# Patient Record
Sex: MALE | Race: WHITE | NOT HISPANIC OR LATINO | Employment: FULL TIME | URBAN - NONMETROPOLITAN AREA
[De-identification: names, ages, dates, MRNs, and addresses within clinical notes are randomized per-mention and may not be internally consistent; named-entity substitution may affect disease eponyms.]

---

## 2017-01-05 ENCOUNTER — APPOINTMENT (OUTPATIENT)
Dept: LAB | Facility: MEDICAL CENTER | Age: 52
End: 2017-01-05
Payer: COMMERCIAL

## 2017-01-05 ENCOUNTER — TRANSCRIBE ORDERS (OUTPATIENT)
Dept: LAB | Facility: MEDICAL CENTER | Age: 52
End: 2017-01-05

## 2017-01-05 DIAGNOSIS — E26.09 ALDOSTERONISM WITH HYPERPLASIA OF THE ADRENAL CORTEX (HCC): ICD-10-CM

## 2017-01-05 DIAGNOSIS — E26.09 ALDOSTERONISM WITH HYPERPLASIA OF THE ADRENAL CORTEX (HCC): Primary | ICD-10-CM

## 2017-01-05 DIAGNOSIS — R80.0 ISOLATED NON-NEPHROTIC PROTEINURIA: ICD-10-CM

## 2017-01-05 LAB
ANION GAP SERPL CALCULATED.3IONS-SCNC: 7 MMOL/L (ref 4–13)
BUN SERPL-MCNC: 17 MG/DL (ref 5–25)
CALCIUM SERPL-MCNC: 9.3 MG/DL (ref 8.3–10.1)
CHLORIDE SERPL-SCNC: 106 MMOL/L (ref 100–108)
CO2 SERPL-SCNC: 28 MMOL/L (ref 21–32)
CREAT SERPL-MCNC: 1.31 MG/DL (ref 0.6–1.3)
CREAT UR-MCNC: 75.7 MG/DL
GFR SERPL CREATININE-BSD FRML MDRD: 57.7 ML/MIN/1.73SQ M
GLUCOSE SERPL-MCNC: 133 MG/DL (ref 65–140)
MICROALBUMIN UR-MCNC: 323 MG/L (ref 0–20)
MICROALBUMIN/CREAT 24H UR: 427 MG/G CREATININE (ref 0–30)
POTASSIUM SERPL-SCNC: 4 MMOL/L (ref 3.5–5.3)
SODIUM SERPL-SCNC: 141 MMOL/L (ref 136–145)

## 2017-01-05 PROCEDURE — 36415 COLL VENOUS BLD VENIPUNCTURE: CPT

## 2017-01-05 PROCEDURE — 82570 ASSAY OF URINE CREATININE: CPT | Performed by: INTERNAL MEDICINE

## 2017-01-05 PROCEDURE — 80048 BASIC METABOLIC PNL TOTAL CA: CPT

## 2017-01-05 PROCEDURE — 82043 UR ALBUMIN QUANTITATIVE: CPT | Performed by: INTERNAL MEDICINE

## 2017-01-06 ENCOUNTER — GENERIC CONVERSION - ENCOUNTER (OUTPATIENT)
Dept: OTHER | Facility: OTHER | Age: 52
End: 2017-01-06

## 2017-05-01 ENCOUNTER — ALLSCRIPTS OFFICE VISIT (OUTPATIENT)
Dept: OTHER | Facility: OTHER | Age: 52
End: 2017-05-01

## 2017-05-01 DIAGNOSIS — R53.82 CHRONIC FATIGUE: ICD-10-CM

## 2017-05-01 DIAGNOSIS — R73.01 IMPAIRED FASTING GLUCOSE: ICD-10-CM

## 2017-05-01 DIAGNOSIS — Z12.5 ENCOUNTER FOR SCREENING FOR MALIGNANT NEOPLASM OF PROSTATE: ICD-10-CM

## 2017-05-01 DIAGNOSIS — I10 ESSENTIAL (PRIMARY) HYPERTENSION: ICD-10-CM

## 2017-05-01 DIAGNOSIS — E07.9 DISORDER OF THYROID: ICD-10-CM

## 2017-05-01 DIAGNOSIS — Z12.11 ENCOUNTER FOR SCREENING FOR MALIGNANT NEOPLASM OF COLON: ICD-10-CM

## 2017-05-01 DIAGNOSIS — E78.5 HYPERLIPIDEMIA: ICD-10-CM

## 2017-05-02 ENCOUNTER — TRANSCRIBE ORDERS (OUTPATIENT)
Dept: LAB | Facility: MEDICAL CENTER | Age: 52
End: 2017-05-02

## 2017-05-02 ENCOUNTER — APPOINTMENT (OUTPATIENT)
Dept: LAB | Facility: MEDICAL CENTER | Age: 52
End: 2017-05-02
Payer: COMMERCIAL

## 2017-05-02 DIAGNOSIS — R53.82 CHRONIC FATIGUE: ICD-10-CM

## 2017-05-02 DIAGNOSIS — Z12.5 ENCOUNTER FOR SCREENING FOR MALIGNANT NEOPLASM OF PROSTATE: ICD-10-CM

## 2017-05-02 DIAGNOSIS — R73.01 IMPAIRED FASTING GLUCOSE: ICD-10-CM

## 2017-05-02 DIAGNOSIS — I10 ESSENTIAL (PRIMARY) HYPERTENSION: ICD-10-CM

## 2017-05-02 DIAGNOSIS — E78.5 HYPERLIPIDEMIA: ICD-10-CM

## 2017-05-02 DIAGNOSIS — Z12.11 ENCOUNTER FOR SCREENING FOR MALIGNANT NEOPLASM OF COLON: ICD-10-CM

## 2017-05-02 DIAGNOSIS — E07.9 DISORDER OF THYROID: ICD-10-CM

## 2017-05-02 LAB
CHOLEST SERPL-MCNC: 272 MG/DL (ref 50–200)
EST. AVERAGE GLUCOSE BLD GHB EST-MCNC: 140 MG/DL
HBA1C MFR BLD: 6.5 % (ref 4.2–6.3)
HDLC SERPL-MCNC: 55 MG/DL (ref 40–60)
HEMOCCULT STL QL IA: NEGATIVE
LDLC SERPL CALC-MCNC: 174 MG/DL (ref 0–100)
PROLACTIN SERPL-MCNC: 6.2 NG/ML (ref 2.5–17.4)
PSA SERPL-MCNC: 1 NG/ML (ref 0–4)
TRIGL SERPL-MCNC: 213 MG/DL
TSH SERPL DL<=0.05 MIU/L-ACNC: 1.34 UIU/ML (ref 0.36–3.74)

## 2017-05-02 PROCEDURE — 36415 COLL VENOUS BLD VENIPUNCTURE: CPT

## 2017-05-02 PROCEDURE — 80061 LIPID PANEL: CPT

## 2017-05-02 PROCEDURE — G0103 PSA SCREENING: HCPCS

## 2017-05-02 PROCEDURE — 83036 HEMOGLOBIN GLYCOSYLATED A1C: CPT

## 2017-05-02 PROCEDURE — 84402 ASSAY OF FREE TESTOSTERONE: CPT

## 2017-05-02 PROCEDURE — 84403 ASSAY OF TOTAL TESTOSTERONE: CPT

## 2017-05-02 PROCEDURE — 84146 ASSAY OF PROLACTIN: CPT

## 2017-05-02 PROCEDURE — 84443 ASSAY THYROID STIM HORMONE: CPT

## 2017-05-02 PROCEDURE — G0328 FECAL BLOOD SCRN IMMUNOASSAY: HCPCS

## 2017-05-03 ENCOUNTER — GENERIC CONVERSION - ENCOUNTER (OUTPATIENT)
Dept: OTHER | Facility: OTHER | Age: 52
End: 2017-05-03

## 2017-05-04 ENCOUNTER — GENERIC CONVERSION - ENCOUNTER (OUTPATIENT)
Dept: OTHER | Facility: OTHER | Age: 52
End: 2017-05-04

## 2017-05-04 LAB
TESTOST FREE SERPL-MCNC: 8.4 PG/ML (ref 7.2–24)
TESTOST SERPL-MCNC: 386 NG/DL (ref 348–1197)
TESTOSTERONE COMMENT: NORMAL

## 2017-05-22 ENCOUNTER — TRANSCRIBE ORDERS (OUTPATIENT)
Dept: ICU | Facility: HOSPITAL | Age: 52
End: 2017-05-22

## 2017-05-22 ENCOUNTER — APPOINTMENT (OUTPATIENT)
Dept: LAB | Facility: MEDICAL CENTER | Age: 52
End: 2017-05-22
Payer: COMMERCIAL

## 2017-05-22 DIAGNOSIS — E26.9 HYPERALDOSTERONISM, UNSPECIFIED (HCC): Primary | ICD-10-CM

## 2017-05-22 DIAGNOSIS — E26.9 HYPERALDOSTERONISM, UNSPECIFIED (HCC): ICD-10-CM

## 2017-05-22 DIAGNOSIS — R80.9 PROTEINURIA, UNSPECIFIED TYPE: ICD-10-CM

## 2017-05-22 LAB
ANION GAP SERPL CALCULATED.3IONS-SCNC: 8 MMOL/L (ref 4–13)
BUN SERPL-MCNC: 22 MG/DL (ref 5–25)
CALCIUM SERPL-MCNC: 9 MG/DL (ref 8.3–10.1)
CHLORIDE SERPL-SCNC: 105 MMOL/L (ref 100–108)
CO2 SERPL-SCNC: 25 MMOL/L (ref 21–32)
CREAT SERPL-MCNC: 1.19 MG/DL (ref 0.6–1.3)
GFR SERPL CREATININE-BSD FRML MDRD: >60 ML/MIN/1.73SQ M
GLUCOSE SERPL-MCNC: 121 MG/DL (ref 65–140)
POTASSIUM SERPL-SCNC: 4.2 MMOL/L (ref 3.5–5.3)
SODIUM SERPL-SCNC: 138 MMOL/L (ref 136–145)

## 2017-05-22 PROCEDURE — 36415 COLL VENOUS BLD VENIPUNCTURE: CPT

## 2017-05-22 PROCEDURE — 80048 BASIC METABOLIC PNL TOTAL CA: CPT

## 2017-05-22 PROCEDURE — 84165 PROTEIN E-PHORESIS SERUM: CPT

## 2017-05-23 ENCOUNTER — APPOINTMENT (OUTPATIENT)
Dept: LAB | Facility: MEDICAL CENTER | Age: 52
End: 2017-05-23
Payer: COMMERCIAL

## 2017-05-23 ENCOUNTER — TRANSCRIBE ORDERS (OUTPATIENT)
Dept: LAB | Facility: MEDICAL CENTER | Age: 52
End: 2017-05-23

## 2017-05-23 DIAGNOSIS — R80.9 PROTEINURIA, UNSPECIFIED TYPE: Primary | ICD-10-CM

## 2017-05-23 DIAGNOSIS — E26.9 HYPERALDOSTERONISM, UNSPECIFIED (HCC): ICD-10-CM

## 2017-05-23 LAB
CREAT UR-MCNC: 143 MG/DL
MICROALBUMIN UR-MCNC: 696 MG/L (ref 0–20)
MICROALBUMIN/CREAT 24H UR: 487 MG/G CREATININE (ref 0–30)

## 2017-05-23 PROCEDURE — 84166 PROTEIN E-PHORESIS/URINE/CSF: CPT | Performed by: INTERNAL MEDICINE

## 2017-05-23 PROCEDURE — 82570 ASSAY OF URINE CREATININE: CPT | Performed by: INTERNAL MEDICINE

## 2017-05-23 PROCEDURE — 82043 UR ALBUMIN QUANTITATIVE: CPT | Performed by: INTERNAL MEDICINE

## 2017-05-24 LAB
ALBUMIN SERPL ELPH-MCNC: 3.99 G/DL (ref 3.5–5)
ALBUMIN SERPL ELPH-MCNC: 58.7 % (ref 52–65)
ALPHA1 GLOB SERPL ELPH-MCNC: 0.25 G/DL (ref 0.1–0.4)
ALPHA1 GLOB SERPL ELPH-MCNC: 3.7 % (ref 2.5–5)
ALPHA2 GLOB SERPL ELPH-MCNC: 0.63 G/DL (ref 0.4–1.2)
ALPHA2 GLOB SERPL ELPH-MCNC: 9.3 % (ref 7–13)
BETA GLOB ABNORMAL SERPL ELPH-MCNC: 0.52 G/DL (ref 0.4–0.8)
BETA1 GLOB SERPL ELPH-MCNC: 7.7 % (ref 5–13)
BETA2 GLOB SERPL ELPH-MCNC: 7.7 % (ref 2–8)
BETA2+GAMMA GLOB SERPL ELPH-MCNC: 0.52 G/DL (ref 0.2–0.5)
GAMMA GLOB ABNORMAL SERPL ELPH-MCNC: 0.88 G/DL (ref 0.5–1.6)
GAMMA GLOB SERPL ELPH-MCNC: 12.9 % (ref 12–22)
IGG/ALB SER: 1.42 {RATIO} (ref 1.1–1.8)
PROT PATTERN SERPL ELPH-IMP: ABNORMAL
PROT SERPL-MCNC: 6.8 G/DL (ref 6.4–8.2)

## 2017-05-26 LAB
ALBUMIN UR ELPH-MCNC: 87.1 %
ALPHA1 GLOB MFR UR ELPH: 1.7 %
ALPHA2 GLOB MFR UR ELPH: 2.1 %
B-GLOBULIN MFR UR ELPH: 4.1 %
GAMMA GLOB MFR UR ELPH: 5 %
PROT PATTERN UR ELPH-IMP: ABNORMAL
PROT UR-MCNC: 76 MG/DL

## 2017-06-06 ENCOUNTER — ALLSCRIPTS OFFICE VISIT (OUTPATIENT)
Dept: OTHER | Facility: OTHER | Age: 52
End: 2017-06-06

## 2017-06-07 ENCOUNTER — GENERIC CONVERSION - ENCOUNTER (OUTPATIENT)
Dept: OTHER | Facility: OTHER | Age: 52
End: 2017-06-07

## 2017-08-24 ENCOUNTER — APPOINTMENT (OUTPATIENT)
Dept: LAB | Facility: MEDICAL CENTER | Age: 52
End: 2017-08-24
Payer: COMMERCIAL

## 2017-08-24 ENCOUNTER — TRANSCRIBE ORDERS (OUTPATIENT)
Dept: LAB | Facility: MEDICAL CENTER | Age: 52
End: 2017-08-24

## 2017-08-24 DIAGNOSIS — E13.9 OTHER SPECIFIED DIABETES MELLITUS: ICD-10-CM

## 2017-08-24 DIAGNOSIS — E13.9 OTHER SPECIFIED DIABETES MELLITUS: Primary | ICD-10-CM

## 2017-08-24 LAB
EST. AVERAGE GLUCOSE BLD GHB EST-MCNC: 137 MG/DL
HBA1C MFR BLD: 6.4 % (ref 4.2–6.3)

## 2017-08-24 PROCEDURE — 36415 COLL VENOUS BLD VENIPUNCTURE: CPT

## 2017-08-24 PROCEDURE — 83036 HEMOGLOBIN GLYCOSYLATED A1C: CPT

## 2017-08-25 ENCOUNTER — GENERIC CONVERSION - ENCOUNTER (OUTPATIENT)
Dept: OTHER | Facility: OTHER | Age: 52
End: 2017-08-25

## 2017-09-07 ENCOUNTER — ALLSCRIPTS OFFICE VISIT (OUTPATIENT)
Dept: OTHER | Facility: OTHER | Age: 52
End: 2017-09-07

## 2017-09-07 DIAGNOSIS — E11.42 TYPE 2 DIABETES MELLITUS WITH DIABETIC POLYNEUROPATHY (HCC): ICD-10-CM

## 2017-09-12 ENCOUNTER — TRANSCRIBE ORDERS (OUTPATIENT)
Dept: LAB | Facility: MEDICAL CENTER | Age: 52
End: 2017-09-12

## 2017-09-12 ENCOUNTER — APPOINTMENT (OUTPATIENT)
Dept: LAB | Facility: MEDICAL CENTER | Age: 52
End: 2017-09-12
Payer: COMMERCIAL

## 2017-09-12 DIAGNOSIS — E78.5 OTHER AND UNSPECIFIED HYPERLIPIDEMIA: Primary | ICD-10-CM

## 2017-09-12 DIAGNOSIS — E11.42 TYPE 2 DIABETES MELLITUS WITH DIABETIC POLYNEUROPATHY (HCC): ICD-10-CM

## 2017-09-12 DIAGNOSIS — E78.5 OTHER AND UNSPECIFIED HYPERLIPIDEMIA: ICD-10-CM

## 2017-09-12 LAB
ANION GAP SERPL CALCULATED.3IONS-SCNC: 8 MMOL/L (ref 4–13)
BUN SERPL-MCNC: 20 MG/DL (ref 5–25)
CALCIUM SERPL-MCNC: 9.5 MG/DL (ref 8.3–10.1)
CHLORIDE SERPL-SCNC: 103 MMOL/L (ref 100–108)
CHOLEST SERPL-MCNC: 268 MG/DL (ref 50–200)
CO2 SERPL-SCNC: 25 MMOL/L (ref 21–32)
CREAT SERPL-MCNC: 1.32 MG/DL (ref 0.6–1.3)
GFR SERPL CREATININE-BSD FRML MDRD: 62 ML/MIN/1.73SQ M
GLUCOSE P FAST SERPL-MCNC: 105 MG/DL (ref 65–99)
HDLC SERPL-MCNC: 50 MG/DL (ref 40–60)
LDLC SERPL CALC-MCNC: 171 MG/DL (ref 0–100)
POTASSIUM SERPL-SCNC: 4.3 MMOL/L (ref 3.5–5.3)
SODIUM SERPL-SCNC: 136 MMOL/L (ref 136–145)
TRIGL SERPL-MCNC: 237 MG/DL
URATE SERPL-MCNC: 8.2 MG/DL (ref 4.2–8)

## 2017-09-12 PROCEDURE — 84550 ASSAY OF BLOOD/URIC ACID: CPT

## 2017-09-12 PROCEDURE — 80061 LIPID PANEL: CPT

## 2017-09-12 PROCEDURE — 80048 BASIC METABOLIC PNL TOTAL CA: CPT

## 2017-09-12 PROCEDURE — 36415 COLL VENOUS BLD VENIPUNCTURE: CPT

## 2017-09-13 ENCOUNTER — GENERIC CONVERSION - ENCOUNTER (OUTPATIENT)
Dept: OTHER | Facility: OTHER | Age: 52
End: 2017-09-13

## 2017-10-03 ENCOUNTER — GENERIC CONVERSION - ENCOUNTER (OUTPATIENT)
Dept: FAMILY MEDICINE CLINIC | Facility: CLINIC | Age: 52
End: 2017-10-03

## 2017-10-10 ENCOUNTER — TRANSCRIBE ORDERS (OUTPATIENT)
Dept: LAB | Facility: MEDICAL CENTER | Age: 52
End: 2017-10-10

## 2017-10-10 ENCOUNTER — APPOINTMENT (OUTPATIENT)
Dept: LAB | Facility: MEDICAL CENTER | Age: 52
End: 2017-10-10
Payer: COMMERCIAL

## 2017-10-10 DIAGNOSIS — N18.2 CHRONIC KIDNEY DISEASE, STAGE II (MILD): ICD-10-CM

## 2017-10-10 DIAGNOSIS — N18.2 CHRONIC KIDNEY DISEASE, STAGE II (MILD): Primary | ICD-10-CM

## 2017-10-10 DIAGNOSIS — R80.9 PROTEINURIA, UNSPECIFIED TYPE: ICD-10-CM

## 2017-10-10 LAB
ANION GAP SERPL CALCULATED.3IONS-SCNC: 6 MMOL/L (ref 4–13)
BACTERIA UR QL AUTO: NORMAL /HPF
BILIRUB UR QL STRIP: NEGATIVE
BUN SERPL-MCNC: 27 MG/DL (ref 5–25)
CALCIUM SERPL-MCNC: 9.2 MG/DL (ref 8.3–10.1)
CHLORIDE SERPL-SCNC: 103 MMOL/L (ref 100–108)
CLARITY UR: CLEAR
CO2 SERPL-SCNC: 27 MMOL/L (ref 21–32)
COLOR UR: YELLOW
CREAT SERPL-MCNC: 1.33 MG/DL (ref 0.6–1.3)
CREAT UR-MCNC: 117 MG/DL
GFR SERPL CREATININE-BSD FRML MDRD: 61 ML/MIN/1.73SQ M
GLUCOSE P FAST SERPL-MCNC: 119 MG/DL (ref 65–99)
GLUCOSE UR STRIP-MCNC: NEGATIVE MG/DL
HGB UR QL STRIP.AUTO: NEGATIVE
HYALINE CASTS #/AREA URNS LPF: NORMAL /LPF
KETONES UR STRIP-MCNC: NEGATIVE MG/DL
LEUKOCYTE ESTERASE UR QL STRIP: NEGATIVE
MICROALBUMIN UR-MCNC: 654 MG/L (ref 0–20)
MICROALBUMIN/CREAT 24H UR: 559 MG/G CREATININE (ref 0–30)
NITRITE UR QL STRIP: NEGATIVE
NON-SQ EPI CELLS URNS QL MICRO: NORMAL /HPF
PH UR STRIP.AUTO: 6 [PH] (ref 4.5–8)
POTASSIUM SERPL-SCNC: 4.4 MMOL/L (ref 3.5–5.3)
PROT UR STRIP-MCNC: ABNORMAL MG/DL
RBC #/AREA URNS AUTO: NORMAL /HPF
SODIUM SERPL-SCNC: 136 MMOL/L (ref 136–145)
SP GR UR STRIP.AUTO: 1.02 (ref 1–1.03)
UROBILINOGEN UR QL STRIP.AUTO: 0.2 E.U./DL
WBC #/AREA URNS AUTO: NORMAL /HPF

## 2017-10-10 PROCEDURE — 82043 UR ALBUMIN QUANTITATIVE: CPT | Performed by: INTERNAL MEDICINE

## 2017-10-10 PROCEDURE — 81001 URINALYSIS AUTO W/SCOPE: CPT | Performed by: INTERNAL MEDICINE

## 2017-10-10 PROCEDURE — 80048 BASIC METABOLIC PNL TOTAL CA: CPT

## 2017-10-10 PROCEDURE — 82570 ASSAY OF URINE CREATININE: CPT | Performed by: INTERNAL MEDICINE

## 2017-10-10 PROCEDURE — 36415 COLL VENOUS BLD VENIPUNCTURE: CPT

## 2017-10-20 ENCOUNTER — GENERIC CONVERSION - ENCOUNTER (OUTPATIENT)
Dept: OTHER | Facility: OTHER | Age: 52
End: 2017-10-20

## 2017-11-08 ENCOUNTER — TRANSCRIBE ORDERS (OUTPATIENT)
Dept: RADIOLOGY | Facility: MEDICAL CENTER | Age: 52
End: 2017-11-08

## 2017-11-08 ENCOUNTER — APPOINTMENT (OUTPATIENT)
Dept: LAB | Facility: MEDICAL CENTER | Age: 52
End: 2017-11-08
Payer: COMMERCIAL

## 2017-11-08 DIAGNOSIS — N18.2 CHRONIC KIDNEY DISEASE, STAGE II (MILD): ICD-10-CM

## 2017-11-08 DIAGNOSIS — N18.2 CHRONIC KIDNEY DISEASE, STAGE II (MILD): Primary | ICD-10-CM

## 2017-11-08 DIAGNOSIS — I12.9 PARENCHYMAL RENAL HYPERTENSION, STAGE 1 THROUGH STAGE 4 OR UNSPECIFIED CHRONIC KIDNEY DISEASE: ICD-10-CM

## 2017-11-08 LAB
ANION GAP SERPL CALCULATED.3IONS-SCNC: 5 MMOL/L (ref 4–13)
BUN SERPL-MCNC: 29 MG/DL (ref 5–25)
CALCIUM SERPL-MCNC: 9.5 MG/DL (ref 8.3–10.1)
CHLORIDE SERPL-SCNC: 104 MMOL/L (ref 100–108)
CO2 SERPL-SCNC: 29 MMOL/L (ref 21–32)
CREAT SERPL-MCNC: 1.33 MG/DL (ref 0.6–1.3)
GFR SERPL CREATININE-BSD FRML MDRD: 61 ML/MIN/1.73SQ M
GLUCOSE SERPL-MCNC: 115 MG/DL (ref 65–140)
POTASSIUM SERPL-SCNC: 4.5 MMOL/L (ref 3.5–5.3)
SODIUM SERPL-SCNC: 138 MMOL/L (ref 136–145)

## 2017-11-08 PROCEDURE — 36415 COLL VENOUS BLD VENIPUNCTURE: CPT

## 2017-11-08 PROCEDURE — 80048 BASIC METABOLIC PNL TOTAL CA: CPT

## 2017-11-24 DIAGNOSIS — E11.29 TYPE 2 DIABETES MELLITUS WITH OTHER DIABETIC KIDNEY COMPLICATION (HCC): ICD-10-CM

## 2017-11-24 DIAGNOSIS — E11.9 TYPE 2 DIABETES MELLITUS WITHOUT COMPLICATIONS (HCC): ICD-10-CM

## 2018-01-09 NOTE — RESULT NOTES
Verified Results  (1) HOMOCYSTEINE [id number] 02:31PM Elfego Nena Order Number: [id number]   Order Number: [id number]     Test Name Result Flag Reference   HOMOCYSTEINE 13 5 umol/L  5 3-14 2

## 2018-01-10 NOTE — RESULT NOTES
Verified Results  (1) TESTOSTERONE, FREE (DIRECT) AND TOTAL [id number] 07:41AM Laly Neither   TW Order Number: [id number]     Test Name Result Flag Reference   FREE TESTOSTERONE, DIRECT 8 4 pg/mL  7 2 - 24 0   COMMENT Comment     Adult male reference interval is based on a population of lean males  up to 36years old  TESTOSTERONE (TOTAL) 386 ng/dL  348 - 1197   Performed at:  [address] [place] [address]  [id number]  : Edna Brown MD, Phone:  [phone]       Plan  Diabetes mellitus    · MetFORMIN HCl ER (MOD) 500 MG Oral Tablet Extended Release 24 Hour; Take 1  tablet at bed x 1 month, then increase to 2 tablets at bed   · (1) HEMOGLOBIN A1C; Status:Active;  Requested for:90Zvc0080;

## 2018-01-11 NOTE — RESULT NOTES
Verified Results  (1) PROTEIN S ACTIVITY [id number] 02:22PM Oswald Jc Order Number: [id number]     Test Name Result Flag Reference   PROTEIN S ACTIVITY 165 % H 60 - 145   Performed at:  [address]  [id number]  : Federico Vanessa MD, Phone:  [phone]     (1) FACTOR V LEIDEN MUTATION DNA ANALYSIS [id number] 02:22PM Oswald Jc Order Number: [id number]     Test Name Result Flag Reference   FACTOR V LEIDEN Comment     Result:  Negative (no mutation found)Factor V Leiden is a specific mutation (R506Q) in the factorV gene that is associated with an increased risk of venousthrombosis  Factor V Leiden is more resistant toinactivation by activated protein C  As a result, factor Vpersists in the circulation leading to a mild hyper-coagulable state  The Leiden mutation accounts for 90% -95% of APC resistance  Factor V Leiden has been reported inpatients with deep vein thrombosis, pulmonary embolus,central retinal vein occlusion, cerebral sinus thrombosisand hepatic vein thrombosis  Other risk factors to beconsidered in the workup for venous thrombosis include [id number] mutation in the factor II (prothrombin) gene,protein S and C deficiency, and antithrombin deficiencies  Anticardiolipin antibody and lupus anticoagulant analysismay be appropriate for certain patients, as well ashomocysteine levels  Contact your local [facility] for information on how to orderadditional testing if desired  FACTOR V LEIDEN INTERPRETATION Comment     **Genetic counselors are available for health care providers to**  discuss results at [phone]  Methodology:DNA analysis of the Factor V gene was performed by allele-specificPCR  The diagnostic sensitivity and specificity is >99% for both  Molecular-based testing is highly accurate, but as in any laboratorytest, diagnostic errors may occur   All test results must be combinedwith clinical information for the most accurate interpretation  References:Melisa BARNEY ()  [facility]in [facility] Med 17:[phone]  Camila Price, Thea Sloan, PhDWhit Rivera, PhDKellen Ugarte, Rosalba Vernon, PhD    Performed at:  [address] [place] [address]  [id number]  : Aurther Galeazzi MD, Phone:  [phone]

## 2018-01-12 VITALS
DIASTOLIC BLOOD PRESSURE: 84 MMHG | BODY MASS INDEX: 37.84 KG/M2 | HEIGHT: 72 IN | WEIGHT: 279.4 LBS | SYSTOLIC BLOOD PRESSURE: 124 MMHG

## 2018-01-12 VITALS
HEIGHT: 72 IN | SYSTOLIC BLOOD PRESSURE: 124 MMHG | BODY MASS INDEX: 36.6 KG/M2 | DIASTOLIC BLOOD PRESSURE: 90 MMHG | WEIGHT: 270.25 LBS

## 2018-01-12 NOTE — RESULT NOTES
Verified Results  (1) HEMOGLOBIN A1C [id number] 07:34AM Artisjuve Blackon     Test Name Result Flag Reference   HEMOGLOBIN A1C 6 4 % H 4 2-6 3   EST  AVG   GLUCOSE 137 mg/dl

## 2018-01-12 NOTE — RESULT NOTES
Verified Results  (1) OCCULT BLOOD, FECAL IMMUNOCHEMICAL TEST [id number] 07:41AM Erenis Order Number: [id number]     Test Name Result Flag Reference   OCCULT BLD, FECAL IMMUNOLOGICAL Negative  Negative   Performed by Fecal Immunochemical Test      (1) PSA (SCREEN) (Dx V76 44 Screen for Prostate Cancer) [id number] 07:41AM Erenis Order Number: [id number]     Test Name Result Flag Reference   PROSTATE SPECIFIC ANTIGEN 1 0 ng/mL  0 0-4 0   American Urological Association Guidelines define biochemical recurrence of prostate cancer as a detectable or rising PSA value post-radical prostatectomy that is greater than or equal to 0 2 ng/mL with a second confirmatory level of greater than or equal to 0 2 ng/mL  (1) PROLACTIN [id number] 07:41AM Erenis Order Number: [id number]     Test Name Result Flag Reference   PROLACTIN 6 2 ng/mL  2 5-17 4     (1) TSH [id number] 07:41AM Erenis Order Number: [id number]     Test Name Result Flag Reference   TSH 1 340 uIU/mL  0 358-3 740   Patients undergoing fluorescein dye angiography may retain small amounts of fluorescein in the body for 48-72 hours post procedure  Samples containing fluorescein can produce falsely depressed TSH values  If the patient had this procedure,a specimen should be resubmitted post fluorescein clearance  (1) HEMOGLOBIN A1C [id number] 07:41AM Erenis Order Number: [id number]     Test Name Result Flag Reference   HEMOGLOBIN A1C 6 5 % H 4 2-6 3   EST  AVG  GLUCOSE 140 mg/dl       (1) LIPID PANEL, FASTING [id number] 07:41AM Erenis Order Number: [id number]     Test Name Result Flag Reference   CHOLESTEROL 272 mg/dL H    HDL,DIRECT 55 mg/dL  40-60   Specimen collection should occur prior to Metamizole administration due to the potential for falsely depressed results     LDL CHOLESTEROL CALCULATED 174 mg/dL H 0-100   Triglyceride: Normal              <150 mg/dl       Borderline High    150-199 mg/dl       High               200-499 mg/dl       Very High          >499 mg/dl  Cholesterol:         Desirable        <200 mg/dl      Borderline High  200-239 mg/dl      High             >239 mg/dl  HDL Cholesterol:        High    >59 mg/dL      Low     <41 mg/dL  LDL CALCULATED:    This screening LDL is a calculated result  It does not have the accuracy of the Direct Measured LDL in the monitoring of patients with hyperlipidemia and/or statin therapy  Direct Measure LDL (WNT181) must be ordered separately in these patients  TRIGLYCERIDES 213 mg/dL H <=150   Specimen collection should occur prior to N-Acetylcysteine or Metamizole administration due to the potential for falsely depressed results

## 2018-01-12 NOTE — RESULT NOTES
Verified Results  (1) BASIC METABOLIC PROFILE [id number] 01:30PM Lorin Cheadle TW Order Number: [id number]      National Kidney Disease Education Program recommendations are as follows:  GFR calculation is accurate only with a steady state creatinine  Chronic Kidney disease less than 60 ml/min/1 73 sq  meters  Kidney failure less than 15 ml/min/1 73 sq  meters  Test Name Result Flag Reference   GLUCOSE,RANDM 116 mg/dL     If the patient is fasting, the ADA then defines impaired fasting glucose as > 100 mg/dL and diabetes as > or equal to 123 mg/dL     SODIUM 142 mmol/L  136-145   POTASSIUM 4 2 mmol/L  3 5-5 3   CHLORIDE 103 mmol/L  100-108   CARBON DIOXIDE 27 mmol/L  21-32   ANION GAP (CALC) 12 mmol/L  4-13   BLOOD UREA NITROGEN 20 mg/dL  5-25   CREATININE 1 24 mg/dL  0 60-1 30   Standardized to IDMS reference method   CALCIUM 8 9 mg/dL  8 3-10 1   eGFR Non-African American      >60 0 ml/min/1 73sq m     (1) URIC ACID [id number] 01:30PM Marquis Martinez Order Number: [id number]     Test Name Result Flag Reference   URIC ACID 8 7 mg/dL H 4 2-8 0

## 2018-01-13 VITALS
WEIGHT: 271 LBS | HEIGHT: 72 IN | SYSTOLIC BLOOD PRESSURE: 124 MMHG | DIASTOLIC BLOOD PRESSURE: 80 MMHG | BODY MASS INDEX: 36.7 KG/M2

## 2018-01-13 NOTE — MISCELLANEOUS
Assessment    1  Never a smoker   2  History of pulmonary embolism (V12 55) (Z28 714)    Plan  History of pulmonary embolism    · Xarelto 20 MG Oral Tablet; Take 1 tablet daily   Rx By: Noe Cohen; Dispense: 30 Days ; #:30 Tablet; Refill: 6; For: History of pulmonary embolism; JASMYN = N; Print Rx   · (1) ANTICARDIOLIPIN ANTIBODY; Status:Active; Requested for:20Apr2016;    Perform:[facility]; Due:20Apr2017;Ordered;  For:History of pulmonary embolism; Ordered By:Mimi Loredo;   · (1) FACTOR V LEIDEN MUTATION DNA ANALYSIS; Status:Active; Requested  for:20Apr2016;    Perform:[facility]; Due:20Apr2017;Ordered;  For:History of pulmonary embolism; Ordered By:Agustín Loredo;   · (1) MTHFR, DNA ANALYSIS; Status:Active; Requested for:20Apr2016;    Perform:[facility]; Due:20Apr2017;Ordered;  For:History of pulmonary embolism; Ordered By:Agustín Loredo;   · (1) PROTEIN C ACTIVITY; Status:Active; Requested for:20Apr2016;    Perform:[facility]; Due:20Apr2017;Ordered;  For:History of pulmonary embolism; Ordered By:Agustín Loredo;   · (1) PROTEIN S ACTIVITY; Status:Active; Requested for:20Apr2016;    Perform:[facility]; Due:20Apr2017;Ordered;  For:History of pulmonary embolism; Ordered By:Mimi Loredo; Discussion/Summary  Discussion Summary:   Patient's had 2 unprovoked clots now one a DVT in the other one a pulmonary embolism  He now has in need for a lifetime anticoagulation  He is currently on his Route toe, second problem he needs to be tested for genetic hyper coagulable states, so we will order appropriate diagnostic studies  History of Present Illness  TCM Communication [facility]: The patient is being contacted for follow-up after hospitalization and Patient called and spoke with Azeem Cuevas to schedule DARBY appt for 4/20/16 at 10:15 am  Hospital Patient had and ECHO and Cta ED Chest Pe Study performed while in hospital  He was hospitalized at [facility]   The date of admission: 04/06/2016, date of discharge: 04/08/2016  Diagnosis: Shortness of breath; chest pain D/C dx - DVT; PE; essential HTN  He was discharged to home  Medications were not reviewed today  He scheduled a follow up appointment  The patient is currently asymptomatic  Counseling was provided to the patient  Tyler Jacinto received call from patient to schedule appt  Communication performed and completed by Josee Peck      Review of Systems  Complete-Male:   Constitutional: No fever or chills, feels well, no tiredness, no recent weight gain or weight loss  Eyes: No complaints of eye pain, no red eyes, no discharge from eyes, no itchy eyes  ENT: no complaints of earache, no hearing loss, no nosebleeds, no nasal discharge, no sore throat, no hoarseness  Cardiovascular: No complaints of slow heart rate, no fast heart rate, no chest pain, no palpitations, no leg claudication, no lower extremity  Respiratory: No complaints of shortness of breath, no wheezing, no cough, no SOB on exertion, no orthopnea or PND  Gastrointestinal: No complaints of abdominal pain, no constipation, no nausea or vomiting, no diarrhea or bloody stools  Genitourinary: No complaints of dysuria, no incontinence, no hesitancy, no nocturia, no genital lesion, no testicular pain  Musculoskeletal: No complaints of arthralgia, no myalgias, no joint swelling or stiffness, no limb pain or swelling  Integumentary: No complaints of skin rash or skin lesions, no itching, no skin wound, no dry skin  Neurological: No compliants of headache, no confusion, no convulsions, no numbness or tingling, no dizziness or fainting, no limb weakness, no difficulty walking  Psychiatric: Is not suicidal, no sleep disturbances, no anxiety or depression, no change in personality, no emotional problems     Endocrine: No complaints of proptosis, no hot flashes, no muscle weakness, no erectile dysfunction, no deepening of the voice, no feelings of weakness  Hematologic/Lymphatic: No complaints of swollen glands, no swollen glands in the neck, does not bleed easily, no easy bruising  ROS Reviewed:   ROS reviewed  Active Problems    1  Acute gouty arthropathy (274 01) (M10 00)   2  Bone spur (726 91) (M77 9)   3  Gout (274 9) (M10 9)   4  Headache (784 0) (R51)   5  Hyperlipidemia (272 4) (E78 5)   6  Hypertension (401 9) (I10)   7  Need for influenza vaccination (V04 81) (Z23)   8  Restless legs syndrome (333 94) (G25 81)   9  Rheumatoid arthritis of foot, unspecified laterality, unspecified rheumatoid factor   presence (714 0) (M06 9)   10  Thyroid disorder (246 9) (E07 9)    Past Medical History    1  History of Acute deep vein thrombosis of lower limb, unspecified laterality   2  History of Acute tracheobronchitis (466 0) (J20 9)   3  History of Chronic cholecystitis (575 11) (K81 1)   4  History of Limb pain (729 5) (M79 609)   5  History of Sciatica (724 3) (M54 30)   6  History of Sinusitis, acute (461 9) (J01 90)    Surgical History    1  History of Appendectomy   2  History of Arthrodesis Lumbar L___   3  History of Cholecystectomy   4  History of Knee Arthroscopy With Lateral Meniscus Repair   5  History of Nasal Septal Deviation Repair   6  History of Oral Surgery Tooth Extraction   7  History of Tonsillectomy With Adenoidectomy   8  History of Uvulectomy  Surgical History Reviewed: The surgical history was reviewed and updated today  Family History    1  Family history of Intracranial Aneurysm Repair    2  Family history of Coronary artery disease (414 00) (I25 10)   3  Family history of hypertension (V17 49) (Z82 49)  Family History Reviewed: The family history was reviewed and updated today  Social History    · Being A Social Drinker   · Never a smoker  Social History Reviewed: The social history was reviewed and updated today  The social history was reviewed and is unchanged  Current Meds   1   Carvedilol 12 5 MG Oral Tablet; Take 1 tablet twice daily Recorded   2  Colcrys 0 6 MG Oral Tablet; TAKE 2 TABLETs for one day and then one tablet daily times   10 days; Therapy: 88DPY1958 to (Evaluate:97Usj0656)  Requested for: 21VIO9037; Last   Rx:31Ryu8015 Ordered   3  Lasix 40 MG Oral Tablet; TAKE 1 TABLET BY MOUTH ONCE DAILY; Therapy: (Onesimo Long) to Recorded   4  Levothyroxine Sodium 112 MCG Oral Tablet; TAKE TWO TABLETS BY MOUTH ONCE   DAILY; Therapy: 47YUK1276 to (Evaluate:33Zow1383)  Requested for: 63EMI2831; Last   Rx:22Mar2016 Ordered   5  Mens Multivitamin Plus TABS; Take 1 tablet daily Recorded   6  MethylPREDNISolone (Barber) 4 MG TABS; TAKE AS DIRECTED ON PATIENT   INSTRUCTION CARD; Therapy: 66HEG0851 to (Last Rx:02Mar2016)  Requested for: 62NRR4201 Ordered   7  Spironolactone 25 MG Oral Tablet; Take 1 tablet daily; Therapy: (Onseimo Long) to Recorded   8  Uloric 40 MG Oral Tablet; TAKE 1 TABLET DAILY AS DIRECTED; Therapy: 66MQQ0183 to (Evaluate:92Kqo7617)  Requested for: 32VUK2613; Last   Rx:35Ztv9895 Ordered   9  Voltaren 1 % Transdermal Gel; as directed 4 times daily Recorded   10  Xarelto 15 MG Oral Tablet; TAKE 1 TABLET Every twelve hours Recorded  Medication List Reviewed: The medication list was reviewed and updated today  Allergies    1  No Known Drug Allergies    Vitals  Signs [Data Includes: Current Encounter]   Recorded: 05RQQ6368 19:27ON   Systolic: 217  Diastolic: 86  Height: 6 ft   Weight: 256 lb   BMI Calculated: 34 72  BSA Calculated: 2 37    Physical Exam    Constitutional   General appearance: No acute distress, well appearing and well nourished  Eyes   Conjunctiva and lids: No swelling, erythema, or discharge  Pupils and irises: Equal, round and reactive to light  Ears, Nose, Mouth, and Throat   External inspection of ears and nose: Normal     Otoscopic examination: Tympanic membrance translucent with normal light reflex  Canals patent without erythema      Nasal mucosa, septum, and turbinates: Normal without edema or erythema  Oropharynx: Normal with no erythema, edema, exudate or lesions  Pulmonary   Respiratory effort: No increased work of breathing or signs of respiratory distress  Auscultation of lungs: Clear to auscultation, equal breath sounds bilaterally, no wheezes, no rales, no rhonci  Cardiovascular   Palpation of heart: Normal PMI, no thrills  Auscultation of heart: Normal rate and rhythm, normal S1 and S2, without murmurs  Examination of extremities for edema and/or varicosities: Normal     Carotid pulses: Normal     Abdomen   Abdomen: Non-tender, no masses  Liver and spleen: No hepatomegaly or splenomegaly  Lymphatic   Palpation of lymph nodes in neck: No lymphadenopathy  Musculoskeletal   Gait and station: Normal     Digits and nails: Normal without clubbing or cyanosis  Inspection/palpation of joints, bones, and muscles: Normal     Skin   Skin and subcutaneous tissue: Normal without rashes or lesions  Neurologic   Cranial nerves: Cranial nerves 2-12 intact  Reflexes: 2+ and symmetric  Sensation: No sensory loss  Psychiatric   Orientation to person, place and time: Normal     Mood and affect: Normal          Future Appointments    Date/Time Provider Specialty Site   04/20/2016 10:15 AM Chyna Hurt DO Family Medicine [address] [place] [address]   Electronically signed by : Luz Apodaca, ; Apr 12 2016 11:30AM EST                       (Author)    Electronically signed by : Izzy Kennedy DO;  Apr 20 2016 11:03AM EST                       (Author)

## 2018-01-14 NOTE — RESULT NOTES
Verified Results  (1) BASIC METABOLIC PROFILE [id number] 07:17AM Amarilis Choi   TW Order Number: [id number]     Test Name Result Flag Reference   SODIUM 136 mmol/L  136-145   POTASSIUM 4 3 mmol/L  3 5-5 3   CHLORIDE 103 mmol/L  100-108   CARBON DIOXIDE 25 mmol/L  21-32   ANION GAP (CALC) 8 mmol/L  4-13   BLOOD UREA NITROGEN 20 mg/dL  5-25   CREATININE 1 32 mg/dL H 0 60-1 30   Standardized to IDMS reference method   CALCIUM 9 5 mg/dL  8 3-10 1   eGFR 62 ml/min/1 73sq m     National Kidney Disease Education Program recommendations are as follows:  GFR calculation is accurate only with a steady state creatinine  Chronic Kidney disease less than 60 ml/min/1 73 sq  meters  Kidney failure less than 15 ml/min/1 73 sq  meters  GLUCOSE FASTING 105 mg/dL H 65-99   Specimen collection should occur prior to Sulfasalazine administration due to the potential for falsely depressed results  Specimen collection should occur prior to Sulfapyridine administration due to the potential for falsely elevated results  (1) URIC ACID 65Bjc1209 07:17AM Amarilis Choi   TW Order Number: [id number]     Test Name Result Flag Reference   URIC ACID 8 2 mg/dL H 4 2-8 0   Specimen collection should occur prior to Metamizole administration due to the potential for falsely depressed results  (1) LIPID PANEL FASTING W DIRECT LDL REFLEX [id number] 07:17AM Amarilis Choi     Test Name Result Flag Reference   CHOLESTEROL 268 mg/dL H    LDL CHOLESTEROL CALCULATED 171 mg/dL H 0-100   This is a patient instruction: This is a fasting test  Water, black tea or black coffee only after 9:00pm the night before the test  Drink 2 glasses of water the morning of the test         Triglyceride:        Normal ??? ??? ??? ??? ??? ??? ??? <150 mg/dl   ??? ??? ???Borderline High ??? ??? 150-199 mg/dl   ??? ??? ? ?? High ??? ??? ??? ??? ??? ??? ??? 200-499 mg/dl   ??? ??? ? ??Very High ??? ??? ??? ??? ??? >499 mg/dl      Cholesterol: Desirable ??? ??? ??? ??? <200 mg/dl   ??? ??? Borderline High ??? 200-239 mg/dl   ??? ??? High ??? ??? ??? ??? ??? ??? >239 mg/dl      HDL Cholesterol:       High ??? ???>59 mg/dL   ??? ??? Low ??? ??? <41 mg/dL      HDL Cholesterol:       High ??? ???>59 mg/dL   ??? ??? Low ??? ??? <41 mg/dL      This screening LDL is a calculated result  It does not have the accuracy of the Direct Measured LDL in the monitoring of patients with hyperlipidemia and/or statin therapy  Direct Measure LDL (EGH985) must be ordered separately in these patients  TRIGLYCERIDES 237 mg/dL H <=150   Specimen collection should occur prior to N-Acetylcysteine or Metamizole administration due to the potential for falsely depressed results  HDL,DIRECT 50 mg/dL  40-60   Specimen collection should occur prior to Metamizole administration due to the potential for falsley depressed results

## 2018-01-14 NOTE — RESULT NOTES
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)  The MTHFR W2299D variant was not identified  Homozygosity for [id number] mutation confers an increased risk for the development ofhyperhomocysteinemia when the individual is deficient in folate,vitamin B6, or vitamin B12  A fasting homocysteine level should bemeasured  Hyperhomocysteinemia may also occur due to mutations inenzymes other than MTHFR that are involved in homocysteine metabolism,or arise due to acquired factors  If homocysteine falls within thenormal range, there is no increased risk for venous thromboembolism orin women, recurrent pregnancy loss  Elevated homocysteine may beassociated with a mildly increased risk for venous thrombosis, coronaryartery disease and recurrent pregnancy loss  Women homozygous forMTHFR C677T also have a modestly increased risk of neural tube defectswith pregnancy, with a greater risk if the fetus is also homozygousfor MTHFR C677T  Other risk factors may be detected through systematicclinical laboratory analysis  REFERENCE: Comment     Sanjuana LD, Agustina Rascon  Am J Epidemiol 2000; 151(9):862-877  Moisedavid Kwong MM, Shantel Garcia Arch Pathol Lab Med 2007; 131(6):872-884  [address] [place] [address]; 10(1):111-113  Melissa SE et al  [facility] Med 2013; 15(2):153-156  Atamaria 86 Gynecol 2011; 118(3):730-740  Manjit Quiroz et al  Eur J Epidemiol 2013; 45(1):906-939  Marilee Keane, PhDWhit Pineda PhDKellen Cabral MS, [facility], PhD    Performed at:  [address] [place] [address]  [id number]  : Nathanael Mensah MD, Phone:  [phone]   ADDITIONAL INFO Comment     Genetic counselors are available to discuss these results with healthcare providers at [phone]  Methylenetetrahydrofolate reductase (MTHFR) is a key enzyme in thefolate pathway and is responsible for the metabolism of homocysteine  There are two common variants in the MTHFR gene, c 655C>T(p Uqa292Mbz), referred to as C677T, and c 1286A>C (p Ypv120Xkw),referred to as Z5352H  Individuals homozygous for C677T (two copiesof the variant), have decreased activity of the MTHFR enzyme and apredisposition to hyperhomocysteinemia, particularly when deficient infolate  Hyperhomocysteinemia is a risk factor for venous thrombosisand coronary artery disease and is associated with an increased riskof fetal open neural tube defects  The C677T variant does notindependently increase risk of these conditions in the absence ofhyperhomocysteinemia  The W9625F variant is not associated withelevated homocysteine levels unless a C677T variant is also present;however, the clinical significance of heterozygosity for both S510Rspu Z9825E is controversial  Population data suggest that these twovariants are not present on the same chromosome, but rare exceptionshave been reported of triple variant MTHFR genotypes (ie  homozygousfor one variant and heterozygous for the other)  Homozygosity vsbJ883G has an estimated frequency of 10% to 15% in Caucasians and 25%in Hispanics  Additional information:Dietary folic acid, B6 and T12 supplementation has been suggested tolower homocysteine levels in some people  Folic acid supplementationhas been shown to reduce the occurrence of neural tube defects  Methodology:DNA analysis of the MTHFR gene was performed by PCRamplification followed by restriction analysis  Thediagnostic sensitivity is >99% for both  Molecular-basedtesting is highly accurate, but as in any laboratory test,rare diagnostic errors may occur  All test results must becombined with clinical information for the most accurateinterpretation

## 2018-01-19 ENCOUNTER — APPOINTMENT (OUTPATIENT)
Dept: LAB | Facility: MEDICAL CENTER | Age: 53
End: 2018-01-19
Payer: COMMERCIAL

## 2018-01-19 ENCOUNTER — TRANSCRIBE ORDERS (OUTPATIENT)
Dept: LAB | Facility: MEDICAL CENTER | Age: 53
End: 2018-01-19

## 2018-01-19 DIAGNOSIS — E13.8 DIABETES MELLITUS OF OTHER TYPE WITH COMPLICATION, UNSPECIFIED LONG TERM INSULIN USE STATUS: ICD-10-CM

## 2018-01-19 DIAGNOSIS — R80.9 PROTEINURIA, UNSPECIFIED TYPE: ICD-10-CM

## 2018-01-19 DIAGNOSIS — E11.29 TYPE 2 DIABETES MELLITUS WITH OTHER DIABETIC KIDNEY COMPLICATION (HCC): ICD-10-CM

## 2018-01-19 DIAGNOSIS — E11.9 TYPE 2 DIABETES MELLITUS WITHOUT COMPLICATIONS (HCC): ICD-10-CM

## 2018-01-19 DIAGNOSIS — N18.2 CHRONIC KIDNEY DISEASE, STAGE II (MILD): ICD-10-CM

## 2018-01-19 DIAGNOSIS — N18.2 CHRONIC KIDNEY DISEASE, STAGE II (MILD): Primary | ICD-10-CM

## 2018-01-19 LAB
ANION GAP SERPL CALCULATED.3IONS-SCNC: 6 MMOL/L (ref 4–13)
BACTERIA UR QL AUTO: NORMAL /HPF
BILIRUB UR QL STRIP: NEGATIVE
BUN SERPL-MCNC: 25 MG/DL (ref 5–25)
CALCIUM SERPL-MCNC: 9.8 MG/DL (ref 8.3–10.1)
CHLORIDE SERPL-SCNC: 104 MMOL/L (ref 100–108)
CLARITY UR: CLEAR
CO2 SERPL-SCNC: 28 MMOL/L (ref 21–32)
COLOR UR: YELLOW
CREAT SERPL-MCNC: 1.43 MG/DL (ref 0.6–1.3)
CREAT UR-MCNC: 173 MG/DL
EST. AVERAGE GLUCOSE BLD GHB EST-MCNC: 154 MG/DL
GFR SERPL CREATININE-BSD FRML MDRD: 56 ML/MIN/1.73SQ M
GLUCOSE P FAST SERPL-MCNC: 148 MG/DL (ref 65–99)
GLUCOSE UR STRIP-MCNC: NEGATIVE MG/DL
HBA1C MFR BLD: 7 % (ref 4.2–6.3)
HGB UR QL STRIP.AUTO: NEGATIVE
HYALINE CASTS #/AREA URNS LPF: NORMAL /LPF
KETONES UR STRIP-MCNC: NEGATIVE MG/DL
LEUKOCYTE ESTERASE UR QL STRIP: NEGATIVE
MICROALBUMIN UR-MCNC: 764 MG/L (ref 0–20)
MICROALBUMIN/CREAT 24H UR: 442 MG/G CREATININE (ref 0–30)
NITRITE UR QL STRIP: NEGATIVE
NON-SQ EPI CELLS URNS QL MICRO: NORMAL /HPF
PH UR STRIP.AUTO: 5.5 [PH] (ref 4.5–8)
POTASSIUM SERPL-SCNC: 4.7 MMOL/L (ref 3.5–5.3)
PROT UR STRIP-MCNC: ABNORMAL MG/DL
RBC #/AREA URNS AUTO: NORMAL /HPF
SODIUM SERPL-SCNC: 138 MMOL/L (ref 136–145)
SP GR UR STRIP.AUTO: 1.02 (ref 1–1.03)
UROBILINOGEN UR QL STRIP.AUTO: 0.2 E.U./DL
WBC #/AREA URNS AUTO: NORMAL /HPF

## 2018-01-19 PROCEDURE — 80048 BASIC METABOLIC PNL TOTAL CA: CPT

## 2018-01-19 PROCEDURE — 82570 ASSAY OF URINE CREATININE: CPT

## 2018-01-19 PROCEDURE — 82043 UR ALBUMIN QUANTITATIVE: CPT

## 2018-01-19 PROCEDURE — 36415 COLL VENOUS BLD VENIPUNCTURE: CPT

## 2018-01-19 PROCEDURE — 83036 HEMOGLOBIN GLYCOSYLATED A1C: CPT

## 2018-01-19 PROCEDURE — 81001 URINALYSIS AUTO W/SCOPE: CPT

## 2018-01-22 ENCOUNTER — GENERIC CONVERSION - ENCOUNTER (OUTPATIENT)
Dept: OTHER | Facility: OTHER | Age: 53
End: 2018-01-22

## 2018-01-24 NOTE — RESULT NOTES
Verified Results  (1) BASIC METABOLIC PROFILE [id number] 07:18AM [facility] [employer]     Test Name Result Flag Reference   SODIUM 138 mmol/L  136-145   POTASSIUM 4 7 mmol/L  3 5-5 3   CHLORIDE 104 mmol/L  100-108   CARBON DIOXIDE 28 mmol/L  21-32   ANION GAP (CALC) 6 mmol/L  4-13   BLOOD UREA NITROGEN 25 mg/dL  5-25   CREATININE 1 43 mg/dL H 0 60-1 30   Standardized to IDMS reference method   CALCIUM 9 8 mg/dL  8 3-10 1   eGFR 56 ml/min/1 73sq m     National Kidney Disease Education Program recommendations are as follows:  GFR calculation is accurate only with a steady state creatinine  Chronic Kidney disease less than 60 ml/min/1 73 sq  meters  Kidney failure less than 15 ml/min/1 73 sq  meters  GLUCOSE FASTING 148 mg/dL H 65-99   Specimen collection should occur prior to Sulfasalazine administration due to the potential for falsely depressed results  Specimen collection should occur prior to Sulfapyridine administration due to the potential for falsely elevated results  (1) HEMOGLOBIN A1C [id number] 07:18AM [facility] [employer]     Test Name Result Flag Reference   HEMOGLOBIN A1C 7 0 % H 4 2-6 3   EST  AVG   GLUCOSE 154 mg/dl

## 2018-01-31 ENCOUNTER — OFFICE VISIT (OUTPATIENT)
Dept: FAMILY MEDICINE CLINIC | Facility: CLINIC | Age: 53
End: 2018-01-31
Payer: COMMERCIAL

## 2018-01-31 VITALS
HEIGHT: 72 IN | SYSTOLIC BLOOD PRESSURE: 132 MMHG | WEIGHT: 283 LBS | TEMPERATURE: 97.6 F | BODY MASS INDEX: 38.33 KG/M2 | DIASTOLIC BLOOD PRESSURE: 84 MMHG

## 2018-01-31 DIAGNOSIS — E11.29 CONTROLLED TYPE 2 DIABETES MELLITUS WITH MICROALBUMINURIA, WITHOUT LONG-TERM CURRENT USE OF INSULIN (HCC): Primary | ICD-10-CM

## 2018-01-31 DIAGNOSIS — E03.9 HYPOTHYROIDISM (ACQUIRED): ICD-10-CM

## 2018-01-31 DIAGNOSIS — I10 ESSENTIAL HYPERTENSION: ICD-10-CM

## 2018-01-31 DIAGNOSIS — G60.9 IDIOPATHIC PERIPHERAL NEUROPATHY: ICD-10-CM

## 2018-01-31 DIAGNOSIS — J01.00 ACUTE MAXILLARY SINUSITIS, RECURRENCE NOT SPECIFIED: ICD-10-CM

## 2018-01-31 DIAGNOSIS — R80.9 CONTROLLED TYPE 2 DIABETES MELLITUS WITH MICROALBUMINURIA, WITHOUT LONG-TERM CURRENT USE OF INSULIN (HCC): Primary | ICD-10-CM

## 2018-01-31 PROBLEM — E11.42 DIABETIC PERIPHERAL NEUROPATHY (HCC): Status: ACTIVE | Noted: 2017-09-07

## 2018-01-31 PROBLEM — N52.1 ERECTILE DYSFUNCTION DUE TO DISEASES CLASSIFIED ELSEWHERE: Status: ACTIVE | Noted: 2017-05-01

## 2018-01-31 PROCEDURE — 3075F SYST BP GE 130 - 139MM HG: CPT | Performed by: FAMILY MEDICINE

## 2018-01-31 PROCEDURE — 3079F DIAST BP 80-89 MM HG: CPT | Performed by: FAMILY MEDICINE

## 2018-01-31 PROCEDURE — 99214 OFFICE O/P EST MOD 30 MIN: CPT | Performed by: FAMILY MEDICINE

## 2018-01-31 PROCEDURE — 3008F BODY MASS INDEX DOCD: CPT | Performed by: FAMILY MEDICINE

## 2018-01-31 RX ORDER — GABAPENTIN 600 MG/1
1 TABLET ORAL 3 TIMES DAILY
COMMUNITY
Start: 2018-01-04 | End: 2018-01-31 | Stop reason: SDUPTHER

## 2018-01-31 RX ORDER — GABAPENTIN 600 MG/1
900 TABLET ORAL 3 TIMES DAILY
Qty: 135 TABLET | Refills: 0 | Status: SHIPPED | OUTPATIENT
Start: 2018-01-31 | End: 2018-03-02 | Stop reason: SDUPTHER

## 2018-01-31 RX ORDER — ALLOPURINOL 300 MG/1
1 TABLET ORAL DAILY
COMMUNITY
Start: 2012-12-17 | End: 2018-05-03 | Stop reason: SDUPTHER

## 2018-01-31 RX ORDER — METFORMIN HYDROCHLORIDE 500 MG/1
2 TABLET, EXTENDED RELEASE ORAL
COMMUNITY
Start: 2017-05-08 | End: 2018-01-31 | Stop reason: SDUPTHER

## 2018-01-31 RX ORDER — ATORVASTATIN CALCIUM 10 MG/1
10 TABLET, FILM COATED ORAL DAILY
COMMUNITY
Start: 2017-07-14 | End: 2018-09-18 | Stop reason: SDUPTHER

## 2018-01-31 RX ORDER — METFORMIN HYDROCHLORIDE 500 MG/1
1000 TABLET, EXTENDED RELEASE ORAL
Qty: 60 TABLET | Refills: 3 | Status: SHIPPED | OUTPATIENT
Start: 2018-01-31 | End: 2018-05-28 | Stop reason: SDUPTHER

## 2018-01-31 RX ORDER — AMOXICILLIN AND CLAVULANATE POTASSIUM 875; 125 MG/1; MG/1
1 TABLET, FILM COATED ORAL EVERY 12 HOURS SCHEDULED
Qty: 20 TABLET | Refills: 0 | Status: SHIPPED | OUTPATIENT
Start: 2018-01-31 | End: 2018-02-10

## 2018-01-31 RX ORDER — SILDENAFIL 100 MG/1
1 TABLET, FILM COATED ORAL AS NEEDED
COMMUNITY
Start: 2017-05-01 | End: 2020-09-10 | Stop reason: SDUPTHER

## 2018-01-31 RX ORDER — LISINOPRIL 2.5 MG/1
5 TABLET ORAL DAILY
Refills: 0 | COMMUNITY
Start: 2017-11-15 | End: 2019-05-16 | Stop reason: SDUPTHER

## 2018-01-31 NOTE — ASSESSMENT & PLAN NOTE
Patient's diabetes is under good control is most recent hemoglobin A1c was 7 which converts to an average blood sugar of all 174 he is not having any episodes of hypoglycemia he is taking his medications faithfully and attempting to watch his diet he has had a recent diabetic eye exam and he has no diabetic sores on his feet although he does have neuropathy which predated him becoming diabetic

## 2018-01-31 NOTE — ASSESSMENT & PLAN NOTE
Patient is currently on 224 mcg of Synthroid no problems with his thyroid feels energetic has not had a recent TSH will need 1 with his next diabetic lab work

## 2018-01-31 NOTE — ASSESSMENT & PLAN NOTE
Blood pressure is being call managed with Nephrology his doing well on is spironolactone and Coreg with good blood pressure readings no headaches or any other symptoms of uncontrolled hypertension

## 2018-01-31 NOTE — PROGRESS NOTES
Assessment/Plan:    No problem-specific Assessment & Plan notes found for this encounter  Diagnoses and all orders for this visit:    Acute maxillary sinusitis, recurrence not specified    Controlled type 2 diabetes mellitus with microalbuminuria, without long-term current use of insulin (HCC)    Hypothyroidism (acquired)    Essential hypertension    Other orders  -     atorvastatin (LIPITOR) 10 mg tablet; Take 1 tablet by mouth daily  -     allopurinol (ZYLOPRIM) 300 mg tablet; Take 1 tablet by mouth daily  -     gabapentin (NEURONTIN) 600 MG tablet; Take 1 tablet by mouth 3 (three) times a day  -     Multiple Vitamins-Minerals (MENS MULTIVITAMIN PLUS) TABS; Take 1 tablet by mouth daily  -     lisinopril (ZESTRIL) 2 5 mg tablet; Take 2 5 tablets by mouth daily  -     metFORMIN (GLUCOPHAGE-XR) 500 mg 24 hr tablet; Take 2 tablets by mouth daily at bedtime  -     sildenafil (VIAGRA) 100 mg tablet; Take 1 tablet by mouth as needed          Subjective:      Patient ID: Kwame Gu is a 46 y o  male  Patient became ill last Tuesday 1 week ago with symptoms of an upper respiratory tract infection he has sinus congestion no fever no chills he has developed a cough and is expectorating yellowish brown mucus his symptoms are getting worse and patient is here seeking treatment for same he has not taken any over-the-counter medications        The following portions of the patient's history were reviewed and updated as appropriate: He  has a past medical history of Arthritis; Chronic cholecystitis; DVT (deep venous thrombosis) ([facility] [address] ); Gout; History of pulmonary embolism; Hypertension; Hypothyroidism; Lumbar back pain; Scab; Sleep apnea; and Wears glasses  He  does not have any pertinent problems on file  He  has a past surgical history that includes Cholecystectomy (03/26/2015); Appendectomy; Spinal fusion; Tonsillectomy (10/16/2013); Nasal septum surgery (10/16/2013);  Uvulopalatopharyngoplasty; [place] tooth extraction; Bunionectomy (Right, 10/7/2016); Arthrodesis; Knee arthroscopy w/ meniscal repair; and Uvulectomy (10/16/2013)  His family history includes Aneurysm in his brother and mother; Coronary artery disease in his father; Hypertension in his father  He  reports that he has never smoked  He has never used smokeless tobacco  He reports that he drinks alcohol  He reports that he does not use drugs  Current Outpatient Prescriptions   Medication Sig Dispense Refill    allopurinol (ZYLOPRIM) 300 mg tablet Take 1 tablet by mouth daily      atorvastatin (LIPITOR) 10 mg tablet Take 1 tablet by mouth daily      carvedilol (COREG) 12 5 mg tablet Take 12 5 mg by mouth 2 (two) times a day with meals   Fish Oil-Cholecalciferol (FISH OIL + D3 PO) Take 2 capsules by mouth daily      furosemide (LASIX) 40 mg tablet Take 40 mg by mouth daily   gabapentin (NEURONTIN) 600 MG tablet Take 1 tablet by mouth 3 (three) times a day      levothyroxine 112 mcg tablet Take 224 mcg by mouth daily   lisinopril (ZESTRIL) 2 5 mg tablet Take 2 5 tablets by mouth daily  0    metFORMIN (GLUCOPHAGE-XR) 500 mg 24 hr tablet Take 2 tablets by mouth daily at bedtime      Multiple Vitamin (MULTIVITAMIN) tablet Take 1 tablet by mouth daily   Multiple Vitamins-Minerals (MENS MULTIVITAMIN PLUS) TABS Take 1 tablet by mouth daily      rivaroxaban (XARELTO) tablet Take 20 mg by mouth daily with breakfast       sildenafil (VIAGRA) 100 mg tablet Take 1 tablet by mouth as needed      spironolactone (ALDACTONE) 25 mg tablet Take 25 mg by mouth daily  No current facility-administered medications for this visit  Current Outpatient Prescriptions on File Prior to Visit   Medication Sig    carvedilol (COREG) 12 5 mg tablet Take 12 5 mg by mouth 2 (two) times a day with meals   Fish Oil-Cholecalciferol (FISH OIL + D3 PO) Take 2 capsules by mouth daily    furosemide (LASIX) 40 mg tablet Take 40 mg by mouth daily      levothyroxine 112 mcg tablet Take 224 mcg by mouth daily   Multiple Vitamin (MULTIVITAMIN) tablet Take 1 tablet by mouth daily   rivaroxaban (XARELTO) tablet Take 20 mg by mouth daily with breakfast     spironolactone (ALDACTONE) 25 mg tablet Take 25 mg by mouth daily   [DISCONTINUED] PREDNISONE, RHONDA, PO Take 1 tablet by mouth as needed     No current facility-administered medications on file prior to visit  He has No Known Allergies       Review of Systems   Constitutional: Positive for fatigue and fever  Negative for activity change, appetite change and diaphoresis  HENT: Positive for sinus pain and sinus pressure  Eyes: Negative  Respiratory: Positive for cough  Negative for apnea, chest tightness, shortness of breath and wheezing  Cardiovascular: Negative for chest pain, palpitations and leg swelling  Gastrointestinal: Negative for abdominal distention, abdominal pain, anal bleeding, constipation, diarrhea, nausea and vomiting  Endocrine: Negative for cold intolerance, heat intolerance, polydipsia, polyphagia and polyuria  Genitourinary: Negative for difficulty urinating, dysuria, flank pain, hematuria and urgency  Musculoskeletal: Negative for arthralgias, back pain, gait problem, joint swelling and myalgias  Skin: Negative for color change, rash and wound  Allergic/Immunologic: Negative for environmental allergies, food allergies and immunocompromised state  Neurological: Negative for dizziness, seizures, syncope, speech difficulty, numbness and headaches  Hematological: Negative for adenopathy  Does not bruise/bleed easily  Psychiatric/Behavioral: Negative for agitation, behavioral problems, hallucinations, sleep disturbance and suicidal ideas  Objective:     Physical Exam   Constitutional: He is oriented to person, place, and time  He appears well-developed and well-nourished  No distress  HENT:   Head: Normocephalic     Right Ear: External ear normal  Left Ear: External ear normal    Mouth/Throat: Oropharyngeal exudate present  Sinuses are injected and hyperemic bilaterally he has copious amounts postnasal drip and inflammation of the posterior pharynx   Eyes: Conjunctivae and EOM are normal  Pupils are equal, round, and reactive to light  Right eye exhibits no discharge  Left eye exhibits no discharge  No scleral icterus  Neck: Normal range of motion  No tracheal deviation present  No thyromegaly present  Cardiovascular: Normal rate, regular rhythm and normal heart sounds  Exam reveals no gallop and no friction rub  No murmur heard  Pulmonary/Chest: Effort normal and breath sounds normal  No respiratory distress  He has no wheezes  Abdominal: Soft  Bowel sounds are normal  He exhibits no mass  There is no tenderness  There is no guarding  Musculoskeletal: He exhibits no edema or deformity  Lymphadenopathy:     He has no cervical adenopathy  Neurological: He is alert and oriented to person, place, and time  No cranial nerve deficit  Skin: Skin is warm and dry  No rash noted  He is not diaphoretic  No erythema  Psychiatric: He has a normal mood and affect   Thought content normal

## 2018-03-02 DIAGNOSIS — G60.9 IDIOPATHIC PERIPHERAL NEUROPATHY: ICD-10-CM

## 2018-03-02 RX ORDER — GABAPENTIN 600 MG/1
900 TABLET ORAL 3 TIMES DAILY
Qty: 135 TABLET | Refills: 2 | Status: SHIPPED | OUTPATIENT
Start: 2018-03-02 | End: 2018-06-24 | Stop reason: SDUPTHER

## 2018-03-05 ENCOUNTER — TRANSCRIBE ORDERS (OUTPATIENT)
Dept: ADMINISTRATIVE | Facility: HOSPITAL | Age: 53
End: 2018-03-05

## 2018-03-05 DIAGNOSIS — G57.52 TARSAL TUNNEL SYNDROME OF LEFT SIDE: ICD-10-CM

## 2018-03-05 DIAGNOSIS — G57.51 TARSAL TUNNEL SYNDROME OF RIGHT SIDE: Primary | ICD-10-CM

## 2018-04-10 ENCOUNTER — OFFICE VISIT (OUTPATIENT)
Dept: URGENT CARE | Facility: CLINIC | Age: 53
End: 2018-04-10
Payer: COMMERCIAL

## 2018-04-10 VITALS
OXYGEN SATURATION: 97 % | DIASTOLIC BLOOD PRESSURE: 62 MMHG | SYSTOLIC BLOOD PRESSURE: 122 MMHG | TEMPERATURE: 97.6 F | HEART RATE: 45 BPM

## 2018-04-10 DIAGNOSIS — S39.012A STRAIN OF LUMBAR REGION, INITIAL ENCOUNTER: Primary | ICD-10-CM

## 2018-04-10 PROCEDURE — 99213 OFFICE O/P EST LOW 20 MIN: CPT | Performed by: NURSE PRACTITIONER

## 2018-04-10 RX ORDER — IBUPROFEN 800 MG/1
800 TABLET ORAL EVERY 6 HOURS PRN
Qty: 30 TABLET | Refills: 0 | Status: SHIPPED | OUTPATIENT
Start: 2018-04-10 | End: 2018-06-20

## 2018-04-10 RX ORDER — CYCLOBENZAPRINE HCL 10 MG
10 TABLET ORAL 3 TIMES DAILY PRN
Qty: 21 TABLET | Refills: 0 | Status: SHIPPED | OUTPATIENT
Start: 2018-04-10 | End: 2018-06-20

## 2018-04-10 NOTE — PROGRESS NOTES
[facility]        NAME: Floyd Mahmood is a 48 y o  male  : 1965    MRN: [id number]  DATE: April 10, 2018  TIME: 4:32 PM    Assessment and Plan   Strain of lumbar region, initial encounter [S39 012A]  1  Strain of lumbar region, initial encounter  ibuprofen (MOTRIN) 800 mg tablet    cyclobenzaprine (FLEXERIL) 10 mg tablet         Patient Instructions       Follow up with PCP in 3-5 days  Proceed to  ER if symptoms worsen  Chief Complaint     Chief Complaint   Patient presents with    Back Pain     States Friday he was twisting and felt pain in lower right back states it's shooting up his back  Erman Earing LPN          History of Present Illness       63-year-old male at urgent care with chief complaint of right-sided lower back pain radiating up denies any pain or tenderness over lumbar spine or thoracic spine pointing to right-sided  He denies any falls injury trauma stated pain started 4 days ago when he was twisting  He has not used any over-the-counter medications reports improvement in symptoms after walking around for short periods time pain gets worse when sitting still at the desk  Back Pain         Review of Systems   Review of Systems   Constitutional: Negative  HENT: Negative  Eyes: Negative  Respiratory: Negative  Cardiovascular: Negative  Gastrointestinal: Negative  Endocrine: Negative  Genitourinary: Negative  Musculoskeletal: Positive for back pain  Skin: Negative  Allergic/Immunologic: Negative  Neurological: Negative  Hematological: Negative  Psychiatric/Behavioral: Negative  Current Medications       Current Outpatient Prescriptions:     allopurinol (ZYLOPRIM) 300 mg tablet, Take 1 tablet by mouth daily, Disp: , Rfl:     atorvastatin (LIPITOR) 10 mg tablet, Take 1 tablet by mouth daily, Disp: , Rfl:     carvedilol (COREG) 12 5 mg tablet, Take 12 5 mg by mouth 2 (two) times a day with meals  , Disp: , Rfl:     Fish Oil-Cholecalciferol (FISH OIL + D3 PO), Take 2 capsules by mouth daily, Disp: , Rfl:     furosemide (LASIX) 40 mg tablet, Take 40 mg by mouth daily  , Disp: , Rfl:     levothyroxine 112 mcg tablet, Take 224 mcg by mouth daily  , Disp: , Rfl:     lisinopril (ZESTRIL) 2 5 mg tablet, Take 2 5 tablets by mouth daily, Disp: , Rfl: 0    metFORMIN (GLUCOPHAGE-XR) 500 mg 24 hr tablet, Take 2 tablets (1,000 mg total) by mouth daily at bedtime, Disp: 60 tablet, Rfl: 3    Multiple Vitamin (MULTIVITAMIN) tablet, Take 1 tablet by mouth daily  , Disp: , Rfl:     Multiple Vitamins-Minerals (MENS MULTIVITAMIN PLUS) TABS, Take 1 tablet by mouth daily, Disp: , Rfl:     rivaroxaban (XARELTO) tablet, Take 20 mg by mouth daily with breakfast , Disp: , Rfl:     sildenafil (VIAGRA) 100 mg tablet, Take 1 tablet by mouth as needed, Disp: , Rfl:     spironolactone (ALDACTONE) 25 mg tablet, Take 25 mg by mouth daily    , Disp: , Rfl:     cyclobenzaprine (FLEXERIL) 10 mg tablet, Take 1 tablet (10 mg total) by mouth 3 (three) times a day as needed for muscle spasms for up to 7 days, Disp: 21 tablet, Rfl: 0    gabapentin (NEURONTIN) 600 MG tablet, Take 1 5 tablets (900 mg total) by mouth 3 (three) times a day for 30 days, Disp: 135 tablet, Rfl: 2    ibuprofen (MOTRIN) 800 mg tablet, Take 1 tablet (800 mg total) by mouth every 6 (six) hours as needed for mild pain for up to 10 days, Disp: 30 tablet, Rfl: 0    Current Allergies     Allergies as of 04/10/2018    (No Known Allergies)            The following portions of the patient's history were reviewed and updated as appropriate: allergies, current medications, past family history, past medical history, past social history, past surgical history and problem list      Past Medical History:   Diagnosis Date    Arthritis     right foot    Chronic cholecystitis     [facility] thrombosis) ([facility] [address] )     Gout     History of pulmonary embolism     Hypertension     Hypothyroidism     Lumbar back pain     Scab     left arm / right leg- no s/s infection    Sleep apnea     no CPAP    Wears glasses     and contacts       Past Surgical History:   Procedure Laterality Date    APPENDECTOMY      ARTHRODESIS      Lumbar L__    BUNIONECTOMY Right 10/7/2016    Procedure: REMOVAL TIBIAL  SESAMOID BONE  RIGHT FOOT;  Surgeon: Nikos Conde DPM;  Location: [facility];  Service:     CHOLECYSTECTOMY  03/26/2015    KNEE ARTHROSCOPY W/ MENISCAL REPAIR      Lateral    NASAL SEPTUM SURGERY  10/16/2013    SPINAL FUSION      TONSILLECTOMY  10/16/2013    with Adenoidectomy    UVULECTOMY  10/16/2013    UVULOPALATOPHARYNGOPLASTY      WISDOM TOOTH EXTRACTION         Family History   Problem Relation Age of Onset    Aneurysm Mother      intracranial aneurysm repair    Coronary artery disease Father     Hypertension Father     Aneurysm Brother          Medications have been verified  Objective   /62   Pulse (!) 45   Temp 97 6 °F (36 4 °C)   SpO2 97%        Physical Exam     Physical Exam   Constitutional: He is oriented to person, place, and time  Vital signs are normal  He appears well-developed and well-nourished  He is active and cooperative  HENT:   Head: Normocephalic and atraumatic  Right Ear: Hearing normal    Left Ear: Hearing normal    Nose: Nose normal    Mouth/Throat: Uvula is midline, oropharynx is clear and moist and mucous membranes are normal    Cardiovascular: Normal rate, regular rhythm, normal heart sounds and normal pulses  Pulmonary/Chest: Effort normal and breath sounds normal    Musculoskeletal:        Lumbar back: He exhibits tenderness and swelling  He exhibits normal range of motion, no bony tenderness, no edema, no deformity, no laceration, no pain, no spasm and normal pulse  Arms:  Neurological: He is alert and oriented to person, place, and time  Skin: Skin is warm, dry and intact  Psychiatric: He has a normal mood and affect   His speech is normal and behavior is normal  Judgment and thought content normal  Cognition and memory are normal

## 2018-04-10 NOTE — PATIENT INSTRUCTIONS
Acute Low Back Pain, Ambulatory Care   GENERAL INFORMATION:   Acute low back pain  is discomfort in your lower back area that lasts for less than 12 weeks  The word acute is used to describe pain that starts suddenly, worsens quickly, and lasts for a short time  Common symptoms include the following:   · Back stiffness or spasms    · Pain down the back or side of one leg    · Holding yourself in an unusual position or posture to decrease your back pain    · Not being able to find a sitting position that is comfortable    · Slow increase in your pain for 24 to 48 hours after you stress your back    · Tenderness on your lower back or severe pain when you move your back  Seek immediate care for the following symptoms:   · Severe pain    · Sudden stiffness and heaviness in both buttocks down to both legs    · Numbness or weakness in one leg, or pain in both legs    · Numbness in your genital area or across your lower back    · Unable to control your urine or bowel movements  Treatment for acute low back pain  may include any of the following:  · Medicines:      ¨ NSAIDs  help decrease swelling and pain or fever  This medicine is available with or without a doctor's order  NSAIDs can cause stomach bleeding or kidney problems in certain people  If you take blood thinner medicine, always ask your healthcare provider if NSAIDs are safe for you  Always read the medicine label and follow directions  ¨ Muscle relaxers  help decrease muscle spasms pain  ¨ Prescription pain medicine  may be given  Ask how to take this medicine safely  · Surgery  may be needed if your pain is severe and other treatments do not work  Surgery may be needed for conditions of the lumbar spine, such as herniated disc or spinal stenosis  Manage your symptoms:   · Sleep on a firm mattress  If you do not have a firm mattress, have someone move your mattress to the floor for a few days   A piece of plywood under your mattress can also help make it firmer  · Apply ice  on your lower back for 15 to 20 minutes every hour or as directed  Use an ice pack, or put crushed ice in a plastic bag  Cover it with a towel  Ice helps prevent tissue damage and decreases swelling and pain  You can alternate ice and heat  · Apply heat  on your lower back for 20 to 30 minutes every 2 hours for as many days as directed  Heat helps decrease pain and muscle spasms  · Go to physical therapy  A physical therapist teaches you exercises to help improve movement and strength, and to decrease pain  Prevent acute low back pain:   · Use proper body mechanics  ¨ Bend at the hips and knees when you  objects  Do not bend from the waist  Use your leg muscles as you lift the load  Do not use your back  Keep the object close to your chest as you lift it  Try not to twist or lift anything above your waist     ¨ Change your position often when you stand for long periods of time  Rest one foot on a small box or footrest, and then switch to the other foot often  ¨ Try not to sit for long periods of time  When you do, sit in a straight-backed chair with your feet flat on the floor  Never reach, pull, or push while you are sitting  · Exercise regularly  Warm up before you exercise  Do exercises that strengthen your back muscles  Ask about the best exercise plan for you  · Maintain a healthy weight  Ask your healthcare provider how much you should weigh  Ask him to help you create a weight loss plan if you are overweight  Follow up with your healthcare provider as directed:  Return for a follow-up visit if you still have pain after 1 to 3 weeks of treatment  You may need to visit an orthopedist if your back pain lasts more than 6 to 12 weeks  Write down your questions so you remember to ask them during your visits  CARE AGREEMENT:   You have the right to help plan your care  Learn about your health condition and how it may be treated   Discuss treatment options with your caregivers to decide what care you want to receive  You always have the right to refuse treatment  The above information is an  only  It is not intended as medical advice for individual conditions or treatments  Talk to your doctor, nurse or pharmacist before following any medical regimen to see if it is safe and effective for you  © 2014 [address] is for End User's use only and may not be sold, redistributed or otherwise used for commercial purposes  All illustrations and images included in CareNotes® are the copyrighted property of [employer]  or Juan Marcano

## 2018-04-24 ENCOUNTER — HOSPITAL ENCOUNTER (OUTPATIENT)
Dept: NEUROLOGY | Facility: AMBULATORY SURGERY CENTER | Age: 53
Discharge: HOME | End: 2018-04-24
Payer: COMMERCIAL

## 2018-04-24 DIAGNOSIS — G57.51 TARSAL TUNNEL SYNDROME OF RIGHT SIDE: ICD-10-CM

## 2018-04-24 DIAGNOSIS — G57.52 TARSAL TUNNEL SYNDROME OF LEFT SIDE: ICD-10-CM

## 2018-04-24 PROCEDURE — 95911 NRV CNDJ TEST 9-10 STUDIES: CPT | Performed by: PSYCHIATRY & NEUROLOGY

## 2018-04-24 PROCEDURE — 95886 MUSC TEST DONE W/N TEST COMP: CPT | Performed by: PSYCHIATRY & NEUROLOGY

## 2018-05-03 DIAGNOSIS — E79.0 HYPERURICEMIA: Primary | ICD-10-CM

## 2018-05-03 RX ORDER — ALLOPURINOL 300 MG/1
300 TABLET ORAL DAILY
Qty: 90 TABLET | Refills: 3 | Status: SHIPPED | OUTPATIENT
Start: 2018-05-03 | End: 2019-05-09 | Stop reason: SDUPTHER

## 2018-05-28 DIAGNOSIS — R80.9 CONTROLLED TYPE 2 DIABETES MELLITUS WITH MICROALBUMINURIA, WITHOUT LONG-TERM CURRENT USE OF INSULIN (HCC): ICD-10-CM

## 2018-05-28 DIAGNOSIS — E11.29 CONTROLLED TYPE 2 DIABETES MELLITUS WITH MICROALBUMINURIA, WITHOUT LONG-TERM CURRENT USE OF INSULIN (HCC): ICD-10-CM

## 2018-05-29 RX ORDER — METFORMIN HYDROCHLORIDE 500 MG/1
TABLET, EXTENDED RELEASE ORAL
Qty: 60 TABLET | Refills: 3 | Status: SHIPPED | OUTPATIENT
Start: 2018-05-29 | End: 2019-02-03 | Stop reason: SDUPTHER

## 2018-06-11 ENCOUNTER — CONSULT (OUTPATIENT)
Dept: FAMILY MEDICINE CLINIC | Facility: CLINIC | Age: 53
End: 2018-06-11
Payer: COMMERCIAL

## 2018-06-11 VITALS
DIASTOLIC BLOOD PRESSURE: 76 MMHG | HEIGHT: 72 IN | HEART RATE: 69 BPM | SYSTOLIC BLOOD PRESSURE: 114 MMHG | TEMPERATURE: 98.2 F | BODY MASS INDEX: 38.14 KG/M2 | WEIGHT: 281.6 LBS | OXYGEN SATURATION: 98 %

## 2018-06-11 DIAGNOSIS — Z12.11 SCREENING FOR COLORECTAL CANCER: Primary | ICD-10-CM

## 2018-06-11 DIAGNOSIS — E11.9 TYPE 2 DIABETES MELLITUS WITHOUT COMPLICATION, WITHOUT LONG-TERM CURRENT USE OF INSULIN (HCC): ICD-10-CM

## 2018-06-11 DIAGNOSIS — Z11.59 ENCOUNTER FOR HEPATITIS C SCREENING TEST FOR LOW RISK PATIENT: ICD-10-CM

## 2018-06-11 DIAGNOSIS — G57.52 TARSAL TUNNEL SYNDROME, LEFT: ICD-10-CM

## 2018-06-11 DIAGNOSIS — Z12.12 SCREENING FOR COLORECTAL CANCER: Primary | ICD-10-CM

## 2018-06-11 PROCEDURE — 99242 OFF/OP CONSLTJ NEW/EST SF 20: CPT | Performed by: FAMILY MEDICINE

## 2018-06-11 RX ORDER — UBIDECARENONE 75 MG
2 CAPSULE ORAL DAILY
COMMUNITY
End: 2019-11-05

## 2018-06-11 NOTE — PROGRESS NOTES
Subjective:     Logan Hyde is a 48 y o  male who presents to the office today for a preoperative consultation at the request of surgeon Tray Williamson DPM who plans on performing  Left tarsal tunnel release on June 25  This consultation is requested for the specific conditions prompting preoperative evaluation (i e  because of potential affect on operative risk): Anticoagulation with Xarelto, hypertension, type 2 diabetes  Planned anesthesia: general  The patient has the following known anesthesia issues:  no prior problems with endotracheal intubation or anesthesia  Patients bleeding risk: no recent abnormal bleeding  The following portions of the patient's history were reviewed and updated as appropriate:   He  has a past medical history of Arthritis; Chronic cholecystitis; DVT (deep venous thrombosis) ([facility] ); Gout; History of pulmonary embolism; Hypertension; Hypothyroidism; Lumbar back pain; Scab; Sleep apnea; and Wears glasses  He   Patient Active Problem List    Diagnosis Date Noted    Tarsal tunnel syndrome of right side     Tarsal tunnel syndrome of left side     Hypothyroidism (acquired) 01/31/2018    Diabetic peripheral neuropathy ([facility] ) 09/07/2017    Controlled type 2 diabetes mellitus with microalbuminuria, without long-term current use of insulin ([facility] ) 06/06/2017    Erectile dysfunction due to diseases classified elsewhere 05/01/2017    Homozygous for MTHFR gene mutation ([place] [id number] ) 05/10/2016    Essential hypertension 04/06/2016     He  has a past surgical history that includes Cholecystectomy (03/26/2015); Appendectomy; Spinal fusion; Tonsillectomy (10/16/2013); Nasal septum surgery (10/16/2013); Uvulopalatopharyngoplasty; [place] tooth extraction; Bunionectomy (Right, 10/7/2016); Arthrodesis; Knee arthroscopy w/ meniscal repair; and Uvulectomy (10/16/2013)  His family history includes Aneurysm in his brother and mother; Coronary artery disease in his father; Hypertension in his father    He reports that he has never smoked  He has never used smokeless tobacco  He reports that he drinks about 6 0 oz of alcohol per week   He reports that he does not use drugs  Current Outpatient Prescriptions   Medication Sig Dispense Refill    allopurinol (ZYLOPRIM) 300 mg tablet Take 1 tablet (300 mg total) by mouth daily 90 tablet 3    atorvastatin (LIPITOR) 10 mg tablet Take 1 tablet by mouth daily      carvedilol (COREG) 12 5 mg tablet Take 12 5 mg by mouth 2 (two) times a day with meals   Coenzyme Q10 (CO Q-10) 200 MG CAPS Take 2 capsules by mouth daily      Fish Oil-Cholecalciferol (FISH OIL + D3 PO) Take 2 capsules by mouth daily      furosemide (LASIX) 40 mg tablet Take 40 mg by mouth daily   gabapentin (NEURONTIN) 600 MG tablet Take 1 5 tablets (900 mg total) by mouth 3 (three) times a day for 30 days 135 tablet 2    levothyroxine 112 mcg tablet Take 224 mcg by mouth daily   lisinopril (ZESTRIL) 2 5 mg tablet Take 2 5 tablets by mouth daily  0    metFORMIN (GLUCOPHAGE-XR) 500 mg 24 hr tablet TAKE TWO TABLETS BY MOUTH AT BEDTIME 60 tablet 3    Multiple Vitamins-Minerals (MENS MULTIVITAMIN PLUS) TABS Take 1 tablet by mouth daily      rivaroxaban (XARELTO) tablet Take 20 mg by mouth daily with breakfast       sildenafil (VIAGRA) 100 mg tablet Take 1 tablet by mouth as needed      spironolactone (ALDACTONE) 25 mg tablet Take 25 mg by mouth daily   cyclobenzaprine (FLEXERIL) 10 mg tablet Take 1 tablet (10 mg total) by mouth 3 (three) times a day as needed for muscle spasms for up to 7 days 21 tablet 0    ibuprofen (MOTRIN) 800 mg tablet Take 1 tablet (800 mg total) by mouth every 6 (six) hours as needed for mild pain for up to 10 days 30 tablet 0     No current facility-administered medications for this visit        Current Outpatient Prescriptions on File Prior to Visit   Medication Sig    allopurinol (ZYLOPRIM) 300 mg tablet Take 1 tablet (300 mg total) by mouth daily    atorvastatin (LIPITOR) 10 mg tablet Take 1 tablet by mouth daily    carvedilol (COREG) 12 5 mg tablet Take 12 5 mg by mouth 2 (two) times a day with meals   Fish Oil-Cholecalciferol (FISH OIL + D3 PO) Take 2 capsules by mouth daily    furosemide (LASIX) 40 mg tablet Take 40 mg by mouth daily   gabapentin (NEURONTIN) 600 MG tablet Take 1 5 tablets (900 mg total) by mouth 3 (three) times a day for 30 days    levothyroxine 112 mcg tablet Take 224 mcg by mouth daily   lisinopril (ZESTRIL) 2 5 mg tablet Take 2 5 tablets by mouth daily    metFORMIN (GLUCOPHAGE-XR) 500 mg 24 hr tablet TAKE TWO TABLETS BY MOUTH AT BEDTIME    Multiple Vitamins-Minerals (MENS MULTIVITAMIN PLUS) TABS Take 1 tablet by mouth daily    rivaroxaban (XARELTO) tablet Take 20 mg by mouth daily with breakfast     sildenafil (VIAGRA) 100 mg tablet Take 1 tablet by mouth as needed    spironolactone (ALDACTONE) 25 mg tablet Take 25 mg by mouth daily   cyclobenzaprine (FLEXERIL) 10 mg tablet Take 1 tablet (10 mg total) by mouth 3 (three) times a day as needed for muscle spasms for up to 7 days    ibuprofen (MOTRIN) 800 mg tablet Take 1 tablet (800 mg total) by mouth every 6 (six) hours as needed for mild pain for up to 10 days    [DISCONTINUED] Multiple Vitamin (MULTIVITAMIN) tablet Take 1 tablet by mouth daily  No current facility-administered medications on file prior to visit  He has No Known Allergies     Past Medical History:   Diagnosis Date    Arthritis     right foot    Chronic cholecystitis     DVT (deep venous thrombosis) (HCC)     Gout     History of pulmonary embolism     Hypertension     Hypothyroidism     Lumbar back pain     Scab     left arm / right leg- no s/s infection    Sleep apnea     no CPAP    Wears glasses     and contacts       Past Surgical History:   Procedure Laterality Date    APPENDECTOMY      ARTHRODESIS      Lumbar L__    BUNIONECTOMY Right 10/7/2016    Procedure: REMOVAL TIBIAL  SESAMOID BONE  RIGHT FOOT;  Surgeon: Jermaine Vieira DPM;  Location: [facility];  Service:     CHOLECYSTECTOMY  03/26/2015    KNEE ARTHROSCOPY W/ MENISCAL REPAIR      Lateral    NASAL SEPTUM SURGERY  10/16/2013    SPINAL FUSION      TONSILLECTOMY  10/16/2013    with Adenoidectomy    UVULECTOMY  10/16/2013    UVULOPALATOPHARYNGOPLASTY      WISDOM TOOTH EXTRACTION         Review of Systems  A comprehensive review of systems was negative except for: Musculoskeletal: positive for  Tarsal tunnel syndrome left foot     Objective:  Physical Exam    Cardiographics  ECG: no prior ECG  Echocardiogram: not done    Imaging  Chest x-ray: Patient has not yet obtained his pre-admission testing     Lab Review   No visits with results within 2 Month(s) from this visit  Latest known visit with results is:   Appointment on 01/19/2018   Component Date Value    Sodium 01/19/2018 138     Potassium 01/19/2018 4 7     Chloride 01/19/2018 104     CO2 01/19/2018 28     Anion Gap 01/19/2018 6     BUN 01/19/2018 25     Creatinine 01/19/2018 1 43*    Glucose, Fasting 01/19/2018 148*    Calcium 01/19/2018 9 8     eGFR 01/19/2018 56     Hemoglobin A1C 01/19/2018 7 0*    EAG 01/19/2018 154         Assessment:     48 y o  male with planned surgery as above  Known risk factors for perioperative complications: Alcohol overuse  Diabetes mellitus    Difficulty with intubation is not anticipated  Cardiac Risk Estimation:  minimal    Current medications which may produce withdrawal symptoms if withheld perioperatively: none     Plan:     1  Preoperative workup as follows none  2  Change in medication regimen before surgery: Patient was instructed to hold his Xarelto for 48 hr prior to the procedure and should resume the Xarelto as soon as possible postoperatively  3  Prophylaxis for cardiac events with perioperative beta-blockers: not indicated    4  Invasive hemodynamic monitoring perioperatively: not indicated  5  Deep vein thrombosis prophylaxis postoperatively:Resume Xarelto as soon as possible after surgery  6  Surveillance for postoperative MI with ECG immediately postoperatively and on postoperative days 1 and 2 AND troponin levels 24 hours postoperatively and on day 4 or hospital discharge (whichever comes first): should be considered    7  Other measures: Please resume Xarelto as soon as possible postoperatively

## 2018-06-12 ENCOUNTER — APPOINTMENT (OUTPATIENT)
Dept: LAB | Facility: HOSPITAL | Age: 53
End: 2018-06-12
Payer: COMMERCIAL

## 2018-06-12 ENCOUNTER — HOSPITAL ENCOUNTER (OUTPATIENT)
Dept: NON INVASIVE DIAGNOSTICS | Facility: HOSPITAL | Age: 53
Discharge: HOME | End: 2018-06-12
Payer: COMMERCIAL

## 2018-06-12 ENCOUNTER — LAB (OUTPATIENT)
Dept: LAB | Facility: HOSPITAL | Age: 53
End: 2018-06-12
Attending: FAMILY MEDICINE
Payer: COMMERCIAL

## 2018-06-12 ENCOUNTER — TRANSCRIBE ORDERS (OUTPATIENT)
Dept: ADMINISTRATIVE | Facility: HOSPITAL | Age: 53
End: 2018-06-12

## 2018-06-12 DIAGNOSIS — G57.53 TARSAL TUNNEL SYNDROME OF BOTH LOWER EXTREMITIES: ICD-10-CM

## 2018-06-12 DIAGNOSIS — Z11.59 ENCOUNTER FOR HEPATITIS C SCREENING TEST FOR LOW RISK PATIENT: ICD-10-CM

## 2018-06-12 DIAGNOSIS — G57.53 TARSAL TUNNEL SYNDROME OF BOTH LOWER EXTREMITIES: Primary | ICD-10-CM

## 2018-06-12 LAB
ANION GAP SERPL CALCULATED.3IONS-SCNC: 12 MMOL/L (ref 4–13)
BASOPHILS # BLD AUTO: 0.04 THOUSANDS/ΜL (ref 0–0.1)
BASOPHILS NFR BLD AUTO: 1 % (ref 0–1)
BUN SERPL-MCNC: 39 MG/DL (ref 5–25)
CALCIUM SERPL-MCNC: 9.2 MG/DL (ref 8.3–10.1)
CHLORIDE SERPL-SCNC: 102 MMOL/L (ref 100–108)
CO2 SERPL-SCNC: 24 MMOL/L (ref 21–32)
CREAT SERPL-MCNC: 1.75 MG/DL (ref 0.6–1.3)
CREAT UR-MCNC: 58 MG/DL
EOSINOPHIL # BLD AUTO: 0.62 THOUSAND/ΜL (ref 0–0.61)
EOSINOPHIL NFR BLD AUTO: 10 % (ref 0–6)
ERYTHROCYTE [DISTWIDTH] IN BLOOD BY AUTOMATED COUNT: 15.1 % (ref 11.6–15.1)
EST. AVERAGE GLUCOSE BLD GHB EST-MCNC: 163 MG/DL
GFR SERPL CREATININE-BSD FRML MDRD: 43 ML/MIN/1.73SQ M
GLUCOSE P FAST SERPL-MCNC: 160 MG/DL (ref 65–99)
HBA1C MFR BLD: 7.3 % (ref 4.2–6.3)
HCT VFR BLD AUTO: 48.2 % (ref 36.5–49.3)
HCV AB SER QL: NORMAL
HGB BLD-MCNC: 16.2 G/DL (ref 12–17)
INR PPP: 2.06 (ref 0.86–1.17)
LYMPHOCYTES # BLD AUTO: 1.76 THOUSANDS/ΜL (ref 0.6–4.47)
LYMPHOCYTES NFR BLD AUTO: 29 % (ref 14–44)
MCH RBC QN AUTO: 30.8 PG (ref 26.8–34.3)
MCHC RBC AUTO-ENTMCNC: 33.6 G/DL (ref 31.4–37.4)
MCV RBC AUTO: 92 FL (ref 82–98)
MICROALBUMIN UR-MCNC: 168 MG/L (ref 0–20)
MICROALBUMIN/CREAT 24H UR: 290 MG/G CREATININE (ref 0–30)
MONOCYTES # BLD AUTO: 0.7 THOUSAND/ΜL (ref 0.17–1.22)
MONOCYTES NFR BLD AUTO: 11 % (ref 4–12)
NEUTROPHILS # BLD AUTO: 3.01 THOUSANDS/ΜL (ref 1.85–7.62)
NEUTS SEG NFR BLD AUTO: 49 % (ref 43–75)
PLATELET # BLD AUTO: 217 THOUSANDS/UL (ref 149–390)
PMV BLD AUTO: 9.8 FL (ref 8.9–12.7)
POTASSIUM SERPL-SCNC: 4.6 MMOL/L (ref 3.5–5.3)
PROTHROMBIN TIME: 22.2 SECONDS (ref 11.8–14.2)
RBC # BLD AUTO: 5.26 MILLION/UL (ref 3.88–5.62)
SODIUM SERPL-SCNC: 138 MMOL/L (ref 136–145)
WBC # BLD AUTO: 6.13 THOUSAND/UL (ref 4.31–10.16)

## 2018-06-12 PROCEDURE — 93005 ELECTROCARDIOGRAM TRACING: CPT

## 2018-06-12 PROCEDURE — 36415 COLL VENOUS BLD VENIPUNCTURE: CPT | Performed by: FAMILY MEDICINE

## 2018-06-12 PROCEDURE — 82043 UR ALBUMIN QUANTITATIVE: CPT | Performed by: FAMILY MEDICINE

## 2018-06-12 PROCEDURE — 85025 COMPLETE CBC W/AUTO DIFF WBC: CPT

## 2018-06-12 PROCEDURE — 80048 BASIC METABOLIC PNL TOTAL CA: CPT | Performed by: FAMILY MEDICINE

## 2018-06-12 PROCEDURE — 3060F POS MICROALBUMINURIA REV: CPT | Performed by: FAMILY MEDICINE

## 2018-06-12 PROCEDURE — 85610 PROTHROMBIN TIME: CPT

## 2018-06-12 PROCEDURE — 86803 HEPATITIS C AB TEST: CPT

## 2018-06-12 PROCEDURE — 83036 HEMOGLOBIN GLYCOSYLATED A1C: CPT | Performed by: FAMILY MEDICINE

## 2018-06-12 PROCEDURE — 82570 ASSAY OF URINE CREATININE: CPT | Performed by: FAMILY MEDICINE

## 2018-06-12 PROCEDURE — 93010 ELECTROCARDIOGRAM REPORT: CPT | Performed by: INTERNAL MEDICINE

## 2018-06-12 NOTE — PROGRESS NOTES
Please call the patient regarding his abnormal result   Kidney function is not as good as it was previously he is reaching a point where he will no longer be able to take metformin see if he goes to a kidney specialist and if he does not we need to refer him to a nephrologist for diabetic kidney disease he needs to avoid NSAIDs like Motrin in the sun or Naprosyn he also needs to decrease his metformin to half the dose he is currently taking because he may have a tendency to retain metformin because he can't cleared by his kidneys he I notice he is on Lasix that to needs to be adjusted on set so that should be adjusted by his kidney specialist

## 2018-06-13 LAB
ATRIAL RATE: 64 BPM
P AXIS: 50 DEGREES
PR INTERVAL: 160 MS
QRS AXIS: 41 DEGREES
QRSD INTERVAL: 90 MS
QT INTERVAL: 440 MS
QTC INTERVAL: 453 MS
T WAVE AXIS: 61 DEGREES
VENTRICULAR RATE: 64 BPM

## 2018-06-13 NOTE — PROGRESS NOTES
Please call the patient regarding his abnormal result  Hemoglobin A1c 7 3 average blood sugar preceding  3 months was 163 our goal hemoglobin A1c is 6 5

## 2018-06-20 NOTE — PRE-PROCEDURE INSTRUCTIONS
Pre-Surgery Instructions:   Medication Instructions    allopurinol (ZYLOPRIM) 300 mg tablet Instructed patient per Anesthesia Guidelines   atorvastatin (LIPITOR) 10 mg tablet Instructed patient per Anesthesia Guidelines   carvedilol (COREG) 12 5 mg tablet Instructed patient per Anesthesia Guidelines   Coenzyme Q10 (CO Q-10) 200 MG CAPS Instructed patient per Anesthesia Guidelines   Fish Oil-Cholecalciferol (FISH OIL + D3 PO) Instructed patient per Anesthesia Guidelines   furosemide (LASIX) 40 mg tablet Instructed patient per Anesthesia Guidelines   gabapentin (NEURONTIN) 600 MG tablet Instructed patient per Anesthesia Guidelines   levothyroxine 112 mcg tablet Instructed patient per Anesthesia Guidelines   lisinopril (ZESTRIL) 2 5 mg tablet Instructed patient per Anesthesia Guidelines   metFORMIN (GLUCOPHAGE-XR) 500 mg 24 hr tablet Instructed patient per Anesthesia Guidelines   Multiple Vitamins-Minerals (MENS MULTIVITAMIN PLUS) TABS Instructed patient per Anesthesia Guidelines   rivaroxaban (XARELTO) tablet Instructed patient per Anesthesia Guidelines   sildenafil (VIAGRA) 100 mg tablet Instructed patient per Anesthesia Guidelines   spironolactone (ALDACTONE) 25 mg tablet Instructed patient per Anesthesia Guidelines  Spoke to patient via phone  PAT visit declined patient currently on vacation in [place] and returning on Sunday  Medications reviewed and patient was instructed to take levothyroxine and coreg am of surgery with a sip of water  Patient was instructed to avoid NSAID, Aspirin, Vitamin, and supplements starting today for surgery 6/25/18  Patient was instructed by MD to stop Xarelto 48 hours prior to surgery  [facility] pre-op instructions reviewed  Pre-op bathing reviewed with patient

## 2018-06-22 ENCOUNTER — ANESTHESIA EVENT (OUTPATIENT)
Dept: PERIOP | Facility: HOSPITAL | Age: 53
End: 2018-06-22
Payer: COMMERCIAL

## 2018-06-24 DIAGNOSIS — G60.9 IDIOPATHIC PERIPHERAL NEUROPATHY: Primary | ICD-10-CM

## 2018-06-24 DIAGNOSIS — S39.012A STRAIN OF LUMBAR REGION, INITIAL ENCOUNTER: ICD-10-CM

## 2018-06-24 RX ORDER — CYCLOBENZAPRINE HCL 10 MG
TABLET ORAL
Qty: 21 TABLET | Refills: 0 | OUTPATIENT
Start: 2018-06-24

## 2018-06-25 ENCOUNTER — ANESTHESIA (OUTPATIENT)
Dept: PERIOP | Facility: HOSPITAL | Age: 53
End: 2018-06-25
Payer: COMMERCIAL

## 2018-06-25 ENCOUNTER — HOSPITAL ENCOUNTER (OUTPATIENT)
Facility: HOSPITAL | Age: 53
Setting detail: OUTPATIENT SURGERY
Discharge: HOME | End: 2018-06-25
Attending: PODIATRIST | Admitting: PODIATRIST
Payer: COMMERCIAL

## 2018-06-25 VITALS
TEMPERATURE: 98 F | WEIGHT: 280 LBS | DIASTOLIC BLOOD PRESSURE: 65 MMHG | HEIGHT: 72 IN | OXYGEN SATURATION: 97 % | RESPIRATION RATE: 18 BRPM | BODY MASS INDEX: 37.93 KG/M2 | SYSTOLIC BLOOD PRESSURE: 153 MMHG | HEART RATE: 58 BPM

## 2018-06-25 DIAGNOSIS — G57.51 TARSAL TUNNEL SYNDROME OF RIGHT SIDE: Primary | ICD-10-CM

## 2018-06-25 LAB
GLUCOSE SERPL-MCNC: 122 MG/DL (ref 65–140)
GLUCOSE SERPL-MCNC: 127 MG/DL (ref 65–140)

## 2018-06-25 PROCEDURE — 82948 REAGENT STRIP/BLOOD GLUCOSE: CPT

## 2018-06-25 RX ORDER — ONDANSETRON 2 MG/ML
4 INJECTION INTRAMUSCULAR; INTRAVENOUS ONCE
Status: DISCONTINUED | OUTPATIENT
Start: 2018-06-25 | End: 2018-06-25 | Stop reason: HOSPADM

## 2018-06-25 RX ORDER — MIDAZOLAM HYDROCHLORIDE 1 MG/ML
INJECTION INTRAMUSCULAR; INTRAVENOUS AS NEEDED
Status: DISCONTINUED | OUTPATIENT
Start: 2018-06-25 | End: 2018-06-25 | Stop reason: SURG

## 2018-06-25 RX ORDER — ONDANSETRON 2 MG/ML
4 INJECTION INTRAMUSCULAR; INTRAVENOUS EVERY 6 HOURS PRN
Status: DISCONTINUED | OUTPATIENT
Start: 2018-06-25 | End: 2018-06-25 | Stop reason: HOSPADM

## 2018-06-25 RX ORDER — ACETAMINOPHEN 325 MG/1
650 TABLET ORAL EVERY 4 HOURS PRN
Status: DISCONTINUED | OUTPATIENT
Start: 2018-06-25 | End: 2018-06-25 | Stop reason: HOSPADM

## 2018-06-25 RX ORDER — ONDANSETRON 2 MG/ML
INJECTION INTRAMUSCULAR; INTRAVENOUS AS NEEDED
Status: DISCONTINUED | OUTPATIENT
Start: 2018-06-25 | End: 2018-06-25 | Stop reason: SURG

## 2018-06-25 RX ORDER — FENTANYL CITRATE 50 UG/ML
INJECTION, SOLUTION INTRAMUSCULAR; INTRAVENOUS AS NEEDED
Status: DISCONTINUED | OUTPATIENT
Start: 2018-06-25 | End: 2018-06-25 | Stop reason: SURG

## 2018-06-25 RX ORDER — MAGNESIUM HYDROXIDE 1200 MG/15ML
LIQUID ORAL AS NEEDED
Status: DISCONTINUED | OUTPATIENT
Start: 2018-06-25 | End: 2018-06-25 | Stop reason: HOSPADM

## 2018-06-25 RX ORDER — OXYCODONE HYDROCHLORIDE 5 MG/1
10 TABLET ORAL EVERY 6 HOURS PRN
Status: DISCONTINUED | OUTPATIENT
Start: 2018-06-25 | End: 2018-06-25 | Stop reason: HOSPADM

## 2018-06-25 RX ORDER — OXYCODONE HYDROCHLORIDE AND ACETAMINOPHEN 5; 325 MG/1; MG/1
1 TABLET ORAL EVERY 4 HOURS PRN
Qty: 30 TABLET | Refills: 0 | Status: SHIPPED | OUTPATIENT
Start: 2018-06-25 | End: 2018-07-05

## 2018-06-25 RX ORDER — GABAPENTIN 600 MG/1
TABLET ORAL
Qty: 135 TABLET | Refills: 2 | Status: SHIPPED | OUTPATIENT
Start: 2018-06-25 | End: 2018-11-08 | Stop reason: SDUPTHER

## 2018-06-25 RX ORDER — PROPOFOL 10 MG/ML
INJECTION, EMULSION INTRAVENOUS AS NEEDED
Status: DISCONTINUED | OUTPATIENT
Start: 2018-06-25 | End: 2018-06-25 | Stop reason: SURG

## 2018-06-25 RX ORDER — OXYCODONE HYDROCHLORIDE 5 MG/1
5 TABLET ORAL EVERY 4 HOURS PRN
Status: DISCONTINUED | OUTPATIENT
Start: 2018-06-25 | End: 2018-06-25 | Stop reason: HOSPADM

## 2018-06-25 RX ORDER — RIVAROXABAN 20 MG/1
TABLET, FILM COATED ORAL
Qty: 30 TABLET | Refills: 5 | Status: SHIPPED | OUTPATIENT
Start: 2018-06-25 | End: 2019-01-13 | Stop reason: SDUPTHER

## 2018-06-25 RX ORDER — SODIUM CHLORIDE 9 MG/ML
125 INJECTION, SOLUTION INTRAVENOUS CONTINUOUS
Status: DISCONTINUED | OUTPATIENT
Start: 2018-06-25 | End: 2018-06-25 | Stop reason: HOSPADM

## 2018-06-25 RX ORDER — FENTANYL CITRATE/PF 50 MCG/ML
25 SYRINGE (ML) INJECTION
Status: DISCONTINUED | OUTPATIENT
Start: 2018-06-25 | End: 2018-06-25 | Stop reason: HOSPADM

## 2018-06-25 RX ORDER — LABETALOL HYDROCHLORIDE 5 MG/ML
INJECTION, SOLUTION INTRAVENOUS AS NEEDED
Status: DISCONTINUED | OUTPATIENT
Start: 2018-06-25 | End: 2018-06-25 | Stop reason: SURG

## 2018-06-25 RX ADMIN — PROPOFOL 100 MG: 10 INJECTION, EMULSION INTRAVENOUS at 14:05

## 2018-06-25 RX ADMIN — Medication 3000 MG: at 14:09

## 2018-06-25 RX ADMIN — FENTANYL CITRATE 25 MCG: 50 INJECTION INTRAMUSCULAR; INTRAVENOUS at 15:45

## 2018-06-25 RX ADMIN — LABETALOL HYDROCHLORIDE 5 MG: 5 INJECTION, SOLUTION INTRAVENOUS at 14:36

## 2018-06-25 RX ADMIN — FENTANYL CITRATE 25 MCG: 50 INJECTION INTRAMUSCULAR; INTRAVENOUS at 15:40

## 2018-06-25 RX ADMIN — FENTANYL CITRATE 50 MCG: 50 INJECTION, SOLUTION INTRAMUSCULAR; INTRAVENOUS at 14:08

## 2018-06-25 RX ADMIN — OXYCODONE HYDROCHLORIDE 5 MG: 5 TABLET ORAL at 16:25

## 2018-06-25 RX ADMIN — PROPOFOL 300 MG: 10 INJECTION, EMULSION INTRAVENOUS at 14:04

## 2018-06-25 RX ADMIN — LIDOCAINE HYDROCHLORIDE 100 MG: 20 INJECTION, SOLUTION INTRAVENOUS at 14:04

## 2018-06-25 RX ADMIN — FENTANYL CITRATE 50 MCG: 50 INJECTION, SOLUTION INTRAMUSCULAR; INTRAVENOUS at 14:27

## 2018-06-25 RX ADMIN — SODIUM CHLORIDE 125 ML/HR: 0.9 INJECTION, SOLUTION INTRAVENOUS at 12:26

## 2018-06-25 RX ADMIN — ONDANSETRON HYDROCHLORIDE 4 MG: 2 INJECTION, SOLUTION INTRAVENOUS at 14:50

## 2018-06-25 RX ADMIN — MIDAZOLAM 2 MG: 1 INJECTION INTRAMUSCULAR; INTRAVENOUS at 13:55

## 2018-06-25 NOTE — OP NOTE
OPERATIVE REPORT  PATIENT NAME: Bonnie Warren    :  1965  MRN: [id number]  Pt Location: [facility] ROOM 02    SURGERY DATE: 2018    Surgeon(s) and Role:     * Mallory Perdomo DPM - Primary     * Joel Givens - Assisting    Preop Diagnosis:  Tarsal tunnel syndrome of both lower extremities [G57 53]    Post-Op Diagnosis Codes:     * Tarsal tunnel syndrome of both lower extremities [G57 53]    Procedure(s) (LRB):  RELEASE TARSAL TUNNEL (Right)    Specimen(s):  * No specimens in log *    Estimated Blood Loss:   0 mL    Hemostasis:  Pneumatic thigh tourniquet 300 mm Hg for 35 min       Anesthesia Type:   General and a total of 15 cc of a 1 1 mixture of 0 25% Marcaine plain and 1% lidocaine plain    Operative Indications:  Tarsal tunnel syndrome of both lower extremities [G57 53]      Operative Findings:  Consistent with diagnosis, hypertrophied lacinate ligament, and enlarged venae comitantes and hypertrophied nerve  Complications:   None    Procedure and Technique:  Under mild sedation, the patient was brought into the operating room and placed on the operating room table in the supine position  A pneumatic thigh tourniquet was then placed around the patient's right thigh with ample webril padding  A time out was performed to confirm the correct patient, procedure and site with all parties in agreement  Following IV sedation, local anesthetic was obtained about the patient's right ankle was performed consisting of 15 ml of 1% Lidocaine and 0 25% Bupivacaine in a 1:1 mixture  The foot was then scrubbed, prepped and draped in the usual aseptic manner  An esmarch bandage was utilized to exsangunate the patients foot and the pneumatic ankle tourniquet was then inflated  The esmarch bandage was removed and the foot was placed on the operating room table      Attention was directed to the medial aspect of the Right ankle, the tarsal tunnel area, where a 4 cm incision was made along the course, just posterior inferior to the posterior tibial tendon  The incision was then deepened down to flexor retinaculum, which was incised  The compartment of the tibial nerve and tibial artery and venae comitantes were identified  The tibial nerve was identified, noted to have moderate fibrosis and fatty tissue on the tendon, but no signs of ganglion cysts or other foreign body  The venae comitantes were noted to be slightly enlarged, and the lacinate ligament was noted to be very hypertrophied  The scar tissue and fatty tissue was debrided from the nerve  The incisions followed inferiorly down to the merrill pedis, which was opened in order to decompress the nerve  No signs of pathology or synovitis were noted  Areas of compression were noted at the inferior aspect of the crural fascia as well as the entrance to the merrill pedis of the right foot    The incision was then flushed with copious amounts of saline  The subcutaneous tissues were reapproximated with 3-o Vicryl  The skin was closed with  3-0 nylon  The incision was then bandaged with  Adaptic, Betadine-soaked 4 x 4's, fluffs, Chidi, cast padding and Robles compression dressing  The Right thigh tourniquet was deflated at 35 minutes with prompt hyperemic response noted to the left foot  The patient left the OR for the PACU with vital signs stable and vascular status intact       I was present for the entire procedure    Patient Disposition:  PACU  and extubated and stable    SIGNATURE: Marisela Garcia  DATE: June 25, 2018  TIME: 3:17 PM

## 2018-06-25 NOTE — ANESTHESIA PREPROCEDURE EVALUATION
Review of Systems/Medical History  Patient summary reviewed  Chart reviewed  No history of anesthetic complications     Cardiovascular  Hypertension on > 1 medication,   Comment:  Hx DVT's  PE's 3/16 and 4/16, PE,  Pulmonary  Sleep apnea (s/p UPPP) ,        GI/Hepatic  Negative GI/hepatic ROS               Endo/Other  Diabetes well controlled type 2 Oral agent, History of thyroid disease , hypothyroidism,   Obesity    GYN       Hematology  Negative hematology ROS      Musculoskeletal  Back pain , lumbar pain,   Arthritis     Neurology    Diabetic neuropathy,    Psychology           Physical Exam    Airway    Mallampati score: II  TM Distance: >3 FB  Neck ROM: full     Dental   No notable dental hx     Cardiovascular  Rhythm: regular, Rate: normal, Cardiovascular exam normal    Pulmonary  Pulmonary exam normal Breath sounds clear to auscultation,     Other Findings        Anesthesia Plan  ASA Score- 3     Anesthesia Type- general with ASA Monitors  Additional Monitors:   Airway Plan:         Plan Factors-Patient not instructed to abstain from smoking on day of procedure       Induction- intravenous  Postoperative Plan- Plan for postoperative opioid use  Informed Consent- Anesthetic plan and risks discussed with patient

## 2018-06-25 NOTE — DISCHARGE SUMMARY
Discharge Summary Outpatient Procedure Podiatry -   Dmitry Baer 48 y o  male MRN: [id number]  Unit/Bed#: OR POOL Encounter: [id number]    Admission Date: 6/25/2018     Admitting Diagnosis: Tarsal tunnel syndrome of both lower extremities [G57 53]    Discharge Diagnosis: same    Procedures Performed: RELEASE TARSAL TUNNEL: 90904 (CPT®)    Complications: none    Condition at Discharge: stable    Discharge instructions/Information to patient and family:   See after visit summary for information provided to patient and family  Provisions for Follow-Up Care/Important appointments:  See after visit summary for information related to follow-up care and any pertinent home health orders  Discharge Medications:  See after visit summary for reconciled discharge medications provided to patient and family

## 2018-07-09 ENCOUNTER — LAB (OUTPATIENT)
Dept: LAB | Facility: MEDICAL CENTER | Age: 53
End: 2018-07-09
Payer: COMMERCIAL

## 2018-07-09 DIAGNOSIS — Z12.11 SCREENING FOR COLORECTAL CANCER: ICD-10-CM

## 2018-07-09 DIAGNOSIS — Z12.12 SCREENING FOR COLORECTAL CANCER: ICD-10-CM

## 2018-07-09 LAB — HEMOCCULT STL QL IA: POSITIVE

## 2018-07-09 PROCEDURE — G0328 FECAL BLOOD SCRN IMMUNOASSAY: HCPCS

## 2018-07-09 NOTE — PROGRESS NOTES
Please call the patient regarding his abnormal result   Fecal occult blood test was positive patient should probably be seen by GI services to determine whether this is anything that needs to be pursued further if he is in agreement we will set him up with the GI

## 2018-07-10 DIAGNOSIS — R19.5 POSITIVE FIT (FECAL IMMUNOCHEMICAL TEST): Primary | ICD-10-CM

## 2018-07-23 ENCOUNTER — TRANSCRIBE ORDERS (OUTPATIENT)
Dept: ADMINISTRATIVE | Facility: HOSPITAL | Age: 53
End: 2018-07-23

## 2018-07-23 ENCOUNTER — APPOINTMENT (OUTPATIENT)
Dept: LAB | Facility: MEDICAL CENTER | Age: 53
End: 2018-07-23
Payer: COMMERCIAL

## 2018-07-23 DIAGNOSIS — N18.2 CHRONIC KIDNEY DISEASE, STAGE II (MILD): Primary | ICD-10-CM

## 2018-07-23 DIAGNOSIS — I12.9 PARENCHYMAL RENAL HYPERTENSION, STAGE 1 THROUGH STAGE 4 OR UNSPECIFIED CHRONIC KIDNEY DISEASE: ICD-10-CM

## 2018-07-23 DIAGNOSIS — N18.2 CHRONIC KIDNEY DISEASE, STAGE II (MILD): ICD-10-CM

## 2018-07-23 DIAGNOSIS — N18.30 CHRONIC KIDNEY DISEASE, STAGE III (MODERATE) (HCC): ICD-10-CM

## 2018-07-23 LAB
ANION GAP SERPL CALCULATED.3IONS-SCNC: 6 MMOL/L (ref 4–13)
BUN SERPL-MCNC: 20 MG/DL (ref 5–25)
CALCIUM SERPL-MCNC: 9.6 MG/DL (ref 8.3–10.1)
CHLORIDE SERPL-SCNC: 104 MMOL/L (ref 100–108)
CO2 SERPL-SCNC: 28 MMOL/L (ref 21–32)
CREAT SERPL-MCNC: 1.3 MG/DL (ref 0.6–1.3)
GFR SERPL CREATININE-BSD FRML MDRD: 62 ML/MIN/1.73SQ M
GLUCOSE SERPL-MCNC: 127 MG/DL (ref 65–140)
POTASSIUM SERPL-SCNC: 4.2 MMOL/L (ref 3.5–5.3)
SODIUM SERPL-SCNC: 138 MMOL/L (ref 136–145)

## 2018-07-23 PROCEDURE — 36415 COLL VENOUS BLD VENIPUNCTURE: CPT

## 2018-07-23 PROCEDURE — 80048 BASIC METABOLIC PNL TOTAL CA: CPT

## 2018-08-09 ENCOUNTER — OFFICE VISIT (OUTPATIENT)
Dept: URGENT CARE | Facility: CLINIC | Age: 53
End: 2018-08-09
Payer: COMMERCIAL

## 2018-08-09 VITALS
HEART RATE: 58 BPM | HEIGHT: 72 IN | RESPIRATION RATE: 18 BRPM | SYSTOLIC BLOOD PRESSURE: 124 MMHG | BODY MASS INDEX: 36.57 KG/M2 | WEIGHT: 270 LBS | DIASTOLIC BLOOD PRESSURE: 76 MMHG | TEMPERATURE: 98 F | OXYGEN SATURATION: 98 %

## 2018-08-09 DIAGNOSIS — M54.50 ACUTE RIGHT-SIDED LOW BACK PAIN WITHOUT SCIATICA: Primary | ICD-10-CM

## 2018-08-09 PROCEDURE — 99213 OFFICE O/P EST LOW 20 MIN: CPT | Performed by: PHYSICIAN ASSISTANT

## 2018-08-09 RX ORDER — CYCLOBENZAPRINE HCL 10 MG
10 TABLET ORAL 3 TIMES DAILY PRN
Qty: 15 TABLET | Refills: 0 | Status: ON HOLD | OUTPATIENT
Start: 2018-08-09 | End: 2018-11-23 | Stop reason: ALTCHOICE

## 2018-08-09 RX ORDER — PREDNISONE 10 MG/1
TABLET ORAL
Qty: 26 TABLET | Refills: 0 | Status: SHIPPED | OUTPATIENT
Start: 2018-08-09 | End: 2018-10-16

## 2018-08-09 NOTE — PATIENT INSTRUCTIONS
Start prednisone and muscle relaxer  Take as directed  Recommend ice for the next couple of days to reduce inflammation  After that can apply moist heat and do gentle stretches  If not improving over the next week, follow up with PCP

## 2018-08-09 NOTE — PROGRESS NOTES
[address]    NAME: Floyd Mahmood is a 48 y o  male  : 1965    MRN: [id number]  DATE: 2018  TIME: 4:46 PM    Assessment and Plan   Acute right-sided low back pain without sciatica [M54 5]  1  Acute right-sided low back pain without sciatica  cyclobenzaprine (FLEXERIL) 10 mg tablet    predniSONE 10 mg tablet       Patient Instructions     Patient Instructions   Start prednisone and muscle relaxer  Take as directed  Recommend ice for the next couple of days to reduce inflammation  After that can apply moist heat and do gentle stretches  If not improving over the next week, follow up with PCP  Chief Complaint     Chief Complaint   Patient presents with    Back Pain     c/o severe pain in right lower back  injured area while golfing 18  has been treating it with ice without relief       History of Present Illness   80-year-old male here with complaint of right lower back pain  Pain does not radiate down his legs  No related numbness or tingling in his lower extremities  No related weakness  No bowel or bladder symptoms  Symptoms started after golfing 5 days ago  Pain is worse when standing for long periods time being on his feet  Patient is taking Xarelto and is unable to take anti-inflammatories such as ibuprofen  Tylenol has offered no relief  Review of Systems   Review of Systems   Constitutional: Negative for activity change, appetite change, chills, diaphoresis, fatigue, fever and unexpected weight change  HENT: Negative for congestion, ear pain, hearing loss, sinus pressure, sneezing, sore throat and tinnitus  Eyes: Negative for photophobia, redness and visual disturbance  Respiratory: Negative for apnea, cough, chest tightness, shortness of breath, wheezing and stridor  Cardiovascular: Negative for chest pain, palpitations and leg swelling     Gastrointestinal: Negative for abdominal distention, abdominal pain, blood in stool, constipation, diarrhea, nausea and vomiting  Endocrine: Negative for cold intolerance, heat intolerance, polydipsia, polyphagia and polyuria  Genitourinary: Negative for difficulty urinating, dysuria, flank pain, hematuria and urgency  Musculoskeletal: Positive for back pain  Negative for arthralgias, gait problem, myalgias, neck pain and neck stiffness  Skin: Negative for rash and wound  Neurological: Negative for dizziness, tremors, seizures, syncope, weakness, light-headedness, numbness and headaches  Hematological: Negative for adenopathy  Does not bruise/bleed easily  Psychiatric/Behavioral: Negative for agitation, behavioral problems, confusion and decreased concentration  The patient is not nervous/anxious  All other systems reviewed and are negative  Current Medications     Current Outpatient Prescriptions:     allopurinol (ZYLOPRIM) 300 mg tablet, Take 1 tablet (300 mg total) by mouth daily, Disp: 90 tablet, Rfl: 3    atorvastatin (LIPITOR) 10 mg tablet, Take 10 mg by mouth daily  , Disp: , Rfl:     carvedilol (COREG) 12 5 mg tablet, Take 12 5 mg by mouth 2 (two) times a day with meals  , Disp: , Rfl:     Coenzyme Q10 (CO Q-10) 200 MG CAPS, Take 2 capsules by mouth daily, Disp: , Rfl:     furosemide (LASIX) 40 mg tablet, Take 40 mg by mouth daily  , Disp: , Rfl:     gabapentin (NEURONTIN) 600 MG tablet, TAKE 1 & 1/2 (ONE & ONE-HALF) TABLETS BY MOUTH THREE TIMES DAILY, Disp: 135 tablet, Rfl: 2    levothyroxine 112 mcg tablet, Take 224 mcg by mouth daily    , Disp: , Rfl:     lisinopril (ZESTRIL) 2 5 mg tablet, Take 2 5 tablets by mouth daily, Disp: , Rfl: 0    metFORMIN (GLUCOPHAGE-XR) 500 mg 24 hr tablet, TAKE TWO TABLETS BY MOUTH AT BEDTIME (Patient taking differently: TAKE one TABLET BY MOUTH AT BEDTIME), Disp: 60 tablet, Rfl: 3    Multiple Vitamins-Minerals (MENS MULTIVITAMIN PLUS) TABS, Take 1 tablet by mouth daily, Disp: , Rfl:     sildenafil (VIAGRA) 100 mg tablet, Take 1 tablet by mouth as needed, Disp: , Rfl:     spironolactone (ALDACTONE) 25 mg tablet, Take 25 mg by mouth daily    , Disp: , Rfl:     XARELTO 20 MG tablet, TAKE ONE TABLET BY MOUTH ONCE DAILY, Disp: 30 tablet, Rfl: 5    cyclobenzaprine (FLEXERIL) 10 mg tablet, Take 1 tablet (10 mg total) by mouth 3 (three) times a day as needed for muscle spasms for up to 5 days, Disp: 15 tablet, Rfl: 0    predniSONE 10 mg tablet, Take 3 tabs BID X 2 days, 2 tabs BID X 2 days, 1 tab BID X 2 days, 1 tab daily X 2 days, Disp: 26 tablet, Rfl: 0    Current Allergies     Allergies as of 08/09/2018    (No Known Allergies)          The following portions of the patient's history were reviewed and updated as appropriate: allergies, current medications, past family history, past medical history, past social history, past surgical history and problem list    Past Medical History:   Diagnosis Date    Arthritis     right foot    Chronic cholecystitis     [facility]     [facility] thrombosis[facility] ([facility] )     Gout     History of pulmonary embolism     Hypertension     Hypothyroidism     Lumbar back pain     [facility]     Scab     right arm- no s/s infection    Sleep apnea     no CPAP    Tarsal tunnel syndrome, right     Tinnitus     left ear    Wears glasses     and contacts    Wears glasses      Past Surgical History:   Procedure Laterality Date    APPENDECTOMY      ARTHRODESIS      Lumbar L__    BUNIONECTOMY Right 10/7/2016    Procedure: REMOVAL TIBIAL  SESAMOID BONE  RIGHT FOOT;  Surgeon: Casandra Lebron DPM;  [facility];  Service:     CHOLECYSTECTOMY  03/26/2015    KNEE ARTHROSCOPY W/ MENISCAL REPAIR      Lateral    NASAL SEPTUM SURGERY  10/16/2013    NM TARSAL TUNNEL RELEASE Right 6/25/2018    Procedure: RELEASE TARSAL TUNNEL;  Surgeon: Yasir Bell DPM;  [facility];  Service: Podiatry    SPINAL FUSION      TONSILLECTOMY  10/16/2013    with Adenoidectomy    UVULECTOMY 10/16/2013    UVULOPALATOPHARYNGOPLASTY      WISDOM TOOTH EXTRACTION       Family History   Problem Relation Age of Onset    Aneurysm Mother         intracranial aneurysm repair    Coronary artery disease Father     Hypertension Father     Aneurysm Brother      Social History     Social History    Marital status: Single     Spouse name: N/A    Number of children: N/A    Years of education: N/A     Occupational History    Not on file  Social History Main Topics    Smoking status: Never Smoker    Smokeless tobacco: Never Used    Alcohol use Yes      Comment: 2 cans of beer each night    Drug use: No    Sexual activity: Yes     Other Topics Concern    Not on file     Social History Narrative    No narrative on file     Medications have been verified  Objective   /76   Pulse 58   Temp 98 °F (36 7 °C) (Tympanic)   Resp 18   Ht 6' (1 829 m)   Wt 122 kg (270 lb)   SpO2 98%   BMI 36 62 kg/m²      Physical Exam   Physical Exam   Constitutional: He appears well-developed and well-nourished  No distress  HENT:   Head: Normocephalic  Right Ear: External ear normal    Left Ear: External ear normal    Nose: Nose normal    Mouth/Throat: Oropharynx is clear and moist  No oropharyngeal exudate  Neck: Normal range of motion  Neck supple  Cardiovascular: Normal rate, regular rhythm and normal heart sounds  No murmur heard  Pulmonary/Chest: Effort normal and breath sounds normal  No respiratory distress  He has no wheezes  He has no rales  Abdominal: Soft  Bowel sounds are normal  There is no tenderness  Musculoskeletal:        Lumbar back: He exhibits decreased range of motion, tenderness, swelling and spasm  Lymphadenopathy:     He has no cervical adenopathy  Skin: Skin is warm  No rash noted  Vitals reviewed

## 2018-08-10 ENCOUNTER — APPOINTMENT (OUTPATIENT)
Dept: LAB | Facility: MEDICAL CENTER | Age: 53
End: 2018-08-10
Payer: COMMERCIAL

## 2018-08-10 DIAGNOSIS — N18.30 CHRONIC KIDNEY DISEASE, STAGE III (MODERATE) (HCC): ICD-10-CM

## 2018-08-10 LAB
ANION GAP SERPL CALCULATED.3IONS-SCNC: 8 MMOL/L (ref 4–13)
BUN SERPL-MCNC: 25 MG/DL (ref 5–25)
CALCIUM SERPL-MCNC: 8.9 MG/DL (ref 8.3–10.1)
CHLORIDE SERPL-SCNC: 108 MMOL/L (ref 100–108)
CO2 SERPL-SCNC: 25 MMOL/L (ref 21–32)
CREAT SERPL-MCNC: 1.32 MG/DL (ref 0.6–1.3)
GFR SERPL CREATININE-BSD FRML MDRD: 61 ML/MIN/1.73SQ M
GLUCOSE P FAST SERPL-MCNC: 127 MG/DL (ref 65–99)
POTASSIUM SERPL-SCNC: 4 MMOL/L (ref 3.5–5.3)
SODIUM SERPL-SCNC: 141 MMOL/L (ref 136–145)

## 2018-08-10 PROCEDURE — 80048 BASIC METABOLIC PNL TOTAL CA: CPT

## 2018-08-10 PROCEDURE — 36415 COLL VENOUS BLD VENIPUNCTURE: CPT

## 2018-08-17 DIAGNOSIS — E03.9 ACQUIRED HYPOTHYROIDISM: Primary | ICD-10-CM

## 2018-08-17 RX ORDER — LEVOTHYROXINE SODIUM 112 UG/1
224 TABLET ORAL DAILY
Qty: 180 TABLET | Refills: 1 | Status: SHIPPED | OUTPATIENT
Start: 2018-08-17 | End: 2019-02-27 | Stop reason: SDUPTHER

## 2018-09-18 DIAGNOSIS — E78.2 MIXED HYPERLIPIDEMIA: Primary | ICD-10-CM

## 2018-09-18 RX ORDER — ATORVASTATIN CALCIUM 10 MG/1
TABLET, FILM COATED ORAL
Qty: 90 TABLET | Refills: 1 | Status: SHIPPED | OUTPATIENT
Start: 2018-09-18 | End: 2019-03-01 | Stop reason: SDUPTHER

## 2018-10-16 ENCOUNTER — OFFICE VISIT (OUTPATIENT)
Dept: GASTROENTEROLOGY | Facility: HOSPITAL | Age: 53
End: 2018-10-16
Payer: COMMERCIAL

## 2018-10-16 VITALS
WEIGHT: 281.6 LBS | HEART RATE: 57 BPM | BODY MASS INDEX: 38.14 KG/M2 | HEIGHT: 72 IN | TEMPERATURE: 98.5 F | SYSTOLIC BLOOD PRESSURE: 150 MMHG | DIASTOLIC BLOOD PRESSURE: 90 MMHG

## 2018-10-16 DIAGNOSIS — Z15.89 HOMOZYGOUS FOR MTHFR GENE MUTATION: ICD-10-CM

## 2018-10-16 DIAGNOSIS — R19.5 POSITIVE FIT (FECAL IMMUNOCHEMICAL TEST): Primary | ICD-10-CM

## 2018-10-16 PROCEDURE — 99244 OFF/OP CNSLTJ NEW/EST MOD 40: CPT | Performed by: INTERNAL MEDICINE

## 2018-10-16 RX ORDER — PREDNISONE 20 MG/1
TABLET ORAL
Refills: 2 | Status: ON HOLD | COMMUNITY
Start: 2018-08-03 | End: 2018-11-23 | Stop reason: ALTCHOICE

## 2018-10-16 RX ORDER — TRAMADOL HYDROCHLORIDE 50 MG/1
TABLET ORAL
Refills: 0 | COMMUNITY
Start: 2018-08-07 | End: 2019-02-12 | Stop reason: ALTCHOICE

## 2018-10-16 RX ORDER — POLYETHYLENE GLYCOL 3350, SODIUM SULFATE, SODIUM CHLORIDE, POTASSIUM CHLORIDE, ASCORBIC ACID, SODIUM ASCORBATE 7.5-2.691G
2000 KIT ORAL ONCE
Qty: 2000 ML | Refills: 0 | Status: SHIPPED | OUTPATIENT
Start: 2018-10-16 | End: 2019-02-12 | Stop reason: ALTCHOICE

## 2018-10-16 NOTE — LETTER
October 16, 2018     Loyd Pollard DO  [facility]    Patient: Josee Kirk   YOB: 1965   Date of Visit: 10/16/2018       Dear Dr Giovanna Rodríguez: Thank you for referring Saud Boogie to me for evaluation  Below are my notes for this consultation  If you have questions, please do not hesitate to call me  I look forward to following your patient along with you  Sincerely,        Noe Curran MD        CC: No Recipients  Noe Curran MD  10/16/2018  1:43 PM  Sign at close encounter  [facility] - Outpatient Consultation  Brisa Baer 48 y o  male MRN: [id number]  Encounter: [id number]          ASSESSMENT AND PLAN:      1  Positive FIT (fecal immunochemical test)  I will schedule for colonoscopy because of his positive fecal immunochemical test   I spoke to him about the benefits and risks of the procedure as well as instructions for the bowel preparation and answered all his questions  - MOVIPREP 100 g; Take 2,000 mL by mouth once for 1 dose  Dispense: 2000 mL; Refill: 0  - Case request operating room: COLONOSCOPY    2  Homozygous for MTHFR gene mutation [facility])  Because of his hypercoagulable state, I asked him to check with Dr Giovanna Rodríguez if it is okay for him to stop the Xarelto for two days prior to the procedure  If he is felt to be high risk, then it is okay for him to continue the Xarelto and I can perform the procedure on anticoagulation  ______________________________________________________________________    HPI:  He presents for evaluation because of a positive fecal immunochemical test   He denies any rectal bleeding, melena, abdominal pain, change in bowel habits, and weight loss  He has a history of clots due to hypercoagulable state so he is currently on Xarelto  He has never previously had a colonoscopy and he denies any family history of colon polyps or colon cancer        REVIEW OF SYSTEMS:    CONSTITUTIONAL: Denies any fever, chills, rigors, and weight loss  HEENT: No earache or tinnitus  Denies hearing loss or visual disturbances  CARDIOVASCULAR: No chest pain or palpitations  RESPIRATORY: Denies any cough, hemoptysis, shortness of breath or dyspnea on exertion  GASTROINTESTINAL: As noted in the History of Present Illness  GENITOURINARY: No problems with urination  Denies any hematuria or dysuria  NEUROLOGIC: No dizziness or vertigo, denies headaches  MUSCULOSKELETAL: Denies any muscle or joint pain, but complains of bilateral foot pain  SKIN: Denies skin rashes or itching  ENDOCRINE: Denies excessive thirst  Denies intolerance to heat or cold  PSYCHOSOCIAL: Denies depression or anxiety  Denies any recent memory loss         Historical Information   Past Medical History:   Diagnosis Date    Arthritis     right foot    Chronic cholecystitis     [facility]     [facility]     Gout     History of pulmonary embolism     Hypertension     Hypothyroidism     Lumbar back pain     [facility] ([facility] )     Scab     right arm- no s/s infection    Sleep apnea     no CPAP    Tarsal tunnel syndrome, right     Tinnitus     left ear    Wears glasses     and contacts    Wears glasses      Past Surgical History:   Procedure Laterality Date    APPENDECTOMY      ARTHRODESIS      Lumbar L__    BUNIONECTOMY Right 10/7/2016    Procedure: REMOVAL TIBIAL  SESAMOID BONE  RIGHT FOOT;  Surgeon: Albert Moss DPM;  [facility];  Service:     CHOLECYSTECTOMY  03/26/2015    KNEE ARTHROSCOPY W/ MENISCAL REPAIR      Lateral    NASAL SEPTUM SURGERY  10/16/2013    SC TARSAL TUNNEL RELEASE Right 6/25/2018    Procedure: RELEASE TARSAL TUNNEL;  Surgeon: Patty Steen DPM;  [facility];  Service: Podiatry    SPINAL FUSION      TONSILLECTOMY  10/16/2013    with Adenoidectomy    UVULECTOMY  10/16/2013    UVULOPALATOPHARYNGOPLASTY  WISDOM TOOTH EXTRACTION       Social History   History   Alcohol Use    Yes     Comment: weekends     History   Drug Use No     History   Smoking Status    Never Smoker   Smokeless Tobacco    Never Used     Family History   Problem Relation Age of Onset    Aneurysm Mother         intracranial aneurysm repair    Coronary artery disease Father     Hypertension Father     Aneurysm Brother        Meds/Allergies       Current Outpatient Prescriptions:     allopurinol (ZYLOPRIM) 300 mg tablet    atorvastatin (LIPITOR) 10 mg tablet    carvedilol (COREG) 12 5 mg tablet    Coenzyme Q10 (CO Q-10) 200 MG CAPS    furosemide (LASIX) 40 mg tablet    gabapentin (NEURONTIN) 600 MG tablet    levothyroxine 112 mcg tablet    lisinopril (ZESTRIL) 2 5 mg tablet    metFORMIN (GLUCOPHAGE-XR) 500 mg 24 hr tablet    Multiple Vitamins-Minerals (MENS MULTIVITAMIN PLUS) TABS    predniSONE 20 mg tablet    sildenafil (VIAGRA) 100 mg tablet    spironolactone (ALDACTONE) 25 mg tablet    traMADol (ULTRAM) 50 mg tablet    XARELTO 20 MG tablet    cyclobenzaprine (FLEXERIL) 10 mg tablet    MOVIPREP 100 g    No Known Allergies        Objective     Blood pressure 150/90, pulse 57, temperature 98 5 °F (36 9 °C), temperature source Tympanic, height 6' (1 829 m), weight 128 kg (281 lb 9 6 oz)  Body mass index is 38 19 kg/m²  PHYSICAL EXAM:      General Appearance:   Alert, cooperative, no distress   HEENT:   Normocephalic, atraumatic, anicteric      Neck:  Supple, symmetrical, trachea midline   Lungs:   Clear to auscultation bilaterally; no rales, rhonchi or wheezing; respirations unlabored    Heart[de-identified]   Regular rate and rhythm; no murmur, rub, or gallop     Abdomen:   Soft, obese,non-tender, non-distended; normal bowel sounds; no masses, no organomegaly    Genitalia:   Deferred    Rectal:   Deferred    Extremities:  No cyanosis, clubbing or edema    Pulses:  2+ and symmetric    Skin:  No jaundice, rashes, or lesions  Lymph nodes:  No palpable cervical lymphadenopathy        Lab Results:   No visits with results within 1 Day(s) from this visit  Latest known visit with results is:   Appointment on 08/10/2018   Component Date Value    Sodium 08/10/2018 141     Potassium 08/10/2018 4 0     Chloride 08/10/2018 108     CO2 08/10/2018 25     ANION GAP 08/10/2018 8     BUN 08/10/2018 25     Creatinine 08/10/2018 1 32*    Glucose, Fasting 08/10/2018 127*    Calcium 08/10/2018 8 9     eGFR 08/10/2018 61          Radiology Results:   No results found

## 2018-10-16 NOTE — PROGRESS NOTES
[facility] - Outpatient Consultation  Pan Baer 48 y o  male MRN: [id number]  Encounter: [id number]          ASSESSMENT AND PLAN:      1  Positive FIT (fecal immunochemical test)  I will schedule for colonoscopy because of his positive fecal immunochemical test   I spoke to him about the benefits and risks of the procedure as well as instructions for the bowel preparation and answered all his questions  - MOVIPREP 100 g; Take 2,000 mL by mouth once for 1 dose  Dispense: 2000 mL; Refill: 0  - Case request operating room: COLONOSCOPY    2  Homozygous for MTHFR gene mutation [facility])  Because of his hypercoagulable state, I asked him to check with Dr Eliza Short if it is okay for him to stop the Xarelto for two days prior to the procedure  If he is felt to be high risk, then it is okay for him to continue the Xarelto and I can perform the procedure on anticoagulation  ______________________________________________________________________    HPI:  He presents for evaluation because of a positive fecal immunochemical test   He denies any rectal bleeding, melena, abdominal pain, change in bowel habits, and weight loss  He has a history of clots due to hypercoagulable state so he is currently on Xarelto  He has never previously had a colonoscopy and he denies any family history of colon polyps or colon cancer  REVIEW OF SYSTEMS:    CONSTITUTIONAL: Denies any fever, chills, rigors, and weight loss  HEENT: No earache or tinnitus  Denies hearing loss or visual disturbances  CARDIOVASCULAR: No chest pain or palpitations  RESPIRATORY: Denies any cough, hemoptysis, shortness of breath or dyspnea on exertion  GASTROINTESTINAL: As noted in the History of Present Illness  GENITOURINARY: No problems with urination  Denies any hematuria or dysuria  NEUROLOGIC: No dizziness or vertigo, denies headaches     MUSCULOSKELETAL: Denies any muscle or joint pain, but complains of bilateral foot pain    SKIN: Denies skin rashes or itching  ENDOCRINE: Denies excessive thirst  Denies intolerance to heat or cold  PSYCHOSOCIAL: Denies depression or anxiety  Denies any recent memory loss         Historical Information   Past Medical History:   Diagnosis Date    Arthritis     right foot    Chronic cholecystitis     [facility]     DVT (deep venous thrombosis) ([facility])     Gout     History of pulmonary embolism     Hypertension     Hypothyroidism     Lumbar back pain     [facility]     Scab     right arm- no s/s infection    Sleep apnea     no CPAP    Tarsal tunnel syndrome, right     Tinnitus     left ear    Wears glasses     and contacts    Wears glasses      Past Surgical History:   Procedure Laterality Date    APPENDECTOMY      ARTHRODESIS      Lumbar L__    BUNIONECTOMY Right 10/7/2016    Procedure: REMOVAL TIBIAL  SESAMOID BONE  RIGHT FOOT;  Surgeon: Guerda Johnson DPM;  [facility];  Service:     CHOLECYSTECTOMY  03/26/2015    KNEE ARTHROSCOPY W/ MENISCAL REPAIR      Lateral    NASAL SEPTUM SURGERY  10/16/2013    [place] TARSAL TUNNEL RELEASE Right 6/25/2018    Procedure: RELEASE TARSAL TUNNEL;  Surgeon: Varsha James DPM;  [facility];  Service: Podiatry    SPINAL FUSION      TONSILLECTOMY  10/16/2013    with Adenoidectomy    UVULECTOMY  10/16/2013    UVULOPALATOPHARYNGOPLASTY      WISDOM TOOTH EXTRACTION       Social History   History   Alcohol Use    Yes     Comment: weekends     History   Drug Use No     History   Smoking Status    Never Smoker   Smokeless Tobacco    Never Used     Family History   Problem Relation Age of Onset    Aneurysm Mother         intracranial aneurysm repair    Coronary artery disease Father     Hypertension Father     Aneurysm Brother        Meds/Allergies       Current Outpatient Prescriptions:     allopurinol (ZYLOPRIM) 300 mg tablet    atorvastatin (LIPITOR) 10 mg tablet    carvedilol (COREG) 12 5 mg tablet    Coenzyme Q10 (CO Q-10) 200 MG CAPS    furosemide (LASIX) 40 mg tablet    gabapentin (NEURONTIN) 600 MG tablet    levothyroxine 112 mcg tablet    lisinopril (ZESTRIL) 2 5 mg tablet    metFORMIN (GLUCOPHAGE-XR) 500 mg 24 hr tablet    Multiple Vitamins-Minerals (MENS MULTIVITAMIN PLUS) TABS    predniSONE 20 mg tablet    sildenafil (VIAGRA) 100 mg tablet    spironolactone (ALDACTONE) 25 mg tablet    traMADol (ULTRAM) 50 mg tablet    XARELTO 20 MG tablet    cyclobenzaprine (FLEXERIL) 10 mg tablet    MOVIPREP 100 g    No Known Allergies        Objective     Blood pressure 150/90, pulse 57, temperature 98 5 °F (36 9 °C), temperature source Tympanic, height 6' (1 829 m), weight 128 kg (281 lb 9 6 oz)  Body mass index is 38 19 kg/m²  PHYSICAL EXAM:      General Appearance:   Alert, cooperative, no distress   HEENT:   Normocephalic, atraumatic, anicteric      Neck:  Supple, symmetrical, trachea midline   Lungs:   Clear to auscultation bilaterally; no rales, rhonchi or wheezing; respirations unlabored    Heart[de-identified]   Regular rate and rhythm; no murmur, rub, or gallop  Abdomen:   Soft, obese,non-tender, non-distended; normal bowel sounds; no masses, no organomegaly    Genitalia:   Deferred    Rectal:   Deferred    Extremities:  No cyanosis, clubbing or edema    Pulses:  2+ and symmetric    Skin:  No jaundice, rashes, or lesions    Lymph nodes:  No palpable cervical lymphadenopathy        Lab Results:   No visits with results within 1 Day(s) from this visit  Latest known visit with results is:   Appointment on 08/10/2018   Component Date Value    Sodium 08/10/2018 141     Potassium 08/10/2018 4 0     Chloride 08/10/2018 108     CO2 08/10/2018 25     ANION GAP 08/10/2018 8     BUN 08/10/2018 25     Creatinine 08/10/2018 1 32*    Glucose, Fasting 08/10/2018 127*    Calcium 08/10/2018 8 9     eGFR 08/10/2018 61          Radiology Results:   No results found

## 2018-10-24 ENCOUNTER — TELEPHONE (OUTPATIENT)
Dept: FAMILY MEDICINE CLINIC | Facility: CLINIC | Age: 53
End: 2018-10-24

## 2018-10-24 ENCOUNTER — TELEPHONE (OUTPATIENT)
Dept: GASTROENTEROLOGY | Facility: CLINIC | Age: 53
End: 2018-10-24

## 2018-10-24 NOTE — TELEPHONE ENCOUNTER
Patient is having a colonoscopy on 11/23/2018 and needs orders on how many days he can hold his xeralto

## 2018-10-25 ENCOUNTER — TELEPHONE (OUTPATIENT)
Dept: GASTROENTEROLOGY | Facility: CLINIC | Age: 53
End: 2018-10-25

## 2018-10-25 NOTE — TELEPHONE ENCOUNTER
Spoke to patient, 2 day Xarelto hold for procedure scheduled 11/23/18  Suprep in not covered under his insurance so I emailed him Dulcolax/Miralax instructiosn

## 2018-11-08 DIAGNOSIS — G60.9 IDIOPATHIC PERIPHERAL NEUROPATHY: ICD-10-CM

## 2018-11-08 RX ORDER — GABAPENTIN 600 MG/1
1200 TABLET ORAL 2 TIMES DAILY
Qty: 135 TABLET | Refills: 5 | Status: SHIPPED | OUTPATIENT
Start: 2018-11-08 | End: 2019-02-12

## 2018-11-09 LAB
LEFT EYE DIABETIC RETINOPATHY: NORMAL
RIGHT EYE DIABETIC RETINOPATHY: NORMAL
SEVERITY (EYE EXAM): NORMAL

## 2018-11-22 ENCOUNTER — ANESTHESIA EVENT (OUTPATIENT)
Dept: PERIOP | Facility: HOSPITAL | Age: 53
End: 2018-11-22
Payer: COMMERCIAL

## 2018-11-22 RX ORDER — SODIUM CHLORIDE, SODIUM LACTATE, POTASSIUM CHLORIDE, CALCIUM CHLORIDE 600; 310; 30; 20 MG/100ML; MG/100ML; MG/100ML; MG/100ML
125 INJECTION, SOLUTION INTRAVENOUS CONTINUOUS
Status: CANCELLED | OUTPATIENT
Start: 2018-11-22

## 2018-11-23 ENCOUNTER — ANESTHESIA (OUTPATIENT)
Dept: PERIOP | Facility: HOSPITAL | Age: 53
End: 2018-11-23
Payer: COMMERCIAL

## 2018-11-23 ENCOUNTER — HOSPITAL ENCOUNTER (OUTPATIENT)
Facility: HOSPITAL | Age: 53
Setting detail: OUTPATIENT SURGERY
Discharge: HOME | End: 2018-11-23
Attending: INTERNAL MEDICINE | Admitting: INTERNAL MEDICINE
Payer: COMMERCIAL

## 2018-11-23 VITALS
HEIGHT: 72 IN | TEMPERATURE: 99.3 F | HEART RATE: 42 BPM | BODY MASS INDEX: 38.06 KG/M2 | SYSTOLIC BLOOD PRESSURE: 146 MMHG | WEIGHT: 281 LBS | RESPIRATION RATE: 20 BRPM | DIASTOLIC BLOOD PRESSURE: 70 MMHG | OXYGEN SATURATION: 95 %

## 2018-11-23 LAB — GLUCOSE SERPL-MCNC: 108 MG/DL (ref 65–140)

## 2018-11-23 PROCEDURE — 45378 DIAGNOSTIC COLONOSCOPY: CPT | Performed by: INTERNAL MEDICINE

## 2018-11-23 PROCEDURE — 82948 REAGENT STRIP/BLOOD GLUCOSE: CPT

## 2018-11-23 RX ORDER — PROPOFOL 10 MG/ML
INJECTION, EMULSION INTRAVENOUS AS NEEDED
Status: DISCONTINUED | OUTPATIENT
Start: 2018-11-23 | End: 2018-11-23 | Stop reason: SURG

## 2018-11-23 RX ORDER — SODIUM CHLORIDE, SODIUM LACTATE, POTASSIUM CHLORIDE, CALCIUM CHLORIDE 600; 310; 30; 20 MG/100ML; MG/100ML; MG/100ML; MG/100ML
125 INJECTION, SOLUTION INTRAVENOUS CONTINUOUS
Status: DISCONTINUED | OUTPATIENT
Start: 2018-11-23 | End: 2018-11-23 | Stop reason: HOSPADM

## 2018-11-23 RX ORDER — PROPOFOL 10 MG/ML
INJECTION, EMULSION INTRAVENOUS CONTINUOUS PRN
Status: DISCONTINUED | OUTPATIENT
Start: 2018-11-23 | End: 2018-11-23 | Stop reason: SURG

## 2018-11-23 RX ADMIN — PROPOFOL 50 MG: 10 INJECTION, EMULSION INTRAVENOUS at 08:33

## 2018-11-23 RX ADMIN — SODIUM CHLORIDE, SODIUM LACTATE, POTASSIUM CHLORIDE, AND CALCIUM CHLORIDE 125 ML/HR: .6; .31; .03; .02 INJECTION, SOLUTION INTRAVENOUS at 07:54

## 2018-11-23 RX ADMIN — PROPOFOL 150 MCG/KG/MIN: 10 INJECTION, EMULSION INTRAVENOUS at 08:32

## 2018-11-23 RX ADMIN — PROPOFOL 50 MG: 10 INJECTION, EMULSION INTRAVENOUS at 08:32

## 2018-11-23 NOTE — OP NOTE
Colonoscopy Procedure Note    Procedure: Colonoscopy    Sedation: Monitored anesthesia care, check anesthesia records      ASA Class: 2    INDICATIONS:  POSITIVE FECAL IMMUNOCHEMICAL TEST    POST-OP DIAGNOSIS: See the impression below    Procedure Details     Prior colonoscopy: No prior colonoscopy  Informed consent was obtained for the procedure, including sedation  Risks of perforation, hemorrhage, adverse drug reaction and aspiration were discussed  The patient was placed in the left lateral decubitus position  Based on the pre-procedure assessment, including review of the patient's medical history, medications, allergies, and review of systems, he had been deemed to be an appropriate candidate for conscious sedation; he was therefore sedated with the medications listed below  The patient was monitored continuously with telemetry, pulse oximetry, blood pressure monitoring, and direct observations  A rectal examination was performed  The colonoscope was inserted into the rectum and advanced under direct vision to the cecum, which was identified by the ileocecal valve and cecal strap  The quality of the colonic preparation was poor  A careful inspection was made as the colonoscope was withdrawn, including a retroflexed view of the rectum; findings and interventions are described below  Findings: There was a large amount of thick liquid and solid stool throughout the colon  This was lavaged and suctioned as much as possible but most of it could not be removed as the solid debris was clogging the suction channel  Retroflexed view in the rectum was unremarkable  Complications: None; patient tolerated the procedure well  Impression:    Poor bowel preparation    Recommendations:  Repeat colonoscopy in 1 years or sooner if clinically indicated  Repeat colonoscopy is being recommended at an interval of less than 10 years, this is because of an inadequate bowel preparation

## 2018-11-23 NOTE — ANESTHESIA POSTPROCEDURE EVALUATION
Post-Op Assessment Note      CV Status:  Stable    Mental Status:  Alert and awake    Hydration Status:  Euvolemic    PONV Controlled:  Controlled    Airway Patency:  Patent    Post Op Vitals Reviewed: Yes          Staff: CRNA           /70 (11/23/18 0847)    Temp 99 3 °F (37 4 °C) (11/23/18 0847)    Pulse (!) 48 (11/23/18 0847)   Resp 20 (11/23/18 0847)    SpO2 98 % (11/23/18 0847)

## 2018-11-23 NOTE — ANESTHESIA PREPROCEDURE EVALUATION
Review of Systems/Medical History          Cardiovascular  Hypertension ,    Pulmonary  Sleep apnea ,        GI/Hepatic            Endo/Other  Diabetes , History of thyroid disease ,   Obesity  morbid obesity   GYN       Hematology   Musculoskeletal    Arthritis     Neurology   Psychology           Physical Exam    Airway    Mallampati score: II         Dental   No notable dental hx     Cardiovascular      Pulmonary      Other Findings        Anesthesia Plan  ASA Score- 3     Anesthesia Type- IV sedation with anesthesia with ASA Monitors  Additional Monitors:   Airway Plan:     Comment: I, Dr Candelaria Martin, the attending physician, have personally seen and evaluated the patient prior to anesthetic care  I have reviewed the pre-anesthetic record, and other medical records if appropriate to the anesthetic care  If a CRNA is involved in the case, I have reviewed the CRNA assessment, if present, and agree  The patient is in a suitable condition to proceed with my formulated anesthetic plan        Plan Factors-    Induction- intravenous  Postoperative Plan-     Informed Consent- Anesthetic plan and risks discussed with patient  I personally reviewed this patient with the CRNA  Discussed and agreed on the Anesthesia Plan with the RAUL Troy

## 2018-11-23 NOTE — H&P
History and Physical - [facility]  Marlin France Keyur 48 y o  male MRN: [id number]                  HPI: Herbert Brink is a 48y o  year old male who presents for positive fecal immunochemical test       REVIEW OF SYSTEMS: Per the HPI, and otherwise unremarkable      Historical Information   Past Medical History:   Diagnosis Date    Arthritis     right foot    Chronic cholecystitis     Diabetes mellitus ([facility] )     [facility]     Gout     History of pulmonary embolism     Hypertension     Hypothyroidism     Lumbar back pain     [facility]FR mutation ([facility] )     Scab     right arm- no s/s infection    Sleep apnea     no CPAP    Tarsal tunnel syndrome, right     Tinnitus     left ear    Wears glasses     and contacts    Wears glasses      Past Surgical History:   Procedure Laterality Date    APPENDECTOMY      ARTHRODESIS      Lumbar L__    BUNIONECTOMY Right 10/7/2016    Procedure: REMOVAL TIBIAL  SESAMOID BONE  RIGHT FOOT;  Surgeon: Jayashree Hawkins DPM;  Location: [facility];  Service:     CHOLECYSTECTOMY  03/26/2015    KNEE ARTHROSCOPY W/ MENISCAL REPAIR      Lateral    NASAL SEPTUM SURGERY  10/16/2013    OR TARSAL TUNNEL RELEASE Right 6/25/2018    Procedure: RELEASE TARSAL TUNNEL;  Surgeon: Jack Silva DPM;  Location: [facility];  Service: Podiatry    SPINAL FUSION      TONSILLECTOMY  10/16/2013    with Adenoidectomy    UVULECTOMY  10/16/2013    UVULOPALATOPHARYNGOPLASTY      WISDOM TOOTH EXTRACTION       Social History   History   Alcohol Use    Yes     Comment: weekends     History   Drug Use No     History   Smoking Status    Never Smoker   Smokeless Tobacco    Never Used     Family History   Problem Relation Age of Onset    Aneurysm Mother         intracranial aneurysm repair    Coronary artery disease Father     Hypertension Father     Aneurysm Brother        Meds/Allergies     Prescriptions Prior to Admission   Medication    allopurinol (ZYLOPRIM) 300 mg tablet    atorvastatin (LIPITOR) 10 mg tablet    carvedilol (COREG) 12 5 mg tablet    Coenzyme Q10 (CO Q-10) 200 MG CAPS    furosemide (LASIX) 40 mg tablet    gabapentin (NEURONTIN) 600 MG tablet    levothyroxine 112 mcg tablet    lisinopril (ZESTRIL) 2 5 mg tablet    metFORMIN (GLUCOPHAGE-XR) 500 mg 24 hr tablet    Multiple Vitamins-Minerals (MENS MULTIVITAMIN PLUS) TABS    sildenafil (VIAGRA) 100 mg tablet    spironolactone (ALDACTONE) 25 mg tablet    traMADol (ULTRAM) 50 mg tablet    MOVIPREP 100 g    XARELTO 20 MG tablet       No Known Allergies    Objective     Blood pressure 145/70, pulse (!) 48, temperature 98 2 °F (36 8 °C), temperature source Tympanic, resp  rate 18, height 6' (1 829 m), weight 127 kg (281 lb), SpO2 99 %  PHYSICAL EXAM    Gen: NAD  CV: RRR  CHEST: Clear  ABD: soft, NT/ND  EXT: no edema      ASSESSMENT/PLAN:  This is a 48y o  year old male here for colonoscopy, and he is stable and optimized for his procedure

## 2019-01-13 DIAGNOSIS — G60.9 IDIOPATHIC PERIPHERAL NEUROPATHY: ICD-10-CM

## 2019-01-14 RX ORDER — RIVAROXABAN 20 MG/1
TABLET, FILM COATED ORAL
Qty: 30 TABLET | Refills: 5 | Status: SHIPPED | OUTPATIENT
Start: 2019-01-14 | End: 2019-05-16 | Stop reason: SDUPTHER

## 2019-02-03 DIAGNOSIS — E11.29 CONTROLLED TYPE 2 DIABETES MELLITUS WITH MICROALBUMINURIA, WITHOUT LONG-TERM CURRENT USE OF INSULIN (HCC): ICD-10-CM

## 2019-02-03 DIAGNOSIS — R80.9 CONTROLLED TYPE 2 DIABETES MELLITUS WITH MICROALBUMINURIA, WITHOUT LONG-TERM CURRENT USE OF INSULIN (HCC): ICD-10-CM

## 2019-02-04 RX ORDER — METFORMIN HYDROCHLORIDE 500 MG/1
TABLET, EXTENDED RELEASE ORAL
Qty: 60 TABLET | Refills: 3 | Status: SHIPPED | OUTPATIENT
Start: 2019-02-04 | End: 2019-03-01 | Stop reason: ALTCHOICE

## 2019-02-12 ENCOUNTER — OFFICE VISIT (OUTPATIENT)
Dept: FAMILY MEDICINE CLINIC | Facility: CLINIC | Age: 54
End: 2019-02-12
Payer: COMMERCIAL

## 2019-02-12 VITALS
BODY MASS INDEX: 34.13 KG/M2 | DIASTOLIC BLOOD PRESSURE: 80 MMHG | WEIGHT: 252 LBS | HEIGHT: 72 IN | SYSTOLIC BLOOD PRESSURE: 126 MMHG

## 2019-02-12 DIAGNOSIS — I10 ESSENTIAL HYPERTENSION: ICD-10-CM

## 2019-02-12 DIAGNOSIS — R80.9 CONTROLLED TYPE 2 DIABETES MELLITUS WITH MICROALBUMINURIA, WITHOUT LONG-TERM CURRENT USE OF INSULIN (HCC): Primary | ICD-10-CM

## 2019-02-12 DIAGNOSIS — M79.651 RIGHT THIGH PAIN: Primary | ICD-10-CM

## 2019-02-12 DIAGNOSIS — G60.9 IDIOPATHIC PERIPHERAL NEUROPATHY: ICD-10-CM

## 2019-02-12 DIAGNOSIS — E03.9 HYPOTHYROIDISM (ACQUIRED): ICD-10-CM

## 2019-02-12 DIAGNOSIS — E11.29 CONTROLLED TYPE 2 DIABETES MELLITUS WITH MICROALBUMINURIA, WITHOUT LONG-TERM CURRENT USE OF INSULIN (HCC): Primary | ICD-10-CM

## 2019-02-12 DIAGNOSIS — N52.1 ERECTILE DYSFUNCTION DUE TO DISEASES CLASSIFIED ELSEWHERE: ICD-10-CM

## 2019-02-12 DIAGNOSIS — M79.651 RIGHT THIGH PAIN: ICD-10-CM

## 2019-02-12 PROCEDURE — 99214 OFFICE O/P EST MOD 30 MIN: CPT | Performed by: FAMILY MEDICINE

## 2019-02-12 PROCEDURE — 1036F TOBACCO NON-USER: CPT | Performed by: FAMILY MEDICINE

## 2019-02-12 PROCEDURE — 3008F BODY MASS INDEX DOCD: CPT | Performed by: FAMILY MEDICINE

## 2019-02-12 PROCEDURE — 3079F DIAST BP 80-89 MM HG: CPT | Performed by: FAMILY MEDICINE

## 2019-02-12 PROCEDURE — 3074F SYST BP LT 130 MM HG: CPT | Performed by: FAMILY MEDICINE

## 2019-02-12 RX ORDER — GABAPENTIN 600 MG/1
1200 TABLET ORAL 3 TIMES DAILY
Qty: 180 TABLET | Refills: 5 | Status: SHIPPED | OUTPATIENT
Start: 2019-02-12 | End: 2019-02-13 | Stop reason: SDUPTHER

## 2019-02-12 NOTE — ASSESSMENT & PLAN NOTE
Lab Results   Component Value Date    HGBA1C 7 3 (H) 06/12/2018       No results for input(s): POCGLU in the last 72 hours      Blood Sugar Average: Last 72 hrs:not cheked

## 2019-02-12 NOTE — PROGRESS NOTES
Assessment/Plan:    Controlled type 2 diabetes mellitus with microalbuminuria, without long-term current use of insulin (HCC)  Lab Results   Component Value Date    HGBA1C 7 3 (H) 06/12/2018       No results for input(s): POCGLU in the last 72 hours  Blood Sugar Average: Last 72 hrs:not cheked      Hypothyroidism (acquired)  Check tsh       Diagnoses and all orders for this visit:    Controlled type 2 diabetes mellitus with microalbuminuria, without long-term current use of insulin (HCC)    Idiopathic peripheral neuropathy    Hypothyroidism (acquired)    Essential hypertension    Erectile dysfunction due to diseases classified elsewhere          Subjective:      Patient ID: Humberto Hernadez is a 48 y o  male  Patient here for follow-up regarding his diabetes he is due for his diabetic labs of also needs thyroid function he is experiencing peripheral neuropathy in both feet his podiatrist has treated by increasing his gabapentin to 1200 mg 3 times daily in addition he is developed problems with pain in the right buttocks and the right lateral thigh a possible case of meralgia paresthetica or a new type of pain syndrome from his low back although he states that his low back has been really good lately with no symptoms on he did lose almost 40 lb watching his diet very closely anxious to see what his hemoglobin A1c is patient just had his eye exam no signs of any diabetic retinopathy      The following portions of the patient's history were reviewed and updated as appropriate:   He  has a past medical history of Arthritis, Chronic cholecystitis, Diabetes mellitus ([facility] ), DVT (deep venous thrombosis) ([facility] ), Gout, History of pulmonary embolism, Hypertension, Hypothyroidism, Lumbar back pain, [facility] ([facility] ), Scab, Sleep apnea, Tarsal tunnel syndrome, right, Tinnitus, Wears glasses, and Wears glasses    He   Patient Active Problem List    Diagnosis Date Noted    Positive FIT (fecal immunochemical test) 10/16/2018    Tarsal tunnel syndrome of right side     Tarsal tunnel syndrome of left side     Hypothyroidism (acquired) 01/31/2018    Diabetic peripheral neuropathy ([facility] ) 09/07/2017    Controlled type 2 diabetes mellitus with microalbuminuria, without long-term current use of insulin ([facility] ) 06/06/2017    Erectile dysfunction due to diseases classified elsewhere 05/01/2017    Homozygous for MTHFR gene mutation ([facility] ) 05/10/2016    Essential hypertension 04/06/2016     He  has a past surgical history that includes Cholecystectomy (03/26/2015); Appendectomy; Spinal fusion; Tonsillectomy (10/16/2013); Nasal septum surgery (10/16/2013); Uvulopalatopharyngoplasty; Aspers tooth extraction; Bunionectomy (Right, 10/7/2016); Arthrodesis; Knee arthroscopy w/ meniscal repair; Uvulectomy (10/16/2013); pr tarsal tunnel release (Right, 6/25/2018); and pr colonoscopy flx dx w/collj spec when pfrmd (N/A, 11/23/2018)  His family history includes Aneurysm in his brother and mother; Coronary artery disease in his father; Hypertension in his father  He  reports that he has never smoked  He has never used smokeless tobacco  He reports that he drinks alcohol  He reports that he does not use drugs  Current Outpatient Medications   Medication Sig Dispense Refill    allopurinol (ZYLOPRIM) 300 mg tablet Take 1 tablet (300 mg total) by mouth daily 90 tablet 3    atorvastatin (LIPITOR) 10 mg tablet TAKE ONE TABLET BY MOUTH ONCE DAILY 90 tablet 1    carvedilol (COREG) 12 5 mg tablet Take 12 5 mg by mouth 2 (two) times a day with meals   furosemide (LASIX) 40 mg tablet Take 40 mg by mouth daily        gabapentin (NEURONTIN) 600 MG tablet Take 2 tablets (1,200 mg total) by mouth 2 (two) times a day 135 tablet 5    levothyroxine 112 mcg tablet Take 2 tablets (224 mcg total) by mouth daily 180 tablet 1    lisinopril (ZESTRIL) 2 5 mg tablet Take 2 5 tablets by mouth daily  0    metFORMIN (GLUCOPHAGE-XR) 500 mg 24 hr tablet TAKE 2 TABLETS BY MOUTH AT BEDTIME (Patient taking differently: TAKE 1 TABLETS BY MOUTH AT BEDTIME) 60 tablet 3    Multiple Vitamins-Minerals (MENS MULTIVITAMIN PLUS) TABS Take 1 tablet by mouth daily      sildenafil (VIAGRA) 100 mg tablet Take 1 tablet by mouth as needed      spironolactone (ALDACTONE) 25 mg tablet Take 25 mg by mouth daily   XARELTO 20 MG tablet TAKE 1 TABLET BY MOUTH ONCE DAILY 30 tablet 5    Coenzyme Q10 (CO Q-10) 200 MG CAPS Take 2 capsules by mouth daily       No current facility-administered medications for this visit  Current Outpatient Medications on File Prior to Visit   Medication Sig    allopurinol (ZYLOPRIM) 300 mg tablet Take 1 tablet (300 mg total) by mouth daily    atorvastatin (LIPITOR) 10 mg tablet TAKE ONE TABLET BY MOUTH ONCE DAILY    carvedilol (COREG) 12 5 mg tablet Take 12 5 mg by mouth 2 (two) times a day with meals   furosemide (LASIX) 40 mg tablet Take 40 mg by mouth daily   gabapentin (NEURONTIN) 600 MG tablet Take 2 tablets (1,200 mg total) by mouth 2 (two) times a day    levothyroxine 112 mcg tablet Take 2 tablets (224 mcg total) by mouth daily    lisinopril (ZESTRIL) 2 5 mg tablet Take 2 5 tablets by mouth daily    metFORMIN (GLUCOPHAGE-XR) 500 mg 24 hr tablet TAKE 2 TABLETS BY MOUTH AT BEDTIME (Patient taking differently: TAKE 1 TABLETS BY MOUTH AT BEDTIME)    Multiple Vitamins-Minerals (MENS MULTIVITAMIN PLUS) TABS Take 1 tablet by mouth daily    sildenafil (VIAGRA) 100 mg tablet Take 1 tablet by mouth as needed    spironolactone (ALDACTONE) 25 mg tablet Take 25 mg by mouth daily   XARELTO 20 MG tablet TAKE 1 TABLET BY MOUTH ONCE DAILY    Coenzyme Q10 (CO Q-10) 200 MG CAPS Take 2 capsules by mouth daily    [DISCONTINUED] MOVIPREP 100 g Take 2,000 mL by mouth once for 1 dose    [DISCONTINUED] traMADol (ULTRAM) 50 mg tablet      No current facility-administered medications on file prior to visit  He has No Known Allergies       Review of Systems   Constitutional: Negative for activity change, appetite change, diaphoresis, fatigue and fever  HENT: Negative  Eyes: Negative  Respiratory: Negative for apnea, cough, chest tightness, shortness of breath and wheezing  Cardiovascular: Negative for chest pain, palpitations and leg swelling  Gastrointestinal: Negative for abdominal distention, abdominal pain, anal bleeding, constipation, diarrhea, nausea and vomiting  Endocrine: Negative for cold intolerance, heat intolerance, polydipsia, polyphagia and polyuria  Genitourinary: Negative for difficulty urinating, dysuria, flank pain, hematuria and urgency  Musculoskeletal: Negative for arthralgias, back pain, gait problem, joint swelling and myalgias  Skin: Negative for color change, rash and wound  Allergic/Immunologic: Negative for environmental allergies, food allergies and immunocompromised state  Neurological: Negative for dizziness, seizures, syncope, speech difficulty, numbness and headaches  Right-sided neuropathy in the buttocks and the lateral right thigh   Hematological: Negative for adenopathy  Does not bruise/bleed easily  Psychiatric/Behavioral: Negative for agitation, behavioral problems, hallucinations, sleep disturbance and suicidal ideas  Objective:      /80 (BP Location: Left arm, Patient Position: Sitting, Cuff Size: Standard)   Ht 6' (1 829 m)   Wt 114 kg (252 lb)   BMI 34 18 kg/m²          Physical Exam   Constitutional: He is oriented to person, place, and time  He appears well-developed and well-nourished  No distress  HENT:   Head: Normocephalic  Right Ear: External ear normal    Left Ear: External ear normal    Nose: Nose normal    Mouth/Throat: Oropharynx is clear and moist    Eyes: Pupils are equal, round, and reactive to light  Conjunctivae and EOM are normal  Right eye exhibits no discharge  Left eye exhibits no discharge  No scleral icterus  Neck: Normal range of motion  No tracheal deviation present  No thyromegaly present  Cardiovascular: Normal rate, regular rhythm and normal heart sounds  Exam reveals no gallop and no friction rub  No murmur heard  Pulses:       Dorsalis pedis pulses are 2+ on the right side, and 2+ on the left side  Posterior tibial pulses are 2+ on the right side, and 2+ on the left side  Pulmonary/Chest: Effort normal and breath sounds normal  No respiratory distress  He has no wheezes  Abdominal: Soft  Bowel sounds are normal  He exhibits no mass  There is no tenderness  There is no guarding  Musculoskeletal: He exhibits no edema or deformity  Feet:    Feet:   Right Foot:   Skin Integrity: Negative for ulcer, skin breakdown, erythema, warmth, callus or dry skin  Left Foot:   Skin Integrity: Negative for ulcer, skin breakdown, erythema, warmth, callus or dry skin  Lymphadenopathy:     He has no cervical adenopathy  Neurological: He is alert and oriented to person, place, and time  No cranial nerve deficit  Skin: Skin is warm and dry  No rash noted  He is not diaphoretic  No erythema  Psychiatric: He has a normal mood and affect  Thought content normal      Patient's shoes and socks removed  Right Foot/Ankle   Right Foot Inspection  Skin Exam: skin normal and skin intact no dry skin, no warmth, no callus, no erythema, no maceration, no abnormal color, no pre-ulcer, no ulcer and no callus                          Toe Exam: ROM and strength within normal limits  Sensory   Vibration: diminished  Proprioception: intact   Monofilament testing: diminished  Vascular  Capillary refills: < 3 seconds  The right DP pulse is 2+  The right PT pulse is 2+       Left Foot/Ankle  Left Foot Inspection  Skin Exam: skin normal and skin intactno dry skin, no warmth, no erythema, no maceration, normal color, no pre-ulcer, no ulcer and no callus                         Toe Exam: ROM and strength within normal limits                   Sensory Vibration: diminished  Proprioception: intact  Monofilament: diminished  Vascular  Capillary refills: < 3 seconds  The left DP pulse is 2+  The left PT pulse is 2+  Assign Risk Category:  No deformity present;  No loss of protective sensation;        Risk: 0

## 2019-02-13 DIAGNOSIS — E11.29 CONTROLLED TYPE 2 DIABETES MELLITUS WITH MICROALBUMINURIA, WITHOUT LONG-TERM CURRENT USE OF INSULIN (HCC): ICD-10-CM

## 2019-02-13 DIAGNOSIS — I10 ESSENTIAL HYPERTENSION: ICD-10-CM

## 2019-02-13 DIAGNOSIS — G60.9 IDIOPATHIC PERIPHERAL NEUROPATHY: ICD-10-CM

## 2019-02-13 DIAGNOSIS — R80.9 CONTROLLED TYPE 2 DIABETES MELLITUS WITH MICROALBUMINURIA, WITHOUT LONG-TERM CURRENT USE OF INSULIN (HCC): ICD-10-CM

## 2019-02-13 RX ORDER — GABAPENTIN 800 MG/1
800 TABLET ORAL 3 TIMES DAILY
Qty: 90 TABLET | Refills: 5 | Status: SHIPPED | OUTPATIENT
Start: 2019-02-13 | End: 2019-05-16 | Stop reason: SDUPTHER

## 2019-02-23 ENCOUNTER — HOSPITAL ENCOUNTER (OUTPATIENT)
Dept: RADIOLOGY | Facility: HOSPITAL | Age: 54
Discharge: HOME | End: 2019-02-23
Attending: FAMILY MEDICINE
Payer: COMMERCIAL

## 2019-02-23 ENCOUNTER — HOSPITAL ENCOUNTER (OUTPATIENT)
Dept: ULTRASOUND IMAGING | Facility: HOSPITAL | Age: 54
Discharge: HOME | End: 2019-02-23
Attending: FAMILY MEDICINE
Payer: COMMERCIAL

## 2019-02-23 DIAGNOSIS — I10 ESSENTIAL HYPERTENSION: ICD-10-CM

## 2019-02-23 DIAGNOSIS — M79.651 RIGHT THIGH PAIN: ICD-10-CM

## 2019-02-23 DIAGNOSIS — R80.9 CONTROLLED TYPE 2 DIABETES MELLITUS WITH MICROALBUMINURIA, WITHOUT LONG-TERM CURRENT USE OF INSULIN (HCC): ICD-10-CM

## 2019-02-23 DIAGNOSIS — E11.29 CONTROLLED TYPE 2 DIABETES MELLITUS WITH MICROALBUMINURIA, WITHOUT LONG-TERM CURRENT USE OF INSULIN (HCC): ICD-10-CM

## 2019-02-23 PROCEDURE — 73552 X-RAY EXAM OF FEMUR 2/>: CPT

## 2019-02-23 PROCEDURE — 76882 US LMTD JT/FCL EVL NVASC XTR: CPT

## 2019-02-23 PROCEDURE — 73502 X-RAY EXAM HIP UNI 2-3 VIEWS: CPT

## 2019-02-27 DIAGNOSIS — M79.651 RIGHT THIGH PAIN: Primary | ICD-10-CM

## 2019-02-27 DIAGNOSIS — E03.9 ACQUIRED HYPOTHYROIDISM: ICD-10-CM

## 2019-02-27 RX ORDER — LEVOTHYROXINE SODIUM 112 UG/1
TABLET ORAL
Qty: 180 TABLET | Refills: 1 | Status: SHIPPED | OUTPATIENT
Start: 2019-02-27 | End: 2019-05-16 | Stop reason: SDUPTHER

## 2019-02-27 NOTE — RESULT ENCOUNTER NOTE
Please call the patient regarding his abnormal result    Patient needs MRI of a bony pelvis and upper femurs order has been put in the chart possible previous hairline fractures

## 2019-03-01 ENCOUNTER — TRANSCRIBE ORDERS (OUTPATIENT)
Dept: ADMINISTRATIVE | Facility: HOSPITAL | Age: 54
End: 2019-03-01

## 2019-03-01 ENCOUNTER — LAB (OUTPATIENT)
Dept: LAB | Facility: MEDICAL CENTER | Age: 54
End: 2019-03-01
Payer: COMMERCIAL

## 2019-03-01 DIAGNOSIS — R80.9 CONTROLLED TYPE 2 DIABETES MELLITUS WITH MICROALBUMINURIA, WITHOUT LONG-TERM CURRENT USE OF INSULIN (HCC): Primary | ICD-10-CM

## 2019-03-01 DIAGNOSIS — E78.2 MIXED HYPERLIPIDEMIA: ICD-10-CM

## 2019-03-01 DIAGNOSIS — N18.30 CHRONIC KIDNEY DISEASE, STAGE III (MODERATE) (HCC): ICD-10-CM

## 2019-03-01 DIAGNOSIS — I12.9 PARENCHYMAL RENAL HYPERTENSION, STAGE 1 THROUGH STAGE 4 OR UNSPECIFIED CHRONIC KIDNEY DISEASE: ICD-10-CM

## 2019-03-01 DIAGNOSIS — N18.30 CHRONIC KIDNEY DISEASE, STAGE III (MODERATE) (HCC): Primary | ICD-10-CM

## 2019-03-01 DIAGNOSIS — M79.651 RIGHT THIGH PAIN: ICD-10-CM

## 2019-03-01 DIAGNOSIS — R80.9 PROTEINURIA, UNSPECIFIED TYPE: ICD-10-CM

## 2019-03-01 DIAGNOSIS — E11.29 CONTROLLED TYPE 2 DIABETES MELLITUS WITH MICROALBUMINURIA, WITHOUT LONG-TERM CURRENT USE OF INSULIN (HCC): Primary | ICD-10-CM

## 2019-03-01 LAB
BACTERIA UR QL AUTO: ABNORMAL /HPF
BILIRUB UR QL STRIP: NEGATIVE
CLARITY UR: CLEAR
COLOR UR: YELLOW
GLUCOSE UR STRIP-MCNC: NEGATIVE MG/DL
HGB UR QL STRIP.AUTO: NEGATIVE
HYALINE CASTS #/AREA URNS LPF: ABNORMAL /LPF
KETONES UR STRIP-MCNC: NEGATIVE MG/DL
LDLC SERPL DIRECT ASSAY-MCNC: 121 MG/DL (ref 0–100)
LEUKOCYTE ESTERASE UR QL STRIP: NEGATIVE
MAGNESIUM SERPL-MCNC: 2.6 MG/DL (ref 1.6–2.6)
NITRITE UR QL STRIP: NEGATIVE
NON-SQ EPI CELLS URNS QL MICRO: ABNORMAL /HPF
PH UR STRIP.AUTO: 5.5 [PH] (ref 4.5–8)
PROT UR STRIP-MCNC: ABNORMAL MG/DL
RBC #/AREA URNS AUTO: ABNORMAL /HPF
SP GR UR STRIP.AUTO: 1.02 (ref 1–1.03)
TSH SERPL DL<=0.05 MIU/L-ACNC: 0.14 UIU/ML (ref 0.36–3.74)
UROBILINOGEN UR QL STRIP.AUTO: 0.2 E.U./DL
WBC #/AREA URNS AUTO: ABNORMAL /HPF

## 2019-03-01 PROCEDURE — 81001 URINALYSIS AUTO W/SCOPE: CPT | Performed by: INTERNAL MEDICINE

## 2019-03-01 PROCEDURE — 83721 ASSAY OF BLOOD LIPOPROTEIN: CPT | Performed by: FAMILY MEDICINE

## 2019-03-01 PROCEDURE — 84443 ASSAY THYROID STIM HORMONE: CPT | Performed by: FAMILY MEDICINE

## 2019-03-01 PROCEDURE — 83735 ASSAY OF MAGNESIUM: CPT

## 2019-03-01 RX ORDER — ATORVASTATIN CALCIUM 20 MG/1
10 TABLET, FILM COATED ORAL DAILY
Start: 2019-03-01 | End: 2019-05-16 | Stop reason: SDUPTHER

## 2019-03-01 NOTE — RESULT ENCOUNTER NOTE
Please call the patient regarding his abnormal result    Thyroid is just a hair over replaced but I probably would not adjust his dosage for that little bit of difference cholesterol is elevated does he follow a diet also if he is willing he can increase his atorvastatin from 10-20 mg

## 2019-03-06 ENCOUNTER — HOSPITAL ENCOUNTER (OUTPATIENT)
Dept: MRI IMAGING | Facility: HOSPITAL | Age: 54
Discharge: HOME | End: 2019-03-06
Attending: FAMILY MEDICINE
Payer: COMMERCIAL

## 2019-03-06 PROCEDURE — 72195 MRI PELVIS W/O DYE: CPT

## 2019-03-07 DIAGNOSIS — M79.651 RIGHT THIGH PAIN: Primary | ICD-10-CM

## 2019-03-07 DIAGNOSIS — R93.7 ABNORMAL MRI SCAN, BONE: ICD-10-CM

## 2019-03-07 NOTE — RESULT ENCOUNTER NOTE
Please call the patient regarding his abnormal result    Patient has a lump on the of right femur in the back of the head of the femur which is rubbing on some tendons this might possibly account for some of his pain needs to also see Orthopedics because of the pain may be would benefit from some injections into the area if he is not already established with an orthopedic surgeon I would recommend Dr Christie Stack

## 2019-03-21 DIAGNOSIS — E78.2 MIXED HYPERLIPIDEMIA: ICD-10-CM

## 2019-03-21 RX ORDER — ATORVASTATIN CALCIUM 10 MG/1
TABLET, FILM COATED ORAL
Qty: 90 TABLET | Refills: 1 | Status: SHIPPED | OUTPATIENT
Start: 2019-03-21 | End: 2019-05-16 | Stop reason: SDUPTHER

## 2019-04-03 ENCOUNTER — OFFICE VISIT (OUTPATIENT)
Dept: OBGYN CLINIC | Facility: HOSPITAL | Age: 54
End: 2019-04-03
Payer: COMMERCIAL

## 2019-04-03 VITALS
HEIGHT: 72 IN | SYSTOLIC BLOOD PRESSURE: 121 MMHG | BODY MASS INDEX: 34.13 KG/M2 | HEART RATE: 54 BPM | DIASTOLIC BLOOD PRESSURE: 73 MMHG | WEIGHT: 252 LBS

## 2019-04-03 DIAGNOSIS — M53.3 SACRODYNIA: Primary | ICD-10-CM

## 2019-04-03 DIAGNOSIS — M79.651 RIGHT THIGH PAIN: ICD-10-CM

## 2019-04-03 DIAGNOSIS — R93.7 ABNORMAL MRI SCAN, BONE: ICD-10-CM

## 2019-04-03 DIAGNOSIS — Z98.890 STATUS POST LUMBAR SPINE SURGERY FOR DECOMPRESSION OF SPINAL CORD: ICD-10-CM

## 2019-04-03 PROCEDURE — 99203 OFFICE O/P NEW LOW 30 MIN: CPT | Performed by: ORTHOPAEDIC SURGERY

## 2019-04-03 RX ORDER — GABAPENTIN 600 MG/1
TABLET ORAL
Refills: 5 | COMMUNITY
Start: 2019-03-30 | End: 2019-05-16 | Stop reason: ALTCHOICE

## 2019-04-03 RX ORDER — LISINOPRIL 5 MG/1
TABLET ORAL
Refills: 1 | COMMUNITY
Start: 2019-03-22 | End: 2020-02-27 | Stop reason: SDUPTHER

## 2019-04-09 ENCOUNTER — HOSPITAL ENCOUNTER (OUTPATIENT)
Dept: RADIOLOGY | Facility: HOSPITAL | Age: 54
Discharge: HOME | End: 2019-04-09
Admitting: STUDENT IN AN ORGANIZED HEALTH CARE EDUCATION/TRAINING PROGRAM
Payer: COMMERCIAL

## 2019-04-09 ENCOUNTER — TRANSCRIBE ORDERS (OUTPATIENT)
Dept: RADIOLOGY | Facility: HOSPITAL | Age: 54
End: 2019-04-09

## 2019-04-09 DIAGNOSIS — M53.3 SACRODYNIA: ICD-10-CM

## 2019-04-09 PROCEDURE — G0260 INJ FOR SACROILIAC JT ANESTH: HCPCS

## 2019-04-09 RX ORDER — METHYLPREDNISOLONE ACETATE 80 MG/ML
80 INJECTION, SUSPENSION INTRA-ARTICULAR; INTRALESIONAL; INTRAMUSCULAR; SOFT TISSUE ONCE
Status: COMPLETED | OUTPATIENT
Start: 2019-04-09 | End: 2019-04-09

## 2019-04-09 RX ORDER — BUPIVACAINE HYDROCHLORIDE 2.5 MG/ML
2 INJECTION, SOLUTION EPIDURAL; INFILTRATION; INTRACAUDAL ONCE
Status: COMPLETED | OUTPATIENT
Start: 2019-04-09 | End: 2019-04-09

## 2019-04-09 RX ADMIN — BUPIVACAINE HYDROCHLORIDE 2 ML: 2.5 INJECTION, SOLUTION EPIDURAL; INFILTRATION; INTRACAUDAL; PERINEURAL at 08:49

## 2019-04-09 RX ADMIN — METHYLPREDNISOLONE ACETATE 80 MG: 80 INJECTION, SUSPENSION INTRA-ARTICULAR; INTRALESIONAL; INTRAMUSCULAR; SOFT TISSUE at 08:50

## 2019-05-07 ENCOUNTER — TELEPHONE (OUTPATIENT)
Dept: PODIATRY | Facility: CLINIC | Age: 54
End: 2019-05-07

## 2019-05-09 DIAGNOSIS — E79.0 HYPERURICEMIA: ICD-10-CM

## 2019-05-09 RX ORDER — ALLOPURINOL 300 MG/1
300 TABLET ORAL DAILY
Qty: 90 TABLET | Refills: 0 | Status: SHIPPED | OUTPATIENT
Start: 2019-05-09 | End: 2019-09-17 | Stop reason: SDUPTHER

## 2019-05-13 ENCOUNTER — TELEPHONE (OUTPATIENT)
Dept: PODIATRY | Facility: CLINIC | Age: 54
End: 2019-05-13

## 2019-05-16 ENCOUNTER — OFFICE VISIT (OUTPATIENT)
Dept: PODIATRY | Facility: CLINIC | Age: 54
End: 2019-05-16
Payer: COMMERCIAL

## 2019-05-16 VITALS
HEIGHT: 60 IN | WEIGHT: 237 LBS | SYSTOLIC BLOOD PRESSURE: 144 MMHG | BODY MASS INDEX: 46.53 KG/M2 | DIASTOLIC BLOOD PRESSURE: 86 MMHG

## 2019-05-16 DIAGNOSIS — R20.2 PARESTHESIA OF BOTH LOWER EXTREMITIES: Primary | ICD-10-CM

## 2019-05-16 DIAGNOSIS — E11.42 DIABETIC PERIPHERAL NEUROPATHY (HCC): ICD-10-CM

## 2019-05-16 PROCEDURE — 99213 OFFICE O/P EST LOW 20 MIN: CPT | Performed by: PODIATRIST

## 2019-05-16 RX ORDER — FUROSEMIDE 80 MG
40 TABLET ORAL
Refills: 1 | COMMUNITY
Start: 2019-04-10 | End: 2019-11-12 | Stop reason: SDUPTHER

## 2019-05-16 RX ORDER — CARVEDILOL 12.5 MG/1
TABLET ORAL
COMMUNITY
End: 2019-05-16 | Stop reason: SDUPTHER

## 2019-05-16 RX ORDER — SPIRONOLACTONE 25 MG/1
TABLET ORAL
COMMUNITY
End: 2020-03-23

## 2019-05-16 RX ORDER — DULOXETIN HYDROCHLORIDE 60 MG/1
1 CAPSULE, DELAYED RELEASE ORAL 2 TIMES DAILY
Refills: 0 | COMMUNITY
Start: 2019-05-10 | End: 2019-06-20

## 2019-05-16 RX ORDER — LEVOTHYROXINE SODIUM 112 UG/1
TABLET ORAL
COMMUNITY
End: 2019-07-25 | Stop reason: SDUPTHER

## 2019-05-16 RX ORDER — ATORVASTATIN CALCIUM 20 MG/1
TABLET, FILM COATED ORAL
COMMUNITY
End: 2019-07-25 | Stop reason: SDUPTHER

## 2019-05-16 RX ORDER — METFORMIN HYDROCHLORIDE 500 MG/1
TABLET, EXTENDED RELEASE ORAL
COMMUNITY
End: 2019-10-24 | Stop reason: SDUPTHER

## 2019-05-16 RX ORDER — ALLOPURINOL 300 MG/1
TABLET ORAL
COMMUNITY
End: 2019-05-16 | Stop reason: SDUPTHER

## 2019-06-20 ENCOUNTER — TELEPHONE (OUTPATIENT)
Dept: PODIATRY | Facility: CLINIC | Age: 54
End: 2019-06-20

## 2019-06-20 DIAGNOSIS — R20.2 PARESTHESIA: Primary | ICD-10-CM

## 2019-06-20 RX ORDER — DULOXETIN HYDROCHLORIDE 60 MG/1
60 CAPSULE, DELAYED RELEASE ORAL DAILY
Qty: 300 CAPSULE | Refills: 9 | Status: SHIPPED | OUTPATIENT
Start: 2019-06-20 | End: 2019-08-13 | Stop reason: ALTCHOICE

## 2019-07-09 ENCOUNTER — PATIENT MESSAGE (OUTPATIENT)
Dept: PODIATRY | Facility: CLINIC | Age: 54
End: 2019-07-09

## 2019-07-09 NOTE — TELEPHONE ENCOUNTER
From: Kate Holguin  To: Arianna Beach DPM  Sent: 7/9/2019 7:42 AM EDT  Subject: Prescription Question    Good Morning Doc,    Ever since I went onto Cymbalta I feel extremely fatigued , have diarrhea and I really cannot sleep  I did not give it any thought until we switched the dosage to 2X a day and it seems it has gotten worse  Can I get off of Cymbalta and go back on Gabapentin? I was on (2)600mg tablets 3x charbel Jacobo

## 2019-07-22 ENCOUNTER — PATIENT MESSAGE (OUTPATIENT)
Dept: PODIATRY | Facility: CLINIC | Age: 54
End: 2019-07-22

## 2019-07-22 NOTE — TELEPHONE ENCOUNTER
From: Stefania Gagnon  To: Marylynn Cushing, DPM  Sent: 7/22/2019 8:03 AM EDT  Subject: Con respecto a:RE: Prescription Question    Good Morning Doc    Sorry I haven't replied I was away on vacation for several weeks and I noticed I did not reply to your request, I will need a 30 day supply with 5 refills  That will get me to my next visit  LORELEI Recio I am using up the Gabapentin that I had before we switched to Cymbalta    I only have 4 days left    the new script should be (2) 600mg 3x aday  Josh Contreras  ----- Message -----  Angeles Garcia: Marylynn Cushing, DPM  Enviado: 7/10/2019 7:41 AM EDT  Para: Stefania Gagnon  Asunto: RE: Prescription Question  Marciano,    Yes we can switch back to the gabapentin  I will send in a prescription  How many weeks worth do you want? Dr Jayce Monterroso    ----- Message -----   From: Stefania Gagnon   Sent: 7/9/2019 7:42 AM EDT   To: Marylynn Cushing, DPM  Subject: Prescription Question    Good Morning Doc,    Ever since I went onto Cymbalta I feel extremely fatigued , have diarrhea and I really cannot sleep  I did not give it any thought until we switched the dosage to 2X a day and it seems it has gotten worse  Can I get off of Cymbalta and go back on Gabapentin? I was on (2)600mg tablets 3x aday        Rahul Jauregui

## 2019-07-25 ENCOUNTER — LAB (OUTPATIENT)
Dept: LAB | Facility: MEDICAL CENTER | Age: 54
End: 2019-07-25
Payer: COMMERCIAL

## 2019-07-25 ENCOUNTER — TRANSCRIBE ORDERS (OUTPATIENT)
Dept: ADMINISTRATIVE | Facility: HOSPITAL | Age: 54
End: 2019-07-25

## 2019-07-25 DIAGNOSIS — E03.9 ACQUIRED HYPOTHYROIDISM: Primary | ICD-10-CM

## 2019-07-25 DIAGNOSIS — E03.9 HYPOTHYROIDISM (ACQUIRED): ICD-10-CM

## 2019-07-25 DIAGNOSIS — E11.42 DIABETIC PERIPHERAL NEUROPATHY (HCC): Primary | ICD-10-CM

## 2019-07-25 DIAGNOSIS — O22.30 DVT (DEEP VEIN THROMBOSIS) IN PREGNANCY: ICD-10-CM

## 2019-07-25 DIAGNOSIS — I10 ESSENTIAL HYPERTENSION: ICD-10-CM

## 2019-07-25 DIAGNOSIS — E11.21 DIABETIC GLOMERULOPATHY (HCC): Primary | ICD-10-CM

## 2019-07-25 DIAGNOSIS — E11.21 DIABETIC GLOMERULOPATHY (HCC): ICD-10-CM

## 2019-07-25 DIAGNOSIS — R80.9 CONTROLLED TYPE 2 DIABETES MELLITUS WITH MICROALBUMINURIA, WITHOUT LONG-TERM CURRENT USE OF INSULIN (HCC): ICD-10-CM

## 2019-07-25 DIAGNOSIS — E78.2 MIXED HYPERLIPIDEMIA: ICD-10-CM

## 2019-07-25 DIAGNOSIS — E11.29 CONTROLLED TYPE 2 DIABETES MELLITUS WITH MICROALBUMINURIA, WITHOUT LONG-TERM CURRENT USE OF INSULIN (HCC): ICD-10-CM

## 2019-07-25 LAB
ALBUMIN SERPL BCP-MCNC: 3.5 G/DL (ref 3.5–5)
ALP SERPL-CCNC: 79 U/L (ref 46–116)
ALT SERPL W P-5'-P-CCNC: 22 U/L (ref 12–78)
ANION GAP SERPL CALCULATED.3IONS-SCNC: 5 MMOL/L (ref 4–13)
AST SERPL W P-5'-P-CCNC: 13 U/L (ref 5–45)
BILIRUB SERPL-MCNC: 0.31 MG/DL (ref 0.2–1)
BUN SERPL-MCNC: 28 MG/DL (ref 5–25)
CALCIUM ALBUM COR SERPL-MCNC: 10.6 MG/DL (ref 8.3–10.1)
CALCIUM SERPL-MCNC: 10.2 MG/DL (ref 8.3–10.1)
CHLORIDE SERPL-SCNC: 110 MMOL/L (ref 100–108)
CO2 SERPL-SCNC: 27 MMOL/L (ref 21–32)
CREAT SERPL-MCNC: 1.25 MG/DL (ref 0.6–1.3)
GFR SERPL CREATININE-BSD FRML MDRD: 65 ML/MIN/1.73SQ M
GLUCOSE P FAST SERPL-MCNC: 117 MG/DL (ref 65–99)
POTASSIUM SERPL-SCNC: 5 MMOL/L (ref 3.5–5.3)
PROT SERPL-MCNC: 7.3 G/DL (ref 6.4–8.2)
SODIUM SERPL-SCNC: 142 MMOL/L (ref 136–145)
TSH SERPL DL<=0.05 MIU/L-ACNC: 0.01 UIU/ML (ref 0.36–3.74)

## 2019-07-25 PROCEDURE — 80053 COMPREHEN METABOLIC PANEL: CPT

## 2019-07-25 PROCEDURE — 84443 ASSAY THYROID STIM HORMONE: CPT

## 2019-07-25 RX ORDER — LEVOTHYROXINE SODIUM 88 UG/1
88 TABLET ORAL DAILY
Qty: 90 TABLET | Refills: 1 | Status: SHIPPED | OUTPATIENT
Start: 2019-07-25 | End: 2019-11-12 | Stop reason: ALTCHOICE

## 2019-07-25 RX ORDER — ATORVASTATIN CALCIUM 20 MG/1
20 TABLET, FILM COATED ORAL DAILY
Qty: 90 TABLET | Refills: 1 | Status: SHIPPED | OUTPATIENT
Start: 2019-07-25 | End: 2019-11-05 | Stop reason: SDUPTHER

## 2019-07-25 RX ORDER — GABAPENTIN 600 MG/1
1200 TABLET ORAL 3 TIMES DAILY
Qty: 180 TABLET | Refills: 5 | Status: SHIPPED | OUTPATIENT
Start: 2019-07-25 | End: 2020-02-13 | Stop reason: SDUPTHER

## 2019-07-25 NOTE — RESULT ENCOUNTER NOTE
Please call the patient regarding his abnormal result    Thyroid is over replaced on please confirm that he is on levothyroxine 112 mcg daily also ask him how long he is been on that does make sure that we did not just recently changed his dosage if we did let me know when and what we did and if this is not a new a recent change I would decrease the levothyroxine from 112 mcg down to 88 mcg and then recheck a TSH in about 6 weeks

## 2019-07-30 DIAGNOSIS — R80.9 CONTROLLED TYPE 2 DIABETES MELLITUS WITH MICROALBUMINURIA, WITHOUT LONG-TERM CURRENT USE OF INSULIN (HCC): Primary | ICD-10-CM

## 2019-07-30 DIAGNOSIS — E78.2 MIXED HYPERLIPIDEMIA: ICD-10-CM

## 2019-07-30 DIAGNOSIS — I10 ESSENTIAL HYPERTENSION: ICD-10-CM

## 2019-07-30 DIAGNOSIS — E11.29 CONTROLLED TYPE 2 DIABETES MELLITUS WITH MICROALBUMINURIA, WITHOUT LONG-TERM CURRENT USE OF INSULIN (HCC): Primary | ICD-10-CM

## 2019-08-13 ENCOUNTER — OFFICE VISIT (OUTPATIENT)
Dept: FAMILY MEDICINE CLINIC | Facility: CLINIC | Age: 54
End: 2019-08-13
Payer: COMMERCIAL

## 2019-08-13 VITALS
SYSTOLIC BLOOD PRESSURE: 150 MMHG | HEIGHT: 72 IN | DIASTOLIC BLOOD PRESSURE: 82 MMHG | BODY MASS INDEX: 33.72 KG/M2 | WEIGHT: 249 LBS

## 2019-08-13 DIAGNOSIS — I10 ESSENTIAL HYPERTENSION: ICD-10-CM

## 2019-08-13 DIAGNOSIS — E11.29 CONTROLLED TYPE 2 DIABETES MELLITUS WITH MICROALBUMINURIA, WITHOUT LONG-TERM CURRENT USE OF INSULIN (HCC): Primary | ICD-10-CM

## 2019-08-13 DIAGNOSIS — E11.42 DIABETIC PERIPHERAL NEUROPATHY (HCC): ICD-10-CM

## 2019-08-13 DIAGNOSIS — E03.9 HYPOTHYROIDISM (ACQUIRED): ICD-10-CM

## 2019-08-13 DIAGNOSIS — R80.9 CONTROLLED TYPE 2 DIABETES MELLITUS WITH MICROALBUMINURIA, WITHOUT LONG-TERM CURRENT USE OF INSULIN (HCC): Primary | ICD-10-CM

## 2019-08-13 DIAGNOSIS — Z15.89 HOMOZYGOUS FOR MTHFR GENE MUTATION: ICD-10-CM

## 2019-08-13 PROCEDURE — 99214 OFFICE O/P EST MOD 30 MIN: CPT | Performed by: FAMILY MEDICINE

## 2019-08-13 PROCEDURE — 3008F BODY MASS INDEX DOCD: CPT | Performed by: FAMILY MEDICINE

## 2019-08-13 RX ORDER — AMITRIPTYLINE HYDROCHLORIDE 25 MG/1
TABLET, FILM COATED ORAL
Qty: 30 TABLET | Refills: 2 | Status: SHIPPED | OUTPATIENT
Start: 2019-08-13 | End: 2019-11-20 | Stop reason: SDUPTHER

## 2019-08-13 NOTE — PROGRESS NOTES
BMI Counseling: Body mass index is 33 77 kg/m²  Discussed the patient's BMI with him  The BMI is above average  BMI counseling and education was provided to the patient  Nutrition recommendations include reducing portion sizes, decreasing overall calorie intake, 3-5 servings of fruits/vegetables daily, reducing fast food intake, consuming healthier snacks, decreasing soda and/or juice intake, moderation in carbohydrate intake, increasing intake of lean protein, reducing intake of saturated fat and trans fat and reducing intake of cholesterol  Exercise recommendations include moderate aerobic physical activity for 150 minutes/week

## 2019-08-13 NOTE — PATIENT INSTRUCTIONS
Obesity   AMBULATORY CARE:   Obesity  is when your body mass index (BMI) is greater than 30  Your healthcare provider will use your height and weight to measure your BMI  The risks of obesity include  many health problems, such as injuries or physical disability  You may need tests to check for the following:  · Diabetes     · High blood pressure or high cholesterol     · Heart disease     · Gallbladder or liver disease     · Cancer of the colon, breast, prostate, liver, or kidney     · Sleep apnea     · Arthritis or gout  Seek care immediately if:   · You have a severe headache, confusion, or difficulty speaking  · You have weakness on one side of your body  · You have chest pain, sweating, or shortness of breath  Contact your healthcare provider if:   · You have symptoms of gallbladder or liver disease, such as pain in your upper abdomen  · You have knee or hip pain and discomfort while walking  · You have symptoms of diabetes, such as intense hunger and thirst, and frequent urination  · You have symptoms of sleep apnea, such as snoring or daytime sleepiness  · You have questions or concerns about your condition or care  Treatment for obesity  focuses on helping you lose weight to improve your health  Even a small decrease in BMI can reduce the risk for many health problems  Your healthcare provider will help you set a weight-loss goal   · Lifestyle changes  are the first step in treating obesity  These include making healthy food choices and getting regular physical activity  Your healthcare provider may suggest a weight-loss program that involves coaching, education, and therapy  · Medicine  may help you lose weight when it is used with a healthy diet and physical activity  · Surgery  can help you lose weight if you are very obese and have other health problems  There are several types of weight-loss surgery  Ask your healthcare provider for more information    Be successful losing weight:   · Set small, realistic goals  An example of a small goal is to walk for 20 minutes 5 days a week  Anther goal is to lose 5% of your body weight  · Tell friends, family members, and coworkers about your goals  and ask for their support  Ask a friend to lose weight with you, or join a weight-loss support group  · Identify foods or triggers that may cause you to overeat , and find ways to avoid them  Remove tempting high-calorie foods from your home and workplace  Place a bowl of fresh fruit on your kitchen counter  If stress causes you to eat, then find other ways to cope with stress  · Keep a diary to track what you eat and drink  Also write down how many minutes of physical activity you do each day  Weigh yourself once a week and record it in your diary  Eating changes: You will need to eat 500 to 1,000 fewer calories each day than you currently eat to lose 1 to 2 pounds a week  The following changes will help you cut calories:  · Eat smaller portions  Use small plates, no larger than 9 inches in diameter  Fill your plate half full of fruits and vegetables  Measure your food using measuring cups until you know what a serving size looks like  · Eat 3 meals and 1 or 2 snacks each day  Plan your meals in advance  Elyce FerrPAK and eat at home most of the time  Eat slowly  · Eat fruits and vegetables at every meal   They are low in calories and high in fiber, which makes you feel full  Do not add butter, margarine, or cream sauce to vegetables  Use herbs to season steamed vegetables  · Eat less fat and fewer fried foods  Eat more baked or grilled chicken and fish  These protein sources are lower in calories and fat than red meat  Limit fast food  Dress your salads with olive oil and vinegar instead of bottled dressing  · Limit the amount of sugar you eat  Do not drink sugary beverages  Limit alcohol  Activity changes:  Physical activity is good for your body in many ways   It helps you burn calories and build strong muscles  It decreases stress and depression, and improves your mood  It can also help you sleep better  Talk to your healthcare provider before you begin an exercise program   · Exercise for at least 30 minutes 5 days a week  Start slowly  Set aside time each day for physical activity that you enjoy and that is convenient for you  It is best to do both weight training and an activity that increases your heart rate, such as walking, bicycling, or swimming  · Find ways to be more active  Do yard work and housecleaning  Walk up the stairs instead of using elevators  Spend your leisure time going to events that require walking, such as outdoor festivals or fairs  This extra physical activity can help you lose weight and keep it off  Follow up with your healthcare provider as directed: You may need to meet with a dietitian  Write down your questions so you remember to ask them during your visits  © 2017 [address] [employer] [employer] Information is for End User's use only and may not be sold, redistributed or otherwise used for commercial purposes  All illustrations and images included in CareNotes® are the copyrighted property of [employer]  or Juan Marcano  The above information is an  only  It is not intended as medical advice for individual conditions or treatments  Talk to your doctor, nurse or pharmacist before following any medical regimen to see if it is safe and effective for you  Weight Management   AMBULATORY CARE:   Why it is important to manage your weight:  Being overweight increases your risk of health conditions such as heart disease, high blood pressure, type 2 diabetes, and certain types of cancer  It can also increase your risk for osteoarthritis, sleep apnea, and other respiratory problems  Aim for a slow, steady weight loss  Even a small amount of weight loss can lower your risk of health problems    How to lose weight safely:  A safe and healthy way to lose weight is to eat fewer calories and get regular exercise  You can lose up about 1 pound a week by decreasing the number of calories you eat by 500 calories each day  You can decrease calories by eating smaller portion sizes or by cutting out high-calorie foods  Read labels to find out how many calories are in the foods you eat  You can also burn calories with exercise such as walking, swimming, or biking  You will be more likely to keep weight off if you make these changes part of your lifestyle  Healthy meal plan for weight management:  A healthy meal plan includes a variety of foods, contains fewer calories, and helps you stay healthy  A healthy meal plan includes the following:  · Eat whole-grain foods more often  A healthy meal plan should contain fiber  Fiber is the part of grains, fruits, and vegetables that is not broken down by your body  Whole-grain foods are healthy and provide extra fiber in your diet  Some examples of whole-grain foods are whole-wheat breads and pastas, oatmeal, brown rice, and bulgur  · Eat a variety of vegetables every day  Include dark, leafy greens such as spinach, kale, koki greens, and mustard greens  Eat yellow and orange vegetables such as carrots, sweet potatoes, and winter squash  · Eat a variety of fruits every day  Choose fresh or canned fruit (canned in its own juice or light syrup) instead of juice  Fruit juice has very little or no fiber  · Eat low-fat dairy foods  Drink fat-free (skim) milk or 1% milk  Eat fat-free yogurt and low-fat cottage cheese  Try low-fat cheeses such as mozzarella and other reduced-fat cheeses  · Choose meat and other protein foods that are low in fat  Choose beans or other legumes such as split peas or lentils  Choose fish, skinless poultry (chicken or turkey), or lean cuts of red meat (beef or pork)  Before you cook meat or poultry, cut off any visible fat  · Use less fat and oil  Try baking foods instead of frying them  Add less fat, such as margarine, sour cream, regular salad dressing and mayonnaise to foods  Eat fewer high-fat foods  Some examples of high-fat foods include french fries, doughnuts, ice cream, and cakes  · Eat fewer sweets  Limit foods and drinks that are high in sugar  This includes candy, cookies, regular soda, and sweetened drinks  Ways to decrease calories:   · Eat smaller portions  ¨ Use a small plate with smaller servings  ¨ Do not eat second helpings  ¨ When you eat at a restaurant, ask for a box and place half of your meal in the box before you eat  ¨ Share an entrée with someone else  · Replace high-calorie snacks with healthy, low-calorie snacks  ¨ Choose fresh fruit, vegetables, fat-free rice cakes, or air-popped popcorn instead of potato chips, nuts, or chocolate  ¨ Choose water or calorie-free drinks instead of soda or sweetened drinks  · Eat regular meals  Skipping meals can lead to overeating later in the day  Eat a healthy snack in place of a meal if you do not have time to eat a regular meal      · Do not shop for groceries when you are hungry  You may be more likely to make unhealthy food choices  Take a grocery list of healthy foods and shop after you have eaten  Exercise:  Exercise at least 30 minutes per day on most days of the week  Some examples of exercise include walking, biking, dancing, and swimming  You can also fit in more physical activity by taking the stairs instead of the elevator or parking farther away from stores  Ask your healthcare provider about the best exercise plan for you  Other things to consider as you try to lose weight:   · Be aware of situations that may give you the urge to overeat, such as eating while watching television  Find ways to avoid these situations  For example, read a book, go for a walk, or do crafts      · Meet with a weight loss support group or friends who are also trying to lose weight  This may help you stay motivated to continue working on your weight loss goals  © 2017 [employer] Information is for End User's use only and may not be sold, redistributed or otherwise used for commercial purposes  All illustrations and images included in CareNotes® are the copyrighted property of [employer]  or Juan Marcano  The above information is an  only  It is not intended as medical advice for individual conditions or treatments  Talk to your doctor, nurse or pharmacist before following any medical regimen to see if it is safe and effective for you

## 2019-08-13 NOTE — PROGRESS NOTES
Assessment/Plan:    Problem List Items Addressed This Visit        Endocrine    Controlled type 2 diabetes mellitus with microalbuminuria, without long-term current use of insulin ([facility] [place] [address] ) - Primary    Homozygous for MTHFR gene mutation ([facility] [place] [address] )    Hypothyroidism (acquired)       Cardiovascular and Mediastinum    Essential hypertension           Diagnoses and all orders for this visit:    Controlled type 2 diabetes mellitus with microalbuminuria, without long-term current use of insulin ([facility] [place] [address] )    Homozygous for MTHFR gene mutation ([facility] [place] [address] )    Hypothyroidism (acquired)    Essential hypertension        No problem-specific Assessment & Plan notes found for this encounter  Subjective:      Patient ID: Marge Singer is a 47 y o  male  Patient presents today for follow-up regarding his diabetes his hypertension his gout and his hypothyroidism he has made some medication changes Dr Dominique Mishra has decreased his medications slightly he is down to 1 metformin a day he is also down to 5 mg of lisinopril and he still takes his spironolactone his biggest problem right now is diabetic neuropathy of both feet with painful neuropathy he is on 30/6 100 mg a day of gabapentin with no relief in his symptoms for very minimal relief      The following portions of the patient's history were reviewed and updated as appropriate:   He has a past medical history of Arthritis, Chronic cholecystitis, Diabetes mellitus ([facility] [place] [address] ), [facility] Gout, History of pulmonary embolism, Hypertension, Hypothyroidism, Lumbar back pain, M[facility]FR mutation ([facility] ), Scab, Sleep apnea, Tarsal tunnel syndrome, right, Tinnitus, Wears glasses, and Wears glasses  ,  does not have any pertinent problems on file  ,   has a past surgical history that includes Cholecystectomy (03/26/2015); Appendectomy; Spinal fusion; Tonsillectomy (10/16/2013); Nasal septum surgery (10/16/2013);  Uvulopalatopharyngoplasty; [place] tooth extraction; Bunionectomy (Right, 10/7/2016); Arthrodesis; Knee arthroscopy w/ meniscal repair; Uvulectomy (10/16/2013); pr tarsal tunnel release (Right, 6/25/2018); and pr colonoscopy flx dx w/collj spec when pfrmd (N/A, 11/23/2018)  ,  family history includes Aneurysm in his brother and mother; Coronary artery disease in his father; Hypertension in his father  ,   reports that he has never smoked  He has never used smokeless tobacco  He reports that he drinks alcohol  He reports that he does not use drugs  ,  has No Known Allergies     Current Outpatient Medications   Medication Sig Dispense Refill    allopurinol (ZYLOPRIM) 300 mg tablet Take 1 tablet (300 mg total) by mouth daily 90 tablet 0    atorvastatin (LIPITOR) 20 mg tablet Take 1 tablet (20 mg total) by mouth daily 90 tablet 1    carvedilol (COREG) 12 5 mg tablet Take 12 5 mg by mouth 2 (two) times a day with meals   furosemide (LASIX) 80 mg tablet 40 mg   1    gabapentin (NEURONTIN) 600 MG tablet Take 2 tablets (1,200 mg total) by mouth 3 (three) times a day 180 tablet 5    levothyroxine 88 mcg tablet Take 1 tablet (88 mcg total) by mouth daily 90 tablet 1    lisinopril (ZESTRIL) 5 mg tablet   1    metFORMIN (GLUCOPHAGE-XR) 500 mg 24 hr tablet metformin  mg tablet,extended release 24 hr      Multiple Vitamins-Minerals (MENS MULTIVITAMIN PLUS) TABS Take 1 tablet by mouth daily      rivaroxaban (XARELTO) 20 mg tablet Take 1 tablet (20 mg total) by mouth daily with breakfast 90 tablet 1    sildenafil (VIAGRA) 100 mg tablet Take 1 tablet by mouth as needed      spironolactone (ALDACTONE) 25 mg tablet spironolactone 25 mg tablet      Coenzyme Q10 (CO Q-10) 200 MG CAPS Take 2 capsules by mouth daily       No current facility-administered medications for this visit  Review of Systems   Constitutional: Positive for activity change  Negative for appetite change, diaphoresis, fatigue and fever  HENT: Negative  Eyes: Negative      Respiratory: Negative for apnea, cough, chest tightness, shortness of breath and wheezing  Cardiovascular: Negative for chest pain, palpitations and leg swelling  Gastrointestinal: Negative for abdominal distention, abdominal pain, anal bleeding, constipation, diarrhea, nausea and vomiting  Endocrine: Negative for cold intolerance, heat intolerance, polydipsia, polyphagia and polyuria  Genitourinary: Negative for difficulty urinating, dysuria, flank pain, hematuria and urgency  Musculoskeletal: Negative for arthralgias, back pain, gait problem, joint swelling and myalgias  Skin: Negative for color change, rash and wound  Allergic/Immunologic: Negative for environmental allergies, food allergies and immunocompromised state  Neurological: Positive for numbness  Negative for dizziness, seizures, syncope, speech difficulty and headaches  Painful diabetic neuropathy both feet   Hematological: Negative for adenopathy  Does not bruise/bleed easily  Psychiatric/Behavioral: Negative for agitation, behavioral problems, hallucinations, sleep disturbance and suicidal ideas  Objective:  Vitals:    08/13/19 0752   BP: 150/82   BP Location: Left arm   Patient Position: Sitting   Cuff Size: Standard   Weight: 113 kg (249 lb)   Height: 6' (1 829 m)     Body mass index is 33 77 kg/m²  Physical Exam   Constitutional: He is oriented to person, place, and time  He appears well-developed and well-nourished  No distress  HENT:   Head: Normocephalic  Right Ear: External ear normal    Left Ear: External ear normal    Nose: Nose normal    Mouth/Throat: Oropharynx is clear and moist    Eyes: Pupils are equal, round, and reactive to light  Conjunctivae and EOM are normal  Right eye exhibits no discharge  Left eye exhibits no discharge  No scleral icterus  Neck: Normal range of motion  No tracheal deviation present  No thyromegaly present  Cardiovascular: Normal rate, regular rhythm and normal heart sounds   Exam reveals no gallop and no friction rub  Pulses are no weak pulses  No murmur heard  Pulses:       Dorsalis pedis pulses are 2+ on the right side, and 2+ on the left side  Posterior tibial pulses are 2+ on the right side, and 2+ on the left side  Pulmonary/Chest: Effort normal and breath sounds normal  No respiratory distress  He has no wheezes  Abdominal: Soft  Bowel sounds are normal  He exhibits no mass  There is no tenderness  There is no guarding  Musculoskeletal: He exhibits no edema or deformity  Feet:   Right Foot:   Skin Integrity: Negative for ulcer, skin breakdown, erythema, warmth, callus or dry skin  Left Foot:   Skin Integrity: Negative for ulcer, skin breakdown, erythema, warmth, callus or dry skin  Lymphadenopathy:     He has no cervical adenopathy  Neurological: He is alert and oriented to person, place, and time  No cranial nerve deficit  Skin: Skin is warm and dry  No rash noted  He is not diaphoretic  No erythema  Psychiatric: He has a normal mood and affect  Thought content normal      Patient's shoes and socks removed  Right Foot/Ankle   Right Foot Inspection  Skin Exam: skin normal and skin intact no dry skin, no warmth, no callus, no erythema, no maceration, no abnormal color, no pre-ulcer, no ulcer and no callus                          Toe Exam: ROM and strength within normal limits  Sensory   Vibration: intact  Proprioception: intact   Monofilament testing: diminished  Vascular  Capillary refills: < 3 seconds  The right DP pulse is 2+  The right PT pulse is 2+  Left Foot/Ankle  Left Foot Inspection  Skin Exam: skin normal and skin intactno dry skin, no warmth, no erythema, no maceration, normal color, no pre-ulcer, no ulcer and no callus                         Toe Exam: ROM and strength within normal limits                   Sensory   Vibration: intact  Proprioception: intact  Monofilament: diminished  Vascular  Capillary refills: < 3 seconds  The left DP pulse is 2+  The left PT pulse is 2+  Assign Risk Category:  No deformity present; No loss of protective sensation;  No weak pulses       Risk: 0

## 2019-08-26 ENCOUNTER — OFFICE VISIT (OUTPATIENT)
Dept: URGENT CARE | Facility: MEDICAL CENTER | Age: 54
End: 2019-08-26
Payer: COMMERCIAL

## 2019-08-26 VITALS
SYSTOLIC BLOOD PRESSURE: 126 MMHG | HEART RATE: 54 BPM | OXYGEN SATURATION: 98 % | DIASTOLIC BLOOD PRESSURE: 82 MMHG | TEMPERATURE: 97.6 F | WEIGHT: 252 LBS | BODY MASS INDEX: 34.13 KG/M2 | RESPIRATION RATE: 16 BRPM | HEIGHT: 72 IN

## 2019-08-26 DIAGNOSIS — J30.9 ALLERGIC RHINITIS, UNSPECIFIED SEASONALITY, UNSPECIFIED TRIGGER: ICD-10-CM

## 2019-08-26 DIAGNOSIS — S05.01XA BILATERAL CORNEAL ABRASIONS, INITIAL ENCOUNTER: Primary | ICD-10-CM

## 2019-08-26 DIAGNOSIS — S05.02XA BILATERAL CORNEAL ABRASIONS, INITIAL ENCOUNTER: Primary | ICD-10-CM

## 2019-08-26 PROCEDURE — 99214 OFFICE O/P EST MOD 30 MIN: CPT | Performed by: NURSE PRACTITIONER

## 2019-08-26 RX ORDER — TOBRAMYCIN 3 MG/ML
2 SOLUTION/ DROPS OPHTHALMIC
Qty: 1 BOTTLE | Refills: 0 | Status: SHIPPED | COMMUNITY
Start: 2019-08-26 | End: 2019-08-31

## 2019-08-26 RX ORDER — TETRACAINE HYDROCHLORIDE 5 MG/ML
2 SOLUTION OPHTHALMIC ONCE
Status: COMPLETED | OUTPATIENT
Start: 2019-08-26 | End: 2019-08-26

## 2019-08-26 RX ADMIN — TETRACAINE HYDROCHLORIDE 2 DROP: 5 SOLUTION OPHTHALMIC at 14:40

## 2019-08-26 NOTE — PATIENT INSTRUCTIONS
Maintain good hygiene of site, wash, and dry well  Maintain good hand hygiene  Apply warm compresses to affected eye  Complete course of eye drops as prescribed  Recommend adding anti-histamines daily, ie  Claritin  Follow up with PCP if symptoms persist or go to Opthalmology or go to nearest ED if any additional neuro distress, ie  Blurred/loss of vision, and/or change in mental status  Corneal Abrasion   WHAT YOU NEED TO KNOW:   A corneal abrasion is a scratch on the cornea of your eye  The cornea is the clear layer that covers the front of your eye  A small scratch may heal in 1 to 2 days  Deeper or larger scratches may take longer to heal         DISCHARGE INSTRUCTIONS:   Contact your healthcare provider if:   · Your eye pain or vision gets worse  · You have yellow or green drainage from your eye  · You have questions or concerns about your condition or care  Medicines:   · Medicines  may be given in the form of eyedrops or ointment to help prevent an eye infection  You may also be given eye drops to decrease pain  Ask how to take this medicine safely  · Take your medicine as directed  Contact your healthcare provider if you think your medicine is not helping or if you have side effects  Tell him or her if you are allergic to any medicine  Keep a list of the medicines, vitamins, and herbs you take  Include the amounts, and when and why you take them  Bring the list or the pill bottles to follow-up visits  Carry your medicine list with you in case of an emergency  Follow up with your healthcare provider as directed:  Write down your questions so you remember to ask them during your visits  Self-care:   · Do not touch or rub your eye  · Ask your healthcare provider when you can start your normal activities  · Ask your healthcare provider when you can wear your contact lenses  · Wear sunglasses in bright light until your eyes feel better    Help prevent corneal abrasions: · Remove your contact lenses if your eyes feel dry or irritated  · Wash your hands if you need to touch your eyes or your face  · Trim your child's fingernails so he cannot scratch his eye  · Wear protective eyewear when you work with chemicals, wood, dust, or metal      · Wear protective eyewear when you play sports  · Do not wear your contacts for longer than you should  · Do not wear colored lenses or lenses with shapes on them  These lenses may cause eye damage and vision loss  · Do not wear glitter makeup  Glitter can easily get into your eyes and under contact lenses  · Do not sleep with your contacts in your eyes  © 2017 [address] [facility] Information is for End User's use only and may not be sold, redistributed or otherwise used for commercial purposes  All illustrations and images included in CareNotes® are the copyrighted property of [employer]  or Juan Marcano  The above information is an  only  It is not intended as medical advice for individual conditions or treatments  Talk to your doctor, nurse or pharmacist before following any medical regimen to see if it is safe and effective for you

## 2019-08-26 NOTE — PROGRESS NOTES
[address] [employer] [facility]        NAME: Adelso Holden is a 47 y o  male  : 1965    MRN: [id number]  DATE: 2019  TIME: 2:08 PM    Assessment and Plan   Bilateral corneal abrasions, initial encounter [S05  [address], [place]  1  Bilateral corneal abrasions, initial encounter  tobramycin (TOBREX) 0 3 % SOLN   2  Allergic rhinitis, unspecified seasonality, unspecified trigger           Patient Instructions   + fluorescein uptake noted on both eyes  Maintain good hygiene of site, wash, and dry well  Maintain good hand hygiene  Apply warm compresses to affected eye  Complete course of eye drops as prescribed  Recommend adding anti-histamines daily, ie  Claritin  Follow up with PCP/Opthalmologist in 3-5 days  Proceed to  ER if symptoms worsen  Chief Complaint     Chief Complaint   Patient presents with    Eye Pain     3/10 right eye pain and irritation that began last night after doing yard work  Some left eye redness as well  Pt c/o some blurred vision  History of Present Illness       Patient presents in office with R eye itching, irritation x last night  + rhinorrhea noted  L eye now starting with similar symptoms  Slightly blurred vision; denies pain  More of annoying sensation  + light sensitivity  Denies crusting, discharge noted, fever, chills, N/V/D  No contact use  Up to date with eye dr  + seasonal allergies, utilized visine dry and itchy eyes with no improvement  Feels like he has been out on a dirt road and dust has gotten into eyes  Review of Systems   Review of Systems   Constitutional: Negative for chills, fatigue and fever  HENT: Positive for rhinorrhea  Negative for congestion, ear pain and sore throat  Eyes: Positive for redness, itching and visual disturbance  Negative for photophobia, pain and discharge  Respiratory: Negative  Cardiovascular: Negative  Gastrointestinal: Negative  Musculoskeletal: Negative for myalgias     Skin: Negative for rash          Current Medications       Current Outpatient Medications:     allopurinol (ZYLOPRIM) 300 mg tablet, Take 1 tablet (300 mg total) by mouth daily, Disp: 90 tablet, Rfl: 0    amitriptyline (ELAVIL) 25 mg tablet, 1/2 tablet 1 hour before bedtime for 2 weeks then increase to 1 full tablet at bedtime, Disp: 30 tablet, Rfl: 2    atorvastatin (LIPITOR) 20 mg tablet, Take 1 tablet (20 mg total) by mouth daily, Disp: 90 tablet, Rfl: 1    carvedilol (COREG) 12 5 mg tablet, Take 12 5 mg by mouth 2 (two) times a day with meals  , Disp: , Rfl:     Coenzyme Q10 (CO Q-10) 200 MG CAPS, Take 2 capsules by mouth daily, Disp: , Rfl:     furosemide (LASIX) 80 mg tablet, 40 mg , Disp: , Rfl: 1    gabapentin (NEURONTIN) 600 MG tablet, Take 2 tablets (1,200 mg total) by mouth 3 (three) times a day, Disp: 180 tablet, Rfl: 5    levothyroxine 88 mcg tablet, Take 1 tablet (88 mcg total) by mouth daily, Disp: 90 tablet, Rfl: 1    lisinopril (ZESTRIL) 5 mg tablet, , Disp: , Rfl: 1    metFORMIN (GLUCOPHAGE-XR) 500 mg 24 hr tablet, metformin  mg tablet,extended release 24 hr, Disp: , Rfl:     Multiple Vitamins-Minerals (MENS MULTIVITAMIN PLUS) TABS, Take 1 tablet by mouth daily, Disp: , Rfl:     rivaroxaban (XARELTO) 20 mg tablet, Take 1 tablet (20 mg total) by mouth daily with breakfast, Disp: 90 tablet, Rfl: 1    sildenafil (VIAGRA) 100 mg tablet, Take 1 tablet by mouth as needed, Disp: , Rfl:     spironolactone (ALDACTONE) 25 mg tablet, spironolactone 25 mg tablet, Disp: , Rfl:     tobramycin (TOBREX) 0 3 % SOLN, Administer 2 drops to both eyes every 4 (four) hours while awake for 5 days, Disp: 1 Bottle, Rfl: 0    Current Allergies     Allergies as of 08/26/2019    (No Known Allergies)            The following portions of the patient's history were reviewed and updated as appropriate: allergies, current medications, past family history, past medical history, past social history, past surgical history and problem list      Past Medical History:   Diagnosis Date    Arthritis     right foot    Chronic cholecystitis     Diabetes mellitus [facility] [address] )     DVT (deep venous thrombosis) (HCC)     Gout     History of pulmonary embolism     Hypertension     Hypothyroidism     Lumbar back pain     MTHFR mutation ([facility] [address] )     Scab     right arm- no s/s infection    Sleep apnea     no CPAP    Tarsal tunnel syndrome, right     Tinnitus     left ear    Wears glasses     and contacts    Wears glasses        Past Surgical History:   Procedure Laterality Date    APPENDECTOMY      ARTHRODESIS      Lumbar L__    BUNIONECTOMY Right 10/7/2016    Procedure: REMOVAL TIBIAL  SESAMOID BONE  RIGHT FOOT;  Surgeon: Mike Briseno DPM;  [facility];  Service:     CHOLECYSTECTOMY  03/26/2015    KNEE ARTHROSCOPY W/ MENISCAL REPAIR      Lateral    NASAL SEPTUM SURGERY  10/16/2013    [place] COLONOSCOPY FLX DX W/COLLJ SPEC WHEN PFRMD N/A 11/23/2018    Procedure: COLONOSCOPY;  Surgeon: Elisha Ramirez MD;  [facility]: [facility];  Service: Gastroenterology    [place] TARSAL TUNNEL RELEASE Right 6/25/2018    Procedure: RELEASE TARSAL TUNNEL;  Surgeon: Humaira Earl DPM;  [facility];  Service: [place]  10/16/2013    with Adenoidectomy    UVULECTOMY  10/16/2013    UVULOPALATOPHARYNGOPLASTY      WISDOM TOOTH EXTRACTION         Family History   Problem Relation Age of Onset    Aneurysm Mother         intracranial aneurysm repair    Coronary artery disease Father     Hypertension Father     Aneurysm Brother          Medications have been verified  Objective   /82   Pulse (!) 54   Temp 97 6 °F (36 4 °C)   Resp 16   Ht 6' (1 829 m)   Wt 114 kg (252 lb)   SpO2 98%   BMI 34 18 kg/m²        Physical Exam     Physical Exam   Constitutional: He is oriented to person, place, and time  Vital signs are normal  He appears well-developed and well-nourished  He is cooperative  Non-toxic appearance  He does not have a sickly appearance  He does not appear ill  No distress  HENT:   Head: Normocephalic and atraumatic  Right Ear: Hearing, tympanic membrane, external ear and ear canal normal    Left Ear: Hearing, tympanic membrane, external ear and ear canal normal    Nose: Nose normal  No mucosal edema, rhinorrhea or sinus tenderness  Right sinus exhibits no maxillary sinus tenderness and no frontal sinus tenderness  Left sinus exhibits no maxillary sinus tenderness and no frontal sinus tenderness  Mouth/Throat: Uvula is midline, oropharynx is clear and moist and mucous membranes are normal  Normal dentition  No oropharyngeal exudate  Eyes: Pupils are equal, round, and reactive to light  EOM and lids are normal  Right eye exhibits no discharge and no exudate  No foreign body present in the right eye  Left eye exhibits no discharge and no exudate  No foreign body present in the left eye  Right conjunctiva is injected  Right conjunctiva has no hemorrhage  Left conjunctiva is injected  Left conjunctiva has no hemorrhage  + fluorescein uptake noted in both eyes, present on both sides of pupils    Neck: Trachea normal and normal range of motion  No thyroid mass and no thyromegaly present  Cardiovascular: Normal rate, regular rhythm, S1 normal, S2 normal, normal heart sounds, intact distal pulses and normal pulses  Exam reveals no gallop and no friction rub  No murmur heard  Pulmonary/Chest: Effort normal and breath sounds normal  No respiratory distress  He has no wheezes  He has no rhonchi  He has no rales  He exhibits no tenderness  Musculoskeletal: Normal range of motion  He exhibits no edema  Lymphadenopathy:     He has no cervical adenopathy  Neurological: He is alert and oriented to person, place, and time  Skin: Skin is warm and dry  No rash noted  He is not diaphoretic  Psychiatric: He has a normal mood and affect   His behavior is normal  Thought content normal  Nursing note and vitals reviewed

## 2019-09-17 DIAGNOSIS — E79.0 HYPERURICEMIA: ICD-10-CM

## 2019-09-17 RX ORDER — ALLOPURINOL 300 MG/1
TABLET ORAL
Qty: 90 TABLET | Refills: 0 | Status: SHIPPED | OUTPATIENT
Start: 2019-09-17 | End: 2019-12-15 | Stop reason: SDUPTHER

## 2019-10-08 DIAGNOSIS — I10 ESSENTIAL HYPERTENSION: ICD-10-CM

## 2019-10-08 DIAGNOSIS — E11.29 CONTROLLED TYPE 2 DIABETES MELLITUS WITH MICROALBUMINURIA, WITHOUT LONG-TERM CURRENT USE OF INSULIN (HCC): ICD-10-CM

## 2019-10-08 DIAGNOSIS — E03.9 HYPOTHYROIDISM (ACQUIRED): Primary | ICD-10-CM

## 2019-10-08 DIAGNOSIS — R80.9 CONTROLLED TYPE 2 DIABETES MELLITUS WITH MICROALBUMINURIA, WITHOUT LONG-TERM CURRENT USE OF INSULIN (HCC): ICD-10-CM

## 2019-10-16 ENCOUNTER — TELEPHONE (OUTPATIENT)
Dept: FAMILY MEDICINE CLINIC | Facility: CLINIC | Age: 54
End: 2019-10-16

## 2019-10-21 DIAGNOSIS — E11.29 CONTROLLED TYPE 2 DIABETES MELLITUS WITH MICROALBUMINURIA, WITHOUT LONG-TERM CURRENT USE OF INSULIN (HCC): ICD-10-CM

## 2019-10-21 DIAGNOSIS — R80.9 CONTROLLED TYPE 2 DIABETES MELLITUS WITH MICROALBUMINURIA, WITHOUT LONG-TERM CURRENT USE OF INSULIN (HCC): ICD-10-CM

## 2019-10-22 RX ORDER — METFORMIN HYDROCHLORIDE 500 MG/1
TABLET, EXTENDED RELEASE ORAL
Qty: 60 TABLET | Refills: 3 | OUTPATIENT
Start: 2019-10-22

## 2019-10-22 NOTE — TELEPHONE ENCOUNTER
We stop the metformin on 03/01/2019 I am not sure why check with the patient on why we stopped his metformin

## 2019-10-24 DIAGNOSIS — E11.29 CONTROLLED TYPE 2 DIABETES MELLITUS WITH MICROALBUMINURIA, WITHOUT LONG-TERM CURRENT USE OF INSULIN (HCC): Primary | ICD-10-CM

## 2019-10-24 DIAGNOSIS — R80.9 CONTROLLED TYPE 2 DIABETES MELLITUS WITH MICROALBUMINURIA, WITHOUT LONG-TERM CURRENT USE OF INSULIN (HCC): Primary | ICD-10-CM

## 2019-10-24 RX ORDER — METFORMIN HYDROCHLORIDE 500 MG/1
500 TABLET, EXTENDED RELEASE ORAL
Qty: 30 TABLET | Refills: 5 | Status: SHIPPED | OUTPATIENT
Start: 2019-10-24 | End: 2020-04-29

## 2019-11-05 ENCOUNTER — OFFICE VISIT (OUTPATIENT)
Dept: OBGYN CLINIC | Facility: HOSPITAL | Age: 54
End: 2019-11-05
Payer: COMMERCIAL

## 2019-11-05 ENCOUNTER — LAB (OUTPATIENT)
Dept: LAB | Facility: MEDICAL CENTER | Age: 54
End: 2019-11-05
Payer: COMMERCIAL

## 2019-11-05 ENCOUNTER — TRANSCRIBE ORDERS (OUTPATIENT)
Dept: ADMINISTRATIVE | Facility: HOSPITAL | Age: 54
End: 2019-11-05

## 2019-11-05 VITALS
BODY MASS INDEX: 35.49 KG/M2 | SYSTOLIC BLOOD PRESSURE: 146 MMHG | WEIGHT: 262 LBS | HEIGHT: 72 IN | DIASTOLIC BLOOD PRESSURE: 98 MMHG | HEART RATE: 59 BPM

## 2019-11-05 DIAGNOSIS — E11.21 DIABETIC GLOMERULOPATHY (HCC): ICD-10-CM

## 2019-11-05 DIAGNOSIS — E78.2 MIXED HYPERLIPIDEMIA: ICD-10-CM

## 2019-11-05 DIAGNOSIS — M10.9 GOUT, UNSPECIFIED CAUSE, UNSPECIFIED CHRONICITY, UNSPECIFIED SITE: ICD-10-CM

## 2019-11-05 DIAGNOSIS — E03.9 ACQUIRED HYPOTHYROIDISM: ICD-10-CM

## 2019-11-05 DIAGNOSIS — R80.9 CONTROLLED TYPE 2 DIABETES MELLITUS WITH MICROALBUMINURIA, WITHOUT LONG-TERM CURRENT USE OF INSULIN (HCC): ICD-10-CM

## 2019-11-05 DIAGNOSIS — Z79.899 MEDICATION DOSE INCREASED: ICD-10-CM

## 2019-11-05 DIAGNOSIS — M46.1 SACROILIITIS (HCC): Primary | ICD-10-CM

## 2019-11-05 DIAGNOSIS — N18.30 CHRONIC KIDNEY DISEASE, STAGE III (MODERATE) (HCC): Primary | ICD-10-CM

## 2019-11-05 DIAGNOSIS — E78.2 MIXED HYPERLIPIDEMIA: Primary | ICD-10-CM

## 2019-11-05 DIAGNOSIS — E11.29 CONTROLLED TYPE 2 DIABETES MELLITUS WITH MICROALBUMINURIA, WITHOUT LONG-TERM CURRENT USE OF INSULIN (HCC): ICD-10-CM

## 2019-11-05 DIAGNOSIS — N18.30 CHRONIC KIDNEY DISEASE, STAGE III (MODERATE) (HCC): ICD-10-CM

## 2019-11-05 LAB
ALBUMIN SERPL BCP-MCNC: 3.7 G/DL (ref 3.5–5)
ALP SERPL-CCNC: 76 U/L (ref 46–116)
ALT SERPL W P-5'-P-CCNC: 30 U/L (ref 12–78)
ANION GAP SERPL CALCULATED.3IONS-SCNC: 5 MMOL/L (ref 4–13)
AST SERPL W P-5'-P-CCNC: 22 U/L (ref 5–45)
BACTERIA UR QL AUTO: NORMAL /HPF
BASOPHILS # BLD AUTO: 0.07 THOUSANDS/ΜL (ref 0–0.1)
BASOPHILS NFR BLD AUTO: 1 % (ref 0–1)
BILIRUB SERPL-MCNC: 0.45 MG/DL (ref 0.2–1)
BILIRUB UR QL STRIP: NEGATIVE
BUN SERPL-MCNC: 23 MG/DL (ref 5–25)
CALCIUM SERPL-MCNC: 9.6 MG/DL (ref 8.3–10.1)
CHLORIDE SERPL-SCNC: 103 MMOL/L (ref 100–108)
CHOLEST SERPL-MCNC: 252 MG/DL (ref 50–200)
CLARITY UR: CLEAR
CO2 SERPL-SCNC: 30 MMOL/L (ref 21–32)
COLOR UR: YELLOW
CREAT SERPL-MCNC: 1.56 MG/DL (ref 0.6–1.3)
CREAT UR-MCNC: 30.7 MG/DL
EOSINOPHIL # BLD AUTO: 0.53 THOUSAND/ΜL (ref 0–0.61)
EOSINOPHIL NFR BLD AUTO: 9 % (ref 0–6)
ERYTHROCYTE [DISTWIDTH] IN BLOOD BY AUTOMATED COUNT: 14.2 % (ref 11.6–15.1)
EST. AVERAGE GLUCOSE BLD GHB EST-MCNC: 128 MG/DL
GFR SERPL CREATININE-BSD FRML MDRD: 50 ML/MIN/1.73SQ M
GLUCOSE P FAST SERPL-MCNC: 150 MG/DL (ref 65–99)
GLUCOSE UR STRIP-MCNC: NEGATIVE MG/DL
HBA1C MFR BLD: 6.1 % (ref 4.2–6.3)
HCT VFR BLD AUTO: 56.6 % (ref 36.5–49.3)
HDLC SERPL-MCNC: 80 MG/DL
HGB BLD-MCNC: 17.7 G/DL (ref 12–17)
HGB UR QL STRIP.AUTO: NEGATIVE
HYALINE CASTS #/AREA URNS LPF: NORMAL /LPF
IMM GRANULOCYTES # BLD AUTO: 0.01 THOUSAND/UL (ref 0–0.2)
IMM GRANULOCYTES NFR BLD AUTO: 0 % (ref 0–2)
KETONES UR STRIP-MCNC: NEGATIVE MG/DL
LDLC SERPL CALC-MCNC: 133 MG/DL (ref 0–100)
LEUKOCYTE ESTERASE UR QL STRIP: NEGATIVE
LYMPHOCYTES # BLD AUTO: 1.8 THOUSANDS/ΜL (ref 0.6–4.47)
LYMPHOCYTES NFR BLD AUTO: 32 % (ref 14–44)
MCH RBC QN AUTO: 30.2 PG (ref 26.8–34.3)
MCHC RBC AUTO-ENTMCNC: 31.3 G/DL (ref 31.4–37.4)
MCV RBC AUTO: 97 FL (ref 82–98)
MICROALBUMIN UR-MCNC: 877 MG/L (ref 0–20)
MICROALBUMIN/CREAT 24H UR: 2857 MG/G CREATININE (ref 0–30)
MONOCYTES # BLD AUTO: 0.42 THOUSAND/ΜL (ref 0.17–1.22)
MONOCYTES NFR BLD AUTO: 7 % (ref 4–12)
NEUTROPHILS # BLD AUTO: 2.85 THOUSANDS/ΜL (ref 1.85–7.62)
NEUTS SEG NFR BLD AUTO: 51 % (ref 43–75)
NITRITE UR QL STRIP: NEGATIVE
NON-SQ EPI CELLS URNS QL MICRO: NORMAL /HPF
NONHDLC SERPL-MCNC: 172 MG/DL
NRBC BLD AUTO-RTO: 0 /100 WBCS
PH UR STRIP.AUTO: 6 [PH]
PLATELET # BLD AUTO: 190 THOUSANDS/UL (ref 149–390)
PMV BLD AUTO: 10.2 FL (ref 8.9–12.7)
POTASSIUM SERPL-SCNC: 4.1 MMOL/L (ref 3.5–5.3)
PROT SERPL-MCNC: 7.5 G/DL (ref 6.4–8.2)
PROT UR STRIP-MCNC: ABNORMAL MG/DL
RBC # BLD AUTO: 5.86 MILLION/UL (ref 3.88–5.62)
RBC #/AREA URNS AUTO: NORMAL /HPF
SODIUM SERPL-SCNC: 138 MMOL/L (ref 136–145)
SP GR UR STRIP.AUTO: 1.01 (ref 1–1.03)
T4 FREE SERPL-MCNC: 0.61 NG/DL (ref 0.76–1.46)
TRIGL SERPL-MCNC: 195 MG/DL
TSH SERPL DL<=0.05 MIU/L-ACNC: 96.3 UIU/ML (ref 0.36–3.74)
UROBILINOGEN UR QL STRIP.AUTO: 0.2 E.U./DL
WBC # BLD AUTO: 5.68 THOUSAND/UL (ref 4.31–10.16)
WBC #/AREA URNS AUTO: NORMAL /HPF

## 2019-11-05 PROCEDURE — 83036 HEMOGLOBIN GLYCOSYLATED A1C: CPT

## 2019-11-05 PROCEDURE — 99213 OFFICE O/P EST LOW 20 MIN: CPT | Performed by: ORTHOPAEDIC SURGERY

## 2019-11-05 PROCEDURE — 82570 ASSAY OF URINE CREATININE: CPT | Performed by: INTERNAL MEDICINE

## 2019-11-05 PROCEDURE — 80061 LIPID PANEL: CPT

## 2019-11-05 PROCEDURE — 80053 COMPREHEN METABOLIC PANEL: CPT

## 2019-11-05 PROCEDURE — 81001 URINALYSIS AUTO W/SCOPE: CPT | Performed by: INTERNAL MEDICINE

## 2019-11-05 PROCEDURE — 36415 COLL VENOUS BLD VENIPUNCTURE: CPT

## 2019-11-05 PROCEDURE — 84439 ASSAY OF FREE THYROXINE: CPT

## 2019-11-05 PROCEDURE — 82043 UR ALBUMIN QUANTITATIVE: CPT | Performed by: INTERNAL MEDICINE

## 2019-11-05 PROCEDURE — 84443 ASSAY THYROID STIM HORMONE: CPT

## 2019-11-05 PROCEDURE — 85025 COMPLETE CBC W/AUTO DIFF WBC: CPT

## 2019-11-05 RX ORDER — ATORVASTATIN CALCIUM 40 MG/1
40 TABLET, FILM COATED ORAL DAILY
Qty: 90 TABLET | Refills: 1
Start: 2019-11-05 | End: 2019-12-24 | Stop reason: SDUPTHER

## 2019-11-05 RX ORDER — FUROSEMIDE 40 MG/1
TABLET ORAL
Refills: 3 | COMMUNITY
Start: 2019-10-28 | End: 2020-02-03 | Stop reason: SDUPTHER

## 2019-11-05 NOTE — RESULT ENCOUNTER NOTE
Please call the patient regarding his abnormal result    Thyroid level seems to be low DD recently decrease his thyroid medicine or did he skip doses or miss doses

## 2019-11-05 NOTE — RESULT ENCOUNTER NOTE
Please call the patient regarding his abnormal result    Cholesterol is 250 to that is too high in the LDL cholesterol is 133 that is too high increase atorvastatin from 20 mg to 40 mg in recheck lipids in 3 months

## 2019-11-05 NOTE — PROGRESS NOTES
47 y o male presents for evaluation of return of pain in the posterior 3rd right hemipelvis extends down the leg  He denies midline low back pain, he denies lateral right hip pain  He denies pain in the right groin    He has had a image guided injection of corticosteroid to the sacroiliac joint on the right side months ago which has alleviated identical pain    Review of Systems  Review of systems negative unless otherwise specified in HPI    Past Medical History  Past Medical History:   Diagnosis Date    Arthritis     right foot    Chronic cholecystitis     Diabetes mellitus ([facility] )     DVT (deep venous thrombosis) ([place] [address] )     Gout     History of pulmonary embolism     Hypertension     Hypothyroidism     Lumbar back pain     MTHFR mutation ([place] [address] )     Scab     right arm- no s/s infection    Sleep apnea     no CPAP    Tarsal tunnel syndrome, right     Tinnitus     left ear    Wears glasses     and contacts    Wears glasses        Past Surgical History  Past Surgical History:   Procedure Laterality Date    APPENDECTOMY      ARTHRODESIS      Lumbar L__    BUNIONECTOMY Right 10/7/2016    Procedure: REMOVAL TIBIAL  SESAMOID BONE  RIGHT FOOT;  Surgeon: Lelia Tapia DPM;  [facility];  Service:     CHOLECYSTECTOMY  03/26/2015    KNEE ARTHROSCOPY W/ MENISCAL REPAIR      Lateral    NASAL SEPTUM SURGERY  10/16/2013    [place] COLONOSCOPY FLX DX W/COLLJ SPEC WHEN PFRMD N/A 11/23/2018    Procedure: COLONOSCOPY;  Surgeon: Teresa Campos MD;  [facility]: [facility];  Service: Gastroenterology    [place] TARSAL TUNNEL RELEASE Right 6/25/2018    Procedure: RELEASE TARSAL TUNNEL;  Surgeon: Hernandez Hemphill DPM;  [facility];  Service: Podiatry   [address]  10/16/2013    with Adenoidectomy    UVULECTOMY  10/16/2013    UVULOPALATOPHARYNGOPLASTY      WISDOM TOOTH EXTRACTION         Current Medications  Current Outpatient Medications on File Prior to Visit   Medication Sig Dispense Refill    allopurinol (ZYLOPRIM) 300 mg tablet TAKE 1 TABLET BY MOUTH ONCE DAILY 90 tablet 0    amitriptyline (ELAVIL) 25 mg tablet 1/2 tablet 1 hour before bedtime for 2 weeks then increase to 1 full tablet at bedtime 30 tablet 2    atorvastatin (LIPITOR) 20 mg tablet Take 1 tablet (20 mg total) by mouth daily 90 tablet 1    carvedilol (COREG) 12 5 mg tablet Take 12 5 mg by mouth 2 (two) times a day with meals   furosemide (LASIX) 40 mg tablet TAKE 1 TABLET BY MOUTH ONCE DAILY AS NEEDED FOR SWELLING  3    furosemide (LASIX) 80 mg tablet 40 mg   1    gabapentin (NEURONTIN) 600 MG tablet Take 2 tablets (1,200 mg total) by mouth 3 (three) times a day 180 tablet 5    levothyroxine 88 mcg tablet Take 1 tablet (88 mcg total) by mouth daily 90 tablet 1    lisinopril (ZESTRIL) 5 mg tablet   1    metFORMIN (GLUCOPHAGE-XR) 500 mg 24 hr tablet Take 1 tablet (500 mg total) by mouth daily at bedtime 30 tablet 5    Multiple Vitamins-Minerals (MENS MULTIVITAMIN PLUS) TABS Take 1 tablet by mouth daily      rivaroxaban (XARELTO) 20 mg tablet Take 1 tablet (20 mg total) by mouth daily with breakfast 90 tablet 1    sildenafil (VIAGRA) 100 mg tablet Take 1 tablet by mouth as needed      spironolactone (ALDACTONE) 25 mg tablet spironolactone 25 mg tablet      [DISCONTINUED] Coenzyme Q10 (CO Q-10) 200 MG CAPS Take 2 capsules by mouth daily       No current facility-administered medications on file prior to visit          Recent Labs [facility])  0   Lab Value Date/Time    HCT 56 6 (H) 11/05/2019 0730    HCT 49 6 (H) 06/11/2015 0713    HGB 17 7 (H) 11/05/2019 0730    HGB 16 8 06/11/2015 0713    WBC 5 68 11/05/2019 0730    WBC 5 61 06/11/2015 0713    INR 2 06 (H) 06/12/2018 0751    ESR 2 06/11/2015 0713    GLUCOSE 150 (H) 12/21/2015 1313    HGBA1C 6 1 11/05/2019 0730    HGBA1C 6 2 (H) 04/28/2015 1339         Physical exam  · General: Awake, Alert, Oriented  · Eyes: Pupils equal, round and reactive to light  · Heart: regular rate and rhythm  · Lungs: No audible wheezing  · Abdomen: soft  Examination confirms lumbar spine without tenderness the midline  There is no paraspinal spasm  The right SI joint is tender to palpation  Right hip is good mobility  There is no tenderness the patient the trochanteric flare on the right  Figure 4 maneuvers exacerbate posterior right pelvic pain  Right knee is not effused  The foot and toes strongly dorsiflex on the right side    Imaging  No new x-rays accompany him    Procedure  None indicated or performed today    Assessment/Plan:   47 y o male who has recurrence of sacroiliac joint dysfunction on the right  Treatment option gluteal risk, benefits, alternatives were discussed in detail  An image guided injection would benefit the patient considerably  This schedule    I would welcome the opportunity see back in the office on an as-needed basis

## 2019-11-12 ENCOUNTER — OFFICE VISIT (OUTPATIENT)
Dept: FAMILY MEDICINE CLINIC | Facility: CLINIC | Age: 54
End: 2019-11-12
Payer: COMMERCIAL

## 2019-11-12 VITALS
WEIGHT: 263.6 LBS | HEIGHT: 72 IN | DIASTOLIC BLOOD PRESSURE: 118 MMHG | SYSTOLIC BLOOD PRESSURE: 172 MMHG | BODY MASS INDEX: 35.7 KG/M2

## 2019-11-12 DIAGNOSIS — I10 ESSENTIAL HYPERTENSION: ICD-10-CM

## 2019-11-12 DIAGNOSIS — E11.42 DIABETIC PERIPHERAL NEUROPATHY (HCC): ICD-10-CM

## 2019-11-12 DIAGNOSIS — R80.9 CONTROLLED TYPE 2 DIABETES MELLITUS WITH MICROALBUMINURIA, WITHOUT LONG-TERM CURRENT USE OF INSULIN (HCC): Primary | ICD-10-CM

## 2019-11-12 DIAGNOSIS — E11.29 CONTROLLED TYPE 2 DIABETES MELLITUS WITH MICROALBUMINURIA, WITHOUT LONG-TERM CURRENT USE OF INSULIN (HCC): Primary | ICD-10-CM

## 2019-11-12 DIAGNOSIS — E03.9 HYPOTHYROIDISM (ACQUIRED): ICD-10-CM

## 2019-11-12 DIAGNOSIS — G57.53 TARSAL TUNNEL SYNDROME OF BOTH LOWER EXTREMITIES: ICD-10-CM

## 2019-11-12 PROBLEM — R20.2 PARESTHESIA OF BOTH LOWER EXTREMITIES: Status: RESOLVED | Noted: 2019-05-16 | Resolved: 2019-11-12

## 2019-11-12 LAB
LEFT EYE DIABETIC RETINOPATHY: NORMAL
LEFT EYE IMAGE QUALITY: NORMAL
LEFT EYE MACULAR EDEMA: NORMAL
LEFT EYE OTHER RETINOPATHY: NORMAL
RIGHT EYE DIABETIC RETINOPATHY: NORMAL
RIGHT EYE IMAGE QUALITY: NORMAL
RIGHT EYE MACULAR EDEMA: NORMAL
RIGHT EYE OTHER RETINOPATHY: NORMAL
SEVERITY (EYE EXAM): NORMAL

## 2019-11-12 PROCEDURE — 2023F DILAT RTA XM W/O RTNOPTHY: CPT | Performed by: FAMILY MEDICINE

## 2019-11-12 PROCEDURE — 99214 OFFICE O/P EST MOD 30 MIN: CPT | Performed by: FAMILY MEDICINE

## 2019-11-12 PROCEDURE — 92250 FUNDUS PHOTOGRAPHY W/I&R: CPT | Performed by: FAMILY MEDICINE

## 2019-11-12 RX ORDER — LEVOTHYROXINE SODIUM 0.12 MG/1
125 TABLET ORAL DAILY
Qty: 30 TABLET | Refills: 3 | Status: SHIPPED | OUTPATIENT
Start: 2019-11-12 | End: 2020-02-07 | Stop reason: DRUGHIGH

## 2019-11-12 RX ORDER — CARVEDILOL 12.5 MG/1
12.5 TABLET ORAL 2 TIMES DAILY WITH MEALS
Qty: 60 TABLET | Refills: 5 | Status: SHIPPED | OUTPATIENT
Start: 2019-11-12 | End: 2020-06-01

## 2019-11-12 NOTE — PROGRESS NOTES
Assessment/Plan:    Problem List Items Addressed This Visit        Endocrine    Controlled type 2 diabetes mellitus with microalbuminuria, without long-term current use of insulin ([facility] [address] ) - Primary       Lab Results   Component Value Date    HGBA1C 6 1 11/05/2019            Relevant Orders    IRIS Diabetic eye exam    Diabetic peripheral neuropathy (HCC)    Hypothyroidism (acquired)       Cardiovascular and Mediastinum    Essential hypertension       Nervous and Auditory    Tarsal tunnel syndrome of both lower extremities           Diagnoses and all orders for this visit:    Controlled type 2 diabetes mellitus with microalbuminuria, without long-term current use of insulin (HCC)  -     IRIS Diabetic eye exam    Diabetic peripheral neuropathy (HCC)    Hypothyroidism (acquired)    Essential hypertension    Tarsal tunnel syndrome of both lower extremities        Controlled type 2 diabetes mellitus with microalbuminuria, without long-term current use of insulin (HCC)    Lab Results   Component Value Date    HGBA1C 6 1 11/05/2019         Subjective:      Patient ID: Asia Tarango is a 47 y o  male      Mr  Nigel De Guzman old here today follow-up visit couple of issues lipid profile still does not meet guidelines his total cholesterol is in the 250s and his LDL cholesterol is in the 130s diet is pretty heavy on meet now the other issue is that I reduced his thyroid the last time he had labs done because he was over replaced but I erroneously thought he was taking 112 mcg and come down to 88 when in actuality he was taking 224 mcg he was taking 2 of the 112 mcg doses so I am going to get him back up again to 125 mcg dose and recheck a TSH in about 3-4 weeks blood pressure is up but he has been off of his carvedilol he ran out of it and had issues with [facility] refilling his prescription so he kind of cold turkey himself off of a [place] dial his tarsal tunnel is her redness really having problems when he has an appointment with his podiatrist in the near future      The following portions of the patient's history were reviewed and updated as appropriate:   He has a past medical history of Arthritis, Chronic cholecystitis, Diabetes mellitus ([facility] [address] ), DVT (deep venous thrombosis) ([facility] ), Gout, History of pulmonary embolism, Hypertension, Hypothyroidism, Lumbar back pain, MTHFR mutation ([facility] ), Scab, Sleep apnea, Tarsal tunnel syndrome, right, Tinnitus, Wears glasses, and Wears glasses  ,  does not have any pertinent problems on file  ,   has a past surgical history that includes Cholecystectomy (03/26/2015); Appendectomy; Spinal fusion; Tonsillectomy (10/16/2013); Nasal septum surgery (10/16/2013); Uvulopalatopharyngoplasty; [place] tooth extraction; Bunionectomy (Right, 10/7/2016); Arthrodesis; Knee arthroscopy w/ meniscal repair; Uvulectomy (10/16/2013); pr tarsal tunnel release (Right, 6/25/2018); and pr colonoscopy flx dx w/collj spec when pfrmd (N/A, 11/23/2018)  ,  family history includes Aneurysm in his brother and mother; Coronary artery disease in his father; Hypertension in his father  ,   reports that he has never smoked  He has never used smokeless tobacco  He reports that he drinks alcohol  He reports that he does not use drugs  ,  has No Known Allergies     Current Outpatient Medications   Medication Sig Dispense Refill    allopurinol (ZYLOPRIM) 300 mg tablet TAKE 1 TABLET BY MOUTH ONCE DAILY 90 tablet 0    amitriptyline (ELAVIL) 25 mg tablet 1/2 tablet 1 hour before bedtime for 2 weeks then increase to 1 full tablet at bedtime 30 tablet 2    atorvastatin (LIPITOR) 40 mg tablet Take 1 tablet (40 mg total) by mouth daily 90 tablet 1    carvedilol (COREG) 12 5 mg tablet Take 12 5 mg by mouth 2 (two) times a day with meals        furosemide (LASIX) 40 mg tablet TAKE 1 TABLET BY MOUTH ONCE DAILY AS NEEDED FOR SWELLING  3    gabapentin (NEURONTIN) 600 MG tablet Take 2 tablets (1,200 mg total) by mouth 3 (three) times a day 180 tablet 5    lisinopril (ZESTRIL) 5 mg tablet   1    metFORMIN (GLUCOPHAGE-XR) 500 mg 24 hr tablet Take 1 tablet (500 mg total) by mouth daily at bedtime 30 tablet 5    Multiple Vitamins-Minerals (MENS MULTIVITAMIN PLUS) TABS Take 1 tablet by mouth daily      rivaroxaban (XARELTO) 20 mg tablet Take 1 tablet (20 mg total) by mouth daily with breakfast 90 tablet 1    sildenafil (VIAGRA) 100 mg tablet Take 1 tablet by mouth as needed      spironolactone (ALDACTONE) 25 mg tablet spironolactone 25 mg tablet       No current facility-administered medications for this visit  Review of Systems   Constitutional: Negative for activity change, appetite change, diaphoresis, fatigue and fever  HENT: Negative  Eyes: Negative  Respiratory: Negative for apnea, cough, chest tightness, shortness of breath and wheezing  Cardiovascular: Negative for chest pain, palpitations and leg swelling  Gastrointestinal: Negative for abdominal distention, abdominal pain, anal bleeding, constipation, diarrhea, nausea and vomiting  Endocrine: Negative for cold intolerance, heat intolerance, polydipsia, polyphagia and polyuria  Type 2 diabetes hemoglobin A1cs are well controlled at 6 1 cholesterol however needs a lot of work   Genitourinary: Negative for difficulty urinating, dysuria, flank pain, hematuria and urgency  Musculoskeletal: Negative for arthralgias, back pain, gait problem, joint swelling and myalgias  Skin: Negative for color change, rash and wound  Allergic/Immunologic: Negative for environmental allergies, food allergies and immunocompromised state  Neurological: Positive for numbness  Negative for dizziness, seizures, syncope, speech difficulty and headaches  Hematological: Negative for adenopathy  Does not bruise/bleed easily  Psychiatric/Behavioral: Negative for agitation, behavioral problems, hallucinations, sleep disturbance and suicidal ideas           Objective:  Vitals:    11/12/19 0700   BP: (!) 172/118   BP Location: Left arm   Patient Position: Sitting   Cuff Size: Large   Weight: 120 kg (263 lb 9 6 oz)   Height: 6' (1 829 m)     Body mass index is 35 75 kg/m²  Physical Exam   Constitutional: He is oriented to person, place, and time  He appears well-developed and well-nourished  No distress  HENT:   Head: Normocephalic  Right Ear: External ear normal    Left Ear: External ear normal    Nose: Nose normal    Mouth/Throat: Oropharynx is clear and moist    Eyes: Pupils are equal, round, and reactive to light  Conjunctivae and EOM are normal  Right eye exhibits no discharge  Left eye exhibits no discharge  No scleral icterus  Neck: Normal range of motion  No tracheal deviation present  No thyromegaly present  Cardiovascular: Normal rate, regular rhythm and normal heart sounds  Exam reveals no gallop and no friction rub  Pulses are no weak pulses  No murmur heard  Pulses:       Dorsalis pedis pulses are 2+ on the right side, and 2+ on the left side  Posterior tibial pulses are 2+ on the right side, and 2+ on the left side  Pulmonary/Chest: Effort normal and breath sounds normal  No respiratory distress  He has no wheezes  Abdominal: Soft  Bowel sounds are normal  He exhibits no mass  There is no tenderness  There is no guarding  Musculoskeletal: He exhibits no edema or deformity  Feet:    Feet:   Right Foot:   Skin Integrity: Negative for ulcer, skin breakdown, erythema, warmth, callus or dry skin  Left Foot:   Skin Integrity: Negative for ulcer, skin breakdown, erythema, warmth, callus or dry skin  Lymphadenopathy:     He has no cervical adenopathy  Neurological: He is alert and oriented to person, place, and time  No cranial nerve deficit  Skin: Skin is warm and dry  No rash noted  He is not diaphoretic  No erythema  Psychiatric: He has a normal mood and affect  Thought content normal      Patient's shoes and socks removed  Right Foot/Ankle   Right Foot Inspection  Skin Exam: skin normal and skin intact no dry skin, no warmth, no callus, no erythema, no maceration, no abnormal color, no pre-ulcer, no ulcer and no callus                          Toe Exam: ROM and strength within normal limits  Sensory   Vibration: intact  Proprioception: intact   Monofilament testing: diminished  Vascular  Capillary refills: < 3 seconds  The right DP pulse is 2+  The right PT pulse is 2+  Left Foot/Ankle  Left Foot Inspection  Skin Exam: skin normal and skin intactno dry skin, no warmth, no erythema, no maceration, normal color, no pre-ulcer, no ulcer and no callus                         Toe Exam: ROM and strength within normal limits                   Sensory   Vibration: intact  Proprioception: intact  Monofilament: diminished  Vascular  Capillary refills: < 3 seconds  The left DP pulse is 2+  The left PT pulse is 2+  Assign Risk Category:  No deformity present; Loss of protective sensation;  No weak pulses       Risk: 1

## 2019-11-18 ENCOUNTER — HOSPITAL ENCOUNTER (OUTPATIENT)
Dept: RADIOLOGY | Facility: HOSPITAL | Age: 54
Discharge: HOME | End: 2019-11-18
Attending: ORTHOPAEDIC SURGERY
Payer: COMMERCIAL

## 2019-11-18 DIAGNOSIS — M46.1 SACROILIITIS (HCC): ICD-10-CM

## 2019-11-18 PROCEDURE — G0260 INJ FOR SACROILIAC JT ANESTH: HCPCS

## 2019-11-18 RX ORDER — BUPIVACAINE HYDROCHLORIDE 2.5 MG/ML
5 INJECTION, SOLUTION EPIDURAL; INFILTRATION; INTRACAUDAL ONCE
Status: COMPLETED | OUTPATIENT
Start: 2019-11-18 | End: 2019-11-18

## 2019-11-18 RX ORDER — METHYLPREDNISOLONE ACETATE 80 MG/ML
80 INJECTION, SUSPENSION INTRA-ARTICULAR; INTRALESIONAL; INTRAMUSCULAR; SOFT TISSUE ONCE
Status: COMPLETED | OUTPATIENT
Start: 2019-11-18 | End: 2019-11-18

## 2019-11-18 RX ADMIN — BUPIVACAINE HYDROCHLORIDE 5 ML: 2.5 INJECTION, SOLUTION EPIDURAL; INFILTRATION; INTRACAUDAL at 08:33

## 2019-11-18 RX ADMIN — METHYLPREDNISOLONE ACETATE 80 MG: 80 INJECTION, SUSPENSION INTRA-ARTICULAR; INTRALESIONAL; INTRAMUSCULAR; SOFT TISSUE at 08:33

## 2019-11-20 DIAGNOSIS — E11.42 DIABETIC PERIPHERAL NEUROPATHY (HCC): ICD-10-CM

## 2019-11-20 RX ORDER — AMITRIPTYLINE HYDROCHLORIDE 25 MG/1
TABLET, FILM COATED ORAL
Qty: 30 TABLET | Refills: 2 | Status: SHIPPED | OUTPATIENT
Start: 2019-11-20 | End: 2020-02-24

## 2019-11-21 ENCOUNTER — OFFICE VISIT (OUTPATIENT)
Dept: PODIATRY | Facility: CLINIC | Age: 54
End: 2019-11-21
Payer: COMMERCIAL

## 2019-11-21 VITALS
DIASTOLIC BLOOD PRESSURE: 104 MMHG | WEIGHT: 260.2 LBS | SYSTOLIC BLOOD PRESSURE: 170 MMHG | HEIGHT: 72 IN | HEART RATE: 53 BPM | BODY MASS INDEX: 35.24 KG/M2

## 2019-11-21 DIAGNOSIS — E11.42 DIABETIC PERIPHERAL NEUROPATHY (HCC): ICD-10-CM

## 2019-11-21 DIAGNOSIS — I73.9 CLAUDICATION IN PERIPHERAL VASCULAR DISEASE (HCC): Primary | ICD-10-CM

## 2019-11-21 DIAGNOSIS — G57.53 TARSAL TUNNEL SYNDROME OF BOTH LOWER EXTREMITIES: ICD-10-CM

## 2019-11-21 PROCEDURE — 99213 OFFICE O/P EST LOW 20 MIN: CPT | Performed by: PODIATRIST

## 2019-11-25 NOTE — PROGRESS NOTES
This patient was seen on 11/21/19  Assessment:    Problem List Items Addressed This Visit        Endocrine    Diabetic peripheral neuropathy (HCC)       Nervous and Auditory    Tarsal tunnel syndrome of both lower extremities      Other Visit Diagnoses     [facility]aud[facility] ([facility]    -  Primary    Relevant Orders    VAS lower limb arterial duplex, complete bilateral          Plan:  1  Order arterial doppler to review peripheral vascular perfusion   2  Start adjunctive capscain + gabapentin PO  3  Follow-up after doppler to review and possibly schedule tarsal tunnel release Left  I feel that a tarsal tunnel release may give some benefit but I was very clear that he should not expect surgical entrapment release to give 100% relief and continued use of gabapentin in likely  Diagnoses and all orders for this visit:    Claudication in peripheral vascular disease ([facility] )  -     VAS lower limb arterial duplex, complete bilateral; Future    Diabetic peripheral neuropathy (HCC)    Tarsal tunnel syndrome of both lower extremities          Subjective:      Patient ID: Marce Streeter is a 47 y o  male  Elaine Rolling is here for cc of painful paresthesias of both lower extremities  He is currenly on max dose gabapentin and getting some breakthrough paresthesias  The following portions of the patient's history were reviewed and updated as appropriate: allergies, current medications, past family history, past medical history, past social history, past surgical history and problem list     Review of Systems   Constitutional: Negative  Respiratory: Negative for chest tightness and shortness of breath  Cardiovascular: Negative for chest pain, palpitations and leg swelling  Gastrointestinal: Negative for abdominal distention, abdominal pain, constipation, diarrhea, nausea and vomiting  Genitourinary: Negative for difficulty urinating  Musculoskeletal: Negative for arthralgias     Skin: Negative  Neurological: Positive for numbness  Patient notes tingling and numbness in feet, with electrical sensations and shooting parethesias   Hematological: Negative  Psychiatric/Behavioral: Negative  Objective:      BP (!) 170/104   Pulse (!) 53   Ht 6' (1 829 m) Comment: verbal  Wt 118 kg (260 lb 3 2 oz)   BMI 35 29 kg/m²          Physical Exam   Constitutional: He is oriented to person, place, and time  He appears well-developed and well-nourished  No distress  HENT:   Head: Normocephalic  Neck: No JVD present  Cardiovascular:   Pulses:       Dorsalis pedis pulses are 0 on the right side, and 0 on the left side  Posterior tibial pulses are 0 on the right side, and 0 on the left side  Pulmonary/Chest: Effort normal and breath sounds normal    Abdominal: Soft  Bowel sounds are normal    Musculoskeletal: Normal range of motion  Neurological: He is alert and oriented to person, place, and time  A sensory deficit is present  I note positive distal "Tinel's sign" tingling and negative proximal "Valleix's sign" upon percussion of the tibial nerve on the Left lower extremity  Skin: Skin is warm and dry  Capillary refill takes less than 2 seconds  Rash noted  He is not diaphoretic  No pallor  Psychiatric: He has a normal mood and affect  His behavior is normal  Judgment and thought content normal    Nursing note and vitals reviewed

## 2019-11-29 ENCOUNTER — HOSPITAL ENCOUNTER (OUTPATIENT)
Dept: NON INVASIVE DIAGNOSTICS | Facility: HOSPITAL | Age: 54
Discharge: HOME | End: 2019-11-29
Payer: COMMERCIAL

## 2019-11-29 ENCOUNTER — HOSPITAL ENCOUNTER (OUTPATIENT)
Dept: NON INVASIVE DIAGNOSTICS | Facility: HOSPITAL | Age: 54
Discharge: HOME | End: 2019-11-29
Attending: PODIATRIST
Payer: COMMERCIAL

## 2019-11-29 DIAGNOSIS — I70.223 REST PAIN OF BOTH LOWER EXTREMITIES DUE TO ATHEROSCLEROSIS (HCC): ICD-10-CM

## 2019-11-29 DIAGNOSIS — R60.9 EDEMA: ICD-10-CM

## 2019-11-29 PROCEDURE — 93923 UPR/LXTR ART STDY 3+ LVLS: CPT | Performed by: SURGERY

## 2019-11-29 PROCEDURE — 93971 EXTREMITY STUDY: CPT | Performed by: SURGERY

## 2019-11-29 PROCEDURE — 93923 UPR/LXTR ART STDY 3+ LVLS: CPT

## 2019-11-29 PROCEDURE — 93971 EXTREMITY STUDY: CPT

## 2019-12-12 ENCOUNTER — OFFICE VISIT (OUTPATIENT)
Dept: PODIATRY | Facility: CLINIC | Age: 54
End: 2019-12-12
Payer: COMMERCIAL

## 2019-12-12 VITALS
HEART RATE: 50 BPM | DIASTOLIC BLOOD PRESSURE: 95 MMHG | HEIGHT: 72 IN | WEIGHT: 261.3 LBS | BODY MASS INDEX: 35.39 KG/M2 | SYSTOLIC BLOOD PRESSURE: 143 MMHG

## 2019-12-12 DIAGNOSIS — G57.52 TARSAL TUNNEL SYNDROME, LEFT: Primary | ICD-10-CM

## 2019-12-12 PROCEDURE — 99213 OFFICE O/P EST LOW 20 MIN: CPT | Performed by: PODIATRIST

## 2019-12-15 DIAGNOSIS — E79.0 HYPERURICEMIA: ICD-10-CM

## 2019-12-16 RX ORDER — ALLOPURINOL 300 MG/1
TABLET ORAL
Qty: 90 TABLET | Refills: 0 | Status: SHIPPED | OUTPATIENT
Start: 2019-12-16 | End: 2020-03-23

## 2019-12-17 NOTE — PROGRESS NOTES
This patient was seen on 12/12/19  Assessment:      Problem List Items Addressed This Visit        Nervous and Auditory    Tarsal tunnel syndrome, left - Primary    Relevant Orders    Case request operating room: RELEASE TARSAL TUNNEL (Completed)    Ambulatory referral to Family Practice    CBC and differential    Basic metabolic panel    Protime-INR          Plan:  I was very clear at the beginning of the discussion about alternatives to this surgery including benign neglect, orthotics, injections, and second surgical opinions  I spent time to discuss with the patient the surgical procedure (release of the tight tarsal tunnel), pre-op testing, and post-op course required to properly heal the surgery  I discussed risks as infection, scar, swelling, pain, poor healing of incision or bone that could require more surgery, change in shape of foot, toe, or walking and function, numbness, neuritis/RSD, blood clots in the leg or lung, and even death from anesthesia complications  I was very specific with him that in light of his diabetes, a tarsal tunnel release may help relief some of the tingling and burning in his foot, but he should not expect all paresthesias to resolve nor the numbness to resolve as his hyperglycemia is still playing a role  No guarantees were given and the possibility of recurrent pain/tingling or incomplete correction were discussed before patient signed the consent form  The surgery is contemplated for 3/92/08  Patient will be scheduled for a history and physical and PATS  Diagnoses and all orders for this visit:    Tarsal tunnel syndrome, left  -     Case request operating room: RELEASE TARSAL TUNNEL; Standing  -     Ambulatory referral to [facility]; Future  -     CBC and differential; Future  -     Basic metabolic panel; Future  -     Protime-INR;  Future  -     Case request operating room: RELEASE TARSAL TUNNEL    Other orders  -     Incentive spirometry; Standing  -     Insert and maintain IV line; Standing  -     Void On-Call to O R ; Standing  -     Place sequential compression device; Standing  -     Electrocardiogram, 12-lead; Standing  -     ceFAZolin (ANCEF) 3,000 mg in dextrose 5 % 100 mL IVPB          Subjective:      Patient ID: Douglas Simon is a 47 y o  male  Sulaiman Fernandez is here for cc of painful paresthesias of his Left foot and ankle  He wants to proceed with a Left tarsal tunnel release as he got some relief when he had it done on the Right  The following portions of the patient's history were reviewed and updated as appropriate: allergies, current medications, past family history, past medical history, past social history, past surgical history and problem list     Review of Systems   Constitutional: Negative  Respiratory: Negative for chest tightness and shortness of breath  Cardiovascular: Negative for chest pain, palpitations and leg swelling  Gastrointestinal: Negative for abdominal distention, abdominal pain, constipation, diarrhea, nausea and vomiting  Genitourinary: Negative for difficulty urinating  Musculoskeletal: Negative for arthralgias  Skin: Negative  Neurological: Positive for numbness  Patient notes tingling and numbness in feet, with electrical sensations and shooting parethesias   Hematological: Negative  Psychiatric/Behavioral: Negative  Objective:      /95   Pulse (!) 50   Ht 6' (1 829 m) Comment: verbal  Wt 119 kg (261 lb 4 8 oz)   BMI 35 44 kg/m²          Physical Exam   Constitutional: He is oriented to person, place, and time  He appears well-developed and well-nourished  No distress  HENT:   Head: Normocephalic  Neck: No JVD present  Cardiovascular:   Pulses:       Dorsalis pedis pulses are 2+ on the right side, and 2+ on the left side  Posterior tibial pulses are 2+ on the right side, and 2+ on the left side  Pulmonary/Chest: Effort normal and breath sounds normal    Abdominal: Soft  Bowel sounds are normal    Musculoskeletal: Normal range of motion  Feet:   Right Foot:   Protective Sensation: 10 sites tested  4 sites sensed  Left Foot:   Protective Sensation: 10 sites tested  3 sites sensed  Neurological: He is alert and oriented to person, place, and time  A sensory deficit is present  I note positive distal "Tinel's sign" tingling and negative proximal "Valleix's sign" upon percussion of the tibial nerve on the Left lower extremity  Skin: Skin is warm and dry  Capillary refill takes less than 2 seconds  No rash noted  He is not diaphoretic  No pallor  Psychiatric: He has a normal mood and affect  His behavior is normal  Judgment and thought content normal    Nursing note and vitals reviewed

## 2019-12-24 DIAGNOSIS — E78.2 MIXED HYPERLIPIDEMIA: ICD-10-CM

## 2019-12-24 RX ORDER — ATORVASTATIN CALCIUM 40 MG/1
40 TABLET, FILM COATED ORAL DAILY
Qty: 90 TABLET | Refills: 0
Start: 2019-12-24 | End: 2020-01-02 | Stop reason: SDUPTHER

## 2019-12-24 NOTE — TELEPHONE ENCOUNTER
Make sure patient has had colorectal screening I do not see it but does not show as a  care gap either and make sure he has a visit scheduled for spring time

## 2020-01-02 DIAGNOSIS — E78.2 MIXED HYPERLIPIDEMIA: ICD-10-CM

## 2020-01-02 RX ORDER — ATORVASTATIN CALCIUM 40 MG/1
40 TABLET, FILM COATED ORAL DAILY
Qty: 90 TABLET | Refills: 0 | Status: SHIPPED | OUTPATIENT
Start: 2020-01-02 | End: 2020-04-29

## 2020-01-10 PROCEDURE — 3066F NEPHROPATHY DOC TX: CPT | Performed by: PHYSICIAN ASSISTANT

## 2020-01-16 ENCOUNTER — OFFICE VISIT (OUTPATIENT)
Dept: OBGYN CLINIC | Facility: HOSPITAL | Age: 55
End: 2020-01-16
Payer: COMMERCIAL

## 2020-01-16 ENCOUNTER — PREP FOR PROCEDURE (OUTPATIENT)
Dept: PODIATRY | Facility: CLINIC | Age: 55
End: 2020-01-16

## 2020-01-16 VITALS
WEIGHT: 270 LBS | BODY MASS INDEX: 36.57 KG/M2 | SYSTOLIC BLOOD PRESSURE: 129 MMHG | HEART RATE: 60 BPM | HEIGHT: 72 IN | DIASTOLIC BLOOD PRESSURE: 86 MMHG

## 2020-01-16 DIAGNOSIS — M46.1 SACROILIITIS (HCC): Primary | ICD-10-CM

## 2020-01-16 DIAGNOSIS — G57.52 TARSAL TUNNEL SYNDROME, LEFT: Primary | ICD-10-CM

## 2020-01-16 PROCEDURE — 99213 OFFICE O/P EST LOW 20 MIN: CPT | Performed by: ORTHOPAEDIC SURGERY

## 2020-01-16 NOTE — PROGRESS NOTES
Assessment:  1  Sacroiliitis ([facility] )         Plan:  The patient is doing well  He can call office if he would like repeat SI joint injection  The patient can follow up as needed and is welcome to return at any point with any new or old issue  To do next visit:  Return if symptoms worsen or fail to improve  The above stated was discussed in layman's terms and the patient expressed understanding  All questions were answered to the patient's satisfaction  Scribe Attestation    I,:   Stephane Fuentes am acting as a scribe while in the presence of the attending physician :        I,:   Luisa Rapp MD personally performed the services described in this documentation    as scribed in my presence :              Subjective:   Alena Singh is a 47 y o  male who presents for follow up of right Sacroiliac joint pain  He is s/p right SI joint injection, 11/18/19  Today he complains of right low back/ buttock pain following walking on hard surface  He denies medications for this issue  He does use Xarelto        Review of systems negative unless otherwise specified in HPI    Past Medical History:   Diagnosis Date    Arthritis     right foot    Chronic cholecystitis     [facility]     [facility] thrombosis[facility]     Gout     History of pulmonary embolism     Hypertension     Hypothyroidism     Lumbar back pain     [facility] mutation [facility] )     Scab     right arm- no s/s infection    Sleep apnea     no CPAP    Tarsal tunnel syndrome, right     Tinnitus     left ear    Wears glasses     and contacts    Wears glasses        Past Surgical History:   Procedure Laterality Date    APPENDECTOMY      ARTHRODESIS      Lumbar L__    BUNIONECTOMY Right 10/7/2016    Procedure: REMOVAL TIBIAL  SESAMOID BONE  RIGHT FOOT;  Surgeon: Socrates Campa DPM;  Location: [facility];  Service:     CHOLECYSTECTOMY  03/26/2015    KNEE ARTHROSCOPY W/ MENISCAL REPAIR      Lateral    NASAL SEPTUM SURGERY  10/16/2013    OH COLONOSCOPY FLX DX W/COLLJ SPEC WHEN PFRMD N/A 11/23/2018    Procedure: COLONOSCOPY;  Surgeon: Meron Munguia MD;  Location: [facility];  Service: Gastroenterology    OH TARSAL TUNNEL RELEASE Right 6/25/2018    Procedure: RELEASE TARSAL TUNNEL;  Surgeon: Shaila Redmond DPM;  Location: [facility];  Service: [place]  10/16/2013    with Adenoidectomy    UVULECTOMY  10/16/2013    UVULOPALATOPHARYNGOPLASTY      WISDOM TOOTH EXTRACTION         Family History   Problem Relation Age of Onset    Aneurysm Mother         intracranial aneurysm repair    Coronary artery disease Father     Hypertension Father     Aneurysm Brother        Social History     Occupational History    Not on file   Tobacco Use    Smoking status: Never Smoker    Smokeless tobacco: Never Used   Substance and Sexual Activity    Alcohol use: Yes     Frequency: 2-4 times a month     Drinks per session: 5 or 6     Binge frequency: Less than monthly     Comment: weekends    Drug use: No    Sexual activity: Yes         Current Outpatient Medications:     allopurinol (ZYLOPRIM) 300 mg tablet, TAKE 1 TABLET BY MOUTH ONCE DAILY, Disp: 90 tablet, Rfl: 0    amitriptyline (ELAVIL) 25 mg tablet, TAKE 1/2 (ONE-HALF) TABLET BY MOUTH 1 HOUR BEFORE BEDTIME FOR 2 WEEKS THEN INCREASE TO 1 FULL TABLET AT BEDTIME, Disp: 30 tablet, Rfl: 2    atorvastatin (LIPITOR) 40 mg tablet, Take 1 tablet (40 mg total) by mouth daily, Disp: 90 tablet, Rfl: 0    carvedilol (COREG) 12 5 mg tablet, Take 1 tablet (12 5 mg total) by mouth 2 (two) times a day with meals, Disp: 60 tablet, Rfl: 5    furosemide (LASIX) 40 mg tablet, TAKE 1 TABLET BY MOUTH ONCE DAILY AS NEEDED FOR SWELLING, Disp: , Rfl: 3    gabapentin (NEURONTIN) 600 MG tablet, Take 2 tablets (1,200 mg total) by mouth 3 (three) times a day, Disp: 180 tablet, Rfl: 5    levothyroxine 125 mcg tablet, Take 1 tablet (125 mcg total) by mouth daily, Disp: 30 tablet, Rfl: 3    lisinopril (ZESTRIL) 5 mg tablet, , Disp: , Rfl: 1    metFORMIN (GLUCOPHAGE-XR) 500 mg 24 hr tablet, Take 1 tablet (500 mg total) by mouth daily at bedtime, Disp: 30 tablet, Rfl: 5    Multiple Vitamins-Minerals (MENS MULTIVITAMIN PLUS) TABS, Take 1 tablet by mouth daily, Disp: , Rfl:     rivaroxaban (XARELTO) 20 mg tablet, Take 1 tablet (20 mg total) by mouth daily with breakfast, Disp: 90 tablet, Rfl: 1    sildenafil (VIAGRA) 100 mg tablet, Take 1 tablet by mouth as needed, Disp: , Rfl:     spironolactone (ALDACTONE) 25 mg tablet, spironolactone 25 mg tablet, Disp: , Rfl:     No Known Allergies         Vitals:    01/16/20 1526   BP: 129/86   Pulse: 60       Objective:  Physical exam  · General: Awake, Alert, Oriented  · Eyes: Pupils equal, round and reactive to light  · Heart: regular rate and rhythm  · Lungs: No audible wheezing  · Abdomen: soft                    Ortho Exam   Lumbar:  Limited flexion and extension  Negative german's test bilaterally  No pain with IR of hip bilaterally  Non-tender right SI joint    Diagnostics, reviewed and taken today if performed as documented:    None performed     Procedures, if performed today:    Procedures    None performed      Portions of the record may have been created with voice recognition software  Occasional wrong word or "sound a like" substitutions may have occurred due to the inherent limitations of voice recognition software  Read the chart carefully and recognize, using context, where substitutions have occurred

## 2020-01-30 DIAGNOSIS — O22.30 DVT (DEEP VEIN THROMBOSIS) IN PREGNANCY: ICD-10-CM

## 2020-01-30 RX ORDER — RIVAROXABAN 20 MG/1
TABLET, FILM COATED ORAL
Qty: 90 TABLET | Refills: 0 | Status: SHIPPED | OUTPATIENT
Start: 2020-01-30 | End: 2020-04-29

## 2020-02-03 ENCOUNTER — TELEPHONE (OUTPATIENT)
Dept: FAMILY MEDICINE CLINIC | Facility: CLINIC | Age: 55
End: 2020-02-03

## 2020-02-03 DIAGNOSIS — R60.9 EDEMA, UNSPECIFIED TYPE: Primary | ICD-10-CM

## 2020-02-03 DIAGNOSIS — E03.9 ACQUIRED HYPOTHYROIDISM: Primary | ICD-10-CM

## 2020-02-03 DIAGNOSIS — E11.9 TYPE 2 DIABETES MELLITUS WITHOUT COMPLICATION, WITHOUT LONG-TERM CURRENT USE OF INSULIN (HCC): ICD-10-CM

## 2020-02-03 RX ORDER — FUROSEMIDE 40 MG/1
40 TABLET ORAL DAILY
Qty: 90 TABLET | Refills: 3 | Status: SHIPPED | OUTPATIENT
Start: 2020-02-03 | End: 2021-02-01

## 2020-02-03 NOTE — TELEPHONE ENCOUNTER
----- Message from Myla Fan DO sent at 2/3/2020  2:03 PM EST -----  Regarding: FW: Non-Urgent Medical Question  Contact: [phone]  Please notify patient lab work is in the chart  ----- Message -----  From: Torrie Milan  Sent: 2/3/2020  10:36 AM EST  To: Myla Fan DO  Subject: FW: Non-Urgent Medical Question                      ----- Message -----  From: Asia Tarango  Sent: 2/3/2020  10:31 AM EST  To: Brandi Duque [facility]  Subject: Non-Urgent Medical Question                      Good Morning Doc    Alfonzo Pineda I know I am due  for my yearly physical and my regular diabetes check, ever since you lowered my thyroid med I have been feeling sluggish, tired and I am putting cristine back on  I am going to schedule an appointment this month but do you want to do any blood work? Let me know when you post the scripts and I can run over the [employer] at lunch time        Andrzej Kebede

## 2020-02-06 ENCOUNTER — LAB (OUTPATIENT)
Dept: LAB | Facility: MEDICAL CENTER | Age: 55
End: 2020-02-06
Payer: COMMERCIAL

## 2020-02-06 DIAGNOSIS — E03.9 ACQUIRED HYPOTHYROIDISM: ICD-10-CM

## 2020-02-06 DIAGNOSIS — E11.9 TYPE 2 DIABETES MELLITUS WITHOUT COMPLICATION, WITHOUT LONG-TERM CURRENT USE OF INSULIN (HCC): ICD-10-CM

## 2020-02-06 LAB
CREAT UR-MCNC: 36.6 MG/DL
LDLC SERPL DIRECT ASSAY-MCNC: 119 MG/DL (ref 0–100)
MICROALBUMIN UR-MCNC: 811 MG/L (ref 0–20)
MICROALBUMIN/CREAT 24H UR: 2216 MG/G CREATININE (ref 0–30)
TSH SERPL DL<=0.05 MIU/L-ACNC: 62 UIU/ML (ref 0.36–3.74)

## 2020-02-06 PROCEDURE — 83721 ASSAY OF BLOOD LIPOPROTEIN: CPT

## 2020-02-06 PROCEDURE — 82043 UR ALBUMIN QUANTITATIVE: CPT

## 2020-02-06 PROCEDURE — 84443 ASSAY THYROID STIM HORMONE: CPT

## 2020-02-06 PROCEDURE — 82570 ASSAY OF URINE CREATININE: CPT

## 2020-02-06 NOTE — RESULT ENCOUNTER NOTE
Please call the patient regarding his abnormal result    Thyroid level is still low make sure that he is taking his thyroid and properly on empty stomach and if that is not the case then he needs to take it regularly however if he is taking his prescription regularly on an empty stomach then we need to increase the dose because his level is low

## 2020-02-06 NOTE — RESULT ENCOUNTER NOTE
Please call the patient regarding his abnormal result    Make sure he follows with his nephrologist his of protein level is still high

## 2020-02-07 DIAGNOSIS — E78.2 MIXED HYPERLIPIDEMIA: Primary | ICD-10-CM

## 2020-02-07 DIAGNOSIS — E03.9 ACQUIRED HYPOTHYROIDISM: ICD-10-CM

## 2020-02-07 RX ORDER — LEVOTHYROXINE SODIUM 0.15 MG/1
150 TABLET ORAL
Qty: 30 TABLET | Refills: 5 | Status: SHIPPED | OUTPATIENT
Start: 2020-02-07 | End: 2020-08-07 | Stop reason: SDUPTHER

## 2020-02-07 RX ORDER — EZETIMIBE 10 MG/1
10 TABLET ORAL DAILY
Qty: 30 TABLET | Refills: 5 | Status: SHIPPED | OUTPATIENT
Start: 2020-02-07 | End: 2020-08-17

## 2020-02-13 ENCOUNTER — TELEPHONE (OUTPATIENT)
Dept: PODIATRY | Facility: CLINIC | Age: 55
End: 2020-02-13

## 2020-02-13 DIAGNOSIS — E11.42 DIABETIC PERIPHERAL NEUROPATHY (HCC): ICD-10-CM

## 2020-02-13 RX ORDER — GABAPENTIN 600 MG/1
1200 TABLET ORAL 3 TIMES DAILY
Qty: 180 TABLET | Refills: 5 | Status: SHIPPED | OUTPATIENT
Start: 2020-02-13 | End: 2020-07-28 | Stop reason: SDUPTHER

## 2020-02-23 DIAGNOSIS — E11.42 DIABETIC PERIPHERAL NEUROPATHY (HCC): ICD-10-CM

## 2020-02-24 ENCOUNTER — TELEPHONE (OUTPATIENT)
Dept: OBGYN CLINIC | Facility: CLINIC | Age: 55
End: 2020-02-24

## 2020-02-24 RX ORDER — AMITRIPTYLINE HYDROCHLORIDE 25 MG/1
TABLET, FILM COATED ORAL
Qty: 30 TABLET | Refills: 0 | Status: SHIPPED | OUTPATIENT
Start: 2020-02-24 | End: 2020-03-23

## 2020-02-26 ENCOUNTER — TELEPHONE (OUTPATIENT)
Dept: PODIATRY | Facility: CLINIC | Age: 55
End: 2020-02-26

## 2020-02-27 DIAGNOSIS — I10 ESSENTIAL HYPERTENSION: ICD-10-CM

## 2020-02-27 DIAGNOSIS — E11.9 TYPE 2 DIABETES MELLITUS WITHOUT COMPLICATION, WITHOUT LONG-TERM CURRENT USE OF INSULIN (HCC): Primary | ICD-10-CM

## 2020-02-28 PROCEDURE — 4010F ACE/ARB THERAPY RXD/TAKEN: CPT | Performed by: PHYSICIAN ASSISTANT

## 2020-02-28 RX ORDER — LISINOPRIL 5 MG/1
5 TABLET ORAL DAILY
Qty: 90 TABLET | Refills: 1 | Status: SHIPPED | OUTPATIENT
Start: 2020-02-28 | End: 2020-09-08

## 2020-03-03 ENCOUNTER — APPOINTMENT (OUTPATIENT)
Dept: LAB | Facility: MEDICAL CENTER | Age: 55
End: 2020-03-03
Payer: COMMERCIAL

## 2020-03-03 DIAGNOSIS — G57.52 TARSAL TUNNEL SYNDROME, LEFT: ICD-10-CM

## 2020-03-03 DIAGNOSIS — R80.9 CONTROLLED TYPE 2 DIABETES MELLITUS WITH MICROALBUMINURIA, WITHOUT LONG-TERM CURRENT USE OF INSULIN (HCC): ICD-10-CM

## 2020-03-03 DIAGNOSIS — Z79.899 MEDICATION DOSE INCREASED: ICD-10-CM

## 2020-03-03 DIAGNOSIS — E78.2 MIXED HYPERLIPIDEMIA: ICD-10-CM

## 2020-03-03 DIAGNOSIS — E03.9 ACQUIRED HYPOTHYROIDISM: Primary | ICD-10-CM

## 2020-03-03 DIAGNOSIS — E11.29 CONTROLLED TYPE 2 DIABETES MELLITUS WITH MICROALBUMINURIA, WITHOUT LONG-TERM CURRENT USE OF INSULIN (HCC): ICD-10-CM

## 2020-03-03 LAB
ANION GAP SERPL CALCULATED.3IONS-SCNC: 6 MMOL/L (ref 4–13)
BASOPHILS # BLD AUTO: 0.04 THOUSANDS/ΜL (ref 0–0.1)
BASOPHILS NFR BLD AUTO: 1 % (ref 0–1)
BUN SERPL-MCNC: 38 MG/DL (ref 5–25)
CALCIUM SERPL-MCNC: 9 MG/DL (ref 8.3–10.1)
CHLORIDE SERPL-SCNC: 108 MMOL/L (ref 100–108)
CHOLEST SERPL-MCNC: 139 MG/DL (ref 50–200)
CO2 SERPL-SCNC: 25 MMOL/L (ref 21–32)
CREAT SERPL-MCNC: 1.51 MG/DL (ref 0.6–1.3)
CREAT UR-MCNC: 39.9 MG/DL
EOSINOPHIL # BLD AUTO: 0.45 THOUSAND/ΜL (ref 0–0.61)
EOSINOPHIL NFR BLD AUTO: 7 % (ref 0–6)
ERYTHROCYTE [DISTWIDTH] IN BLOOD BY AUTOMATED COUNT: 14.3 % (ref 11.6–15.1)
EST. AVERAGE GLUCOSE BLD GHB EST-MCNC: 128 MG/DL
GFR SERPL CREATININE-BSD FRML MDRD: 52 ML/MIN/1.73SQ M
GLUCOSE P FAST SERPL-MCNC: 143 MG/DL (ref 65–99)
HBA1C MFR BLD: 6.1 %
HCT VFR BLD AUTO: 52.4 % (ref 36.5–49.3)
HDLC SERPL-MCNC: 55 MG/DL
HGB BLD-MCNC: 16.5 G/DL (ref 12–17)
IMM GRANULOCYTES # BLD AUTO: 0.02 THOUSAND/UL (ref 0–0.2)
IMM GRANULOCYTES NFR BLD AUTO: 0 % (ref 0–2)
INR PPP: 1.78 (ref 0.84–1.19)
LDLC SERPL CALC-MCNC: 24 MG/DL (ref 0–100)
LYMPHOCYTES # BLD AUTO: 1.51 THOUSANDS/ΜL (ref 0.6–4.47)
LYMPHOCYTES NFR BLD AUTO: 24 % (ref 14–44)
MCH RBC QN AUTO: 31 PG (ref 26.8–34.3)
MCHC RBC AUTO-ENTMCNC: 31.5 G/DL (ref 31.4–37.4)
MCV RBC AUTO: 99 FL (ref 82–98)
MICROALBUMIN UR-MCNC: 727 MG/L (ref 0–20)
MICROALBUMIN/CREAT 24H UR: 1822 MG/G CREATININE (ref 0–30)
MONOCYTES # BLD AUTO: 0.55 THOUSAND/ΜL (ref 0.17–1.22)
MONOCYTES NFR BLD AUTO: 9 % (ref 4–12)
NEUTROPHILS # BLD AUTO: 3.73 THOUSANDS/ΜL (ref 1.85–7.62)
NEUTS SEG NFR BLD AUTO: 59 % (ref 43–75)
NONHDLC SERPL-MCNC: 84 MG/DL
NRBC BLD AUTO-RTO: 0 /100 WBCS
PLATELET # BLD AUTO: 193 THOUSANDS/UL (ref 149–390)
PMV BLD AUTO: 10.1 FL (ref 8.9–12.7)
POTASSIUM SERPL-SCNC: 4.7 MMOL/L (ref 3.5–5.3)
PROTHROMBIN TIME: 20.2 SECONDS (ref 11.6–14.5)
RBC # BLD AUTO: 5.32 MILLION/UL (ref 3.88–5.62)
SODIUM SERPL-SCNC: 139 MMOL/L (ref 136–145)
TRIGL SERPL-MCNC: 301 MG/DL
WBC # BLD AUTO: 6.3 THOUSAND/UL (ref 4.31–10.16)

## 2020-03-03 PROCEDURE — 85025 COMPLETE CBC W/AUTO DIFF WBC: CPT

## 2020-03-03 PROCEDURE — 82043 UR ALBUMIN QUANTITATIVE: CPT

## 2020-03-03 PROCEDURE — 80061 LIPID PANEL: CPT

## 2020-03-03 PROCEDURE — 80048 BASIC METABOLIC PNL TOTAL CA: CPT

## 2020-03-03 PROCEDURE — 36415 COLL VENOUS BLD VENIPUNCTURE: CPT

## 2020-03-03 PROCEDURE — 83036 HEMOGLOBIN GLYCOSYLATED A1C: CPT

## 2020-03-03 PROCEDURE — 85610 PROTHROMBIN TIME: CPT

## 2020-03-03 PROCEDURE — 82570 ASSAY OF URINE CREATININE: CPT

## 2020-03-03 RX ORDER — LEVOTHYROXINE SODIUM 0.03 MG/1
25 TABLET ORAL DAILY
Qty: 30 TABLET | Refills: 2 | Status: SHIPPED | OUTPATIENT
Start: 2020-03-03 | End: 2020-06-23

## 2020-03-06 ENCOUNTER — CONSULT (OUTPATIENT)
Dept: FAMILY MEDICINE CLINIC | Facility: CLINIC | Age: 55
End: 2020-03-06
Payer: COMMERCIAL

## 2020-03-06 VITALS
OXYGEN SATURATION: 98 % | WEIGHT: 265 LBS | HEIGHT: 72 IN | DIASTOLIC BLOOD PRESSURE: 84 MMHG | TEMPERATURE: 98.2 F | RESPIRATION RATE: 15 BRPM | HEART RATE: 63 BPM | BODY MASS INDEX: 35.89 KG/M2 | SYSTOLIC BLOOD PRESSURE: 122 MMHG

## 2020-03-06 DIAGNOSIS — E11.29 CONTROLLED TYPE 2 DIABETES MELLITUS WITH MICROALBUMINURIA, WITHOUT LONG-TERM CURRENT USE OF INSULIN (HCC): ICD-10-CM

## 2020-03-06 DIAGNOSIS — Z01.818 PREOP EXAMINATION: Primary | ICD-10-CM

## 2020-03-06 DIAGNOSIS — I10 ESSENTIAL HYPERTENSION: ICD-10-CM

## 2020-03-06 DIAGNOSIS — G57.52 TARSAL TUNNEL SYNDROME, LEFT: ICD-10-CM

## 2020-03-06 DIAGNOSIS — E03.9 HYPOTHYROIDISM (ACQUIRED): ICD-10-CM

## 2020-03-06 DIAGNOSIS — R80.9 CONTROLLED TYPE 2 DIABETES MELLITUS WITH MICROALBUMINURIA, WITHOUT LONG-TERM CURRENT USE OF INSULIN (HCC): ICD-10-CM

## 2020-03-06 PROCEDURE — 99242 OFF/OP CONSLTJ NEW/EST SF 20: CPT | Performed by: FAMILY MEDICINE

## 2020-03-06 PROCEDURE — 3044F HG A1C LEVEL LT 7.0%: CPT | Performed by: FAMILY MEDICINE

## 2020-03-06 NOTE — H&P (VIEW-ONLY)
BMI Counseling: Body mass index is 35 94 kg/m²  The BMI is above normal  Nutrition recommendations include decreasing portion sizes, encouraging healthy choices of fruits and vegetables, decreasing fast food intake, consuming healthier snacks, moderation in carbohydrate intake and increasing intake of lean protein  Exercise recommendations include moderate physical activity 150 minutes/week and exercising 3-5 times per week  No pharmacotherapy was ordered  Patient referred to PCP due to patient being overweight  Subjective:     Laura Canas is a 47 y o  male who presents to the office today for a preoperative consultation at the request of surgeon Dr Cliff Delvalle who plans on performing left tarsal tunnel release on March 13  This consultation is requested for the specific conditions prompting preoperative evaluation (i e  because of potential affect on operative risk): Hypertension, type 2 diabetes  Planned anesthesia: general  The patient has the following known anesthesia issues:  no prior problems with endotracheal intubation or anesthesia  Patients bleeding risk: no recent abnormal bleeding  The following portions of the patient's history were reviewed and updated as appropriate:   He  has a past medical history of Arthritis, Chronic cholecystitis, Diabetes mellitus ([address] ), DVT (deep venous thrombosis) ([facility] ), Gout, History of pulmonary embolism, Hypertension, Hypothyroidism, Lumbar back pain, MTHFR mutation ([address] ), Scab, Sleep apnea, Tarsal tunnel syndrome, right, Tinnitus, Wears glasses, and Wears glasses    He   Patient Active Problem List    Diagnosis Date Noted    Tarsal tunnel syndrome, left 12/12/2019    Tarsal tunnel syndrome of both lower extremities 11/12/2019    Sacroiliitis ([address] ) 11/05/2019    Positive FIT (fecal immunochemical test) 10/16/2018    Hypothyroidism (acquired) 01/31/2018    Diabetic peripheral neuropathy ([address] ) 09/07/2017    Controlled type 2 diabetes mellitus with microalbuminuria, without long-term current use of insulin ([facility] ) 06/06/2017    Erectile dysfunction due to diseases classified elsewhere 05/01/2017    Homozygous for MTHFR gene mutation ([facility] ) 05/10/2016    Essential hypertension 04/06/2016     He  has a past surgical history that includes Cholecystectomy (03/26/2015); Appendectomy; Spinal fusion; Tonsillectomy (10/16/2013); Nasal septum surgery (10/16/2013); Uvulopalatopharyngoplasty; [place] tooth extraction; Bunionectomy (Right, 10/7/2016); Arthrodesis; Knee arthroscopy w/ meniscal repair; Uvulectomy (10/16/2013); pr tarsal tunnel release (Right, 6/25/2018); and pr colonoscopy flx dx w/collj spec when pfrmd (N/A, 11/23/2018)  His family history includes Aneurysm in his brother and mother; Coronary artery disease in his father; Hypertension in his father  He  reports that he has never smoked  He has never used smokeless tobacco  He reports that he drinks alcohol  He reports that he does not use drugs    Current Outpatient Medications   Medication Sig Dispense Refill    allopurinol (ZYLOPRIM) 300 mg tablet TAKE 1 TABLET BY MOUTH ONCE DAILY 90 tablet 0    atorvastatin (LIPITOR) 40 mg tablet Take 1 tablet (40 mg total) by mouth daily 90 tablet 0    carvedilol (COREG) 12 5 mg tablet Take 1 tablet (12 5 mg total) by mouth 2 (two) times a day with meals 60 tablet 5    ezetimibe (ZETIA) 10 mg tablet Take 1 tablet (10 mg total) by mouth daily 30 tablet 5    furosemide (LASIX) 40 mg tablet Take 1 tablet (40 mg total) by mouth daily 90 tablet 3    gabapentin (NEURONTIN) 600 MG tablet Take 2 tablets (1,200 mg total) by mouth 3 (three) times a day 180 tablet 5    levothyroxine 150 mcg tablet Take 1 tablet (150 mcg total) by mouth daily in the early morning 30 tablet 5    levothyroxine 25 mcg tablet Take 1 tablet (25 mcg total) by mouth daily With Levothyroxine 150mcg to equal 175mcg daily 30 tablet 2    lisinopril (ZESTRIL) 5 mg tablet Take 1 tablet (5 mg total) by mouth daily 90 tablet 1    metFORMIN (GLUCOPHAGE-XR) 500 mg 24 hr tablet Take 1 tablet (500 mg total) by mouth daily at bedtime 30 tablet 5    Multiple Vitamins-Minerals (MENS MULTIVITAMIN PLUS) TABS Take 1 tablet by mouth daily      sildenafil (VIAGRA) 100 mg tablet Take 1 tablet by mouth as needed      spironolactone (ALDACTONE) 25 mg tablet spironolactone 25 mg tablet      XARELTO 20 MG tablet TAKE 1 TABLET BY MOUTH ONCE DAILY WITH  BREAKFAST 90 tablet 0    amitriptyline (ELAVIL) 25 mg tablet TAKE 1/2 TABLET BY MOUTH 1 HOUR BEFORE BEDTIME FOR 2 WEEKS THEN INCREASE TO 1 FULL TABLET AT BEDTIME 30 tablet 0     No current facility-administered medications for this visit        Current Outpatient Medications on File Prior to Visit   Medication Sig    allopurinol (ZYLOPRIM) 300 mg tablet TAKE 1 TABLET BY MOUTH ONCE DAILY    atorvastatin (LIPITOR) 40 mg tablet Take 1 tablet (40 mg total) by mouth daily    carvedilol (COREG) 12 5 mg tablet Take 1 tablet (12 5 mg total) by mouth 2 (two) times a day with meals    ezetimibe (ZETIA) 10 mg tablet Take 1 tablet (10 mg total) by mouth daily    furosemide (LASIX) 40 mg tablet Take 1 tablet (40 mg total) by mouth daily    gabapentin (NEURONTIN) 600 MG tablet Take 2 tablets (1,200 mg total) by mouth 3 (three) times a day    levothyroxine 150 mcg tablet Take 1 tablet (150 mcg total) by mouth daily in the early morning    levothyroxine 25 mcg tablet Take 1 tablet (25 mcg total) by mouth daily With Levothyroxine 150mcg to equal 175mcg daily    lisinopril (ZESTRIL) 5 mg tablet Take 1 tablet (5 mg total) by mouth daily    metFORMIN (GLUCOPHAGE-XR) 500 mg 24 hr tablet Take 1 tablet (500 mg total) by mouth daily at bedtime    Multiple Vitamins-Minerals (MENS MULTIVITAMIN PLUS) TABS Take 1 tablet by mouth daily    sildenafil (VIAGRA) 100 mg tablet Take 1 tablet by mouth as needed    spironolactone (ALDACTONE) 25 mg tablet spironolactone 25 mg tablet    XARELTO 20 MG tablet TAKE 1 TABLET BY MOUTH ONCE DAILY WITH  BREAKFAST    amitriptyline (ELAVIL) 25 mg tablet TAKE 1/2 TABLET BY MOUTH 1 HOUR BEFORE BEDTIME FOR 2 WEEKS THEN INCREASE TO 1 FULL TABLET AT BEDTIME     No current facility-administered medications on file prior to visit  He has No Known Allergies     Past Medical History:   Diagnosis Date    Arthritis     right foot    Chronic cholecystitis     Diabetes mellitus ([facility] )     DVT (deep venous thrombosis) (HCC)     Gout     History of pulmonary embolism     Hypertension     Hypothyroidism     Lumbar back pain     [facility]FR mutation ([facility] )     Scab     right arm- no s/s infection    Sleep apnea     no CPAP    Tarsal tunnel syndrome, right     Tinnitus     left ear    Wears glasses     and contacts    Wears glasses        Past Surgical History:   Procedure Laterality Date    APPENDECTOMY      ARTHRODESIS      Lumbar L__    BUNIONECTOMY Right 10/7/2016    Procedure: REMOVAL TIBIAL  SESAMOID BONE  RIGHT FOOT;  Surgeon: Clara Downey DPM;  [facility];  Service:     CHOLECYSTECTOMY  03/26/2015    KNEE ARTHROSCOPY W/ MENISCAL REPAIR      Lateral    NASAL SEPTUM SURGERY  10/16/2013    SC COLONOSCOPY FLX DX W/COLLJ SPEC WHEN PFRMD N/A 11/23/2018    Procedure: COLONOSCOPY;  Surgeon: Meron Munguia MD;  [facility]: [facility];  Service: Gastroenterology    SC TARSAL TUNNEL RELEASE Right 6/25/2018    Procedure: RELEASE TARSAL TUNNEL;  Surgeon: Shaila Redmond DPM;  [facility];  Service: Podiatry   [email or web address]  10/16/2013    with Adenoidectomy    UVULECTOMY  10/16/2013    UVULOPALATOPHARYNGOPLASTY      WISDOM TOOTH EXTRACTION         Review of Systems  A comprehensive review of systems was negative except for: Cardiovascular: positive for Essential hypertension  Musculoskeletal: positive for  trouble walking and Left tarsal tunnel entrapment  Endocrine: positive for diabetic symptoms including increased fatigue, polydipsia, polyphagia and polyuria     Objective:  Physical Exam    Cardiographics  ECG: no prior ECG  Echocardiogram: not done    Imaging  Chest x-ray:  no recent chest x-ray     Lab Review   Appointment on 03/03/2020   Component Date Value    Hemoglobin A1C 03/03/2020 6 1*    EAG 03/03/2020 128     Sodium 03/03/2020 139     Potassium 03/03/2020 4 7     Chloride 03/03/2020 108     CO2 03/03/2020 25     ANION GAP 03/03/2020 6     BUN 03/03/2020 38*    Creatinine 03/03/2020 1 51*    Glucose, Fasting 03/03/2020 143*    Calcium 03/03/2020 9 0     eGFR 03/03/2020 52     Cholesterol 03/03/2020 139     Triglycerides 03/03/2020 301*    HDL, Direct 03/03/2020 55     LDL Calculated 03/03/2020 24     Non-HDL-Chol (CHOL-HDL) 03/03/2020 84     WBC 03/03/2020 6 30     RBC 03/03/2020 5 32     Hemoglobin 03/03/2020 16 5     Hematocrit 03/03/2020 52 4*    MCV 03/03/2020 99*    MCH 03/03/2020 31 0     MCHC 03/03/2020 31 5     RDW 03/03/2020 14 3     MPV 03/03/2020 10 1     Platelets 55/03/8431 193     nRBC 03/03/2020 0     Neutrophils Relative 03/03/2020 59     Immat GRANS % 03/03/2020 0     Lymphocytes Relative 03/03/2020 24     Monocytes Relative 03/03/2020 9     Eosinophils Relative 03/03/2020 7*    Basophils Relative 03/03/2020 1     Neutrophils Absolute 03/03/2020 3 73     Immature Grans Absolute 03/03/2020 0 02     Lymphocytes Absolute 03/03/2020 1 51     Monocytes Absolute 03/03/2020 0 55     Eosinophils Absolute 03/03/2020 0 45     Basophils Absolute 03/03/2020 0 04     Protime 03/03/2020 20 2*    INR 03/03/2020 1 78*    Creatinine, Ur 03/03/2020 39 9     Microalbum  ,U,Random 03/03/2020 727 0*    Microalb Creat Ratio 03/03/2020 1,822*   Ancillary Orders on 02/07/2020   Component Date Value    TSH 3RD GENERATON 03/03/2020 55 900*   Lab on 02/06/2020   Component Date Value    LDL Direct 02/06/2020 119*    Creatinine, Ur 02/06/2020 36 6     Microalbum  ,U,Random 02/06/2020 811 0*  Microalb Creat Ratio 02/06/2020 2,216*    TSH 3RD GENERATON 02/06/2020 62 000*   Ancillary Orders on 01/10/2020   Component Date Value    Hemoglobin A1C 02/06/2020 6 4*    EAG 02/06/2020 137     Sodium 02/06/2020 140     Potassium 02/06/2020 4 7     Chloride 02/06/2020 107     CO2 02/06/2020 29     ANION GAP 02/06/2020 4     BUN 02/06/2020 29*    Creatinine 02/06/2020 1 64*    Glucose, Fasting 02/06/2020 126*    Calcium 02/06/2020 9 8     eGFR 02/06/2020 47         Assessment:     47 y o  male with planned surgery as above  Known risk factors for perioperative complications: None    Difficulty with intubation is not anticipated  Cardiac Risk Estimation:  Minimal    Current medications which may produce withdrawal symptoms if withheld perioperatively:  None      Plan:  Patient is CLEARED for surgery without any additional cardiac testing  1  Preoperative workup as follows none  2  Change in medication regimen before surgery: none, continue medication regimen including morning of surgery, with sip of water  3  Prophylaxis for cardiac events with perioperative beta-blockers: not indicated  4  Invasive hemodynamic monitoring perioperatively: not indicated  5  Deep vein thrombosis prophylaxis postoperatively:Not indicated  6  Other measures: Consultation with hospitalist for in-hospital postoperative management of complications

## 2020-03-06 NOTE — PROGRESS NOTES
BMI Counseling: Body mass index is 35 94 kg/m²  The BMI is above normal  Nutrition recommendations include decreasing portion sizes, encouraging healthy choices of fruits and vegetables, decreasing fast food intake, consuming healthier snacks, moderation in carbohydrate intake and increasing intake of lean protein  Exercise recommendations include moderate physical activity 150 minutes/week and exercising 3-5 times per week  No pharmacotherapy was ordered  Patient referred to PCP due to patient being overweight  Subjective:     Esmer Wilkinson is a 47 y o  male who presents to the office today for a preoperative consultation at the request of surgeon Dr Rachelle Winn who plans on performing left tarsal tunnel release on March 13  This consultation is requested for the specific conditions prompting preoperative evaluation (i e  because of potential affect on operative risk): Hypertension, type 2 diabetes  Planned anesthesia: general  The patient has the following known anesthesia issues:  no prior problems with endotracheal intubation or anesthesia  Patients bleeding risk: no recent abnormal bleeding  The following portions of the patient's history were reviewed and updated as appropriate:   He  has a past medical history of Arthritis, Chronic cholecystitis, Diabetes mellitus ([facility] ), DVT (deep venous thrombosis) ([facility] [address] ), Gout, History of pulmonary embolism, Hypertension, Hypothyroidism, Lumbar back pain, MTHFR mutation ([facility] ), Scab, Sleep apnea, Tarsal tunnel syndrome, right, Tinnitus, Wears glasses, and Wears glasses    He   Patient Active Problem List    Diagnosis Date Noted    Tarsal tunnel syndrome, left 12/12/2019    Tarsal tunnel syndrome of both lower extremities 11/12/2019    Sacroiliitis ([address] ) 11/05/2019    Positive FIT (fecal immunochemical test) 10/16/2018    Hypothyroidism (acquired) 01/31/2018    Diabetic peripheral neuropathy ([facility] [facility] ) 09/07/2017    Controlled type 2 diabetes mellitus with microalbuminuria, without long-term current use of insulin ([facility] ) 06/06/2017    Erectile dysfunction due to diseases classified elsewhere 05/01/2017    Homozygous for MTHFR gene mutation ([facility] ) 05/10/2016    Essential hypertension 04/06/2016     He  has a past surgical history that includes Cholecystectomy (03/26/2015); Appendectomy; Spinal fusion; Tonsillectomy (10/16/2013); Nasal septum surgery (10/16/2013); Uvulopalatopharyngoplasty; [place] tooth extraction; Bunionectomy (Right, 10/7/2016); Arthrodesis; Knee arthroscopy w/ meniscal repair; Uvulectomy (10/16/2013); pr tarsal tunnel release (Right, 6/25/2018); and pr colonoscopy flx dx w/collj spec when pfrmd (N/A, 11/23/2018)  His family history includes Aneurysm in his brother and mother; Coronary artery disease in his father; Hypertension in his father  He  reports that he has never smoked  He has never used smokeless tobacco  He reports that he drinks alcohol  He reports that he does not use drugs    Current Outpatient Medications   Medication Sig Dispense Refill    allopurinol (ZYLOPRIM) 300 mg tablet TAKE 1 TABLET BY MOUTH ONCE DAILY 90 tablet 0    atorvastatin (LIPITOR) 40 mg tablet Take 1 tablet (40 mg total) by mouth daily 90 tablet 0    carvedilol (COREG) 12 5 mg tablet Take 1 tablet (12 5 mg total) by mouth 2 (two) times a day with meals 60 tablet 5    ezetimibe (ZETIA) 10 mg tablet Take 1 tablet (10 mg total) by mouth daily 30 tablet 5    furosemide (LASIX) 40 mg tablet Take 1 tablet (40 mg total) by mouth daily 90 tablet 3    gabapentin (NEURONTIN) 600 MG tablet Take 2 tablets (1,200 mg total) by mouth 3 (three) times a day 180 tablet 5    levothyroxine 150 mcg tablet Take 1 tablet (150 mcg total) by mouth daily in the early morning 30 tablet 5    levothyroxine 25 mcg tablet Take 1 tablet (25 mcg total) by mouth daily With Levothyroxine 150mcg to equal 175mcg daily 30 tablet 2    lisinopril (ZESTRIL) 5 mg tablet Take 1 tablet (5 mg total) by mouth daily 90 tablet 1    metFORMIN (GLUCOPHAGE-XR) 500 mg 24 hr tablet Take 1 tablet (500 mg total) by mouth daily at bedtime 30 tablet 5    Multiple Vitamins-Minerals (MENS MULTIVITAMIN PLUS) TABS Take 1 tablet by mouth daily      sildenafil (VIAGRA) 100 mg tablet Take 1 tablet by mouth as needed      spironolactone (ALDACTONE) 25 mg tablet spironolactone 25 mg tablet      XARELTO 20 MG tablet TAKE 1 TABLET BY MOUTH ONCE DAILY WITH  BREAKFAST 90 tablet 0    amitriptyline (ELAVIL) 25 mg tablet TAKE 1/2 TABLET BY MOUTH 1 HOUR BEFORE BEDTIME FOR 2 WEEKS THEN INCREASE TO 1 FULL TABLET AT BEDTIME 30 tablet 0     No current facility-administered medications for this visit        Current Outpatient Medications on File Prior to Visit   Medication Sig    allopurinol (ZYLOPRIM) 300 mg tablet TAKE 1 TABLET BY MOUTH ONCE DAILY    atorvastatin (LIPITOR) 40 mg tablet Take 1 tablet (40 mg total) by mouth daily    carvedilol (COREG) 12 5 mg tablet Take 1 tablet (12 5 mg total) by mouth 2 (two) times a day with meals    ezetimibe (ZETIA) 10 mg tablet Take 1 tablet (10 mg total) by mouth daily    furosemide (LASIX) 40 mg tablet Take 1 tablet (40 mg total) by mouth daily    gabapentin (NEURONTIN) 600 MG tablet Take 2 tablets (1,200 mg total) by mouth 3 (three) times a day    levothyroxine 150 mcg tablet Take 1 tablet (150 mcg total) by mouth daily in the early morning    levothyroxine 25 mcg tablet Take 1 tablet (25 mcg total) by mouth daily With Levothyroxine 150mcg to equal 175mcg daily    lisinopril (ZESTRIL) 5 mg tablet Take 1 tablet (5 mg total) by mouth daily    metFORMIN (GLUCOPHAGE-XR) 500 mg 24 hr tablet Take 1 tablet (500 mg total) by mouth daily at bedtime    Multiple Vitamins-Minerals (MENS MULTIVITAMIN PLUS) TABS Take 1 tablet by mouth daily    sildenafil (VIAGRA) 100 mg tablet Take 1 tablet by mouth as needed    spironolactone (ALDACTONE) 25 mg tablet spironolactone 25 mg tablet    XARELTO 20 MG tablet TAKE 1 TABLET BY MOUTH ONCE DAILY WITH  BREAKFAST    amitriptyline (ELAVIL) 25 mg tablet TAKE 1/2 TABLET BY MOUTH 1 HOUR BEFORE BEDTIME FOR 2 WEEKS THEN INCREASE TO 1 FULL TABLET AT BEDTIME     No current facility-administered medications on file prior to visit  He has No Known Allergies     Past Medical History:   Diagnosis Date    Arthritis     right foot    Chronic cholecystitis     Diabetes mellitus ([facility] )     DVT (deep venous thrombosis) (HCC)     Gout     History of pulmonary embolism     Hypertension     Hypothyroidism     Lumbar back pain     [facility]FR mutation ([facility] )     Scab     right arm- no s/s infection    Sleep apnea     no CPAP    Tarsal tunnel syndrome, right     Tinnitus     left ear    Wears glasses     and contacts    Wears glasses        Past Surgical History:   Procedure Laterality Date    APPENDECTOMY      ARTHRODESIS      Lumbar L__    BUNIONECTOMY Right 10/7/2016    Procedure: REMOVAL TIBIAL  SESAMOID BONE  RIGHT FOOT;  Surgeon: Shay Duran DPM;  [facility];  Service:     CHOLECYSTECTOMY  03/26/2015    KNEE ARTHROSCOPY W/ MENISCAL REPAIR      Lateral    NASAL SEPTUM SURGERY  10/16/2013    NV COLONOSCOPY FLX DX W/COLLJ SPEC WHEN PFRMD N/A 11/23/2018    Procedure: COLONOSCOPY;  Surgeon: Vinayak Gonzalez MD;  [facility]: [facility];  Service: Gastroenterology    NV TARSAL TUNNEL RELEASE Right 6/25/2018    Procedure: RELEASE TARSAL TUNNEL;  Surgeon: Alexandru Jones DPM;  [facility];  Service: Podiatry   [address] [employer] [employer] [place]  10/16/2013    with Adenoidectomy    UVULECTOMY  10/16/2013    UVULOPALATOPHARYNGOPLASTY      WISDOM TOOTH EXTRACTION         Review of Systems  A comprehensive review of systems was negative except for: Cardiovascular: positive for Essential hypertension  Musculoskeletal: positive for  trouble walking and Left tarsal tunnel entrapment  Endocrine: positive for diabetic symptoms including increased fatigue, polydipsia, polyphagia and polyuria     Objective:  Physical Exam    Cardiographics  ECG: no prior ECG  Echocardiogram: not done    Imaging  Chest x-ray:  no recent chest x-ray     Lab Review   Appointment on 03/03/2020   Component Date Value    Hemoglobin A1C 03/03/2020 6 1*    EAG 03/03/2020 128     Sodium 03/03/2020 139     Potassium 03/03/2020 4 7     Chloride 03/03/2020 108     CO2 03/03/2020 25     ANION GAP 03/03/2020 6     BUN 03/03/2020 38*    Creatinine 03/03/2020 1 51*    Glucose, Fasting 03/03/2020 143*    Calcium 03/03/2020 9 0     eGFR 03/03/2020 52     Cholesterol 03/03/2020 139     Triglycerides 03/03/2020 301*    HDL, Direct 03/03/2020 55     LDL Calculated 03/03/2020 24     Non-HDL-Chol (CHOL-HDL) 03/03/2020 84     WBC 03/03/2020 6 30     RBC 03/03/2020 5 32     Hemoglobin 03/03/2020 16 5     Hematocrit 03/03/2020 52 4*    MCV 03/03/2020 99*    MCH 03/03/2020 31 0     MCHC 03/03/2020 31 5     RDW 03/03/2020 14 3     MPV 03/03/2020 10 1     Platelets 90/98/1422 193     nRBC 03/03/2020 0     Neutrophils Relative 03/03/2020 59     Immat GRANS % 03/03/2020 0     Lymphocytes Relative 03/03/2020 24     Monocytes Relative 03/03/2020 9     Eosinophils Relative 03/03/2020 7*    Basophils Relative 03/03/2020 1     Neutrophils Absolute 03/03/2020 3 73     Immature Grans Absolute 03/03/2020 0 02     Lymphocytes Absolute 03/03/2020 1 51     Monocytes Absolute 03/03/2020 0 55     Eosinophils Absolute 03/03/2020 0 45     Basophils Absolute 03/03/2020 0 04     Protime 03/03/2020 20 2*    INR 03/03/2020 1 78*    Creatinine, Ur 03/03/2020 39 9     Microalbum  ,U,Random 03/03/2020 727 0*    Microalb Creat Ratio 03/03/2020 1,822*   Ancillary Orders on 02/07/2020   Component Date Value    TSH 3RD GENERATON 03/03/2020 55 900*   Lab on 02/06/2020   Component Date Value    LDL Direct 02/06/2020 119*    Creatinine, Ur 02/06/2020 36 6     Microalbum  ,U,Random 02/06/2020 811 0*  Microalb Creat Ratio 02/06/2020 2,216*    TSH 3RD GENERATON 02/06/2020 62 000*   Ancillary Orders on 01/10/2020   Component Date Value    Hemoglobin A1C 02/06/2020 6 4*    EAG 02/06/2020 137     Sodium 02/06/2020 140     Potassium 02/06/2020 4 7     Chloride 02/06/2020 107     CO2 02/06/2020 29     ANION GAP 02/06/2020 4     BUN 02/06/2020 29*    Creatinine 02/06/2020 1 64*    Glucose, Fasting 02/06/2020 126*    Calcium 02/06/2020 9 8     eGFR 02/06/2020 47         Assessment:     47 y o  male with planned surgery as above  Known risk factors for perioperative complications: None    Difficulty with intubation is not anticipated  Cardiac Risk Estimation:  Minimal    Current medications which may produce withdrawal symptoms if withheld perioperatively:  None      Plan:  Patient is CLEARED for surgery without any additional cardiac testing  1  Preoperative workup as follows none  2  Change in medication regimen before surgery: none, continue medication regimen including morning of surgery, with sip of water  3  Prophylaxis for cardiac events with perioperative beta-blockers: not indicated  4  Invasive hemodynamic monitoring perioperatively: not indicated  5  Deep vein thrombosis prophylaxis postoperatively:Not indicated  6  Other measures: Consultation with hospitalist for in-hospital postoperative management of complications

## 2020-03-11 NOTE — PRE-PROCEDURE INSTRUCTIONS
Pre-Surgery Instructions:   Medication Instructions    carvedilol (COREG) 12 5 mg tablet Instructed patient per Anesthesia Guidelines   gabapentin (NEURONTIN) 600 MG tablet Instructed patient per Anesthesia Guidelines   lisinopril (ZESTRIL) 5 mg tablet Instructed patient per Anesthesia Guidelines  Pre-op Showering Instructions for Surgery Patients    Before your operation, you play an important role in decreasing your risk for infection by washing with special antiseptic soap  This is an effective way to reduce bacteria on the skin which may help to prevent infections at the surgical site  Please read the following directions in advance  1  In the week before your operation, purchase a 4 ounce bottle of antiseptic soap containing chlorhexidine gluconate (CHG)  4%  Some brand names include: Aplicare®, Endure, and Hibiclens®  The cost is usually less than $5 00   For your convenience, the [employer] [facility] [employer] carries the soap   It may also be available at your doctors office or pre-admission testing center, and at most retail pharmacies   If you are allergic or sensitive to soaps containing CHG, please let your doctor know so another antiseptic can be suggested   CHG antiseptic soap is for external use only  2   The day before your operation, follow these instructions carefully to get ready   Please clean linens (sheets) on your bed; you should sleep on clean sheets after your evening shower   Get clean towels and washcloth ready - you need enough for 2 showers   Set aside clean underwear, pajamas, and clothing to wear after the showers     Reminders:   DO NOT use any other soap or body rinse on your skin during or after the antiseptic showers   DO NOT use lotion, powder, deodorant, or perfume/aftershave of any kind on your skin after your antiseptic shower   DO NOT shave any body parts in the 24 hours/day before your operation   DO NOT get the antiseptic soap in your eyes, ears, nose, mouth, or vaginal area    3  You will need to shower the night before AND the morning of your surgery  Shower 1:   The first evening before the operation, take the first shower   First, shampoo your hair with regular shampoo and rinse it completely before you use the antiseptic soap  After washing and rinsing your hair, rinse your body   Next, use a clean washcloth to apply the antiseptic soap and wash your body from the neck down to your toes using ½ bottle of the antiseptic soap   You will use the other ½ bottle for the second shower   Clean the area where your incision will be; lather this area well for about 2 minutes   If you are having head or neck surgery, wash areas with the antiseptic soap   Rinse yourself completely with running water   Use a clean towel to dry off   Wear clean underwear and clothing/pajamas  Shower 2   The morning of your operation, take the second shower following the same steps as Shower 1 using the second ½ of the bottle of antiseptic soap   Use clean cloths and towels to wash and dry yourself   Wear clean underwear and clothing

## 2020-03-11 NOTE — PRE-PROCEDURE INSTRUCTIONS
Pre-Surgery Instructions:   Medication Instructions    carvedilol (COREG) 12 5 mg tablet Instructed patient per Anesthesia Guidelines   gabapentin (NEURONTIN) 600 MG tablet Instructed patient per Anesthesia Guidelines   lisinopril (ZESTRIL) 5 mg tablet Instructed patient per Anesthesia Guidelines

## 2020-03-12 ENCOUNTER — ANESTHESIA EVENT (OUTPATIENT)
Dept: PERIOP | Facility: HOSPITAL | Age: 55
End: 2020-03-12
Payer: COMMERCIAL

## 2020-03-13 ENCOUNTER — HOSPITAL ENCOUNTER (OUTPATIENT)
Facility: HOSPITAL | Age: 55
Setting detail: OUTPATIENT SURGERY
Discharge: HOME | End: 2020-03-13
Attending: PODIATRIST | Admitting: PODIATRIST
Payer: COMMERCIAL

## 2020-03-13 ENCOUNTER — ANESTHESIA (OUTPATIENT)
Dept: PERIOP | Facility: HOSPITAL | Age: 55
End: 2020-03-13
Payer: COMMERCIAL

## 2020-03-13 VITALS
HEIGHT: 72 IN | BODY MASS INDEX: 35.21 KG/M2 | OXYGEN SATURATION: 96 % | SYSTOLIC BLOOD PRESSURE: 127 MMHG | WEIGHT: 260 LBS | RESPIRATION RATE: 16 BRPM | DIASTOLIC BLOOD PRESSURE: 76 MMHG | HEART RATE: 52 BPM | TEMPERATURE: 96.6 F

## 2020-03-13 DIAGNOSIS — G89.18 POSTOPERATIVE PAIN: Primary | ICD-10-CM

## 2020-03-13 LAB
GLUCOSE SERPL-MCNC: 102 MG/DL (ref 65–140)
GLUCOSE SERPL-MCNC: 117 MG/DL (ref 65–140)

## 2020-03-13 PROCEDURE — 82948 REAGENT STRIP/BLOOD GLUCOSE: CPT

## 2020-03-13 PROCEDURE — 28035 DECOMPRESSION OF TIBIA NERVE: CPT | Performed by: PODIATRIST

## 2020-03-13 PROCEDURE — 99024 POSTOP FOLLOW-UP VISIT: CPT | Performed by: PODIATRIST

## 2020-03-13 RX ORDER — LIDOCAINE HYDROCHLORIDE 10 MG/ML
INJECTION, SOLUTION EPIDURAL; INFILTRATION; INTRACAUDAL; PERINEURAL AS NEEDED
Status: DISCONTINUED | OUTPATIENT
Start: 2020-03-13 | End: 2020-03-13 | Stop reason: SURG

## 2020-03-13 RX ORDER — DIPHENHYDRAMINE HYDROCHLORIDE 50 MG/ML
12.5 INJECTION INTRAMUSCULAR; INTRAVENOUS ONCE AS NEEDED
Status: DISCONTINUED | OUTPATIENT
Start: 2020-03-13 | End: 2020-03-13 | Stop reason: HOSPADM

## 2020-03-13 RX ORDER — MAGNESIUM HYDROXIDE 1200 MG/15ML
LIQUID ORAL AS NEEDED
Status: DISCONTINUED | OUTPATIENT
Start: 2020-03-13 | End: 2020-03-13 | Stop reason: HOSPADM

## 2020-03-13 RX ORDER — CEFAZOLIN SODIUM 2 G/50ML
2000 SOLUTION INTRAVENOUS ONCE
Status: COMPLETED | OUTPATIENT
Start: 2020-03-13 | End: 2020-03-13

## 2020-03-13 RX ORDER — KETOROLAC TROMETHAMINE 30 MG/ML
INJECTION, SOLUTION INTRAMUSCULAR; INTRAVENOUS AS NEEDED
Status: DISCONTINUED | OUTPATIENT
Start: 2020-03-13 | End: 2020-03-13 | Stop reason: SURG

## 2020-03-13 RX ORDER — FENTANYL CITRATE 50 UG/ML
INJECTION, SOLUTION INTRAMUSCULAR; INTRAVENOUS AS NEEDED
Status: DISCONTINUED | OUTPATIENT
Start: 2020-03-13 | End: 2020-03-13 | Stop reason: SURG

## 2020-03-13 RX ORDER — SODIUM CHLORIDE, SODIUM LACTATE, POTASSIUM CHLORIDE, CALCIUM CHLORIDE 600; 310; 30; 20 MG/100ML; MG/100ML; MG/100ML; MG/100ML
50 INJECTION, SOLUTION INTRAVENOUS CONTINUOUS
Status: DISCONTINUED | OUTPATIENT
Start: 2020-03-13 | End: 2020-03-13 | Stop reason: HOSPADM

## 2020-03-13 RX ORDER — PROPOFOL 10 MG/ML
INJECTION, EMULSION INTRAVENOUS AS NEEDED
Status: DISCONTINUED | OUTPATIENT
Start: 2020-03-13 | End: 2020-03-13 | Stop reason: SURG

## 2020-03-13 RX ORDER — DEXAMETHASONE SODIUM PHOSPHATE 4 MG/ML
4 INJECTION, SOLUTION INTRA-ARTICULAR; INTRALESIONAL; INTRAMUSCULAR; INTRAVENOUS; SOFT TISSUE ONCE AS NEEDED
Status: DISCONTINUED | OUTPATIENT
Start: 2020-03-13 | End: 2020-03-13 | Stop reason: HOSPADM

## 2020-03-13 RX ORDER — MIDAZOLAM HYDROCHLORIDE 2 MG/2ML
INJECTION, SOLUTION INTRAMUSCULAR; INTRAVENOUS AS NEEDED
Status: DISCONTINUED | OUTPATIENT
Start: 2020-03-13 | End: 2020-03-13 | Stop reason: SURG

## 2020-03-13 RX ORDER — PROPOFOL 10 MG/ML
INJECTION, EMULSION INTRAVENOUS CONTINUOUS PRN
Status: DISCONTINUED | OUTPATIENT
Start: 2020-03-13 | End: 2020-03-13 | Stop reason: SURG

## 2020-03-13 RX ORDER — MEPERIDINE HYDROCHLORIDE 25 MG/ML
12.5 INJECTION INTRAMUSCULAR; INTRAVENOUS; SUBCUTANEOUS
Status: DISCONTINUED | OUTPATIENT
Start: 2020-03-13 | End: 2020-03-13 | Stop reason: HOSPADM

## 2020-03-13 RX ORDER — PROMETHAZINE HYDROCHLORIDE 25 MG/ML
12.5 INJECTION, SOLUTION INTRAMUSCULAR; INTRAVENOUS ONCE AS NEEDED
Status: DISCONTINUED | OUTPATIENT
Start: 2020-03-13 | End: 2020-03-13 | Stop reason: HOSPADM

## 2020-03-13 RX ORDER — FENTANYL CITRATE/PF 50 MCG/ML
12.5 SYRINGE (ML) INJECTION
Status: DISCONTINUED | OUTPATIENT
Start: 2020-03-13 | End: 2020-03-13 | Stop reason: HOSPADM

## 2020-03-13 RX ORDER — FENTANYL CITRATE/PF 50 MCG/ML
25 SYRINGE (ML) INJECTION
Status: COMPLETED | OUTPATIENT
Start: 2020-03-13 | End: 2020-03-13

## 2020-03-13 RX ORDER — OXYCODONE HYDROCHLORIDE AND ACETAMINOPHEN 5; 325 MG/1; MG/1
1 TABLET ORAL EVERY 4 HOURS PRN
Qty: 15 TABLET | Refills: 0 | Status: SHIPPED | OUTPATIENT
Start: 2020-03-13 | End: 2020-03-23

## 2020-03-13 RX ADMIN — LIDOCAINE HYDROCHLORIDE 50 MG: 10 INJECTION, SOLUTION EPIDURAL; INFILTRATION; INTRACAUDAL; PERINEURAL at 12:33

## 2020-03-13 RX ADMIN — FENTANYL CITRATE 25 MCG: 50 INJECTION INTRAMUSCULAR; INTRAVENOUS at 13:57

## 2020-03-13 RX ADMIN — PROPOFOL 120 MCG/KG/MIN: 10 INJECTION, EMULSION INTRAVENOUS at 12:33

## 2020-03-13 RX ADMIN — KETOROLAC TROMETHAMINE 30 MG: 30 INJECTION, SOLUTION INTRAMUSCULAR; INTRAVENOUS at 13:01

## 2020-03-13 RX ADMIN — FENTANYL CITRATE 25 MCG: 50 INJECTION INTRAMUSCULAR; INTRAVENOUS at 13:47

## 2020-03-13 RX ADMIN — FENTANYL CITRATE 50 MCG: 50 INJECTION INTRAMUSCULAR; INTRAVENOUS at 12:33

## 2020-03-13 RX ADMIN — CEFAZOLIN SODIUM 2000 MG: 2 SOLUTION INTRAVENOUS at 12:38

## 2020-03-13 RX ADMIN — MIDAZOLAM HYDROCHLORIDE 2 MG: 1 INJECTION, SOLUTION INTRAMUSCULAR; INTRAVENOUS at 12:24

## 2020-03-13 RX ADMIN — FENTANYL CITRATE 25 MCG: 50 INJECTION INTRAMUSCULAR; INTRAVENOUS at 14:04

## 2020-03-13 RX ADMIN — SODIUM CHLORIDE, SODIUM LACTATE, POTASSIUM CHLORIDE, AND CALCIUM CHLORIDE: .6; .31; .03; .02 INJECTION, SOLUTION INTRAVENOUS at 13:11

## 2020-03-13 RX ADMIN — FENTANYL CITRATE 25 MCG: 50 INJECTION INTRAMUSCULAR; INTRAVENOUS at 13:52

## 2020-03-13 RX ADMIN — PROPOFOL 150 MG: 10 INJECTION, EMULSION INTRAVENOUS at 12:33

## 2020-03-13 RX ADMIN — SODIUM CHLORIDE, SODIUM LACTATE, POTASSIUM CHLORIDE, AND CALCIUM CHLORIDE: .6; .31; .03; .02 INJECTION, SOLUTION INTRAVENOUS at 12:20

## 2020-03-13 NOTE — OP NOTE
OPERATIVE REPORT  PATIENT NAME: Isabelle Snyder    :  1965  MRN: [id number]  Pt Location:  OR ROOM 11    SURGERY DATE: 3/13/2020    Surgeon(s) and Role:     * Ynes Arango DPM - Primary     * Israel Anand DPM - Assisting    Preop Diagnosis:  Tarsal tunnel syndrome, left [G57 52]    Post-Op Diagnosis Codes:     * Tarsal tunnel syndrome, left [G57 52]    Procedure(s) (LRB):  RELEASE TARSAL TUNNEL (Left)     CPT 42791: release tarsal tunnel (posterior tibial nerve decompression)    Specimen(s):  * No specimens in log *    Estimated Blood Loss:   Minimal    Drains:  * No LDAs found *    Anesthesia Type:   IV Sedation with Anesthesia    Operative Indications:  Tarsal tunnel syndrome, left [G57 52]    Operative Findings:  Assistant with diagnosis  Hypertrophied, enlarged venous structures storm posterior tibial nerve within tarsal tunnel  Hypertrophied and thickening of flexor retinaculum     Complications:   None    Procedure and Technique:    Under mild sedation the patient was brought into the operating room and transferred operating table in a supine position  After IV sedation was achieved by anesthesia team and the universal time-out was performed were all parties in agreement correct patient, correct procedure and correct site a tibial block was performed consisting of 20 mL one-to-one mixture of 1% lidocaine plain and 0 5% bupivacaine plain     A pneumatic calf tourniquet was placed over the left calf ample padding  The left lower leg was then prepped and draped in the usual aseptic manner  Attention was directed to the medial aspect of the ankle  A curvilinear incision was made through skin posterior to the medial malleolus and tibial using 15 blade  Dissection was carried down using tenotomy scissors cauterizing venous vessels as needed  There were noted to be a large amounts of varicosities in the subcutaneous layer  Subcutaneous fat was divided bluntly    The flexor retinaculum was then identified and incised with a scissor and #15 blade  There was noted to be hypertrophy and thickening of flexor retinaculum as well as enlarged venae concomitantes storm-tibial nerve  Adequate decompression was noted to the all sites and wound was irrigated with copious amounts of normal saline  Subcutaneous tissue was repaired using 3-0 Vicryl and the skin was repaired using 3 0 nylon  This incision was then dressed with Xeroform and dry sterile dressing  1 rolls of cast padding, kerlex, and ACE wrap were applied   The pneumatic calf tourniquet was deflated and proper hyperemic response was noted to all digits  The patient tolerated procedure well without immediate complications and transferred to PACU with stable vital signs  Dr Magi Tobias was present for the entire procedure and participated in all key surgical elements      Patient Disposition:  PACU     SIGNATURE: Polly Jason DPM  DATE: March 13, 2020  TIME: 1:22 PM

## 2020-03-13 NOTE — DISCHARGE INSTRUCTIONS
Dr Rosibel Soto Instructions    1  Take your prescribed medication as directed  2  Upon arrival at home, lie down and elevate your surgical foot on 2 pillows  3  Remain quiet, off your feet as much as possible, for the first 24-48 hours  This is when your feet first swell and may become painful  After 48 hours you may begin limited walking following these restrictions:   Weightbear as tolerated to surgical foot in surgical shoe and with crutches  4  Drink large quantities of water  Consume no alcohol  Continue a well-balanced diet  5  Report any unusual discomfort or fever to this office  6  A limited amount of discomfort and swelling is to be expected  In some cases the skin may take on a bruised appearance  The surgical solution that was applied to your foot prior to the operation is dark in color and the operation site may appear to be oozing when it actually is not  7  A slight amount of blood is to be expected, and is no cause for alarm  Do not remove the dressings  If there is active bleeding and if the bleeding persists, add additional gauze to the bandage, apply direct pressure, elevate your feet and call this office  8  Do not get the dressings wet  As regular bathing may be inconvenient, sponge baths are recommended  9  When anesthesia wears off and if any discomfort should be present, apply an ice pack directly over the operated area for 15 minute intervals for several hours or until the pain leaves  (USE IN EXCESS OF 15 MINUTES COULD CAUSE FROSTBITE)  Do not use hot water bags or electric pads  A convenient icepack can be made by placing ice cubes in a plastic bag and covering this with a towel  10  If necessary, take a mild laxative before retiring  11  Wear your special open shoes anytime you put weight on your foot, even if it is just to walk to the bathroom and back  It will probably be 2 or 3 weeks before you will be permitted to try regular shoes    12  Having performed the operation, we are interested in a prompt recovery  Please cooperate by following the above instructions  13  Please call to confirm your post-op appointment or call with any other questions

## 2020-03-13 NOTE — NURSING NOTE
Dressing L-foot dry/intact  Brisk capillary refill +2 toes  Denies pain at this time  Fitted for Post-op Shoe  No complaints

## 2020-03-13 NOTE — ANESTHESIA POSTPROCEDURE EVALUATION
Post-Op Assessment Note    CV Status:  Stable  Pain Score: 1    Pain management: adequate     Mental Status:  Alert and awake   Hydration Status:  Euvolemic   PONV Controlled:  Controlled   Airway Patency:  Patent   Post Op Vitals Reviewed: Yes      Staff: Anesthesiologist, CRNA           /68 (03/13/20 1319)    Temp (!) 97 °F (36 1 °C) (03/13/20 1319)    Pulse 64 (03/13/20 1319)   Resp 16 (03/13/20 1319)    SpO2 98 % (03/13/20 1319)

## 2020-03-13 NOTE — DISCHARGE SUMMARY
Discharge Summary Outpatient Procedure Podiatry -   Anna Nurse Keyur 47 y o  male MRN: [id number]  Unit/Bed#: [facility] Encounter: [id number]    Admission Date: 3/13/2020     Admitting Diagnosis: Tarsal tunnel syndrome, left [G57 52]    Discharge Diagnosis: same    Procedures Performed: RELEASE TARSAL TUNNEL: 10646 (CPT®)    Complications: none    Condition at Discharge: stable    Discharge instructions/Information to patient and family:   See after visit summary for information provided to patient and family  Provisions for Follow-Up Care/Important appointments:  See after visit summary for information related to follow-up care and any pertinent home health orders  Discharge Medications:  See after visit summary for reconciled discharge medications provided to patient and family

## 2020-03-13 NOTE — ANESTHESIA PREPROCEDURE EVALUATION
Review of Systems/Medical History          Cardiovascular  Negative cardio ROS Exercise tolerance (METS): >4,  Hyperlipidemia, Hypertension controlled,   PE (stopped xarelto two days ago ),  Pulmonary  Negative pulmonary ROS Sleep apnea (cpap non compliant ) ,        GI/Hepatic  Negative GI/hepatic ROS          Chronic kidney disease stage 3,        Endo/Other  Diabetes well controlled type 2 Oral agent, History of thyroid disease , hypothyroidism,   Obesity    GYN       Hematology  Negative hematology ROS      Musculoskeletal  Negative musculoskeletal ROS   Arthritis     Neurology  Negative neurology ROS      Psychology   Negative psychology ROS              Physical Exam    Airway    Mallampati score: II  TM Distance: >3 FB  Neck ROM: full     Dental       Cardiovascular  Comment: Negative ROS, Cardiovascular exam normal    Pulmonary  Pulmonary exam normal     Other Findings        Anesthesia Plan  ASA Score- 2     Anesthesia Type- IV sedation with anesthesia with ASA Monitors  Additional Monitors:   Airway Plan:         Plan Factors-Patient not instructed to abstain from smoking on day of procedure  Patient did not smoke on day of surgery  Induction- intravenous  Postoperative Plan- Plan for postoperative opioid use  Informed Consent- Anesthetic plan and risks discussed with patient  I personally reviewed this patient with the CRNA  Discussed and agreed on the Anesthesia Plan with the CRNA             Lab Results   Component Value Date    HGBA1C 6 1 (H) 03/03/2020       Lab Results   Component Value Date     12/21/2015    K 4 7 03/03/2020     03/03/2020    CO2 25 03/03/2020    ANIONGAP 10 12/21/2015    BUN 38 (H) 03/03/2020    CREATININE 1 51 (H) 03/03/2020    GLUCOSE 150 (H) 12/21/2015    GLUF 143 (H) 03/03/2020    CALCIUM 9 0 03/03/2020    CORRECTEDCA 10 6 (H) 07/25/2019    AST 22 11/05/2019    ALT 30 11/05/2019    ALKPHOS 76 11/05/2019    PROT 7 1 06/11/2015    PROT 7 1 06/11/2015 BILITOT 0 6 06/11/2015    EGFR 52 03/03/2020       Lab Results   Component Value Date    WBC 6 30 03/03/2020    HGB 16 5 03/03/2020    HCT 52 4 (H) 03/03/2020    MCV 99 (H) 03/03/2020     03/03/2020   Normal sinus rhythm  Normal ECG  When compared with ECG of 23-SEP-2016 08:23,  No significant change was found  Confirmed by Joy 24, [address] ([phone]) on 6/13/2018 10:35:46 PM

## 2020-03-13 NOTE — INTERVAL H&P NOTE
H&P reviewed  After examining the patient I find no changes in the patients condition since the H&P had been written      Vitals:    03/13/20 1047   BP: 122/63   Pulse: (!) 53   Resp: 20   Temp: 97 7 °F (36 5 °C)   SpO2: 94%

## 2020-03-16 NOTE — ANESTHESIA POST-OP FOLLOW-UP NOTE
Patient: Sherley Reyes  Procedure(s):  RELEASE TARSAL TUNNEL  Vitals:    03/13/20 1416   BP: 127/76   Pulse: (!) 52   Resp: 16   Temp: (!) 96 6 °F (35 9 °C)   SpO2: 96%     Called pt after preop nurse on routine call said the pt c/o jaw pain and swelling  Talked to pt  Pt said the pain started as he was leaving the hospital after surgery  He said the pain and swelling is better today  No redness or signs of infection  No swallowing/ chewing/ biting/ speaking problem  Pt is able to eat hard and solid foods     Told the pt that he can come to ER and we will be happy to take care of him or if he feels he is getting better ( which he said he does) he can wait and reevaluate in 24 hrs

## 2020-03-19 ENCOUNTER — OFFICE VISIT (OUTPATIENT)
Dept: PODIATRY | Facility: CLINIC | Age: 55
End: 2020-03-19

## 2020-03-19 VITALS
SYSTOLIC BLOOD PRESSURE: 108 MMHG | WEIGHT: 265.8 LBS | DIASTOLIC BLOOD PRESSURE: 68 MMHG | HEART RATE: 49 BPM | BODY MASS INDEX: 37.21 KG/M2 | HEIGHT: 71 IN

## 2020-03-19 DIAGNOSIS — Z98.890 POSTOPERATIVE STATE: Primary | ICD-10-CM

## 2020-03-19 PROCEDURE — 3060F POS MICROALBUMINURIA REV: CPT | Performed by: PODIATRIST

## 2020-03-19 PROCEDURE — 3074F SYST BP LT 130 MM HG: CPT | Performed by: PODIATRIST

## 2020-03-19 PROCEDURE — 3078F DIAST BP <80 MM HG: CPT | Performed by: PODIATRIST

## 2020-03-19 PROCEDURE — 2022F DILAT RTA XM EVC RTNOPTHY: CPT | Performed by: PODIATRIST

## 2020-03-19 PROCEDURE — 3066F NEPHROPATHY DOC TX: CPT | Performed by: PODIATRIST

## 2020-03-19 PROCEDURE — 99024 POSTOP FOLLOW-UP VISIT: CPT | Performed by: PODIATRIST

## 2020-03-19 NOTE — PROGRESS NOTES
This patient was seen on 3/19/20  Assessment:    Problem List Items Addressed This Visit        Other    Postoperative state - Primary          Plan:  Sutures left intact  May ambulate as tolerated  Instructions given to keep surgical dressing clean, dry and intact  Fresh DSD applied  Diagnoses and all orders for this visit:    Postoperative state          Subjective:      Patient ID: Imtiaz Reina is a 47 y o  male  Alondra [place] is here for his first post-op appointment  He denies pain, fevers, nor strikethrough drainage  The following portions of the patient's history were reviewed and updated as appropriate: allergies, current medications, past family history, past medical history, past social history, past surgical history and problem list     Review of Systems   Constitutional: Negative  Respiratory: Negative  Objective:      /68   Pulse (!) 49   Ht 5' 11" (1 803 m)   Wt 121 kg (265 lb 12 8 oz)   BMI 37 07 kg/m²          Physical Exam   Constitutional: He appears well-developed and well-nourished  No distress  Pulmonary/Chest: Effort normal and breath sounds normal    Skin: He is not diaphoretic  Nursing note and vitals reviewed  The incision is well-coapted without dehiscence, signs of infection, active drainage, necrosis  Her calf is soft and non-tender  Neurovascular status is intact to the foot  Normal post-op edema and bruising is noted

## 2020-03-21 DIAGNOSIS — E11.42 DIABETIC PERIPHERAL NEUROPATHY (HCC): ICD-10-CM

## 2020-03-21 DIAGNOSIS — E26.9 HYPERALDOSTERONISM (HCC): Primary | ICD-10-CM

## 2020-03-21 DIAGNOSIS — E79.0 HYPERURICEMIA: ICD-10-CM

## 2020-03-23 RX ORDER — ALLOPURINOL 300 MG/1
TABLET ORAL
Qty: 90 TABLET | Refills: 0 | Status: SHIPPED | OUTPATIENT
Start: 2020-03-23 | End: 2020-06-23

## 2020-03-23 RX ORDER — AMITRIPTYLINE HYDROCHLORIDE 25 MG/1
TABLET, FILM COATED ORAL
Qty: 30 TABLET | Refills: 0 | Status: SHIPPED | OUTPATIENT
Start: 2020-03-23 | End: 2020-10-15

## 2020-03-23 RX ORDER — SPIRONOLACTONE 25 MG/1
TABLET ORAL
Qty: 90 TABLET | Refills: 1 | Status: SHIPPED | OUTPATIENT
Start: 2020-03-23 | End: 2020-09-28

## 2020-03-27 ENCOUNTER — OFFICE VISIT (OUTPATIENT)
Dept: PODIATRY | Facility: CLINIC | Age: 55
End: 2020-03-27

## 2020-03-27 VITALS — HEIGHT: 71 IN | WEIGHT: 263 LBS | BODY MASS INDEX: 36.82 KG/M2

## 2020-03-27 DIAGNOSIS — Z98.890 POSTOPERATIVE STATE: Primary | ICD-10-CM

## 2020-03-27 PROCEDURE — 2022F DILAT RTA XM EVC RTNOPTHY: CPT | Performed by: PODIATRIST

## 2020-03-27 PROCEDURE — 3074F SYST BP LT 130 MM HG: CPT | Performed by: PODIATRIST

## 2020-03-27 PROCEDURE — 3060F POS MICROALBUMINURIA REV: CPT | Performed by: PODIATRIST

## 2020-03-27 PROCEDURE — 99024 POSTOP FOLLOW-UP VISIT: CPT | Performed by: PODIATRIST

## 2020-03-27 PROCEDURE — 3066F NEPHROPATHY DOC TX: CPT | Performed by: PODIATRIST

## 2020-03-27 PROCEDURE — 3078F DIAST BP <80 MM HG: CPT | Performed by: PODIATRIST

## 2020-03-27 NOTE — PROGRESS NOTES
This patient was seen on 3/27/20  Assessment:    Problem List Items Addressed This Visit        Other    Postoperative state - Primary          Plan:  Sutures left intact  Patient to keep small adherent dressing clean, dry and intact over incision line at all times  Follow-up x 1 weeks for suture removal      Diagnoses and all orders for this visit:    Postoperative state          Subjective:      Patient ID: Yung Asif is a 47 y o  male  Jenny Jaime is here for his second post-op appointment  He denies fevers, drainage, pain  He notes a reduction in pain in his foot (such as arch cramping) since the surgery  The following portions of the patient's history were reviewed and updated as appropriate: allergies, current medications, past family history, past medical history, past social history, past surgical history and problem list     Review of Systems   Constitutional: Negative  Respiratory: Negative  Objective:      Ht 5' 11" (1 803 m)   Wt 119 kg (263 lb)   BMI 36 68 kg/m²          Physical Exam   Constitutional: He appears well-developed and well-nourished  No distress  Pulmonary/Chest: Effort normal and breath sounds normal    Skin: He is not diaphoretic  Nursing note and vitals reviewed  The incision is well-coapted without dehiscence, signs of infection, active drainage, necrosis  Her calf is soft and non-tender  Neurovascular status is intact to the foot  Normal post-op edema and bruising is noted

## 2020-04-03 ENCOUNTER — OFFICE VISIT (OUTPATIENT)
Dept: PODIATRY | Facility: CLINIC | Age: 55
End: 2020-04-03

## 2020-04-03 VITALS — BODY MASS INDEX: 36.82 KG/M2 | WEIGHT: 263 LBS | HEIGHT: 71 IN

## 2020-04-03 DIAGNOSIS — Z98.890 POSTOPERATIVE STATE: Primary | ICD-10-CM

## 2020-04-03 PROCEDURE — 3078F DIAST BP <80 MM HG: CPT | Performed by: PODIATRIST

## 2020-04-03 PROCEDURE — 3060F POS MICROALBUMINURIA REV: CPT | Performed by: PODIATRIST

## 2020-04-03 PROCEDURE — 2022F DILAT RTA XM EVC RTNOPTHY: CPT | Performed by: PODIATRIST

## 2020-04-03 PROCEDURE — 99024 POSTOP FOLLOW-UP VISIT: CPT | Performed by: PODIATRIST

## 2020-04-03 PROCEDURE — 3066F NEPHROPATHY DOC TX: CPT | Performed by: PODIATRIST

## 2020-04-03 PROCEDURE — 3074F SYST BP LT 130 MM HG: CPT | Performed by: PODIATRIST

## 2020-04-10 ENCOUNTER — OFFICE VISIT (OUTPATIENT)
Dept: PODIATRY | Facility: CLINIC | Age: 55
End: 2020-04-10
Payer: COMMERCIAL

## 2020-04-10 VITALS — HEIGHT: 71 IN | WEIGHT: 250.4 LBS | BODY MASS INDEX: 35.06 KG/M2

## 2020-04-10 DIAGNOSIS — L97.321 VENOUS STASIS ULCER OF LEFT ANKLE LIMITED TO BREAKDOWN OF SKIN WITH VARICOSE VEINS (HCC): Primary | ICD-10-CM

## 2020-04-10 DIAGNOSIS — I83.023 VENOUS STASIS ULCER OF LEFT ANKLE LIMITED TO BREAKDOWN OF SKIN WITH VARICOSE VEINS (HCC): Primary | ICD-10-CM

## 2020-04-10 DIAGNOSIS — Z98.890 POSTOPERATIVE STATE: ICD-10-CM

## 2020-04-10 PROCEDURE — 3078F DIAST BP <80 MM HG: CPT | Performed by: PODIATRIST

## 2020-04-10 PROCEDURE — 3060F POS MICROALBUMINURIA REV: CPT | Performed by: PODIATRIST

## 2020-04-10 PROCEDURE — 99213 OFFICE O/P EST LOW 20 MIN: CPT | Performed by: PODIATRIST

## 2020-04-10 PROCEDURE — 2022F DILAT RTA XM EVC RTNOPTHY: CPT | Performed by: PODIATRIST

## 2020-04-10 PROCEDURE — 1036F TOBACCO NON-USER: CPT | Performed by: PODIATRIST

## 2020-04-10 PROCEDURE — 3074F SYST BP LT 130 MM HG: CPT | Performed by: PODIATRIST

## 2020-04-10 PROCEDURE — 3044F HG A1C LEVEL LT 7.0%: CPT | Performed by: PODIATRIST

## 2020-04-10 PROCEDURE — 3066F NEPHROPATHY DOC TX: CPT | Performed by: PODIATRIST

## 2020-04-12 PROBLEM — L97.321 VENOUS STASIS ULCER OF LEFT ANKLE LIMITED TO BREAKDOWN OF SKIN WITH VARICOSE VEINS (HCC): Status: ACTIVE | Noted: 2020-04-12

## 2020-04-12 PROBLEM — I83.023 VENOUS STASIS ULCER OF LEFT ANKLE LIMITED TO BREAKDOWN OF SKIN WITH VARICOSE VEINS (HCC): Status: ACTIVE | Noted: 2020-04-12

## 2020-04-12 PROCEDURE — 29580 STRAPPING UNNA BOOT: CPT | Performed by: PODIATRIST

## 2020-04-17 ENCOUNTER — OFFICE VISIT (OUTPATIENT)
Dept: PODIATRY | Facility: CLINIC | Age: 55
End: 2020-04-17
Payer: COMMERCIAL

## 2020-04-17 VITALS — HEIGHT: 71 IN | BODY MASS INDEX: 35.01 KG/M2 | WEIGHT: 250.1 LBS

## 2020-04-17 DIAGNOSIS — L97.321 VENOUS STASIS ULCER OF LEFT ANKLE LIMITED TO BREAKDOWN OF SKIN WITH VARICOSE VEINS (HCC): Primary | ICD-10-CM

## 2020-04-17 DIAGNOSIS — I83.023 VENOUS STASIS ULCER OF LEFT ANKLE LIMITED TO BREAKDOWN OF SKIN WITH VARICOSE VEINS (HCC): Primary | ICD-10-CM

## 2020-04-17 PROCEDURE — 1036F TOBACCO NON-USER: CPT | Performed by: PODIATRIST

## 2020-04-17 PROCEDURE — 99213 OFFICE O/P EST LOW 20 MIN: CPT | Performed by: PODIATRIST

## 2020-04-17 PROCEDURE — 3066F NEPHROPATHY DOC TX: CPT | Performed by: PODIATRIST

## 2020-04-17 PROCEDURE — 3078F DIAST BP <80 MM HG: CPT | Performed by: PODIATRIST

## 2020-04-17 PROCEDURE — 3074F SYST BP LT 130 MM HG: CPT | Performed by: PODIATRIST

## 2020-04-17 PROCEDURE — 2022F DILAT RTA XM EVC RTNOPTHY: CPT | Performed by: PODIATRIST

## 2020-04-17 PROCEDURE — 3060F POS MICROALBUMINURIA REV: CPT | Performed by: PODIATRIST

## 2020-04-17 PROCEDURE — 3044F HG A1C LEVEL LT 7.0%: CPT | Performed by: PODIATRIST

## 2020-04-29 DIAGNOSIS — R80.9 CONTROLLED TYPE 2 DIABETES MELLITUS WITH MICROALBUMINURIA, WITHOUT LONG-TERM CURRENT USE OF INSULIN (HCC): ICD-10-CM

## 2020-04-29 DIAGNOSIS — O22.30 DVT (DEEP VEIN THROMBOSIS) IN PREGNANCY: ICD-10-CM

## 2020-04-29 DIAGNOSIS — E78.2 MIXED HYPERLIPIDEMIA: ICD-10-CM

## 2020-04-29 DIAGNOSIS — E11.29 CONTROLLED TYPE 2 DIABETES MELLITUS WITH MICROALBUMINURIA, WITHOUT LONG-TERM CURRENT USE OF INSULIN (HCC): ICD-10-CM

## 2020-04-29 RX ORDER — METFORMIN HYDROCHLORIDE 500 MG/1
TABLET, EXTENDED RELEASE ORAL
Qty: 30 TABLET | Refills: 0 | Status: SHIPPED | OUTPATIENT
Start: 2020-04-29 | End: 2020-06-11

## 2020-04-29 RX ORDER — RIVAROXABAN 20 MG/1
TABLET, FILM COATED ORAL
Qty: 90 TABLET | Refills: 0 | Status: SHIPPED | OUTPATIENT
Start: 2020-04-29 | End: 2020-08-07 | Stop reason: SDUPTHER

## 2020-04-29 RX ORDER — ATORVASTATIN CALCIUM 40 MG/1
TABLET, FILM COATED ORAL
Qty: 90 TABLET | Refills: 0 | Status: SHIPPED | OUTPATIENT
Start: 2020-04-29 | End: 2020-08-07 | Stop reason: SDUPTHER

## 2020-05-30 DIAGNOSIS — I10 ESSENTIAL HYPERTENSION: ICD-10-CM

## 2020-06-01 RX ORDER — CARVEDILOL 12.5 MG/1
TABLET ORAL
Qty: 60 TABLET | Refills: 0 | Status: SHIPPED | OUTPATIENT
Start: 2020-06-01 | End: 2020-07-01

## 2020-06-05 ENCOUNTER — OFFICE VISIT (OUTPATIENT)
Dept: FAMILY MEDICINE CLINIC | Facility: CLINIC | Age: 55
End: 2020-06-05
Payer: COMMERCIAL

## 2020-06-05 VITALS
SYSTOLIC BLOOD PRESSURE: 140 MMHG | OXYGEN SATURATION: 96 % | HEIGHT: 71 IN | WEIGHT: 265.6 LBS | BODY MASS INDEX: 37.18 KG/M2 | DIASTOLIC BLOOD PRESSURE: 88 MMHG | TEMPERATURE: 96.7 F | HEART RATE: 54 BPM

## 2020-06-05 DIAGNOSIS — G89.29 CHRONIC MIDLINE LOW BACK PAIN WITH RIGHT-SIDED SCIATICA: ICD-10-CM

## 2020-06-05 DIAGNOSIS — M54.41 CHRONIC MIDLINE LOW BACK PAIN WITH RIGHT-SIDED SCIATICA: ICD-10-CM

## 2020-06-05 DIAGNOSIS — Z98.1 HISTORY OF SPINAL FUSION: ICD-10-CM

## 2020-06-05 DIAGNOSIS — Z13.31 DEPRESSION SCREENING NEGATIVE: ICD-10-CM

## 2020-06-05 DIAGNOSIS — E11.29 CONTROLLED TYPE 2 DIABETES MELLITUS WITH MICROALBUMINURIA, WITHOUT LONG-TERM CURRENT USE OF INSULIN (HCC): Primary | ICD-10-CM

## 2020-06-05 DIAGNOSIS — R80.9 CONTROLLED TYPE 2 DIABETES MELLITUS WITH MICROALBUMINURIA, WITHOUT LONG-TERM CURRENT USE OF INSULIN (HCC): Primary | ICD-10-CM

## 2020-06-05 DIAGNOSIS — I10 ESSENTIAL HYPERTENSION: ICD-10-CM

## 2020-06-05 DIAGNOSIS — E03.9 HYPOTHYROIDISM (ACQUIRED): ICD-10-CM

## 2020-06-05 PROBLEM — M87.00 AVASCULAR NECROSIS OF BONE (HCC): Status: ACTIVE | Noted: 2020-06-05

## 2020-06-05 PROBLEM — M10.9 GOUT: Status: ACTIVE | Noted: 2020-06-05

## 2020-06-05 PROBLEM — R19.5 POSITIVE FIT (FECAL IMMUNOCHEMICAL TEST): Status: RESOLVED | Noted: 2018-10-16 | Resolved: 2020-06-05

## 2020-06-05 PROBLEM — Z98.890 POSTOPERATIVE STATE: Status: RESOLVED | Noted: 2020-03-19 | Resolved: 2020-06-05

## 2020-06-05 PROCEDURE — 99213 OFFICE O/P EST LOW 20 MIN: CPT | Performed by: PHYSICIAN ASSISTANT

## 2020-06-05 PROCEDURE — 3008F BODY MASS INDEX DOCD: CPT | Performed by: PHYSICIAN ASSISTANT

## 2020-06-05 PROCEDURE — 1036F TOBACCO NON-USER: CPT | Performed by: PHYSICIAN ASSISTANT

## 2020-06-05 PROCEDURE — 3080F DIAST BP >= 90 MM HG: CPT | Performed by: PHYSICIAN ASSISTANT

## 2020-06-05 PROCEDURE — 3060F POS MICROALBUMINURIA REV: CPT | Performed by: PHYSICIAN ASSISTANT

## 2020-06-05 PROCEDURE — 3044F HG A1C LEVEL LT 7.0%: CPT | Performed by: PHYSICIAN ASSISTANT

## 2020-06-05 PROCEDURE — 2022F DILAT RTA XM EVC RTNOPTHY: CPT | Performed by: PHYSICIAN ASSISTANT

## 2020-06-05 PROCEDURE — 3066F NEPHROPATHY DOC TX: CPT | Performed by: PHYSICIAN ASSISTANT

## 2020-06-05 PROCEDURE — 3077F SYST BP >= 140 MM HG: CPT | Performed by: PHYSICIAN ASSISTANT

## 2020-06-11 ENCOUNTER — HOSPITAL ENCOUNTER (OUTPATIENT)
Dept: MRI IMAGING | Facility: HOSPITAL | Age: 55
Discharge: HOME | End: 2020-06-11
Payer: COMMERCIAL

## 2020-06-11 DIAGNOSIS — G89.29 CHRONIC MIDLINE LOW BACK PAIN WITH RIGHT-SIDED SCIATICA: ICD-10-CM

## 2020-06-11 DIAGNOSIS — R80.9 CONTROLLED TYPE 2 DIABETES MELLITUS WITH MICROALBUMINURIA, WITHOUT LONG-TERM CURRENT USE OF INSULIN (HCC): ICD-10-CM

## 2020-06-11 DIAGNOSIS — E11.29 CONTROLLED TYPE 2 DIABETES MELLITUS WITH MICROALBUMINURIA, WITHOUT LONG-TERM CURRENT USE OF INSULIN (HCC): ICD-10-CM

## 2020-06-11 DIAGNOSIS — Z98.1 HISTORY OF SPINAL FUSION: ICD-10-CM

## 2020-06-11 DIAGNOSIS — M54.41 CHRONIC MIDLINE LOW BACK PAIN WITH RIGHT-SIDED SCIATICA: ICD-10-CM

## 2020-06-11 PROCEDURE — 72148 MRI LUMBAR SPINE W/O DYE: CPT

## 2020-06-11 RX ORDER — METFORMIN HYDROCHLORIDE 500 MG/1
TABLET, EXTENDED RELEASE ORAL
Qty: 30 TABLET | Refills: 0 | Status: SHIPPED | OUTPATIENT
Start: 2020-06-11 | End: 2020-06-16 | Stop reason: SDUPTHER

## 2020-06-16 DIAGNOSIS — R80.9 CONTROLLED TYPE 2 DIABETES MELLITUS WITH MICROALBUMINURIA, WITHOUT LONG-TERM CURRENT USE OF INSULIN (HCC): ICD-10-CM

## 2020-06-16 DIAGNOSIS — E11.29 CONTROLLED TYPE 2 DIABETES MELLITUS WITH MICROALBUMINURIA, WITHOUT LONG-TERM CURRENT USE OF INSULIN (HCC): ICD-10-CM

## 2020-06-16 RX ORDER — METFORMIN HYDROCHLORIDE 500 MG/1
500 TABLET, EXTENDED RELEASE ORAL
Qty: 90 TABLET | Refills: 1 | Status: SHIPPED | OUTPATIENT
Start: 2020-06-16 | End: 2021-01-14

## 2020-06-23 DIAGNOSIS — E03.9 ACQUIRED HYPOTHYROIDISM: ICD-10-CM

## 2020-06-23 DIAGNOSIS — E79.0 HYPERURICEMIA: ICD-10-CM

## 2020-06-23 RX ORDER — LEVOTHYROXINE SODIUM 25 UG/1
TABLET ORAL
Qty: 30 TABLET | Refills: 0 | Status: SHIPPED | OUTPATIENT
Start: 2020-06-23 | End: 2020-08-07 | Stop reason: SDUPTHER

## 2020-06-23 RX ORDER — ALLOPURINOL 300 MG/1
TABLET ORAL
Qty: 90 TABLET | Refills: 0 | Status: SHIPPED | OUTPATIENT
Start: 2020-06-23 | End: 2020-09-28

## 2020-07-01 ENCOUNTER — APPOINTMENT (OUTPATIENT)
Dept: LAB | Facility: MEDICAL CENTER | Age: 55
End: 2020-07-01
Payer: COMMERCIAL

## 2020-07-01 DIAGNOSIS — I10 ESSENTIAL HYPERTENSION: ICD-10-CM

## 2020-07-01 DIAGNOSIS — E03.9 HYPOTHYROIDISM (ACQUIRED): ICD-10-CM

## 2020-07-01 DIAGNOSIS — E11.29 CONTROLLED TYPE 2 DIABETES MELLITUS WITH MICROALBUMINURIA, WITHOUT LONG-TERM CURRENT USE OF INSULIN (HCC): ICD-10-CM

## 2020-07-01 DIAGNOSIS — R80.9 CONTROLLED TYPE 2 DIABETES MELLITUS WITH MICROALBUMINURIA, WITHOUT LONG-TERM CURRENT USE OF INSULIN (HCC): ICD-10-CM

## 2020-07-01 LAB
ALBUMIN SERPL BCP-MCNC: 3.5 G/DL (ref 3.5–5)
ALP SERPL-CCNC: 67 U/L (ref 46–116)
ALT SERPL W P-5'-P-CCNC: 27 U/L (ref 12–78)
ANION GAP SERPL CALCULATED.3IONS-SCNC: 8 MMOL/L (ref 4–13)
AST SERPL W P-5'-P-CCNC: 17 U/L (ref 5–45)
BILIRUB SERPL-MCNC: 0.37 MG/DL (ref 0.2–1)
BUN SERPL-MCNC: 33 MG/DL (ref 5–25)
CALCIUM SERPL-MCNC: 9.7 MG/DL (ref 8.3–10.1)
CHLORIDE SERPL-SCNC: 108 MMOL/L (ref 100–108)
CHOLEST SERPL-MCNC: 120 MG/DL (ref 50–200)
CO2 SERPL-SCNC: 24 MMOL/L (ref 21–32)
CREAT SERPL-MCNC: 1.53 MG/DL (ref 0.6–1.3)
CREAT UR-MCNC: 43.1 MG/DL
ERYTHROCYTE [DISTWIDTH] IN BLOOD BY AUTOMATED COUNT: 14.3 % (ref 11.6–15.1)
EST. AVERAGE GLUCOSE BLD GHB EST-MCNC: 128 MG/DL
GFR SERPL CREATININE-BSD FRML MDRD: 50 ML/MIN/1.73SQ M
GLUCOSE P FAST SERPL-MCNC: 127 MG/DL (ref 65–99)
HBA1C MFR BLD: 6.1 %
HCT VFR BLD AUTO: 49.2 % (ref 36.5–49.3)
HDLC SERPL-MCNC: 52 MG/DL
HGB BLD-MCNC: 15.7 G/DL (ref 12–17)
LDLC SERPL CALC-MCNC: 47 MG/DL (ref 0–100)
MCH RBC QN AUTO: 30.4 PG (ref 26.8–34.3)
MCHC RBC AUTO-ENTMCNC: 31.9 G/DL (ref 31.4–37.4)
MCV RBC AUTO: 95 FL (ref 82–98)
MICROALBUMIN UR-MCNC: 323 MG/L (ref 0–20)
MICROALBUMIN/CREAT 24H UR: 749 MG/G CREATININE (ref 0–30)
PLATELET # BLD AUTO: 180 THOUSANDS/UL (ref 149–390)
PMV BLD AUTO: 10.3 FL (ref 8.9–12.7)
POTASSIUM SERPL-SCNC: 4.7 MMOL/L (ref 3.5–5.3)
PROT SERPL-MCNC: 7 G/DL (ref 6.4–8.2)
RBC # BLD AUTO: 5.16 MILLION/UL (ref 3.88–5.62)
SODIUM SERPL-SCNC: 140 MMOL/L (ref 136–145)
TRIGL SERPL-MCNC: 107 MG/DL
TSH SERPL DL<=0.05 MIU/L-ACNC: 3.08 UIU/ML (ref 0.36–3.74)
WBC # BLD AUTO: 6.36 THOUSAND/UL (ref 4.31–10.16)

## 2020-07-01 PROCEDURE — 3044F HG A1C LEVEL LT 7.0%: CPT | Performed by: PHYSICIAN ASSISTANT

## 2020-07-01 PROCEDURE — 85027 COMPLETE CBC AUTOMATED: CPT

## 2020-07-01 PROCEDURE — 84443 ASSAY THYROID STIM HORMONE: CPT

## 2020-07-01 PROCEDURE — 80053 COMPREHEN METABOLIC PANEL: CPT

## 2020-07-01 PROCEDURE — 82570 ASSAY OF URINE CREATININE: CPT | Performed by: PHYSICIAN ASSISTANT

## 2020-07-01 PROCEDURE — 80061 LIPID PANEL: CPT

## 2020-07-01 PROCEDURE — 3062F POS MACROALBUMINURIA REV: CPT | Performed by: PHYSICIAN ASSISTANT

## 2020-07-01 PROCEDURE — 82043 UR ALBUMIN QUANTITATIVE: CPT | Performed by: PHYSICIAN ASSISTANT

## 2020-07-01 PROCEDURE — 36415 COLL VENOUS BLD VENIPUNCTURE: CPT

## 2020-07-01 PROCEDURE — 83036 HEMOGLOBIN GLYCOSYLATED A1C: CPT

## 2020-07-01 RX ORDER — CARVEDILOL 12.5 MG/1
TABLET ORAL
Qty: 60 TABLET | Refills: 0 | Status: SHIPPED | OUTPATIENT
Start: 2020-07-01 | End: 2020-08-12

## 2020-07-28 DIAGNOSIS — E11.42 DIABETIC PERIPHERAL NEUROPATHY (HCC): ICD-10-CM

## 2020-07-28 RX ORDER — GABAPENTIN 600 MG/1
1200 TABLET ORAL 3 TIMES DAILY
Qty: 180 TABLET | Refills: 5 | Status: SHIPPED | OUTPATIENT
Start: 2020-07-28 | End: 2020-11-30

## 2020-08-07 DIAGNOSIS — O22.30 DVT (DEEP VEIN THROMBOSIS) IN PREGNANCY: ICD-10-CM

## 2020-08-07 DIAGNOSIS — E78.2 MIXED HYPERLIPIDEMIA: ICD-10-CM

## 2020-08-07 DIAGNOSIS — E03.9 ACQUIRED HYPOTHYROIDISM: ICD-10-CM

## 2020-08-07 RX ORDER — LEVOTHYROXINE SODIUM 0.15 MG/1
150 TABLET ORAL
Qty: 90 TABLET | Refills: 1 | Status: SHIPPED | OUTPATIENT
Start: 2020-08-07 | End: 2021-02-03 | Stop reason: SDUPTHER

## 2020-08-07 RX ORDER — ATORVASTATIN CALCIUM 40 MG/1
40 TABLET, FILM COATED ORAL DAILY
Qty: 90 TABLET | Refills: 1 | Status: SHIPPED | OUTPATIENT
Start: 2020-08-07 | End: 2021-02-22

## 2020-08-07 RX ORDER — LEVOTHYROXINE SODIUM 0.03 MG/1
25 TABLET ORAL DAILY
Qty: 90 TABLET | Refills: 1 | Status: SHIPPED | OUTPATIENT
Start: 2020-08-07 | End: 2021-02-03

## 2020-08-12 DIAGNOSIS — I10 ESSENTIAL HYPERTENSION: ICD-10-CM

## 2020-08-12 RX ORDER — CARVEDILOL 12.5 MG/1
TABLET ORAL
Qty: 60 TABLET | Refills: 0 | Status: SHIPPED | OUTPATIENT
Start: 2020-08-12 | End: 2020-09-08

## 2020-08-16 DIAGNOSIS — E78.2 MIXED HYPERLIPIDEMIA: ICD-10-CM

## 2020-08-17 RX ORDER — EZETIMIBE 10 MG/1
TABLET ORAL
Qty: 30 TABLET | Refills: 0 | Status: SHIPPED | OUTPATIENT
Start: 2020-08-17 | End: 2020-09-28

## 2020-09-07 DIAGNOSIS — I10 ESSENTIAL HYPERTENSION: ICD-10-CM

## 2020-09-07 DIAGNOSIS — E11.9 TYPE 2 DIABETES MELLITUS WITHOUT COMPLICATION, WITHOUT LONG-TERM CURRENT USE OF INSULIN (HCC): ICD-10-CM

## 2020-09-08 ENCOUNTER — OFFICE VISIT (OUTPATIENT)
Dept: FAMILY MEDICINE CLINIC | Facility: CLINIC | Age: 55
End: 2020-09-08
Payer: COMMERCIAL

## 2020-09-08 VITALS
OXYGEN SATURATION: 96 % | DIASTOLIC BLOOD PRESSURE: 84 MMHG | SYSTOLIC BLOOD PRESSURE: 128 MMHG | HEIGHT: 71 IN | TEMPERATURE: 96.4 F | HEART RATE: 96 BPM | BODY MASS INDEX: 36.37 KG/M2 | WEIGHT: 259.8 LBS

## 2020-09-08 DIAGNOSIS — I10 ESSENTIAL HYPERTENSION: Primary | ICD-10-CM

## 2020-09-08 DIAGNOSIS — E03.9 HYPOTHYROIDISM (ACQUIRED): ICD-10-CM

## 2020-09-08 DIAGNOSIS — E11.42 DIABETIC PERIPHERAL NEUROPATHY (HCC): ICD-10-CM

## 2020-09-08 DIAGNOSIS — R80.9 CONTROLLED TYPE 2 DIABETES MELLITUS WITH MICROALBUMINURIA, WITHOUT LONG-TERM CURRENT USE OF INSULIN (HCC): ICD-10-CM

## 2020-09-08 DIAGNOSIS — E11.29 CONTROLLED TYPE 2 DIABETES MELLITUS WITH MICROALBUMINURIA, WITHOUT LONG-TERM CURRENT USE OF INSULIN (HCC): ICD-10-CM

## 2020-09-08 PROCEDURE — 99213 OFFICE O/P EST LOW 20 MIN: CPT | Performed by: PHYSICIAN ASSISTANT

## 2020-09-08 PROCEDURE — 4010F ACE/ARB THERAPY RXD/TAKEN: CPT | Performed by: INTERNAL MEDICINE

## 2020-09-08 PROCEDURE — 3725F SCREEN DEPRESSION PERFORMED: CPT | Performed by: PHYSICIAN ASSISTANT

## 2020-09-08 PROCEDURE — 3066F NEPHROPATHY DOC TX: CPT | Performed by: INTERNAL MEDICINE

## 2020-09-08 RX ORDER — LISINOPRIL 5 MG/1
TABLET ORAL
Qty: 90 TABLET | Refills: 0 | Status: SHIPPED | OUTPATIENT
Start: 2020-09-08 | End: 2020-12-14

## 2020-09-08 RX ORDER — CARVEDILOL 12.5 MG/1
TABLET ORAL
Qty: 60 TABLET | Refills: 0 | Status: SHIPPED | OUTPATIENT
Start: 2020-09-08 | End: 2020-10-19

## 2020-09-08 NOTE — PROGRESS NOTES
Assessment/Plan:    Problem List Items Addressed This Visit        Endocrine    Controlled type 2 diabetes mellitus with microalbuminuria, without long-term current use of insulin (HCC)    Relevant Orders    CBC    Hemoglobin A1C    Microalbumin / creatinine urine ratio    Diabetic peripheral neuropathy ([facility] [address] )    Relevant Orders    Microalbumin / creatinine urine ratio    Lipid Panel with Direct LDL reflex    Hypothyroidism (acquired)    Relevant Orders    TSH, 3rd generation with Free T4 reflex       Cardiovascular and Mediastinum    Essential hypertension - Primary    Relevant Orders    CBC    Comprehensive metabolic panel           Diagnoses and all orders for this visit:    Essential hypertension  -     CBC; Future  -     Comprehensive metabolic panel; Future    Diabetic peripheral neuropathy (HCC)  -     Microalbumin / creatinine urine ratio  -     Lipid Panel with Direct LDL reflex; Future    Hypothyroidism (acquired)  -     TSH, 3rd generation with Free T4 reflex; Future    Controlled type 2 diabetes mellitus with microalbuminuria, without long-term current use of insulin (HCC)  -     CBC; Future  -     Hemoglobin A1C; Future  -     Microalbumin / creatinine urine ratio        Follow up in 3 months or sooner PRN  Subjective:      Patient ID: Gage Finney is a 54 y o  male  Carlos Estrada is here today for DM follow up  He complains of worsening neuropathy in bilateral feet, mostly in the balls of feet and than to toes 3-5  He is taking 3600 mg of gabapentin daily  He is considering tapering off of gabapentin to try Lyrica again, but the Lyrica did not work in the past  He does see Dr Maria Isabel Quan and is also considering asking him to Rx Tramadol     He is due for eye exam at next OV along with foot exam        The following portions of the patient's history were reviewed and updated as appropriate:   He has a past medical history of Arthritis, Chronic cholecystitis, Diabetes mellitus ([facility] [address] ), DVT (deep venous thrombosis) ([facility] [address] ), Gout, History of pulmonary embolism, Hyperlipidemia, Hypertension, Hypothyroidism, Lumbar back pain, MTHFR mutation ([facility] ), Neuropathy, Scab, Sleep apnea, Tarsal tunnel syndrome, right, Tinnitus, Wears glasses, and Wears glasses  ,  does not have any pertinent problems on file  ,   has a past surgical history that includes Cholecystectomy (03/26/2015); Appendectomy; Spinal fusion; Tonsillectomy (10/16/2013); Nasal septum surgery (10/16/2013); Uvulopalatopharyngoplasty; [place] tooth extraction; Bunionectomy (Right, 10/7/2016); Arthrodesis; Knee arthroscopy w/ meniscal repair; Uvulectomy (10/16/2013); pr tarsal tunnel release (Right, 6/25/2018); pr colonoscopy flx dx w/collj spec when pfrmd (N/A, 11/23/2018); Colonoscopy; Back surgery; Knee arthroscopy; and pr tarsal tunnel release (Left, 3/13/2020)  ,  family history includes Aneurysm in his brother and mother; Coronary artery disease in his father; Hypertension in his father  ,   reports that he has never smoked  He has never used smokeless tobacco  He reports current alcohol use  He reports that he does not use drugs  ,  has No Known Allergies     Current Outpatient Medications   Medication Sig Dispense Refill    allopurinol (ZYLOPRIM) 300 mg tablet Take 1 tablet by mouth once daily 90 tablet 0    atorvastatin (LIPITOR) 40 mg tablet Take 1 tablet (40 mg total) by mouth daily 90 tablet 1    carvedilol (COREG) 12 5 mg tablet TAKE 1 TABLET BY MOUTH TWICE DAILY WITH MEALS 60 tablet 0    ezetimibe (ZETIA) 10 mg tablet Take 1 tablet by mouth once daily 30 tablet 0    furosemide (LASIX) 40 mg tablet Take 1 tablet (40 mg total) by mouth daily 90 tablet 3    gabapentin (NEURONTIN) 600 MG tablet Take 2 tablets (1,200 mg total) by mouth 3 (three) times a day 180 tablet 5    levothyroxine (Euthyrox) 25 mcg tablet Take 1 tablet (25 mcg total) by mouth daily Taken with Levothyroxine 150 mcg to equal 175mcg daily 90 tablet 1    levothyroxine 150 mcg tablet Take 1 tablet (150 mcg total) by mouth daily in the early morning Taken with Levothyroxine 25mcg to equal 175mcg 90 tablet 1    lisinopril (ZESTRIL) 5 mg tablet Take 1 tablet by mouth once daily 90 tablet 0    metFORMIN (GLUCOPHAGE-XR) 500 mg 24 hr tablet Take 1 tablet (500 mg total) by mouth daily at bedtime 90 tablet 1    Multiple Vitamins-Minerals (MENS MULTIVITAMIN PLUS) TABS Take 1 tablet by mouth daily      rivaroxaban (Xarelto) 20 mg tablet Take 1 tablet (20 mg total) by mouth daily with breakfast 90 tablet 1    spironolactone (ALDACTONE) 25 mg tablet Take 1 tablet by mouth once daily 90 tablet 1    amitriptyline (ELAVIL) 25 mg tablet TAKE 1/2 TABLET BY MOUTH 1 HOUR BEFORE BEDTIME FOR 2 WEEKS AND THEN INCREASE TO 1 FULL TABLET AT BEDTIME (Patient not taking: Reported on 6/5/2020) 30 tablet 0    sildenafil (VIAGRA) 100 mg tablet Take 1 tablet by mouth as needed       No current facility-administered medications for this visit  Review of Systems   Constitutional: Negative for activity change, appetite change, chills, diaphoresis, fatigue, fever and unexpected weight change  HENT: Negative for congestion, ear pain, postnasal drip, rhinorrhea, sinus pressure, sinus pain, sneezing, sore throat, tinnitus and voice change  Eyes: Negative for pain, redness and visual disturbance  Respiratory: Negative for cough, chest tightness, shortness of breath and wheezing  Cardiovascular: Negative for chest pain, palpitations and leg swelling  Gastrointestinal: Negative for abdominal pain, blood in stool, constipation, diarrhea, nausea and vomiting  Genitourinary: Negative for difficulty urinating, dysuria, frequency, hematuria and urgency  Musculoskeletal: Negative for arthralgias, back pain, gait problem, joint swelling, myalgias, neck pain and neck stiffness  Skin: Negative for color change, pallor, rash and wound     Neurological: Positive for numbness (has "dulled sensation" in his hands, drops things at times)  Negative for dizziness, tremors, weakness, light-headedness and headaches  Pain in feet   Psychiatric/Behavioral: Negative for dysphoric mood, self-injury, sleep disturbance and suicidal ideas  The patient is not nervous/anxious  Objective:  Vitals:    09/08/20 0711   BP: 128/84   Pulse: 96   Temp: (!) 96 4 °F (35 8 °C)   SpO2: 96%   Weight: 118 kg (259 lb 12 8 oz)   Height: 5' 11" (1 803 m)     Body mass index is 36 23 kg/m²  Physical Exam  Vitals signs reviewed  Constitutional:       General: He is not in acute distress  Appearance: He is well-developed  He is not diaphoretic  HENT:      Head: Normocephalic and atraumatic  Right Ear: Hearing, tympanic membrane, ear canal and external ear normal       Left Ear: Hearing, tympanic membrane, ear canal and external ear normal       Mouth/Throat:      Pharynx: Uvula midline  No oropharyngeal exudate  Eyes:      General: No scleral icterus  Right eye: No discharge  Left eye: No discharge  Conjunctiva/sclera: Conjunctivae normal    Neck:      Musculoskeletal: Neck supple  Thyroid: No thyromegaly  Vascular: No carotid bruit  Cardiovascular:      Rate and Rhythm: Normal rate and regular rhythm  Heart sounds: Normal heart sounds  No murmur  Pulmonary:      Effort: Pulmonary effort is normal  No respiratory distress  Breath sounds: Normal breath sounds  No wheezing  Abdominal:      General: Bowel sounds are normal  There is no distension  Palpations: Abdomen is soft  There is no mass  Tenderness: There is no abdominal tenderness  There is no guarding or rebound  Musculoskeletal: Normal range of motion  General: No tenderness  Lymphadenopathy:      Cervical: No cervical adenopathy  Skin:     General: Skin is warm and dry  Findings: No erythema or rash  Neurological:      Mental Status: He is alert and oriented to person, place, and time  Psychiatric:         Behavior: Behavior normal          Thought Content:  Thought content normal          Judgment: Judgment normal

## 2020-09-10 DIAGNOSIS — N52.9 ERECTILE DYSFUNCTION, UNSPECIFIED ERECTILE DYSFUNCTION TYPE: Primary | ICD-10-CM

## 2020-09-10 RX ORDER — SILDENAFIL 100 MG/1
100 TABLET, FILM COATED ORAL AS NEEDED
Qty: 30 TABLET | Refills: 0 | Status: SHIPPED | OUTPATIENT
Start: 2020-09-10

## 2020-09-28 DIAGNOSIS — E26.9 HYPERALDOSTERONISM (HCC): ICD-10-CM

## 2020-09-28 DIAGNOSIS — E79.0 HYPERURICEMIA: ICD-10-CM

## 2020-09-28 DIAGNOSIS — E78.2 MIXED HYPERLIPIDEMIA: ICD-10-CM

## 2020-09-28 RX ORDER — SPIRONOLACTONE 25 MG/1
TABLET ORAL
Qty: 90 TABLET | Refills: 1 | Status: SHIPPED | OUTPATIENT
Start: 2020-09-28 | End: 2021-07-19

## 2020-09-28 RX ORDER — ALLOPURINOL 300 MG/1
TABLET ORAL
Qty: 90 TABLET | Refills: 0 | Status: SHIPPED | OUTPATIENT
Start: 2020-09-28 | End: 2021-01-14

## 2020-09-28 RX ORDER — EZETIMIBE 10 MG/1
TABLET ORAL
Qty: 30 TABLET | Refills: 0 | Status: SHIPPED | OUTPATIENT
Start: 2020-09-28 | End: 2020-10-21

## 2020-10-12 ENCOUNTER — LAB (OUTPATIENT)
Dept: LAB | Facility: MEDICAL CENTER | Age: 55
End: 2020-10-12
Payer: COMMERCIAL

## 2020-10-12 DIAGNOSIS — E11.29 CONTROLLED TYPE 2 DIABETES MELLITUS WITH MICROALBUMINURIA, WITHOUT LONG-TERM CURRENT USE OF INSULIN (HCC): ICD-10-CM

## 2020-10-12 DIAGNOSIS — E03.9 HYPOTHYROIDISM (ACQUIRED): ICD-10-CM

## 2020-10-12 DIAGNOSIS — E11.42 DIABETIC PERIPHERAL NEUROPATHY (HCC): ICD-10-CM

## 2020-10-12 DIAGNOSIS — R80.9 CONTROLLED TYPE 2 DIABETES MELLITUS WITH MICROALBUMINURIA, WITHOUT LONG-TERM CURRENT USE OF INSULIN (HCC): ICD-10-CM

## 2020-10-12 DIAGNOSIS — I10 ESSENTIAL HYPERTENSION: ICD-10-CM

## 2020-10-12 LAB
ALBUMIN SERPL BCP-MCNC: 3.5 G/DL (ref 3.5–5)
ALP SERPL-CCNC: 78 U/L (ref 46–116)
ALT SERPL W P-5'-P-CCNC: 27 U/L (ref 12–78)
ANION GAP SERPL CALCULATED.3IONS-SCNC: 3 MMOL/L (ref 4–13)
AST SERPL W P-5'-P-CCNC: 10 U/L (ref 5–45)
BILIRUB SERPL-MCNC: 0.29 MG/DL (ref 0.2–1)
BUN SERPL-MCNC: 30 MG/DL (ref 5–25)
CALCIUM SERPL-MCNC: 9.1 MG/DL (ref 8.3–10.1)
CHLORIDE SERPL-SCNC: 110 MMOL/L (ref 100–108)
CHOLEST SERPL-MCNC: 120 MG/DL (ref 50–200)
CO2 SERPL-SCNC: 26 MMOL/L (ref 21–32)
CREAT SERPL-MCNC: 1.26 MG/DL (ref 0.6–1.3)
CREAT UR-MCNC: 83.8 MG/DL
ERYTHROCYTE [DISTWIDTH] IN BLOOD BY AUTOMATED COUNT: 14.4 % (ref 11.6–15.1)
EST. AVERAGE GLUCOSE BLD GHB EST-MCNC: 134 MG/DL
GFR SERPL CREATININE-BSD FRML MDRD: 64 ML/MIN/1.73SQ M
GLUCOSE P FAST SERPL-MCNC: 119 MG/DL (ref 65–99)
HBA1C MFR BLD: 6.3 %
HCT VFR BLD AUTO: 51.4 % (ref 36.5–49.3)
HDLC SERPL-MCNC: 58 MG/DL
HGB BLD-MCNC: 15.9 G/DL (ref 12–17)
LDLC SERPL CALC-MCNC: 41 MG/DL (ref 0–100)
MCH RBC QN AUTO: 30.2 PG (ref 26.8–34.3)
MCHC RBC AUTO-ENTMCNC: 30.9 G/DL (ref 31.4–37.4)
MCV RBC AUTO: 98 FL (ref 82–98)
MICROALBUMIN UR-MCNC: 1150 MG/L (ref 0–20)
MICROALBUMIN/CREAT 24H UR: 1372 MG/G CREATININE (ref 0–30)
PLATELET # BLD AUTO: 188 THOUSANDS/UL (ref 149–390)
PMV BLD AUTO: 10.4 FL (ref 8.9–12.7)
POTASSIUM SERPL-SCNC: 4.7 MMOL/L (ref 3.5–5.3)
PROT SERPL-MCNC: 7.2 G/DL (ref 6.4–8.2)
RBC # BLD AUTO: 5.26 MILLION/UL (ref 3.88–5.62)
SODIUM SERPL-SCNC: 139 MMOL/L (ref 136–145)
TRIGL SERPL-MCNC: 107 MG/DL
TSH SERPL DL<=0.05 MIU/L-ACNC: 2.15 UIU/ML (ref 0.36–3.74)
WBC # BLD AUTO: 6.38 THOUSAND/UL (ref 4.31–10.16)

## 2020-10-12 PROCEDURE — 83036 HEMOGLOBIN GLYCOSYLATED A1C: CPT

## 2020-10-12 PROCEDURE — 82043 UR ALBUMIN QUANTITATIVE: CPT | Performed by: PHYSICIAN ASSISTANT

## 2020-10-12 PROCEDURE — 36415 COLL VENOUS BLD VENIPUNCTURE: CPT

## 2020-10-12 PROCEDURE — 3062F POS MACROALBUMINURIA REV: CPT | Performed by: INTERNAL MEDICINE

## 2020-10-12 PROCEDURE — 85027 COMPLETE CBC AUTOMATED: CPT

## 2020-10-12 PROCEDURE — 3044F HG A1C LEVEL LT 7.0%: CPT | Performed by: INTERNAL MEDICINE

## 2020-10-12 PROCEDURE — 80061 LIPID PANEL: CPT

## 2020-10-12 PROCEDURE — 82570 ASSAY OF URINE CREATININE: CPT | Performed by: PHYSICIAN ASSISTANT

## 2020-10-12 PROCEDURE — 84443 ASSAY THYROID STIM HORMONE: CPT

## 2020-10-12 PROCEDURE — 80053 COMPREHEN METABOLIC PANEL: CPT

## 2020-10-13 DIAGNOSIS — E11.9 TYPE 2 DIABETES MELLITUS WITHOUT COMPLICATION, WITHOUT LONG-TERM CURRENT USE OF INSULIN (HCC): ICD-10-CM

## 2020-10-13 DIAGNOSIS — I10 ESSENTIAL HYPERTENSION: ICD-10-CM

## 2020-10-13 DIAGNOSIS — R80.9 PROTEINURIA, UNSPECIFIED TYPE: Primary | ICD-10-CM

## 2020-10-13 DIAGNOSIS — E78.2 MIXED HYPERLIPIDEMIA: ICD-10-CM

## 2020-10-15 ENCOUNTER — OFFICE VISIT (OUTPATIENT)
Dept: NEPHROLOGY | Facility: CLINIC | Age: 55
End: 2020-10-15
Payer: COMMERCIAL

## 2020-10-15 VITALS
HEIGHT: 72 IN | SYSTOLIC BLOOD PRESSURE: 112 MMHG | HEART RATE: 52 BPM | BODY MASS INDEX: 34.95 KG/M2 | WEIGHT: 258 LBS | TEMPERATURE: 97.8 F | OXYGEN SATURATION: 96 % | DIASTOLIC BLOOD PRESSURE: 82 MMHG

## 2020-10-15 DIAGNOSIS — N18.31 STAGE 3A CHRONIC KIDNEY DISEASE (HCC): Primary | ICD-10-CM

## 2020-10-15 DIAGNOSIS — E26.9 HYPERALDOSTERONISM (HCC): ICD-10-CM

## 2020-10-15 DIAGNOSIS — E11.21 DIABETIC NEPHROPATHY ASSOCIATED WITH TYPE 2 DIABETES MELLITUS (HCC): ICD-10-CM

## 2020-10-15 DIAGNOSIS — I10 ESSENTIAL HYPERTENSION: ICD-10-CM

## 2020-10-15 DIAGNOSIS — R80.9 PROTEINURIA, UNSPECIFIED TYPE: ICD-10-CM

## 2020-10-15 PROCEDURE — 1036F TOBACCO NON-USER: CPT | Performed by: INTERNAL MEDICINE

## 2020-10-15 PROCEDURE — 3079F DIAST BP 80-89 MM HG: CPT | Performed by: INTERNAL MEDICINE

## 2020-10-15 PROCEDURE — 99244 OFF/OP CNSLTJ NEW/EST MOD 40: CPT | Performed by: INTERNAL MEDICINE

## 2020-10-17 DIAGNOSIS — I10 ESSENTIAL HYPERTENSION: ICD-10-CM

## 2020-10-19 RX ORDER — CARVEDILOL 12.5 MG/1
TABLET ORAL
Qty: 60 TABLET | Refills: 0 | Status: SHIPPED | OUTPATIENT
Start: 2020-10-19 | End: 2020-10-21

## 2020-10-21 DIAGNOSIS — I10 ESSENTIAL HYPERTENSION: ICD-10-CM

## 2020-10-21 DIAGNOSIS — E78.2 MIXED HYPERLIPIDEMIA: ICD-10-CM

## 2020-10-21 RX ORDER — CARVEDILOL 12.5 MG/1
TABLET ORAL
Qty: 60 TABLET | Refills: 0 | Status: SHIPPED | OUTPATIENT
Start: 2020-10-21 | End: 2020-11-17

## 2020-10-21 RX ORDER — EZETIMIBE 10 MG/1
TABLET ORAL
Qty: 30 TABLET | Refills: 0 | Status: SHIPPED | OUTPATIENT
Start: 2020-10-21 | End: 2020-12-14

## 2020-11-17 DIAGNOSIS — I10 ESSENTIAL HYPERTENSION: ICD-10-CM

## 2020-11-17 RX ORDER — CARVEDILOL 12.5 MG/1
TABLET ORAL
Qty: 60 TABLET | Refills: 0 | Status: SHIPPED | OUTPATIENT
Start: 2020-11-17 | End: 2020-11-25

## 2020-11-25 DIAGNOSIS — I10 ESSENTIAL HYPERTENSION: ICD-10-CM

## 2020-11-25 RX ORDER — CARVEDILOL 12.5 MG/1
TABLET ORAL
Qty: 60 TABLET | Refills: 0 | Status: SHIPPED | OUTPATIENT
Start: 2020-11-25 | End: 2021-01-25

## 2020-11-28 DIAGNOSIS — E11.42 DIABETIC PERIPHERAL NEUROPATHY (HCC): ICD-10-CM

## 2020-11-30 RX ORDER — GABAPENTIN 600 MG/1
TABLET ORAL
Qty: 180 TABLET | Refills: 0 | Status: SHIPPED | OUTPATIENT
Start: 2020-11-30 | End: 2020-12-21

## 2020-12-09 ENCOUNTER — OFFICE VISIT (OUTPATIENT)
Dept: FAMILY MEDICINE CLINIC | Facility: CLINIC | Age: 55
End: 2020-12-09
Payer: COMMERCIAL

## 2020-12-09 VITALS
HEIGHT: 72 IN | BODY MASS INDEX: 35.49 KG/M2 | WEIGHT: 262 LBS | TEMPERATURE: 97.3 F | SYSTOLIC BLOOD PRESSURE: 110 MMHG | DIASTOLIC BLOOD PRESSURE: 78 MMHG

## 2020-12-09 DIAGNOSIS — E11.42 DIABETIC PERIPHERAL NEUROPATHY (HCC): ICD-10-CM

## 2020-12-09 DIAGNOSIS — E03.9 HYPOTHYROIDISM (ACQUIRED): ICD-10-CM

## 2020-12-09 DIAGNOSIS — E11.29 CONTROLLED TYPE 2 DIABETES MELLITUS WITH MICROALBUMINURIA, WITHOUT LONG-TERM CURRENT USE OF INSULIN (HCC): ICD-10-CM

## 2020-12-09 DIAGNOSIS — E11.9 TYPE 2 DIABETES MELLITUS WITHOUT COMPLICATION, WITHOUT LONG-TERM CURRENT USE OF INSULIN (HCC): Primary | ICD-10-CM

## 2020-12-09 DIAGNOSIS — R80.9 CONTROLLED TYPE 2 DIABETES MELLITUS WITH MICROALBUMINURIA, WITHOUT LONG-TERM CURRENT USE OF INSULIN (HCC): ICD-10-CM

## 2020-12-09 DIAGNOSIS — E11.21 DIABETIC NEPHROPATHY ASSOCIATED WITH TYPE 2 DIABETES MELLITUS (HCC): ICD-10-CM

## 2020-12-09 PROCEDURE — 99214 OFFICE O/P EST MOD 30 MIN: CPT | Performed by: FAMILY MEDICINE

## 2020-12-09 PROCEDURE — 3066F NEPHROPATHY DOC TX: CPT | Performed by: FAMILY MEDICINE

## 2020-12-09 PROCEDURE — 3078F DIAST BP <80 MM HG: CPT | Performed by: FAMILY MEDICINE

## 2020-12-09 PROCEDURE — 92250 FUNDUS PHOTOGRAPHY W/I&R: CPT | Performed by: FAMILY MEDICINE

## 2020-12-09 PROCEDURE — 1036F TOBACCO NON-USER: CPT | Performed by: FAMILY MEDICINE

## 2020-12-09 PROCEDURE — 3008F BODY MASS INDEX DOCD: CPT | Performed by: FAMILY MEDICINE

## 2020-12-09 PROCEDURE — 2025F 7 FLD RTA PHOTO W/O RTNOPTHY: CPT | Performed by: FAMILY MEDICINE

## 2020-12-09 PROCEDURE — 3074F SYST BP LT 130 MM HG: CPT | Performed by: FAMILY MEDICINE

## 2020-12-13 DIAGNOSIS — E11.9 TYPE 2 DIABETES MELLITUS WITHOUT COMPLICATION, WITHOUT LONG-TERM CURRENT USE OF INSULIN (HCC): ICD-10-CM

## 2020-12-13 DIAGNOSIS — I10 ESSENTIAL HYPERTENSION: ICD-10-CM

## 2020-12-13 DIAGNOSIS — E78.2 MIXED HYPERLIPIDEMIA: ICD-10-CM

## 2020-12-14 PROCEDURE — 4010F ACE/ARB THERAPY RXD/TAKEN: CPT | Performed by: FAMILY MEDICINE

## 2020-12-14 RX ORDER — EZETIMIBE 10 MG/1
TABLET ORAL
Qty: 30 TABLET | Refills: 0 | Status: SHIPPED | OUTPATIENT
Start: 2020-12-14 | End: 2021-01-14

## 2020-12-14 RX ORDER — LISINOPRIL 5 MG/1
TABLET ORAL
Qty: 90 TABLET | Refills: 0 | Status: SHIPPED | OUTPATIENT
Start: 2020-12-14 | End: 2021-03-29

## 2020-12-15 ENCOUNTER — TELEPHONE (OUTPATIENT)
Dept: FAMILY MEDICINE CLINIC | Facility: CLINIC | Age: 55
End: 2020-12-15

## 2020-12-15 DIAGNOSIS — B34.9 VIRAL INFECTION, UNSPECIFIED: ICD-10-CM

## 2020-12-15 DIAGNOSIS — J22 LOWER RESPIRATORY INFECTION: Primary | ICD-10-CM

## 2020-12-15 DIAGNOSIS — Z03.818 ENCOUNTER FOR OBSERVATION FOR SUSPECTED EXPOSURE TO OTHER BIOLOGICAL AGENTS RULED OUT: ICD-10-CM

## 2020-12-15 RX ORDER — AZITHROMYCIN 250 MG/1
TABLET, FILM COATED ORAL
Qty: 6 TABLET | Refills: 0 | Status: SHIPPED | OUTPATIENT
Start: 2020-12-15 | End: 2020-12-19

## 2020-12-18 DIAGNOSIS — Z03.818 ENCOUNTER FOR OBSERVATION FOR SUSPECTED EXPOSURE TO OTHER BIOLOGICAL AGENTS RULED OUT: ICD-10-CM

## 2020-12-18 DIAGNOSIS — B34.9 VIRAL INFECTION, UNSPECIFIED: ICD-10-CM

## 2020-12-18 PROCEDURE — U0003 INFECTIOUS AGENT DETECTION BY NUCLEIC ACID (DNA OR RNA); SEVERE ACUTE RESPIRATORY SYNDROME CORONAVIRUS 2 (SARS-COV-2) (CORONAVIRUS DISEASE [COVID-19]), AMPLIFIED PROBE TECHNIQUE, MAKING USE OF HIGH THROUGHPUT TECHNOLOGIES AS DESCRIBED BY CMS-2020-01-R: HCPCS | Performed by: FAMILY MEDICINE

## 2020-12-19 DIAGNOSIS — E11.42 DIABETIC PERIPHERAL NEUROPATHY (HCC): ICD-10-CM

## 2020-12-19 LAB — SARS-COV-2 RNA SPEC QL NAA+PROBE: DETECTED

## 2020-12-21 RX ORDER — GABAPENTIN 600 MG/1
TABLET ORAL
Qty: 180 TABLET | Refills: 0 | Status: SHIPPED | OUTPATIENT
Start: 2020-12-21 | End: 2021-01-06

## 2020-12-21 NOTE — RESULT ENCOUNTER NOTE
Please call the patient regarding his abnormal result   Call patient he is positive for COVID he needs to quarantine for 10 days from the date of his  Nasal swab in also patient needs to not return to work on day 10 unless he is totally free of infection for 48 hours without using Tylenol or ibuprofen in the interim he should take vitamin D3 2000 units daily he should also take vitamin-C 1000 mg daily and zinc 100 mg daily he is already on blood thinners may does not need the aspirin

## 2021-01-06 ENCOUNTER — LAB (OUTPATIENT)
Dept: LAB | Facility: MEDICAL CENTER | Age: 56
End: 2021-01-06
Payer: COMMERCIAL

## 2021-01-06 DIAGNOSIS — E11.42 DIABETIC PERIPHERAL NEUROPATHY (HCC): ICD-10-CM

## 2021-01-06 DIAGNOSIS — E78.2 MIXED HYPERLIPIDEMIA: ICD-10-CM

## 2021-01-06 DIAGNOSIS — E83.52 HYPERCALCEMIA: Primary | ICD-10-CM

## 2021-01-06 DIAGNOSIS — I10 ESSENTIAL HYPERTENSION: ICD-10-CM

## 2021-01-06 DIAGNOSIS — E11.9 TYPE 2 DIABETES MELLITUS WITHOUT COMPLICATION, WITHOUT LONG-TERM CURRENT USE OF INSULIN (HCC): ICD-10-CM

## 2021-01-06 LAB
ALBUMIN SERPL BCP-MCNC: 3.1 G/DL (ref 3.5–5)
ALP SERPL-CCNC: 75 U/L (ref 46–116)
ALT SERPL W P-5'-P-CCNC: 62 U/L (ref 12–78)
ANION GAP SERPL CALCULATED.3IONS-SCNC: 4 MMOL/L (ref 4–13)
AST SERPL W P-5'-P-CCNC: 31 U/L (ref 5–45)
BILIRUB SERPL-MCNC: 0.35 MG/DL (ref 0.2–1)
BUN SERPL-MCNC: 20 MG/DL (ref 5–25)
CALCIUM ALBUM COR SERPL-MCNC: 10.5 MG/DL (ref 8.3–10.1)
CALCIUM SERPL-MCNC: 9.8 MG/DL (ref 8.3–10.1)
CHLORIDE SERPL-SCNC: 112 MMOL/L (ref 100–108)
CHOLEST SERPL-MCNC: 134 MG/DL (ref 50–200)
CO2 SERPL-SCNC: 26 MMOL/L (ref 21–32)
CREAT SERPL-MCNC: 1.11 MG/DL (ref 0.6–1.3)
ERYTHROCYTE [DISTWIDTH] IN BLOOD BY AUTOMATED COUNT: 15.6 % (ref 11.6–15.1)
EST. AVERAGE GLUCOSE BLD GHB EST-MCNC: 143 MG/DL
GFR SERPL CREATININE-BSD FRML MDRD: 74 ML/MIN/1.73SQ M
GLUCOSE P FAST SERPL-MCNC: 96 MG/DL (ref 65–99)
HBA1C MFR BLD: 6.6 %
HCT VFR BLD AUTO: 43.3 % (ref 36.5–49.3)
HDLC SERPL-MCNC: 55 MG/DL
HGB BLD-MCNC: 12.8 G/DL (ref 12–17)
LDLC SERPL CALC-MCNC: 60 MG/DL (ref 0–100)
MCH RBC QN AUTO: 29 PG (ref 26.8–34.3)
MCHC RBC AUTO-ENTMCNC: 29.6 G/DL (ref 31.4–37.4)
MCV RBC AUTO: 98 FL (ref 82–98)
PLATELET # BLD AUTO: 364 THOUSANDS/UL (ref 149–390)
PMV BLD AUTO: 10.2 FL (ref 8.9–12.7)
POTASSIUM SERPL-SCNC: 4.4 MMOL/L (ref 3.5–5.3)
PROT SERPL-MCNC: 7.2 G/DL (ref 6.4–8.2)
RBC # BLD AUTO: 4.41 MILLION/UL (ref 3.88–5.62)
SODIUM SERPL-SCNC: 142 MMOL/L (ref 136–145)
TRIGL SERPL-MCNC: 96 MG/DL
WBC # BLD AUTO: 6.54 THOUSAND/UL (ref 4.31–10.16)

## 2021-01-06 PROCEDURE — 85027 COMPLETE CBC AUTOMATED: CPT

## 2021-01-06 PROCEDURE — 83036 HEMOGLOBIN GLYCOSYLATED A1C: CPT

## 2021-01-06 PROCEDURE — 80053 COMPREHEN METABOLIC PANEL: CPT

## 2021-01-06 PROCEDURE — 36415 COLL VENOUS BLD VENIPUNCTURE: CPT

## 2021-01-06 PROCEDURE — 3044F HG A1C LEVEL LT 7.0%: CPT | Performed by: FAMILY MEDICINE

## 2021-01-06 PROCEDURE — 80061 LIPID PANEL: CPT

## 2021-01-06 RX ORDER — GABAPENTIN 600 MG/1
TABLET ORAL
Qty: 180 TABLET | Refills: 0 | Status: SHIPPED | OUTPATIENT
Start: 2021-01-06 | End: 2021-02-01

## 2021-01-14 DIAGNOSIS — E79.0 HYPERURICEMIA: ICD-10-CM

## 2021-01-14 DIAGNOSIS — R80.9 CONTROLLED TYPE 2 DIABETES MELLITUS WITH MICROALBUMINURIA, WITHOUT LONG-TERM CURRENT USE OF INSULIN (HCC): ICD-10-CM

## 2021-01-14 DIAGNOSIS — E78.2 MIXED HYPERLIPIDEMIA: ICD-10-CM

## 2021-01-14 DIAGNOSIS — E11.29 CONTROLLED TYPE 2 DIABETES MELLITUS WITH MICROALBUMINURIA, WITHOUT LONG-TERM CURRENT USE OF INSULIN (HCC): ICD-10-CM

## 2021-01-14 PROCEDURE — 3066F NEPHROPATHY DOC TX: CPT | Performed by: FAMILY MEDICINE

## 2021-01-14 RX ORDER — METFORMIN HYDROCHLORIDE 500 MG/1
TABLET, EXTENDED RELEASE ORAL
Qty: 30 TABLET | Refills: 0 | Status: SHIPPED | OUTPATIENT
Start: 2021-01-14 | End: 2021-02-22

## 2021-01-14 RX ORDER — ALLOPURINOL 300 MG/1
TABLET ORAL
Qty: 90 TABLET | Refills: 0 | Status: SHIPPED | OUTPATIENT
Start: 2021-01-14 | End: 2021-04-19

## 2021-01-14 RX ORDER — EZETIMIBE 10 MG/1
TABLET ORAL
Qty: 30 TABLET | Refills: 0 | Status: SHIPPED | OUTPATIENT
Start: 2021-01-14 | End: 2021-02-22

## 2021-01-23 DIAGNOSIS — I10 ESSENTIAL HYPERTENSION: ICD-10-CM

## 2021-01-25 RX ORDER — CARVEDILOL 12.5 MG/1
TABLET ORAL
Qty: 60 TABLET | Refills: 0 | Status: SHIPPED | OUTPATIENT
Start: 2021-01-25 | End: 2021-03-10 | Stop reason: SDUPTHER

## 2021-02-01 DIAGNOSIS — E11.42 DIABETIC PERIPHERAL NEUROPATHY (HCC): ICD-10-CM

## 2021-02-01 DIAGNOSIS — R60.9 EDEMA, UNSPECIFIED TYPE: ICD-10-CM

## 2021-02-01 PROCEDURE — 3066F NEPHROPATHY DOC TX: CPT | Performed by: FAMILY MEDICINE

## 2021-02-01 RX ORDER — GABAPENTIN 600 MG/1
TABLET ORAL
Qty: 180 TABLET | Refills: 0 | Status: SHIPPED | OUTPATIENT
Start: 2021-02-01 | End: 2021-02-22

## 2021-02-01 RX ORDER — FUROSEMIDE 40 MG/1
TABLET ORAL
Qty: 90 TABLET | Refills: 0 | Status: SHIPPED | OUTPATIENT
Start: 2021-02-01 | End: 2021-04-30

## 2021-02-03 DIAGNOSIS — E03.9 ACQUIRED HYPOTHYROIDISM: ICD-10-CM

## 2021-02-03 RX ORDER — LEVOTHYROXINE SODIUM 0.03 MG/1
25 TABLET ORAL DAILY
Qty: 90 TABLET | Refills: 1
Start: 2021-02-03

## 2021-02-03 RX ORDER — LEVOTHYROXINE SODIUM 0.15 MG/1
150 TABLET ORAL
Qty: 90 TABLET | Refills: 1 | Status: SHIPPED | OUTPATIENT
Start: 2021-02-03 | End: 2021-08-02

## 2021-02-22 DIAGNOSIS — R80.9 CONTROLLED TYPE 2 DIABETES MELLITUS WITH MICROALBUMINURIA, WITHOUT LONG-TERM CURRENT USE OF INSULIN (HCC): ICD-10-CM

## 2021-02-22 DIAGNOSIS — E11.42 DIABETIC PERIPHERAL NEUROPATHY (HCC): ICD-10-CM

## 2021-02-22 DIAGNOSIS — E03.9 ACQUIRED HYPOTHYROIDISM: ICD-10-CM

## 2021-02-22 DIAGNOSIS — E78.2 MIXED HYPERLIPIDEMIA: ICD-10-CM

## 2021-02-22 DIAGNOSIS — E11.29 CONTROLLED TYPE 2 DIABETES MELLITUS WITH MICROALBUMINURIA, WITHOUT LONG-TERM CURRENT USE OF INSULIN (HCC): ICD-10-CM

## 2021-02-22 RX ORDER — ATORVASTATIN CALCIUM 40 MG/1
TABLET, FILM COATED ORAL
Qty: 90 TABLET | Refills: 0 | Status: SHIPPED | OUTPATIENT
Start: 2021-02-22 | End: 2021-05-21

## 2021-02-22 RX ORDER — LEVOTHYROXINE SODIUM 25 UG/1
TABLET ORAL
Qty: 30 TABLET | Refills: 0 | Status: SHIPPED | OUTPATIENT
Start: 2021-02-22 | End: 2021-03-22

## 2021-02-22 RX ORDER — EZETIMIBE 10 MG/1
TABLET ORAL
Qty: 30 TABLET | Refills: 0 | Status: SHIPPED | OUTPATIENT
Start: 2021-02-22 | End: 2021-03-22

## 2021-02-22 RX ORDER — METFORMIN HYDROCHLORIDE 500 MG/1
TABLET, EXTENDED RELEASE ORAL
Qty: 30 TABLET | Refills: 0 | Status: SHIPPED | OUTPATIENT
Start: 2021-02-22 | End: 2021-03-22

## 2021-02-22 RX ORDER — GABAPENTIN 600 MG/1
TABLET ORAL
Qty: 180 TABLET | Refills: 0 | Status: SHIPPED | OUTPATIENT
Start: 2021-02-22 | End: 2021-03-22

## 2021-03-02 DIAGNOSIS — O22.30 DVT (DEEP VEIN THROMBOSIS) IN PREGNANCY: ICD-10-CM

## 2021-03-02 RX ORDER — RIVAROXABAN 20 MG/1
TABLET, FILM COATED ORAL
Qty: 90 TABLET | Refills: 0 | Status: SHIPPED | OUTPATIENT
Start: 2021-03-02 | End: 2021-07-12

## 2021-03-08 ENCOUNTER — LAB (OUTPATIENT)
Dept: LAB | Facility: MEDICAL CENTER | Age: 56
End: 2021-03-08
Payer: COMMERCIAL

## 2021-03-08 DIAGNOSIS — E55.9 VITAMIN D DEFICIENCY: ICD-10-CM

## 2021-03-08 DIAGNOSIS — R73.09 ELEVATED GLUCOSE: ICD-10-CM

## 2021-03-08 DIAGNOSIS — E34.9 ELEVATED PARATHYROID HORMONE: Primary | ICD-10-CM

## 2021-03-08 DIAGNOSIS — E83.52 HYPERCALCEMIA: ICD-10-CM

## 2021-03-08 LAB
25(OH)D3 SERPL-MCNC: 23.1 NG/ML (ref 30–100)
ALBUMIN SERPL BCP-MCNC: 3.7 G/DL (ref 3.5–5)
ALP SERPL-CCNC: 74 U/L (ref 46–116)
ALT SERPL W P-5'-P-CCNC: 36 U/L (ref 12–78)
ANION GAP SERPL CALCULATED.3IONS-SCNC: 5 MMOL/L (ref 4–13)
AST SERPL W P-5'-P-CCNC: 28 U/L (ref 5–45)
BILIRUB SERPL-MCNC: 0.47 MG/DL (ref 0.2–1)
BUN SERPL-MCNC: 24 MG/DL (ref 5–25)
CALCIUM SERPL-MCNC: 9.9 MG/DL (ref 8.3–10.1)
CHLORIDE SERPL-SCNC: 110 MMOL/L (ref 100–108)
CO2 SERPL-SCNC: 28 MMOL/L (ref 21–32)
CREAT SERPL-MCNC: 1.26 MG/DL (ref 0.6–1.3)
GFR SERPL CREATININE-BSD FRML MDRD: 64 ML/MIN/1.73SQ M
GLUCOSE P FAST SERPL-MCNC: 108 MG/DL (ref 65–99)
POTASSIUM SERPL-SCNC: 4.2 MMOL/L (ref 3.5–5.3)
PROT SERPL-MCNC: 7.3 G/DL (ref 6.4–8.2)
PTH-INTACT SERPL-MCNC: 90.9 PG/ML (ref 18.4–80.1)
SODIUM SERPL-SCNC: 143 MMOL/L (ref 136–145)

## 2021-03-08 PROCEDURE — 36415 COLL VENOUS BLD VENIPUNCTURE: CPT

## 2021-03-08 PROCEDURE — 83970 ASSAY OF PARATHORMONE: CPT

## 2021-03-08 PROCEDURE — 80053 COMPREHEN METABOLIC PANEL: CPT

## 2021-03-08 PROCEDURE — 82306 VITAMIN D 25 HYDROXY: CPT

## 2021-03-10 ENCOUNTER — OFFICE VISIT (OUTPATIENT)
Dept: FAMILY MEDICINE CLINIC | Facility: CLINIC | Age: 56
End: 2021-03-10
Payer: COMMERCIAL

## 2021-03-10 VITALS
DIASTOLIC BLOOD PRESSURE: 98 MMHG | SYSTOLIC BLOOD PRESSURE: 142 MMHG | TEMPERATURE: 97.6 F | WEIGHT: 264 LBS | HEIGHT: 72 IN | BODY MASS INDEX: 35.76 KG/M2

## 2021-03-10 DIAGNOSIS — Z00.00 ANNUAL PHYSICAL EXAM: Primary | ICD-10-CM

## 2021-03-10 DIAGNOSIS — I10 ESSENTIAL HYPERTENSION: ICD-10-CM

## 2021-03-10 DIAGNOSIS — Z23 ENCOUNTER FOR IMMUNIZATION: ICD-10-CM

## 2021-03-10 PROCEDURE — 3008F BODY MASS INDEX DOCD: CPT | Performed by: FAMILY MEDICINE

## 2021-03-10 PROCEDURE — 3080F DIAST BP >= 90 MM HG: CPT | Performed by: FAMILY MEDICINE

## 2021-03-10 PROCEDURE — 99396 PREV VISIT EST AGE 40-64: CPT | Performed by: FAMILY MEDICINE

## 2021-03-10 PROCEDURE — 3077F SYST BP >= 140 MM HG: CPT | Performed by: FAMILY MEDICINE

## 2021-03-10 PROCEDURE — 1036F TOBACCO NON-USER: CPT | Performed by: FAMILY MEDICINE

## 2021-03-10 RX ORDER — CARVEDILOL 12.5 MG/1
12.5 TABLET ORAL 2 TIMES DAILY WITH MEALS
Qty: 180 TABLET | Refills: 3 | Status: SHIPPED | OUTPATIENT
Start: 2021-03-10 | End: 2021-06-15

## 2021-03-10 NOTE — PATIENT INSTRUCTIONS
Wellness Visit for Adults   AMBULATORY CARE:   A wellness visit  is when you see your healthcare provider to get screened for health problems  Your healthcare provider will also give you advice on how to stay healthy  Write down your questions so you remember to ask them  Ask your healthcare provider how often you should have a wellness visit  What happens at a wellness visit:  Your healthcare provider will ask about your health, and your family history of health problems  This includes high blood pressure, heart disease, and cancer  He or she will ask if you have symptoms that concern you, if you smoke, and about your mood  You may also be asked about your intake of medicines, supplements, food, and alcohol  Any of the following may be done:  · Your weight  will be checked  Your height may also be checked so your body mass index (BMI) can be calculated  Your BMI shows if you are at a healthy weight  · Your blood pressure  and heart rate will be checked  Your temperature may also be checked  · Blood and urine tests  may be done  Blood tests may be done to check your cholesterol levels  Abnormal cholesterol levels increase your risk for heart disease and stroke  You may also need a blood or urine test to check for diabetes if you are at increased risk  Urine tests may be done to look for signs of an infection or kidney disease  · A physical exam  includes checking your heartbeat and lungs with a stethoscope  Your healthcare provider may also check your skin to look for sun damage  · Screening tests  may be recommended  A screening test is done to check for diseases that may not cause symptoms  The screening tests you may need depend on your age, gender, family history, and lifestyle habits  For example, colorectal screening may be recommended if you are 48years old or older  Screening tests you need if you are a woman:   · A Pap smear  is used to screen for cervical cancer   Pap smears are usually done every 3 to 5 years depending on your age  You may need them more often if you have had abnormal Pap smear test results in the past  Ask your healthcare provider how often you should have a Pap smear  · A mammogram  is an x-ray of your breasts to screen for breast cancer  Experts recommend mammograms every 2 years starting at age 48 years  You may need a mammogram at age 52 years or younger if you have an increased risk for breast cancer  Talk to your healthcare provider about when you should start having mammograms and how often you need them  Vaccines you may need:   · Get an influenza vaccine  every year  The influenza vaccine protects you from the flu  Several types of viruses cause the flu  The viruses change over time, so new vaccines are made each year  · Get a tetanus-diphtheria (Td) booster vaccine  every 10 years  This vaccine protects you against tetanus and diphtheria  Tetanus is a severe infection that may cause painful muscle spasms and lockjaw  Diphtheria is a severe bacterial infection that causes a thick covering in the back of your mouth and throat  · Get a human papillomavirus (HPV) vaccine  if you are female and aged 23 to 32 or male 23 to 24 and never received it  This vaccine protects you from HPV infection  HPV is the most common infection spread by sexual contact  HPV may also cause vaginal, penile, and anal cancers  · Get a pneumococcal vaccine  if you are aged 72 years or older  The pneumococcal vaccine is an injection given to protect you from pneumococcal disease  Pneumococcal disease is an infection caused by pneumococcal bacteria  The infection may cause pneumonia, meningitis, or an ear infection  · Get a shingles vaccine  if you are 60 or older, even if you have had shingles before  The shingles vaccine is an injection to protect you from the varicella-zoster virus  This is the same virus that causes chickenpox   Shingles is a painful rash that develops in people who had chickenpox or have been exposed to the virus  How to eat healthy:  My Plate is a model for planning healthy meals  It shows the types and amounts of foods that should go on your plate  Fruits and vegetables make up about half of your plate, and grains and protein make up the other half  A serving of dairy is included on the side of your plate  The amount of calories and serving sizes you need depends on your age, gender, weight, and height  Examples of healthy foods are listed below:  · Eat a variety of vegetables  such as dark green, red, and orange vegetables  You can also include canned vegetables low in sodium (salt) and frozen vegetables without added butter or sauces  · Eat a variety of fresh fruits , canned fruit in 100% juice, frozen fruit, and dried fruit  · Include whole grains  At least half of the grains you eat should be whole grains  Examples include whole-wheat bread, wheat pasta, brown rice, and whole-grain cereals such as oatmeal     · Eat a variety of protein foods such as seafood (fish and shellfish), lean meat, and poultry without skin (turkey and chicken)  Examples of lean meats include pork leg, shoulder, or tenderloin, and beef round, sirloin, tenderloin, and extra lean ground beef  Other protein foods include eggs and egg substitutes, beans, peas, soy products, nuts, and seeds  · Choose low-fat dairy products such as skim or 1% milk or low-fat yogurt, cheese, and cottage cheese  · Limit unhealthy fats  such as butter, hard margarine, and shortening  Exercise:  Exercise at least 30 minutes per day on most days of the week  Some examples of exercise include walking, biking, dancing, and swimming  You can also fit in more physical activity by taking the stairs instead of the elevator or parking farther away from stores  Include muscle strengthening activities 2 days each week  Regular exercise provides many health benefits   It helps you manage your weight, and decreases your risk for type 2 diabetes, heart disease, stroke, and high blood pressure  Exercise can also help improve your mood  Ask your healthcare provider about the best exercise plan for you  General health and safety guidelines:   · Do not smoke  Nicotine and other chemicals in cigarettes and cigars can cause lung damage  Ask your healthcare provider for information if you currently smoke and need help to quit  E-cigarettes or smokeless tobacco still contain nicotine  Talk to your healthcare provider before you use these products  · Limit alcohol  A drink of alcohol is 12 ounces of beer, 5 ounces of wine, or 1½ ounces of liquor  · Lose weight, if needed  Being overweight increases your risk of certain health conditions  These include heart disease, high blood pressure, type 2 diabetes, and certain types of cancer  · Protect your skin  Do not sunbathe or use tanning beds  Use sunscreen with a SPF 15 or higher  Apply sunscreen at least 15 minutes before you go outside  Reapply sunscreen every 2 hours  Wear protective clothing, hats, and sunglasses when you are outside  · Drive safely  Always wear your seatbelt  Make sure everyone in your car wears a seatbelt  A seatbelt can save your life if you are in an accident  Do not use your cell phone when you are driving  This could distract you and cause an accident  Pull over if you need to make a call or send a text message  · Practice safe sex  Use latex condoms if are sexually active and have more than one partner  Your healthcare provider may recommend screening tests for sexually transmitted infections (STIs)  · Wear helmets, lifejackets, and protective gear  Always wear a helmet when you ride a bike or motorcycle, go skiing, or play sports that could cause a head injury  Wear protective equipment when you play sports  Wear a lifejacket when you are on a boat or doing water sports      © Copyright [employer] 2020 Information is for End User's use only and may not be sold, redistributed or otherwise used for commercial purposes  All illustrations and images included in CareNotes® are the copyrighted property of [employer]  or Keely Medina  The above information is an  only  It is not intended as medical advice for individual conditions or treatments  Talk to your doctor, nurse or pharmacist before following any medical regimen to see if it is safe and effective for you  Weight Management   AMBULATORY CARE:   Why it is important to manage your weight:  Being overweight increases your risk of health conditions such as heart disease, high blood pressure, type 2 diabetes, and certain types of cancer  It can also increase your risk for osteoarthritis, sleep apnea, and other respiratory problems  Aim for a slow, steady weight loss  Even a small amount of weight loss can lower your risk of health problems  How to lose weight safely:  A safe and healthy way to lose weight is to eat fewer calories and get regular exercise  · You can lose up about 1 pound a week by decreasing the number of calories you eat by 500 calories each day  You can decrease calories by eating smaller portion sizes or by cutting out high-calorie foods  Read labels to find out how many calories are in the foods you eat  · You can also burn calories with exercise such as walking, swimming, or biking  You will be more likely to keep weight off if you make these changes part of your lifestyle  Exercise at least 30 minutes per day on most days of the week  You can also fit in more physical activity by taking the stairs instead of the elevator or parking farther away from stores  Ask your healthcare provider about the best exercise plan for you  Healthy meal plan for weight management:  A healthy meal plan includes a variety of foods, contains fewer calories, and helps you stay healthy   A healthy meal plan includes the following:     · Eat whole-grain foods more often  A healthy meal plan should contain fiber  Fiber is the part of grains, fruits, and vegetables that is not broken down by your body  Whole-grain foods are healthy and provide extra fiber in your diet  Some examples of whole-grain foods are whole-wheat breads and pastas, oatmeal, brown rice, and bulgur  · Eat a variety of vegetables every day  Include dark, leafy greens such as spinach, kale, koki greens, and mustard greens  Eat yellow and orange vegetables such as carrots, sweet potatoes, and winter squash  · Eat a variety of fruits every day  Choose fresh or canned fruit (canned in its own juice or light syrup) instead of juice  Fruit juice has very little or no fiber  · Eat low-fat dairy foods  Drink fat-free (skim) milk or 1% milk  Eat fat-free yogurt and low-fat cottage cheese  Try low-fat cheeses such as mozzarella and other reduced-fat cheeses  · Choose meat and other protein foods that are low in fat  Choose beans or other legumes such as split peas or lentils  Choose fish, skinless poultry (chicken or turkey), or lean cuts of red meat (beef or pork)  Before you cook meat or poultry, cut off any visible fat  · Use less fat and oil  Try baking foods instead of frying them  Add less fat, such as margarine, sour cream, regular salad dressing and mayonnaise to foods  Eat fewer high-fat foods  Some examples of high-fat foods include french fries, doughnuts, ice cream, and cakes  · Eat fewer sweets  Limit foods and drinks that are high in sugar  This includes candy, cookies, regular soda, and sweetened drinks  Ways to decrease calories:   · Eat smaller portions  ? Use a small plate with smaller servings  ? Do not eat second helpings  ? When you eat at a restaurant, ask for a box and place half of your meal in the box before you eat  ? Share an entrée with someone else  · Replace high-calorie snacks with healthy, low-calorie snacks  ? Choose fresh fruit, vegetables, fat-free rice cakes, or air-popped popcorn instead of potato chips, nuts, or chocolate  ? Choose water or calorie-free drinks instead of soda or sweetened drinks  · Do not shop for groceries when you are hungry  You may be more likely to make unhealthy food choices  Take a grocery list of healthy foods and shop after you have eaten  · Eat regular meals  Do not skip meals  Skipping meals can lead to overeating later in the day  This can make it harder for you to lose weight  Eat a healthy snack in place of a meal if you do not have time to eat a regular meal  Talk with a dietitian to help you create a meal plan and schedule that is right for you  Other things to consider as you try to lose weight:   · Be aware of situations that may give you the urge to overeat, such as eating while watching television  Find ways to avoid these situations  For example, read a book, go for a walk, or do crafts  · Meet with a weight loss support group or friends who are also trying to lose weight  This may help you stay motivated to continue working on your weight loss goals  © Copyright [address] Information is for End User's use only and may not be sold, redistributed or otherwise used for commercial purposes  All illustrations and images included in CareNotes® are the copyrighted property of [employer]  or [facility] Jackelyn Escobedo   The above information is an  only  It is not intended as medical advice for individual conditions or treatments  Talk to your doctor, nurse or pharmacist before following any medical regimen to see if it is safe and effective for you  Obesity   AMBULATORY CARE:   Obesity  is when your body mass index (BMI) is greater than 30  Your healthcare provider will use your height and weight to measure your BMI  The risks of obesity include  many health problems, such as injuries or physical disability   You may need tests to check for the following:  · Diabetes    · High blood pressure or high cholesterol    · Heart disease    · Gallbladder or liver disease    · Cancer of the colon, breast, prostate, liver, or kidney    · Sleep apnea    · Arthritis or gout    Seek care immediately if:   · You have a severe headache, confusion, or difficulty speaking  · You have weakness on one side of your body  · You have chest pain, sweating, or shortness of breath  Contact your healthcare provider if:   · You have symptoms of gallbladder or liver disease, such as pain in your upper abdomen  · You have knee or hip pain and discomfort while walking  · You have symptoms of diabetes, such as intense hunger and thirst, and frequent urination  · You have symptoms of sleep apnea, such as snoring or daytime sleepiness  · You have questions or concerns about your condition or care  Treatment for obesity  focuses on helping you lose weight to improve your health  Even a small decrease in BMI can reduce the risk for many health problems  Your healthcare provider will help you set a weight-loss goal   · Lifestyle changes  are the first step in treating obesity  These include making healthy food choices and getting regular physical activity  Your healthcare provider may suggest a weight-loss program that involves coaching, education, and therapy  · Medicine  may help you lose weight when it is used with a healthy diet and physical activity  · Surgery  can help you lose weight if you are very obese and have other health problems  There are several types of weight-loss surgery  Ask your healthcare provider for more information  Be successful losing weight:   · Set small, realistic goals  An example of a small goal is to walk for 20 minutes 5 days a week  Reyna goal is to lose 5% of your body weight  · Tell friends, family members, and coworkers about your goals  and ask for their support   Ask a friend to lose weight with you, or join a weight-loss support group  · Identify foods or triggers that may cause you to overeat , and find ways to avoid them  Remove tempting high-calorie foods from your home and workplace  Place a bowl of fresh fruit on your kitchen counter  If stress causes you to eat, then find other ways to cope with stress  · Keep a diary to track what you eat and drink  Also write down how many minutes of physical activity you do each day  Weigh yourself once a week and record it in your diary  Eating changes: You will need to eat 500 to 1,000 fewer calories each day than you currently eat to lose 1 to 2 pounds a week  The following changes will help you cut calories:  · Eat smaller portions  Use small plates, no larger than 9 inches in diameter  Fill your plate half full of fruits and vegetables  Measure your food using measuring cups until you know what a serving size looks like  · Eat 3 meals and 1 or 2 snacks each day  Plan your meals in advance  Colon and eat at home most of the time  Eat slowly  Do not skip meals  Skipping meals can lead to overeating later in the day  This can make it harder for you to lose weight  Talk with a dietitian to help you make a meal plan and schedule that is right for you  · Eat fruits and vegetables at every meal   They are low in calories and high in fiber, which makes you feel full  Do not add butter, margarine, or cream sauce to vegetables  Use herbs to season steamed vegetables  · Eat less fat and fewer fried foods  Eat more baked or grilled chicken and fish  These protein sources are lower in calories and fat than red meat  Limit fast food  Dress your salads with olive oil and vinegar instead of bottled dressing  · Limit the amount of sugar you eat  Do not drink sugary beverages  Limit alcohol  Activity changes:  Physical activity is good for your body in many ways  It helps you burn calories and build strong muscles   It decreases stress and depression, and improves your mood  It can also help you sleep better  Talk to your healthcare provider before you begin an exercise program   · Exercise for at least 30 minutes 5 days a week  Start slowly  Set aside time each day for physical activity that you enjoy and that is convenient for you  It is best to do both weight training and an activity that increases your heart rate, such as walking, bicycling, or swimming  · Find ways to be more active  Do yard work and housecleaning  Walk up the stairs instead of using elevators  Spend your leisure time going to events that require walking, such as outdoor festivals or fairs  This extra physical activity can help you lose weight and keep it off  Follow up with your healthcare provider as directed: You may need to meet with a dietitian  Write down your questions so you remember to ask them during your visits  © Copyright [address] Information is for End User's use only and may not be sold, redistributed or otherwise used for commercial purposes  All illustrations and images included in CareNotes® are the copyrighted property of [employer]  or [facility] Jackelyn Escobedo   The above information is an  only  It is not intended as medical advice for individual conditions or treatments  Talk to your doctor, nurse or pharmacist before following any medical regimen to see if it is safe and effective for you

## 2021-03-10 NOTE — PROGRESS NOTES
ADULT ANNUAL [address] PRACTICE    NAME: Kellie Baer  AGE: 54 y o  SEX: male  : 1965     DATE: 3/10/2021     Assessment and Plan:     Problem List Items Addressed This Visit     None          Immunizations and preventive care screenings were discussed with patient today  Appropriate education was printed on patient's after visit summary  Counseling:  Alcohol/drug use: discussed moderation in alcohol intake, the recommendations for healthy alcohol use, and avoidance of illicit drug use  Dental Health: discussed importance of regular tooth brushing, flossing, and dental visits  Injury prevention: discussed safety/seat belts, safety helmets, smoke detectors, carbon dioxide detectors, and smoking near bedding or upholstery  Sexual health: discussed sexually transmitted diseases, partner selection, use of condoms, avoidance of unintended pregnancy, and contraceptive alternatives  · Exercise: the importance of regular exercise/physical activity was discussed  Recommend exercise 3-5 times per week for at least 30 minutes  No follow-ups on file  Chief Complaint:     Chief Complaint   Patient presents with    Annual Exam      History of Present Illness:     Adult Annual Physical   Patient here for a comprehensive physical exam  The patient reports no problems  Diet and Physical Activity  · Diet/Nutrition: poor diet  · Exercise: walking  Depression Screening  PHQ-9 Depression Screening    PHQ-9:   Frequency of the following problems over the past two weeks:           General Health  · Sleep: sleeps poorly  · Hearing: normal - bilateral   · Vision: wears glasses  · Dental: regular dental visits          Health  · Symptoms include: none     Review of Systems:     Review of Systems   Past Medical History:     Past Medical History:   Diagnosis Date    Arthritis     right foot    Chronic cholecystitis     Diabetes mellitus ([address] [facility] )     DVT (deep venous thrombosis) ([address] )     Gout     History of pulmonary embolism     Hyperlipidemia     Hypertension     Hypothyroidism     Lumbar back pain     MTHFR mutation (HCC)     Neuropathy     Scab     right arm- no s/s infection    Sleep apnea     no CPAP    Tarsal tunnel syndrome, right     Tinnitus     left ear    Wears glasses     and contacts    Wears glasses       Past Surgical History:     Past Surgical History:   Procedure Laterality Date    APPENDECTOMY      ARTHRODESIS      Lumbar L__    BACK SURGERY      BUNIONECTOMY Right 10/7/2016    Procedure: REMOVAL TIBIAL  SESAMOID BONE  RIGHT FOOT;  Surgeon: Shay Duran DPM;  [facility];  Service:     CHOLECYSTECTOMY  03/26/2015    COLONOSCOPY      KNEE ARTHROSCOPY      KNEE ARTHROSCOPY W/ MENISCAL REPAIR      Lateral    NASAL SEPTUM SURGERY  10/16/2013    OH COLONOSCOPY FLX DX W/COLLJ SPEC WHEN PFRMD N/A 11/23/2018    Procedure: COLONOSCOPY;  Surgeon: Vinayak Gonzalez MD;  [facility]: [facility];  Service: Gastroenterology    OH TARSAL TUNNEL RELEASE Right 6/25/2018    Procedure: RELEASE TARSAL TUNNEL;  Surgeon: Alexandru Jones DPM;  [facility];  Service: Podiatry    OH TARSAL TUNNEL RELEASE Left 3/13/2020    Procedure: RELEASE TARSAL TUNNEL;  Surgeon: Alexandru Jones DPM;  Location: [facility];  Service: [place]  10/16/2013    with Adenoidectomy    UVULECTOMY  10/16/2013    UVULOPALATOPHARYNGOPLASTY      WISDOM TOOTH EXTRACTION        Family History:     Family History   Problem Relation Age of Onset    Aneurysm Mother         intracranial aneurysm repair    Coronary artery disease Father     Hypertension Father     Aneurysm Brother       Social History:     E-Cigarette/Vaping    E-Cigarette Use Never User      E-Cigarette/Vaping Substances    Nicotine No     THC No     CBD No     Flavoring No      Social History     Socioeconomic History    Marital status: Single     Spouse name: None    Number of children: None    Years of education: None    Highest education level: None   Occupational History    None   Social Needs    Financial resource strain: None    Food insecurity     Worry: None     Inability: None    Transportation needs     Medical: None     Non-medical: None   Tobacco Use    Smoking status: Never Smoker    Smokeless tobacco: Never Used   Substance and Sexual Activity    Alcohol use: Yes     Frequency: 2-4 times a month     Drinks per session: 5 or 6     Binge frequency: Less than monthly     Comment: weekends    Drug use: No    Sexual activity: Yes   Lifestyle    Physical activity     Days per week: None     Minutes per session: None    Stress: None   Relationships    Social connections     Talks on phone: None     Gets together: None     Attends Latter day service: None     Active member of club or organization: None     Attends meetings of clubs or organizations: None     Relationship status: None    Intimate partner violence     Fear of current or ex partner: None     Emotionally abused: None     Physically abused: None     Forced sexual activity: None   Other Topics Concern    None   Social History Narrative    None      Current Medications:     Current Outpatient Medications   Medication Sig Dispense Refill    allopurinol (ZYLOPRIM) 300 mg tablet Take 1 tablet by mouth once daily 90 tablet 0    atorvastatin (LIPITOR) 40 mg tablet Take 1 tablet by mouth once daily 90 tablet 0    carvedilol (COREG) 12 5 mg tablet TAKE 1 TABLET BY MOUTH TWICE DAILY WITH MEALS 60 tablet 0    Euthyrox 25 MCG tablet TAKE 1 TABLET BY MOUTH ONCE DAILY WITH LEVOTHYROXINE 150MCG TO EQUAL 175MCG 30 tablet 0    ezetimibe (ZETIA) 10 mg tablet Take 1 tablet by mouth once daily 30 tablet 0    furosemide (LASIX) 40 mg tablet Take 1 tablet by mouth once daily 90 tablet 0    gabapentin (NEURONTIN) 600 MG tablet TAKE 2 TABLETS BY MOUTH THREE TIMES DAILY 180 tablet 0    levothyroxine 150 mcg tablet Take 1 tablet (150 mcg total) by mouth daily in the early morning Taken with Levothyroxine 25mcg to equal 175mcg 90 tablet 1    lisinopril (ZESTRIL) 5 mg tablet Take 1 tablet by mouth once daily 90 tablet 0    metFORMIN (GLUCOPHAGE-XR) 500 mg 24 hr tablet TAKE 1 TABLET BY MOUTH ONCE DAILY AT BEDTIME 30 tablet 0    Multiple Vitamins-Minerals (MENS MULTIVITAMIN PLUS) TABS Take 1 tablet by mouth daily      sildenafil (Viagra) 100 mg tablet Take 1 tablet (100 mg total) by mouth as needed for erectile dysfunction 30 tablet 0    spironolactone (ALDACTONE) 25 mg tablet Take 1 tablet by mouth once daily 90 tablet 1    Xarelto 20 MG tablet Take 1 tablet by mouth once daily with breakfast 90 tablet 0     No current facility-administered medications for this visit         Allergies:     No Known Allergies   Physical Exam:     /98 (BP Location: Left arm, Patient Position: Sitting, Cuff Size: Large)   Temp 97 6 °F (36 4 °C) (Temporal)   Ht 6' (1 829 m)   Wt 120 kg (264 lb)   BMI 35 80 kg/m²     Physical Exam     Margoth Floyd, DO  [address] [facility]

## 2021-03-21 DIAGNOSIS — E11.42 DIABETIC PERIPHERAL NEUROPATHY (HCC): ICD-10-CM

## 2021-03-22 DIAGNOSIS — E11.29 CONTROLLED TYPE 2 DIABETES MELLITUS WITH MICROALBUMINURIA, WITHOUT LONG-TERM CURRENT USE OF INSULIN (HCC): ICD-10-CM

## 2021-03-22 DIAGNOSIS — E03.9 ACQUIRED HYPOTHYROIDISM: ICD-10-CM

## 2021-03-22 DIAGNOSIS — E78.2 MIXED HYPERLIPIDEMIA: ICD-10-CM

## 2021-03-22 DIAGNOSIS — R80.9 CONTROLLED TYPE 2 DIABETES MELLITUS WITH MICROALBUMINURIA, WITHOUT LONG-TERM CURRENT USE OF INSULIN (HCC): ICD-10-CM

## 2021-03-22 RX ORDER — EZETIMIBE 10 MG/1
TABLET ORAL
Qty: 30 TABLET | Refills: 0 | Status: SHIPPED | OUTPATIENT
Start: 2021-03-22 | End: 2021-04-19

## 2021-03-22 RX ORDER — GABAPENTIN 600 MG/1
TABLET ORAL
Qty: 180 TABLET | Refills: 0 | Status: SHIPPED | OUTPATIENT
Start: 2021-03-22 | End: 2021-04-22

## 2021-03-22 RX ORDER — METFORMIN HYDROCHLORIDE 500 MG/1
TABLET, EXTENDED RELEASE ORAL
Qty: 30 TABLET | Refills: 0 | Status: SHIPPED | OUTPATIENT
Start: 2021-03-22 | End: 2021-04-30

## 2021-03-22 RX ORDER — LEVOTHYROXINE SODIUM 25 UG/1
TABLET ORAL
Qty: 30 TABLET | Refills: 0 | Status: SHIPPED | OUTPATIENT
Start: 2021-03-22 | End: 2021-04-19

## 2021-03-26 ENCOUNTER — IMMUNIZATIONS (OUTPATIENT)
Dept: FAMILY MEDICINE CLINIC | Facility: HOSPITAL | Age: 56
End: 2021-03-26

## 2021-03-26 DIAGNOSIS — Z23 ENCOUNTER FOR IMMUNIZATION: Primary | ICD-10-CM

## 2021-03-26 PROCEDURE — 91301 SARS-COV-2 / COVID-19 MRNA VACCINE (MODERNA) 100 MCG: CPT

## 2021-03-26 PROCEDURE — 0011A SARS-COV-2 / COVID-19 MRNA VACCINE (MODERNA) 100 MCG: CPT

## 2021-03-29 DIAGNOSIS — E11.9 TYPE 2 DIABETES MELLITUS WITHOUT COMPLICATION, WITHOUT LONG-TERM CURRENT USE OF INSULIN (HCC): ICD-10-CM

## 2021-03-29 DIAGNOSIS — I10 ESSENTIAL HYPERTENSION: ICD-10-CM

## 2021-03-29 PROCEDURE — 4010F ACE/ARB THERAPY RXD/TAKEN: CPT | Performed by: FAMILY MEDICINE

## 2021-03-29 RX ORDER — LISINOPRIL 5 MG/1
TABLET ORAL
Qty: 90 TABLET | Refills: 0 | Status: SHIPPED | OUTPATIENT
Start: 2021-03-29 | End: 2021-07-06

## 2021-04-12 ENCOUNTER — OFFICE VISIT (OUTPATIENT)
Dept: NEPHROLOGY | Facility: CLINIC | Age: 56
End: 2021-04-12
Payer: COMMERCIAL

## 2021-04-12 VITALS
HEART RATE: 46 BPM | DIASTOLIC BLOOD PRESSURE: 84 MMHG | HEIGHT: 72 IN | BODY MASS INDEX: 34.67 KG/M2 | SYSTOLIC BLOOD PRESSURE: 124 MMHG | WEIGHT: 256 LBS | OXYGEN SATURATION: 97 %

## 2021-04-12 DIAGNOSIS — M1A.00X0 IDIOPATHIC CHRONIC GOUT WITHOUT TOPHUS, UNSPECIFIED SITE: ICD-10-CM

## 2021-04-12 DIAGNOSIS — E26.9 HYPERALDOSTERONISM (HCC): ICD-10-CM

## 2021-04-12 DIAGNOSIS — E55.9 VITAMIN D DEFICIENCY: ICD-10-CM

## 2021-04-12 DIAGNOSIS — I10 ESSENTIAL HYPERTENSION: ICD-10-CM

## 2021-04-12 DIAGNOSIS — N18.31 STAGE 3A CHRONIC KIDNEY DISEASE (HCC): Primary | ICD-10-CM

## 2021-04-12 PROCEDURE — 99214 OFFICE O/P EST MOD 30 MIN: CPT | Performed by: INTERNAL MEDICINE

## 2021-04-12 PROCEDURE — 3074F SYST BP LT 130 MM HG: CPT | Performed by: INTERNAL MEDICINE

## 2021-04-12 PROCEDURE — 3066F NEPHROPATHY DOC TX: CPT | Performed by: INTERNAL MEDICINE

## 2021-04-12 PROCEDURE — 3079F DIAST BP 80-89 MM HG: CPT | Performed by: INTERNAL MEDICINE

## 2021-04-12 PROCEDURE — 3008F BODY MASS INDEX DOCD: CPT | Performed by: INTERNAL MEDICINE

## 2021-04-12 PROCEDURE — 1036F TOBACCO NON-USER: CPT | Performed by: INTERNAL MEDICINE

## 2021-04-12 NOTE — PROGRESS NOTES
[facility] [place], [place]    Name: Darlene Barillas  YOB: 1965      Assessment/Plan:    Stage 3a chronic kidney disease  Lab Results   Component Value Date    EGFR 64 03/08/2021    EGFR 74 01/06/2021    EGFR 64 10/12/2020    CREATININE 1 26 03/08/2021    CREATININE 1 11 01/06/2021    CREATININE 1 26 10/12/2020       Kidney function stable, continue with blood pressure and blood sugar control  Hyperaldosteronism ([address] )    Pressures been well controlled, continue current dosing of spironolactone  Essential hypertension    Continue with current medications, blood pressures as mentioned above are doing well at this time  Vitamin D deficiency    Patient will increase vitamin-D intake up to 5000 units once a day  Gout    Continue with allopurinol 300 mg once daily  Patient has been without flares since our last visit  Problem List Items Addressed This Visit        Endocrine    Hyperaldosteronism ([address] )       Pressures been well controlled, continue current dosing of spironolactone  Cardiovascular and Mediastinum    Essential hypertension       Continue with current medications, blood pressures as mentioned above are doing well at this time  Genitourinary    Stage 3a chronic kidney disease - Primary     Lab Results   Component Value Date    EGFR 64 03/08/2021    EGFR 74 01/06/2021    EGFR 64 10/12/2020    CREATININE 1 26 03/08/2021    CREATININE 1 11 01/06/2021    CREATININE 1 26 10/12/2020       Kidney function stable, continue with blood pressure and blood sugar control  Relevant Orders    Microalbumin / creatinine urine ratio    Urinalysis with microscopic    Comprehensive metabolic panel    PTH, intact    Magnesium       Other    Gout       Continue with allopurinol 300 mg once daily  Patient has been without flares since our last visit           Vitamin D deficiency       Patient will increase vitamin-D intake up to 5000 units once a day  Relevant Medications    Cholecalciferol 125 MCG (5000 UT) TABS            Patient is stable and doing well at this time  We will see him back in approximately 6 months for regular appointment  Blood work to be done prior to that appointment  Subjective:      Patient ID: Gayle Lee is a 64 y o  male  Patient presents for follow up regarding CKD/hyperaldosteronism  Patient is having therapy for legs regarding her for neuropathy, there has been significant improvement in neuropathy that he is very happy about  The continues to have several more treatments left  He is taking magnesium, as well as other supplements along with fish oil during the course of his treatments  Hypertension  This is a chronic problem  The current episode started more than 1 year ago  The problem is unchanged  The problem is controlled  Pertinent negatives include no chest pain or orthopnea  There are no associated agents to hypertension  Risk factors for coronary artery disease include male gender and diabetes mellitus  Past treatments include ACE inhibitors, diuretics and lifestyle changes  Hypertensive end-organ damage includes kidney disease  Identifiable causes of hypertension include chronic renal disease and hyperaldosteronism  The following portions of the patient's history were reviewed and updated as appropriate: allergies, current medications, past family history, past medical history, past social history, past surgical history and problem list     Review of Systems   Cardiovascular: Negative for chest pain and orthopnea  All other systems reviewed and are negative          Social History     Socioeconomic History    Marital status: Single     Spouse name: Not on file    Number of children: Not on file    Years of education: Not on file    Highest education level: Not on file   Occupational History    Not on file   Social Needs    Financial resource strain: Not on file    Food insecurity     Worry: Not on file     Inability: Not on file    Transportation needs     Medical: Not on file     Non-medical: Not on file   Tobacco Use    Smoking status: Never Smoker    Smokeless tobacco: Never Used   Substance and Sexual Activity    Alcohol use: Yes     Frequency: 2-4 times a month     Drinks per session: 5 or 6     Binge frequency: Less than monthly     Comment: weekends    Drug use: No    Sexual activity: Yes   Lifestyle    Physical activity     Days per week: Not on file     Minutes per session: Not on file    Stress: Not on file   Relationships    Social connections     Talks on phone: Not on file     Gets together: Not on file     Attends Anabaptist service: Not on file     Active member of club or organization: Not on file     Attends meetings of clubs or organizations: Not on file     Relationship status: Not on file    Intimate partner violence     Fear of current or ex partner: Not on file     Emotionally abused: Not on file     Physically abused: Not on file     Forced sexual activity: Not on file   Other Topics Concern    Not on file   Social History Narrative    Not on file     Past Medical History:   Diagnosis Date    Arthritis     right foot    Chronic cholecystitis     Diabetes mellitus ([place] [address] )     DVT (deep venous thrombosis) ([place] [address] )     Gout     History of pulmonary embolism     Hyperlipidemia     Hypertension     Hypothyroidism     Lumbar back pain     MTHFR mutation ([place] [address] )     Neuropathy     Scab     right arm- no s/s infection    Sleep apnea     no CPAP    Tarsal tunnel syndrome, right     Tinnitus     left ear    Wears glasses     and contacts    Wears glasses      Past Surgical History:   Procedure Laterality Date    APPENDECTOMY      ARTHRODESIS      Lumbar L__    BACK SURGERY      BUNIONECTOMY Right 10/7/2016    Procedure: REMOVAL TIBIAL  SESAMOID BONE  RIGHT FOOT;  Surgeon: Ashley Torres DPM;  [facility]; Service:     CHOLECYSTECTOMY  03/26/2015    COLONOSCOPY      KNEE ARTHROSCOPY      KNEE ARTHROSCOPY W/ MENISCAL REPAIR      Lateral    NASAL SEPTUM SURGERY  10/16/2013    DC COLONOSCOPY FLX DX W/COLLJ SPEC WHEN PFRMD N/A 11/23/2018    Procedure: COLONOSCOPY;  Surgeon: Mitzi Tamayo MD;  [facility];  Service: Gastroenterology    DC TARSAL TUNNEL RELEASE Right 6/25/2018    Procedure: RELEASE TARSAL TUNNEL;  Surgeon: Radha Arias DPM;  [facility];  Service: Podiatry    DC TARSAL TUNNEL RELEASE Left 3/13/2020    Procedure: RELEASE TARSAL TUNNEL;  Surgeon: Radha Arias DPM;  Location: [facility] MA[facility] OR;  Service: [place]  10/16/2013    with Adenoidectomy    UVULECTOMY  10/16/2013    UVULOPALATOPHARYNGOPLASTY      WISDOM TOOTH EXTRACTION         Current Outpatient Medications:     allopurinol (ZYLOPRIM) 300 mg tablet, Take 1 tablet by mouth once daily, Disp: 90 tablet, Rfl: 0    atorvastatin (LIPITOR) 40 mg tablet, Take 1 tablet by mouth once daily, Disp: 90 tablet, Rfl: 0    carvedilol (COREG) 12 5 mg tablet, Take 1 tablet (12 5 mg total) by mouth 2 (two) times a day with meals, Disp: 180 tablet, Rfl: 3    Euthyrox 25 MCG tablet, TAKE 1 TABLET BY MOUTH ONCE DAILY WITH LEVOTHYROXINE 150 MCG TO EQUAL 175 MCG , Disp: 30 tablet, Rfl: 0    ezetimibe (ZETIA) 10 mg tablet, Take 1 tablet by mouth once daily, Disp: 30 tablet, Rfl: 0    furosemide (LASIX) 40 mg tablet, Take 1 tablet by mouth once daily, Disp: 90 tablet, Rfl: 0    gabapentin (NEURONTIN) 600 MG tablet, TAKE 2 TABLETS BY MOUTH THREE TIMES DAILY, Disp: 180 tablet, Rfl: 0    levothyroxine 150 mcg tablet, Take 1 tablet (150 mcg total) by mouth daily in the early morning Taken with Levothyroxine 25mcg to equal 175mcg, Disp: 90 tablet, Rfl: 1    lisinopril (ZESTRIL) 5 mg tablet, Take 1 tablet by mouth once daily, Disp: 90 tablet, Rfl: 0    metFORMIN (GLUCOPHAGE-XR) 500 mg 24 hr tablet, TAKE 1 TABLET BY MOUTH ONCE DAILY AT BEDTIME, Disp: 30 tablet, Rfl: 0    Multiple Vitamins-Minerals (MENS MULTIVITAMIN PLUS) TABS, Take 1 tablet by mouth daily, Disp: , Rfl:     sildenafil (Viagra) 100 mg tablet, Take 1 tablet (100 mg total) by mouth as needed for erectile dysfunction, Disp: 30 tablet, Rfl: 0    spironolactone (ALDACTONE) 25 mg tablet, Take 1 tablet by mouth once daily, Disp: 90 tablet, Rfl: 1    Xarelto 20 MG tablet, Take 1 tablet by mouth once daily with breakfast, Disp: 90 tablet, Rfl: 0    Cholecalciferol 125 MCG (5000 UT) TABS, Take 1 tablet (5,000 Units total) by mouth daily, Disp: , Rfl:     Lab Results   Component Value Date     12/21/2015    SODIUM 143 03/08/2021    K 4 2 03/08/2021     (H) 03/08/2021    CO2 28 03/08/2021    ANIONGAP 10 12/21/2015    AGAP 5 03/08/2021    BUN 24 03/08/2021    CREATININE 1 26 03/08/2021    GLUC 127 07/23/2018    GLUF 108 (H) 03/08/2021    CALCIUM 9 9 03/08/2021    AST 28 03/08/2021    ALT 36 03/08/2021    ALKPHOS 74 03/08/2021    PROT 7 1 06/11/2015    PROT 7 1 06/11/2015    TP 7 3 03/08/2021    BILITOT 0 6 06/11/2015    TBILI 0 47 03/08/2021    EGFR 64 03/08/2021     Lab Results   Component Value Date    WBC 6 54 01/06/2021    HGB 12 8 01/06/2021    HCT 43 3 01/06/2021    MCV 98 01/06/2021     01/06/2021     Lab Results   Component Value Date    CHOLESTEROL 134 01/06/2021    CHOLESTEROL 120 10/12/2020    CHOLESTEROL 120 07/01/2020     Lab Results   Component Value Date    HDL 55 01/06/2021    HDL 58 10/12/2020    HDL 52 07/01/2020     Lab Results   Component Value Date    LDLCALC 60 01/06/2021    LDLCALC 41 10/12/2020    LDLCALC 47 07/01/2020     Lab Results   Component Value Date    TRIG 96 01/06/2021    TRIG 107 10/12/2020    TRIG 107 07/01/2020     No results found for: [place], [place]  Lab Results   Component Value Date    DLP7BXBRCTSP 2 150 10/12/2020     Lab Results   Component Value Date    PTH 90 9 (H) 03/08/2021    CALCIUM 9 9 03/08/2021 Lab Results   Component Value Date    SPEP  05/22/2017     The serum total protein and albumin are within normal limits  The serum protein electrophoresis shows an increased beta-2 region  No monoclonal bands noted  Reviewed by: Marla More MD ([id number]) **Electronic Signature**    UPEP  05/23/2017     The urine total protein is increased  No monoclonal bands noted  Reviewed by: Lidia Eastman MD ([id number]) **Electronic Signature**     No results found for: ZAIRA [id number]        Objective:      /84   Pulse (!) 46   Ht 6' (1 829 m)   Wt 116 kg (256 lb)   SpO2 97%   BMI 34 72 kg/m²          Physical Exam  Vitals signs reviewed  Constitutional:       General: He is not in acute distress  Appearance: He is well-developed  HENT:      Head: Normocephalic and atraumatic  Eyes:      Conjunctiva/sclera: Conjunctivae normal    Neck:      Musculoskeletal: Neck supple  Cardiovascular:      Rate and Rhythm: Normal rate and regular rhythm  Pulmonary:      Effort: Pulmonary effort is normal       Breath sounds: Normal breath sounds  Abdominal:      Palpations: Abdomen is soft  Musculoskeletal:      Right lower leg: Edema present  Left lower leg: Edema present  Skin:     General: Skin is warm  Findings: No rash  Neurological:      Mental Status: He is alert and oriented to person, place, and time  Cranial Nerves: No cranial nerve deficit     Psychiatric:         Behavior: Behavior normal

## 2021-04-12 NOTE — ASSESSMENT & PLAN NOTE
Lab Results   Component Value Date    EGFR 64 03/08/2021    EGFR 74 01/06/2021    EGFR 64 10/12/2020    CREATININE 1 26 03/08/2021    CREATININE 1 11 01/06/2021    CREATININE 1 26 10/12/2020       Kidney function stable, continue with blood pressure and blood sugar control

## 2021-04-19 DIAGNOSIS — E78.2 MIXED HYPERLIPIDEMIA: ICD-10-CM

## 2021-04-19 DIAGNOSIS — E03.9 ACQUIRED HYPOTHYROIDISM: ICD-10-CM

## 2021-04-19 DIAGNOSIS — E79.0 HYPERURICEMIA: ICD-10-CM

## 2021-04-19 RX ORDER — EZETIMIBE 10 MG/1
TABLET ORAL
Qty: 30 TABLET | Refills: 0 | Status: SHIPPED | OUTPATIENT
Start: 2021-04-19 | End: 2021-06-01

## 2021-04-19 RX ORDER — LEVOTHYROXINE SODIUM 25 UG/1
TABLET ORAL
Qty: 30 TABLET | Refills: 0 | Status: SHIPPED | OUTPATIENT
Start: 2021-04-19 | End: 2021-06-01

## 2021-04-19 RX ORDER — ALLOPURINOL 300 MG/1
TABLET ORAL
Qty: 90 TABLET | Refills: 0 | Status: SHIPPED | OUTPATIENT
Start: 2021-04-19 | End: 2021-07-19

## 2021-04-22 DIAGNOSIS — E11.42 DIABETIC PERIPHERAL NEUROPATHY (HCC): ICD-10-CM

## 2021-04-22 RX ORDER — GABAPENTIN 600 MG/1
TABLET ORAL
Qty: 180 TABLET | Refills: 0 | Status: SHIPPED | OUTPATIENT
Start: 2021-04-22 | End: 2021-05-21

## 2021-04-28 ENCOUNTER — IMMUNIZATIONS (OUTPATIENT)
Dept: FAMILY MEDICINE CLINIC | Facility: HOSPITAL | Age: 56
End: 2021-04-28

## 2021-04-28 DIAGNOSIS — Z23 ENCOUNTER FOR IMMUNIZATION: Primary | ICD-10-CM

## 2021-04-28 PROCEDURE — 91301 SARS-COV-2 / COVID-19 MRNA VACCINE (MODERNA) 100 MCG: CPT

## 2021-04-28 PROCEDURE — 0012A SARS-COV-2 / COVID-19 MRNA VACCINE (MODERNA) 100 MCG: CPT

## 2021-04-30 DIAGNOSIS — E11.29 CONTROLLED TYPE 2 DIABETES MELLITUS WITH MICROALBUMINURIA, WITHOUT LONG-TERM CURRENT USE OF INSULIN (HCC): ICD-10-CM

## 2021-04-30 DIAGNOSIS — R80.9 CONTROLLED TYPE 2 DIABETES MELLITUS WITH MICROALBUMINURIA, WITHOUT LONG-TERM CURRENT USE OF INSULIN (HCC): ICD-10-CM

## 2021-04-30 DIAGNOSIS — R60.9 EDEMA, UNSPECIFIED TYPE: ICD-10-CM

## 2021-04-30 RX ORDER — FUROSEMIDE 40 MG/1
TABLET ORAL
Qty: 90 TABLET | Refills: 1 | Status: SHIPPED | OUTPATIENT
Start: 2021-04-30 | End: 2021-11-08 | Stop reason: SDUPTHER

## 2021-04-30 RX ORDER — METFORMIN HYDROCHLORIDE 500 MG/1
TABLET, EXTENDED RELEASE ORAL
Qty: 30 TABLET | Refills: 0 | Status: SHIPPED | OUTPATIENT
Start: 2021-04-30 | End: 2021-06-01

## 2021-05-21 DIAGNOSIS — E11.42 DIABETIC PERIPHERAL NEUROPATHY (HCC): ICD-10-CM

## 2021-05-21 DIAGNOSIS — E78.2 MIXED HYPERLIPIDEMIA: ICD-10-CM

## 2021-05-21 RX ORDER — GABAPENTIN 600 MG/1
TABLET ORAL
Qty: 180 TABLET | Refills: 0 | Status: SHIPPED | OUTPATIENT
Start: 2021-05-21 | End: 2021-06-15

## 2021-05-21 RX ORDER — ATORVASTATIN CALCIUM 40 MG/1
TABLET, FILM COATED ORAL
Qty: 90 TABLET | Refills: 0 | Status: SHIPPED | OUTPATIENT
Start: 2021-05-21 | End: 2021-08-26

## 2021-05-31 DIAGNOSIS — E78.2 MIXED HYPERLIPIDEMIA: ICD-10-CM

## 2021-05-31 DIAGNOSIS — E03.9 ACQUIRED HYPOTHYROIDISM: ICD-10-CM

## 2021-05-31 DIAGNOSIS — E11.29 CONTROLLED TYPE 2 DIABETES MELLITUS WITH MICROALBUMINURIA, WITHOUT LONG-TERM CURRENT USE OF INSULIN (HCC): ICD-10-CM

## 2021-05-31 DIAGNOSIS — R80.9 CONTROLLED TYPE 2 DIABETES MELLITUS WITH MICROALBUMINURIA, WITHOUT LONG-TERM CURRENT USE OF INSULIN (HCC): ICD-10-CM

## 2021-06-01 RX ORDER — EZETIMIBE 10 MG/1
TABLET ORAL
Qty: 30 TABLET | Refills: 0 | Status: SHIPPED | OUTPATIENT
Start: 2021-06-01 | End: 2021-07-06

## 2021-06-01 RX ORDER — METFORMIN HYDROCHLORIDE 500 MG/1
TABLET, EXTENDED RELEASE ORAL
Qty: 30 TABLET | Refills: 0 | Status: SHIPPED | OUTPATIENT
Start: 2021-06-01 | End: 2021-07-06

## 2021-06-01 RX ORDER — LEVOTHYROXINE SODIUM 25 UG/1
TABLET ORAL
Qty: 30 TABLET | Refills: 0 | Status: SHIPPED | OUTPATIENT
Start: 2021-06-01 | End: 2021-07-06

## 2021-06-15 DIAGNOSIS — I10 ESSENTIAL HYPERTENSION: ICD-10-CM

## 2021-06-15 DIAGNOSIS — E11.42 DIABETIC PERIPHERAL NEUROPATHY (HCC): ICD-10-CM

## 2021-06-15 RX ORDER — GABAPENTIN 600 MG/1
TABLET ORAL
Qty: 180 TABLET | Refills: 0 | Status: SHIPPED | OUTPATIENT
Start: 2021-06-15 | End: 2021-07-19

## 2021-06-15 RX ORDER — CARVEDILOL 12.5 MG/1
TABLET ORAL
Qty: 60 TABLET | Refills: 0 | Status: SHIPPED | OUTPATIENT
Start: 2021-06-15 | End: 2021-09-02 | Stop reason: HOSPADM

## 2021-07-02 DIAGNOSIS — E03.9 ACQUIRED HYPOTHYROIDISM: ICD-10-CM

## 2021-07-02 DIAGNOSIS — E11.29 CONTROLLED TYPE 2 DIABETES MELLITUS WITH MICROALBUMINURIA, WITHOUT LONG-TERM CURRENT USE OF INSULIN (HCC): ICD-10-CM

## 2021-07-02 DIAGNOSIS — R80.9 CONTROLLED TYPE 2 DIABETES MELLITUS WITH MICROALBUMINURIA, WITHOUT LONG-TERM CURRENT USE OF INSULIN (HCC): ICD-10-CM

## 2021-07-02 DIAGNOSIS — I10 ESSENTIAL HYPERTENSION: ICD-10-CM

## 2021-07-02 DIAGNOSIS — E11.9 TYPE 2 DIABETES MELLITUS WITHOUT COMPLICATION, WITHOUT LONG-TERM CURRENT USE OF INSULIN (HCC): ICD-10-CM

## 2021-07-02 DIAGNOSIS — E78.2 MIXED HYPERLIPIDEMIA: ICD-10-CM

## 2021-07-06 RX ORDER — METFORMIN HYDROCHLORIDE 500 MG/1
TABLET, EXTENDED RELEASE ORAL
Qty: 30 TABLET | Refills: 0 | Status: SHIPPED | OUTPATIENT
Start: 2021-07-06 | End: 2021-08-02

## 2021-07-06 RX ORDER — LISINOPRIL 5 MG/1
TABLET ORAL
Qty: 90 TABLET | Refills: 0 | Status: SHIPPED | OUTPATIENT
Start: 2021-07-06 | End: 2021-10-04

## 2021-07-06 RX ORDER — LEVOTHYROXINE SODIUM 25 UG/1
TABLET ORAL
Qty: 30 TABLET | Refills: 0 | Status: SHIPPED | OUTPATIENT
Start: 2021-07-06 | End: 2021-08-02

## 2021-07-06 RX ORDER — EZETIMIBE 10 MG/1
TABLET ORAL
Qty: 30 TABLET | Refills: 0 | Status: SHIPPED | OUTPATIENT
Start: 2021-07-06 | End: 2021-08-02

## 2021-07-11 DIAGNOSIS — O22.30 DVT (DEEP VEIN THROMBOSIS) IN PREGNANCY: ICD-10-CM

## 2021-07-12 ENCOUNTER — HOSPITAL ENCOUNTER (OUTPATIENT)
Dept: NON INVASIVE DIAGNOSTICS | Facility: HOSPITAL | Age: 56
Discharge: HOME | End: 2021-07-12
Attending: FAMILY MEDICINE
Payer: COMMERCIAL

## 2021-07-12 ENCOUNTER — TELEPHONE (OUTPATIENT)
Dept: FAMILY MEDICINE CLINIC | Facility: CLINIC | Age: 56
End: 2021-07-12

## 2021-07-12 DIAGNOSIS — I82.509 CHRONIC DEEP VEIN THROMBOSIS (DVT) OF LOWER EXTREMITY, UNSPECIFIED LATERALITY, UNSPECIFIED VEIN (HCC): Primary | ICD-10-CM

## 2021-07-12 DIAGNOSIS — I82.509 CHRONIC DEEP VEIN THROMBOSIS (DVT) OF LOWER EXTREMITY, UNSPECIFIED LATERALITY, UNSPECIFIED VEIN (HCC): ICD-10-CM

## 2021-07-12 PROCEDURE — 93971 EXTREMITY STUDY: CPT | Performed by: SURGERY

## 2021-07-12 PROCEDURE — 93971 EXTREMITY STUDY: CPT

## 2021-07-12 RX ORDER — RIVAROXABAN 20 MG/1
TABLET, FILM COATED ORAL
Qty: 90 TABLET | Refills: 0 | Status: SHIPPED | OUTPATIENT
Start: 2021-07-12 | End: 2021-10-04

## 2021-07-12 NOTE — TELEPHONE ENCOUNTER
Given to  to schedule       ----- Message from Patrick Duron DO sent at 7/12/2021 12:13 PM EDT -----  Regarding: FW: Non-Urgent Medical Question  Contact: [phone]  Resume anticoagulation immediately and I have ordered a stat venous Doppler to be done today  ----- Message -----  From: Bladimir Herrera  Sent: 7/12/2021  11:44 AM EDT  To: Patrick Duron DO  Subject: FW: Non-Urgent Medical Question                    ----- Message -----  From: Safia Mahmood  Sent: 7/12/2021  11:41 AM EDT  To: [facility]  Subject: Non-Urgent Medical Question                      Good Morning Doc,    I just realized that I have not been taking my Xarelto for the past month and my right lower leg is sore in the exact spot where my first clot was, what should I do?       Justus Padilla

## 2021-07-12 NOTE — TELEPHONE ENCOUNTER
STAT results -  Positive (+) for DVT - Right Posterior Tibial vein    Pt was sent home because he received Rx'd Xarelto today

## 2021-07-13 NOTE — TELEPHONE ENCOUNTER
Call patient make sure he picked up his Xarelto now generally if somebody starting this for for an acute DVT without ever being on it we to give them 15 mg twice daily for the days but since he has already been on a before I think we can skip the loading dose and just treat him by resuming his previous dose find out if he was on the 20 mg once a day or 10 mg once a day

## 2021-07-19 DIAGNOSIS — E26.9 HYPERALDOSTERONISM (HCC): ICD-10-CM

## 2021-07-19 DIAGNOSIS — E79.0 HYPERURICEMIA: ICD-10-CM

## 2021-07-19 DIAGNOSIS — E11.42 DIABETIC PERIPHERAL NEUROPATHY (HCC): ICD-10-CM

## 2021-07-19 RX ORDER — ALLOPURINOL 300 MG/1
TABLET ORAL
Qty: 90 TABLET | Refills: 0 | Status: SHIPPED | OUTPATIENT
Start: 2021-07-19 | End: 2021-10-18

## 2021-07-19 RX ORDER — GABAPENTIN 600 MG/1
TABLET ORAL
Qty: 180 TABLET | Refills: 0 | Status: SHIPPED | OUTPATIENT
Start: 2021-07-19 | End: 2021-08-26

## 2021-07-19 RX ORDER — SPIRONOLACTONE 25 MG/1
TABLET ORAL
Qty: 90 TABLET | Refills: 0 | Status: SHIPPED | OUTPATIENT
Start: 2021-07-19 | End: 2021-10-19

## 2021-08-02 DIAGNOSIS — E11.29 CONTROLLED TYPE 2 DIABETES MELLITUS WITH MICROALBUMINURIA, WITHOUT LONG-TERM CURRENT USE OF INSULIN (HCC): ICD-10-CM

## 2021-08-02 DIAGNOSIS — E03.9 ACQUIRED HYPOTHYROIDISM: ICD-10-CM

## 2021-08-02 DIAGNOSIS — R80.9 CONTROLLED TYPE 2 DIABETES MELLITUS WITH MICROALBUMINURIA, WITHOUT LONG-TERM CURRENT USE OF INSULIN (HCC): ICD-10-CM

## 2021-08-02 DIAGNOSIS — E78.2 MIXED HYPERLIPIDEMIA: ICD-10-CM

## 2021-08-02 RX ORDER — EZETIMIBE 10 MG/1
TABLET ORAL
Qty: 30 TABLET | Refills: 0 | Status: SHIPPED | OUTPATIENT
Start: 2021-08-02 | End: 2021-09-13 | Stop reason: SDUPTHER

## 2021-08-02 RX ORDER — LEVOTHYROXINE SODIUM 25 UG/1
TABLET ORAL
Qty: 30 TABLET | Refills: 0 | Status: SHIPPED | OUTPATIENT
Start: 2021-08-02 | End: 2021-09-15

## 2021-08-02 RX ORDER — METFORMIN HYDROCHLORIDE 500 MG/1
TABLET, EXTENDED RELEASE ORAL
Qty: 30 TABLET | Refills: 0 | Status: SHIPPED | OUTPATIENT
Start: 2021-08-02 | End: 2021-09-15

## 2021-08-02 RX ORDER — LEVOTHYROXINE SODIUM 0.15 MG/1
TABLET ORAL
Qty: 90 TABLET | Refills: 0 | Status: SHIPPED | OUTPATIENT
Start: 2021-08-02 | End: 2021-10-18

## 2021-08-25 DIAGNOSIS — E11.42 DIABETIC PERIPHERAL NEUROPATHY (HCC): ICD-10-CM

## 2021-08-25 DIAGNOSIS — E78.2 MIXED HYPERLIPIDEMIA: ICD-10-CM

## 2021-08-26 RX ORDER — GABAPENTIN 600 MG/1
TABLET ORAL
Qty: 180 TABLET | Refills: 0 | Status: SHIPPED | OUTPATIENT
Start: 2021-08-26 | End: 2021-09-24

## 2021-08-26 RX ORDER — ATORVASTATIN CALCIUM 40 MG/1
TABLET, FILM COATED ORAL
Qty: 90 TABLET | Refills: 0 | Status: SHIPPED | OUTPATIENT
Start: 2021-08-26 | End: 2021-12-10 | Stop reason: SDUPTHER

## 2021-08-30 ENCOUNTER — HOSPITAL ENCOUNTER (INPATIENT)
Facility: HOSPITAL | Age: 56
LOS: 3 days | Discharge: HOME | DRG: 243 | End: 2021-09-02
Attending: EMERGENCY MEDICINE | Admitting: INTERNAL MEDICINE
Payer: COMMERCIAL

## 2021-08-30 ENCOUNTER — APPOINTMENT (EMERGENCY)
Dept: RADIOLOGY | Facility: HOSPITAL | Age: 56
DRG: 243 | End: 2021-08-30
Payer: COMMERCIAL

## 2021-08-30 ENCOUNTER — APPOINTMENT (INPATIENT)
Dept: NON INVASIVE DIAGNOSTICS | Facility: HOSPITAL | Age: 56
DRG: 243 | End: 2021-08-30
Payer: COMMERCIAL

## 2021-08-30 DIAGNOSIS — E87.5 HYPERKALEMIA: ICD-10-CM

## 2021-08-30 DIAGNOSIS — R00.1 BRADYCARDIA: Primary | ICD-10-CM

## 2021-08-30 DIAGNOSIS — N28.9 RENAL INSUFFICIENCY: ICD-10-CM

## 2021-08-30 DIAGNOSIS — I48.0 PAROXYSMAL ATRIAL FIBRILLATION (HCC): ICD-10-CM

## 2021-08-30 PROBLEM — Z86.718 HISTORY OF DVT (DEEP VEIN THROMBOSIS): Status: ACTIVE | Noted: 2021-08-30

## 2021-08-30 PROBLEM — E78.5 DYSLIPIDEMIA: Status: ACTIVE | Noted: 2021-08-30

## 2021-08-30 PROBLEM — R55 SYNCOPAL EPISODES: Status: ACTIVE | Noted: 2021-08-30

## 2021-08-30 LAB
ALBUMIN SERPL BCP-MCNC: 3.7 G/DL (ref 3.5–5)
ALP SERPL-CCNC: 53 U/L (ref 46–116)
ALT SERPL W P-5'-P-CCNC: 30 U/L (ref 12–78)
ANION GAP SERPL CALCULATED.3IONS-SCNC: 8 MMOL/L (ref 4–13)
ANION GAP SERPL CALCULATED.3IONS-SCNC: 9 MMOL/L (ref 4–13)
ANION GAP SERPL CALCULATED.3IONS-SCNC: 9 MMOL/L (ref 4–13)
APTT PPP: 41 SECONDS (ref 23–37)
AST SERPL W P-5'-P-CCNC: 42 U/L (ref 5–45)
ATRIAL RATE: 267 BPM
ATRIAL RATE: 36 BPM
ATRIAL RATE: 50 BPM
BASOPHILS # BLD AUTO: 0.03 THOUSANDS/ΜL (ref 0–0.1)
BASOPHILS NFR BLD AUTO: 1 % (ref 0–1)
BILIRUB SERPL-MCNC: 0.49 MG/DL (ref 0.2–1)
BUN SERPL-MCNC: 40 MG/DL (ref 5–25)
BUN SERPL-MCNC: 41 MG/DL (ref 5–25)
BUN SERPL-MCNC: 42 MG/DL (ref 5–25)
CALCIUM SERPL-MCNC: 9.5 MG/DL (ref 8.3–10.1)
CALCIUM SERPL-MCNC: 9.7 MG/DL (ref 8.3–10.1)
CALCIUM SERPL-MCNC: 9.8 MG/DL (ref 8.3–10.1)
CHLORIDE SERPL-SCNC: 102 MMOL/L (ref 100–108)
CHLORIDE SERPL-SCNC: 105 MMOL/L (ref 100–108)
CHLORIDE SERPL-SCNC: 105 MMOL/L (ref 100–108)
CO2 SERPL-SCNC: 22 MMOL/L (ref 21–32)
CO2 SERPL-SCNC: 24 MMOL/L (ref 21–32)
CO2 SERPL-SCNC: 24 MMOL/L (ref 21–32)
CREAT SERPL-MCNC: 1.66 MG/DL (ref 0.6–1.3)
CREAT SERPL-MCNC: 1.72 MG/DL (ref 0.6–1.3)
CREAT SERPL-MCNC: 1.93 MG/DL (ref 0.6–1.3)
EOSINOPHIL # BLD AUTO: 0.41 THOUSAND/ΜL (ref 0–0.61)
EOSINOPHIL NFR BLD AUTO: 6 % (ref 0–6)
ERYTHROCYTE [DISTWIDTH] IN BLOOD BY AUTOMATED COUNT: 14.7 % (ref 11.6–15.1)
EST. AVERAGE GLUCOSE BLD GHB EST-MCNC: 126 MG/DL
GFR SERPL CREATININE-BSD FRML MDRD: 38 ML/MIN/1.73SQ M
GFR SERPL CREATININE-BSD FRML MDRD: 43 ML/MIN/1.73SQ M
GFR SERPL CREATININE-BSD FRML MDRD: 45 ML/MIN/1.73SQ M
GLUCOSE SERPL-MCNC: 112 MG/DL (ref 65–140)
GLUCOSE SERPL-MCNC: 122 MG/DL (ref 65–140)
GLUCOSE SERPL-MCNC: 164 MG/DL (ref 65–140)
GLUCOSE SERPL-MCNC: 167 MG/DL (ref 65–140)
GLUCOSE SERPL-MCNC: 181 MG/DL (ref 65–140)
GLUCOSE SERPL-MCNC: 220 MG/DL (ref 65–140)
HBA1C MFR BLD: 6 %
HCT VFR BLD AUTO: 49.9 % (ref 36.5–49.3)
HGB BLD-MCNC: 15.9 G/DL (ref 12–17)
IMM GRANULOCYTES # BLD AUTO: 0.02 THOUSAND/UL (ref 0–0.2)
IMM GRANULOCYTES NFR BLD AUTO: 0 % (ref 0–2)
INR PPP: 2.43 (ref 0.84–1.19)
LYMPHOCYTES # BLD AUTO: 1.83 THOUSANDS/ΜL (ref 0.6–4.47)
LYMPHOCYTES NFR BLD AUTO: 29 % (ref 14–44)
MAGNESIUM SERPL-MCNC: 2.1 MG/DL (ref 1.6–2.6)
MCH RBC QN AUTO: 30.9 PG (ref 26.8–34.3)
MCHC RBC AUTO-ENTMCNC: 31.9 G/DL (ref 31.4–37.4)
MCV RBC AUTO: 97 FL (ref 82–98)
MONOCYTES # BLD AUTO: 0.64 THOUSAND/ΜL (ref 0.17–1.22)
MONOCYTES NFR BLD AUTO: 10 % (ref 4–12)
NEUTROPHILS # BLD AUTO: 3.49 THOUSANDS/ΜL (ref 1.85–7.62)
NEUTS SEG NFR BLD AUTO: 54 % (ref 43–75)
NRBC BLD AUTO-RTO: 0 /100 WBCS
NT-PROBNP SERPL-MCNC: 292 PG/ML
P AXIS: 64 DEGREES
P AXIS: 74 DEGREES
PLATELET # BLD AUTO: 175 THOUSANDS/UL (ref 149–390)
PMV BLD AUTO: 10 FL (ref 8.9–12.7)
POTASSIUM SERPL-SCNC: 5 MMOL/L (ref 3.5–5.3)
POTASSIUM SERPL-SCNC: 5.3 MMOL/L (ref 3.5–5.3)
POTASSIUM SERPL-SCNC: 6.3 MMOL/L (ref 3.5–5.3)
PR INTERVAL: 152 MS
PR INTERVAL: 162 MS
PROT SERPL-MCNC: 7.4 G/DL (ref 6.4–8.2)
PROTHROMBIN TIME: 25.9 SECONDS (ref 11.6–14.5)
QRS AXIS: 55 DEGREES
QRS AXIS: 59 DEGREES
QRS AXIS: 67 DEGREES
QRSD INTERVAL: 84 MS
QRSD INTERVAL: 86 MS
QRSD INTERVAL: 90 MS
QT INTERVAL: 392 MS
QT INTERVAL: 464 MS
QT INTERVAL: 574 MS
QTC INTERVAL: 404 MS
QTC INTERVAL: 423 MS
QTC INTERVAL: 443 MS
RBC # BLD AUTO: 5.14 MILLION/UL (ref 3.88–5.62)
SODIUM SERPL-SCNC: 134 MMOL/L (ref 136–145)
SODIUM SERPL-SCNC: 136 MMOL/L (ref 136–145)
SODIUM SERPL-SCNC: 138 MMOL/L (ref 136–145)
T WAVE AXIS: 60 DEGREES
T WAVE AXIS: 62 DEGREES
T WAVE AXIS: 78 DEGREES
T4 FREE SERPL-MCNC: 1.43 NG/DL (ref 0.76–1.46)
TROPONIN I SERPL-MCNC: <0.02 NG/ML
TSH SERPL DL<=0.05 MIU/L-ACNC: 3.83 UIU/ML (ref 0.36–3.74)
VENTRICULAR RATE: 36 BPM
VENTRICULAR RATE: 50 BPM
VENTRICULAR RATE: 64 BPM
WBC # BLD AUTO: 6.42 THOUSAND/UL (ref 4.31–10.16)

## 2021-08-30 PROCEDURE — 96375 TX/PRO/DX INJ NEW DRUG ADDON: CPT

## 2021-08-30 PROCEDURE — 36415 COLL VENOUS BLD VENIPUNCTURE: CPT | Performed by: EMERGENCY MEDICINE

## 2021-08-30 PROCEDURE — 80048 BASIC METABOLIC PNL TOTAL CA: CPT | Performed by: NURSE PRACTITIONER

## 2021-08-30 PROCEDURE — 71045 X-RAY EXAM CHEST 1 VIEW: CPT

## 2021-08-30 PROCEDURE — 93306 TTE W/DOPPLER COMPLETE: CPT

## 2021-08-30 PROCEDURE — 85730 THROMBOPLASTIN TIME PARTIAL: CPT | Performed by: EMERGENCY MEDICINE

## 2021-08-30 PROCEDURE — 83735 ASSAY OF MAGNESIUM: CPT | Performed by: EMERGENCY MEDICINE

## 2021-08-30 PROCEDURE — 93010 ELECTROCARDIOGRAM REPORT: CPT | Performed by: INTERNAL MEDICINE

## 2021-08-30 PROCEDURE — 83880 ASSAY OF NATRIURETIC PEPTIDE: CPT | Performed by: EMERGENCY MEDICINE

## 2021-08-30 PROCEDURE — 82948 REAGENT STRIP/BLOOD GLUCOSE: CPT

## 2021-08-30 PROCEDURE — 85025 COMPLETE CBC W/AUTO DIFF WBC: CPT | Performed by: EMERGENCY MEDICINE

## 2021-08-30 PROCEDURE — 99285 EMERGENCY DEPT VISIT HI MDM: CPT

## 2021-08-30 PROCEDURE — 84443 ASSAY THYROID STIM HORMONE: CPT | Performed by: EMERGENCY MEDICINE

## 2021-08-30 PROCEDURE — 80053 COMPREHEN METABOLIC PANEL: CPT | Performed by: EMERGENCY MEDICINE

## 2021-08-30 PROCEDURE — 93005 ELECTROCARDIOGRAM TRACING: CPT

## 2021-08-30 PROCEDURE — 96365 THER/PROPH/DIAG IV INF INIT: CPT

## 2021-08-30 PROCEDURE — 84484 ASSAY OF TROPONIN QUANT: CPT | Performed by: EMERGENCY MEDICINE

## 2021-08-30 PROCEDURE — 99254 IP/OBS CNSLTJ NEW/EST MOD 60: CPT | Performed by: INTERNAL MEDICINE

## 2021-08-30 PROCEDURE — 83036 HEMOGLOBIN GLYCOSYLATED A1C: CPT | Performed by: NURSE PRACTITIONER

## 2021-08-30 PROCEDURE — 86618 LYME DISEASE ANTIBODY: CPT | Performed by: PHYSICIAN ASSISTANT

## 2021-08-30 PROCEDURE — 85610 PROTHROMBIN TIME: CPT | Performed by: EMERGENCY MEDICINE

## 2021-08-30 PROCEDURE — 80048 BASIC METABOLIC PNL TOTAL CA: CPT

## 2021-08-30 PROCEDURE — 93306 TTE W/DOPPLER COMPLETE: CPT | Performed by: INTERNAL MEDICINE

## 2021-08-30 PROCEDURE — 99291 CRITICAL CARE FIRST HOUR: CPT | Performed by: INTERNAL MEDICINE

## 2021-08-30 PROCEDURE — 96368 THER/DIAG CONCURRENT INF: CPT

## 2021-08-30 PROCEDURE — 99291 CRITICAL CARE FIRST HOUR: CPT | Performed by: EMERGENCY MEDICINE

## 2021-08-30 PROCEDURE — 84439 ASSAY OF FREE THYROXINE: CPT | Performed by: INTERNAL MEDICINE

## 2021-08-30 RX ORDER — ATROPINE SULFATE 0.1 MG/ML
0.5 INJECTION INTRAVENOUS ONCE
Status: COMPLETED | OUTPATIENT
Start: 2021-08-30 | End: 2021-08-30

## 2021-08-30 RX ORDER — LISINOPRIL 5 MG/1
5 TABLET ORAL DAILY
Status: DISCONTINUED | OUTPATIENT
Start: 2021-08-30 | End: 2021-08-30

## 2021-08-30 RX ORDER — HEPARIN SODIUM 1000 [USP'U]/ML
4000 INJECTION, SOLUTION INTRAVENOUS; SUBCUTANEOUS ONCE
Status: DISCONTINUED | OUTPATIENT
Start: 2021-08-30 | End: 2021-08-30

## 2021-08-30 RX ORDER — GABAPENTIN 300 MG/1
1200 CAPSULE ORAL 3 TIMES DAILY
Status: DISCONTINUED | OUTPATIENT
Start: 2021-08-30 | End: 2021-09-02 | Stop reason: HOSPADM

## 2021-08-30 RX ORDER — HEPARIN SODIUM 10000 [USP'U]/100ML
3-20 INJECTION, SOLUTION INTRAVENOUS
Status: DISCONTINUED | OUTPATIENT
Start: 2021-08-30 | End: 2021-08-30

## 2021-08-30 RX ORDER — ALLOPURINOL 100 MG/1
300 TABLET ORAL DAILY
Status: DISCONTINUED | OUTPATIENT
Start: 2021-08-30 | End: 2021-09-02 | Stop reason: HOSPADM

## 2021-08-30 RX ORDER — SODIUM CHLORIDE, SODIUM LACTATE, POTASSIUM CHLORIDE, CALCIUM CHLORIDE 600; 310; 30; 20 MG/100ML; MG/100ML; MG/100ML; MG/100ML
75 INJECTION, SOLUTION INTRAVENOUS CONTINUOUS
Status: DISCONTINUED | OUTPATIENT
Start: 2021-08-30 | End: 2021-09-01

## 2021-08-30 RX ORDER — ATROPINE SULFATE 1 MG/ML
0.5 INJECTION, SOLUTION INTRAMUSCULAR; INTRAVENOUS; SUBCUTANEOUS ONCE
Status: DISCONTINUED | OUTPATIENT
Start: 2021-08-30 | End: 2021-08-30

## 2021-08-30 RX ORDER — ALBUTEROL SULFATE 2.5 MG/3ML
10 SOLUTION RESPIRATORY (INHALATION) ONCE
Status: DISCONTINUED | OUTPATIENT
Start: 2021-08-30 | End: 2021-08-30

## 2021-08-30 RX ORDER — ACETAMINOPHEN 325 MG/1
650 TABLET ORAL EVERY 6 HOURS PRN
Status: DISCONTINUED | OUTPATIENT
Start: 2021-08-30 | End: 2021-09-02 | Stop reason: HOSPADM

## 2021-08-30 RX ORDER — DOPAMINE HYDROCHLORIDE 160 MG/100ML
1-20 INJECTION, SOLUTION INTRAVENOUS CONTINUOUS
Status: DISCONTINUED | OUTPATIENT
Start: 2021-08-30 | End: 2021-08-31

## 2021-08-30 RX ORDER — ATORVASTATIN CALCIUM 40 MG/1
40 TABLET, FILM COATED ORAL
Status: DISCONTINUED | OUTPATIENT
Start: 2021-08-30 | End: 2021-09-02 | Stop reason: HOSPADM

## 2021-08-30 RX ORDER — FUROSEMIDE 40 MG/1
40 TABLET ORAL DAILY
Status: DISCONTINUED | OUTPATIENT
Start: 2021-08-30 | End: 2021-08-30

## 2021-08-30 RX ORDER — EZETIMIBE 10 MG/1
10 TABLET ORAL DAILY
Status: DISCONTINUED | OUTPATIENT
Start: 2021-08-30 | End: 2021-08-30

## 2021-08-30 RX ORDER — EZETIMIBE 10 MG/1
10 TABLET ORAL EVERY MORNING
Status: DISCONTINUED | OUTPATIENT
Start: 2021-08-30 | End: 2021-09-02 | Stop reason: HOSPADM

## 2021-08-30 RX ORDER — ATROPINE SULFATE 0.1 MG/ML
INJECTION INTRAVENOUS
Status: COMPLETED
Start: 2021-08-30 | End: 2021-08-30

## 2021-08-30 RX ORDER — SPIRONOLACTONE 25 MG/1
25 TABLET ORAL DAILY
Status: DISCONTINUED | OUTPATIENT
Start: 2021-08-30 | End: 2021-08-30

## 2021-08-30 RX ADMIN — DOPAMINE HYDROCHLORIDE IN DEXTROSE 5 MCG/KG/MIN: 1.6 INJECTION, SOLUTION INTRAVENOUS at 08:25

## 2021-08-30 RX ADMIN — DOPAMINE HYDROCHLORIDE IN DEXTROSE 15 MCG/KG/MIN: 1.6 INJECTION, SOLUTION INTRAVENOUS at 13:19

## 2021-08-30 RX ADMIN — SODIUM CHLORIDE, SODIUM LACTATE, POTASSIUM CHLORIDE, AND CALCIUM CHLORIDE 500 ML: .6; .31; .03; .02 INJECTION, SOLUTION INTRAVENOUS at 07:53

## 2021-08-30 RX ADMIN — SODIUM CHLORIDE, SODIUM LACTATE, POTASSIUM CHLORIDE, AND CALCIUM CHLORIDE 75 ML/HR: .6; .31; .03; .02 INJECTION, SOLUTION INTRAVENOUS at 17:39

## 2021-08-30 RX ADMIN — ATROPINE SULFATE 0.5 MG: 0.1 INJECTION INTRAVENOUS at 07:47

## 2021-08-30 RX ADMIN — INSULIN LISPRO 1 UNITS: 100 INJECTION, SOLUTION INTRAVENOUS; SUBCUTANEOUS at 18:30

## 2021-08-30 RX ADMIN — ACETAMINOPHEN 650 MG: 325 TABLET, FILM COATED ORAL at 14:21

## 2021-08-30 RX ADMIN — DOPAMINE HYDROCHLORIDE IN DEXTROSE 17.5 MCG/KG/MIN: 1.6 INJECTION, SOLUTION INTRAVENOUS at 17:01

## 2021-08-30 RX ADMIN — DOPAMINE HYDROCHLORIDE IN DEXTROSE 20 MCG/KG/MIN: 1.6 INJECTION, SOLUTION INTRAVENOUS at 21:10

## 2021-08-30 RX ADMIN — INSULIN LISPRO 1 UNITS: 100 INJECTION, SOLUTION INTRAVENOUS; SUBCUTANEOUS at 11:53

## 2021-08-30 RX ADMIN — GABAPENTIN 1200 MG: 300 CAPSULE ORAL at 21:10

## 2021-08-30 RX ADMIN — SODIUM CHLORIDE, SODIUM LACTATE, POTASSIUM CHLORIDE, AND CALCIUM CHLORIDE 125 ML/HR: .6; .31; .03; .02 INJECTION, SOLUTION INTRAVENOUS at 08:06

## 2021-08-30 RX ADMIN — GABAPENTIN 1200 MG: 300 CAPSULE ORAL at 15:45

## 2021-08-30 RX ADMIN — SODIUM CHLORIDE, SODIUM LACTATE, POTASSIUM CHLORIDE, AND CALCIUM CHLORIDE 500 ML: .6; .31; .03; .02 INJECTION, SOLUTION INTRAVENOUS at 09:44

## 2021-08-30 NOTE — ASSESSMENT & PLAN NOTE
Lab Results   Component Value Date/Time    INR 2 43 (H) 08/30/2021 07:33 AM    PTT 41 (H) 08/30/2021 07:33 AM    PROTIME 25 9 (H) 08/30/2021 07:33 AM     H/o DVT of B/L LE and PE   · Pt takes Xeralto at home - took today   · Heparin tomorrow

## 2021-08-30 NOTE — ED NOTES
Patients girlfriend and emergency contact updated on patient status and plan of care        Mayank Avalos RN  08/30/21 0745

## 2021-08-30 NOTE — PROGRESS NOTES
08/30/21 1200   Clinical Encounter Type   Visited With Patient   Routine Visit Introduction   Continue Visiting Yes

## 2021-08-30 NOTE — ED NOTES
While provider still speaking with pt at bedside  Pt noted to become bradycardic  New EKG obtained, pt placed on life pack, and atropine given as ordered        Jong Harmon RN  08/30/21 [id number]

## 2021-08-30 NOTE — ASSESSMENT & PLAN NOTE
· Cardiology recommendations appreciated   · Monitor BP - if BP increases considering Lisinopril or Amlodipine  · Carvedilol currently held

## 2021-08-30 NOTE — PROGRESS NOTES
Pastoral Care Progress Note    2021  Patient: Aparna Brownlee : 1965  Admission Date & Time: 2021 4271  MRN: [id number] [facility]: [id number]                     Chaplaincy Interventions Utilized:   Empowerment: Encouraged self-care    Exploration: Explored emotional needs & resources and Explored spiritual needs & resources    Collaboration: Encouraged adherence to treatment plan    Relationship Building: Cultivated a relationship of care and support and Listened empathically      Chaplaincy Outcomes Achieved:  Expressed gratitude    Spiritual Coping Strategies Utilized:   Spiritual gratitude

## 2021-08-30 NOTE — CONSULTS
Consult - Cardiology   Portia Baer 64 y o  male MRN: [id number]  Unit/Bed#: ED 10 Encounter: [id number]        Reason For Consult:  Cardia                 Assessment:  Near-syncope  - likely due to conversion pause or Symptomatic sinus bradycardia (POA)   Sinus rhythm with ventricular rate trend approximately 40 beats per minute  Paroxysmal Atrial flutter (POA)   New dx  HTN    O/p Rx: coreg 12 5mg bid, lasix 40mg/d  Dyslipidemia:    zetia 10mg  Hypothyroidism   TSH 3 82  Hx DVT, bilateral PE-2016   xarelto 20mg qd  Other       Diabetes mellitus       CORIN:  No CPAP    Discussion / Plan:  #  patient presents to the hospital with complaints of near-syncope with evidence of sinus bradycardia, and intermittent atrial flutter with concern for symptomatic sinus bradycardia or extended conversion pause as cause for the patient's near-syncope and lightheadedness    · patient initiated on dopamine with improvement in sinus rate now trending in 50s-low 60s and asymptomatic ~~> continue same hopeful to avoid need for temporary pacing wire  · With signs of new atrial flutter with ventricular rates around 100 beats per minute on Coreg and bradyarrhythmias and thus tachy-luzma syndrome he will in all likelihood need a permanent pacemaker ~~> check echocardiogram with forthcoming correspondence with electrophysiology regarding guarding PPM   · Coreg being held ~~> should hypertension become problematic we can advance lisinopril or add amlodipine  · Hold Xarelto ~~> for now will place patient on a heparin drip for stroke risk reduction            History Of Present Illness:  Gia Barr is a 70-year-old who reports remote evaluation by [facility] cardiologist Dr Ramesh Mitchell for evaluation of chest discomfort with the patient reporting having had a negative stress test with no subsequent cardiology care    His history is notable for hypertension, dyslipidemia, untreated sleep apnea, prior DVT and PE for which he is anticoagulated, and hypothyroidism  This patient indicates he 1-2 month history of intermittent recurring episodes of lightheadedness occurring on essentially a daily basis  The symptoms have occurred randomly under varying circumstances without clear relation to body position or activity with patient stating that he would have transient lightheadedness which would last seconds and then resolve without prior syncope  There is no accompanying sense of cardiac ectopy or chest pain  Today patient had a recurrent episode of lightheadedness resulting in his coming to the emergency department  He states that he had an acute feeling of some lightheadedness which was more profound in usual with a sense of impending loss of consciousness  With that the patient leaned against his workstation for support  After few moments he felt improved enough to ambulate to where he can sit down  Again his episode included no feelings of cardiac ectopy, chest pain, diaphoresis, or dyspnea  Because of the profound nature of his symptoms he this time came to emergency department to be evaluated  Since arrival the patient has had evidence of sinus bradycardia with ventricular rate trend in the 40s as well as intermittent brief episodes of atrial flutter with will looks to be ventricular rates of about 100 beats per minute  His current telemetry shows no conversion pause of greater than 3 seconds          Past Medical History:        Past Medical History:   Diagnosis Date    Arthritis     right foot    Chronic cholecystitis     Diabetes mellitus ([address] )     [facility])     Gout     History of pulmonary embolism     Hyperlipidemia     Hypertension     Hypothyroidism     Lumbar back pain     MTHFR mutation     Neuropathy     Scab     right arm- no s/s infection    Sleep apnea     no CPAP    Tarsal tunnel syndrome, right     Tinnitus     left ear    Wears glasses     and contacts    Wears glasses Past Surgical History:   Procedure Laterality Date    APPENDECTOMY      ARTHRODESIS      Lumbar L__    BACK SURGERY      BUNIONECTOMY Right 10/7/2016    Procedure: REMOVAL TIBIAL  SESAMOID BONE  RIGHT FOOT;  Surgeon: Sary Cazares DPM;  Location: [facility];  Service:     CHOLECYSTECTOMY  03/26/2015    COLONOSCOPY      KNEE ARTHROSCOPY      KNEE ARTHROSCOPY W/ MENISCAL REPAIR      Lateral    NASAL SEPTUM SURGERY  10/16/2013    MI COLONOSCOPY FLX DX W/COLLJ SPEC WHEN PFRMD N/A 11/23/2018    Procedure: COLONOSCOPY;  Surgeon: Omer Arzate MD;  Location: [facility];  Service: Gastroenterology    MI TARSAL TUNNEL RELEASE Right 6/25/2018    Procedure: RELEASE TARSAL TUNNEL;  Surgeon: De Dunlap DPM;  Location: [facility];  Service: Podiatry   [address] Left 3/13/2020    Procedure: RELEASE TARSAL TUNNEL;  Surgeon: De Dunlap DPM;  Location: [address] [place] [address] MAIN OR;  Service: [place]  10/16/2013    with Adenoidectomy    UVULECTOMY  10/16/2013    UVULOPALATOPHARYNGOPLASTY      WISDOM TOOTH EXTRACTION          Allergy:        No Known Allergies    Medications:       Prior to Admission medications    Medication Sig Start Date End Date Taking?  Authorizing Provider   allopurinol (ZYLOPRIM) 300 mg tablet Take 1 tablet by mouth once daily 7/19/21  Yes Reagan Later, DO   atorvastatin (LIPITOR) 40 mg tablet Take 1 tablet by mouth once daily 8/26/21  Yes Reagan Later, DO   carvedilol (COREG) 12 5 mg tablet TAKE 1 TABLET BY MOUTH TWICE DAILY WITH MEALS 6/15/21  Yes Reagan Later, DO   Cholecalciferol 125 MCG (5000 UT) TABS Take 1 tablet (5,000 Units total) by mouth daily 4/12/21  Yes Valente Navarro, DO   Euthyrox 25 MCG tablet TAKE 1 TABLET BY MOUTH ONCE DAILY WITH LEVOTHYROXINE 150 MCG TO EQUAL 175 MCG 8/2/21  Yes Reagan Later, DO   ezetimibe (ZETIA) 10 mg tablet Take 1 tablet by mouth once daily 8/2/21  Yes Reagan Later, DO   furosemide (LASIX) 40 mg tablet Take 1 tablet by mouth once daily 4/30/21  Yes Valente Navarro, DO   gabapentin (NEURONTIN) 600 MG tablet TAKE 2 TABLETS BY MOUTH THREE TIMES DAILY 8/26/21  Yes Ratna Leak, DO   levothyroxine 150 mcg tablet TAKE 1 TABLET BY MOUTH ONCE DAILY IN THE EARLY MORNING    TAKE WITH LEVOTHYROXINE 25 MCG TO EQUAL 175 MCG 8/2/21  Yes Ratna Leak, DO   lisinopril (ZESTRIL) 5 mg tablet Take 1 tablet by mouth once daily 7/6/21  Yes Ratna Leak, DO   metFORMIN (GLUCOPHAGE-XR) 500 mg 24 hr tablet TAKE 1 TABLET BY MOUTH ONCE DAILY AT BEDTIME 8/2/21  Yes Ratna Leak, DO   Multiple Vitamins-Minerals (MENS MULTIVITAMIN PLUS) TABS Take 1 tablet by mouth daily   Yes Historical Provider, MD   spironolactone (ALDACTONE) 25 mg tablet Take 1 tablet by mouth once daily 7/19/21  Yes SARA Dye   Xarelto 20 MG tablet Take 1 tablet by mouth once daily with breakfast 7/12/21  Yes Ratna Leak, DO   sildenafil (Viagra) 100 mg tablet Take 1 tablet (100 mg total) by mouth as needed for erectile dysfunction 9/10/20   Margo Delcid PA-C       Family History:     Family History   Problem Relation Age of Onset    Aneurysm Mother         intracranial aneurysm repair    Coronary artery disease Father     Hypertension Father     Aneurysm Brother         Social History:       Social History     Socioeconomic History    Marital status: Single     Spouse name: None    Number of children: None    Years of education: None    Highest education level: None   Occupational History    None   Tobacco Use    Smoking status: Never Smoker    Smokeless tobacco: Never Used   Vaping Use    Vaping Use: Never used   Substance and Sexual Activity    Alcohol use: Yes     Comment: weekends    Drug use: No    Sexual activity: Yes   Other Topics Concern    None   Social History Narrative    None     Social Determinants of Health     Financial Resource Strain:     Difficulty of Paying Living Expenses:    Food Insecurity:     Worried About Running Out of Food in the Last Year:    [address] in the Last Year:    Transportation Needs:     Lack of Transportation (Medical):  Lack of Transportation (Non-Medical):    Physical Activity:     Days of Exercise per Week:     Minutes of Exercise per Session:    Stress:     Feeling of Stress :    Social Connections:     Frequency of Communication with Friends and Family:     Frequency of Social Gatherings with Friends and Family:     Attends Sabianism Services:     Active Member of Clubs or Organizations:     Attends Club or Organization Meetings:     Marital Status:    Intimate Partner Violence:     Fear of Current or Ex-Partner:     Emotionally Abused:     Physically Abused:     Sexually Abused:        ROS:  Symptoms per HPI  The remainder of the review of systems is negative    Exam:  General:  Alert, normally conversant, comfortable appearing  Head: Normocephalic, atraumatic  Eyes:  EOMI  Pupils - equal, round, reactive to accomodation  No icterus  Normal Conjunctiva  Oropharynx: Moist without lesion  Neck:  No gross bruit, JVD, thyromegaly, or lymphadenopathy  Heart:  Regular with slow rate  No rub nor pathologic murmur  Lungs:  Clear without rales/rhonchi/wheeze  Abdomen:  Soft and nontender with normal bowel sounds  No organomegaly or mass  Lower Limbs:  No edema  Pulses[de-identified]  RLE - DP:  2+                 LLE - DP:  2+  Musculoskeletal: Independent movement of limbs observed, Formal ROM and strength eval not performed  Neurologic:    Oriented to: person, place, situation  Cranial Nerves: grossly intact - vision, smell, taste, and hearing were not tested       Motor function: grossly normal, symmetric   Sensation: Was not tested       Vitals:    08/30/21 0730 08/30/21 0816 08/30/21 0830 08/30/21 0845   BP: 116/78 112/64 (!) 91/42 (!) 101/43   Pulse: 61 81 (!) 44 80   Resp: 18 16 16 18   Temp: 98 2 °F (36 8 °C)      TempSrc: Oral      SpO2: 96% 96% 95% 95%   Weight: 121 kg (266 lb 12 1 oz)              DATA:      Telemetry:   Intermittent atrial flutter with ventricular rates approximately 100 beats per minute  Sinus rates on arrival approximately 40 beats per minute-now trending in the upper 50s on dopamine      -----------------------------------------------------------------------------------------------------------------------------------------------  Weights: Wt Readings from Last 20 Encounters:   08/30/21 121 kg (266 lb 12 1 oz)   04/12/21 116 kg (256 lb)   03/10/21 120 kg (264 lb)   12/09/20 119 kg (262 lb)   10/15/20 117 kg (258 lb)   09/08/20 118 kg (259 lb 12 8 oz)   06/05/20 120 kg (265 lb 9 6 oz)   04/17/20 113 kg (250 lb 1 6 oz)   04/10/20 114 kg (250 lb 6 4 oz)   04/03/20 119 kg (263 lb)   03/27/20 119 kg (263 lb)   03/19/20 121 kg (265 lb 12 8 oz)   03/13/20 118 kg (260 lb)   03/06/20 120 kg (265 lb)   01/16/20 122 kg (270 lb)   12/12/19 119 kg (261 lb 4 8 oz)   11/21/19 118 kg (260 lb 3 2 oz)   11/12/19 120 kg (263 lb 9 6 oz)   11/05/19 119 kg (262 lb)   08/26/19 114 kg (252 lb)   , Body mass index is 36 18 kg/m²           Lab Studies:    Results from last 7 days   Lab Units 08/30/21  0733   TROPONIN I ng/mL <0 02            Results from last 7 days   Lab Units 08/30/21  0733   WBC Thousand/uL 6 42   HEMOGLOBIN g/dL 15 9   HEMATOCRIT % 49 9*   PLATELETS Thousands/uL 175   ,       Invalid input(s): LABALBU

## 2021-08-30 NOTE — H&P
[address] [place] [address]  H&P- [facility] [address] [employer] [address] 1965, 64 y o  male MRN: [id number]  Unit/Bed#: ICU 13 Encounter: [id number]  Primary Care Provider: Rita Abdalla DO   Date and time admitted to hospital: 8/30/2021  7:27 AM    * Syncopal episodes   Assessment & Plan  Syncopal episode while standing, was able to catch himself on seat  EKG: Changing between Sinus bradycardia and A fib   Atropine did not resolve bradycardia  CXR: no acute cardiopulmonary disease, scarring of left lung base  ECHO: EF 75%, no wall motion abnormality  · Cardiology on board  - Sinus bradycardia vs Extended conversion pause   · On Dopamine infusions  · If bradycardia and sxs recur considering TVP   · Telemetry monitoring   · Monitor w AM CMP, Mg, Calcium    Dyslipidemia  Assessment & Plan  Pt had no anginal symptoms on arrival    Lipid Panel from January 2021 was unremarkable  · Managed at home w Lipitor and Zetia - continue inpatient     History of DVT (deep vein thrombosis)  Assessment & Plan  Lab Results   Component Value Date/Time    INR 2 43 (H) 08/30/2021 07:33 AM    PTT 41 (H) 08/30/2021 07:33 AM    PROTIME 25 9 (H) 08/30/2021 07:33 AM   H/o DVT of B/L LE and PE   · Pt takes Xeralto at home - took today   · Heparin tomorrow   · Repeat PTT, PT, INR in AM     Paroxysmal atrial fibrillation (HCC)  Assessment & Plan  EKG: changing between Sinus bradycardia and Paroxysmal Atrial Fibrillation    Echo: EF 75%, no wall motion abnormality  · On telemetry monitoring   · Cardiology recommendations appreciated  · Pt takes Inga Dukes at home for DVT - took today in AM     Essential hypertension  Assessment & Plan  · Cardiology recommendations appreciated   · Monitor BP - if BP increases considering Lisinopril or Amlodipine  · Carvedilol currently held  · Monitor w AM CMP    -------------------------------------------------------------------------------------------------------------  Chief Complaint: Syncopal episode     History of Present Illness   HX and PE limited by: None  Adelso Holden is a 64 y o  male who presents to ED brought by coworker with a syncopal episode at work  Pt was standing then felt his knees buckle, and was able to catch himself by sitting on chair, no fall, no head trauma  Pt addresses feeling dizzy and blacking out for about 5 min, blurred vision, feeling "sluggish"  Pt denied chest pain, palpitations, dyspnea, cough, diaphoresis, chills  Pt has been experiencing dizzy spells for about 2 months, however, never had episodes of passing out  At home, has also experienced dizziness after raising himself from sitting or laying in bed  Fam Hx includes CAD, HTN, Aneurysm  In the ED, pt was bradycardic with stable blood pressure  History obtained from the patient   -------------------------------------------------------------------------------------------------------------  Dispo: Admit to Stepdown Level 1    Code Status: Level 1 - Full Code  --------------------------------------------------------------------------------------------------------------  Review of Systems   Constitutional: Negative for chills, diaphoresis, fatigue and fever  HENT: Negative for hearing loss, postnasal drip, sneezing, sore throat, tinnitus and trouble swallowing  Eyes: Positive for visual disturbance (blurry vision, sudden vision loss for 5 min)  Negative for photophobia, pain and redness  Respiratory: Negative for cough, chest tightness and shortness of breath  Cardiovascular: Negative for chest pain, palpitations and leg swelling  Gastrointestinal: Negative for abdominal pain, blood in stool, constipation, diarrhea, nausea and vomiting  Endocrine: Negative for cold intolerance and heat intolerance  Genitourinary: Negative for decreased urine volume, difficulty urinating, enuresis and hematuria  Musculoskeletal: Negative for arthralgias, back pain, gait problem, neck pain and neck stiffness     Skin: Negative for color change, rash and wound  Neurological: Positive for dizziness, syncope and weakness  Negative for numbness and headaches  Psychiatric/Behavioral: Negative for agitation, confusion and decreased concentration  The patient is not nervous/anxious  A 12-point, complete review of systems was reviewed and negative except as stated above     Physical Exam  Vitals and nursing note reviewed  Constitutional:       General: He is not in acute distress  Appearance: Normal appearance  He is obese  He is not ill-appearing  HENT:      Head: Normocephalic and atraumatic  Nose: Nose normal       Mouth/Throat:      Mouth: Mucous membranes are moist       Pharynx: Oropharynx is clear  Eyes:      General: No scleral icterus  Conjunctiva/sclera: Conjunctivae normal    Cardiovascular:      Rate and Rhythm: Regular rhythm  Bradycardia present  Pulses: Normal pulses  Heart sounds: Normal heart sounds  No murmur heard  No gallop  Pulmonary:      Effort: Pulmonary effort is normal  No respiratory distress  Breath sounds: Normal breath sounds  No wheezing or rales  Abdominal:      General: Bowel sounds are normal  There is no distension  Palpations: Abdomen is soft  There is no mass  Tenderness: There is no abdominal tenderness  Musculoskeletal:         General: No tenderness  Normal range of motion  Cervical back: Normal range of motion and neck supple  No tenderness  Right lower leg: Edema present  Left lower leg: Edema present  Skin:     General: Skin is warm and dry  Capillary Refill: Capillary refill takes less than 2 seconds  Coloration: Skin is not jaundiced  Neurological:      General: No focal deficit present  Mental Status: He is alert and oriented to person, place, and time  Mental status is at baseline  Sensory: No sensory deficit     Psychiatric:         Mood and Affect: Mood normal          Behavior: Behavior normal  Behavior is cooperative  Thought Content: Thought content normal          Judgment: Judgment normal        --------------------------------------------------------------------------------------------------------------  Vitals:   Vitals:    08/30/21 1446 08/30/21 1448 08/30/21 1500 08/30/21 1530   BP:  153/66     Pulse:  (!) 52 (!) 46 (!) 54   Resp:  18 19 (!) 24   Temp: (!) 96 3 °F (35 7 °C)      TempSrc: Temporal      SpO2:  96% 97% 93%   Weight:       Height:         Temp  Min: 96 2 °F (35 7 °C)  Max: 98 2 °F (36 8 °C)  IBW (Ideal Body Weight): 77 6 kg  Height: 6' (182 9 cm)  Body mass index is 34 03 kg/m²  Laboratory and Diagnostics:  Results from last 7 days   Lab Units 08/30/21  0733   WBC Thousand/uL 6 42   HEMOGLOBIN g/dL 15 9   HEMATOCRIT % 49 9*   PLATELETS Thousands/uL 175   NEUTROS PCT % 54   MONOS PCT % 10     Results from last 7 days   Lab Units 08/30/21  0940 08/30/21  0733   SODIUM mmol/L 136 138   POTASSIUM mmol/L 5 0 6 3*   CHLORIDE mmol/L 105 105   CO2 mmol/L 22 24   ANION GAP mmol/L 9 9   BUN mg/dL 41* 42*   CREATININE mg/dL 1 72* 1 66*   CALCIUM mg/dL 9 7 9 5   GLUCOSE RANDOM mg/dL 181* 122   ALT U/L  --  30   AST U/L  --  42   ALK PHOS U/L  --  53   ALBUMIN g/dL  --  3 7   TOTAL BILIRUBIN mg/dL  --  0 49     Results from last 7 days   Lab Units 08/30/21  0733   MAGNESIUM mg/dL 2 1      Results from last 7 days   Lab Units 08/30/21  0733   INR  2 43*   PTT seconds 41*      Results from last 7 days   Lab Units 08/30/21  0733   TROPONIN I ng/mL <0 02         ABG:    VBG:          Micro:        EKG: Initial EKG showed P  Afib, repeat EKG Sinus bradycardia  Compared to EKG from 12/Jun/21 previously sinus rhythm  Imaging: I have personally reviewed pertinent reports     and I have personally reviewed pertinent films in PACS      Historical Information   Past Medical History:   Diagnosis Date    Arthritis     right foot    Chronic cholecystitis     Diabetes mellitus ([address] )     DVT [facility]deep venous thrombosis) ([facility] [address] [facility]     Gout     History of pulmonary embolism     Hyperlipidemia     Hypertension     Hypothyroidism     Lumbar back pain     MTHFR mutation     Neuropathy     Scab     right arm- no s/s infection    Sleep apnea     no CPAP    Tarsal tunnel syndrome, right     Tinnitus     left ear    Wears glasses     and contacts    Wears glasses      Past Surgical History:   Procedure Laterality Date    APPENDECTOMY      ARTHRODESIS      Lumbar L__    BACK SURGERY      BUNIONECTOMY Right 10/7/2016    Procedure: REMOVAL TIBIAL  SESAMOID BONE  RIGHT FOOT;  Surgeon: Idris Epps DPM;  Location: [facility];  Service:     CHOLECYSTECTOMY  03/26/2015    COLONOSCOPY      KNEE ARTHROSCOPY      KNEE ARTHROSCOPY W/ MENISCAL REPAIR      Lateral    NASAL SEPTUM SURGERY  10/16/2013    [place] COLONOSCOPY FLX DX W/COLLJ SPEC WHEN PFRMD N/A 11/23/2018    Procedure: COLONOSCOPY;  Surgeon: Fermin Forrest MD;  Location: [facility];  Service: Gastroenterology    AZ TARSAL TUNNEL RELEASE Right 6/25/2018    Procedure: RELEASE TARSAL TUNNEL;  Surgeon: Nettie Leroy DPM;  Location: [facility];  Service: Podiatry    [place] TARSAL TUNNEL RELEASE Left 3/13/2020    Procedure: RELEASE TARSAL TUNNEL;  Surgeon: Nettie Leroy DPM;  Location: [address] [place] [address] MAIN OR;  Service: [place]  10/16/2013    with Adenoidectomy    UVULECTOMY  10/16/2013    UVULOPALATOPHARYNGOPLASTY      WISDOM TOOTH EXTRACTION       Social History   Social History     Substance and Sexual Activity   Alcohol Use Yes    Comment: weekends     Social History     Substance and Sexual Activity   Drug Use No     Social History     Tobacco Use   Smoking Status Never Smoker   Smokeless Tobacco Never Used     Exercise History: None   Family History:   Family History   Problem Relation Age of Onset    Aneurysm Mother         intracranial aneurysm repair    Coronary artery disease Father     Hypertension Father     Aneurysm Brother      I have reviewed this patient's family history and commented on sigificant items within the HPI      Medications:  Current Facility-Administered Medications   Medication Dose Route Frequency    acetaminophen (TYLENOL) tablet 650 mg  650 mg Oral Q6H PRN    allopurinol (ZYLOPRIM) tablet 300 mg  300 mg Oral Daily    atorvastatin (LIPITOR) tablet 40 mg  40 mg Oral Before Dinner    DOPamine (INTROPIN) 400 mg in 250 mL infusion (premix)  1-20 mcg/kg/min Intravenous Continuous    ezetimibe (ZETIA) tablet 10 mg  10 mg Oral QAM    gabapentin (NEURONTIN) capsule 1,200 mg  1,200 mg Oral TID    insulin lispro (HumaLOG) 100 units/mL subcutaneous injection 1-5 Units  1-5 Units Subcutaneous Q6H [address]    lactated ringers infusion  125 mL/hr Intravenous Continuous    levothyroxine tablet 175 mcg  175 mcg Oral Early Morning    [START ON 8/31/2021] multivitamin-minerals (CENTRUM) tablet 1 tablet  1 tablet Oral Daily     Home medications:  Prior to Admission Medications   Prescriptions Last Dose Informant Patient Reported? Taking?    Cholecalciferol 125 MCG (5000 UT) TABS 8/30/2021 at Unknown time  No Yes   Sig: Take 1 tablet (5,000 Units total) by mouth daily   Euthyrox 25 MCG tablet 8/30/2021 at Unknown time  No Yes   Sig: TAKE 1 TABLET BY MOUTH ONCE DAILY WITH LEVOTHYROXINE 150 MCG TO EQUAL 175 MCG   Multiple Vitamins-Minerals (MENS MULTIVITAMIN PLUS) TABS 8/30/2021 at Unknown time Self Yes Yes   Sig: Take 1 tablet by mouth daily   Xarelto 20 MG tablet 8/30/2021 at Unknown time  No Yes   Sig: Take 1 tablet by mouth once daily with breakfast   allopurinol (ZYLOPRIM) 300 mg tablet 8/30/2021 at Unknown time  No Yes   Sig: Take 1 tablet by mouth once daily   atorvastatin (LIPITOR) 40 mg tablet 8/30/2021 at Unknown time  No Yes   Sig: Take 1 tablet by mouth once daily   carvedilol (COREG) 12 5 mg tablet 8/30/2021 at Unknown time  No Yes   Sig: TAKE 1 TABLET BY MOUTH TWICE DAILY WITH MEALS   ezetimibe (ZETIA) 10 mg tablet 8/30/2021 at Unknown time  No Yes   Sig: Take 1 tablet by mouth once daily   furosemide (LASIX) 40 mg tablet 8/30/2021 at Unknown time  No Yes   Sig: Take 1 tablet by mouth once daily   gabapentin (NEURONTIN) 600 MG tablet 8/30/2021 at Unknown time  No Yes   Sig: TAKE 2 TABLETS BY MOUTH THREE TIMES DAILY   levothyroxine 150 mcg tablet 8/30/2021 at Unknown time  No Yes   Sig: TAKE 1 TABLET BY MOUTH ONCE DAILY IN THE EARLY MORNING  TAKE WITH LEVOTHYROXINE 25 MCG TO EQUAL 175 MCG   lisinopril (ZESTRIL) 5 mg tablet 8/30/2021 at Unknown time  No Yes   Sig: Take 1 tablet by mouth once daily   metFORMIN (GLUCOPHAGE-XR) 500 mg 24 hr tablet 8/30/2021 at Unknown time  No Yes   Sig: TAKE 1 TABLET BY MOUTH ONCE DAILY AT BEDTIME   sildenafil (Viagra) 100 mg tablet Unknown at Unknown time Self No No   Sig: Take 1 tablet (100 mg total) by mouth as needed for erectile dysfunction   spironolactone (ALDACTONE) 25 mg tablet 8/30/2021 at Unknown time  No Yes   Sig: Take 1 tablet by mouth once daily      Facility-Administered Medications: None     Allergies:  No Known Allergies    ------------------------------------------------------------------------------------------------------------  Advance Directive and Living Will:      Power of :    POLST:    ------------------------------------------------------------------------------------------------------------  Anticipated Length of Stay is > 2 midnights    Care Time Delivered:   No Critical Care time spent       Martha Osman MD        Portions of the record may have been created with voice recognition software  Occasional wrong word or "sound a like" substitutions may have occurred due to the inherent limitations of voice recognition software    Read the chart carefully and recognize, using context, where substitutions have occurred

## 2021-08-30 NOTE — ASSESSMENT & PLAN NOTE
EKG: changing between Sinus bradycardia and Paroxysmal Atrial Fibrillation    Echo: EF 75%, no wall motion abnormality  · On telemetry monitoring   · Cardiology recommendations appreciated  · Pt takes Xeralto at home for DVT - currently held   · Heparin drip in AM started

## 2021-08-30 NOTE — ED PROVIDER NOTES
History  Chief Complaint   Patient presents with    Dizziness     states sudden onset within the last hour of dizziness, minor chest pain, and blurry vision  intermittent over the last 2 months but states that he has not been evaluated for it  History provided by:  Patient   used: No    Dizziness  Quality:  Lightheadedness (Near syncopal episode)  Severity:  Severe  Onset quality:  Sudden  Duration: Just prior to arrival while at work  Timing:  Constant  Progression:  Partially resolved  Chronicity:  Recurrent  Context: standing up    Context comment:  Happened multiple times over the last week but never lasted and this episode was the worst and lasted  Didn't pass out  No CP  No palpitations  No h/o cva  Anticoagulated for PE/DVT in past  H/O htn on carvedilol  Relieved by:  Nothing  Worsened by:  Nothing  Ineffective treatments:  None tried  Associated symptoms: hearing loss    Associated symptoms: no chest pain, no headaches, no nausea, no palpitations, no shortness of breath, no vomiting and no weakness    Risk factors: no heart disease and no hx of stroke    Risk factors comment:  Prior PE/DVT on xarelto  Prior to Admission Medications   Prescriptions Last Dose Informant Patient Reported? Taking?    Cholecalciferol 125 MCG (5000 UT) TABS 8/30/2021 at Unknown time  No Yes   Sig: Take 1 tablet (5,000 Units total) by mouth daily   Euthyrox 25 MCG tablet 8/30/2021 at Unknown time  No Yes   Sig: TAKE 1 TABLET BY MOUTH ONCE DAILY WITH LEVOTHYROXINE 150 MCG TO EQUAL 175 MCG   Multiple Vitamins-Minerals (MENS MULTIVITAMIN PLUS) TABS 8/30/2021 at Unknown time Self Yes Yes   Sig: Take 1 tablet by mouth daily   Xarelto 20 MG tablet 8/30/2021 at Unknown time  No Yes   Sig: Take 1 tablet by mouth once daily with breakfast   allopurinol (ZYLOPRIM) 300 mg tablet 8/30/2021 at Unknown time  No Yes   Sig: Take 1 tablet by mouth once daily   atorvastatin (LIPITOR) 40 mg tablet 8/30/2021 at Unknown time  No Yes   Sig: Take 1 tablet by mouth once daily   carvedilol (COREG) 12 5 mg tablet 8/30/2021 at Unknown time  No Yes   Sig: TAKE 1 TABLET BY MOUTH TWICE DAILY WITH MEALS   ezetimibe (ZETIA) 10 mg tablet 8/30/2021 at Unknown time  No Yes   Sig: Take 1 tablet by mouth once daily   furosemide (LASIX) 40 mg tablet 8/30/2021 at Unknown time  No Yes   Sig: Take 1 tablet by mouth once daily   gabapentin (NEURONTIN) 600 MG tablet 8/30/2021 at Unknown time  No Yes   Sig: TAKE 2 TABLETS BY MOUTH THREE TIMES DAILY   levothyroxine 150 mcg tablet 8/30/2021 at Unknown time  No Yes   Sig: TAKE 1 TABLET BY MOUTH ONCE DAILY IN THE EARLY MORNING    TAKE WITH LEVOTHYROXINE 25 MCG TO EQUAL 175 MCG   lisinopril (ZESTRIL) 5 mg tablet 8/30/2021 at Unknown time  No Yes   Sig: Take 1 tablet by mouth once daily   metFORMIN (GLUCOPHAGE-XR) 500 mg 24 hr tablet 8/30/2021 at Unknown time  No Yes   Sig: TAKE 1 TABLET BY MOUTH ONCE DAILY AT BEDTIME   sildenafil (Viagra) 100 mg tablet Unknown at Unknown time Self No No   Sig: Take 1 tablet (100 mg total) by mouth as needed for erectile dysfunction   spironolactone (ALDACTONE) 25 mg tablet 8/30/2021 at Unknown time  No Yes   Sig: Take 1 tablet by mouth once daily      Facility-Administered Medications: None       Past Medical History:   Diagnosis Date    Arthritis     right foot    Chronic cholecystitis     Diabetes mellitus (HCC)     DVT (deep venous thrombosis) (HCC)     Gout     History of pulmonary embolism     Hyperlipidemia     Hypertension     Hypothyroidism     Lumbar back pain     MTHFR mutation     Neuropathy     Scab     right arm- no s/s infection    Sleep apnea     no CPAP    Tarsal tunnel syndrome, right     Tinnitus     left ear    Wears glasses     and contacts    Wears glasses        Past Surgical History:   Procedure Laterality Date    APPENDECTOMY      ARTHRODESIS      Lumbar L__    BACK SURGERY      BUNIONECTOMY Right 10/7/2016    Procedure: REMOVAL TIBIAL  SESAMOID BONE  RIGHT FOOT;  Surgeon: Karthik Campos DPM;  Location: [facility];  Service:     CHOLECYSTECTOMY  03/26/2015    COLONOSCOPY      KNEE ARTHROSCOPY      KNEE ARTHROSCOPY W/ MENISCAL REPAIR      Lateral    NASAL SEPTUM SURGERY  10/16/2013    HI COLONOSCOPY FLX DX W/COLLJ SPEC WHEN PFRMD N/A 11/23/2018    Procedure: COLONOSCOPY;  Surgeon: Darnell Rossi MD;  Location: [facility];  Service: Gastroenterology    HI TARSAL TUNNEL RELEASE Right 6/25/2018    Procedure: RELEASE TARSAL TUNNEL;  Surgeon: Kacey Estrada DPM;  Location: [facility];  Service: Podiatry    HI TARSAL TUNNEL RELEASE Left 3/13/2020    Procedure: RELEASE TARSAL TUNNEL;  Surgeon: Kacey Estrada DPM;  Location: [address] [place] [address] MAIN OR;  Service: [place]  10/16/2013    with Adenoidectomy    UVULECTOMY  10/16/2013    UVULOPALATOPHARYNGOPLASTY      WISDOM TOOTH EXTRACTION         Family History   Problem Relation Age of Onset    Aneurysm Mother         intracranial aneurysm repair    Coronary artery disease Father     Hypertension Father     Aneurysm Brother      I have reviewed and agree with the history as documented  E-Cigarette/Vaping    E-Cigarette Use Never User      E-Cigarette/Vaping Substances    Nicotine No     THC No     CBD No     Flavoring No      Social History     Tobacco Use    Smoking status: Never Smoker    Smokeless tobacco: Never Used   Vaping Use    Vaping Use: Never used   Substance Use Topics    Alcohol use: Yes     Comment: weekends    Drug use: No       Review of Systems   Constitutional: Negative for appetite change, chills and fever  HENT: Positive for hearing loss  Negative for congestion and sore throat  Hearing was "funny" when he was feeling like this  Respiratory: Negative for cough, chest tightness and shortness of breath  Cardiovascular: Negative for chest pain, palpitations and leg swelling     Gastrointestinal: Negative for abdominal pain, nausea and vomiting  Neurological: Positive for speech difficulty and light-headedness  Negative for dizziness, facial asymmetry, weakness, numbness and headaches  Speech felt funny when dizzy   All other systems reviewed and are negative  Physical Exam  Physical Exam  Vitals and nursing note reviewed  Constitutional:       General: He is not in acute distress  Appearance: Normal appearance  He is well-developed  He is not ill-appearing, toxic-appearing or diaphoretic  HENT:      Head: Normocephalic and atraumatic  Right Ear: Hearing normal  No drainage or swelling  Left Ear: Hearing normal  No drainage or swelling  Eyes:      General: Lids are normal          Right eye: No discharge  Left eye: No discharge  Extraocular Movements: Extraocular movements intact  Conjunctiva/sclera: Conjunctivae normal       Pupils: Pupils are equal, round, and reactive to light  Neck:      Vascular: No JVD  Trachea: Trachea normal    Cardiovascular:      Rate and Rhythm: Bradycardia present  Rhythm irregular  Pulses: Normal pulses  Heart sounds: Normal heart sounds  No murmur heard  No friction rub  No gallop  Pulmonary:      Effort: Pulmonary effort is normal  No respiratory distress  Breath sounds: Normal breath sounds  No stridor  No wheezing or rales  Abdominal:      Palpations: Abdomen is soft  Tenderness: There is no abdominal tenderness  There is no guarding or rebound  Musculoskeletal:         General: No tenderness  Normal range of motion  Cervical back: Normal range of motion  Right lower leg: No edema  Left lower leg: No edema  Skin:     General: Skin is warm and dry  Coloration: Skin is not pale  Findings: No rash  Neurological:      General: No focal deficit present  Mental Status: He is alert  GCS: GCS eye subscore is 4  GCS verbal subscore is 5  GCS motor subscore is 6  Cranial Nerves: Cranial nerves are intact  No cranial nerve deficit  Sensory: Sensation is intact  No sensory deficit  Motor: Motor function is intact  No abnormal muscle tone  Coordination: Finger-Nose-Finger Test normal    Psychiatric:         Mood and Affect: Mood normal          Speech: Speech normal          Behavior: Behavior is cooperative           Vital Signs  ED Triage Vitals [08/30/21 0730]   Temperature Pulse Respirations Blood Pressure SpO2   98 2 °F (36 8 °C) 61 18 116/78 96 %      Temp Source Heart Rate Source Patient Position - Orthostatic VS BP Location FiO2 (%)   Oral -- -- -- --      Pain Score       2           Vitals:    08/30/21 0816 08/30/21 0830 08/30/21 0845 08/30/21 0915   BP: 112/64 (!) 91/42 (!) 101/43 (!) 109/47   Pulse: 81 (!) 44 80 58         Visual Acuity  Visual Acuity      Most Recent Value   L Pupil Size (mm)  3   R Pupil Size (mm)  3          ED Medications  Medications   lactated ringers infusion (125 mL/hr Intravenous New Bag 8/30/21 0806)   DOPamine (INTROPIN) 400 mg in 250 mL infusion (premix) (7 5 mcg/kg/min × 121 kg Intravenous Rate/Dose Change 8/30/21 0835)   lactated ringers bolus 500 mL (0 mL Intravenous Stopped 8/30/21 0828)   atropine 1 mg/10 mL injection 0 5 mg (0 5 mg Intravenous Given 8/30/21 0747)       Diagnostic Studies  Results Reviewed     Procedure Component Value Units Date/Time    Comprehensive metabolic panel [[id number]]  (Abnormal) Collected: 08/30/21 0733    Lab Status: Final result Specimen: Blood from Arm, Left Updated: 08/30/21 0914     Sodium 138 mmol/L      Potassium 6 3 mmol/L      Chloride 105 mmol/L      CO2 24 mmol/L      ANION GAP 9 mmol/L      BUN 42 mg/dL      Creatinine 1 66 mg/dL      Glucose 122 mg/dL      Calcium 9 5 mg/dL      AST 42 U/L      ALT 30 U/L      Alkaline Phosphatase 53 U/L      Total Protein 7 4 g/dL      Albumin 3 7 g/dL      Total Bilirubin 0 49 mg/dL      eGFR 45 ml/min/1 73sq m     Narrative:      Consolidated [employer] guidelines for Chronic Kidney Disease (CKD):     Stage 1 with normal or high GFR (GFR > 90 mL/min/1 73 square meters)    Stage 2 Mild CKD (GFR = 60-89 mL/min/1 73 square meters)    Stage 3A Moderate CKD (GFR = 45-59 mL/min/1 73 square meters)    Stage 3B Moderate CKD (GFR = 30-44 mL/min/1 73 square meters)    Stage 4 Severe CKD (GFR = 15-29 mL/min/1 73 square meters)    Stage 5 End Stage CKD (GFR <15 mL/min/1 73 square meters)  Note: GFR calculation is accurate only with a steady state creatinine    TSH [[id number]]  (Abnormal) Collected: 08/30/21 0733    Lab Status: Final result Specimen: Blood from Arm, Left Updated: 08/30/21 0913     TSH 3RD GENERATON 3 825 uIU/mL     Narrative:      Patients undergoing fluorescein dye angiography may retain small amounts of fluorescein in the body for 48-72 hours post procedure  Samples containing fluorescein can produce falsely depressed TSH values  If the patient had this procedure,a specimen should be resubmitted post fluorescein clearance        Magnesium [[id number]]  (Normal) Collected: 08/30/21 0733    Lab Status: Final result Specimen: Blood from Arm, Left Updated: 08/30/21 0913     Magnesium 2 1 mg/dL     NT-BNP PRO [[id number]]  (Abnormal) Collected: 08/30/21 0733    Lab Status: Final result Specimen: Blood from Arm, Left Updated: 08/30/21 0913     NT-proBNP 292 pg/mL     APTT [[id number]]  (Abnormal) Collected: 08/30/21 0733    Lab Status: Final result Specimen: Blood from Arm, Left Updated: 08/30/21 0834     PTT 41 seconds     Protime-INR [[id number]]  (Abnormal) Collected: 08/30/21 0733    Lab Status: Final result Specimen: Blood from Arm, Left Updated: 08/30/21 0834     Protime 25 9 seconds      INR 2 43    Troponin I [[id number]]  (Normal) Collected: 08/30/21 0733    Lab Status: Final result Specimen: Blood from Arm, Left Updated: 08/30/21 0831     Troponin I <0 02 ng/mL     CBC and differential [[id number]]  (Abnormal) Collected: 08/30/21 9515    Lab Status: Final result Specimen: Blood from Arm, Left Updated: 08/30/21 0802     WBC 6 42 Thousand/uL      RBC 5 14 Million/uL      Hemoglobin 15 9 g/dL      Hematocrit 49 9 %      MCV 97 fL      MCH 30 9 pg      MCHC 31 9 g/dL      RDW 14 7 %      MPV 10 0 fL      Platelets 859 Thousands/uL      nRBC 0 /100 WBCs      Neutrophils Relative 54 %      Immat GRANS % 0 %      Lymphocytes Relative 29 %      Monocytes Relative 10 %      Eosinophils Relative 6 %      Basophils Relative 1 %      Neutrophils Absolute 3 49 Thousands/µL      Immature Grans Absolute 0 02 Thousand/uL      Lymphocytes Absolute 1 83 Thousands/µL      Monocytes Absolute 0 64 Thousand/µL      Eosinophils Absolute 0 41 Thousand/µL      Basophils Absolute 0 03 Thousands/µL     Fingerstick Glucose (POCT) [[id number]]  (Normal) Collected: 08/30/21 0736    Lab Status: Final result Updated: 08/30/21 0737     POC Glucose 112 mg/dl                  XR chest 1 view portable   ED Interpretation by Cristina Kramer MD (35/13 [id number])   I have personally reviewed the x-ray and my findings are: no acute disease                     Procedures  ECG 12 Lead Documentation Only    Date/Time: 8/30/2021 7:40 AM  Performed by: Cristina Kramer MD  Authorized by: Cristina Kramer MD     Indications / Diagnosis:  Dizziness  ECG reviewed by me, the ED Provider: yes    Patient location:  ED  Interpretation:     Interpretation: abnormal    Rate:     ECG rate:  64    ECG rate assessment: normal    Rhythm:     Rhythm: atrial fibrillation    QRS:     QRS axis:  Normal    QRS intervals:  Normal  Conduction:     Conduction: normal    ST segments:     ST segments:  Normal  T waves:     T waves: normal    ECG 12 Lead Documentation Only    Date/Time: 8/30/2021 7:50 AM  Performed by: Cristina Kramer MD  Authorized by: Cristina Kramer MD     Indications / Diagnosis:  Dizziness  ECG reviewed by me, the ED Provider: yes    Patient location: ED  Interpretation:     Interpretation: abnormal    Rate:     ECG rate:  36    ECG rate assessment: bradycardic    Rhythm:     Rhythm: sinus bradycardia    QRS:     QRS axis:  Normal    QRS intervals:  Normal  Conduction:     Conduction: normal    ST segments:     ST segments:  Normal  T waves:     T waves: normal    CriticalCare Time  Performed by: Zane Aguilar MD  Authorized by: Zane Aguilar MD     Critical care provider statement:     Critical care time (minutes):  40    Critical care time was exclusive of:  Separately billable procedures and treating other patients and teaching time    Critical care was necessary to treat or prevent imminent or life-threatening deterioration of the following conditions:  Cardiac failure (Significant bradycardia requiring IV atropine, dopamine drip, Cardiology consult and admission to step-down unit )    Critical care was time spent personally by me on the following activities:  Obtaining history from patient or surrogate, development of treatment plan with patient or surrogate, discussions with consultants, evaluation of patient's response to treatment, examination of patient, re-evaluation of patient's condition, ordering and review of radiographic studies, ordering and review of laboratory studies and review of old charts    I assumed direction of critical care for this patient from another provider in my specialty: no               ED Course  ED Course as of Aug 30 0928   [facility] Aug 30, 2021   6000 Patient's heart rate in the 70s and his blood pressure is stable  He is currently on a dopamine drip and will be admitted to the step-down unit  8876 Patient's potassium was 6 3 with slight hemolysis will need repeat  ICU was informed of this and will order redraw  Does have some renal insufficiency                                                MDM  Number of Diagnoses or Management Options  Bradycardia  Diagnosis management comments: Patient with new onset atrial fibrillation and then suddenly had some significant sinus bradycardia  This may be medication related  He states he did not take an extra dose of his medication  Initial atropine does did nothing to his heart rate  Pacer pads were placed  I immediately consulted Cardiology and ended up speaking with Dr Loree Soler from electrophysiology  Start dopamine drip  Patient would intermittently go back into a more rapid heart rate in the 80s to 100s which appeared to be atrial fibrillation and then drop back down to his sinus bradycardia so the dopamine was started  ICU is down to see the patient to admit to level 1 step-down  This could all be medication related but will need to be monitored as he may need a pacemaker         Amount and/or Complexity of Data Reviewed  Clinical lab tests: ordered and reviewed  Tests in the radiology section of CPT®: ordered and reviewed  Tests in the medicine section of CPT®: ordered and reviewed  Independent visualization of images, tracings, or specimens: yes        Disposition  Final diagnoses:   Bradycardia   Renal insufficiency   Hyperkalemia - hemolysis     Time reflects when diagnosis was documented in both MDM as applicable and the Disposition within this note     Time User Action Codes Description Comment    8/30/2021  8:15 AM Octavia Santos Add [R00 1] Bradycardia     8/30/2021  9:27 AM Octavia Santos Add [N28 9] Renal insufficiency     8/30/2021  9:27 AM Octavia Santos Add [E87 5] Hyperkalemia     8/30/2021  9:27 AM Octavia Santos Modify [E87 5] Hyperkalemia hemolysis      ED Disposition     ED Disposition Condition Date/Time Comment    Admit Stable Mon Aug 30, 2021  8:28 AM Case was discussed with Critical care and the patient's admission status was agreed to be Admission Status: inpatient status to the service of Dr Denise John          Follow-up Information    None         Patient's Medications   Discharge Prescriptions    No medications on file     No discharge procedures on file      PDMP Review     None          ED Provider  Electronically Signed by           Ayaka Small MD  08/30/21 6613

## 2021-08-30 NOTE — ASSESSMENT & PLAN NOTE
Pt had no anginal symptoms on arrival    Lipid Panel from January 2021 was unremarkable     · Managed at home w Lipitor and Zetia - continue inpatient

## 2021-08-30 NOTE — ASSESSMENT & PLAN NOTE
· Cardiology recommendations appreciated   · Monitor BP - if BP increases considering Lisinopril or Amlodipine  · Hold Carvedilol   · Hold home spironolactone, Lisinopril, Lasix  · Monitor w AM CMP

## 2021-08-30 NOTE — ASSESSMENT & PLAN NOTE
Syncopal episode while standing, was able to catch himself on sit  EKG: Changing between Sinus bradycardia and A fib   Atropine did not resolve bradycardia     CXR; interpreted as no acute cardiopulmonary disease, will follow results report    · Cardiology on board  - Sinus bradycardia vs Extended conversion pause   · On Dopamine infusions  · If bradycardia and sxs recur considering TVP   · Telemetry monitoring   · F/u on Echo results

## 2021-08-30 NOTE — ASSESSMENT & PLAN NOTE
EKG: changing between Sinus bradycardia and Paroxysmal Atrial Fibrillation  · On telemetry monitoring   · Cardiology recommendations appreciated  · Echo results pending   · Pt takes Xeralto at home for DVT - took today

## 2021-08-30 NOTE — ASSESSMENT & PLAN NOTE
Lab Results   Component Value Date/Time    INR 1 24 (H) 08/31/2021 05:48 AM    INR 2 43 (H) 08/30/2021 07:33 AM    PTT 33 08/31/2021 07:40 AM    PTT 41 (H) 08/30/2021 07:33 AM    PROTIME 15 3 (H) 08/31/2021 05:48 AM    PROTIME 25 9 (H) 08/30/2021 07:33 AM   H/o DVT of B/L LE and PE   · Pt takes Xeralto at home - currently Held   · Heparin drip AM started  · Repeat PTT, PT, INR in AM

## 2021-08-30 NOTE — ASSESSMENT & PLAN NOTE
Syncopal episode while standing, was able to catch himself on seat  EKG: Changing between Sinus bradycardia and A fib   Atropine did not resolve bradycardia     CXR: no acute cardiopulmonary disease, scarring of left lung base  ECHO: EF 75%, no wall motion abnormality  · Cardiology on board  - Sinus bradycardia vs Extended conversion pause   · On Dopamine infusions  · If bradycardia and sxs recur placement of Permanent TVP tomorrow  · Pt made NPO at midnight   · Telemetry monitoring   · Monitor w AM CMP, CBC, Mg, Calcium

## 2021-08-31 ENCOUNTER — ANESTHESIA EVENT (INPATIENT)
Dept: NON INVASIVE DIAGNOSTICS | Facility: HOSPITAL | Age: 56
DRG: 243 | End: 2021-08-31
Payer: COMMERCIAL

## 2021-08-31 PROBLEM — G47.33 OBSTRUCTIVE SLEEP APNEA: Status: ACTIVE | Noted: 2021-08-31

## 2021-08-31 PROBLEM — R00.1 SINUS BRADYCARDIA: Status: ACTIVE | Noted: 2021-08-31

## 2021-08-31 PROBLEM — N17.9 ACUTE-ON-CHRONIC KIDNEY INJURY (HCC): Status: ACTIVE | Noted: 2021-08-31

## 2021-08-31 PROBLEM — N18.9 ACUTE-ON-CHRONIC KIDNEY INJURY (HCC): Status: ACTIVE | Noted: 2021-08-31

## 2021-08-31 LAB
ALBUMIN SERPL BCP-MCNC: 3.6 G/DL (ref 3.5–5)
ALP SERPL-CCNC: 54 U/L (ref 46–116)
ALT SERPL W P-5'-P-CCNC: 27 U/L (ref 12–78)
ANION GAP SERPL CALCULATED.3IONS-SCNC: 13 MMOL/L (ref 4–13)
APTT PPP: 33 SECONDS (ref 23–37)
APTT PPP: 91 SECONDS (ref 23–37)
AST SERPL W P-5'-P-CCNC: 20 U/L (ref 5–45)
B BURGDOR IGG+IGM SER-ACNC: 111
BASOPHILS # BLD AUTO: 0.02 THOUSANDS/ΜL (ref 0–0.1)
BASOPHILS NFR BLD AUTO: 0 % (ref 0–1)
BILIRUB SERPL-MCNC: 0.61 MG/DL (ref 0.2–1)
BUN SERPL-MCNC: 36 MG/DL (ref 5–25)
CA-I BLD-SCNC: 1.23 MMOL/L (ref 1.12–1.32)
CALCIUM SERPL-MCNC: 9.9 MG/DL (ref 8.3–10.1)
CHLORIDE SERPL-SCNC: 104 MMOL/L (ref 100–108)
CO2 SERPL-SCNC: 20 MMOL/L (ref 21–32)
CREAT SERPL-MCNC: 1.74 MG/DL (ref 0.6–1.3)
EOSINOPHIL # BLD AUTO: 0.2 THOUSAND/ΜL (ref 0–0.61)
EOSINOPHIL NFR BLD AUTO: 2 % (ref 0–6)
ERYTHROCYTE [DISTWIDTH] IN BLOOD BY AUTOMATED COUNT: 14.4 % (ref 11.6–15.1)
GFR SERPL CREATININE-BSD FRML MDRD: 43 ML/MIN/1.73SQ M
GLUCOSE SERPL-MCNC: 142 MG/DL (ref 65–140)
GLUCOSE SERPL-MCNC: 152 MG/DL (ref 65–140)
GLUCOSE SERPL-MCNC: 163 MG/DL (ref 65–140)
GLUCOSE SERPL-MCNC: 170 MG/DL (ref 65–140)
GLUCOSE SERPL-MCNC: 173 MG/DL (ref 65–140)
GLUCOSE SERPL-MCNC: 207 MG/DL (ref 65–140)
HCT VFR BLD AUTO: 50 % (ref 36.5–49.3)
HGB BLD-MCNC: 16.4 G/DL (ref 12–17)
IMM GRANULOCYTES # BLD AUTO: 0.02 THOUSAND/UL (ref 0–0.2)
IMM GRANULOCYTES NFR BLD AUTO: 0 % (ref 0–2)
INR PPP: 1.24 (ref 0.84–1.19)
LYMPHOCYTES # BLD AUTO: 1.2 THOUSANDS/ΜL (ref 0.6–4.47)
LYMPHOCYTES NFR BLD AUTO: 13 % (ref 14–44)
MAGNESIUM SERPL-MCNC: 1.8 MG/DL (ref 1.6–2.6)
MCH RBC QN AUTO: 31.1 PG (ref 26.8–34.3)
MCHC RBC AUTO-ENTMCNC: 32.8 G/DL (ref 31.4–37.4)
MCV RBC AUTO: 95 FL (ref 82–98)
MONOCYTES # BLD AUTO: 0.93 THOUSAND/ΜL (ref 0.17–1.22)
MONOCYTES NFR BLD AUTO: 10 % (ref 4–12)
NEUTROPHILS # BLD AUTO: 6.6 THOUSANDS/ΜL (ref 1.85–7.62)
NEUTS SEG NFR BLD AUTO: 75 % (ref 43–75)
NRBC BLD AUTO-RTO: 0 /100 WBCS
PLATELET # BLD AUTO: 178 THOUSANDS/UL (ref 149–390)
PMV BLD AUTO: 9.6 FL (ref 8.9–12.7)
POTASSIUM SERPL-SCNC: 5 MMOL/L (ref 3.5–5.3)
PROT SERPL-MCNC: 7.5 G/DL (ref 6.4–8.2)
PROTHROMBIN TIME: 15.3 SECONDS (ref 11.6–14.5)
RBC # BLD AUTO: 5.27 MILLION/UL (ref 3.88–5.62)
SODIUM SERPL-SCNC: 137 MMOL/L (ref 136–145)
WBC # BLD AUTO: 8.97 THOUSAND/UL (ref 4.31–10.16)

## 2021-08-31 PROCEDURE — 85610 PROTHROMBIN TIME: CPT | Performed by: NURSE PRACTITIONER

## 2021-08-31 PROCEDURE — 99233 SBSQ HOSP IP/OBS HIGH 50: CPT | Performed by: INTERNAL MEDICINE

## 2021-08-31 PROCEDURE — 85730 THROMBOPLASTIN TIME PARTIAL: CPT | Performed by: INTERNAL MEDICINE

## 2021-08-31 PROCEDURE — 85025 COMPLETE CBC W/AUTO DIFF WBC: CPT | Performed by: NURSE PRACTITIONER

## 2021-08-31 PROCEDURE — 83735 ASSAY OF MAGNESIUM: CPT | Performed by: NURSE PRACTITIONER

## 2021-08-31 PROCEDURE — 82330 ASSAY OF CALCIUM: CPT | Performed by: NURSE PRACTITIONER

## 2021-08-31 PROCEDURE — 85730 THROMBOPLASTIN TIME PARTIAL: CPT

## 2021-08-31 PROCEDURE — 80053 COMPREHEN METABOLIC PANEL: CPT | Performed by: NURSE PRACTITIONER

## 2021-08-31 PROCEDURE — 82948 REAGENT STRIP/BLOOD GLUCOSE: CPT

## 2021-08-31 RX ORDER — HEPARIN SODIUM 10000 [USP'U]/100ML
3-30 INJECTION, SOLUTION INTRAVENOUS
Status: DISCONTINUED | OUTPATIENT
Start: 2021-08-31 | End: 2021-08-31 | Stop reason: ALTCHOICE

## 2021-08-31 RX ORDER — DOPAMINE HYDROCHLORIDE 160 MG/100ML
20 INJECTION, SOLUTION INTRAVENOUS CONTINUOUS
Status: DISCONTINUED | OUTPATIENT
Start: 2021-08-31 | End: 2021-09-01

## 2021-08-31 RX ORDER — HEPARIN SODIUM 10000 [USP'U]/100ML
3-30 INJECTION, SOLUTION INTRAVENOUS
Status: DISCONTINUED | OUTPATIENT
Start: 2021-08-31 | End: 2021-09-01

## 2021-08-31 RX ORDER — SODIUM CHLORIDE 9 MG/ML
100 INJECTION, SOLUTION INTRAVENOUS CONTINUOUS
Status: DISCONTINUED | OUTPATIENT
Start: 2021-08-31 | End: 2021-08-31

## 2021-08-31 RX ORDER — DOCUSATE SODIUM 100 MG/1
100 CAPSULE, LIQUID FILLED ORAL 2 TIMES DAILY
Status: DISCONTINUED | OUTPATIENT
Start: 2021-08-31 | End: 2021-09-02 | Stop reason: HOSPADM

## 2021-08-31 RX ADMIN — INSULIN LISPRO 1 UNITS: 100 INJECTION, SOLUTION INTRAVENOUS; SUBCUTANEOUS at 18:26

## 2021-08-31 RX ADMIN — MULTIPLE VITAMINS W/ MINERALS TAB 1 TABLET: TAB ORAL at 09:12

## 2021-08-31 RX ADMIN — INSULIN LISPRO 1 UNITS: 100 INJECTION, SOLUTION INTRAVENOUS; SUBCUTANEOUS at 05:54

## 2021-08-31 RX ADMIN — DOPAMINE HYDROCHLORIDE IN DEXTROSE 17.5 MCG/KG/MIN: 1.6 INJECTION, SOLUTION INTRAVENOUS at 13:28

## 2021-08-31 RX ADMIN — ALLOPURINOL 300 MG: 100 TABLET ORAL at 09:12

## 2021-08-31 RX ADMIN — SODIUM CHLORIDE, SODIUM LACTATE, POTASSIUM CHLORIDE, AND CALCIUM CHLORIDE 50 ML/HR: .6; .31; .03; .02 INJECTION, SOLUTION INTRAVENOUS at 08:58

## 2021-08-31 RX ADMIN — SODIUM CHLORIDE 100 ML/HR: 0.9 INJECTION, SOLUTION INTRAVENOUS at 11:50

## 2021-08-31 RX ADMIN — INSULIN LISPRO 1 UNITS: 100 INJECTION, SOLUTION INTRAVENOUS; SUBCUTANEOUS at 11:57

## 2021-08-31 RX ADMIN — ONDANSETRON 8 MG: 2 INJECTION INTRAMUSCULAR; INTRAVENOUS at 00:09

## 2021-08-31 RX ADMIN — HEPARIN SODIUM 18 UNITS/KG/HR: 10000 INJECTION, SOLUTION INTRAVENOUS at 09:11

## 2021-08-31 RX ADMIN — GABAPENTIN 1200 MG: 300 CAPSULE ORAL at 21:20

## 2021-08-31 RX ADMIN — SODIUM CHLORIDE, SODIUM LACTATE, POTASSIUM CHLORIDE, AND CALCIUM CHLORIDE 75 ML/HR: .6; .31; .03; .02 INJECTION, SOLUTION INTRAVENOUS at 16:26

## 2021-08-31 RX ADMIN — DOPAMINE HYDROCHLORIDE IN DEXTROSE 20 MCG/KG/MIN: 1.6 INJECTION, SOLUTION INTRAVENOUS at 11:49

## 2021-08-31 RX ADMIN — DOPAMINE HYDROCHLORIDE IN DEXTROSE 17.5 MCG/KG/MIN: 1.6 INJECTION, SOLUTION INTRAVENOUS at 17:34

## 2021-08-31 RX ADMIN — ACETAMINOPHEN 650 MG: 325 TABLET, FILM COATED ORAL at 17:41

## 2021-08-31 RX ADMIN — EZETIMIBE 10 MG: 10 TABLET ORAL at 09:27

## 2021-08-31 RX ADMIN — GABAPENTIN 1200 MG: 300 CAPSULE ORAL at 09:12

## 2021-08-31 RX ADMIN — LEVOTHYROXINE SODIUM 175 MCG: 125 TABLET ORAL at 05:49

## 2021-08-31 RX ADMIN — DOPAMINE HYDROCHLORIDE IN DEXTROSE 15 MCG/KG/MIN: 1.6 INJECTION, SOLUTION INTRAVENOUS at 21:20

## 2021-08-31 RX ADMIN — DOPAMINE HYDROCHLORIDE IN DEXTROSE 20 MCG/KG/MIN: 1.6 INJECTION, SOLUTION INTRAVENOUS at 08:58

## 2021-08-31 RX ADMIN — INSULIN LISPRO 1 UNITS: 100 INJECTION, SOLUTION INTRAVENOUS; SUBCUTANEOUS at 22:13

## 2021-08-31 RX ADMIN — ATORVASTATIN CALCIUM 40 MG: 40 TABLET, FILM COATED ORAL at 16:31

## 2021-08-31 RX ADMIN — GABAPENTIN 1200 MG: 300 CAPSULE ORAL at 16:31

## 2021-08-31 RX ADMIN — DOPAMINE HYDROCHLORIDE IN DEXTROSE 20 MCG/KG/MIN: 1.6 INJECTION, SOLUTION INTRAVENOUS at 02:52

## 2021-08-31 RX ADMIN — DOCUSATE SODIUM 100 MG: 100 CAPSULE ORAL at 18:29

## 2021-08-31 RX ADMIN — SODIUM CHLORIDE 500 ML: 0.9 INJECTION, SOLUTION INTRAVENOUS at 10:20

## 2021-08-31 RX ADMIN — DOPAMINE HYDROCHLORIDE IN DEXTROSE 20 MCG/KG/MIN: 1.6 INJECTION, SOLUTION INTRAVENOUS at 05:49

## 2021-08-31 RX ADMIN — DOPAMINE HYDROCHLORIDE IN DEXTROSE 20 MCG/KG/MIN: 1.6 INJECTION, SOLUTION INTRAVENOUS at 00:07

## 2021-08-31 NOTE — ANESTHESIA PREPROCEDURE EVALUATION
Procedure:  CARDIAC PACER IMPLANT    Relevant Problems   CARDIO   (+) Essential hypertension   (+) Paroxysmal atrial fibrillation (HCC)   (+) Sinus bradycardia      ENDO   (+) Controlled type 2 diabetes mellitus with microalbuminuria, without long-term current use of insulin (HCC)   (+) Hypothyroidism (acquired)      /RENAL   (+) Acute-on-chronic kidney injury ([address] )   (+) Diabetic nephropathy associated with type 2 diabetes mellitus (HCC)   (+) Stage 3a chronic kidney disease (HCC)      MUSCULOSKELETAL   (+) Gout   (+) Sacroiliitis (HCC)      NEURO/PSYCH   (+) Diabetic peripheral neuropathy (HCC)   (+) History of DVT (deep vein thrombosis)      PULMONARY   (+) Obstructive sleep apnea        Physical Exam    Airway    Mallampati score: III  TM Distance: >3 FB  Neck ROM: full     Dental   No notable dental hx     Cardiovascular  Rhythm: irregular, Rate: abnormal,     Pulmonary  Breath sounds clear to auscultation,     Other Findings        Anesthesia Plan  ASA Score- 3     Anesthesia Type- general and IV sedation with anesthesia with ASA Monitors  Additional Monitors:   Airway Plan: LMA  Comment: Transthoracic Echocardiogram  2D, M-mode, Doppler, and Color Doppler     Study date:  30-Aug-2021     Patient: Zada Dakin    Age: 64 years  Gender: Male  Diagnoses: R00 1 - Bradycardia, unspecified     Interpreting Physician:  Rowena Sterling DO     SUMMARY     LEFT VENTRICLE:  Systolic function was normal  Ejection fraction was estimated to be 75 %  There were no regional wall motion abnormalities  Wall thickness was mildly increased      LEFT ATRIUM:  The atrium was mildly dilated      RIGHT ATRIUM:  The atrium was mildly dilated          MITRAL VALVE:  There was mild annular calcification      EKG  A-Fib    LMA for airway control patient supine with CORIN    Plan Factors-Exercise tolerance (METS): >4 METS  Chart reviewed  EKG reviewed  Existing labs reviewed  Patient summary reviewed      Patient is not a current smoker  Patient not instructed to abstain from smoking on day of procedure  Patient did not smoke on day of surgery  Obstructive sleep apnea risk education given perioperatively  Induction- intravenous  Postoperative Plan- Plan for postoperative opioid use  Informed Consent- Anesthetic plan and risks discussed with patient

## 2021-08-31 NOTE — ASSESSMENT & PLAN NOTE
Lab Results   Component Value Date    EGFR 43 08/31/2021    EGFR 38 08/30/2021    EGFR 43 08/30/2021    CREATININE 1 74 (H) 08/31/2021    CREATININE 1 93 (H) 08/30/2021    CREATININE 1 72 (H) 08/30/2021    BUN 36 (H) 08/31/2021    BUN 40 (H) 08/30/2021    BUN 41 (H) 08/30/2021     · IV fluids  · Avoid nephrotoxic agents and hypotension  · Hold Lisinopril   · Monitor AM CMP, CBC, Ca+2

## 2021-08-31 NOTE — PROGRESS NOTES
[facility]  Progress Note - Alice Valenzuela [address] 1965, 64 y o  male MRN: [id number]  Unit/Bed#: ICU 13 Encounter: [id number]  Primary Care Provider: Emerald Tristan DO   Date and time admitted to hospital: 8/30/2021  7:27 AM    Sinus bradycardia  Assessment & Plan  · Dopamine drip at max dose - 20 mcg/kg/min  · IV hydration   · Cardiology recommendations appreciated  · Will try to wean dopamine and monitor   · Decision for pacemaker by end of day     * Syncopal episodes   Assessment & Plan  Syncopal episode while standing, was able to catch himself on seat  EKG: Changing between Sinus bradycardia and A fib   Atropine did not resolve bradycardia  CXR: no acute cardiopulmonary disease, scarring of left lung base  ECHO: EF 75%, no wall motion abnormality  · Cardiology on board  - Sinus bradycardia vs Extended conversion pause   · On Dopamine infusions  · If bradycardia and sxs recur placement of Permanent TVP tomorrow  · Pt made NPO at midnight   · Telemetry monitoring   · Monitor w AM CMP, CBC, Mg, Calcium    Paroxysmal atrial fibrillation (HCC)  Assessment & Plan  EKG: changing between Sinus bradycardia and Paroxysmal Atrial Fibrillation    Echo: EF 75%, no wall motion abnormality  · On telemetry monitoring   · Cardiology recommendations appreciated  · Pt takes Gabby Hazy at home for DVT - currently held   · Heparin drip in AM started    History of DVT (deep vein thrombosis)  Assessment & Plan  Lab Results   Component Value Date/Time    INR 1 24 (H) 08/31/2021 05:48 AM    INR 2 43 (H) 08/30/2021 07:33 AM    PTT 33 08/31/2021 07:40 AM    PTT 41 (H) 08/30/2021 07:33 AM    PROTIME 15 3 (H) 08/31/2021 05:48 AM    PROTIME 25 9 (H) 08/30/2021 07:33 AM   H/o DVT of B/L LE and PE   · Pt takes Xeralto at home - currently Held   · Heparin drip AM started  · Repeat PTT, PT, INR in AM     Acute-on-chronic kidney injury [facility])  Assessment & Plan  Lab Results   Component Value Date    EGFR 43 08/31/2021 EGFR 38 08/30/2021    EGFR 43 08/30/2021    CREATININE 1 74 (H) 08/31/2021    CREATININE 1 93 (H) 08/30/2021    CREATININE 1 72 (H) 08/30/2021    BUN 36 (H) 08/31/2021    BUN 40 (H) 08/30/2021    BUN 41 (H) 08/30/2021     · IV fluids  · Avoid nephrotoxic agents and hypotension  · Hold Lisinopril   · Monitor AM CMP, CBC, Ca+2    Dyslipidemia  Assessment & Plan  Pt had no anginal symptoms on arrival    Lipid Panel from January 2021 was unremarkable  · Managed at home w Lipitor and Zetia - continue inpatient     Hyperaldosteronism [facility])  Assessment & Plan  · On spironolactone at home - currently held  · BP monitoring   · Will follow AM CMP     Essential hypertension  Assessment & Plan  · Cardiology recommendations appreciated   · Monitor BP - if BP increases considering Lisinopril or Amlodipine  · Hold Carvedilol   · Hold home spironolactone, Lisinopril, Lasix  · Monitor w AM CMP    ----------------------------------------------------------------------------------------  HPI/24hr events: Pt had nausea, vomited once non-bloody and nursing team provided Zofran  Dopamine dose maxed  Patient appropriate for transfer out of the ICU today?: No  Disposition: Continue Stepdown Level 1 level of care   Code Status: Level 1 - Full Code  ---------------------------------------------------------------------------------------  SUBJECTIVE  Pt addressed HA, n/v, posterior LE pain B/L, and able to have bowel movement and voiding urine  Review of Systems   Constitutional: Negative for chills, diaphoresis, fatigue and fever  HENT: Negative for postnasal drip, sore throat and trouble swallowing  Eyes: Negative for photophobia, redness and visual disturbance  Respiratory: Negative for cough, chest tightness and shortness of breath  Gastrointestinal: Positive for nausea and vomiting  Negative for abdominal pain, blood in stool, constipation and diarrhea     Genitourinary: Negative for decreased urine volume, difficulty urinating and hematuria  Musculoskeletal: Positive for myalgias (B/L LE)  Neurological: Positive for headaches  Negative for dizziness, syncope and weakness  Psychiatric/Behavioral: Negative for confusion and decreased concentration  The patient is not nervous/anxious  Review of systems was reviewed and negative unless stated above in HPI/24-hour events   ---------------------------------------------------------------------------------------  OBJECTIVE    Vitals   Vitals:    21 1143 21 1145 21 1200 21 1230   BP:   (!) 176/78 170/79   BP Location:       Pulse: 73 66 58 56   Resp: 19 19 20 15   Temp:       TempSrc:       SpO2: (!) 80% (!) 87% 97% 96%   Weight:       Height:         Temp (24hrs), Av 5 °F (36 4 °C), Min:96 3 °F (35 7 °C), Max:98 8 °F (37 1 °C)  Current: Temperature: 97 8 °F (36 6 °C)          Respiratory:  SpO2: SpO2: 96 %  Nasal Cannula O2 Flow Rate (L/min): 3 L/min    Invasive/non-invasive ventilation settings   Respiratory    Lab Data (Last 4 hours)    None         O2/Vent Data (Last 4 hours)    None                Physical Exam  Vitals and nursing note reviewed  Constitutional:       General: He is not in acute distress  Appearance: Normal appearance  He is obese  He is not ill-appearing  HENT:      Head: Normocephalic and atraumatic  Mouth/Throat:      Mouth: Mucous membranes are moist       Pharynx: Oropharynx is clear  Eyes:      General: No scleral icterus  Conjunctiva/sclera: Conjunctivae normal    Cardiovascular:      Rate and Rhythm: Regular rhythm  Bradycardia present  Pulses: Normal pulses  Heart sounds: Normal heart sounds  No murmur heard  No gallop  Pulmonary:      Effort: Pulmonary effort is normal  No respiratory distress  Breath sounds: Normal breath sounds  No wheezing or rales  Abdominal:      General: Bowel sounds are normal  There is no distension  Palpations: Abdomen is soft  There is no mass  Tenderness: There is no abdominal tenderness  Musculoskeletal:         General: No tenderness  Normal range of motion  Cervical back: Normal range of motion and neck supple  No tenderness  Right lower leg: No edema  Left lower leg: No edema  Skin:     General: Skin is warm and dry  Capillary Refill: Capillary refill takes less than 2 seconds  Coloration: Skin is not jaundiced  Neurological:      General: No focal deficit present  Mental Status: He is alert and oriented to person, place, and time  Mental status is at baseline  Sensory: No sensory deficit  Psychiatric:         Mood and Affect: Mood normal          Behavior: Behavior normal          Thought Content:  Thought content normal          Judgment: Judgment normal          Laboratory and Diagnostics:  Results from last 7 days   Lab Units 08/31/21  0548 08/30/21  0733   WBC Thousand/uL 8 97 6 42   HEMOGLOBIN g/dL 16 4 15 9   HEMATOCRIT % 50 0* 49 9*   PLATELETS Thousands/uL 178 175   NEUTROS PCT % 75 54   MONOS PCT % 10 10     Results from last 7 days   Lab Units 08/31/21  0548 08/30/21  1939 08/30/21  0940 08/30/21  0733   SODIUM mmol/L 137 134* 136 138   POTASSIUM mmol/L 5 0 5 3 5 0 6 3*   CHLORIDE mmol/L 104 102 105 105   CO2 mmol/L 20* 24 22 24   ANION GAP mmol/L 13 8 9 9   BUN mg/dL 36* 40* 41* 42*   CREATININE mg/dL 1 74* 1 93* 1 72* 1 66*   CALCIUM mg/dL 9 9 9 8 9 7 9 5   GLUCOSE RANDOM mg/dL 163* 220* 181* 122   ALT U/L 27  --   --  30   AST U/L 20  --   --  42   ALK PHOS U/L 54  --   --  53   ALBUMIN g/dL 3 6  --   --  3 7   TOTAL BILIRUBIN mg/dL 0 61  --   --  0 49     Results from last 7 days   Lab Units 08/31/21  0548 08/30/21  0733   MAGNESIUM mg/dL 1 8 2 1      Results from last 7 days   Lab Units 08/31/21  0740 08/31/21  0548 08/30/21  0733   INR   --  1 24* 2 43*   PTT seconds 33  --  41*      Results from last 7 days   Lab Units 08/30/21  0733   TROPONIN I ng/mL <0 02         ABG:    VBG: Micro        EK/30 showed Afib, Sinus bradycardia,   Imaging: I have personally reviewed pertinent reports  Intake and Output  I/O       701 -  0700 701 -  07 -  0700    P  O   720     I V  (mL/kg)  3323 9 (32 3)     IV Piggyback  1000     Total Intake(mL/kg)  5043 9 (49)     Urine (mL/kg/hr)  3050     Emesis/NG output  0     Stool  0     Total Output  3050     Net  + 9            Unmeasured Stool Occurrence  1 x     Unmeasured Emesis Occurrence  1 x           Height and Weights   Height: 6' (182 9 cm)  IBW (Ideal Body Weight): 77 6 kg  Body mass index is 30 83 kg/m²  Weight (last 2 days)     Date/Time   Weight    21 0555   103 (227 29)    21 1130   114 (250 88)    21 1014   114 (250 88)    21 0730   121 (266 76)                Nutrition       Diet Orders   (From admission, onward)             Start     Ordered    21 0001  Diet NPO  Diet effective midnight     Question Answer Comment   Diet Type NPO    RD to adjust diet per protocol? Yes        21 1059    21 0903  Diet Reggie/CHO Controlled; Consistent Carbohydrate Diet Level 2 (5 carb servings/75 grams CHO/meal); Lo Fat  Diet effective now     Question Answer Comment   Diet Type Reggie/CHO Controlled    Reggie/CHO Controlled Consistent Carbohydrate Diet Level 2 (5 carb servings/75 grams CHO/meal)    Other Restriction(s): Lo Fat    RD to adjust diet per protocol?  Yes        21 0902                  Active Medications  Scheduled Meds:  Current Facility-Administered Medications   Medication Dose Route Frequency Provider Last Rate    acetaminophen  650 mg Oral Q6H PRN SARA Lakhani      allopurinol  300 mg Oral Daily SARA Cedillo      atorvastatin  40 mg Oral Before JaSARA Mejia      DOPamine in dextrose 5%  20 mcg/kg/min Intravenous Continuous Rujul Choi, DO 17 5 mcg/kg/min (21 1239)    ezetimibe  10 mg Oral ADITYA Jimenez CRNP      gabapentin  1,200 mg Oral TID Tommas Smiling, CRNP      heparin (porcine)  3-30 Units/kg/hr (Order-Specific) Intravenous Titrated Thomas Buckley MD 18 Units/kg/hr (08/31/21 0911)    insulin lispro  1-5 Units Subcutaneous Q6H [address] Hemanth Singleton, CRNP      lactated ringers  75 mL/hr Intravenous Continuous Dorcas Running, DO Stopped (08/31/21 1015)    levothyroxine  175 mcg Oral Early Morning Tommas Smiling, CRNP      multivitamin-minerals  1 tablet Oral Daily Tommas Smiling, [address]      ondansetron  8 mg Intravenous Q8H PRN Griselda Pool, AARONNP 8 mg (08/31/21 0009)    sodium chloride  100 mL/hr Intravenous Continuous Rujul Choi,  mL/hr (08/31/21 1150)     Continuous Infusions:  DOPamine in dextrose 5%, 20 mcg/kg/min, Last Rate: 17 5 mcg/kg/min (08/31/21 1239)  heparin (porcine), 3-30 Units/kg/hr (Order-Specific), Last Rate: 18 Units/kg/hr (08/31/21 0911)  lactated ringers, 75 mL/hr, Last Rate: Stopped (08/31/21 1015)  sodium chloride, 100 mL/hr, Last Rate: 100 mL/hr (08/31/21 1150)      PRN Meds:   acetaminophen, 650 mg, Q6H PRN  ondansetron, 8 mg, Q8H PRN        Invasive Devices Review  Invasive Devices     Peripheral Intravenous Line            Peripheral IV 08/30/21 Left Antecubital 1 day    Peripheral IV 08/30/21 Left Hand 1 day          Airway            Non-Surgical Airway Oral pharyngeal airway 100 mm 536 days                Rationale for remaining devices: Necessary for medical management    ---------------------------------------------------------------------------------------  Advance Directive and Living Will:      Power of :    POLST:    ---------------------------------------------------------------------------------------  Care Time Delivered:   No Critical Care time spent       Thomas Buckley MD      Portions of the record may have been created with voice recognition software    Occasional wrong word or "sound a like" substitutions may have occurred due to the inherent limitations of voice recognition software    Read the chart carefully and recognize, using context, where substitutions have occurred

## 2021-08-31 NOTE — PLAN OF CARE
Problem: Potential for Falls  Goal: Patient will remain free of falls  Description: INTERVENTIONS:  - Educate patient/family on patient safety including physical limitations  - Instruct patient to call for assistance with activity   - Consult OT/PT to assist with strengthening/mobility   - Keep Call bell within reach  - Keep bed low and locked with side rails adjusted as appropriate  - Keep care items and personal belongings within reach  - Initiate and maintain comfort rounds  - Make Fall Risk Sign visible to staff  - Apply yellow socks and bracelet for high fall risk patients  - Consider moving patient to room near nurses station  Outcome: Progressing     Problem: PAIN - ADULT  Goal: Verbalizes/displays adequate comfort level or baseline comfort level  Description: Interventions:  - Encourage patient to monitor pain and request assistance  - Assess pain using appropriate pain scale  - Administer analgesics based on type and severity of pain and evaluate response  - Implement non-pharmacological measures as appropriate and evaluate response  - Consider cultural and social influences on pain and pain management  - Notify physician/advanced practitioner if interventions unsuccessful or patient reports new pain  Outcome: Progressing     Problem: INFECTION - ADULT  Goal: Absence or prevention of progression during hospitalization  Description: INTERVENTIONS:  - Assess and monitor for signs and symptoms of infection  - Monitor lab/diagnostic results  - Monitor all insertion sites, i e  indwelling lines, tubes, and drains  - Monitor endotracheal if appropriate and nasal secretions for changes in amount and color  - [place] appropriate cooling/warming therapies per order  - Administer medications as ordered  - Instruct and encourage patient and family to use good hand hygiene technique  - Identify and instruct in appropriate isolation precautions for identified infection/condition  Outcome: Progressing  Goal: Absence of fever/infection during neutropenic period  Description: INTERVENTIONS:  - Monitor WBC    Outcome: Progressing     Problem: SAFETY ADULT  Goal: Patient will remain free of falls  Description: INTERVENTIONS:  - Educate patient/family on patient safety including physical limitations  - Instruct patient to call for assistance with activity   - Consult OT/PT to assist with strengthening/mobility   - Keep Call bell within reach  - Keep bed low and locked with side rails adjusted as appropriate  - Keep care items and personal belongings within reach  - Initiate and maintain comfort rounds  - Make Fall Risk Sign visible to staff  - Apply yellow socks and bracelet for high fall risk patients  - Consider moving patient to room near nurses station  Outcome: Progressing  Goal: Maintain or return to baseline ADL function  Description: INTERVENTIONS:  -  Assess patient's ability to carry out ADLs; assess patient's baseline for ADL function and identify physical deficits which impact ability to perform ADLs (bathing, care of mouth/teeth, toileting, grooming, dressing, etc )  - Assess/evaluate cause of self-care deficits   - Assess range of motion  - Assess patient's mobility; develop plan if impaired  - Assess patient's need for assistive devices and provide as appropriate  - Encourage maximum independence but intervene and supervise when necessary  - Involve family in performance of ADLs  - Assess for home care needs following discharge   - Consider OT consult to assist with ADL evaluation and planning for discharge  - Provide patient education as appropriate  Outcome: Progressing  Goal: Maintains/Returns to pre admission functional level  Description: INTERVENTIONS:  - Perform BMAT or MOVE assessment daily    - Set and communicate daily mobility goal to care team and patient/family/caregiver     - Collaborate with rehabilitation services on mobility goals if consulted  - Out of bed for toileting  - Record patient progress and toleration of activity level   Outcome: Progressing     Problem: DISCHARGE PLANNING  Goal: Discharge to home or other facility with appropriate resources  Description: INTERVENTIONS:  - Identify barriers to discharge w/patient and caregiver  - Arrange for needed discharge resources and transportation as appropriate  - Identify discharge learning needs (meds, wound care, etc )  - Arrange for interpretive services to assist at discharge as needed  - Refer to Case Management Department for coordinating discharge planning if the patient needs post-hospital services based on physician/advanced practitioner order or complex needs related to functional status, cognitive ability, or social support system  Outcome: Progressing     Problem: Knowledge Deficit  Goal: Patient/family/caregiver demonstrates understanding of disease process, treatment plan, medications, and discharge instructions  Description: Complete learning assessment and assess knowledge base    Interventions:  - Provide teaching at level of understanding  - Provide teaching via preferred learning methods  Outcome: Progressing

## 2021-08-31 NOTE — UTILIZATION REVIEW
Initial Clinical Review    Admission: Date/Time/Statement:   Admission Orders (From admission, onward)     Ordered        08/30/21 0829  Inpatient Admission  Once                   Orders Placed This Encounter   Procedures    Inpatient Admission     Standing Status:   Standing     Number of Occurrences:   1     Order Specific Question:   Level of Care     Answer:   Level 1 Stepdown [13]     Order Specific Question:   Estimated length of stay     Answer:   More than 2 Midnights     Order Specific Question:   Certification     Answer:   I certify that inpatient services are medically necessary for this patient for a duration of greater than two midnights  See H&P and MD Progress Notes for additional information about the patient's course of treatment  ED Arrival Information     Expected Arrival Acuity    - 8/30/2021 07:24 Emergent         Means of arrival Escorted by Service Admission type    Walk-In Self Pulmonology Emergency         Arrival complaint    Dizziness, numbness        Chief Complaint   Patient presents with    Dizziness     states sudden onset within the last hour of dizziness, minor chest pain, and blurry vision  intermittent over the last 2 months but states that he has not been evaluated for it  Initial Presentation: 64  Y O male presents to ED from work after a syncopal episode  Denied head trauma  States he felt dizzy, blacked out for about 5 minutes, vision blurred, felt  "sluggish "  Admits to dizzy spells for past 2 months, however, never passed out  PMH  Is   DVT, essential hypertension  CXR  NAD  EKG  Shows   SB and Afib  Bradycardia not resolved  With atropine  Admit  Ip with  Syncopal episodes, PAF  And  Hypertension and plan is  Tele, monitor labs, cardiology consult, dopamine  Infusion, IV heparin and  IVF  Cardiology consult  ( 8/30)    Pre syncope secondary to sinus bradycardia  Tele  Back and for the  From SB to Afib  Continue  Dopamine  Drip    Start  IV  Heparin 8/31, patient took  Jaziel Lopez  8/30  Wait  2  DE  Date:  8/31      Day 2:    Started on IV  Heparin  Still with some transient lightheadedness  Continue  IVF and  Dopamine  Infusion  Considering  TVP  Continue tele monitoring  Had 1 episode of vomiting  Will make  Decision for pacemaker by   End of day  Will try to wean dopamine      ED Triage Vitals   Temperature Pulse Respirations Blood Pressure SpO2   08/30/21 0730 08/30/21 0730 08/30/21 0730 08/30/21 0730 08/30/21 0730   98 2 °F (36 8 °C) 61 18 116/78 96 %      Temp Source Heart Rate Source Patient Position - Orthostatic VS BP Location FiO2 (%)   08/30/21 0730 08/30/21 1242 08/30/21 1630 08/30/21 1630 --   Oral Monitor Sitting Left arm       Pain Score       08/30/21 0730       2          Wt Readings from Last 1 Encounters:   08/31/21 103 kg (227 lb 4 7 oz)     Additional Vital Signs:   /30/21 1530  --  54Abnormal   24Abnormal   --  --  93 %  --  --  None (Room air)  --   08/30/21 1500  --  46Abnormal   19  --  --  97 %  --  --  --  --   08/30/21 1448  --  52Abnormal   18  153/66  99  96 %  --  --  --  --   08/30/21 1446  96 3 °F (35 7 °C)Abnormal   --  --  --  --  --  --  --  --  --   08/30/21 1430  --  48Abnormal   23Abnormal   137/58  89  96 %  --  --  --  --   08/30/21 1409  --  48Abnormal   20  143/58  91  96 %  32  3 L/min  Nasal cannula  --   08/30/21 1400  --  48Abnormal   21  --  --  95 %  --  --  --  --   08/30/21 1330  --  50Abnormal   22  136/62  83  95 %  --  --  --  --   08/30/21 1300  --  52Abnormal   23Abnormal   136/63  84  96 %  --  --  --  --   08/30/21 1243  --  --  --  --  --  --  32  3 L/min  Nasal cannula  --   08/30/21 1242  --  52Abnormal   17  --  --  88 %Abnormal   --  --  --  --   08/30/21 1230  --  48Abnormal   23Abnormal   142/54  74  94 %  --  --  --  --   08/30/21 1200  --  54Abnormal   24Abnormal   --  --  94 %  --  --  --  --   08/30/21 1130  --  46Abnormal   16  125/54  75  96 %  --  --  --  --   08/30/21 1129  96 2 °F (35 7 °C)Abnormal   --  --  --  --  --  --  --  --  --   08/30/21 1100  --  72  19  120/58  83  92 %  --  --  --  --   08/30/21 0931  --  53Abnormal   20  111/51  --  95 %  --  --  None (Room air)  --   08/30/21 0915  --  58  18  109/47Abnormal   66  96 %  --  --  None (Room air)  --   08/30/21 0845  --  80  18  101/43Abnormal   64  95 %  --  --  None (Room air)  --   08/30/21 0830  --  44Abnormal   16  91/42Abnormal   59  95 %  --  --  None (Room air)  --   08/30/21 0816  --  81  16  112/64  --  96 %  --  --  None (Room air)  --   08/30/21 0739  --  --  --  --  --  --  --  --  None (Room air)  --   08/30/21 0730  98 2 °F (36 8 °C)  61  18  116/78  --  96 %  --  --  None (Room          Pertinent Labs/Diagnostic Test Results:   CXR   ( 8/30)   NAD  EKG   Atrial fibrillation   HR  64         Results from last 7 days   Lab Units 08/31/21  0548 08/30/21  0733   WBC Thousand/uL 8 97 6 42   HEMOGLOBIN g/dL 16 4 15 9   HEMATOCRIT % 50 0* 49 9*   PLATELETS Thousands/uL 178 175   NEUTROS ABS Thousands/µL 6 60 3 49         Results from last 7 days   Lab Units 08/31/21  0548 08/30/21  1939 08/30/21  0940 08/30/21  0733   SODIUM mmol/L 137 134* 136 138   POTASSIUM mmol/L 5 0 5 3 5 0 6 3*   CHLORIDE mmol/L 104 102 105 105   CO2 mmol/L 20* 24 22 24   ANION GAP mmol/L 13 8 9 9   BUN mg/dL 36* 40* 41* 42*   CREATININE mg/dL 1 74* 1 93* 1 72* 1 66*   EGFR ml/min/1 73sq m 43 38 43 45   CALCIUM mg/dL 9 9 9 8 9 7 9 5   CALCIUM, IONIZED mmol/L 1 23  --   --   --    MAGNESIUM mg/dL 1 8  --   --  2 1     Results from last 7 days   Lab Units 08/31/21  0548 08/30/21  0733   AST U/L 20 42   ALT U/L 27 30   ALK PHOS U/L 54 53   TOTAL PROTEIN g/dL 7 5 7 4   ALBUMIN g/dL 3 6 3 7   TOTAL BILIRUBIN mg/dL 0 61 0 49     Results from last 7 days   Lab Units 08/31/21  0552 08/31/21  0040 08/30/21  1829 08/30/21  1131 08/30/21  0736   POC GLUCOSE mg/dl 152* 142* 167* 164* 112     Results from last 7 days   Lab Units 08/31/21  0548 08/30/21  1939 08/30/21  0940 08/30/21  0733   GLUCOSE RANDOM mg/dL 163* 220* 181* 122         Results from last 7 days   Lab Units 08/30/21  0733   HEMOGLOBIN A1C % 6 0*   EAG mg/dl 126             Results from last 7 days   Lab Units 08/30/21  0733   TROPONIN I ng/mL <0 02         Results from last 7 days   Lab Units 08/31/21  0740 08/31/21  0548 08/30/21  0733   PROTIME seconds  --  15 3* 25 9*   INR   --  1 24* 2 43*   PTT seconds 33  --  41*     Results from last 7 days   Lab Units 08/30/21  0733   TSH 3RD GENERATON uIU/mL 3 825*                     Results from last 7 days   Lab Units 08/30/21  0733   NT-PRO BNP pg/mL 292*             ED Treatment:   Medication Administration from 08/30/2021 0724 to 08/30/2021 1012       Date/Time Order Dose Route Action Comments     08/30/2021 0823 atropine injection 0 5 mg 0 mg Intravenous Hold given code cart atropine 0 5mg per 5mL, unable to link this order     08/30/2021 6260 lactated ringers bolus 500 mL 0 mL Intravenous Stopped      08/30/2021 0753 lactated ringers bolus 500 mL 500 mL Intravenous New Bag      08/30/2021 0806 lactated ringers infusion 125 mL/hr Intravenous New Bag      08/30/2021 0835 DOPamine (INTROPIN) 400 mg in 250 mL infusion (premix) 7 5 mcg/kg/min Intravenous Rate/Dose Change      08/30/2021 0825 DOPamine (INTROPIN) 400 mg in 250 mL infusion (premix) 5 mcg/kg/min Intravenous New Bag      08/30/2021 0747 atropine 1 mg/10 mL injection 0 5 mg 0 5 mg Intravenous Given      08/30/2021 0944 lactated ringers bolus 500 mL 500 mL Intravenous New Bag         Present on Admission:   Syncopal episodes    Essential hypertension   Paroxysmal atrial fibrillation (HCC)   Dyslipidemia   Acute-on-chronic kidney injury ([facility] [address] )   Hyperaldosteronism ([facility] [address] )   Sinus bradycardia      Admitting Diagnosis: Hyperkalemia [E87 5]  Dizziness [R42]  Bradycardia [R00 1]  Renal insufficiency [N28 9]  Age/Sex: 64 y o  male  Admission Orders:  Scheduled Medications:  allopurinol, 300 mg, Oral, Daily  atorvastatin, 40 mg, Oral, Before Dinner  ezetimibe, 10 mg, Oral, QAM  gabapentin, 1,200 mg, Oral, TID  insulin lispro, 1-5 Units, Subcutaneous, Q6H DAVID  levothyroxine, 175 mcg, Oral, Early Morning  multivitamin-minerals, 1 tablet, Oral, Daily      Continuous IV Infusions:  DOPamine in dextrose 5%, 1-20 mcg/kg/min, Intravenous, Continuous  heparin (porcine), 3-30 Units/kg/hr (Order-Specific), Intravenous, Titrated  lactated ringers, 75 mL/hr, Intravenous, Continuous      PRN Meds:  acetaminophen, 650 mg, Oral, Q6H PRN  ondansetron, 8 mg, Intravenous, Q8H PRN        IP CONSULT TO CARDIOLOGY  IP CONSULT TO CASE MANAGEMENT    Network Utilization Review Department  ATTENTION: Please call with any questions or concerns to [phone] and carefully listen to the prompts so that you are directed to the right person  All voicemails are confidential   Karina Gao all requests for admission clinical reviews, approved or denied determinations and any other requests to dedicated fax number below belonging to the campus where the patient is receiving treatment   List of dedicated fax numbers for the Facilities:  [address] DENIALS (Administrative/Medical Necessity) [phone]   [address] (Maternity/NICU/Pediatrics) [phone]   [address] Jarocho Mcneil [phone] [place] [address] [address]aya Jane Fitzgerald [address] [facility] [phone]

## 2021-08-31 NOTE — ASSESSMENT & PLAN NOTE
· Dopamine drip at max dose - 20 mcg/kg/min  · IV hydration   · Cardiology recommendations appreciated  · Will try to wean dopamine and monitor   · Decision for pacemaker by end of day

## 2021-08-31 NOTE — PROGRESS NOTES
08/31/21 1200   Clinical Encounter Type   Visited With Patient   Routine Visit Follow-up   Continue Visiting Yes

## 2021-08-31 NOTE — UTILIZATION REVIEW
Inpatient Admission Authorization Request   NOTIFICATION OF INPATIENT ADMISSION/INPATIENT AUTHORIZATION REQUEST   SERVICING FACILITY:   [address] [place] [address], [facility], [facility]  Tax ID: [id number]  NPI: [id number]  Place of Service: Inpatient [address] [facility]  [address]  Place of Service Code: 24     ATTENDING PROVIDER:  Attending Name and NPI#: Freda Holliday [[id number]]  Address: [address] [place] [address], [place] [address] [address] [facility]  Phone: [phone]     UTILIZATION REVIEW CONTACT:  Yakelin Escudero Utilization   Network Utilization Review Department  Phone: [phone]  Fax: [phone]  Email: [email or web address]     PHYSICIAN ADVISORY SERVICES:  FOR [id number] REVIEW - MEDICAL NECESSITY DENIAL  Phone: [phone]  Fax: [phone]  Email: [email or web address]     TYPE OF REQUEST:  Inpatient Status     ADMISSION INFORMATION:  ADMISSION DATE/TIME: 8/30/21  8:29 AM  PATIENT DIAGNOSIS CODE/DESCRIPTION:  Hyperkalemia [E87 5]  Dizziness [R42]  Bradycardia [R00 1]  Renal insufficiency [N28 9]  DISCHARGE DATE/TIME: No discharge date for patient encounter  DISCHARGE DISPOSITION (IF DISCHARGED): Home/Self Care     IMPORTANT INFORMATION:  Please contact the Glenis Barrios directly with any questions or concerns regarding this request  Department voicemails are confidential     Send requests for admission clinical reviews, concurrent reviews, approvals, and administrative denials due to lack of clinical to fax [phone]

## 2021-08-31 NOTE — PROGRESS NOTES
Progress Note - Cardiology   Juan Daniel Baer 64 y o  male MRN: [id number]  Unit/Bed#: ICU 13 Encounter: [id number]          Assessment / Plan  Near-syncope, Symptomatic sinus bradycardia (POA), possible long conversion pause   coreg 12 5mg bid prior to admission - last dose ~5:00 a m  8/30/2021   Sinus rhythm with ventricular rate trend approximately 40 beats per minute  Paroxysmal Atrial flutter (POA)             New dx  HTN               O/p Rx: coreg 12 5mg bid, lasix 40mg/d  Dyslipidemia:               zetia 10mg  Hypothyroidism              TSH 3 82  Hx DVT, bilateral PE-2016              xarelto 20mg qd  Other       Diabetes mellitus       CORIN:  No CPAP    · With half life of carvedilol, and last dose at approximately 5:00 a m  Yesterday would expect affected of this drug to be almost completely eliminated ~~> will wean from dopamine following ventricular rates with both sinus rhythm and atrial dysrhythmia guiding determination of near-term pacemaker need  Discussed with nursing who will now cut dopamine in half  · Will place patient on a heparin drip for stroke risk protection until plan regarding device therapy is finalized ~~> if not needed will resume DOAC (direct oral anticoagulant)  · Presently with suboptimal BP trend that we will for now accept this ~~> if patient does not return to beta-blocker this admission will plan to add amlodipine        Subjective:  Some vague transient lightheadedness - not correlated to rate/rhythm or orthostasis   No other complaints    Vitals:  Vitals:    08/30/21 1130 08/31/21 0555   Weight: 114 kg (250 lb 14 1 oz) 103 kg (227 lb 4 7 oz)   ,  Vitals:    08/31/21 0830 08/31/21 0841 08/31/21 0919 08/31/21 0930   BP: 156/81   147/63   BP Location: Right arm      Pulse: (!) 50 (!) 50 (!) 50    Resp: (!) 23 19 (!) 11    Temp:       TempSrc:       SpO2: 94% 95% 94%    Weight:       Height:           Exam:  General:  Alert normally conversant and comfortable-appearing  Heart: Regular with controlled rate and no pathologic murmur or rub  Lungs:  Clear throughout  Lower Limbs:  No edema    Telemetry:       Predominantly sinus rhythm with ventricular rate trend around 50 beats per minute with occasional episodes of atrial fibrillation or flutter with ventricular rate around 90 beats per minute      Medications:    Current Facility-Administered Medications:     acetaminophen (TYLENOL) tablet 650 mg, 650 mg, Oral, Q6H PRN, SARA Nicole, 650 mg at 08/30/21 1421    allopurinol (ZYLOPRIM) tablet 300 mg, 300 mg, Oral, Daily, SARA Cedillo, 300 mg at 08/31/21 0912    atorvastatin (LIPITOR) tablet 40 mg, 40 mg, Oral, Before Darrold SARA Segura    DOPamine (INTROPIN) 400 mg in 250 mL infusion (premix), 1-20 mcg/kg/min, Intravenous, Continuous, SARA Nicole, Last Rate: 90 8 mL/hr at 08/31/21 0858, 20 mcg/kg/min at 08/31/21 0858    ezetimibe (ZETIA) tablet 10 mg, 10 mg, Oral, QAM, SARA Cedillo, 10 mg at 08/31/21 3258    gabapentin (NEURONTIN) capsule 1,200 mg, 1,200 mg, Oral, TID, SARA Nicole, 1,200 mg at 08/31/21 0912    heparin (porcine) 25,000 units in 0 45% NaCl 250 mL infusion (premix), 3-30 Units/kg/hr (Order-Specific), Intravenous, Titrated, Stewart Russell MD, Last Rate: 18 mL/hr at 08/31/21 0911, 18 Units/kg/hr at 08/31/21 0911    insulin lispro (HumaLOG) 100 units/mL subcutaneous injection 1-5 Units, 1-5 Units, Subcutaneous, Q6H [facility], 1 Units at 08/31/21 0554 **AND** Fingerstick Glucose (POCT), , , Q6H, SARA Cedillo    lactated ringers infusion, 75 mL/hr, Intravenous, Continuous, Alanna Carrasco DO, Last Rate: 50 mL/hr at 08/31/21 0858, 50 mL/hr at 08/31/21 0858    levothyroxine tablet 175 mcg, 175 mcg, Oral, Early Morning, MeccaSARA Vasquez, 175 mcg at 08/31/21 0549    multivitamin-minerals (CENTRUM) tablet 1 tablet, 1 tablet, Oral, Daily, SARA Nicole, 1 tablet at 08/31/21 0912    ondansetron (ZOFRAN) 8 mg in sodium chloride 0 9 % 50 mL IVPB, 8 mg, Intravenous, Q8H PRN, SARA Valdivia, Last Rate: 200 mL/hr at 08/31/21 0009, 8 mg at 08/31/21 0009      Labs/Data:        Results from last 7 days   Lab Units 08/31/21  0548 08/30/21  0733   WBC Thousand/uL 8 97 6 42   HEMOGLOBIN g/dL 16 4 15 9   HEMATOCRIT % 50 0* 49 9*   PLATELETS Thousands/uL 178 175     Results from last 7 days   Lab Units 08/31/21  0548 08/30/21  1939 08/30/21  0940 08/30/21  0733   POTASSIUM mmol/L 5 0 5 3 5 0 6 3*   CHLORIDE mmol/L 104 102 105 105   CO2 mmol/L 20* 24 22 24   BUN mg/dL 36* 40* 41* 42*   TROPONIN I ng/mL  --   --   --  <0 02

## 2021-09-01 ENCOUNTER — ANESTHESIA (INPATIENT)
Dept: NON INVASIVE DIAGNOSTICS | Facility: HOSPITAL | Age: 56
DRG: 243 | End: 2021-09-01
Payer: COMMERCIAL

## 2021-09-01 ENCOUNTER — APPOINTMENT (OUTPATIENT)
Dept: NON INVASIVE DIAGNOSTICS | Facility: HOSPITAL | Age: 56
DRG: 243 | End: 2021-09-01
Attending: INTERNAL MEDICINE
Payer: COMMERCIAL

## 2021-09-01 ENCOUNTER — APPOINTMENT (INPATIENT)
Dept: RADIOLOGY | Facility: HOSPITAL | Age: 56
DRG: 243 | End: 2021-09-01
Payer: COMMERCIAL

## 2021-09-01 LAB
ANION GAP SERPL CALCULATED.3IONS-SCNC: 6 MMOL/L (ref 4–13)
APTT PPP: 31 SECONDS (ref 23–37)
BUN SERPL-MCNC: 33 MG/DL (ref 5–25)
CALCIUM SERPL-MCNC: 9.6 MG/DL (ref 8.3–10.1)
CHLORIDE SERPL-SCNC: 105 MMOL/L (ref 100–108)
CO2 SERPL-SCNC: 28 MMOL/L (ref 21–32)
CREAT SERPL-MCNC: 1.62 MG/DL (ref 0.6–1.3)
ERYTHROCYTE [DISTWIDTH] IN BLOOD BY AUTOMATED COUNT: 14.6 % (ref 11.6–15.1)
GFR SERPL CREATININE-BSD FRML MDRD: 47 ML/MIN/1.73SQ M
GLUCOSE SERPL-MCNC: 103 MG/DL (ref 65–140)
GLUCOSE SERPL-MCNC: 122 MG/DL (ref 65–140)
GLUCOSE SERPL-MCNC: 125 MG/DL (ref 65–140)
GLUCOSE SERPL-MCNC: 134 MG/DL (ref 65–140)
GLUCOSE SERPL-MCNC: 138 MG/DL (ref 65–140)
GLUCOSE SERPL-MCNC: 160 MG/DL (ref 65–140)
GLUCOSE SERPL-MCNC: 86 MG/DL (ref 65–140)
HCT VFR BLD AUTO: 48.3 % (ref 36.5–49.3)
HGB BLD-MCNC: 15.5 G/DL (ref 12–17)
INR PPP: 1.05 (ref 0.84–1.19)
MCH RBC QN AUTO: 30.8 PG (ref 26.8–34.3)
MCHC RBC AUTO-ENTMCNC: 32.1 G/DL (ref 31.4–37.4)
MCV RBC AUTO: 96 FL (ref 82–98)
PLATELET # BLD AUTO: 151 THOUSANDS/UL (ref 149–390)
PMV BLD AUTO: 9.5 FL (ref 8.9–12.7)
POTASSIUM SERPL-SCNC: 5 MMOL/L (ref 3.5–5.3)
PROTHROMBIN TIME: 13.6 SECONDS (ref 11.6–14.5)
RBC # BLD AUTO: 5.04 MILLION/UL (ref 3.88–5.62)
SODIUM SERPL-SCNC: 139 MMOL/L (ref 136–145)
WBC # BLD AUTO: 8.15 THOUSAND/UL (ref 4.31–10.16)

## 2021-09-01 PROCEDURE — 80048 BASIC METABOLIC PNL TOTAL CA: CPT | Performed by: INTERNAL MEDICINE

## 2021-09-01 PROCEDURE — 02H60JZ INSERTION OF PACEMAKER LEAD INTO RIGHT ATRIUM, OPEN APPROACH: ICD-10-PCS | Performed by: INTERNAL MEDICINE

## 2021-09-01 PROCEDURE — 71045 X-RAY EXAM CHEST 1 VIEW: CPT

## 2021-09-01 PROCEDURE — 33208 INSRT HEART PM ATRIAL & VENT: CPT | Performed by: INTERNAL MEDICINE

## 2021-09-01 PROCEDURE — 0JH606Z INSERTION OF PACEMAKER, DUAL CHAMBER INTO CHEST SUBCUTANEOUS TISSUE AND FASCIA, OPEN APPROACH: ICD-10-PCS | Performed by: INTERNAL MEDICINE

## 2021-09-01 PROCEDURE — C1898 LEAD, PMKR, OTHER THAN TRANS: HCPCS

## 2021-09-01 PROCEDURE — 82948 REAGENT STRIP/BLOOD GLUCOSE: CPT

## 2021-09-01 PROCEDURE — 85610 PROTHROMBIN TIME: CPT | Performed by: INTERNAL MEDICINE

## 2021-09-01 PROCEDURE — C1785 PMKR, DUAL, RATE-RESP: HCPCS

## 2021-09-01 PROCEDURE — 02HK0JZ INSERTION OF PACEMAKER LEAD INTO RIGHT VENTRICLE, OPEN APPROACH: ICD-10-PCS | Performed by: INTERNAL MEDICINE

## 2021-09-01 PROCEDURE — C1892 INTRO/SHEATH,FIXED,PEEL-AWAY: HCPCS | Performed by: INTERNAL MEDICINE

## 2021-09-01 PROCEDURE — 85730 THROMBOPLASTIN TIME PARTIAL: CPT | Performed by: INTERNAL MEDICINE

## 2021-09-01 PROCEDURE — 85027 COMPLETE CBC AUTOMATED: CPT | Performed by: INTERNAL MEDICINE

## 2021-09-01 PROCEDURE — C1769 GUIDE WIRE: HCPCS | Performed by: INTERNAL MEDICINE

## 2021-09-01 PROCEDURE — 99233 SBSQ HOSP IP/OBS HIGH 50: CPT | Performed by: INTERNAL MEDICINE

## 2021-09-01 PROCEDURE — 99232 SBSQ HOSP IP/OBS MODERATE 35: CPT | Performed by: INTERNAL MEDICINE

## 2021-09-01 RX ORDER — FENTANYL CITRATE/PF 50 MCG/ML
25 SYRINGE (ML) INJECTION
Status: DISCONTINUED | OUTPATIENT
Start: 2021-09-01 | End: 2021-09-01 | Stop reason: HOSPADM

## 2021-09-01 RX ORDER — GENTAMICIN SULFATE 40 MG/ML
INJECTION, SOLUTION INTRAMUSCULAR; INTRAVENOUS CODE/TRAUMA/SEDATION MEDICATION
Status: COMPLETED | OUTPATIENT
Start: 2021-09-01 | End: 2021-09-01

## 2021-09-01 RX ORDER — ONDANSETRON 2 MG/ML
4 INJECTION INTRAMUSCULAR; INTRAVENOUS ONCE AS NEEDED
Status: DISCONTINUED | OUTPATIENT
Start: 2021-09-01 | End: 2021-09-01 | Stop reason: HOSPADM

## 2021-09-01 RX ORDER — ACETAMINOPHEN 325 MG/1
650 TABLET ORAL EVERY 4 HOURS PRN
Status: DISCONTINUED | OUTPATIENT
Start: 2021-09-01 | End: 2021-09-01 | Stop reason: SDUPTHER

## 2021-09-01 RX ORDER — CEFAZOLIN SODIUM 2 G/50ML
SOLUTION INTRAVENOUS AS NEEDED
Status: DISCONTINUED | OUTPATIENT
Start: 2021-09-01 | End: 2021-09-01

## 2021-09-01 RX ORDER — EPHEDRINE SULFATE 50 MG/ML
INJECTION INTRAVENOUS AS NEEDED
Status: DISCONTINUED | OUTPATIENT
Start: 2021-09-01 | End: 2021-09-01

## 2021-09-01 RX ORDER — LIDOCAINE HYDROCHLORIDE 20 MG/ML
INJECTION, SOLUTION EPIDURAL; INFILTRATION; INTRACAUDAL; PERINEURAL AS NEEDED
Status: DISCONTINUED | OUTPATIENT
Start: 2021-09-01 | End: 2021-09-01

## 2021-09-01 RX ORDER — PROPOFOL 10 MG/ML
INJECTION, EMULSION INTRAVENOUS CONTINUOUS PRN
Status: DISCONTINUED | OUTPATIENT
Start: 2021-09-01 | End: 2021-09-01

## 2021-09-01 RX ORDER — KETAMINE HYDROCHLORIDE 50 MG/ML
INJECTION, SOLUTION, CONCENTRATE INTRAMUSCULAR; INTRAVENOUS AS NEEDED
Status: DISCONTINUED | OUTPATIENT
Start: 2021-09-01 | End: 2021-09-01

## 2021-09-01 RX ORDER — CEFAZOLIN SODIUM 2 G/50ML
2000 SOLUTION INTRAVENOUS ONCE
Status: COMPLETED | OUTPATIENT
Start: 2021-09-01 | End: 2021-09-01

## 2021-09-01 RX ORDER — HYDROMORPHONE HCL/PF 1 MG/ML
0.5 SYRINGE (ML) INJECTION
Status: DISCONTINUED | OUTPATIENT
Start: 2021-09-01 | End: 2021-09-01 | Stop reason: HOSPADM

## 2021-09-01 RX ORDER — OXYCODONE HYDROCHLORIDE 5 MG/1
5 TABLET ORAL EVERY 4 HOURS PRN
Status: DISCONTINUED | OUTPATIENT
Start: 2021-09-01 | End: 2021-09-02 | Stop reason: HOSPADM

## 2021-09-01 RX ORDER — CHLORHEXIDINE GLUCONATE 0.12 MG/ML
15 RINSE ORAL ONCE
Status: DISCONTINUED | OUTPATIENT
Start: 2021-09-01 | End: 2021-09-02 | Stop reason: HOSPADM

## 2021-09-01 RX ORDER — MEPERIDINE HYDROCHLORIDE 25 MG/ML
12.5 INJECTION INTRAMUSCULAR; INTRAVENOUS; SUBCUTANEOUS
Status: DISCONTINUED | OUTPATIENT
Start: 2021-09-01 | End: 2021-09-01 | Stop reason: HOSPADM

## 2021-09-01 RX ORDER — FENTANYL CITRATE 50 UG/ML
INJECTION, SOLUTION INTRAMUSCULAR; INTRAVENOUS AS NEEDED
Status: DISCONTINUED | OUTPATIENT
Start: 2021-09-01 | End: 2021-09-01

## 2021-09-01 RX ORDER — LIDOCAINE HYDROCHLORIDE 10 MG/ML
INJECTION, SOLUTION EPIDURAL; INFILTRATION; INTRACAUDAL; PERINEURAL CODE/TRAUMA/SEDATION MEDICATION
Status: COMPLETED | OUTPATIENT
Start: 2021-09-01 | End: 2021-09-01

## 2021-09-01 RX ADMIN — GENTAMICIN SULFATE 80 MG: 40 INJECTION, SOLUTION INTRAMUSCULAR; INTRAVENOUS at 14:51

## 2021-09-01 RX ADMIN — DOCUSATE SODIUM 100 MG: 100 CAPSULE ORAL at 18:40

## 2021-09-01 RX ADMIN — DOPAMINE HYDROCHLORIDE IN DEXTROSE 15 MCG/KG/MIN: 1.6 INJECTION, SOLUTION INTRAVENOUS at 05:16

## 2021-09-01 RX ADMIN — OXYCODONE HYDROCHLORIDE 5 MG: 5 TABLET ORAL at 21:20

## 2021-09-01 RX ADMIN — IOHEXOL 10 ML: 350 INJECTION, SOLUTION INTRAVENOUS at 14:22

## 2021-09-01 RX ADMIN — SODIUM CHLORIDE, SODIUM LACTATE, POTASSIUM CHLORIDE, AND CALCIUM CHLORIDE 75 ML/HR: .6; .31; .03; .02 INJECTION, SOLUTION INTRAVENOUS at 18:36

## 2021-09-01 RX ADMIN — GABAPENTIN 1200 MG: 300 CAPSULE ORAL at 21:19

## 2021-09-01 RX ADMIN — KETAMINE HYDROCHLORIDE 30 MG: 50 INJECTION INTRAMUSCULAR; INTRAVENOUS at 13:58

## 2021-09-01 RX ADMIN — LIDOCAINE HYDROCHLORIDE 100 MG: 20 INJECTION, SOLUTION EPIDURAL; INFILTRATION; INTRACAUDAL; PERINEURAL at 13:46

## 2021-09-01 RX ADMIN — LIDOCAINE HYDROCHLORIDE 30 ML: 10 INJECTION, SOLUTION EPIDURAL; INFILTRATION; INTRACAUDAL; PERINEURAL at 14:19

## 2021-09-01 RX ADMIN — ACETAMINOPHEN 650 MG: 325 TABLET, FILM COATED ORAL at 02:44

## 2021-09-01 RX ADMIN — EPHEDRINE SULFATE 10 MG: 50 INJECTION, SOLUTION INTRAVENOUS at 15:00

## 2021-09-01 RX ADMIN — PROPOFOL 80 MCG/KG/MIN: 10 INJECTION, EMULSION INTRAVENOUS at 13:46

## 2021-09-01 RX ADMIN — CEFAZOLIN SODIUM 2000 MG: 2 SOLUTION INTRAVENOUS at 13:50

## 2021-09-01 RX ADMIN — ISOPROTERENOL HYDROCHLORIDE 1 MCG/MIN: 0.2 INJECTION, SOLUTION INTRAMUSCULAR; INTRAVENOUS at 04:45

## 2021-09-01 RX ADMIN — DOPAMINE HYDROCHLORIDE IN DEXTROSE 10 MCG/KG/MIN: 1.6 INJECTION, SOLUTION INTRAVENOUS at 10:31

## 2021-09-01 RX ADMIN — DOPAMINE HYDROCHLORIDE IN DEXTROSE 17.5 MCG/KG/MIN: 1.6 INJECTION, SOLUTION INTRAVENOUS at 01:07

## 2021-09-01 RX ADMIN — FENTANYL CITRATE 50 MCG: 50 INJECTION INTRAMUSCULAR; INTRAVENOUS at 13:58

## 2021-09-01 RX ADMIN — GABAPENTIN 1200 MG: 300 CAPSULE ORAL at 18:40

## 2021-09-01 RX ADMIN — ATORVASTATIN CALCIUM 40 MG: 40 TABLET, FILM COATED ORAL at 18:40

## 2021-09-01 RX ADMIN — CEFAZOLIN SODIUM 2000 MG: 2 SOLUTION INTRAVENOUS at 18:36

## 2021-09-01 RX ADMIN — LEVOTHYROXINE SODIUM 175 MCG: 125 TABLET ORAL at 05:20

## 2021-09-01 RX ADMIN — FENTANYL CITRATE 50 MCG: 50 INJECTION INTRAMUSCULAR; INTRAVENOUS at 13:46

## 2021-09-01 RX ADMIN — KETAMINE HYDROCHLORIDE 20 MG: 50 INJECTION INTRAMUSCULAR; INTRAVENOUS at 14:04

## 2021-09-01 NOTE — ASSESSMENT & PLAN NOTE
Lab Results   Component Value Date/Time    INR 1 05 09/01/2021 05:10 AM    INR 1 24 (H) 08/31/2021 05:48 AM    INR 2 43 (H) 08/30/2021 07:33 AM    PTT 31 09/01/2021 05:10 AM    PTT 91 (H) 08/31/2021 03:08 PM    PTT 33 08/31/2021 07:40 AM    PROTIME 13 6 09/01/2021 05:10 AM    PROTIME 15 3 (H) 08/31/2021 05:48 AM    PROTIME 25 9 (H) 08/30/2021 07:33 AM   H/o DVT of B/L LE and PE   · Pt takes Xeralto at home - currently Held   · Heparin drip held for TVP placement   · Repeat PTT, PT, INR in AM   · Restart POAC after TVP placement

## 2021-09-01 NOTE — ASSESSMENT & PLAN NOTE
· Dopamine drip at 15 mcg/kg/min  · IV hydration   · Cardiology recommendations appreciated  · Will try to wean dopamine and monitor   · Isuprel drip 1 mcg/kg/min - as patient becomes bradycardic with dopamine wean trials  · Placement of pacemaker today

## 2021-09-01 NOTE — ASSESSMENT & PLAN NOTE
EKG: changing between Sinus bradycardia and Paroxysmal Atrial Fibrillation    Echo: EF 75%, no wall motion abnormality  · On telemetry monitoring   · Cardiology recommendations appreciated  · Pt takes Dorismanjit Rosas at home for DVT - currently held, cont TY [facility] after TVP placement   · Heparin drip held at midnight for TVP placement today

## 2021-09-01 NOTE — ASSESSMENT & PLAN NOTE
H/o of CORIN, not on CPAP at home  Sat at upper 90s on 4L NC while sleeping   · Requires O2 supplementation while sleeping

## 2021-09-01 NOTE — ASSESSMENT & PLAN NOTE
Lab Results   Component Value Date/Time    INR 1 05 09/01/2021 05:10 AM    INR 1 24 (H) 08/31/2021 05:48 AM    INR 2 43 (H) 08/30/2021 07:33 AM    PTT 31 09/01/2021 05:10 AM    PTT 91 (H) 08/31/2021 03:08 PM    PTT 33 08/31/2021 07:40 AM    PROTIME 13 6 09/01/2021 05:10 AM    PROTIME 15 3 (H) 08/31/2021 05:48 AM    PROTIME 25 9 (H) 08/30/2021 07:33 AM   H/o DVT of B/L LE and PE   · Pt takes Xeralto at home - currently Held   · Heparin drip held for TVP placement   · Repeat PTT, PT, INR in AM

## 2021-09-01 NOTE — ASSESSMENT & PLAN NOTE
EKG: changing between Sinus bradycardia and Paroxysmal Atrial Fibrillation    Echo: EF 75%, no wall motion abnormality  · On telemetry monitoring   · Cardiology recommendations appreciated  · Pt takes Xeralto at home for DVT - currently held   · Heparin drip held at midnight for TVP placement today

## 2021-09-01 NOTE — ASSESSMENT & PLAN NOTE
Lab Results   Component Value Date    EGFR 47 09/01/2021    EGFR 43 08/31/2021    EGFR 38 08/30/2021    CREATININE 1 62 (H) 09/01/2021    CREATININE 1 74 (H) 08/31/2021    CREATININE 1 93 (H) 08/30/2021    BUN 33 (H) 09/01/2021    BUN 36 (H) 08/31/2021    BUN 40 (H) 08/30/2021     · IV fluids  · Avoid nephrotoxic agents and hypotension  · Hold Lisinopril   · Monitor AM CMP, CBC

## 2021-09-01 NOTE — PROGRESS NOTES
CARDIOLOGY INPATIENT FOLLOW-UP PROGRESS NOTE  *-*-*-*-*-*-*-*-*-*-*-*-*-*-*-*-*-*-*-*-*-*-*-*-*-*-*-*-*-*-*-*-*-*-*-*-*-*-*-*-*-*-*-*-*-*-*-*-*-*-*-*-*-*-  Vonda Glass DATE: 09/01/21 9:09 AM   AUTHOR: Severiano Loose, MD  PATIENT: Michell Aldrich   1965    [id number]   57 y o    male  INPATIENT HOSPITALIST PHYSICIAN: Larissa Sousa, *  DATE OF ADMISSION: 8/30/2021  7:27 AM; LENGTH OF STAY: 2 days  *-*-*-*-*-*-*-*-*-*-*-*-*-*-*-*-*-*-*-*-*-*-*-*-*-*-*-*-*-*-*-*-*-*-*-*-*-*-*-*-*-*-*-*-*-*-*-*-*-*-*-*-*-*-    CARDIOLOGY ASSESSMENT  1  Symptomatic sinus bradycardia  2  Paroxysmal atrial fibrillation and atrial flutter with conversion pauses  3  Essential hypertension  4  Dyslipidemia  5  History of DVT  6  Diabetes mellitus  7  Obesity  8  Stage III A/B CKD    Patient hemodynamically stable though still bradycardic despite being off beta-blocker medication for over 72 hours  Discussed with patient about the plan for pacemaker implantation later today  He has been NPO  Is currently on heparin drip  Blood pressure is noted to be elevated due to dopamine       Patient Active Problem List   Diagnosis    Essential hypertension    Controlled type 2 diabetes mellitus with microalbuminuria, without long-term current use of insulin ([facility] [address] [facility]    Diabetic peripheral neuropathy ([facility] [address] )    Erectile dysfunction due to diseases classified elsewhere    Homozygous for MTHFR gene mutation    Hypothyroidism (acquired)    Sacroiliitis ([address]    Tarsal tunnel syndrome of both lower extremities    Tarsal tunnel syndrome, left    Venous stasis ulcer of left ankle limited to breakdown of skin with varicose veins (HCC)    Avascular necrosis of bone (HCC)    Gout    Hyperaldosteronism ([address]    Diabetic nephropathy associated with type 2 diabetes mellitus (HCC)    Stage 3a chronic kidney disease (HCC)    Proteinuria    Vitamin D deficiency    Syncopal episodes     Paroxysmal atrial fibrillation (HCC)    History of DVT (deep vein thrombosis)    Dyslipidemia    Acute-on-chronic kidney injury ([address] )    Sinus bradycardia    Obstructive sleep apnea          amckd  PLAN:  -- lowering dose of dopamine while increasing Isuprel dose to 2 micrograms/minute  -- continue telemetry monitoring   -- patient is scheduled to have left-sided dual-chamber pacemaker implantation later today  -- plan is for restarting oral anticoagulation after pacemaker implantation  *-*-*-*-*-*-*-*-*-*-*-*-*-*-*-*-*-*-*-*-*-*-*-*-*-*-*-*-*-*-*-*-*-*-*-*-*-*-*-*-*-*-*-*-*-*-*-*-*-*-*-*-*-*-  INTERVAL CHANGES / HISTORY OF PRESENT ILLNESS:  No acute events overnight  Patient remains bradycardic with heart rate in 40s  Has had no significant pauses  Is currently on dopamine at 18 micrograms/kilogram per minute and on Isuprel at 1 milligram/minute  Is scheduled for pacemaker today  Denies any chest pain or shortness of breath or dizziness  *-*-*-*-*-*-*-*-*-*-*-*-*-*-*-*-*-*-*-*-*-*-*-*-*-*-*-*-*-*-*-*-*-*-*-*-*-*-*-*-*-*-*-*-*-*-*-*-*-*-*-*-*-*  REVIEW OF SYMPTOMS:    Positive for:  As noted above  Negative for: All remaining as reviewed below and in HPI   SYSTEM SYMPTOMS REVIEWED:  General--weight change, fever, night sweats  Respiratoryl-- Wheezing, shortness of breath, cough, URI symptoms, sputum, blood  Cardiovascular--chest pain, syncope, dyspnea on exertion, edema, decline in exercise tolerance, claudication   Gastrointestinal--persistent vomiting, diarrhea, abdominal distention, blood in stool   Muscular or skeletal--joint pain or swelling   Neurologic--headaches, syncope, abnormal movement  Hematologic--history of easy bruising and bleeding   Endocrine--thyroid enlargement, heat or cold intolerance, polyuria   Psychiatric--anxiety, depression      *-*-*-*-*-*-*-*-*-*-*-*-*-*-*-*-*-*-*-*-*-*-*-*-*-*-*-*-*-*-*-*-*-*-*-*-*-*-*-*-*-*-*-*-*-*-*-*-*-*-*-*-*-*-  VITAL SIGNS:  Vitals:    09/01/21 0800 09/01/21 0830 09/01/21 0900 09/01/21 0905   BP: 156/65 170/70 142/67    Pulse: (!) 50 (!) 52 (!) 52 (!) 52   Resp: 20 18 20 15   Temp: (!) 97 °F (36 1 °C)      TempSrc: Oral      SpO2: 96% 98% 95% 96%   Weight:       Height:          Temp (24hrs), Av 9 °F (36 6 °C), Min:97 °F (36 1 °C), Max:98 6 °F (37 °C)  Current: Temperature: (!) 97 °F (36 1 °C)    Weight (last 2 days)     Date/Time   Weight    21 0555   103 (227 29)    21 1130   114 (250 88)    21 1014   114 (250 88)    21 0730   121 (266 76)            Body mass index is 30 83 kg/m²  Wt Readings from Last 3 Encounters:   21 103 kg (227 lb 4 7 oz)   21 116 kg (256 lb)   03/10/21 120 kg (264 lb)      Intake/Output Summary (Last 24 hours) at 2021 2381  Last data filed at 2021 8907  Gross per 24 hour   Intake 4303 29 ml   Output 3150 ml   Net 1153 29 ml        *-*-*-*-*-*-*-*-*-*-*-*-*-*-*-*-*-*-*-*-*-*-*-*-*-*-*-*-*-*-*-*-*-*-*-*-*-*-*-*-*-*-*-*-*-*-*-*-*-*-*-*-*-*-  PHYSICAL EXAM:  General Appearance:    Alert, cooperative, no distress, appears stated age, large built, slightly obese   Head, Eyes, ENT:    No obvious abnormality, moist mucous mebranes  Neck:   Supple, no carotid bruit or JVD   Back:     Symmetric, no curvature  Lungs:     Respirations unlabored  Clear to auscultation bilaterally,    Chest wall:    No tenderness or deformity   Heart:    Regular rate and rhythm, bradycardic, S1 and S2 normal, no murmur, rub  or gallop  Abdomen:     Soft, non-tender, No obvious masses, or organomegaly   Extremities:   Extremities warm, no cyanosis or edema    Skin:   Skin color, texture, turgor normal, no rashes or lesions     *-*-*-*-*-*-*-*-*-*-*-*-*-*-*-*-*-*-*-*-*-*-*-*-*-*-*-*-*-*-*-*-*-*-*-*-*-*-*-*-*-*-*-*-*-*-*-*-*-*-*-*-*-*-  TELEMETRY, LAST ECG:  Telemetry reviewed    Sinus bradycardia with heart rate in mid 40s mostly with occasional dropped to high 30s  At times has ectopic atrial rhythm  No pauses noted   Results for orders placed or performed during the hospital encounter of 08/30/21   ECG 12 lead   Result Value    Ventricular Rate 50    Atrial Rate 50    CO Interval 162    QRSD Interval 86    QT Interval 464    QTC Interval 423    P Axis 64    QRS Axis 59    T Wave Axis 60    Narrative    Sinus bradycardia  Otherwise normal ECG  When compared with ECG of 30-AUG-2021 07:48, (unconfirmed)  No significant change was found  Confirmed by Xin Rivas ([id number]) on 8/30/2021 10:43:51 AM      *-*-*-*-*-*-*-*-*-*-*-*-*-*-*-*-*-*-*-*-*-*-*-*-*-*-*-*-*-*-*-*-*-*-*-*-*-*-*-*-*-*-*-*-*-*-*-*-*-*-*-*-*-*-    CURRENT SCHEDULED MEDICATIONS:    Current Facility-Administered Medications:     acetaminophen (TYLENOL) tablet 650 mg, 650 mg, Oral, Q6H PRN, SARA Nicole, 650 mg at 09/01/21 0244    allopurinol (ZYLOPRIM) tablet 300 mg, 300 mg, Oral, Daily, SARA Cedillo, 300 mg at 08/31/21 0912    atorvastatin (LIPITOR) tablet 40 mg, 40 mg, Oral, Before Darrold SARA Segura, 40 mg at 08/31/21 1631    docusate sodium (COLACE) capsule 100 mg, 100 mg, Oral, BID, SARA Mendoza, 100 mg at 08/31/21 1829    DOPamine (INTROPIN) 400 mg in 250 mL infusion (premix), 20 mcg/kg/min, Intravenous, Continuous, Xin Choi DO, Last Rate: 38 6 mL/hr at 09/01/21 0850, 10 mcg/kg/min at 09/01/21 0850    ezetimibe (ZETIA) tablet 10 mg, 10 mg, Oral, QAM, SARA Cedillo, 10 mg at 08/31/21 7218    gabapentin (NEURONTIN) capsule 1,200 mg, 1,200 mg, Oral, TID, SARA Nicole, 1,200 mg at 08/31/21 2120    insulin lispro (HumaLOG) 100 units/mL subcutaneous injection 1-5 Units, 1-5 Units, Subcutaneous, HS, SARA Mendoza, 1 Units at 08/31/21 2213    insulin lispro (HumaLOG) 100 units/mL subcutaneous injection 1-6 Units, 1-6 Units, Subcutaneous, TID AC, 1 Units at 08/31/21 1826 **AND** Fingerstick Glucose (POCT), , , TID AC, Marce Birch, SARA    isoproterenol (ISUPREL) 1000 mcg (STANDARD CONCENTRATION) IV in dextrose 5% 250 mL, 1 mcg/min, Intravenous, Continuous, Thora Cam, CRNP, Last Rate: 30 mL/hr at 09/01/21 0905, 2 mcg/min at 09/01/21 0905    lactated ringers infusion, 75 mL/hr, Intravenous, Continuous, Alanna Carrasco DO, Last Rate: 75 mL/hr at 08/31/21 1626, 75 mL/hr at 08/31/21 1626    levothyroxine tablet 175 mcg, 175 mcg, Oral, Early Morning, Wayna Halsted, CRNP, 175 mcg at 09/01/21 1751    multivitamin-minerals (CENTRUM) tablet 1 tablet, 1 tablet, Oral, Daily, Wayna Halsted, CRNP, 1 tablet at 08/31/21 0912    ondansetron (ZOFRAN) 8 mg in sodium chloride 0 9 % 50 mL IVPB, 8 mg, Intravenous, Q8H PRN, Thora Cam, CRNP, Last Rate: 200 mL/hr at 08/31/21 0009, 8 mg at 08/31/21 0009 ALLERGIES:  No Known Allergies     *-*-*-*-*-*-*-*-*-*-*-*-*-*-*-*-*-*-*-*-*-*-*-*-*-*-*-*-*-*-*-*-*-*-*-*-*-*-*-*-*-*-*-*-*-*-*-*-*-*-*-*-*-*  LABORATORY DATA:  I have personally reviewed pertinent labs      CMP:  Results from last 7 days   Lab Units 09/01/21  0510 08/31/21  0548 08/30/21  1939 08/30/21  0733   SODIUM mmol/L 139 137 134* 138   POTASSIUM mmol/L 5 0 5 0 5 3 6 3*   CHLORIDE mmol/L 105 104 102 105   CO2 mmol/L 28 20* 24 24   BUN mg/dL 33* 36* 40* 42*   CREATININE mg/dL 1 62* 1 74* 1 93* 1 66*   CALCIUM mg/dL 9 6 9 9 9 8 9 5   ALK PHOS U/L  --  54  --  53   ALT U/L  --  27  --  30   AST U/L  --  20  --  42        Results from last 7 days   Lab Units 08/31/21  0548 08/30/21  0733   MAGNESIUM mg/dL 1 8 2 1        Cardiac Profile:   Results from last 7 days   Lab Units 08/30/21  0733   TROPONIN I ng/mL <0 02   NT-PRO BNP pg/mL 292*     Results from last 7 days   Lab Units 08/30/21  0733   HEMOGLOBIN A1C % 6 0*            CBC:   Results from last 7 days   Lab Units 09/01/21  0510 08/31/21  0548 08/30/21  0733   WBC Thousand/uL 8 15 8 97 6 42   HEMOGLOBIN g/dL 15 5 16 4 15 9   HEMATOCRIT % 48 3 50 0* 49 9*   PLATELETS Thousands/uL 151 178 175     PT/INR:   Lab Results   Component Value Date    INR 1 05 09/01/2021   , Microbiology: *-*-*-*-*-*-*-*-*-*-*-*-*-*-*-*-*-*-*-*-*-*-*-*-*-*-*-*-*-*-*-*-*-*-*-*-*-*-*-*-*-*-*-*-*-*-*-*-*-*-*-*-*-*-  IMAGING STUDIES REPORTS: Imaging studies results reviewed    No valid procedures specified  No Chest XR results available for this patient  *-*-*-*-*-*-*-*-*-*-*-*-*-*-*-*-*-*-*-*-*-*-*-*-*-*-*-*-*-*-*-*-*-*-*-*-*-*-*-*-*-*-*-*-*-*-*-*-*-*-*-*-*-*-  ECHOCARDIOGRAM AND OTHER CARDIAC TESTS RESULTS:  Results for orders placed during the hospital encounter of 21    Echo complete with contrast if indicated    Narrative  [address] [place] [address]  [facility], [address]  [phone]    Transthoracic Echocardiogram  2D, M-mode, Doppler, and Color Doppler    Study date:  30-Aug-2021    Patient: Eden Garza  MR number: [id number]  Account number: [de-identified]  : 1965  Age: 64 years  Gender: Male  Status: Inpatient  Location: Bedside  Height: 72 in  Weight: 249 5 lb  BP: 125/ 54 mmHg    Indications: Bradycardia  Near syncope  Diagnoses: R00 1 - Bradycardia, unspecified    Sonographer:  ROMELIA Rincon  Primary Physician:  Yun Cramer DO  Referring Physician:  Nelsy Costa PA-C  Group:  [facility]  Interpreting Physician:  Janes Rose DO    SUMMARY    LEFT VENTRICLE:  Systolic function was normal  Ejection fraction was estimated to be 75 %  There were no regional wall motion abnormalities  Wall thickness was mildly increased  LEFT ATRIUM:  The atrium was mildly dilated  RIGHT ATRIUM:  The atrium was mildly dilated  MITRAL VALVE:  There was mild annular calcification  SUMMARY MEASUREMENTS  2D measurements:  Unspecified Anatomy: Ao Diam was 3 7 cm  LA Diam was 3 9 cm  LAAs A2C was 22 3 cm2  LAAs A4C was 25 cm2  LAESV A-L A2C was 71 2 ml  LAESV A-L A4C was 86 8 ml  LAESV Index (A-L) was 34 2 ml/m2  LAESV MOD A2C was 68 1 ml  LAESV MOD  A4C was 79 ml  LAESV(A-L) was 80 1 ml  LAESV(MOD BP) was 74 4 ml  LAESVInd MOD BP was 31 8 ml/m2  LALs A2C was 5 9 cm  LALs A4C was 6 1 cm  LVEDV MOD A4C was 105 ml  LVEF MOD A4C was 72 7 %  LVESV MOD A4C was 28 7 ml  LVLd A4C was  8 5 cm  LVLs A4C was 6 5 cm  RVIDd was 3 8 cm  SV MOD A4C was 76 4 ml   CW measurements:  Unspecified Anatomy:   AV Env  Ti was 315 9 ms   AV VTI was 40 5 cm  AV Vmax was 1 9 m/s  AV Vmean was 1 3 m/s  AV maxPG was 13 9 mmHg  AV meanPG was 7 6 mmHg  MM measurements:  Unspecified Anatomy:   TAPSE was 2 5 cm  PW measurements:  Unspecified Anatomy:   DVI was 0 7   E' Avg was 0 1 m/s  E' Lat was 0 1 m/s  E' Sept was 0 1 m/s  E/E' Avg was 8 2   E/E' Lat was 7   E/E' Sept was 9 8   LVOT Env  Ti was 288 ms  LVOT VTI was 30 2 cm  LVOT Vmax was 1 5 m/s  LVOT  Vmean was 1 m/s  LVOT maxPG was 8 5 mmHg  LVOT meanPG was 5 mmHg  MV A Reji was 0 9 m/s  MV Dec Davidson was 4 5 m/s2  MV DecT was 230 1 ms   MV E Reji was 1 m/s  MV E/A Ratio was 1 2   RV S' was 0 2 m/s  HISTORY: PRIOR HISTORY: hypothyroid  history of DVT  Risk factors: hypertension, diabetes, and medication-treated hypercholesterolemia  PROCEDURE: The procedure was performed at the bedside  This was a routine study  The transthoracic approach was used  The study included complete 2D imaging, M-mode, complete spectral Doppler, and color Doppler  The heart rate was 65 bpm,  at the start of the study  Image quality was adequate  LEFT VENTRICLE: Size was normal  Systolic function was normal  Ejection fraction was estimated to be 75 %  There were no regional wall motion abnormalities  Wall thickness was mildly increased  DOPPLER: Left ventricular diastolic function  parameters were normal     RIGHT VENTRICLE: The size was normal  Systolic function was normal  Wall thickness was normal     LEFT ATRIUM: The atrium was mildly dilated  RIGHT ATRIUM: The atrium was mildly dilated  MITRAL VALVE: There was mild annular calcification  There was normal leaflet separation  DOPPLER: The transmitral velocity was within the normal range  There was no evidence for stenosis  There was no regurgitation  AORTIC VALVE: The valve was trileaflet  Leaflets exhibited normal thickness and normal cuspal separation  DOPPLER: Transaortic velocity was within the normal range  There was no evidence for stenosis  There was no regurgitation  TRICUSPID VALVE: The valve structure was normal  There was normal leaflet separation  DOPPLER: The transtricuspid velocity was within the normal range  There was no evidence for stenosis  There was no regurgitation  PULMONIC VALVE: Not well visualized  DOPPLER: The transpulmonic velocity was within the normal range  There was no evidence for stenosis  There was no regurgitation  PERICARDIUM: There was no pericardial effusion  The pericardium was normal in appearance  AORTA: The root exhibited normal size  SYSTEMIC VEINS: IVC: The inferior vena cava was normal in size and course  Respirophasic changes were normal     SYSTEM MEASUREMENT TABLES    2D  Ao Diam: 3 7 cm  LA Diam: 3 9 cm  LAAs A2C: 22 3 cm2  LAAs A4C: 25 cm2  LAESV A-L A2C: 71 2 ml  LAESV A-L A4C: 86 8 ml  LAESV Index (A-L): 34 2 ml/m2  LAESV MOD A2C: 68 1 ml  LAESV MOD A4C: 79 ml  LAESV(A-L): 80 1 ml  LAESV(MOD BP): 74 4 ml  LAESVInd MOD BP: 31 8 ml/m2  LALs A2C: 5 9 cm  LALs A4C: 6 1 cm  LVEDV MOD A4C: 105 ml  LVEF MOD A4C: 72 7 %  LVESV MOD A4C: 28 7 ml  LVLd A4C: 8 5 cm  LVLs A4C: 6 5 cm  RVIDd: 3 8 cm  SV MOD A4C: 76 4 ml    CW  AV Env  Ti: 315 9 ms  AV VTI: 40 5 cm  AV Vmax: 1 9 m/s  AV Vmean: 1 3 m/s  AV maxP 9 mmHg  AV meanP 6 mmHg    MM  TAPSE: 2 5 cm    PW  DVI: 0 7  E' Av 1 m/s  E' Lat: 0 1 m/s  E' Sept: 0 1 m/s  E/E' Av 2  E/E' Lat: 7  E/E' Sept: 9 8  LVOT Env  Ti: 288 ms  LVOT VTI: 30 2 cm  LVOT Vmax: 1 5 m/s  LVOT Vmean: 1 m/s  LVOT maxP 5 mmHg  LVOT meanP mmHg  MV A Reji: 0 9 m/s  MV Dec Stewart: 4 5 m/s2  MV DecT: 230 1 ms  MV E Reji: 1 m/s  MV E/A Ratio: 1 2  RV S': 0 2 m/s    [employer] Accredited Echocardi[facility]    Prepared and electronically signed by    Daniel Wong DO  Signed 30-Aug-2021 14:34:41    No results found for this or any previous visit  No results found for this or any previous visit  No results found for this or any previous visit         *-*-*-*-*-*-*-*-*-*-*-*-*-*-*-*-*-*-*-*-*-*-*-*-*-*-*-*-*-*-*-*-*-*-*-*-*-*-*-*-*-*-*-*-*-*-*-*-*-*-*-*-*-*-  SIGNATURES:   [unfilled]   José Miguel Bird MD

## 2021-09-01 NOTE — ASSESSMENT & PLAN NOTE
Syncopal episode while standing, was able to catch himself on seat  EKG: Changing between Sinus bradycardia and A fib   Atropine did not resolve bradycardia     CXR: no acute cardiopulmonary disease, scarring of left lung base  ECHO: EF 75%, no wall motion abnormality  · Cardiology on board  - Sinus bradycardia vs Extended conversion pause   · On Dopamine infusions  · If bradycardia and sxs recur placement of Permanent TVP today  · Pt made NPO at midnight   · Telemetry monitoring   · Monitor w AM CMP, CBC

## 2021-09-01 NOTE — ANESTHESIA POSTPROCEDURE EVALUATION
Post-Op Assessment Note    CV Status:  Stable  Pain Score: 2    Pain management: adequate     Mental Status:  Alert and awake   Hydration Status:  Euvolemic   PONV Controlled:  Controlled   Airway Patency:  Patent      Post Op Vitals Reviewed: Yes      Staff: Anesthesiologist         No complications documented      /75 (09/01/21 1533)    Temp     Pulse 58 (09/01/21 1533)   Resp 14 (09/01/21 1533)    SpO2 94 % (09/01/21 1533)

## 2021-09-01 NOTE — PROGRESS NOTES
[facility]  Progress Note - Jayshree Cocking [address] 1965, 64 y o  male MRN: [id number]  Unit/Bed#: ICU 13 Encounter: [id number]  Primary Care Provider: Lolly Gill DO   Date and time admitted to hospital: 8/30/2021  7:27 AM    Sinus bradycardia  Assessment & Plan  · Dopamine drip at 15 mcg/kg/min  · IV hydration   · Cardiology recommendations appreciated  · Will try to wean dopamine and monitor   · Isuprel drip 1 mcg/kg/min - as patient becomes bradycardic with dopamine wean trials  · Placement of pacemaker today     * Syncopal episodes   Assessment & Plan  Syncopal episode while standing, was able to catch himself on seat  EKG: Changing between Sinus bradycardia and A fib   Atropine did not resolve bradycardia  CXR: no acute cardiopulmonary disease, scarring of left lung base  ECHO: EF 75%, no wall motion abnormality  · Cardiology on board  - Sinus bradycardia vs Extended conversion pause   · On Dopamine infusions  · If bradycardia and sxs recur placement of Permanent TVP today  · Pt made NPO at midnight   · Telemetry monitoring   · Monitor w AM CMP, CBC    Paroxysmal atrial fibrillation (HCC)  Assessment & Plan  EKG: changing between Sinus bradycardia and Paroxysmal Atrial Fibrillation    Echo: EF 75%, no wall motion abnormality  · On telemetry monitoring   · Cardiology recommendations appreciated  · Pt takes Cholo Gonzalez at home for DVT - currently held, cont [facility] after TVP placement   · Heparin drip held at midnight for TVP placement today    History of DVT (deep vein thrombosis)  Assessment & Plan  Lab Results   Component Value Date/Time    INR 1 05 09/01/2021 05:10 AM    INR 1 24 (H) 08/31/2021 05:48 AM    INR 2 43 (H) 08/30/2021 07:33 AM    PTT 31 09/01/2021 05:10 AM    PTT 91 (H) 08/31/2021 03:08 PM    PTT 33 08/31/2021 07:40 AM    PROTIME 13 6 09/01/2021 05:10 AM    PROTIME 15 3 (H) 08/31/2021 05:48 AM    PROTIME 25 9 (H) 08/30/2021 07:33 AM   H/o DVT of B/L LE and PE   · Pt takes Xeralto at home - currently Held   · Heparin drip held for TVP placement   · Repeat PTT, PT, INR in AM   · Restart POAC after TVP placement     Obstructive sleep apnea  Assessment & Plan  H/o of CORIN, not on CPAP at home  Sat at upper 90s on 4L NC while sleeping   · Requires O2 supplementation while sleeping     Acute-on-chronic kidney injury [facility])  Assessment & Plan  Lab Results   Component Value Date    EGFR 47 09/01/2021    EGFR 43 08/31/2021    EGFR 38 08/30/2021    CREATININE 1 62 (H) 09/01/2021    CREATININE 1 74 (H) 08/31/2021    CREATININE 1 93 (H) 08/30/2021    BUN 33 (H) 09/01/2021    BUN 36 (H) 08/31/2021    BUN 40 (H) 08/30/2021     · IV fluids  · Avoid nephrotoxic agents and hypotension  · Hold Lisinopril   · Monitor AM CMP, CBC    Dyslipidemia  Assessment & Plan  Pt had no anginal symptoms on arrival    Lipid Panel from January 2021 was unremarkable  · Managed at home w Lipitor and Zetia - continue inpatient     Hyperaldosteronism [facility])  Assessment & Plan  · On spironolactone at home - currently held  · BP monitoring   · Will follow AM CMP     Essential hypertension  Assessment & Plan  · Cardiology recommendations appreciated   · Monitor BP - if BP increases considering Lisinopril or Amlodipine  · Hold Carvedilol   · Hold home spironolactone, Lisinopril, Lasix  · Monitor w AM CMP      ----------------------------------------------------------------------------------------  HPI/24hr events: Pt had bradycardia with trial of dopamine wean from 17 5 to 15 mcg/kg/min after elevated /73  Cardiology contacted and recommended Isuprel drip 1mcg/kg/min  Pt had HA overnight which resolved with tylenol, and episode of dizziness and blurry vision       Patient appropriate for transfer out of the ICU today?: No  Disposition: Transfer to Med Surg with Telemetry   Code Status: Level 1 - Full Code  ---------------------------------------------------------------------------------------  SUBJECTIVE  Pt addressed no symptoms today, denied dizziness, blurry vision, N/V, posterior LE pain  Review of Systems   Constitutional: Negative for chills, diaphoresis, fatigue and fever  HENT: Negative for ear pain, postnasal drip, sore throat and trouble swallowing  Eyes: Negative for photophobia, pain and visual disturbance  Respiratory: Negative for cough, choking, chest tightness and shortness of breath  Cardiovascular: Negative for chest pain, palpitations and leg swelling  Gastrointestinal: Negative for abdominal pain, blood in stool, constipation, diarrhea, nausea and vomiting  Genitourinary: Negative for decreased urine volume, difficulty urinating, dysuria and hematuria  Musculoskeletal: Negative for arthralgias, back pain and neck pain  Skin: Negative for color change and rash  Neurological: Positive for dizziness  Negative for seizures, syncope and headaches  Psychiatric/Behavioral: Negative for confusion and decreased concentration  The patient is not nervous/anxious  All other systems reviewed and are negative  Review of systems was reviewed and negative unless stated above in HPI/24-hour events   ---------------------------------------------------------------------------------------  OBJECTIVE    Vitals   Vitals:    21 0905 21 0930 21 1000 21 1020   BP:  157/72 142/67    Pulse: (!) 52  (!) 54 (!) 54   Resp: 15  19 16   Temp:       TempSrc:       SpO2: 96%  93% 93%   Weight:       Height:         Temp (24hrs), Av 9 °F (36 6 °C), Min:97 °F (36 1 °C), Max:98 6 °F (37 °C)  Current: Temperature: (!) 97 °F (36 1 °C)          Respiratory:  SpO2: SpO2: 93 %  Nasal Cannula O2 Flow Rate (L/min): 3 L/min    Invasive/non-invasive ventilation settings   Respiratory    Lab Data (Last 4 hours)    None         O2/Vent Data (Last 4 hours)    None                Physical Exam  Vitals and nursing note reviewed  Constitutional:       General: He is not in acute distress  Appearance: Normal appearance  He is not ill-appearing  HENT:      Head: Normocephalic and atraumatic  Mouth/Throat:      Mouth: Mucous membranes are moist       Pharynx: Oropharynx is clear  Eyes:      General: No scleral icterus  Conjunctiva/sclera: Conjunctivae normal    Cardiovascular:      Rate and Rhythm: Regular rhythm  Bradycardia present  Pulses: Normal pulses  Heart sounds: Normal heart sounds  No murmur heard  No gallop  Pulmonary:      Effort: Pulmonary effort is normal  No respiratory distress  Breath sounds: Normal breath sounds  No wheezing or rales  Abdominal:      General: Bowel sounds are normal  There is no distension  Palpations: Abdomen is soft  There is no mass  Tenderness: There is no abdominal tenderness  Musculoskeletal:         General: No tenderness  Normal range of motion  Cervical back: Normal range of motion and neck supple  No tenderness  Right lower leg: No edema  Left lower leg: No edema  Skin:     General: Skin is warm and dry  Capillary Refill: Capillary refill takes less than 2 seconds  Coloration: Skin is not jaundiced or pale  Neurological:      General: No focal deficit present  Mental Status: He is alert and oriented to person, place, and time  Mental status is at baseline  Sensory: No sensory deficit  Psychiatric:         Mood and Affect: Mood normal          Behavior: Behavior normal          Thought Content:  Thought content normal          Judgment: Judgment normal          Laboratory and Diagnostics:  Results from last 7 days   Lab Units 09/01/21  0510 08/31/21  0548 08/30/21  0733   WBC Thousand/uL 8 15 8 97 6 42   HEMOGLOBIN g/dL 15 5 16 4 15 9   HEMATOCRIT % 48 3 50 0* 49 9*   PLATELETS Thousands/uL 151 178 175   NEUTROS PCT %  --  75 54   MONOS PCT %  --  10 10     Results from last 7 days   Lab Units 09/01/21  0510 08/31/21  0548 08/30/21  1939 08/30/21  0940 08/30/21  4186 SODIUM mmol/L 139 137 134* 136 138   POTASSIUM mmol/L 5 0 5 0 5 3 5 0 6 3*   CHLORIDE mmol/L 105 104 102 105 105   CO2 mmol/L 28 20* 24 22 24   ANION GAP mmol/L 6 13 8 9 9   BUN mg/dL 33* 36* 40* 41* 42*   CREATININE mg/dL 1 62* 1 74* 1 93* 1 72* 1 66*   CALCIUM mg/dL 9 6 9 9 9 8 9 7 9 5   GLUCOSE RANDOM mg/dL 160* 163* 220* 181* 122   ALT U/L  --  27  --   --  30   AST U/L  --  20  --   --  42   ALK PHOS U/L  --  54  --   --  53   ALBUMIN g/dL  --  3 6  --   --  3 7   TOTAL BILIRUBIN mg/dL  --  0 61  --   --  0 49     Results from last 7 days   Lab Units 21  0548 21  0733   MAGNESIUM mg/dL 1 8 2 1      Results from last 7 days   Lab Units 21  0510 21  1508 21  0740 21  0548 21  0733   INR  1 05  --   --  1 24* 2 43*   PTT seconds 31 91* 33  --  41*      Results from last 7 days   Lab Units 21  0733   TROPONIN I ng/mL <0 02         ABG:    VBG:          Micro        EK/30 - Sinus bradycardia, Afib   Imaging: I have personally reviewed pertinent reports  Intake and Output  I/O       701 -  07 -  07 07 -  0700    P  O  720 780     I V  (mL/kg) 3323 9 (32 3) 3381 6 (32 8) 425 6 (4 1)    IV Piggyback 1000      Total Intake(mL/kg) 5043 9 (49) 4161 6 (40 4) 425 6 (4 1)    Urine (mL/kg/hr) 3050 3775 (1 5) 850 (2 3)    Emesis/NG output 0      Stool 0      Total Output 3050 3775 850    Net + 9 +386 6 -424 4           Unmeasured Stool Occurrence 1 x      Unmeasured Emesis Occurrence 1 x            Height and Weights   Height: 6' (182 9 cm)  IBW (Ideal Body Weight): 77 6 kg  Body mass index is 30 83 kg/m²    Weight (last 2 days)     Date/Time   Weight    21 0555   103 (227 29)    21 1130   114 (250 88)    21 1014   114 (250 88)    21 0730   121 (266 76)                Nutrition       Diet Orders   (From admission, onward)             Start     Ordered    21 0001  Diet NPO  Diet effective midnight     Question Answer Comment   Diet Type NPO    RD to adjust diet per protocol? Yes        08/31/21 1059                  Active Medications  Scheduled Meds:  Current Facility-Administered Medications   Medication Dose Route Frequency Provider Last Rate    acetaminophen  650 mg Oral Q6H PRN SARA Ramirez      allopurinol  300 mg Oral Daily Hemanth SingletonSARA      atorvastatin  40 mg Oral Before [employer]iSARA      docusate sodium  100 mg Oral BID SARA Taveras      DOPamine in dextrose 5%  20 mcg/kg/min Intravenous Continuous Rujul Choi, DO 10 mcg/kg/min (09/01/21 1031)    ezetimibe  10 mg Oral QAM Hemanth SingletonSARA      gabapentin  1,200 mg Oral TID SARA Ramirez      insulin lispro  1-5 Units Subcutaneous HS MadeSARA Jay      insulin lispro  1-6 Units Subcutaneous TID AC SARA Taveras      isoproterenol  2 mcg/min Intravenous Continuous Oracio Vega MD 2 mcg/min (09/01/21 0906)    lactated ringers  75 mL/hr Intravenous Continuous Alanna NEWTON Dostal, DO 75 mL/hr (08/31/21 1626)    levothyroxine  175 mcg Oral Early Morning SARA Ramirez      multivitamin-minerals  1 tablet Oral Daily Rylan Stark SARA Singleton      ondansetron  8 mg Intravenous Q8H PRN SARA Anaya 8 mg (08/31/21 0009)     Continuous Infusions:  DOPamine in dextrose 5%, 20 mcg/kg/min, Last Rate: 10 mcg/kg/min (09/01/21 1031)  isoproterenol, 2 mcg/min, Last Rate: 2 mcg/min (09/01/21 0906)  lactated ringers, 75 mL/hr, Last Rate: 75 mL/hr (08/31/21 1626)      PRN Meds:   acetaminophen, 650 mg, Q6H PRN  ondansetron, 8 mg, Q8H PRN        Invasive Devices Review  Invasive Devices     Peripheral Intravenous Line            Peripheral IV 08/30/21 Left Antecubital 2 days    Peripheral IV 08/30/21 Left Hand 2 days          Airway            Non-Surgical Airway Oral pharyngeal airway 100 mm 536 days                Rationale for remaining devices: Medically necessary  ---------------------------------------------------------------------------------------  Advance Directive and Living Will:      Power of :    POLST:    ---------------------------------------------------------------------------------------  Care Time Delivered:   No Critical Care time spent       Lexus Feliciano MD      Portions of the record may have been created with voice recognition software  Occasional wrong word or "sound a like" substitutions may have occurred due to the inherent limitations of voice recognition software    Read the chart carefully and recognize, using context, where substitutions have occurred

## 2021-09-01 NOTE — QUICK NOTE
Pt addressed that he will be updating his significant other, Dwightclaudiajuve Lissett, about his care  He addressed no need to contact

## 2021-09-02 ENCOUNTER — APPOINTMENT (INPATIENT)
Dept: RADIOLOGY | Facility: HOSPITAL | Age: 56
DRG: 243 | End: 2021-09-02
Payer: COMMERCIAL

## 2021-09-02 ENCOUNTER — TRANSITIONAL CARE MANAGEMENT (OUTPATIENT)
Dept: FAMILY MEDICINE CLINIC | Facility: CLINIC | Age: 56
End: 2021-09-02

## 2021-09-02 VITALS
RESPIRATION RATE: 16 BRPM | HEIGHT: 72 IN | DIASTOLIC BLOOD PRESSURE: 79 MMHG | HEART RATE: 61 BPM | SYSTOLIC BLOOD PRESSURE: 133 MMHG | BODY MASS INDEX: 33.68 KG/M2 | WEIGHT: 248.68 LBS | TEMPERATURE: 97.6 F | OXYGEN SATURATION: 95 %

## 2021-09-02 PROBLEM — E78.5 DYSLIPIDEMIA: Status: RESOLVED | Noted: 2021-08-30 | Resolved: 2021-09-02

## 2021-09-02 LAB
ANION GAP SERPL CALCULATED.3IONS-SCNC: 10 MMOL/L (ref 4–13)
BUN SERPL-MCNC: 27 MG/DL (ref 5–25)
CALCIUM SERPL-MCNC: 9.3 MG/DL (ref 8.3–10.1)
CHLORIDE SERPL-SCNC: 108 MMOL/L (ref 100–108)
CO2 SERPL-SCNC: 26 MMOL/L (ref 21–32)
CREAT SERPL-MCNC: 1.33 MG/DL (ref 0.6–1.3)
ERYTHROCYTE [DISTWIDTH] IN BLOOD BY AUTOMATED COUNT: 14.8 % (ref 11.6–15.1)
GFR SERPL CREATININE-BSD FRML MDRD: 59 ML/MIN/1.73SQ M
GLUCOSE SERPL-MCNC: 110 MG/DL (ref 65–140)
GLUCOSE SERPL-MCNC: 94 MG/DL (ref 65–140)
GLUCOSE SERPL-MCNC: 95 MG/DL (ref 65–140)
HCT VFR BLD AUTO: 46.1 % (ref 36.5–49.3)
HGB BLD-MCNC: 14.6 G/DL (ref 12–17)
MCH RBC QN AUTO: 30.5 PG (ref 26.8–34.3)
MCHC RBC AUTO-ENTMCNC: 31.7 G/DL (ref 31.4–37.4)
MCV RBC AUTO: 96 FL (ref 82–98)
PLATELET # BLD AUTO: 138 THOUSANDS/UL (ref 149–390)
PMV BLD AUTO: 9.6 FL (ref 8.9–12.7)
POTASSIUM SERPL-SCNC: 4.8 MMOL/L (ref 3.5–5.3)
RBC # BLD AUTO: 4.79 MILLION/UL (ref 3.88–5.62)
SODIUM SERPL-SCNC: 144 MMOL/L (ref 136–145)
WBC # BLD AUTO: 6.75 THOUSAND/UL (ref 4.31–10.16)

## 2021-09-02 PROCEDURE — 82948 REAGENT STRIP/BLOOD GLUCOSE: CPT

## 2021-09-02 PROCEDURE — 71046 X-RAY EXAM CHEST 2 VIEWS: CPT

## 2021-09-02 PROCEDURE — 99024 POSTOP FOLLOW-UP VISIT: CPT | Performed by: INTERNAL MEDICINE

## 2021-09-02 PROCEDURE — 85027 COMPLETE CBC AUTOMATED: CPT | Performed by: NURSE PRACTITIONER

## 2021-09-02 PROCEDURE — 86618 LYME DISEASE ANTIBODY: CPT | Performed by: FAMILY MEDICINE

## 2021-09-02 PROCEDURE — 99239 HOSP IP/OBS DSCHRG MGMT >30: CPT | Performed by: INTERNAL MEDICINE

## 2021-09-02 PROCEDURE — 80048 BASIC METABOLIC PNL TOTAL CA: CPT | Performed by: NURSE PRACTITIONER

## 2021-09-02 RX ORDER — CARVEDILOL 6.25 MG/1
6.25 TABLET ORAL 2 TIMES DAILY WITH MEALS
Qty: 60 TABLET | Refills: 0 | Status: SHIPPED | OUTPATIENT
Start: 2021-09-02 | End: 2021-12-28 | Stop reason: SDUPTHER

## 2021-09-02 RX ADMIN — DOCUSATE SODIUM 100 MG: 100 CAPSULE ORAL at 08:21

## 2021-09-02 RX ADMIN — LEVOTHYROXINE SODIUM 175 MCG: 125 TABLET ORAL at 05:36

## 2021-09-02 RX ADMIN — GABAPENTIN 1200 MG: 300 CAPSULE ORAL at 08:21

## 2021-09-02 RX ADMIN — ALLOPURINOL 300 MG: 100 TABLET ORAL at 08:22

## 2021-09-02 RX ADMIN — EZETIMIBE 10 MG: 10 TABLET ORAL at 08:22

## 2021-09-02 RX ADMIN — MULTIPLE VITAMINS W/ MINERALS TAB 1 TABLET: TAB ORAL at 08:22

## 2021-09-02 RX ADMIN — RIVAROXABAN 20 MG: 20 TABLET, FILM COATED ORAL at 08:22

## 2021-09-02 NOTE — PLAN OF CARE
Problem: Potential for Falls  Goal: Patient will remain free of falls  Description: INTERVENTIONS:  - Educate patient/family on patient safety including physical limitations  - Instruct patient to call for assistance with activity   - Consult OT/PT to assist with strengthening/mobility   - Keep Call bell within reach  - Keep bed low and locked with side rails adjusted as appropriate  - Keep care items and personal belongings within reach  - Initiate and maintain comfort rounds  - Make Fall Risk Sign visible to staff  - Offer Toileting every 2 Hours, in advance of need  - Apply yellow socks and bracelet for high fall risk patients  - Consider moving patient to room near nurses station  9/2/2021 1249 by Bette Meraz RN  Outcome: Adequate for Discharge  9/2/2021 0748 by Bette Meraz RN  Outcome: Progressing     Problem: PAIN - ADULT  Goal: Verbalizes/displays adequate comfort level or baseline comfort level  Description: Interventions:  - Encourage patient to monitor pain and request assistance  - Assess pain using appropriate pain scale  - Administer analgesics based on type and severity of pain and evaluate response  - Implement non-pharmacological measures as appropriate and evaluate response  - Consider cultural and social influences on pain and pain management  - Notify physician/advanced practitioner if interventions unsuccessful or patient reports new pain  9/2/2021 1249 by Bette Meraz RN  Outcome: Adequate for Discharge  9/2/2021 0748 by Bette Meraz RN  Outcome: Progressing     Problem: INFECTION - ADULT  Goal: Absence or prevention of progression during hospitalization  Description: INTERVENTIONS:  - Assess and monitor for signs and symptoms of infection  - Monitor lab/diagnostic results  - Monitor all insertion sites, i e  indwelling lines, tubes, and drains  - [place] appropriate cooling/warming therapies per order  - Administer medications as ordered  - Instruct and encourage patient and family to use good hand hygiene technique  - Identify and instruct in appropriate isolation precautions for identified infection/condition  9/2/2021 1249 by Ashley Padilla RN  Outcome: Adequate for Discharge  9/2/2021 0748 by Ashley Padilla RN  Outcome: Progressing     Problem: SAFETY ADULT  Goal: Patient will remain free of falls  Description: INTERVENTIONS:  - Educate patient/family on patient safety including physical limitations  - Instruct patient to call for assistance with activity   - Consult OT/PT to assist with strengthening/mobility   - Keep Call bell within reach  - Keep bed low and locked with side rails adjusted as appropriate  - Keep care items and personal belongings within reach  - Initiate and maintain comfort rounds  - Make Fall Risk Sign visible to staff  - Offer Toileting every 2 Hours, in advance of need  - Apply yellow socks and bracelet for high fall risk patients  - Consider moving patient to room near nurses station  9/2/2021 1249 by Ashley Padilla RN  Outcome: Adequate for Discharge  9/2/2021 0748 by Ashley Padilla RN  Outcome: Progressing  Goal: Maintain or return to baseline ADL function  Description: INTERVENTIONS:  - Educate patient/family on patient safety including physical limitations  - Instruct patient to call for assistance with activity   - Consult OT/PT to assist with strengthening/mobility   - Keep Call bell within reach  - Keep bed low and locked with side rails adjusted as appropriate  - Keep care items and personal belongings within reach  - Initiate and maintain comfort rounds  - Make Fall Risk Sign visible to staff  - Offer Toileting every 2 Hours, in advance of need  - Apply yellow socks and bracelet for high fall risk patients  - Consider moving patient to room near nurses station  9/2/2021 1249 by Ashley Padilla RN  Outcome: Adequate for Discharge  9/2/2021 0748 by Ashley Padilla RN  Outcome: Progressing  Goal: Maintains/Returns to pre admission functional level  Description: INTERVENTIONS:  - Perform BMAT or MOVE assessment daily    - Set and communicate daily mobility goal to care team and patient/family/caregiver  - Collaborate with rehabilitation services on mobility goals if consulted  - Perform Range of Motion times a day  - Reposition patient every hours  - Dangle patient times a day  - Stand patient times a day  - Ambulate patient times a day  - Out of bed to chair times a day   - Out of bed for meals times a day  - Out of bed for toileting  - Record patient progress and toleration of activity level   9/2/2021 1249 by Gilberto Cruz RN  Outcome: Adequate for Discharge  9/2/2021 0748 by Gilberto Cruz RN  Outcome: Progressing     Problem: DISCHARGE PLANNING  Goal: Discharge to home or other facility with appropriate resources  Description: INTERVENTIONS:  - Identify barriers to discharge w/patient and caregiver  - Arrange for needed discharge resources and transportation as appropriate  - Identify discharge learning needs (meds, wound care, etc )  - Refer to Case Management Department for coordinating discharge planning if the patient needs post-hospital services based on physician/advanced practitioner order or complex needs related to functional status, cognitive ability, or social support system  9/2/2021 1249 by Gilberto Cruz RN  Outcome: Adequate for Discharge  9/2/2021 0748 by Gilberto Cruz RN  Outcome: Progressing     Problem: Knowledge Deficit  Goal: Patient/family/caregiver demonstrates understanding of disease process, treatment plan, medications, and discharge instructions  Description: Complete learning assessment and assess knowledge base    Interventions:  - Provide teaching at level of understanding  - Provide teaching via preferred learning methods  9/2/2021 1249 by Gilberto Cruz RN  Outcome: Adequate for Discharge  9/2/2021 0748 by Gilberto Cruz RN  Outcome: Progressing     Problem: CARDIOVASCULAR - ADULT  Goal: Maintains optimal cardiac output and hemodynamic stability  Description: INTERVENTIONS:  - Monitor I/O, vital signs and rhythm  - Monitor for S/S and trends of decreased cardiac output  - Administer and titrate ordered vasoactive medications to optimize hemodynamic stability  - Assess quality of pulses, skin color and temperature  - Assess for signs of decreased coronary artery perfusion  - Instruct patient to report change in severity of symptoms  9/2/2021 1249 by Lala Nicole RN  Outcome: Adequate for Discharge  9/2/2021 0748 by Lala Nicole RN  Outcome: Progressing  Goal: Absence of cardiac dysrhythmias or at baseline rhythm  Description: INTERVENTIONS:  - Continuous cardiac monitoring, vital signs, obtain 12 lead EKG if ordered  - Administer antiarrhythmic and heart rate control medications as ordered  - Monitor electrolytes and administer replacement therapy as ordered  9/2/2021 1249 by Lala Nicole RN  Outcome: Adequate for Discharge  9/2/2021 0748 by Lala Nicole RN  Outcome: Progressing     Problem: METABOLIC, FLUID AND ELECTROLYTES - ADULT  Goal: Glucose maintained within target range  Description: INTERVENTIONS:  - Monitor Blood Glucose as ordered  - Assess for signs and symptoms of hyperglycemia and hypoglycemia  - Administer ordered medications to maintain glucose within target range  - Assess nutritional intake and initiate nutrition service referral as needed  9/2/2021 1249 by Lala Nicole RN  Outcome: Adequate for Discharge  9/2/2021 0748 by Lala Nicole RN  Outcome: Progressing

## 2021-09-02 NOTE — PLAN OF CARE
Problem: Potential for Falls  Goal: Patient will remain free of falls  Description: INTERVENTIONS:  - Educate patient/family on patient safety including physical limitations  - Instruct patient to call for assistance with activity   - Consult OT/PT to assist with strengthening/mobility   - Keep Call bell within reach  - Keep bed low and locked with side rails adjusted as appropriate  - Keep care items and personal belongings within reach  - Initiate and maintain comfort rounds  - Make Fall Risk Sign visible to staff  - Offer Toileting every 2 Hours, in advance of need  - Apply yellow socks and bracelet for high fall risk patients  - Consider moving patient to room near nurses station  Outcome: Progressing     Problem: PAIN - ADULT  Goal: Verbalizes/displays adequate comfort level or baseline comfort level  Description: Interventions:  - Encourage patient to monitor pain and request assistance  - Assess pain using appropriate pain scale  - Administer analgesics based on type and severity of pain and evaluate response  - Implement non-pharmacological measures as appropriate and evaluate response  - Consider cultural and social influences on pain and pain management  - Notify physician/advanced practitioner if interventions unsuccessful or patient reports new pain  Outcome: Progressing     Problem: INFECTION - ADULT  Goal: Absence or prevention of progression during hospitalization  Description: INTERVENTIONS:  - Assess and monitor for signs and symptoms of infection  - Monitor lab/diagnostic results  - Monitor all insertion sites, i e  indwelling lines, tubes, and drains  - [place] appropriate cooling/warming therapies per order  - Administer medications as ordered  - Instruct and encourage patient and family to use good hand hygiene technique  - Identify and instruct in appropriate isolation precautions for identified infection/condition  Outcome: Progressing     Problem: SAFETY ADULT  Goal: Patient will remain free of falls  Description: INTERVENTIONS:  - Educate patient/family on patient safety including physical limitations  - Instruct patient to call for assistance with activity   - Consult OT/PT to assist with strengthening/mobility   - Keep Call bell within reach  - Keep bed low and locked with side rails adjusted as appropriate  - Keep care items and personal belongings within reach  - Initiate and maintain comfort rounds  - Make Fall Risk Sign visible to staff  - Offer Toileting every 2 Hours, in advance of need  - Apply yellow socks and bracelet for high fall risk patients  - Consider moving patient to room near nurses station  Outcome: Progressing  Goal: Maintain or return to baseline ADL function  Description: INTERVENTIONS:  - Educate patient/family on patient safety including physical limitations  - Instruct patient to call for assistance with activity   - Consult OT/PT to assist with strengthening/mobility   - Keep Call bell within reach  - Keep bed low and locked with side rails adjusted as appropriate  - Keep care items and personal belongings within reach  - Initiate and maintain comfort rounds  - Make Fall Risk Sign visible to staff  - Offer Toileting every 2 Hours, in advance of need  - Apply yellow socks and bracelet for high fall risk patients  - Consider moving patient to room near nurses station  Outcome: Progressing  Goal: Maintains/Returns to pre admission functional level  Description: INTERVENTIONS:  - Perform BMAT or MOVE assessment daily    - Set and communicate daily mobility goal to care team and patient/family/caregiver     - Collaborate with rehabilitation services on mobility goals if consulted  - Ambulate patient 5 times a day  - Out of bed to chair 3 times a day   - Out of bed for meals   - Out of bed for toileting  - Record patient progress and toleration of activity level   Outcome: Progressing     Problem: DISCHARGE PLANNING  Goal: Discharge to home or other facility with appropriate resources  Description: INTERVENTIONS:  - Identify barriers to discharge w/patient and caregiver  - Arrange for needed discharge resources and transportation as appropriate  - Identify discharge learning needs (meds, wound care, etc )  - Refer to Case Management Department for coordinating discharge planning if the patient needs post-hospital services based on physician/advanced practitioner order or complex needs related to functional status, cognitive ability, or social support system  Outcome: Progressing     Problem: Knowledge Deficit  Goal: Patient/family/caregiver demonstrates understanding of disease process, treatment plan, medications, and discharge instructions  Description: Complete learning assessment and assess knowledge base    Interventions:  - Provide teaching at level of understanding  - Provide teaching via preferred learning methods  Outcome: Progressing     Problem: CARDIOVASCULAR - ADULT  Goal: Maintains optimal cardiac output and hemodynamic stability  Description: INTERVENTIONS:  - Monitor I/O, vital signs and rhythm  - Monitor for S/S and trends of decreased cardiac output  - Administer and titrate ordered vasoactive medications to optimize hemodynamic stability  - Assess quality of pulses, skin color and temperature  - Assess for signs of decreased coronary artery perfusion  - Instruct patient to report change in severity of symptoms  Outcome: Progressing  Goal: Absence of cardiac dysrhythmias or at baseline rhythm  Description: INTERVENTIONS:  - Continuous cardiac monitoring, vital signs, obtain 12 lead EKG if ordered  - Administer antiarrhythmic and heart rate control medications as ordered  - Monitor electrolytes and administer replacement therapy as ordered  Outcome: Progressing     Problem: METABOLIC, FLUID AND ELECTROLYTES - ADULT  Goal: Glucose maintained within target range  Description: INTERVENTIONS:  - Monitor Blood Glucose as ordered  - Assess for signs and symptoms of hyperglycemia and hypoglycemia  - Administer ordered medications to maintain glucose within target range  - Assess nutritional intake and initiate nutrition service referral as needed  Outcome: Progressing

## 2021-09-02 NOTE — DISCHARGE INSTR - AVS FIRST PAGE
Dear Billie Corado,     It was our pleasure to care for you here at [facility], [facility]  It is our hope that we were always able to exceed the expected standards for your care during your stay  You were hospitalized due to symptomatic bradycardia  You were cared for on the 4th floor by Jesika Green DO with the [facility] who covers for your primary care physician (PCP), John Mayorga DO, while you were hospitalized  If you have any questions or concerns related to this hospitalization, you may contact us at [phone]  For follow up as well as any medication refills, we recommend that you follow up with your primary care physician  A registered nurse will reach out to you by phone within a few days after your discharge to answer any additional questions that you may have after going home  However, at this time we provide for you here, the most important instructions / recommendations at discharge:     · Notable Medication Adjustments -   · Please see discharge medication list  · Resume your blood thinner  · Testing Required after Discharge -   · Follow-up with electrophysiology, Cardiology at discharge  · Important follow up information -   · None  · Other Instructions -   · Please follow-up pacemaker instructions, no lifting anything heavy per cardiology discussion  · Please review this entire after visit summary as additional general instructions including medication list, appointments, activity, diet, any pertinent wound care, and other additional recommendations from your care team that may be provided for you        Sincerely,     Jesika Green DO and Nurse Peace Nicholson

## 2021-09-02 NOTE — DISCHARGE SUMMARY
[address]  Discharge- Enmanuel Dukesenold 1965, 64 y o  male MRN: [id number]  Unit/Bed#: E4 -01 Encounter: [id number]  Primary Care Provider: Racheal Thornton DO   Date and time admitted to hospital: 8/30/2021  7:27 AM    Admitting Provider:  Akash Liang DO  Discharge Provider:  Ellyn Georges DO  Admission Date: 8/30/2021       Discharge Date: 09/02/21   LOS: 3  Primary Care Physician at Discharge: Racheal Thornton, [address] [place] [address] COURSE:  Olga Beltran is a 64 y o  male who presented with multiple syncopal episodes  The patient was found to have sick sinus syndrome with symptomatic sinus bradycardia  The patient initially required intensive care admission, he was started on dopamine, however due to accelerated hypertension bradycardia the patient was placed on Isoproterenol  Due to symptomatic bradycardia the patient was evaluated in consultation by the cardiology service  He underwent pacemaker placement  He subsequently did improve, he did have an acute kidney injury on chronic kidney disease stage IIIA which was near baseline at the time of discharge  The patient was cleared by Cardiology on postoperative day number 1  His device was interrogated and showed good function  The patient was resumed back on his outpatient Xarelto, and subsequently monitored on gyn medical floor on telemetry  At the time of discharge the patient was tolerating oral diet, he was without acute complaint and was medically cleared for discharge  All questions were answered to the patient's satisfaction and he was in agreement with the discharge plan    The patient will have established outpatient follow-up with Cardiology service and will have device check in 1-2 weeks        DISCHARGE DIAGNOSES  Obstructive sleep apnea  Assessment & Plan  Outpatient sleep study if agreeable    Sinus bradycardia  Assessment & Plan  Patient with sick sinus syndrome status post pacemaker placed - see plan for syncopal episode    Acute-on-chronic kidney injury [facility])  Assessment & Plan  Lab Results   Component Value Date    EGFR 59 2021    EGFR 47 2021    EGFR 43 2021    CREATININE 1 33 (H) 2021    CREATININE 1 62 (H) 2021    CREATININE 1 74 (H) 2021   Renal function improved, near baseline  Outpatient follow-up    History of DVT (deep vein thrombosis)  Assessment & Plan  Continue Xarelto    Paroxysmal atrial fibrillation [facility])  Assessment & Plan  Ventricular rate control  Continue Xarelto at discharge  Patient with dual-chamber pacemaker implantation  Will be discharged on carvedilol 6 25 mg twice a day    Hyperaldosteronism [facility])  Assessment & Plan  Continue Aldactone    * Syncopal episodes   Assessment & Plan  Secondary to sick sinus syndrome  Underwent pacemaker placement 2021  Postoperative day 1  From pacemaker placed  Cleared by Cardiology for discharge today  Device interrogation today shows normal function  Patient will have device check in the device clinic within 1-2 weeks           [facility]  * No surgery found *    RADIOLOGY RESULTS  Echo complete with contrast if indicated    Result Date: 2021  Narrative: [facility], [facility] [address], [address] [facility] [phone] Transthoracic Echocardiogram 2D, M-mode, Doppler, and Color Doppler Study date:  30-Aug-2021 Patient: Dieter Jimenez MR number: [id number] Account number: [de-identified] : 1965 Age: 64 years Gender: Male Status: Inpatient Location: Bedside Height: 72 in Weight: 249 5 lb BP: 125/ 54 mmHg Indications: Bradycardia  Near syncope   Diagnoses: R00 1 - Bradycardia, unspecified Sonographer:  ROMELIA Naik Primary Physician:  Ashely Stroud DO Referring Physician:  Sharyn Mayorga PA-C Group:  [facility] Interpreting Physician:  Marbella Alejo DO SUMMARY LEFT VENTRICLE: Systolic function was normal  Ejection fraction was estimated to be 75 %  There were no regional wall motion abnormalities  Wall thickness was mildly increased  LEFT ATRIUM: The atrium was mildly dilated  RIGHT ATRIUM: The atrium was mildly dilated  MITRAL VALVE: There was mild annular calcification  SUMMARY MEASUREMENTS 2D measurements: Unspecified Anatomy: Ao Diam was 3 7 cm  LA Diam was 3 9 cm  LAAs A2C was 22 3 cm2  LAAs A4C was 25 cm2  LAESV A-L A2C was 71 2 ml  LAESV A-L A4C was 86 8 ml  LAESV Index (A-L) was 34 2 ml/m2  LAESV MOD A2C was 68 1 ml  LAESV MOD A4C was 79 ml  LAESV(A-L) was 80 1 ml  LAESV(MOD BP) was 74 4 ml  LAESVInd MOD BP was 31 8 ml/m2  LALs A2C was 5 9 cm  LALs A4C was 6 1 cm  LVEDV MOD A4C was 105 ml  LVEF MOD A4C was 72 7 %  LVESV MOD A4C was 28 7 ml  LVLd A4C was 8 5 cm  LVLs A4C was 6 5 cm  RVIDd was 3 8 cm  SV MOD A4C was 76 4 ml  CW measurements: Unspecified Anatomy:   AV Env  Ti was 315 9 ms   AV VTI was 40 5 cm  AV Vmax was 1 9 m/s  AV Vmean was 1 3 m/s  AV maxPG was 13 9 mmHg  AV meanPG was 7 6 mmHg  MM measurements: Unspecified Anatomy:   TAPSE was 2 5 cm  PW measurements: Unspecified Anatomy:   DVI was 0 7   E' Avg was 0 1 m/s  E' Lat was 0 1 m/s  E' Sept was 0 1 m/s  E/E' Avg was 8 2   E/E' Lat was 7   E/E' Sept was 9 8   LVOT Env  Ti was 288 ms  LVOT VTI was 30 2 cm  LVOT Vmax was 1 5 m/s  LVOT Vmean was 1 m/s  LVOT maxPG was 8 5 mmHg  LVOT meanPG was 5 mmHg  MV A Reji was 0 9 m/s  MV Dec [place] was 4 5 m/s2  MV DecT was 230 1 ms   MV E Reji was 1 m/s  MV E/A Ratio was 1 2   RV S' was 0 2 m/s  HISTORY: PRIOR HISTORY: hypothyroid  history of DVT  Risk factors: hypertension, diabetes, and medication-treated hypercholesterolemia  PROCEDURE: The procedure was performed at the bedside  This was a routine study  The transthoracic approach was used   The study included complete 2D imaging, M-mode, complete spectral Doppler, and color Doppler  The heart rate was 65 bpm, at the start of the study  Image quality was adequate  LEFT VENTRICLE: Size was normal  Systolic function was normal  Ejection fraction was estimated to be 75 %  There were no regional wall motion abnormalities  Wall thickness was mildly increased  DOPPLER: Left ventricular diastolic function parameters were normal  RIGHT VENTRICLE: The size was normal  Systolic function was normal  Wall thickness was normal  LEFT ATRIUM: The atrium was mildly dilated  RIGHT ATRIUM: The atrium was mildly dilated  MITRAL VALVE: There was mild annular calcification  There was normal leaflet separation  DOPPLER: The transmitral velocity was within the normal range  There was no evidence for stenosis  There was no regurgitation  AORTIC VALVE: The valve was trileaflet  Leaflets exhibited normal thickness and normal cuspal separation  DOPPLER: Transaortic velocity was within the normal range  There was no evidence for stenosis  There was no regurgitation  TRICUSPID VALVE: The valve structure was normal  There was normal leaflet separation  DOPPLER: The transtricuspid velocity was within the normal range  There was no evidence for stenosis  There was no regurgitation  PULMONIC VALVE: Not well visualized  DOPPLER: The transpulmonic velocity was within the normal range  There was no evidence for stenosis  There was no regurgitation  PERICARDIUM: There was no pericardial effusion  The pericardium was normal in appearance  AORTA: The root exhibited normal size  SYSTEMIC VEINS: IVC: The inferior vena cava was normal in size and course   Respirophasic changes were normal  SYSTEM MEASUREMENT TABLES 2D Ao Diam: 3 7 cm LA Diam: 3 9 cm LAAs A2C: 22 3 cm2 LAAs A4C: 25 cm2 LAESV A-L A2C: 71 2 ml LAESV A-L A4C: 86 8 ml LAESV Index (A-L): 34 2 ml/m2 LAESV MOD A2C: 68 1 ml LAESV MOD A4C: 79 ml LAESV(A-L): 80 1 ml LAESV(MOD BP): 74 4 ml LAESVInd MOD BP: 31 8 ml/m2 LALs A2C: 5 9 cm LALs A4C: 6 1 cm LVEDV MOD A4C: 105 ml LVEF MOD A4C: 72 7 % LVESV MOD A4C: 28 7 ml LVLd A4C: 8 5 cm LVLs A4C: 6 5 cm RVIDd: 3 8 cm SV MOD A4C: 76 4 ml CW AV Env  Ti: 315 9 ms AV VTI: 40 5 cm AV Vmax: 1 9 m/s AV Vmean: 1 3 m/s AV maxP 9 mmHg AV meanP 6 mmHg MM TAPSE: 2 5 cm PW DVI: 0 7 E' Av 1 m/s E' Lat: 0 1 m/s E' Sept: 0 1 m/s E/E' Av 2 E/E' Lat: 7 E/E' Sept: 9 8 LVOT Env  Ti: 288 ms LVOT VTI: 30 2 cm LVOT Vmax: 1 5 m/s LVOT Vmean: 1 m/s LVOT maxP 5 mmHg LVOT meanP mmHg MV A Reji: 0 9 m/s MV Dec [place]: 4 5 m/s2 MV DecT: 230 1 ms MV E Reji: 1 m/s MV E/A Ratio: 1 2 RV S': 0 2 m/[facility]redited [facility] Prepared and electronically signed by Karoline Quinteros DO Signed 30-Aug-2021 14:34:41     XR chest portable    Result Date: 2021  Narrative: CHEST INDICATION:   Patient s/p Pacemaker/ICD Insertion  COMPARISON:  2021 EXAM PERFORMED/VIEWS:  XR CHEST PORTABLE FINDINGS:  Left-sided chest wall intracardiac device is identified  Leads are intact  Cardiomediastinal silhouette appears unremarkable  The lungs are clear  No pneumothorax or pleural effusion  Osseous structures appear within normal limits for patient age  Impression: No pneumothorax  Workstation performed: [id number]     XR chest 1 view portable    Result Date: 2021  Narrative: CHEST INDICATION:   dizziness  COMPARISON:  2016 EXAM PERFORMED/VIEWS:  XR CHEST PORTABLE Single view FINDINGS: Strand-like scarring lateral left lung base, unchanged Cardiomediastinal silhouette appears unremarkable  The lungs are otherwise clear  No pneumothorax or pleural effusion  Osseous structures appear within normal limits for patient age  Impression: Scarring lateral left lung base No acute cardiopulmonary disease   Stable exam Workstation performed: [id number]     Cardiac pacer implant    Result Date: 2021  Narrative: ELECTROPHYSIOLOGY OPERATIVE REPORT PATIENT NAME: Marco Cherry :  1965 MRN: [id number] Date of surgery: 09/01/21 Surgeon: Ayesha Quesada DO Pt Location:  Cardiac Electrophysiology Laboratory PROCEDURE PERFORMED: 1)Dual Chamber Permanent Pacemaker Implantation Preoperative Medications: vancomycin ANESTHESIA: general PREOPERATIVE DIAGNOSIS: Symptomatic Sick Sinus syndrome Tachycardia/bradycardia syndrome POSTOPERATIVE DIAGNOSIS: Successful Dual Chamber Permanent Pacemaker implant  Same as Preop  Informed Consent: Risks, benefits, and alternatives discussed with patient and any family present  The patient understands risks, which include but are not limited to life threatening  bleeding, infection, air around lungs, blood around the heart and reoperation dislodged or malfunctioning device  Procedure Description: After informed consent was obtained, the patient was brought to the electrophysiology laboratory NPO  A time out was called and the patient was properly identified  The patient was pre-medicated as above  The left infraclavicular area was prepped and draped in the usual sterile fashion  After local anesthetic infiltration with 1% lidocaine, an incision was made below clavicle  The incision was extended down to the level of the pectoral fascia  A pocket was formed above the pectoral fascia using cautery and blunt dissection  axillary approach was done  A safe sheath introducer was advanced over a wire into the axillary vein, the wire was confirmed to be in the right atrium on flouroscopy, the wire was removed  Through the introducer the pacing lead was passed into the right heart  Under fluoroscopic guidance the right ventricular lead was positioned on the right ventricular septum  After satisfactory ventricular sensing and pacing thresholds were confirmed, the lead was sewn to the pectoral fascia/muscle using 0 silk sutures   The right atrial lead was placed in the right atrial appendage with similar fashion using a preformed J stylet, the helix was deployed, tissue contact was confirmed and good lead parameters were seen, the Safe-sheath was removed and the lead was tied down with 0 silk sutures to the muscle  The lead and pulse generator were placed in the pre-pectoral pocket  The pocket was then closed with 3 layers of 2-0, 3-0, 4-0 -Vicryl suture was used to close the skin  Sterile dressing applied  EBL: Minimal Complications: None IGOPULCH:85 cc Findings:  Vein was extremely deep and this patient's chest wall making axillary vein access difficult and placing him at higher risk for pneumothorax The patient tolerated the procedure well  Plan: Routine postoperative care, CXR, and overnight observation with telemetry  Follow-up in 2 weeks with incision check and interrogation         LABS  Results from last 7 days   Lab Units 09/02/21  0524 09/01/21  0510 08/31/21  0548 08/30/21  0733   WBC Thousand/uL 6 75 8 15 8 97 6 42   HEMOGLOBIN g/dL 14 6 15 5 16 4 15 9   HEMATOCRIT % 46 1 48 3 50 0* 49 9*   MCV fL 96 96 95 97   PLATELETS Thousands/uL 138* 151 178 175   INR   --  1 05 1 24* 2 43*     Results from last 7 days   Lab Units 09/02/21  0524 09/01/21  0510 08/31/21  0548 08/30/21  1939 08/30/21  0940 08/30/21  0733   SODIUM mmol/L 144 139 137 134* 136 138   POTASSIUM mmol/L 4 8 5 0 5 0 5 3 5 0 6 3*   CHLORIDE mmol/L 108 105 104 102 105 105   CO2 mmol/L 26 28 20* 24 22 24   BUN mg/dL 27* 33* 36* 40* 41* 42*   CREATININE mg/dL 1 33* 1 62* 1 74* 1 93* 1 72* 1 66*   CALCIUM mg/dL 9 3 9 6 9 9 9 8 9 7 9 5   ALBUMIN g/dL  --   --  3 6  --   --  3 7   TOTAL BILIRUBIN mg/dL  --   --  0 61  --   --  0 49   ALK PHOS U/L  --   --  54  --   --  53   ALT U/L  --   --  27  --   --  30   AST U/L  --   --  20  --   --  42   EGFR ml/min/1 73sq m 59 47 43 38 43 45   GLUCOSE RANDOM mg/dL 95 160* 163* 220* 181* 122     Results from last 7 days   Lab Units 08/30/21  0733   TROPONIN I ng/mL <0 02     Results from last 7 days   Lab Units 08/30/21  0733   NT-PRO BNP pg/mL 292*          Results from last 7 days   Lab Units 09/02/21  1054 09/02/21  0717 09/01/21  2109 09/01/21  1839 09/01/21  1527 09/01/21  1219 09/01/21  1105 09/01/21  0830 08/31/21  2100 08/31/21  1826   POC GLUCOSE mg/dl 110 94 86 122 103 134 125 138 170* 173*     Results from last 7 days   Lab Units 08/30/21  0733   HEMOGLOBIN A1C % 6 0*     Results from last 7 days   Lab Units 08/30/21  0940 08/30/21  0733   TSH 3RD GENERATON uIU/mL  --  3 825*   FREE T4 ng/dL 1 43  --                Cultures:         Invalid input(s): URIBILINOGEN              PHYSICAL EXAM:  Vitals:   Blood Pressure: 133/79 (09/02/21 0717)  Pulse: 61 (09/02/21 0717)  Temperature: 97 6 °F (36 4 °C) (09/02/21 0717)  Temp Source: Temporal (09/02/21 0717)  Respirations: 16 (09/02/21 0717)  Height: 6' (182 9 cm) (08/30/21 1130)  Weight - Scale: 113 kg (248 lb 10 9 oz) (09/02/21 0600)  SpO2: 95 % (09/02/21 0717)      General: well appearing, no acute distress  HEENT: atraumatic, PERRLA, moist mucosa, normal pharynx, normal tonsils and adenoids, normal tongue, no fluid in sinuses  Neck: Trachea midline, no carotid bruit, no masses  Respiratory: normal chest wall expansion, CTA B, no r/r/w, no rubs  Cardiovascular: RRR, no m/r/g, Normal S1 and S2  Abdomen: Soft, non-tender, non-distended, normal bowel sounds in all quadrants, no hepatosplenomegaly, no tympany  Rectal: deferred  Musculoskeletal: normal ROM in upper and lower extremities  Integumentary: warm, dry, and pink, with no rash, purpura, or petechia  Heme/Lymph: no lymphadenopathy, no bruises  Neurological: Cranial Nerves II-XII grossly intact, no tics, normal sensation to pressure and light touch  Psychiatric: cooperative with normal mood, affect, and cognition      Discharge Disposition: Home/Self Care      Test Results Pending at Discharge:   Pending Labs     Order Current Status    Lyme Antibody Profile with reflex to WB In process              Medications   · Summary of Medication Adjustments made as a result of this hospitalization:  Carvedilol 6 25 mg twice a day  · Medication Dosing Tapers - Please refer to Discharge Medication List for details on any medication dosing tapers (if applicable to patient)  · Discharge Medication List: See after visit summary for reconciled discharge medications  Diet restrictions: Activity restrictions: No strenuous activity  Discharge Condition: good    Outpatient Follow-Up and Discharge Instructions  See after visit summary section titled Discharge Instructions for information provided to patient and family  Code Status:  Full   Discharge Statement   I spent 35 minutes discharging the patient  This time was spent on the day of discharge  Greater than 50% of total time was spent with the patient and / or family counseling and / or coordination of care  ** Please Note: This note was completed in part utilizing M-Modal Fluency Direct Software  Grammatical errors, random word insertions, spelling mistakes, and incomplete sentences may be an occasional consequence of this system secondary to software limitations, ambient noise, and hardware issues  If you have any questions or concerns about the content, text, or information contained within the body of this dictation, please contact the provider for clarification  **

## 2021-09-02 NOTE — ASSESSMENT & PLAN NOTE
Lab Results   Component Value Date    EGFR 59 09/02/2021    EGFR 47 09/01/2021    EGFR 43 08/31/2021    CREATININE 1 33 (H) 09/02/2021    CREATININE 1 62 (H) 09/01/2021    CREATININE 1 74 (H) 08/31/2021   Renal function improved, near baseline  Outpatient follow-up

## 2021-09-02 NOTE — PROGRESS NOTES
CARDIOLOGY INPATIENT FOLLOW-UP PROGRESS NOTE  *-*-*-*-*-*-*-*-*-*-*-*-*-*-*-*-*-*-*-*-*-*-*-*-*-*-*-*-*-*-*-*-*-*-*-*-*-*-*-*-*-*-*-*-*-*-*-*-*-*-*-*-*-*-  [id number] DATE: 09/02/21 10:29 AM   AUTHOR: Rajendra Morejon MD  PATIENT: Ambrosio Pan   1965    [id number]   69 y o    male  INPATIENT HOSPITALIST PHYSICIAN: Jaida Winn, *  DATE OF ADMISSION: 8/30/2021  7:27 AM; LENGTH OF STAY: 3 days  *-*-*-*-*-*-*-*-*-*-*-*-*-*-*-*-*-*-*-*-*-*-*-*-*-*-*-*-*-*-*-*-*-*-*-*-*-*-*-*-*-*-*-*-*-*-*-*-*-*-*-*-*-*-    CARDIOLOGY ASSESSMENT  1  Sick sinus syndrome with symptomatic sinus bradycardia, POD 1 s/p dual-chamber pacemaker implantation  2  Paroxysmal atrial fibrillation and atrial flutter with conversion pauses  3  Essential hypertension  4  Dyslipidemia  5  History of DVT and pulmonary embolism  6  Diabetes mellitus  7  Obesity  8  Stage III A/B CKD  9  Hypothyroidism    Patient is doing well status post pacemaker implantation with no recurrence of symptoms  His device interrogation this morning shows normal functioning device with good parameters  Chest x-ray this morning shows well placed leads without obvious pneumothorax  Formal report is pending  His blood pressure is well controlled  On exam he does not have any signs of heart failure       Patient Active Problem List   Diagnosis    Essential hypertension    Controlled type 2 diabetes mellitus with microalbuminuria, without long-term current use of insulin ([facility] [address] )    Diabetic peripheral neuropathy ([facility] )    Erectile dysfunction due to diseases classified elsewhere    Homozygous for MTHFR gene mutation    Hypothyroidism (acquired)    Sacroiliitis ([facility] [address] )    Tarsal tunnel syndrome of both lower extremities    Tarsal tunnel syndrome, left    Venous stasis ulcer of left ankle limited to breakdown of skin with varicose veins (HCC)    Avascular necrosis of bone (HCC)    Gout    Hyperaldosteronism ([address]    Diabetic nephropathy associated with type 2 diabetes mellitus (HCC)    Stage 3a chronic kidney disease (HCC)    Proteinuria    Vitamin D deficiency    Syncopal episodes     Paroxysmal atrial fibrillation (HCC)    History of DVT (deep vein thrombosis)    Dyslipidemia    Acute-on-chronic kidney injury ([facility] [address] )    Sinus bradycardia    Obstructive sleep apnea          PLAN:  -- patient may be discharged home from cardiac perspective  -- he should continue Xarelto therapy  Given his blood pressure would sending on carvedilol 6 25 mg twice daily  -- please provide patient with post pacemaker implantation instructions and precautions  -- we will make arrangements for patient did to be seen in device clinic for incision check in 7-10 days and establishment of cardiology office appointment  -- will likely need evaluation for sleep apnea in the outpatient setting   -- future consultation with electrophysiologist if patient is having recurrent atrial fibrillation episodes  *-*-*-*-*-*-*-*-*-*-*-*-*-*-*-*-*-*-*-*-*-*-*-*-*-*-*-*-*-*-*-*-*-*-*-*-*-*-*-*-*-*-*-*-*-*-*-*-*-*-*-*-*-*-  INTERVAL CHANGES / HISTORY OF PRESENT ILLNESS:  No acute events overnight  Patient reports feeling well with no chest pain shortness of breath dizziness or lightheadedness  Has been ambulating without symptoms  Denies pain at the site of pacemaker  *-*-*-*-*-*-*-*-*-*-*-*-*-*-*-*-*-*-*-*-*-*-*-*-*-*-*-*-*-*-*-*-*-*-*-*-*-*-*-*-*-*-*-*-*-*-*-*-*-*-*-*-*-*  REVIEW OF SYMPTOMS:    Positive for:  As noted above  Negative for: All remaining as reviewed below and in HPI   SYSTEM SYMPTOMS REVIEWED:  General--weight change, fever, night sweats  Respiratoryl-- Wheezing, shortness of breath, cough, URI symptoms, sputum, blood  Cardiovascular--chest pain, syncope, dyspnea on exertion, edema, decline in exercise tolerance, claudication   Gastrointestinal--persistent vomiting, diarrhea, abdominal distention, blood in stool   Muscular or skeletal--joint pain or swelling Neurologic--headaches, syncope, abnormal movement  Hematologic--history of easy bruising and bleeding   Endocrine--thyroid enlargement, heat or cold intolerance, polyuria   Psychiatric--anxiety, depression      *-*-*-*-*-*-*-*-*-*-*-*-*-*-*-*-*-*-*-*-*-*-*-*-*-*-*-*-*-*-*-*-*-*-*-*-*-*-*-*-*-*-*-*-*-*-*-*-*-*-*-*-*-*-  VITAL SIGNS:  Vitals:    21 2300 21 0257 21 0600 21 0717   BP: 134/78 118/72  133/79   BP Location: Right arm Right arm  Right arm   Pulse: 60 62  61   Resp: 18 18  16   Temp: (!) 97 2 °F (36 2 °C) 98 °F (36 7 °C)  97 6 °F (36 4 °C)   TempSrc: Temporal Temporal  Temporal   SpO2: 98% 95%  95%   Weight:   113 kg (248 lb 10 9 oz)    Height:          Temp (24hrs), Av 6 °F (36 4 °C), Min:97 2 °F (36 2 °C), Max:98 °F (36 7 °C)  Current: Temperature: 97 6 °F (36 4 °C)    Weight (last 2 days)     Date/Time   Weight    21 0600   113 (248 68)    21 0555   103 (227 29)            Body mass index is 33 73 kg/m²  Wt Readings from Last 3 Encounters:   21 113 kg (248 lb 10 9 oz)   21 116 kg (256 lb)   03/10/21 120 kg (264 lb)      Intake/Output Summary (Last 24 hours) at 2021 1029  Last data filed at 2021 0815  Gross per 24 hour   Intake 2996 29 ml   Output 480 ml   Net 2516 29 ml        *-*-*-*-*-*-*-*-*-*-*-*-*-*-*-*-*-*-*-*-*-*-*-*-*-*-*-*-*-*-*-*-*-*-*-*-*-*-*-*-*-*-*-*-*-*-*-*-*-*-*-*-*-*-  PHYSICAL EXAM:  General Appearance:    Alert, cooperative, no distress, appears stated age, large built, slightly obese   Head, Eyes, ENT:    No obvious abnormality, moist mucous mebranes  Neck:   Supple, no carotid bruit or JVD   Back:     Symmetric, no curvature  Lungs:     Respirations unlabored  Clear to auscultation bilaterally,    Chest wall:    No tenderness or deformity  Left upper chest pacemaker implant site covered with left lower dressing  There is no hematoma or signs of inflammation in the surrounding region     Heart:    Regular rate and rhythm, bradycardic, S1 and S2 normal, no murmur, rub  or gallop  Abdomen:     Soft, non-tender, No obvious masses, or organomegaly   Extremities:   Extremities warm, no cyanosis or edema    Skin:   Skin color, texture, turgor normal, no rashes or lesions     *-*-*-*-*-*-*-*-*-*-*-*-*-*-*-*-*-*-*-*-*-*-*-*-*-*-*-*-*-*-*-*-*-*-*-*-*-*-*-*-*-*-*-*-*-*-*-*-*-*-*-*-*-*-  TELEMETRY, LAST ECG:  Telemetry reviewed    Atrial paced ventricular sensed rhythm at 60 beats per minute although pacer spikes are not visible    No significant ectopy Results for orders placed or performed during the hospital encounter of 08/30/21   ECG 12 lead   Result Value    Ventricular Rate 50    Atrial Rate 50    CT Interval 162    QRSD Interval 86    QT Interval 464    QTC Interval 423    P Axis 64    QRS Axis 59    T Wave Axis 60    Narrative    Sinus bradycardia  Otherwise normal ECG  When compared with ECG of 30-AUG-2021 07:48, (unconfirmed)  No significant change was found  Confirmed by Xin Nicole ([id number]) on 8/30/2021 10:43:51 AM      *-*-*-*-*-*-*-*-*-*-*-*-*-*-*-*-*-*-*-*-*-*-*-*-*-*-*-*-*-*-*-*-*-*-*-*-*-*-*-*-*-*-*-*-*-*-*-*-*-*-*-*-*-*-    CURRENT SCHEDULED MEDICATIONS:    Current Facility-Administered Medications:     acetaminophen (TYLENOL) tablet 650 mg, 650 mg, Oral, Q6H PRN, SARA Soto, 650 mg at 09/01/21 0244    allopurinol (ZYLOPRIM) tablet 300 mg, 300 mg, Oral, Daily, Aissatou SARA Peng, 300 mg at 09/02/21 9304    atorvastatin (LIPITOR) tablet 40 mg, 40 mg, Oral, Before Dinner, [place] Cooldisha, CRNP, 40 mg at 09/01/21 1840    chlorhexidine (PERIDEX) 0 12 % oral rinse 15 mL, 15 mL, Swish & Spit, Once, Alida Wahl PA-C    docusate sodium (COLACE) capsule 100 mg, 100 mg, Oral, BID, Aissatou SARA Peng, 100 mg at 09/02/21 1783    ezetimibe (ZETIA) tablet 10 mg, 10 mg, Oral, QAM, Aissatou AARON PengNP, 10 mg at 09/02/21 8903    gabapentin (NEURONTIN) capsule 1,200 mg, 1,200 mg, Oral, TID, [place]worth Cooler, CRNP, 1,200 mg at 09/02/21 5594    insulin lispro (HumaLOG) 100 units/mL subcutaneous injection 1-5 Units, 1-5 Units, Subcutaneous, HS, SARA Mullins, 1 Units at 08/31/21 2213    insulin lispro (HumaLOG) 100 units/mL subcutaneous injection 1-6 Units, 1-6 Units, Subcutaneous, TID AC, 1 Units at 08/31/21 1826 **AND** Fingerstick Glucose (POCT), , , TID AC, SARA Mullins    levothyroxine tablet 175 mcg, 175 mcg, Oral, Early Morning, SARA Mullins, 175 mcg at 09/02/21 0536    multivitamin-minerals (CENTRUM) tablet 1 tablet, 1 tablet, Oral, Daily, Jardenisa WatersumAARONNP, 1 tablet at 09/02/21 1996    ondansetron (ZOFRAN) 8 mg in sodium chloride 0 9 % 50 mL IVPB, 8 mg, Intravenous, Q8H PRN, Melissa Montiel CRNP, Last Rate: 200 mL/hr at 08/31/21 0009, 8 mg at 08/31/21 0009    oxyCODONE (ROXICODONE) IR tablet 5 mg, 5 mg, Oral, Q4H PRN, Cari Allen PA-C, 5 mg at 09/01/21 2120    rivaroxaban (XARELTO) tablet 20 mg, 20 mg, Oral, Daily With Breakfast, Melissa Watersum, CRNP, 20 mg at 09/02/21 1433 ALLERGIES:  No Known Allergies     *-*-*-*-*-*-*-*-*-*-*-*-*-*-*-*-*-*-*-*-*-*-*-*-*-*-*-*-*-*-*-*-*-*-*-*-*-*-*-*-*-*-*-*-*-*-*-*-*-*-*-*-*-*  LABORATORY DATA:  I have personally reviewed pertinent labs      CMP:  Results from last 7 days   Lab Units 09/02/21  0524 09/01/21  0510 08/31/21  0548 08/30/21  0733   SODIUM mmol/L 144 139 137 138   POTASSIUM mmol/L 4 8 5 0 5 0 6 3*   CHLORIDE mmol/L 108 105 104 105   CO2 mmol/L 26 28 20* 24   BUN mg/dL 27* 33* 36* 42*   CREATININE mg/dL 1 33* 1 62* 1 74* 1 66*   CALCIUM mg/dL 9 3 9 6 9 9 9 5   ALK PHOS U/L  --   --  54 53   ALT U/L  --   --  27 30   AST U/L  --   --  20 42        Results from last 7 days   Lab Units 08/31/21  0548 08/30/21  0733   MAGNESIUM mg/dL 1 8 2 1        Cardiac Profile:   Results from last 7 days   Lab Units 08/30/21  0733   TROPONIN I ng/mL <0 02   NT-PRO BNP pg/mL 292*     Results from last 7 days   Lab Units 08/30/21  0733   HEMOGLOBIN A1C % 6 0* CBC:   Results from last 7 days   Lab Units 21  0524 21  0510 21  0548   WBC Thousand/uL 6 75 8 15 8 97   HEMOGLOBIN g/dL 14 6 15 5 16 4   HEMATOCRIT % 46 1 48 3 50 0*   PLATELETS Thousands/uL 138* 151 178     PT/INR:   No results found for: PT, INR, Microbiology:          *-*-*-*-*-*-*-*-*-*-*-*-*-*-*-*-*-*-*-*-*-*-*-*-*-*-*-*-*-*-*-*-*-*-*-*-*-*-*-*-*-*-*-*-*-*-*-*-*-*-*-*-*-*-  IMAGING STUDIES REPORTS: Imaging studies results reviewed    No valid procedures specified  No Chest XR results available for this patient  *-*-*-*-*-*-*-*-*-*-*-*-*-*-*-*-*-*-*-*-*-*-*-*-*-*-*-*-*-*-*-*-*-*-*-*-*-*-*-*-*-*-*-*-*-*-*-*-*-*-*-*-*-*-  ECHOCARDIOGRAM AND OTHER CARDIAC TESTS RESULTS:  Results for orders placed during the hospital encounter of 21    Echo complete with contrast if indicated    Narrative  [facility]  [facility]  [place], [address]  [phone]    Transthoracic Echocardiogram  2D, M-mode, Doppler, and Color Doppler    Study date:  30-Aug-2021    Patient: Janell Fall  MR number: [id number]  Account number: [de-identified]  : 1965  Age: 64 years  Gender: Male  Status: Inpatient  Location: Bedside  Height: 72 in  Weight: 249 5 lb  BP: 125/ 54 mmHg    Indications: Bradycardia  Near syncope  Diagnoses: R00 1 - Bradycardia, unspecified    Sonographer:  ROMELIA Stallworth  Primary Physician:  Tamiko Wallace DO  Referring Physician:  Mulugeta Dickson PA-C  Group:  [facility]  Interpreting Physician:  Ayla Swanson DO    SUMMARY    LEFT VENTRICLE:  Systolic function was normal  Ejection fraction was estimated to be 75 %  There were no regional wall motion abnormalities  Wall thickness was mildly increased  LEFT ATRIUM:  The atrium was mildly dilated  RIGHT ATRIUM:  The atrium was mildly dilated  MITRAL VALVE:  There was mild annular calcification      SUMMARY MEASUREMENTS  2D measurements:  Unspecified Anatomy: Ao Diam was 3 7 cm  LA Diam was 3 9 cm  LAAs A2C was 22 3 cm2  LAAs A4C was 25 cm2  LAESV A-L A2C was 71 2 ml  LAESV A-L A4C was 86 8 ml  LAESV Index (A-L) was 34 2 ml/m2  LAESV MOD A2C was 68 1 ml  LAESV MOD  A4C was 79 ml  LAESV(A-L) was 80 1 ml  LAESV(MOD BP) was 74 4 ml  LAESVInd MOD BP was 31 8 ml/m2  LALs A2C was 5 9 cm  LALs A4C was 6 1 cm  LVEDV MOD A4C was 105 ml  LVEF MOD A4C was 72 7 %  LVESV MOD A4C was 28 7 ml  LVLd A4C was  8 5 cm  LVLs A4C was 6 5 cm  RVIDd was 3 8 cm  SV MOD A4C was 76 4 ml   CW measurements:  Unspecified Anatomy:   AV Env  Ti was 315 9 ms   AV VTI was 40 5 cm  AV Vmax was 1 9 m/s  AV Vmean was 1 3 m/s  AV maxPG was 13 9 mmHg  AV meanPG was 7 6 mmHg  MM measurements:  Unspecified Anatomy:   TAPSE was 2 5 cm  PW measurements:  Unspecified Anatomy:   DVI was 0 7   E' Avg was 0 1 m/s  E' Lat was 0 1 m/s  E' Sept was 0 1 m/s  E/E' Avg was 8 2   E/E' Lat was 7   E/E' Sept was 9 8   LVOT Env  Ti was 288 ms  LVOT VTI was 30 2 cm  LVOT Vmax was 1 5 m/s  LVOT  Vmean was 1 m/s  LVOT maxPG was 8 5 mmHg  LVOT meanPG was 5 mmHg  MV A Reji was 0 9 m/s  MV Dec [place] was 4 5 m/s2  MV DecT was 230 1 ms   MV E Reji was 1 m/s  MV E/A Ratio was 1 2   RV S' was 0 2 m/s  HISTORY: PRIOR HISTORY: hypothyroid  history of DVT  Risk factors: hypertension, diabetes, and medication-treated hypercholesterolemia  PROCEDURE: The procedure was performed at the bedside  This was a routine study  The transthoracic approach was used  The study included complete 2D imaging, M-mode, complete spectral Doppler, and color Doppler  The heart rate was 65 bpm,  at the start of the study  Image quality was adequate  LEFT VENTRICLE: Size was normal  Systolic function was normal  Ejection fraction was estimated to be 75 %  There were no regional wall motion abnormalities  Wall thickness was mildly increased   DOPPLER: Left ventricular diastolic function  parameters were normal     RIGHT VENTRICLE: The size was normal  Systolic function was normal  Wall thickness was normal     LEFT ATRIUM: The atrium was mildly dilated  RIGHT ATRIUM: The atrium was mildly dilated  MITRAL VALVE: There was mild annular calcification  There was normal leaflet separation  DOPPLER: The transmitral velocity was within the normal range  There was no evidence for stenosis  There was no regurgitation  AORTIC VALVE: The valve was trileaflet  Leaflets exhibited normal thickness and normal cuspal separation  DOPPLER: Transaortic velocity was within the normal range  There was no evidence for stenosis  There was no regurgitation  TRICUSPID VALVE: The valve structure was normal  There was normal leaflet separation  DOPPLER: The transtricuspid velocity was within the normal range  There was no evidence for stenosis  There was no regurgitation  PULMONIC VALVE: Not well visualized  DOPPLER: The transpulmonic velocity was within the normal range  There was no evidence for stenosis  There was no regurgitation  PERICARDIUM: There was no pericardial effusion  The pericardium was normal in appearance  AORTA: The root exhibited normal size  SYSTEMIC VEINS: IVC: The inferior vena cava was normal in size and course  Respirophasic changes were normal     SYSTEM MEASUREMENT TABLES    2D  Ao Diam: 3 7 cm  LA Diam: 3 9 cm  LAAs A2C: 22 3 cm2  LAAs A4C: 25 cm2  LAESV A-L A2C: 71 2 ml  LAESV A-L A4C: 86 8 ml  LAESV Index (A-L): 34 2 ml/m2  LAESV MOD A2C: 68 1 ml  LAESV MOD A4C: 79 ml  LAESV(A-L): 80 1 ml  LAESV(MOD BP): 74 4 ml  LAESVInd MOD BP: 31 8 ml/m2  LALs A2C: 5 9 cm  LALs A4C: 6 1 cm  LVEDV MOD A4C: 105 ml  LVEF MOD A4C: 72 7 %  LVESV MOD A4C: 28 7 ml  LVLd A4C: 8 5 cm  LVLs A4C: 6 5 cm  RVIDd: 3 8 cm  SV MOD A4C: 76 4 ml    CW  AV Env  Ti: 315 9 ms  AV VTI: 40 5 cm  AV Vmax: 1 9 m/s  AV Vmean: 1 3 m/s  AV maxP 9 mmHg  AV meanP 6 mmHg    MM  TAPSE: 2 5 cm    PW  DVI: 0 7  E' Av 1 m/s  E' Lat: 0 1 m/s  E' Sept: 0 1 m/s  E/E' Av 2  E/E' Lat: 7  E/E' Sept: 9 8  LVOT Env  Ti: 288 ms  LVOT VTI: 30 2 cm  LVOT Vmax: 1 5 m/s  LVOT Vmean: 1 m/s  LVOT maxP 5 mmHg  LVOT meanP mmHg  MV A Reji: 0 9 m/s  MV Dec [place]: 4 5 m/s2  MV DecT: 230 1 ms  MV E Reji: 1 m/s  MV E/A Ratio: 1 2  RV S': 0 2 m/s    [facility]    Prepared and electronically signed by    Elijah De Oliveira DO  Signed 30-Aug-2021 14:34:41    No results found for this or any previous visit  No results found for this or any previous visit  No results found for this or any previous visit         *-*-*-*-*-*-*-*-*-*-*-*-*-*-*-*-*-*-*-*-*-*-*-*-*-*-*-*-*-*-*-*-*-*-*-*-*-*-*-*-*-*-*-*-*-*-*-*-*-*-*-*-*-*-  SIGNATURES:   [unfilled]   Анна Osborne MD

## 2021-09-02 NOTE — DISCHARGE INSTRUCTIONS
Pacemaker   WHAT YOU NEED TO KNOW:   A pacemaker is a small device placed in your chest to help control your heartbeat  You may need a pacemaker if your heartbeat is too slow, too fast, or irregular  A pacemaker is about the size of a large wristwatch  It contains flexible wires (leads) with sensors, a battery, pulse generator, and a small computer  The sensors measure your heartbeat and send the information to the computer  The computer causes the generator to send electrical impulses to your heart  This makes your heart beat correctly  Some pacemakers can also record your heart rate and rhythm  DISCHARGE INSTRUCTIONS:   Call your local emergency number (911 in the [address] [facility]), or have someone call if:   · You have any of the following signs of a heart attack:      ? Squeezing, pressure, or pain in your chest    ? You may  also have any of the following:     § Discomfort or pain in your back, neck, jaw, stomach, or arm    § Shortness of breath    § Nausea or vomiting    § Lightheadedness or a sudden cold sweat    · You feel lightheaded, short of breath, or have chest pain  · You cough up blood  Seek care immediately if:   · Your arm or leg feels warm, tender, and painful  It may look swollen and red  · You feel weak, dizzy, or faint  · Your stitches come apart  · Your pulse is lower or higher than your healthcare provider said it should be  Call your doctor or cardiologist if:   · You have a fever or chills  · Your procedure area is red, swollen, or draining pus  · You have questions or concerns about your condition or care  Care for the incision area as directed:  Ask your healthcare provider when you can remove your bandage  Wash around the incision area with soap and water  It is okay to let soap and water run over the area  Do not scrub the area  Gently pat the area dry, and apply new, clean bandages as directed  Check every day for redness, swelling, or pus    Activity:  Ask your provider how long to follow these and other safety precautions given to you:  · Do not  lift anything heavier than 10 pounds, or as directed  · Do not  lift the arm closest to your pacemaker over your head  · Do not  put pressure on the pacemaker area  · Do not  play contact sports or do vigorous exercises  These activities can damage your pacemaker or cause the wires to move  Ask your healthcare provider which activities are safe for you to do  Check your pulse, if directed:  Check while you are resting  · Use a watch with a second hand  Count your pulse for 60 seconds  · To check the pulse on your wrist,  place your index and middle fingers on the inside of your wrist  You should feel your pulse beating just below your thumb  · To check the pulse on your neck,  place your index and middle fingers on one side of your neck  You should feel your pulse beating where your neck meets your jaw  · Record your information  Include your pulse rate, the date, time, and which side was used to take the pulse  Include anything you notice about your pulse, such as that it is weak, strong, or missing beats  Bring a copy of the information to your follow-up visits  Pacemaker safety:  Talk to your healthcare provider about driving and playing sports after you have a pacemaker inserted  The following are instructions to keep you safe with a pacemaker:  · Tell all healthcare providers that you have a pacemaker  MRI machines and certain equipment used during surgery can affect how your pacemaker works  · Wear medical alert identification  Wear medical alert jewelry or carry a card that says you have a pacemaker  Ask your healthcare provider where to get these items  · Stay away from magnets or machines with electric fields  These can interfere with how your pacemaker works  You will get specific safety information based on the type of pacemaker you have   The following are general safety guidelines:    ? Do not  lean into a car engine or do welding  ? Do not  use an electronic body fat scale or electronic abdominal stimulating exercise machine  ? Check  before you have a CT scan  Some CT scan machines may be safe to use with your pacemaker  ? Check  before you have electrolysis for hair removal  Your provider may give you instructions on how to get this procedure safely  He or she may need to give you written permission before you can get electrolysis  ? Check  before you use a medical alert system, or let your company know you have a pacemaker  The company will be able to tell you if a pacemaker is safe to use with the system  ? Avoid or limit  time around electric fence systems  This also includes electric systems to keep pets in a small area  If you cannot avoid it, make your time near it as short as possible  · Keep your cell phone away from your pacemaker  Do not place your cell phone in a breast pocket over your pacemaker site  Use your cell phone with the ear on the opposite side from your pacemaker  · Keep headphones away from your pacemaker  Earbud and clip-on headphones for [employer] players might interfere with your pacemaker  Make sure your headphones are always at least 6 inches away from the pacemaker  Do not put headphones in your shirt pocket over the pacemaker  Do not let the headphones hang from your neck onto your chest  Do not let anyone who is using headphones put his or her head on your chest     · Tell airport security that you have a pacemaker before you go through the metal detectors  Metal detectors may beep because of the metal in your pacemaker  Step away from the machine if you feel dizzy or your heart rate increases  Ask the security agents not to hold a security wand over your pacemaker for more than a few seconds  Your pacemaker function or programming may be affected by the wand      What you need to know about pacemaker care:   · Your healthcare provider will check your pacemaker about every 3 months  He or she will make sure it is working correctly  You may also need regular EKGs to check the electrical activity of your heart  · Your pacemaker generator, battery, and leads will need to be replaced  The battery may need to be replaced in 6 to 7 years or longer  The generator will be replaced at the same time  The battery and generator are replaced during surgery  Your leads will need to be replaced if they cause an infection or move out of place  Your healthcare provider will monitor you and decide when these parts need to be replaced  Follow up with your doctor or cardiologist as directed: You will need regular checks to make sure your pacemaker is working correctly  Write down your questions so you remember to ask them during your visits  © Copyright [employer] 2021 Information is for End User's use only and may not be sold, redistributed or otherwise used for commercial purposes  All illustrations and images included in CareNotes® are the copyrighted property of A D A M , Inc  or [facility] Jackelyn Escobedo   The above information is an  only  It is not intended as medical advice for individual conditions or treatments  Talk to your doctor, nurse or pharmacist before following any medical regimen to see if it is safe and effective for you

## 2021-09-02 NOTE — ASSESSMENT & PLAN NOTE
Secondary to sick sinus syndrome  Underwent pacemaker placement 09/02/2021  Postoperative day 1   From pacemaker placed  Cleared by Cardiology for discharge today  Device interrogation today shows normal function  Patient will have device check in the device clinic within 1-2 weeks

## 2021-09-02 NOTE — ASSESSMENT & PLAN NOTE
Ventricular rate control  Continue Xarelto at discharge  Patient with dual-chamber pacemaker implantation  Will be discharged on carvedilol 6 25 mg twice a day

## 2021-09-02 NOTE — PLAN OF CARE
Problem: Potential for Falls  Goal: Patient will remain free of falls  Description: INTERVENTIONS:  - Educate patient/family on patient safety including physical limitations  - Instruct patient to call for assistance with activity   - Consult OT/PT to assist with strengthening/mobility   - Keep Call bell within reach  - Keep bed low and locked with side rails adjusted as appropriate  - Keep care items and personal belongings within reach  - Initiate and maintain comfort rounds  - Make Fall Risk Sign visible to staff  - Offer Toileting every 2 Hours, in advance of need  - Apply yellow socks and bracelet for high fall risk patients  - Consider moving patient to room near nurses station  Outcome: Progressing     Problem: PAIN - ADULT  Goal: Verbalizes/displays adequate comfort level or baseline comfort level  Description: Interventions:  - Encourage patient to monitor pain and request assistance  - Assess pain using appropriate pain scale  - Administer analgesics based on type and severity of pain and evaluate response  - Implement non-pharmacological measures as appropriate and evaluate response  - Consider cultural and social influences on pain and pain management  - Notify physician/advanced practitioner if interventions unsuccessful or patient reports new pain  Outcome: Progressing     Problem: INFECTION - ADULT  Goal: Absence or prevention of progression during hospitalization  Description: INTERVENTIONS:  - Assess and monitor for signs and symptoms of infection  - Monitor lab/diagnostic results  - Monitor all insertion sites, i e  indwelling lines, tubes, and drains  - [place] appropriate cooling/warming therapies per order  - Administer medications as ordered  - Instruct and encourage patient and family to use good hand hygiene technique  - Identify and instruct in appropriate isolation precautions for identified infection/condition  Outcome: Progressing     Problem: SAFETY ADULT  Goal: Patient will remain free of falls  Description: INTERVENTIONS:  - Educate patient/family on patient safety including physical limitations  - Instruct patient to call for assistance with activity   - Consult OT/PT to assist with strengthening/mobility   - Keep Call bell within reach  - Keep bed low and locked with side rails adjusted as appropriate  - Keep care items and personal belongings within reach  - Initiate and maintain comfort rounds  - Make Fall Risk Sign visible to staff  - Offer Toileting every 2 Hours, in advance of need  - Apply yellow socks and bracelet for high fall risk patients  - Consider moving patient to room near nurses station  Outcome: Progressing  Goal: Maintain or return to baseline ADL function  Description: INTERVENTIONS:  - Educate patient/family on patient safety including physical limitations  - Instruct patient to call for assistance with activity   - Consult OT/PT to assist with strengthening/mobility   - Keep Call bell within reach  - Keep bed low and locked with side rails adjusted as appropriate  - Keep care items and personal belongings within reach  - Initiate and maintain comfort rounds  - Make Fall Risk Sign visible to staff  - Offer Toileting every 2 Hours, in advance of need  - Apply yellow socks and bracelet for high fall risk patients  - Consider moving patient to room near nurses station  Outcome: Progressing  Goal: Maintains/Returns to pre admission functional level  Description: INTERVENTIONS:  - Perform BMAT or MOVE assessment daily    - Set and communicate daily mobility goal to care team and patient/family/caregiver  - Collaborate with rehabilitation services on mobility goals if consulted  - Perform Range of Motion 3 times a day  - Reposition patient every 2 hours    - Dangle patient 3 times a day  - Stand patient 3 times a day  - Ambulate patient 3 times a day  - Out of bed to chair 3 times a day   - Out of bed for meals 3 times a day  - Out of bed for toileting  - Record patient progress and toleration of activity level   Outcome: Progressing     Problem: DISCHARGE PLANNING  Goal: Discharge to home or other facility with appropriate resources  Description: INTERVENTIONS:  - Identify barriers to discharge w/patient and caregiver  - Arrange for needed discharge resources and transportation as appropriate  - Identify discharge learning needs (meds, wound care, etc )  - Refer to Case Management Department for coordinating discharge planning if the patient needs post-hospital services based on physician/advanced practitioner order or complex needs related to functional status, cognitive ability, or social support system  Outcome: Progressing     Problem: Knowledge Deficit  Goal: Patient/family/caregiver demonstrates understanding of disease process, treatment plan, medications, and discharge instructions  Description: Complete learning assessment and assess knowledge base    Interventions:  - Provide teaching at level of understanding  - Provide teaching via preferred learning methods  Outcome: Progressing     Problem: CARDIOVASCULAR - ADULT  Goal: Maintains optimal cardiac output and hemodynamic stability  Description: INTERVENTIONS:  - Monitor I/O, vital signs and rhythm  - Monitor for S/S and trends of decreased cardiac output  - Administer and titrate ordered vasoactive medications to optimize hemodynamic stability  - Assess quality of pulses, skin color and temperature  - Assess for signs of decreased coronary artery perfusion  - Instruct patient to report change in severity of symptoms  Outcome: Progressing  Goal: Absence of cardiac dysrhythmias or at baseline rhythm  Description: INTERVENTIONS:  - Continuous cardiac monitoring, vital signs, obtain 12 lead EKG if ordered  - Administer antiarrhythmic and heart rate control medications as ordered  - Monitor electrolytes and administer replacement therapy as ordered  Outcome: Progressing     Problem: METABOLIC, FLUID AND ELECTROLYTES - ADULT  Goal: Glucose maintained within target range  Description: INTERVENTIONS:  - Monitor Blood Glucose as ordered  - Assess for signs and symptoms of hyperglycemia and hypoglycemia  - Administer ordered medications to maintain glucose within target range  - Assess nutritional intake and initiate nutrition service referral as needed  Outcome: Progressing

## 2021-09-03 LAB — B BURGDOR IGG+IGM SER-ACNC: 32

## 2021-09-13 ENCOUNTER — OFFICE VISIT (OUTPATIENT)
Dept: FAMILY MEDICINE CLINIC | Facility: CLINIC | Age: 56
End: 2021-09-13
Payer: COMMERCIAL

## 2021-09-13 VITALS
DIASTOLIC BLOOD PRESSURE: 86 MMHG | SYSTOLIC BLOOD PRESSURE: 128 MMHG | HEIGHT: 72 IN | BODY MASS INDEX: 35.87 KG/M2 | WEIGHT: 264.8 LBS | TEMPERATURE: 95.3 F

## 2021-09-13 DIAGNOSIS — Z76.89 ENCOUNTER FOR SUPPORT AND COORDINATION OF TRANSITION OF CARE: Primary | ICD-10-CM

## 2021-09-13 DIAGNOSIS — E78.2 MIXED HYPERLIPIDEMIA: ICD-10-CM

## 2021-09-13 DIAGNOSIS — E72.12 METHYLENETETRAHYDROFOLATE REDUCTASE DEFICIENCY (HCC): ICD-10-CM

## 2021-09-13 PROCEDURE — 1111F DSCHRG MED/CURRENT MED MERGE: CPT | Performed by: FAMILY MEDICINE

## 2021-09-13 PROCEDURE — 99496 TRANSJ CARE MGMT HIGH F2F 7D: CPT | Performed by: FAMILY MEDICINE

## 2021-09-13 RX ORDER — EZETIMIBE 10 MG/1
10 TABLET ORAL DAILY
Qty: 30 TABLET | Refills: 5 | Status: SHIPPED | OUTPATIENT
Start: 2021-09-13 | End: 2021-09-15

## 2021-09-13 NOTE — PROGRESS NOTES
Assessment/Plan:      Diagnoses and all orders for this visit:    Encounter for support and coordination of transition of care         Subjective:     Patient ID: Olga Beltran is a 64 y o  male  Mr  Brenda Stein old here transition of care had a witnessed syncopal episode and was documented to have sick sinus syndrome with tachyarrhythmia bradyarrhythmia he was seen by Dr Javed pickering who elected to place a demand pacemaker his incisional site looks very good he is asymptomatic since having his pacemaker inserted he has an appointment to see his on [de-identified] assistant at Cardiology and subsequently the pacemaker interrogation teen      Review of Systems   Constitutional: Negative for activity change, appetite change, diaphoresis, fatigue and fever  HENT: Negative  Eyes: Negative  Respiratory: Negative for apnea, cough, chest tightness, shortness of breath and wheezing  Cardiovascular: Negative for chest pain, palpitations and leg swelling  Gastrointestinal: Negative for abdominal distention, abdominal pain, anal bleeding, constipation, diarrhea, nausea and vomiting  Endocrine: Negative for cold intolerance, heat intolerance, polydipsia, polyphagia and polyuria  Genitourinary: Negative for difficulty urinating, dysuria, flank pain, hematuria and urgency  Musculoskeletal: Negative for arthralgias, back pain, gait problem, joint swelling and myalgias  Skin: Negative for color change, rash and wound  Allergic/Immunologic: Negative for environmental allergies, food allergies and immunocompromised state  Neurological: Negative for dizziness, seizures, syncope, speech difficulty, numbness and headaches  Hematological: Negative for adenopathy  Does not bruise/bleed easily  Psychiatric/Behavioral: Negative for agitation, behavioral problems, hallucinations, sleep disturbance and suicidal ideas           Objective:     Physical Exam  Constitutional:       General: He is not in acute distress  Appearance: He is well-developed  He is not diaphoretic  HENT:      Head: Normocephalic  Right Ear: External ear normal       Left Ear: External ear normal       Nose: Nose normal    Eyes:      General: No scleral icterus  Right eye: No discharge  Left eye: No discharge  Conjunctiva/sclera: Conjunctivae normal       Pupils: Pupils are equal, round, and reactive to light  Neck:      Thyroid: No thyromegaly  Trachea: No tracheal deviation  Cardiovascular:      Rate and Rhythm: Normal rate and regular rhythm  Heart sounds: Normal heart sounds  No murmur heard  No friction rub  No gallop  Comments: Patient had a demand pacemaker inserted he is asymptomatic now for sick sinus syndrome  Pulmonary:      Effort: Pulmonary effort is normal  No respiratory distress  Breath sounds: Normal breath sounds  No wheezing  Abdominal:      General: Bowel sounds are normal       Palpations: Abdomen is soft  There is no mass  Tenderness: There is no abdominal tenderness  There is no guarding  Musculoskeletal:         General: No deformity  Cervical back: Normal range of motion  Lymphadenopathy:      Cervical: No cervical adenopathy  Skin:     General: Skin is warm and dry  Findings: No erythema or rash  Neurological:      Mental Status: He is alert and oriented to person, place, and time  Cranial Nerves: No cranial nerve deficit  Psychiatric:         Thought Content:  Thought content normal            Vitals:    09/13/21 1524   BP: 128/86   BP Location: Left arm   Patient Position: Sitting   Cuff Size: Large   Temp: (!) 95 3 °F (35 2 °C)   TempSrc: Temporal   Weight: 120 kg (264 lb 12 8 oz)   Height: 6' (1 829 m)       The following portions of the patient's history were reviewed and updated as appropriate:   He  has a past medical history of Arthritis, Chronic cholecystitis, Diabetes mellitus ([address] ), DVT (deep venous thrombosis) ([facility] [address] ), Gout, History of pulmonary embolism, Hyperlipidemia, Hypertension, Hypothyroidism, Lumbar back pain, MTHFR mutation, Neuropathy, Scab, Sleep apnea, Tarsal tunnel syndrome, right, Tinnitus, Wears glasses, and Wears glasses  He   Patient Active Problem List    Diagnosis Date Noted    Acute-on-chronic kidney injury ([facility] ) 08/31/2021    Sinus bradycardia 08/31/2021    Obstructive sleep apnea 08/31/2021    Syncopal episodes  08/30/2021    Paroxysmal atrial fibrillation ([facility] ) 08/30/2021    History of DVT (deep vein thrombosis) 08/30/2021    Vitamin D deficiency 04/12/2021    Hyperaldosteronism ([place] [address] ) 10/15/2020    Diabetic nephropathy associated with type 2 diabetes mellitus ([place] [address] ) 10/15/2020    Stage 3a chronic kidney disease ([place] [address] ) 10/15/2020    Proteinuria 10/15/2020    Avascular necrosis of bone ([place] [address] ) 06/05/2020    Gout 06/05/2020    Venous stasis ulcer of left ankle limited to breakdown of skin with varicose veins ([place] [address] ) 04/12/2020    Tarsal tunnel syndrome, left 12/12/2019    Tarsal tunnel syndrome of both lower extremities 11/12/2019    Sacroiliitis ([place] [address] ) 11/05/2019    Hypothyroidism (acquired) 01/31/2018    Diabetic peripheral neuropathy ([place] [address] ) 09/07/2017    Controlled type 2 diabetes mellitus with microalbuminuria, without long-term current use of insulin ([place] [address] ) 06/06/2017    Erectile dysfunction due to diseases classified elsewhere 05/01/2017    Homozygous for MTHFR gene mutation 05/10/2016     He  has a past surgical history that includes Cholecystectomy (03/26/2015); Appendectomy; Spinal fusion; Tonsillectomy (10/16/2013); Nasal septum surgery (10/16/2013); Uvulopalatopharyngoplasty; [place] tooth extraction; Bunionectomy (Right, 10/07/2016); Arthrodesis; Knee arthroscopy w/ meniscal repair; Uvulectomy (10/16/2013); pr tarsal tunnel release (Right, 06/25/2018); pr colonoscopy flx dx w/collj spec when pfrmd (N/A, 11/23/2018);  Colonoscopy; Back surgery; Knee arthroscopy; pr tarsal tunnel release (Left, 03/13/2020); and Cardiac pacemaker placement (09/01/2021)  His family history includes Aneurysm in his brother and mother; Coronary artery disease in his father; Hypertension in his father  He  reports that he has never smoked  He has never used smokeless tobacco  He reports current alcohol use  He reports that he does not use drugs  Current Outpatient Medications   Medication Sig Dispense Refill    allopurinol (ZYLOPRIM) 300 mg tablet Take 1 tablet by mouth once daily 90 tablet 0    atorvastatin (LIPITOR) 40 mg tablet Take 1 tablet by mouth once daily 90 tablet 0    carvedilol (COREG) 6 25 mg tablet Take 1 tablet (6 25 mg total) by mouth 2 (two) times a day with meals 60 tablet 0    Euthyrox 25 MCG tablet TAKE 1 TABLET BY MOUTH ONCE DAILY WITH LEVOTHYROXINE 150 MCG TO EQUAL 175 MCG 30 tablet 0    ezetimibe (ZETIA) 10 mg tablet Take 1 tablet by mouth once daily 30 tablet 0    furosemide (LASIX) 40 mg tablet Take 1 tablet by mouth once daily 90 tablet 1    gabapentin (NEURONTIN) 600 MG tablet TAKE 2 TABLETS BY MOUTH THREE TIMES DAILY 180 tablet 0    levothyroxine 150 mcg tablet TAKE 1 TABLET BY MOUTH ONCE DAILY IN THE EARLY MORNING  TAKE WITH LEVOTHYROXINE 25 MCG TO EQUAL 175 MCG 90 tablet 0    lisinopril (ZESTRIL) 5 mg tablet Take 1 tablet by mouth once daily 90 tablet 0    metFORMIN (GLUCOPHAGE-XR) 500 mg 24 hr tablet TAKE 1 TABLET BY MOUTH ONCE DAILY AT BEDTIME 30 tablet 0    Multiple Vitamins-Minerals (MENS MULTIVITAMIN PLUS) TABS Take 1 tablet by mouth daily      sildenafil (Viagra) 100 mg tablet Take 1 tablet (100 mg total) by mouth as needed for erectile dysfunction 30 tablet 0    spironolactone (ALDACTONE) 25 mg tablet Take 1 tablet by mouth once daily 90 tablet 0    Xarelto 20 MG tablet Take 1 tablet by mouth once daily with breakfast 90 tablet 0     No current facility-administered medications for this visit       Current Outpatient Medications on File Prior to Visit   Medication Sig  allopurinol (ZYLOPRIM) 300 mg tablet Take 1 tablet by mouth once daily    atorvastatin (LIPITOR) 40 mg tablet Take 1 tablet by mouth once daily    carvedilol (COREG) 6 25 mg tablet Take 1 tablet (6 25 mg total) by mouth 2 (two) times a day with meals    Euthyrox 25 MCG tablet TAKE 1 TABLET BY MOUTH ONCE DAILY WITH LEVOTHYROXINE 150 MCG TO EQUAL 175 MCG    ezetimibe (ZETIA) 10 mg tablet Take 1 tablet by mouth once daily    furosemide (LASIX) 40 mg tablet Take 1 tablet by mouth once daily    gabapentin (NEURONTIN) 600 MG tablet TAKE 2 TABLETS BY MOUTH THREE TIMES DAILY    levothyroxine 150 mcg tablet TAKE 1 TABLET BY MOUTH ONCE DAILY IN THE EARLY MORNING  TAKE WITH LEVOTHYROXINE 25 MCG TO EQUAL 175 MCG    lisinopril (ZESTRIL) 5 mg tablet Take 1 tablet by mouth once daily    metFORMIN (GLUCOPHAGE-XR) 500 mg 24 hr tablet TAKE 1 TABLET BY MOUTH ONCE DAILY AT BEDTIME    Multiple Vitamins-Minerals (MENS MULTIVITAMIN PLUS) TABS Take 1 tablet by mouth daily    sildenafil (Viagra) 100 mg tablet Take 1 tablet (100 mg total) by mouth as needed for erectile dysfunction    spironolactone (ALDACTONE) 25 mg tablet Take 1 tablet by mouth once daily    Xarelto 20 MG tablet Take 1 tablet by mouth once daily with breakfast     No current facility-administered medications on file prior to visit  He has No Known Allergies       Transitional Care Management Review:  Tramaine Landis is a 64 y o  male here for TCM follow up       During the TCM phone call patient stated:    TCM Call (since 8/13/2021)     Date and time call was made  9/2/2021  1:14 PM    Hospital care reviewed  Records reviewed    Patient was hospitialized at  [place] [id number]        Date of Admission  08/30/21    Date of discharge  09/02/21    Diagnosis  SYNCOPAL EPISODES/ PACEMAKER    Disposition  Home    Were the patients medications reviewed and updated  No    Current Symptoms  None      TCM Call (since 8/13/2021)     Post hospital issues  None Should patient be enrolled in anticoag monitoring? No    Scheduled for follow up?   Yes (Comment)  TCM 9/13 AVAILABLE APPTS DID NOT WORK FOR PT TO GET IN SOONER    Patients specialists  Cardiologist; Nephrologist    Cardiologist name  DR ARMSTRONG/ PACEMAKER CHECK    Nephrologist name  Dr Imtiaz Montanez appt 10/27 at 3:30    Did you obtain your prescribed medications  Yes    Do you need help managing your prescriptions or medications  No    Is transportation to your appointment needed  No    I have advised the patient to call PCP with any new or worsening symptoms  BIJU CORTEZ, PRACTICE COORDINATOR              Navid Negrete, DO

## 2021-09-14 DIAGNOSIS — E11.29 CONTROLLED TYPE 2 DIABETES MELLITUS WITH MICROALBUMINURIA, WITHOUT LONG-TERM CURRENT USE OF INSULIN (HCC): ICD-10-CM

## 2021-09-14 DIAGNOSIS — E03.9 ACQUIRED HYPOTHYROIDISM: ICD-10-CM

## 2021-09-14 DIAGNOSIS — E78.2 MIXED HYPERLIPIDEMIA: ICD-10-CM

## 2021-09-14 DIAGNOSIS — R80.9 CONTROLLED TYPE 2 DIABETES MELLITUS WITH MICROALBUMINURIA, WITHOUT LONG-TERM CURRENT USE OF INSULIN (HCC): ICD-10-CM

## 2021-09-15 RX ORDER — METFORMIN HYDROCHLORIDE 500 MG/1
TABLET, EXTENDED RELEASE ORAL
Qty: 30 TABLET | Refills: 0 | Status: SHIPPED | OUTPATIENT
Start: 2021-09-15 | End: 2021-10-11

## 2021-09-15 RX ORDER — LEVOTHYROXINE SODIUM 25 UG/1
TABLET ORAL
Qty: 30 TABLET | Refills: 0 | Status: SHIPPED | OUTPATIENT
Start: 2021-09-15 | End: 2021-10-21 | Stop reason: SDUPTHER

## 2021-09-15 RX ORDER — EZETIMIBE 10 MG/1
TABLET ORAL
Qty: 30 TABLET | Refills: 0 | Status: SHIPPED | OUTPATIENT
Start: 2021-09-15 | End: 2021-11-08 | Stop reason: SDUPTHER

## 2021-09-17 NOTE — PROGRESS NOTES
Cardiology Follow Up    Tramaine Landis  1965  [id number]  [address] [facility] [place] [address] [address]  [phone]    1  Paroxysmal atrial fibrillation (HCC)  Diagnostic Sleep Study   2  Sick sinus syndrome ([address] )     3  S/P placement of cardiac pacemaker     4  Essential hypertension     5  Dyslipidemia     6  History of DVT (deep vein thrombosis)     7  Obstructive sleep apnea  Diagnostic Sleep Study       Discussion/Summary:  Overall he is doing well from a cardiac standpoint  His pacemaker was interrogated in the office today  It showed normal function  His atrial fibrillation burden is 1 6%  He is asymptomatic in regards to atrial fibrillation  He will remain on carvedilol  He has already been on anticoagulation with Xarelto given his prior DVT  He states he was diagnosed with sleep apnea 15 years ago but did not want to wear the mask at that time  He is requesting a repeat sleep study which I have ordered  Blood pressure is well controlled  His lipids are at goal on current statin therapy  He will RTO in 3 months  I will have him see Dr Lida Chacon  He will call with any concerns  Interval History:   Tramaine Landis is a 64 y o  male with hypertension, dyslipidemia, diabetes, prior DVT, obstructive sleep apnea who presents to the office today for hospital follow up  The patient was recently hospitalized with symptoms of lightheadedness  He was found to have intermittent sinus bradycardia with heart rates in the 40's alternating episodes of atrial fibrillation along with conversion pauses up to 3 4 seconds  He was placed on dopamine initially without improvement  He underwent dual chamber pacemaker implantation on 9/1/21  He was already on anticoagulation with Xarelto secondary to prior history of DVT  Since hospital discharge he reports feeling well   He has been able to ambulate a few miles at a time without any shortness of breath or chest pain  He has been abiding by lifting restrictions  He denies any lower extremity edema, orthopnea, and PND  He denies lightheadedness, dizziness, palpitations, and syncope       Medical Problems     Problem List     Controlled type 2 diabetes mellitus with microalbuminuria, without long-term current use of insulin ([facility] )    Overview Signed 2/12/2019  7:22 AM by Nacho Sanabria DO     Does not check his sugars at home             Lab Results   Component Value Date    HGBA1C 6 0 (H) 08/30/2021         Diabetic peripheral neuropathy ([facility] )      Lab Results   Component Value Date    HGBA1C 6 0 (H) 08/30/2021         Erectile dysfunction due to diseases classified elsewhere    Homozygous for MTHFR gene mutation    Hypothyroidism (acquired)    Overview Signed 2/12/2019  7:23 AM by Nacho Sanabria DO     Symptoms not present          Sacroiliitis ([facility] )    Tarsal tunnel syndrome of both lower extremities    Tarsal tunnel syndrome, left    Overview Signed 12/12/2019 10:12 AM by Edelmira Arzola DPM     Added automatically from request for surgery [id number]         Venous stasis ulcer of left ankle limited to breakdown of skin with varicose veins ([facility] )    Avascular necrosis of bone ([facility] )    Gout    Hyperaldosteronism ([facility] )    Diabetic nephropathy associated with type 2 diabetes mellitus ([facility] )      Lab Results   Component Value Date    HGBA1C 6 0 (H) 08/30/2021         Stage 3a chronic kidney disease ([facility] )    Lab Results   Component Value Date    EGFR 59 09/02/2021    EGFR 47 09/01/2021    EGFR 43 08/31/2021    CREATININE 1 33 (H) 09/02/2021    CREATININE 1 62 (H) 09/01/2021    CREATININE 1 74 (H) 08/31/2021         Proteinuria    Vitamin D deficiency    Syncopal episodes     Paroxysmal atrial fibrillation (HCC)    History of DVT (deep vein thrombosis)    Acute-on-chronic kidney injury [facility])    Lab Results   Component Value Date    EGFR 59 09/02/2021    EGFR 47 09/01/2021    EGFR 43 08/31/2021    CREATININE 1 33 (H) 09/02/2021    CREATININE 1 62 (H) 09/01/2021    CREATININE 1 74 (H) 08/31/2021         Sinus bradycardia    Obstructive sleep apnea    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HCC)              Past Medical History:   Diagnosis Date    Arthritis     right foot    Chronic cholecystitis     Diabetes mellitus ([address] )     DVT (deep venous thrombosis) (HCC)     Gout     History of pulmonary embolism     Hyperlipidemia     Hypertension     Hypothyroidism     Lumbar back pain     MTHFR mutation     Neuropathy     Scab     right arm- no s/s infection    Sleep apnea     no CPAP    Tarsal tunnel syndrome, right     Tinnitus     left ear    Wears glasses     and contacts    Wears glasses      Social History     Socioeconomic History    Marital status: Single     Spouse name: Not on file    Number of children: Not on file    Years of education: Not on file    Highest education level: Not on file   Occupational History    Not on file   Tobacco Use    Smoking status: Never Smoker    Smokeless tobacco: Never Used   Vaping Use    Vaping Use: Never used   Substance and Sexual Activity    Alcohol use: Yes     Comment: weekends    Drug use: No    Sexual activity: Yes   Other Topics Concern    Not on file   Social History Narrative    Not on file     Social Determinants of Health     Financial Resource Strain:     Difficulty of Paying Living Expenses:    Food Insecurity:     Worried About Running Out of Food in the Last Year:     Ran Out of Food in the Last Year:    Transportation Needs:     Lack of Transportation (Medical):      Lack of Transportation (Non-Medical):    Physical Activity:     Days of Exercise per Week:     Minutes of Exercise per Session:    Stress:     Feeling of Stress :    Social Connections:     Frequency of Communication with Friends and Family:     Frequency of Social Gatherings with Friends and Family:     Attends Anabaptism Services:     Active Member of Clubs or Organizations:     Attends Club or Organization Meetings:     Marital Status:    Intimate Partner Violence:     Fear of Current or Ex-Partner:     Emotionally Abused:     Physically Abused:     Sexually Abused:       Family History   Problem Relation Age of Onset    Aneurysm Mother         intracranial aneurysm repair    Coronary artery disease Father     Hypertension Father     Aneurysm Brother      Past Surgical History:   Procedure Laterality Date    APPENDECTOMY      ARTHRODESIS      Lumbar L__    BACK SURGERY      BUNIONECTOMY Right 10/07/2016    Procedure: REMOVAL TIBIAL  SESAMOID BONE  RIGHT FOOT;  Surgeon: Ming Aquino DPM;  Location: [facility];  Service:    [address] [place] [address]  09/01/2021    CHOLECYSTECTOMY  03/26/2015    COLONOSCOPY      KNEE ARTHROSCOPY      KNEE ARTHROSCOPY W/ MENISCAL REPAIR      Lateral    NASAL SEPTUM SURGERY  10/16/2013    [place] COLONOSCOPY FLX DX W/COLLJ SPEC WHEN PFRMD N/A 11/23/2018    Procedure: COLONOSCOPY;  Surgeon: Merlin Lulas, MD;  Location: [facility];  Service: Gastroenterology    NY TARSAL TUNNEL RELEASE Right 06/25/2018    Procedure: RELEASE TARSAL TUNNEL;  Surgeon: Michelle Humphrey DPM;  Location: [facility];  Service: Podiatry    [place] TARSAL TUNNEL RELEASE Left 03/13/2020    Procedure: RELEASE TARSAL TUNNEL;  Surgeon: Michelle Humphrey DPM;  Location: [facility] MAIN OR;  Service: [place]  10/16/2013    with Adenoidectomy    UVULECTOMY  10/16/2013    UVULOPALATOPHARYNGOPLASTY      WISDOM TOOTH EXTRACTION         Current Outpatient Medications:     allopurinol (ZYLOPRIM) 300 mg tablet, Take 1 tablet by mouth once daily, Disp: 90 tablet, Rfl: 0    atorvastatin (LIPITOR) 40 mg tablet, Take 1 tablet by mouth once daily, Disp: 90 tablet, Rfl: 0    carvedilol (COREG) 6 25 mg tablet, Take 1 tablet (6 25 mg total) by mouth 2 (two) times a day with meals, Disp: 60 tablet, Rfl: 0    Euthyrox 25 MCG tablet, TAKE 1 TABLET BY MOUTH ONCE DAILY WITH LEVOTHYROXINE 150 MCG TO EQUAL 175 MCG, Disp: 30 tablet, Rfl: 0    ezetimibe (ZETIA) 10 mg tablet, Take 1 tablet by mouth once daily, Disp: 30 tablet, Rfl: 0    furosemide (LASIX) 40 mg tablet, Take 1 tablet by mouth once daily, Disp: 90 tablet, Rfl: 1    gabapentin (NEURONTIN) 600 MG tablet, TAKE 2 TABLETS BY MOUTH THREE TIMES DAILY, Disp: 180 tablet, Rfl: 0    levothyroxine 150 mcg tablet, TAKE 1 TABLET BY MOUTH ONCE DAILY IN THE EARLY MORNING    TAKE WITH LEVOTHYROXINE 25 MCG TO EQUAL 175 MCG, Disp: 90 tablet, Rfl: 0    lisinopril (ZESTRIL) 5 mg tablet, Take 1 tablet by mouth once daily, Disp: 90 tablet, Rfl: 0    metFORMIN (GLUCOPHAGE-XR) 500 mg 24 hr tablet, TAKE 1 TABLET BY MOUTH ONCE DAILY AT BEDTIME, Disp: 30 tablet, Rfl: 0    Multiple Vitamins-Minerals (MENS MULTIVITAMIN PLUS) TABS, Take 1 tablet by mouth daily, Disp: , Rfl:     sildenafil (Viagra) 100 mg tablet, Take 1 tablet (100 mg total) by mouth as needed for erectile dysfunction, Disp: 30 tablet, Rfl: 0    spironolactone (ALDACTONE) 25 mg tablet, Take 1 tablet by mouth once daily, Disp: 90 tablet, Rfl: 0    Xarelto 20 MG tablet, Take 1 tablet by mouth once daily with breakfast, Disp: 90 tablet, Rfl: 0  No Known Allergies    Labs:     Chemistry        Component Value Date/Time     12/21/2015 1313    K 4 8 09/02/2021 0524    K 4 6 12/21/2015 1313     09/02/2021 0524     12/21/2015 1313    CO2 26 09/02/2021 0524    CO2 27 7 12/21/2015 1313    BUN 27 (H) 09/02/2021 0524    BUN 15 12/21/2015 1313    CREATININE 1 33 (H) 09/02/2021 0524    CREATININE 1 20 12/21/2015 1313        Component Value Date/Time    CALCIUM 9 3 09/02/2021 0524    CALCIUM 9 9 12/21/2015 1313    ALKPHOS 54 08/31/2021 0548    ALKPHOS 73 06/11/2015 0713    AST 20 08/31/2021 0548    AST 31 06/11/2015 0713    ALT 27 08/31/2021 0548    ALT 50 06/11/2015 0713    BILITOT 0 6 2015 0713            No results found for: CHOL  Lab Results   Component Value Date    HDL 55 2021    HDL 58 10/12/2020    HDL 52 2020     Lab Results   Component Value Date    LDLCALC 60 2021    LDLCALC 41 10/12/2020    LDLCALC 47 2020     Lab Results   Component Value Date    TRIG 96 2021    TRIG 107 10/12/2020    TRIG 107 2020     No results found for: CHOLHDL    Imaging: Echo complete with contrast if indicated    Result Date: 2021  Narrative: [facility]  [place], [address] [phone] Transthoracic Echocardiogram 2D, M-mode, Doppler, and Color Doppler Study date:  30-Aug-2021 Patient: Simi Langston MR number: [id number] Account number: [de-identified] : 1965 Age: 64 years Gender: Male Status: Inpatient Location: Bedside Height: 72 in Weight: 249 5 lb BP: 125/ 54 mmHg Indications: Bradycardia  Near syncope  Diagnoses: R00 1 - Bradycardia, unspecified Sonographer:  ROMELIA Johnston Primary Physician:  Crystal Ceron DO Referring Physician:  Alvaro Oh PA-C Group:  [facility] Interpreting Physician:  Maira Cuellar DO SUMMARY LEFT VENTRICLE: Systolic function was normal  Ejection fraction was estimated to be 75 %  There were no regional wall motion abnormalities  Wall thickness was mildly increased  LEFT ATRIUM: The atrium was mildly dilated  RIGHT ATRIUM: The atrium was mildly dilated  MITRAL VALVE: There was mild annular calcification  SUMMARY MEASUREMENTS 2D measurements: Unspecified Anatomy: Ao Diam was 3 7 cm  LA Diam was 3 9 cm  LAAs A2C was 22 3 cm2  LAAs A4C was 25 cm2  LAESV A-L A2C was 71 2 ml  LAESV A-L A4C was 86 8 ml  LAESV Index (A-L) was 34 2 ml/m2  LAESV MOD A2C was 68 1 ml  LAESV MOD A4C was 79 ml  LAESV(A-L) was 80 1 ml  LAESV(MOD BP) was 74 4 ml  LAESVInd MOD BP was 31 8 ml/m2  LALs A2C was 5 9 cm  LALs A4C was 6 1 cm  LVEDV MOD A4C was 105 ml  LVEF MOD A4C was 72 7 %  LVESV MOD A4C was 28 7 ml  LVLd A4C was 8 5 cm  LVLs A4C was 6 5 cm  RVIDd was 3 8 cm  SV MOD A4C was 76 4 ml  CW measurements: Unspecified Anatomy:   AV Env  Ti was 315 9 ms   AV VTI was 40 5 cm  AV Vmax was 1 9 m/s  AV Vmean was 1 3 m/s  AV maxPG was 13 9 mmHg  AV meanPG was 7 6 mmHg  MM measurements: Unspecified Anatomy:   TAPSE was 2 5 cm  PW measurements: Unspecified Anatomy:   DVI was 0 7   E' Avg was 0 1 m/s  E' Lat was 0 1 m/s  E' Sept was 0 1 m/s  E/E' Avg was 8 2   E/E' Lat was 7   E/E' Sept was 9 8   LVOT Env  Ti was 288 ms  LVOT VTI was 30 2 cm  LVOT Vmax was 1 5 m/s  LVOT Vmean was 1 m/s  LVOT maxPG was 8 5 mmHg  LVOT meanPG was 5 mmHg  MV A Reji was 0 9 m/s  MV Dec [place] was 4 5 m/s2  MV DecT was 230 1 ms   MV E Reji was 1 m/s  MV E/A Ratio was 1 2   RV S' was 0 2 m/s  HISTORY: PRIOR HISTORY: hypothyroid  history of DVT  Risk factors: hypertension, diabetes, and medication-treated hypercholesterolemia  PROCEDURE: The procedure was performed at the bedside  This was a routine study  The transthoracic approach was used  The study included complete 2D imaging, M-mode, complete spectral Doppler, and color Doppler  The heart rate was 65 bpm, at the start of the study  Image quality was adequate  LEFT VENTRICLE: Size was normal  Systolic function was normal  Ejection fraction was estimated to be 75 %  There were no regional wall motion abnormalities  Wall thickness was mildly increased  DOPPLER: Left ventricular diastolic function parameters were normal  RIGHT VENTRICLE: The size was normal  Systolic function was normal  Wall thickness was normal  LEFT ATRIUM: The atrium was mildly dilated  RIGHT ATRIUM: The atrium was mildly dilated  MITRAL VALVE: There was mild annular calcification  There was normal leaflet separation  DOPPLER: The transmitral velocity was within the normal range  There was no evidence for stenosis  There was no regurgitation   AORTIC VALVE: The valve was trileaflet  Leaflets exhibited normal thickness and normal cuspal separation  DOPPLER: Transaortic velocity was within the normal range  There was no evidence for stenosis  There was no regurgitation  TRICUSPID VALVE: The valve structure was normal  There was normal leaflet separation  DOPPLER: The transtricuspid velocity was within the normal range  There was no evidence for stenosis  There was no regurgitation  PULMONIC VALVE: Not well visualized  DOPPLER: The transpulmonic velocity was within the normal range  There was no evidence for stenosis  There was no regurgitation  PERICARDIUM: There was no pericardial effusion  The pericardium was normal in appearance  AORTA: The root exhibited normal size  SYSTEMIC VEINS: IVC: The inferior vena cava was normal in size and course  Respirophasic changes were normal  SYSTEM MEASUREMENT TABLES 2D Ao Diam: 3 7 cm LA Diam: 3 9 cm LAAs A2C: 22 3 cm2 LAAs A4C: 25 cm2 LAESV A-L A2C: 71 2 ml LAESV A-L A4C: 86 8 ml LAESV Index (A-L): 34 2 ml/m2 LAESV MOD A2C: 68 1 ml LAESV MOD A4C: 79 ml LAESV(A-L): 80 1 ml LAESV(MOD BP): 74 4 ml LAESVInd MOD BP: 31 8 ml/m2 LALs A2C: 5 9 cm LALs A4C: 6 1 cm LVEDV MOD A4C: 105 ml LVEF MOD A4C: 72 7 % LVESV MOD A4C: 28 7 ml LVLd A4C: 8 5 cm LVLs A4C: 6 5 cm RVIDd: 3 8 cm SV MOD A4C: 76 4 ml CW AV Env  Ti: 315 9 ms AV VTI: 40 5 cm AV Vmax: 1 9 m/s AV Vmean: 1 3 m/s AV maxP 9 mmHg AV meanP 6 mmHg MM TAPSE: 2 5 cm PW DVI: 0 7 E' Av 1 m/s E' Lat: 0 1 m/s E' Sept: 0 1 m/s E/E' Av 2 E/E' Lat: 7 E/E' Sept: 9 8 LVOT Env  Ti: 288 ms LVOT VTI: 30 2 cm LVOT Vmax: 1 5 m/s LVOT Vmean: 1 m/s LVOT maxP 5 mmHg LVOT meanP mmHg MV A Reji: 0 9 m/s MV Dec [place]: 4 5 m/s2 MV DecT: 230 1 ms MV E Reji: 1 m/s MV E/A Ratio: 1 2 RV S': 0 2 m/s Intersocietal Commission Accredited Echocardiography Laboratory Prepared and electronically signed by Diana Clemons DO Signed 30-Aug-2021 14:34:41     XR chest portable    Result Date: 2021  Narrative: CHEST INDICATION:   Patient s/p Pacemaker/ICD Insertion  COMPARISON:  2021 EXAM PERFORMED/VIEWS:  XR CHEST PORTABLE FINDINGS:  Left-sided chest wall intracardiac device is identified  Leads are intact  Cardiomediastinal silhouette appears unremarkable  The lungs are clear  No pneumothorax or pleural effusion  Osseous structures appear within normal limits for patient age  Impression: No pneumothorax  Workstation performed: [id number]     XR chest 1 view portable    Result Date: 2021  Narrative: CHEST INDICATION:   dizziness  COMPARISON:  2016 EXAM PERFORMED/VIEWS:  XR CHEST PORTABLE Single view FINDINGS: Strand-like scarring lateral left lung base, unchanged Cardiomediastinal silhouette appears unremarkable  The lungs are otherwise clear  No pneumothorax or pleural effusion  Osseous structures appear within normal limits for patient age  Impression: Scarring lateral left lung base No acute cardiopulmonary disease  Stable exam Workstation performed: [id number]     XR chest 2 views    Result Date: 9/3/2021  Narrative: CHEST INDICATION:   Patient s/p Pacemaker/ICD Insertion  COMPARISON:  2021, CT chest 2016 EXAM PERFORMED/VIEWS:  XR CHEST PA & LATERAL  The frontal view was performed utilizing dual energy radiographic technique  Images: 4 FINDINGS:  The patient's arm slightly degrades assessment of the lateral view  Cardiomediastinal silhouette appears unremarkable  Dual lead left chest wall pacer with intact lead wires and no pneumothorax  Linear atelectasis or scarring left base  No pneumothorax or pleural effusion  Possible old right anterior lower rib fracture  Paravertebral ossifications thoracic spine  Cholecystotomy clips  Impression: No pneumothorax   Workstation performed: [id number]     Cardiac pacer implant    Result Date: 2021  Narrative: ELECTROPHYSIOLOGY OPERATIVE REPORT PATIENT NAME: Floyd Mahmood :  1965 MRN: [id number] Date of surgery: 09/01/21 Surgeon: Clemente Tierney DO Pt Location:  Cardiac Electrophysiology Laboratory PROCEDURE PERFORMED: 1)Dual Chamber Permanent Pacemaker Implantation Preoperative Medications: vancomycin ANESTHESIA: general PREOPERATIVE DIAGNOSIS: Symptomatic Sick Sinus syndrome Tachycardia/bradycardia syndrome POSTOPERATIVE DIAGNOSIS: Successful Dual Chamber Permanent Pacemaker implant  Same as Preop  Informed Consent: Risks, benefits, and alternatives discussed with patient and any family present  The patient understands risks, which include but are not limited to life threatening  bleeding, infection, air around lungs, blood around the heart and reoperation dislodged or malfunctioning device  Procedure Description: After informed consent was obtained, the patient was brought to the electrophysiology laboratory NPO  A time out was called and the patient was properly identified  The patient was pre-medicated as above  The left infraclavicular area was prepped and draped in the usual sterile fashion  After local anesthetic infiltration with 1% lidocaine, an incision was made below clavicle  The incision was extended down to the level of the pectoral fascia  A pocket was formed above the pectoral fascia using cautery and blunt dissection  axillary approach was done  A safe sheath introducer was advanced over a wire into the axillary vein, the wire was confirmed to be in the right atrium on flouroscopy, the wire was removed  Through the introducer the pacing lead was passed into the right heart  Under fluoroscopic guidance the right ventricular lead was positioned on the right ventricular septum  After satisfactory ventricular sensing and pacing thresholds were confirmed, the lead was sewn to the pectoral fascia/muscle using 0 silk sutures   The right atrial lead was placed in the right atrial appendage with similar fashion using a preformed J stylet, the helix was deployed, tissue contact was confirmed and good lead parameters were seen, the Safe-sheath was removed and the lead was tied down with 0 silk sutures to the muscle  The lead and pulse generator were placed in the pre-pectoral pocket  The pocket was then closed with 3 layers of 2-0, 3-0, 4-0 -Vicryl suture was used to close the skin  Sterile dressing applied  EBL: Minimal Complications: None DDDCVUKP:95 cc Findings:  Vein was extremely deep and this patient's chest wall making axillary vein access difficult and placing him at higher risk for pneumothorax The patient tolerated the procedure well  Plan: Routine postoperative care, CXR, and overnight observation with telemetry  Follow-up in 2 weeks with incision check and interrogation  Review of Systems   Constitutional: Negative for chills and fever  HENT: Negative  Cardiovascular: Negative for chest pain, dyspnea on exertion, leg swelling, orthopnea, palpitations, paroxysmal nocturnal dyspnea and syncope  Respiratory: Negative for cough and shortness of breath  Gastrointestinal: Negative for diarrhea, nausea and vomiting  Genitourinary: Negative  Neurological: Negative for dizziness and light-headedness  All other systems reviewed and are negative  Vitals:    09/21/21 1432   BP: 114/80   Pulse: 68   SpO2: 95%     Vitals:    09/21/21 1432   Weight: 118 kg (261 lb)     Height: 6' (182 9 cm)   Body mass index is 35 4 kg/m²  Physical Exam:  Physical Exam  Vitals reviewed  Constitutional:       General: He is not in acute distress  Appearance: He is well-developed  He is obese  He is not diaphoretic  HENT:      Head: Normocephalic and atraumatic  Eyes:      Pupils: Pupils are equal, round, and reactive to light  Neck:      Vascular: No carotid bruit  Cardiovascular:      Rate and Rhythm: Normal rate and regular rhythm  Pulses:           Radial pulses are 2+ on the right side and 2+ on the left side          Dorsalis pedis pulses are 2+ on the right side and 2+ on the left side       Heart sounds: S1 normal and S2 normal  No murmur heard  Pulmonary:      Effort: Pulmonary effort is normal  No respiratory distress  Breath sounds: Normal breath sounds  No wheezing or rales  Abdominal:      General: There is no distension  Palpations: Abdomen is soft  Tenderness: There is no abdominal tenderness  Musculoskeletal:         General: Normal range of motion  Cervical back: Normal range of motion  Right lower leg: No edema  Left lower leg: No edema  Skin:     General: Skin is warm and dry  Findings: No erythema  Neurological:      General: No focal deficit present  Mental Status: He is alert and oriented to person, place, and time     Psychiatric:         Mood and Affect: Mood normal          Behavior: Behavior normal

## 2021-09-21 ENCOUNTER — IN-CLINIC DEVICE VISIT (OUTPATIENT)
Dept: CARDIOLOGY CLINIC | Facility: HOSPITAL | Age: 56
End: 2021-09-21
Payer: COMMERCIAL

## 2021-09-21 ENCOUNTER — OFFICE VISIT (OUTPATIENT)
Dept: CARDIOLOGY CLINIC | Facility: HOSPITAL | Age: 56
End: 2021-09-21
Payer: COMMERCIAL

## 2021-09-21 VITALS
HEART RATE: 68 BPM | BODY MASS INDEX: 35.35 KG/M2 | WEIGHT: 261 LBS | DIASTOLIC BLOOD PRESSURE: 80 MMHG | HEIGHT: 72 IN | SYSTOLIC BLOOD PRESSURE: 114 MMHG | OXYGEN SATURATION: 95 %

## 2021-09-21 DIAGNOSIS — Z95.0 S/P PLACEMENT OF CARDIAC PACEMAKER: ICD-10-CM

## 2021-09-21 DIAGNOSIS — I49.5 SICK SINUS SYNDROME (HCC): ICD-10-CM

## 2021-09-21 DIAGNOSIS — E78.5 DYSLIPIDEMIA: ICD-10-CM

## 2021-09-21 DIAGNOSIS — I10 ESSENTIAL HYPERTENSION: ICD-10-CM

## 2021-09-21 DIAGNOSIS — Z95.0 PRESENCE OF CARDIAC PACEMAKER: Primary | ICD-10-CM

## 2021-09-21 DIAGNOSIS — Z86.718 HISTORY OF DVT (DEEP VEIN THROMBOSIS): ICD-10-CM

## 2021-09-21 DIAGNOSIS — G47.33 OBSTRUCTIVE SLEEP APNEA: ICD-10-CM

## 2021-09-21 DIAGNOSIS — I48.0 PAROXYSMAL ATRIAL FIBRILLATION (HCC): Primary | ICD-10-CM

## 2021-09-21 PROCEDURE — 99024 POSTOP FOLLOW-UP VISIT: CPT | Performed by: INTERNAL MEDICINE

## 2021-09-21 PROCEDURE — 1111F DSCHRG MED/CURRENT MED MERGE: CPT | Performed by: PHYSICIAN ASSISTANT

## 2021-09-21 PROCEDURE — 99214 OFFICE O/P EST MOD 30 MIN: CPT | Performed by: PHYSICIAN ASSISTANT

## 2021-09-21 NOTE — PROGRESS NOTES
Results for orders placed or performed in visit on 09/21/21   Cardiac EP device report    Narrative    MDDT DUAL PM/ ACTIVE SYSTEM IS MRI CONDITIONAL  DEVICE INTERROGATED IN THE [facility]: WOUND CHECK: INCISION CLEAN AND DRY WITH EDGES APPROXIMATED; WOUND CARE AND RESTRICTIONS REVIEWED WITH PATIENT  BATTERY VOLTAGE ADEQUATE (12 4 YRS)  AP: 83 7%  : 1 5% (MVP-ON)  ALL LEAD PARAMETERS WITHIN NORMAL LIMITS  188 AT/AF EPISODES W/ AVAIL EGRMS SHOWING PAF, MAX DURATION 2 MINS 37 SECS  PT TAKES Zahida Guerrier  EF: 75% (ECHO 08/30/21)  NO PROGRAMMING CHANGES MADE TO DEVICE PARAMETERS  PT SEEN BY FRAN LARIOS PA-C TODAY  PACEMAKER FUNCTIONING APPROPRIATELY    [facility] AND [facility]

## 2021-09-23 ENCOUNTER — TELEPHONE (OUTPATIENT)
Dept: SLEEP CENTER | Facility: CLINIC | Age: 56
End: 2021-09-23

## 2021-09-23 NOTE — TELEPHONE ENCOUNTER
----- Message from Alonso Curran MD sent at 9/23/2021 11:57 AM EDT -----  approved  ----- Message -----  From: Rona Magdaleno  Sent: 9/22/2021   8:29 AM EDT  To: Sleep Medicine [place] Provider    This sleep study needs approval      If approved please sign and return to clerical pool  If denied please include reasons why  Also provide alternative testing if warranted  Please sign and return to clerical pool

## 2021-09-24 DIAGNOSIS — E11.42 DIABETIC PERIPHERAL NEUROPATHY (HCC): ICD-10-CM

## 2021-09-24 RX ORDER — GABAPENTIN 600 MG/1
TABLET ORAL
Qty: 180 TABLET | Refills: 0 | Status: SHIPPED | OUTPATIENT
Start: 2021-09-24 | End: 2021-10-20 | Stop reason: SDUPTHER

## 2021-10-03 DIAGNOSIS — I10 ESSENTIAL HYPERTENSION: ICD-10-CM

## 2021-10-03 DIAGNOSIS — E11.9 TYPE 2 DIABETES MELLITUS WITHOUT COMPLICATION, WITHOUT LONG-TERM CURRENT USE OF INSULIN (HCC): ICD-10-CM

## 2021-10-03 DIAGNOSIS — O22.30 DVT (DEEP VEIN THROMBOSIS) IN PREGNANCY: ICD-10-CM

## 2021-10-04 PROCEDURE — 4010F ACE/ARB THERAPY RXD/TAKEN: CPT | Performed by: PHYSICIAN ASSISTANT

## 2021-10-04 RX ORDER — LISINOPRIL 5 MG/1
TABLET ORAL
Qty: 90 TABLET | Refills: 0 | Status: SHIPPED | OUTPATIENT
Start: 2021-10-04 | End: 2022-01-11 | Stop reason: SDUPTHER

## 2021-10-04 RX ORDER — RIVAROXABAN 20 MG/1
TABLET, FILM COATED ORAL
Qty: 90 TABLET | Refills: 0 | Status: SHIPPED | OUTPATIENT
Start: 2021-10-04 | End: 2022-01-11 | Stop reason: SDUPTHER

## 2021-10-11 DIAGNOSIS — R80.9 CONTROLLED TYPE 2 DIABETES MELLITUS WITH MICROALBUMINURIA, WITHOUT LONG-TERM CURRENT USE OF INSULIN (HCC): ICD-10-CM

## 2021-10-11 DIAGNOSIS — E11.29 CONTROLLED TYPE 2 DIABETES MELLITUS WITH MICROALBUMINURIA, WITHOUT LONG-TERM CURRENT USE OF INSULIN (HCC): ICD-10-CM

## 2021-10-11 PROCEDURE — 3066F NEPHROPATHY DOC TX: CPT | Performed by: PHYSICIAN ASSISTANT

## 2021-10-11 RX ORDER — METFORMIN HYDROCHLORIDE 500 MG/1
TABLET, EXTENDED RELEASE ORAL
Qty: 30 TABLET | Refills: 0 | Status: SHIPPED | OUTPATIENT
Start: 2021-10-11 | End: 2021-10-18 | Stop reason: SDUPTHER

## 2021-10-18 DIAGNOSIS — E26.9 HYPERALDOSTERONISM (HCC): ICD-10-CM

## 2021-10-18 DIAGNOSIS — R80.9 CONTROLLED TYPE 2 DIABETES MELLITUS WITH MICROALBUMINURIA, WITHOUT LONG-TERM CURRENT USE OF INSULIN (HCC): ICD-10-CM

## 2021-10-18 DIAGNOSIS — E03.9 ACQUIRED HYPOTHYROIDISM: ICD-10-CM

## 2021-10-18 DIAGNOSIS — E11.29 CONTROLLED TYPE 2 DIABETES MELLITUS WITH MICROALBUMINURIA, WITHOUT LONG-TERM CURRENT USE OF INSULIN (HCC): ICD-10-CM

## 2021-10-18 DIAGNOSIS — E79.0 HYPERURICEMIA: ICD-10-CM

## 2021-10-18 RX ORDER — LEVOTHYROXINE SODIUM 0.15 MG/1
TABLET ORAL
Qty: 90 TABLET | Refills: 0 | Status: SHIPPED | OUTPATIENT
Start: 2021-10-18 | End: 2021-10-18 | Stop reason: SDUPTHER

## 2021-10-18 RX ORDER — ALLOPURINOL 300 MG/1
TABLET ORAL
Qty: 90 TABLET | Refills: 0 | Status: SHIPPED | OUTPATIENT
Start: 2021-10-18 | End: 2022-01-11 | Stop reason: SDUPTHER

## 2021-10-19 RX ORDER — METFORMIN HYDROCHLORIDE 500 MG/1
500 TABLET, EXTENDED RELEASE ORAL
Qty: 30 TABLET | Refills: 0 | Status: SHIPPED | OUTPATIENT
Start: 2021-10-19 | End: 2021-11-22 | Stop reason: SDUPTHER

## 2021-10-19 RX ORDER — SPIRONOLACTONE 25 MG/1
TABLET ORAL
Qty: 90 TABLET | Refills: 0 | Status: SHIPPED | OUTPATIENT
Start: 2021-10-19 | End: 2022-01-27 | Stop reason: SDUPTHER

## 2021-10-19 RX ORDER — LEVOTHYROXINE SODIUM 0.15 MG/1
TABLET ORAL
Qty: 90 TABLET | Refills: 0 | Status: SHIPPED | OUTPATIENT
Start: 2021-10-19 | End: 2022-02-14 | Stop reason: SDUPTHER

## 2021-10-20 DIAGNOSIS — E11.42 DIABETIC PERIPHERAL NEUROPATHY (HCC): ICD-10-CM

## 2021-10-20 RX ORDER — GABAPENTIN 600 MG/1
1200 TABLET ORAL 3 TIMES DAILY
Qty: 180 TABLET | Refills: 0 | Status: SHIPPED | OUTPATIENT
Start: 2021-10-20 | End: 2021-11-12 | Stop reason: SDUPTHER

## 2021-10-21 DIAGNOSIS — E03.9 ACQUIRED HYPOTHYROIDISM: ICD-10-CM

## 2021-10-21 RX ORDER — LEVOTHYROXINE SODIUM 0.03 MG/1
25 TABLET ORAL DAILY
Qty: 30 TABLET | Refills: 5 | Status: SHIPPED | OUTPATIENT
Start: 2021-10-21 | End: 2021-11-22 | Stop reason: SDUPTHER

## 2021-11-08 DIAGNOSIS — E78.2 MIXED HYPERLIPIDEMIA: ICD-10-CM

## 2021-11-08 RX ORDER — EZETIMIBE 10 MG/1
10 TABLET ORAL DAILY
Qty: 30 TABLET | Refills: 0 | Status: SHIPPED | OUTPATIENT
Start: 2021-11-08 | End: 2022-01-27 | Stop reason: SDUPTHER

## 2021-11-12 DIAGNOSIS — E11.42 DIABETIC PERIPHERAL NEUROPATHY (HCC): ICD-10-CM

## 2021-11-12 RX ORDER — GABAPENTIN 600 MG/1
1200 TABLET ORAL 3 TIMES DAILY
Qty: 180 TABLET | Refills: 0 | Status: SHIPPED | OUTPATIENT
Start: 2021-11-12 | End: 2021-12-13 | Stop reason: SDUPTHER

## 2021-11-22 DIAGNOSIS — E03.9 ACQUIRED HYPOTHYROIDISM: ICD-10-CM

## 2021-11-22 DIAGNOSIS — E11.29 CONTROLLED TYPE 2 DIABETES MELLITUS WITH MICROALBUMINURIA, WITHOUT LONG-TERM CURRENT USE OF INSULIN (HCC): ICD-10-CM

## 2021-11-22 DIAGNOSIS — R80.9 CONTROLLED TYPE 2 DIABETES MELLITUS WITH MICROALBUMINURIA, WITHOUT LONG-TERM CURRENT USE OF INSULIN (HCC): ICD-10-CM

## 2021-11-22 RX ORDER — METFORMIN HYDROCHLORIDE 500 MG/1
500 TABLET, EXTENDED RELEASE ORAL
Qty: 30 TABLET | Refills: 0 | OUTPATIENT
Start: 2021-11-22

## 2021-11-22 RX ORDER — LEVOTHYROXINE SODIUM 0.03 MG/1
25 TABLET ORAL DAILY
Qty: 30 TABLET | Refills: 0 | OUTPATIENT
Start: 2021-11-22

## 2021-11-23 DIAGNOSIS — E79.0 HYPERURICEMIA: ICD-10-CM

## 2021-11-23 DIAGNOSIS — R80.9 CONTROLLED TYPE 2 DIABETES MELLITUS WITH MICROALBUMINURIA, WITHOUT LONG-TERM CURRENT USE OF INSULIN (HCC): Primary | ICD-10-CM

## 2021-11-23 DIAGNOSIS — E11.29 CONTROLLED TYPE 2 DIABETES MELLITUS WITH MICROALBUMINURIA, WITHOUT LONG-TERM CURRENT USE OF INSULIN (HCC): Primary | ICD-10-CM

## 2021-11-23 DIAGNOSIS — E03.9 ACQUIRED HYPOTHYROIDISM: ICD-10-CM

## 2021-11-23 RX ORDER — LEVOTHYROXINE SODIUM 0.03 MG/1
25 TABLET ORAL DAILY
Qty: 30 TABLET | Refills: 5 | Status: SHIPPED | OUTPATIENT
Start: 2021-11-23 | End: 2021-12-27 | Stop reason: SDUPTHER

## 2021-11-23 RX ORDER — METFORMIN HYDROCHLORIDE 500 MG/1
500 TABLET, EXTENDED RELEASE ORAL
Qty: 30 TABLET | Refills: 5 | Status: SHIPPED | OUTPATIENT
Start: 2021-11-23 | End: 2021-12-27 | Stop reason: SDUPTHER

## 2021-12-06 ENCOUNTER — LAB (OUTPATIENT)
Dept: LAB | Facility: MEDICAL CENTER | Age: 56
End: 2021-12-06
Payer: COMMERCIAL

## 2021-12-06 DIAGNOSIS — E11.29 CONTROLLED TYPE 2 DIABETES MELLITUS WITH MICROALBUMINURIA, WITHOUT LONG-TERM CURRENT USE OF INSULIN (HCC): ICD-10-CM

## 2021-12-06 DIAGNOSIS — R80.9 CONTROLLED TYPE 2 DIABETES MELLITUS WITH MICROALBUMINURIA, WITHOUT LONG-TERM CURRENT USE OF INSULIN (HCC): ICD-10-CM

## 2021-12-06 DIAGNOSIS — E03.9 ACQUIRED HYPOTHYROIDISM: ICD-10-CM

## 2021-12-06 DIAGNOSIS — E79.0 HYPERURICEMIA: ICD-10-CM

## 2021-12-06 LAB
ANION GAP SERPL CALCULATED.3IONS-SCNC: 3 MMOL/L (ref 4–13)
BUN SERPL-MCNC: 27 MG/DL (ref 5–25)
CALCIUM SERPL-MCNC: 10.5 MG/DL (ref 8.3–10.1)
CHLORIDE SERPL-SCNC: 113 MMOL/L (ref 100–108)
CO2 SERPL-SCNC: 27 MMOL/L (ref 21–32)
CREAT SERPL-MCNC: 1.5 MG/DL (ref 0.6–1.3)
GFR SERPL CREATININE-BSD FRML MDRD: 51 ML/MIN/1.73SQ M
GLUCOSE SERPL-MCNC: 89 MG/DL (ref 65–140)
POTASSIUM SERPL-SCNC: 4.9 MMOL/L (ref 3.5–5.3)
SODIUM SERPL-SCNC: 143 MMOL/L (ref 136–145)
TSH SERPL DL<=0.05 MIU/L-ACNC: 1.74 UIU/ML (ref 0.36–3.74)
URATE SERPL-MCNC: 5 MG/DL (ref 4.2–8)

## 2021-12-06 PROCEDURE — 3066F NEPHROPATHY DOC TX: CPT | Performed by: FAMILY MEDICINE

## 2021-12-06 PROCEDURE — 84443 ASSAY THYROID STIM HORMONE: CPT

## 2021-12-06 PROCEDURE — 36415 COLL VENOUS BLD VENIPUNCTURE: CPT

## 2021-12-06 PROCEDURE — 83036 HEMOGLOBIN GLYCOSYLATED A1C: CPT

## 2021-12-06 PROCEDURE — 80048 BASIC METABOLIC PNL TOTAL CA: CPT

## 2021-12-06 PROCEDURE — 84550 ASSAY OF BLOOD/URIC ACID: CPT

## 2021-12-07 LAB
EST. AVERAGE GLUCOSE BLD GHB EST-MCNC: 131 MG/DL
HBA1C MFR BLD: 6.2 %

## 2021-12-07 PROCEDURE — 3044F HG A1C LEVEL LT 7.0%: CPT | Performed by: FAMILY MEDICINE

## 2021-12-11 DIAGNOSIS — E11.42 DIABETIC PERIPHERAL NEUROPATHY (HCC): ICD-10-CM

## 2021-12-13 ENCOUNTER — OFFICE VISIT (OUTPATIENT)
Dept: FAMILY MEDICINE CLINIC | Facility: CLINIC | Age: 56
End: 2021-12-13
Payer: COMMERCIAL

## 2021-12-13 VITALS
BODY MASS INDEX: 36.7 KG/M2 | HEIGHT: 72 IN | TEMPERATURE: 96.3 F | WEIGHT: 271 LBS | DIASTOLIC BLOOD PRESSURE: 88 MMHG | SYSTOLIC BLOOD PRESSURE: 126 MMHG

## 2021-12-13 DIAGNOSIS — R80.9 CONTROLLED TYPE 2 DIABETES MELLITUS WITH MICROALBUMINURIA, WITHOUT LONG-TERM CURRENT USE OF INSULIN (HCC): Primary | ICD-10-CM

## 2021-12-13 DIAGNOSIS — E11.21 DIABETIC NEPHROPATHY ASSOCIATED WITH TYPE 2 DIABETES MELLITUS (HCC): ICD-10-CM

## 2021-12-13 DIAGNOSIS — N18.31 STAGE 3A CHRONIC KIDNEY DISEASE (HCC): ICD-10-CM

## 2021-12-13 DIAGNOSIS — I48.0 PAROXYSMAL ATRIAL FIBRILLATION (HCC): ICD-10-CM

## 2021-12-13 DIAGNOSIS — L97.321 VENOUS STASIS ULCER OF LEFT ANKLE LIMITED TO BREAKDOWN OF SKIN WITH VARICOSE VEINS (HCC): ICD-10-CM

## 2021-12-13 DIAGNOSIS — E11.29 CONTROLLED TYPE 2 DIABETES MELLITUS WITH MICROALBUMINURIA, WITHOUT LONG-TERM CURRENT USE OF INSULIN (HCC): Primary | ICD-10-CM

## 2021-12-13 DIAGNOSIS — I83.023 VENOUS STASIS ULCER OF LEFT ANKLE LIMITED TO BREAKDOWN OF SKIN WITH VARICOSE VEINS (HCC): ICD-10-CM

## 2021-12-13 DIAGNOSIS — M46.1 SACROILIITIS (HCC): ICD-10-CM

## 2021-12-13 DIAGNOSIS — M1A.00X0 IDIOPATHIC CHRONIC GOUT WITHOUT TOPHUS, UNSPECIFIED SITE: ICD-10-CM

## 2021-12-13 DIAGNOSIS — E11.42 DIABETIC PERIPHERAL NEUROPATHY (HCC): ICD-10-CM

## 2021-12-13 DIAGNOSIS — E26.9 HYPERALDOSTERONISM (HCC): ICD-10-CM

## 2021-12-13 PROBLEM — N18.9 ACUTE-ON-CHRONIC KIDNEY INJURY (HCC): Status: RESOLVED | Noted: 2021-08-31 | Resolved: 2021-12-13

## 2021-12-13 PROBLEM — N17.9 ACUTE-ON-CHRONIC KIDNEY INJURY (HCC): Status: RESOLVED | Noted: 2021-08-31 | Resolved: 2021-12-13

## 2021-12-13 PROBLEM — M87.00 AVASCULAR NECROSIS OF BONE (HCC): Status: RESOLVED | Noted: 2020-06-05 | Resolved: 2021-12-13

## 2021-12-13 PROCEDURE — 1036F TOBACCO NON-USER: CPT | Performed by: FAMILY MEDICINE

## 2021-12-13 PROCEDURE — 3725F SCREEN DEPRESSION PERFORMED: CPT | Performed by: FAMILY MEDICINE

## 2021-12-13 PROCEDURE — 3008F BODY MASS INDEX DOCD: CPT | Performed by: FAMILY MEDICINE

## 2021-12-13 PROCEDURE — 99214 OFFICE O/P EST MOD 30 MIN: CPT | Performed by: FAMILY MEDICINE

## 2021-12-13 PROCEDURE — 3079F DIAST BP 80-89 MM HG: CPT | Performed by: FAMILY MEDICINE

## 2021-12-13 PROCEDURE — 3074F SYST BP LT 130 MM HG: CPT | Performed by: FAMILY MEDICINE

## 2021-12-13 RX ORDER — GABAPENTIN 600 MG/1
1200 TABLET ORAL 3 TIMES DAILY
Qty: 180 TABLET | Refills: 0 | Status: SHIPPED | OUTPATIENT
Start: 2021-12-13 | End: 2022-05-20 | Stop reason: SDUPTHER

## 2021-12-13 RX ORDER — GABAPENTIN 600 MG/1
TABLET ORAL
Qty: 180 TABLET | Refills: 0 | Status: SHIPPED | OUTPATIENT
Start: 2021-12-13 | End: 2022-01-11 | Stop reason: SDUPTHER

## 2021-12-27 DIAGNOSIS — E03.9 ACQUIRED HYPOTHYROIDISM: ICD-10-CM

## 2021-12-27 DIAGNOSIS — E11.29 CONTROLLED TYPE 2 DIABETES MELLITUS WITH MICROALBUMINURIA, WITHOUT LONG-TERM CURRENT USE OF INSULIN (HCC): ICD-10-CM

## 2021-12-27 DIAGNOSIS — R80.9 CONTROLLED TYPE 2 DIABETES MELLITUS WITH MICROALBUMINURIA, WITHOUT LONG-TERM CURRENT USE OF INSULIN (HCC): ICD-10-CM

## 2021-12-27 RX ORDER — METFORMIN HYDROCHLORIDE 500 MG/1
500 TABLET, EXTENDED RELEASE ORAL
Qty: 30 TABLET | Refills: 0 | Status: SHIPPED | OUTPATIENT
Start: 2021-12-27 | End: 2022-01-27 | Stop reason: SDUPTHER

## 2021-12-27 RX ORDER — LEVOTHYROXINE SODIUM 0.03 MG/1
25 TABLET ORAL DAILY
Qty: 30 TABLET | Refills: 0 | Status: SHIPPED | OUTPATIENT
Start: 2021-12-27 | End: 2022-01-27 | Stop reason: SDUPTHER

## 2021-12-28 DIAGNOSIS — I48.0 PAROXYSMAL ATRIAL FIBRILLATION (HCC): ICD-10-CM

## 2021-12-28 RX ORDER — CARVEDILOL 6.25 MG/1
6.25 TABLET ORAL 2 TIMES DAILY WITH MEALS
Qty: 90 TABLET | Refills: 0 | Status: SHIPPED | OUTPATIENT
Start: 2021-12-28 | End: 2022-02-04 | Stop reason: SDUPTHER

## 2022-01-06 ENCOUNTER — HOSPITAL ENCOUNTER (OUTPATIENT)
Dept: SLEEP CENTER | Facility: HOSPITAL | Age: 57
Discharge: HOME | End: 2022-01-06
Payer: COMMERCIAL

## 2022-01-06 DIAGNOSIS — I48.0 PAROXYSMAL ATRIAL FIBRILLATION (HCC): ICD-10-CM

## 2022-01-06 DIAGNOSIS — G47.33 OBSTRUCTIVE SLEEP APNEA: ICD-10-CM

## 2022-01-06 PROCEDURE — 95810 POLYSOM 6/> YRS 4/> PARAM: CPT

## 2022-01-07 ENCOUNTER — TELEPHONE (OUTPATIENT)
Dept: SLEEP CENTER | Facility: CLINIC | Age: 57
End: 2022-01-07

## 2022-01-07 NOTE — PROGRESS NOTES
Sleep Study Documentation    Pre-Sleep Study       Sleep testing procedure explained to patient:YES    Patient napped prior to study:NO    [address] [place] worker after 12PM   Caffeine use:NO    Alcohol:Dayshift workers after 5PM: Alcohol use:NO    Typical day for patient:YES       Study Documentation    Sleep Study Indications: The patient is here for snoring, BMI>30, neck circumference >18inches and witnessed apneas  Sleep Study: Diagnostic   Snore: Moderate to Severe  Supplemental O2: no    O2 flow rate (L/min) range N/A  O2 flow rate (L/min) final N/A  Minimum SaO2 76  Baseline SaO2 90        Mode of Therapy:N/A    EKG abnormalities: no     EEG abnormalities: no    Sleep Study Recorded < 2 hours: N/A    Sleep Study Recorded > 2 hours but incomplete study: N/A    Sleep Study Recorded 6 hours but no sleep obtained: NO    Patient classification: employed       Post-Sleep Study    Medication used at bedtime or during sleep study:YES other prescription medications    Patient reports time it took to fall asleep:less than 20 minutes    Patient reports waking up during study:1 to 2 times  Patient reports returning to sleep in 10 to 30 minutes  Patient reports sleeping 4 to 6 hours with dreaming  Patient reports sleep during study:typical    Patient rated sleepiness: Somewhat sleepy or tired    PAP treatment:no

## 2022-01-11 DIAGNOSIS — O22.30 DVT (DEEP VEIN THROMBOSIS) IN PREGNANCY: ICD-10-CM

## 2022-01-11 DIAGNOSIS — E11.9 TYPE 2 DIABETES MELLITUS WITHOUT COMPLICATION, WITHOUT LONG-TERM CURRENT USE OF INSULIN (HCC): ICD-10-CM

## 2022-01-11 DIAGNOSIS — E79.0 HYPERURICEMIA: ICD-10-CM

## 2022-01-11 DIAGNOSIS — I10 ESSENTIAL HYPERTENSION: ICD-10-CM

## 2022-01-11 DIAGNOSIS — E11.42 DIABETIC PERIPHERAL NEUROPATHY (HCC): ICD-10-CM

## 2022-01-12 PROCEDURE — 4010F ACE/ARB THERAPY RXD/TAKEN: CPT | Performed by: INTERNAL MEDICINE

## 2022-01-12 RX ORDER — LISINOPRIL 5 MG/1
5 TABLET ORAL DAILY
Qty: 90 TABLET | Refills: 0 | Status: SHIPPED | OUTPATIENT
Start: 2022-01-12 | End: 2022-04-14 | Stop reason: SDUPTHER

## 2022-01-12 RX ORDER — ALLOPURINOL 300 MG/1
300 TABLET ORAL DAILY
Qty: 90 TABLET | Refills: 0 | Status: SHIPPED | OUTPATIENT
Start: 2022-01-12 | End: 2022-04-14 | Stop reason: SDUPTHER

## 2022-01-12 RX ORDER — GABAPENTIN 600 MG/1
1200 TABLET ORAL 3 TIMES DAILY
Qty: 180 TABLET | Refills: 0 | Status: SHIPPED | OUTPATIENT
Start: 2022-01-12 | End: 2022-02-14 | Stop reason: SDUPTHER

## 2022-01-13 ENCOUNTER — IN-CLINIC DEVICE VISIT (OUTPATIENT)
Dept: CARDIOLOGY CLINIC | Facility: HOSPITAL | Age: 57
End: 2022-01-13
Payer: COMMERCIAL

## 2022-01-13 DIAGNOSIS — Z95.0 PRESENCE OF CARDIAC PACEMAKER: Primary | ICD-10-CM

## 2022-01-13 PROCEDURE — 93280 PM DEVICE PROGR EVAL DUAL: CPT | Performed by: INTERNAL MEDICINE

## 2022-01-13 NOTE — PROGRESS NOTES
Results for orders placed or performed in visit on 01/13/22   Cardiac EP device report    Narrative    MDDT DUAL PM/ ACTIVE SYSTEM IS MRI CONDITIONAL  DEVICE INTERROGATED IN THE MINERS OFFICE: BATTERY VOLTAGE ADEQUATE (13 6 YRS)  AP: 88 5%  : 0 8% (MVP-ON)  ALL LEAD PARAMETERS WITHIN NORMAL LIMITS  1 FAST A&V EPISODE (PREVIOUSLY ADDRESSED) 488 AT/AF EPISODES W/ AVAIL EGMS SHOWING AF, PAT, MAX DURATION 31 MINS 18 SECS  AF BURDEN: 0 7%  PT TAKES Farida Leong  EF: 75% (ECHO 8/30/21)  NO PROGRAMMING CHANGES MADE TO DEVICE PARAMETERS  PACEMAKER FUNCTIONING APPROPRIATELY    [address] [place] [address] Street

## 2022-01-24 ENCOUNTER — OFFICE VISIT (OUTPATIENT)
Dept: SLEEP CENTER | Facility: CLINIC | Age: 57
End: 2022-01-24
Payer: COMMERCIAL

## 2022-01-24 VITALS
SYSTOLIC BLOOD PRESSURE: 112 MMHG | HEIGHT: 74 IN | HEART RATE: 73 BPM | BODY MASS INDEX: 35.5 KG/M2 | DIASTOLIC BLOOD PRESSURE: 72 MMHG | WEIGHT: 276.6 LBS

## 2022-01-24 DIAGNOSIS — G47.34 SLEEP RELATED HYPOXIA: ICD-10-CM

## 2022-01-24 DIAGNOSIS — I10 ESSENTIAL HYPERTENSION: ICD-10-CM

## 2022-01-24 DIAGNOSIS — F45.8 BRUXISM: ICD-10-CM

## 2022-01-24 DIAGNOSIS — G25.81 RLS (RESTLESS LEGS SYNDROME): ICD-10-CM

## 2022-01-24 DIAGNOSIS — G47.33 OBSTRUCTIVE SLEEP APNEA: Primary | ICD-10-CM

## 2022-01-24 DIAGNOSIS — E11.42 DIABETIC PERIPHERAL NEUROPATHY (HCC): ICD-10-CM

## 2022-01-24 DIAGNOSIS — G47.9 SLEEP DISTURBANCE: ICD-10-CM

## 2022-01-24 DIAGNOSIS — E66.9 OBESITY (BMI 30-39.9): ICD-10-CM

## 2022-01-24 DIAGNOSIS — Z86.711 HISTORY OF PULMONARY EMBOLISM: ICD-10-CM

## 2022-01-24 DIAGNOSIS — R00.1 SINUS BRADYCARDIA: ICD-10-CM

## 2022-01-24 DIAGNOSIS — N18.31 STAGE 3A CHRONIC KIDNEY DISEASE (HCC): ICD-10-CM

## 2022-01-24 DIAGNOSIS — G47.19 EXCESSIVE DAYTIME SLEEPINESS: ICD-10-CM

## 2022-01-24 DIAGNOSIS — I48.0 PAROXYSMAL ATRIAL FIBRILLATION (HCC): ICD-10-CM

## 2022-01-24 PROCEDURE — 3074F SYST BP LT 130 MM HG: CPT | Performed by: INTERNAL MEDICINE

## 2022-01-24 PROCEDURE — 3008F BODY MASS INDEX DOCD: CPT | Performed by: INTERNAL MEDICINE

## 2022-01-24 PROCEDURE — 99204 OFFICE O/P NEW MOD 45 MIN: CPT | Performed by: INTERNAL MEDICINE

## 2022-01-24 PROCEDURE — 3078F DIAST BP <80 MM HG: CPT | Performed by: INTERNAL MEDICINE

## 2022-01-24 PROCEDURE — 1036F TOBACCO NON-USER: CPT | Performed by: INTERNAL MEDICINE

## 2022-01-24 RX ORDER — ZOLPIDEM TARTRATE 5 MG/1
5 TABLET ORAL AS NEEDED
Qty: 1 TABLET | Refills: 0 | Status: SHIPPED | OUTPATIENT
Start: 2022-01-24 | End: 2022-01-25

## 2022-01-24 NOTE — PROGRESS NOTES
Review of Systems      Genitourinary need to urinate more than twice a night   Cardiology none   Gastrointestinal none   Neurology muscle weakness and numbness/tingling of an extremity   Constitutional fatigue and weight change   Integumentary none   Psychiatry none   Musculoskeletal none   Pulmonary shortness of breath with activity and snoring   ENT ringing in ears   Endocrine none   Hematological none

## 2022-01-24 NOTE — PATIENT INSTRUCTIONS
What is CORIN? Obstructive sleep apnea is a common and serious sleep disorder that causes you to stop breathing during sleep  The airway repeatedly becomes blocked, limiting the amount of air that reaches your lungs  When this happens, you may snore loudly or making choking noises as you try to breathe  Your brain and body becomes oxygen deprived and you may wake up  This may happen a few times a night, or in more severe cases, several hundred times a night  Sleep apnea can make you wake up in the morning feeling tired or unrefreshed even though you have had a full night of sleep  During the day, you may feel fatigued, have difficulty concentrating or you may even unintentionally fall asleep  This is because your body is waking up numerous times throughout the night, even though you might not be conscious of each awakening  The lack of oxygen your body receives can have negative long-term consequences for your health  This includes:  High blood pressure  Heart disease  Irregular heart rhythms  Stroke  Pre-diabetes and diabetes  Depression    Testing  An objective evaluation of your sleep may be needed before your board certified sleep physician can make a diagnosis  Options include:   In-lab overnight sleep study  This type of sleep study requires you to stay overnight at a sleep center, in a bed that may resemble a hotel room  You will sleep with sensors hooked up to various parts of your body  These sensors record your brain waves, heartbeat, breathing and movement  An overnight sleep study also provides your doctor with the most complete information about your sleep  Learn more about an overnight sleep study      Home sleep apnea test  Some patients with high risk factors for obstructive sleep apnea and no other medical disorders may be candidates for a home sleep apnea test  The testing equipment differs in that it is less complicated than what is used in an overnight sleep study   As such, does not give all the data an in-lab will and if negative, may not mean you do not have the problem  Treatment for sleep apnea  includes using a continuous positive airway pressure (CPAP) machine to keep your airway open during sleep  A mask is placed over your nose and mouth, or just your nose  The mask is hooked to the CPAP machine that blows a gentle stream of air into the mask when you breathe  This helps keep your airway open so you can breathe more regularly  Extra oxygen may be given to you through the machine  You may be given a mouth device  It looks like a mouth guard or dental retainer and stops your tongue and mouth tissues from blocking your throat while you sleep  Surgery may be needed to remove extra tissues that block your mouth, throat, or nose  Manage sleep apnea:   Do not smoke  Nicotine and other chemicals in cigarettes and cigars can cause lung damage  Ask your healthcare provider for information if you currently smoke and need help to quit  E-cigarettes or smokeless tobacco still contain nicotine  Talk to your healthcare provider before you use these products  Do not drink alcohol or take sedative medicine before you go to sleep  Alcohol and sedatives can relax the muscles and tissues around your throat  This can block the airflow to your lungs  Maintain a healthy weight  Excess tissue around your throat may restrict your breathing  Ask your healthcare provider for information if you need to lose weight  Sleep on your side or use pillows designed to prevent sleep apnea  This prevents your tongue or other tissues from blocking your throat  You can also raise the head of your bed  Driving Safety  Refrain from driving when drowsy  Follow up with your healthcare provider as directed:  Write down your questions so you remember to ask them during your visits  Go to AASM website for more information: Sleepeducation  org     What is CORIN?    Obstructive sleep apnea is a common and serious sleep disorder that causes you to stop breathing during sleep  The airway repeatedly becomes blocked, limiting the amount of air that reaches your lungs  When this happens, you may snore loudly or making choking noises as you try to breathe  Your brain and body becomes oxygen deprived and you may wake up  This may happen a few times a night, or in more severe cases, several hundred times a night  Sleep apnea can make you wake up in the morning feeling tired or unrefreshed even though you have had a full night of sleep  During the day, you may feel fatigued, have difficulty concentrating or you may even unintentionally fall asleep  This is because your body is waking up numerous times throughout the night, even though you might not be conscious of each awakening  The lack of oxygen your body receives can have negative long-term consequences for your health  This includes:  High blood pressure  Heart disease  Irregular heart rhythms  Stroke  Pre-diabetes and diabetes  Depression    Testing  An objective evaluation of your sleep may be needed before your board certified sleep physician can make a diagnosis  Options include:   In-lab overnight sleep study  This type of sleep study requires you to stay overnight at a sleep center, in a bed that may resemble a hotel room  You will sleep with sensors hooked up to various parts of your body  These sensors record your brain waves, heartbeat, breathing and movement  An overnight sleep study also provides your doctor with the most complete information about your sleep  Learn more about an overnight sleep study      Home sleep apnea test  Some patients with high risk factors for obstructive sleep apnea and no other medical disorders may be candidates for a home sleep apnea test  The testing equipment differs in that it is less complicated than what is used in an overnight sleep study   As such, does not give all the data an in-lab will and if negative, may not mean you do not have the problem  Treatment for sleep apnea  includes using a continuous positive airway pressure (CPAP) machine to keep your airway open during sleep  A mask is placed over your nose and mouth, or just your nose  The mask is hooked to the CPAP machine that blows a gentle stream of air into the mask when you breathe  This helps keep your airway open so you can breathe more regularly  Extra oxygen may be given to you through the machine  You may be given a mouth device  It looks like a mouth guard or dental retainer and stops your tongue and mouth tissues from blocking your throat while you sleep  Surgery may be needed to remove extra tissues that block your mouth, throat, or nose  Manage sleep apnea:   Do not smoke  Nicotine and other chemicals in cigarettes and cigars can cause lung damage  Ask your healthcare provider for information if you currently smoke and need help to quit  E-cigarettes or smokeless tobacco still contain nicotine  Talk to your healthcare provider before you use these products  Do not drink alcohol or take sedative medicine before you go to sleep  Alcohol and sedatives can relax the muscles and tissues around your throat  This can block the airflow to your lungs  Maintain a healthy weight  Excess tissue around your throat may restrict your breathing  Ask your healthcare provider for information if you need to lose weight  Sleep on your side or use pillows designed to prevent sleep apnea  This prevents your tongue or other tissues from blocking your throat  You can also raise the head of your bed  Driving Safety  Refrain from driving when drowsy  Follow up with your healthcare provider as directed:  Write down your questions so you remember to ask them during your visits  Go to AAS website for more information: Sleepeducation  org     Nursing Support:  When: Monday through Friday 7A-5PM except holidays  Where: Our direct line is [phone]      If you are having a true emergency please call 911  In the event that the line is busy or it is after hours please leave a voice message and we will return your call  Please speak clearly, leaving your full name, birth date, best number to reach you and the reason for your call  Medication refills: We will need the name of the medication, the dosage, the ordering provider, whether you get a 30 or 90 day refill, and the pharmacy name and address  Medications will be ordered by the provider only  Nurses cannot call in prescriptions  Please allow 7 days for medication refills  Physician requested updates: If your provider requested that you call with an update after starting medication, please be ready to provide us the medication and dosage, what time you take your medication, the time you attempt to fall asleep, time you fall asleep, when you wake up, and what time you get out of bed  Sleep Study Results: We will contact you with sleep study results and/or next steps after the physician has reviewed your testing

## 2022-01-24 NOTE — PROGRESS NOTES
Consultation - Shayy Hernandes 89  64 y o  male  :1965  DVV:[id number]  DOS:2022    Physician Requesting Consult: Marquis Becker DO             Reason for Consult : At your kind request I saw Avani Kwong for initial sleep evaluation today  Patient recently had a diagnostic sleep study and is here to review results / treatment options  The study demonstrated severe obstructive sleep apnea: AHI 43 5 /hour considerably higher while supine  Moderate to loud intensity  snoring  was noted  Minimum oxygen saturation was 69 %  and 156 minutes of total sleep time was spent with saturations less than 90%  PFSH, Problem List, Medications & Allergies were reviewed in EMR  Andriette Presume  has a past medical history of Arthritis, Chronic cholecystitis, Diabetes mellitus ([facility] [address] ), [facility] Gout, History of pulmonary embolism, Hyperlipidemia, Hypertension, Hypothyroidism, Lumbar back pain, MTHFR mutation, Neuropathy, Scab, Sleep apnea, Tarsal tunnel syndrome, right, Tinnitus, Wears glasses, and Wears glasses  He has a current medication list which includes the following prescription(s): allopurinol, atorvastatin, carvedilol, ezetimibe, furosemide, gabapentin, gabapentin, levothyroxine, levothyroxine, lisinopril, metformin, mens multivitamin plus, rivaroxaban, sildenafil, spironolactone, and zolpidem  HPI:  Study was undertaken for bed partner is report of loud snoring and witnessed apneas  He was diagnosed with obstructive sleep apnea around 15 years ago and prescribed CPAP  He discontinued use because of intolerance  He is not aware of snoring or breathing difficulties during sleep  Other Complaints:  Frequent interruptions of sleep and sleep is non restorative  Restless Leg Syndrome: has typical symptoms occurring infrequently but can disturb sleep 1 to 2 times a month    He has diabetic peripheral neuropathy for which she is on gabapentin ;  Parasomnia: no features reported    Sleep Routine (on average):   Typical Bedtime:  10:00 p m  Gets OOB:  5:00 a m  TIB:7 hrs  Sleep latency:< 15 minutes Sleep Interruptions:2-/nite and is able to fall back asleep  Awakens: with aid of an alarm  Upon awakening: never feels rested  Williams Has reports Excessive Daytime Sleepiness feels like napping & is dozing off unintentionally when sedentary at work and in the evenings at home  He rated himself at Total score: 13 /24 on the [place] Sleepiness Scale  Habits:  reports that he has never smoked  He has never used smokeless tobacco  ;   E-Cigarette/Vaping    E-Cigarette Use Never User     ;  reports no history of drug use ;  reports current alcohol use  ; Caffeine use:limited ; Exercise routine: none   Family History: Negative for sleep disturbance  ROS - reviewed and as attached  Significant for weight gain of around 20 lb in the past year  He has a pacemaker since September of last year when he presented with syncope and was also noted to have atrial fibrillation  He reported no nasal symptoms  He has some dyspnea on effort  Monia Le EXAM:  /72   Pulse 73   Ht 6' 1 83" (1 875 m)   Wt 125 kg (276 lb 9 6 oz)   BMI 35 68 kg/m²    General: Well groomed male, well appearing, in no apparent distress  Neurological: Alert and orientated;  cooperative; Cranial nerves intact;    Psychiatric: Speech:clear and coherent;  Normal mood, affect & thought   Skin: warm and dry; Color& Hydration good; no facial rashes or lesions   HEENT:  Craniofacial anatomy: normal Sinuses: non- tender  TMJ: Normal    Eyes: EOM's intact;  conjunctiva/corneas clear   Ears: Externallyappear normal     Nasal Airway: is patent Septum:intact; Mucosa: normal; Turbinates: normal; Rhinorrhea: None   Mouth: Lips: normal posture; Dentition: worn down   Mucosa:moist  ; Hard Palate:normal    Oropharryx: crowdedTongue: Mallampati:Class IV and MobileSoft Palate: s/p UP3 Tonsils: absent  Neck:is thick and excess fatty tissue; Neck Circumference: 20 "; Supple; no abnormal masses; Thyroid:normal  Trachea:central     Lymph: No Cervical or Submandibular Lymhadenopathy  Heart: S1,S2 normal; RRR; no gallop; no murmurs   Lungs: Respiratory Effort:normal  Air entry good bilaterally  No wheezes  No rales  Abdomen: Obese, Soft & non-tender    Extremities: No pedal edema  No clubbing or cyanosis  Musculoskeletal:  Motor normal; Gait:normal        IMPRESSION: Primary/Secondary Sleep Diagnoses (to Medical or Psych conditions) & Comorbidities   1  Obstructive sleep apnea  CPAP Study   2  Sleep related hypoxia  CPAP Study   3  RLS (restless legs syndrome)     4  Excessive daytime sleepiness     5  Bruxism     6  Paroxysmal atrial fibrillation (HCC)     7  Sinus bradycardia     8  Essential hypertension     9  History of pulmonary embolism     10  Diabetic peripheral neuropathy ([facility] [address] )     11  Stage 3a chronic kidney disease ([facility] [address] )     12  Obesity (BMI 30-39 9)     13  Sleep disturbance  zolpidem (AMBIEN) 5 mg tablet        PLAN:   1  I reviewed results of the Sleep study with the patient  2  With respect to above conditions, I counseled on pathophysiology, diagnosis, treatment options, risks and benefits; inter-relationship and effects on symptoms and comorbidities; risks of no treatment; costs and insurance aspects  3  Patient elected positive airway pressure therapy and is to be scheduled for a titration study  I gave him a prescription for Ambien to be used on the study night  4  I also advised on weight reduction  5  Follow-up to be scheduled after the study to review results and to initiate therapy  Sincerely,     Authenticated electronically by Donaldo Grant MD   on 57/85/27   Board Certified Specialist     Portions of the record may have been created with voice recognition software   Occasional wrong word or "sound a like" substitutions may have occurred due to the inherent limitations of voice recognition software  There may also be notations and random deletions of words or characters from malfunctioning software  Read the chart carefully and recognize, using context, where substitutions/deletions have occurred

## 2022-01-25 ENCOUNTER — HOSPITAL ENCOUNTER (OUTPATIENT)
Dept: SLEEP CENTER | Facility: CLINIC | Age: 57
Discharge: HOME | End: 2022-01-25
Payer: COMMERCIAL

## 2022-01-25 DIAGNOSIS — G47.33 OBSTRUCTIVE SLEEP APNEA: ICD-10-CM

## 2022-01-25 DIAGNOSIS — G47.34 SLEEP RELATED HYPOXIA: ICD-10-CM

## 2022-01-25 PROCEDURE — 95811 POLYSOM 6/>YRS CPAP 4/> PARM: CPT

## 2022-01-26 ENCOUNTER — TELEPHONE (OUTPATIENT)
Dept: SLEEP CENTER | Facility: CLINIC | Age: 57
End: 2022-01-26

## 2022-01-26 NOTE — TELEPHONE ENCOUNTER
Called patient and advised sleep study resulted and APAP ordered  Rescheduled DME set up to 2/10/22 in [place]  Appointment information emailed to [facility]  Rescheduled compliance follow up to 5/5/22

## 2022-01-26 NOTE — PROGRESS NOTES
Sleep Study Documentation    Pre-Sleep Study       Sleep testing procedure explained to patient:YES    Patient napped prior to study:NO    Caffeine:Dayshift worker after 12PM   Caffeine use:YES- coffee  6 ounces and soda  12 ounces    Alcohol:Dayshift workers after 5PM: Alcohol use:NO    Typical day for patient:YES       Study Documentation    Sleep Study Indications: G47 [id number]    Sleep Study: Treatment   Optimal PAP pressure: 9   Leak:Small  Snore:Eliminated  REM Obtained:yes  Supplemental O2: no    Minimum SaO2 81  Baseline SaO2 94  PAP mask tried (list all) Resmed Airfit N20  PAP mask choice (final) Resmed Airfit N20  PAP mask type:nasal  PAP pressure at which snoring was eliminated 7  Minimum SaO2 at final PAP pressure 90  Mode of Therapy:CPAP  ETCO2:No  CPAP changed to BiPAP:No    Mode of Therapy:    EKG abnormalities: no     EEG abnormalities: no    Sleep Study Recorded < 2 hours: N/A    Sleep Study Recorded > 2 hours but incomplete study: N/A    Sleep Study Recorded 6 hours but no sleep obtained: NO    Patient classification: employed       Post-Sleep Study    Medication used at bedtime or during sleep study:YES prescription sleep aid    Patient reports time it took to fall asleep:less than 20 minutes    Patient reports waking up during study:Denied    Patient reports sleeping 6 to 8 hours without dreaming  Patient reports sleep during study:better than usual    Patient rated sleepiness: Not sleepy or tired    PAP treatment:yes: Post PAP treatment patient reports feeling better and  would wear PAP mask at home

## 2022-01-27 DIAGNOSIS — E78.2 MIXED HYPERLIPIDEMIA: ICD-10-CM

## 2022-01-27 DIAGNOSIS — R80.9 CONTROLLED TYPE 2 DIABETES MELLITUS WITH MICROALBUMINURIA, WITHOUT LONG-TERM CURRENT USE OF INSULIN (HCC): ICD-10-CM

## 2022-01-27 DIAGNOSIS — E11.29 CONTROLLED TYPE 2 DIABETES MELLITUS WITH MICROALBUMINURIA, WITHOUT LONG-TERM CURRENT USE OF INSULIN (HCC): ICD-10-CM

## 2022-01-27 DIAGNOSIS — E03.9 ACQUIRED HYPOTHYROIDISM: ICD-10-CM

## 2022-01-27 DIAGNOSIS — E26.9 HYPERALDOSTERONISM (HCC): ICD-10-CM

## 2022-01-27 PROCEDURE — 3066F NEPHROPATHY DOC TX: CPT | Performed by: INTERNAL MEDICINE

## 2022-01-27 RX ORDER — METFORMIN HYDROCHLORIDE 500 MG/1
500 TABLET, EXTENDED RELEASE ORAL
Qty: 90 TABLET | Refills: 1 | Status: SHIPPED | OUTPATIENT
Start: 2022-01-27 | End: 2022-04-14 | Stop reason: SDUPTHER

## 2022-01-27 RX ORDER — LEVOTHYROXINE SODIUM 0.03 MG/1
25 TABLET ORAL DAILY
Qty: 90 TABLET | Refills: 1 | Status: SHIPPED | OUTPATIENT
Start: 2022-01-27 | End: 2022-04-29 | Stop reason: SDUPTHER

## 2022-01-27 RX ORDER — EZETIMIBE 10 MG/1
10 TABLET ORAL DAILY
Qty: 90 TABLET | Refills: 1 | Status: SHIPPED | OUTPATIENT
Start: 2022-01-27 | End: 2022-04-29 | Stop reason: SDUPTHER

## 2022-01-27 RX ORDER — SPIRONOLACTONE 25 MG/1
25 TABLET ORAL DAILY
Qty: 90 TABLET | Refills: 0 | Status: SHIPPED | OUTPATIENT
Start: 2022-01-27 | End: 2022-08-04

## 2022-02-04 DIAGNOSIS — I48.0 PAROXYSMAL ATRIAL FIBRILLATION (HCC): ICD-10-CM

## 2022-02-04 RX ORDER — CARVEDILOL 6.25 MG/1
6.25 TABLET ORAL 2 TIMES DAILY WITH MEALS
Qty: 90 TABLET | Refills: 0 | Status: SHIPPED | OUTPATIENT
Start: 2022-02-04 | End: 2022-02-07

## 2022-02-06 DIAGNOSIS — I48.0 PAROXYSMAL ATRIAL FIBRILLATION (HCC): ICD-10-CM

## 2022-02-07 RX ORDER — CARVEDILOL 6.25 MG/1
TABLET ORAL
Qty: 90 TABLET | Refills: 0 | Status: SHIPPED | OUTPATIENT
Start: 2022-02-07 | End: 2022-03-24 | Stop reason: SDUPTHER

## 2022-02-14 DIAGNOSIS — E11.42 DIABETIC PERIPHERAL NEUROPATHY (HCC): ICD-10-CM

## 2022-02-14 DIAGNOSIS — E03.9 ACQUIRED HYPOTHYROIDISM: ICD-10-CM

## 2022-02-14 RX ORDER — GABAPENTIN 600 MG/1
1200 TABLET ORAL 3 TIMES DAILY
Qty: 180 TABLET | Refills: 0 | Status: SHIPPED | OUTPATIENT
Start: 2022-02-14 | End: 2022-03-15 | Stop reason: SDUPTHER

## 2022-02-14 RX ORDER — LEVOTHYROXINE SODIUM 0.15 MG/1
TABLET ORAL
Qty: 90 TABLET | Refills: 0 | Status: SHIPPED | OUTPATIENT
Start: 2022-02-14 | End: 2022-05-06 | Stop reason: SDUPTHER

## 2022-02-21 NOTE — TELEPHONE ENCOUNTER
Left voice message for patient  Advised to call office to review sleep study results  Sleep study resulted and shows severe CORIN (AHI 43 5) and hypoxia  Needs to schedule consult 
Returned patient's voice message  Left call back message 
Reviewed sleep study results with patient  Scheduled consult with Dr Melinda Lala in [place] 1/24/22 
Regular rate and rhythm, Heart sounds S1 S2 present, no murmurs, rubs or gallops

## 2022-03-15 DIAGNOSIS — E11.42 DIABETIC PERIPHERAL NEUROPATHY (HCC): ICD-10-CM

## 2022-03-15 RX ORDER — GABAPENTIN 600 MG/1
1200 TABLET ORAL 3 TIMES DAILY
Qty: 180 TABLET | Refills: 0 | Status: SHIPPED | OUTPATIENT
Start: 2022-03-15 | End: 2022-04-14 | Stop reason: SDUPTHER

## 2022-03-24 DIAGNOSIS — I48.0 PAROXYSMAL ATRIAL FIBRILLATION (HCC): ICD-10-CM

## 2022-03-24 DIAGNOSIS — E78.2 MIXED HYPERLIPIDEMIA: ICD-10-CM

## 2022-03-25 RX ORDER — ATORVASTATIN CALCIUM 40 MG/1
40 TABLET, FILM COATED ORAL DAILY
Qty: 90 TABLET | Refills: 0 | Status: SHIPPED | OUTPATIENT
Start: 2022-03-25 | End: 2022-06-24 | Stop reason: SDUPTHER

## 2022-03-25 RX ORDER — CARVEDILOL 6.25 MG/1
TABLET ORAL
Qty: 90 TABLET | Refills: 0 | Status: SHIPPED | OUTPATIENT
Start: 2022-03-25 | End: 2022-05-06 | Stop reason: SDUPTHER

## 2022-03-31 ENCOUNTER — OFFICE VISIT (OUTPATIENT)
Dept: CARDIOLOGY CLINIC | Facility: HOSPITAL | Age: 57
End: 2022-03-31
Payer: COMMERCIAL

## 2022-03-31 VITALS
WEIGHT: 266 LBS | HEIGHT: 73 IN | HEART RATE: 78 BPM | BODY MASS INDEX: 35.25 KG/M2 | DIASTOLIC BLOOD PRESSURE: 80 MMHG | SYSTOLIC BLOOD PRESSURE: 114 MMHG

## 2022-03-31 DIAGNOSIS — R55 SYNCOPE, UNSPECIFIED SYNCOPE TYPE: ICD-10-CM

## 2022-03-31 DIAGNOSIS — Z95.0 PACEMAKER: ICD-10-CM

## 2022-03-31 DIAGNOSIS — I10 PRIMARY HYPERTENSION: ICD-10-CM

## 2022-03-31 DIAGNOSIS — I48.0 PAROXYSMAL ATRIAL FIBRILLATION (HCC): Primary | ICD-10-CM

## 2022-03-31 DIAGNOSIS — R00.1 SINUS BRADYCARDIA: ICD-10-CM

## 2022-03-31 PROCEDURE — 99214 OFFICE O/P EST MOD 30 MIN: CPT | Performed by: INTERNAL MEDICINE

## 2022-04-01 PROBLEM — I10 PRIMARY HYPERTENSION: Status: ACTIVE | Noted: 2022-04-01

## 2022-04-01 PROBLEM — Z95.0 PACEMAKER: Status: ACTIVE | Noted: 2022-04-01

## 2022-04-01 PROCEDURE — 93000 ELECTROCARDIOGRAM COMPLETE: CPT | Performed by: INTERNAL MEDICINE

## 2022-04-01 NOTE — PROGRESS NOTES
Cardiology Follow Up    Angélica Fraga  1965  [id number]  [facility] [place]  [address] [facility] [address] [facility] [address] [phone]    1  Paroxysmal atrial fibrillation (HCC)     2  Sinus bradycardia  POCT ECG   3  Syncope, unspecified syncope type  POCT ECG   4  Pacemaker  POCT ECG   5  Primary hypertension           Discussion/Summary:    All of his assessed cardiac problems are stable  I have reviewed his medications and made no changes  No cardiac testing is ordered  RTO 6 months - he is hoping to be able to cut back on his BP meds  Interval History: This is the first time I have met him  He has PAF, atrial flutter, severe bradycardia and syncopal episodes in 8/2021  A DDDR pacemaker was placed  His symptoms have resolved  He remains in SR  He is on [facility]  ECHO 8/2021 - EF 75%    Pacer check 1/2022 - AF burden 0 7%    He is active and denies CP, SOB, dizziness  He has been on CPAP for a few months and is very compliant      Patient Active Problem List   Diagnosis    Controlled type 2 diabetes mellitus with microalbuminuria, without long-term current use of insulin ([address] )    Diabetic peripheral neuropathy ([address] )    Erectile dysfunction due to diseases classified elsewhere    Homozygous for MTHFR gene mutation    Hypothyroidism (acquired)    Sacroiliitis ([address] )    Tarsal tunnel syndrome of both lower extremities    Tarsal tunnel syndrome, left    Venous stasis ulcer of left ankle limited to breakdown of skin with varicose veins (HCC)    Gout    Hyperaldosteronism ([address] )    Diabetic nephropathy associated with type 2 diabetes mellitus (HCC)    Stage 3a chronic kidney disease (HCC)    Proteinuria    Vitamin D deficiency    Syncopal episodes     Paroxysmal atrial fibrillation (HCC)    History of DVT (deep vein thrombosis)    Sinus bradycardia    Obstructive sleep apnea    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[facility] )    Pacemaker    Primary hypertension     Past Medical History:   Diagnosis Date    Arthritis     right foot    Chronic cholecystitis     Diabetes mellitus ([facility] )     D[facility] thrombosis) [facility]     Gout     History of pulmonary embolism     Hyperlipidemia     Hypertension     Hypothyroidism     Lumbar back pain     MTHFR mutation     Neuropathy     Scab     right arm- no s/s infection    Sleep apnea     no CPAP    Tarsal tunnel syndrome, right     Tinnitus     left ear    Wears glasses     and contacts    Wears glasses      Social History     Socioeconomic History    Marital status: Single     Spouse name: Not on file    Number of children: Not on file    Years of education: Not on file    Highest education level: Not on file   Occupational History    Not on file   Tobacco Use    Smoking status: Never Smoker    Smokeless tobacco: Never Used   Vaping Use    Vaping Use: Never used   Substance and Sexual Activity    Alcohol use: Yes     Comment: weekends    Drug use: No    Sexual activity: Yes   Other Topics Concern    Not on file   Social History Narrative    Not on file     Social Determinants of Health     Financial Resource Strain: Not on file   Food Insecurity: Not on file   Transportation Needs: Not on file   Physical Activity: Not on file   Stress: Not on file   Social Connections: Not on file   Intimate Partner Violence: Not on file   Housing Stability: Not on file      Family History   Problem Relation Age of Onset    Aneurysm Mother         intracranial aneurysm repair    Coronary artery disease Father     Hypertension Father     Aneurysm Brother      Past Surgical History:   Procedure Laterality Date    APPENDECTOMY      ARTHRODESIS      Lumbar L__    BACK SURGERY      BUNIONECTOMY Right 10/07/2016    Procedure: REMOVAL TIBIAL  SESAMOID BONE  RIGHT FOOT;  Surgeon: Aidee Terrazas DPM;  Location: [facility] OR;  Service:     CARDIAC PACEMAKER PLACEMENT  09/01/2021    CHOLECYSTECTOMY  03/26/2015    COLONOSCOPY      KNEE ARTHROSCOPY      KNEE ARTHROSCOPY W/ MENISCAL REPAIR      Lateral    NASAL SEPTUM SURGERY  10/16/2013    ND COLONOSCOPY FLX DX W/COLLJ SPEC WHEN PFRMD N/A 11/23/2018    Procedure: COLONOSCOPY;  Surgeon: Anay Gonzalez MD;  Location: [facility] OR;  Service: Gastroenterology    ND TARSAL TUNNEL RELEASE Right 06/25/2018    Procedure: RELEASE TARSAL TUNNEL;  Surgeon: Mitzi Chung DPM;  Location: [facility] OR;  Service: Podiatry    ND TARSAL TUNNEL RELEASE Left 03/13/2020    Procedure: RELEASE TARSAL TUNNEL;  Surgeon: Mitzi Chung DPM;  Location: [facility] MAIN OR;  Service: [place]  10/16/2013    with Adenoidectomy    UVULECTOMY  10/16/2013    UVULOPALATOPHARYNGOPLASTY      WISDOM TOOTH EXTRACTION         Current Outpatient Medications:     allopurinol (ZYLOPRIM) 300 mg tablet, Take 1 tablet (300 mg total) by mouth daily, Disp: 90 tablet, Rfl: 0    atorvastatin (LIPITOR) 40 mg tablet, Take 1 tablet (40 mg total) by mouth daily, Disp: 90 tablet, Rfl: 0    carvedilol (COREG) 6 25 mg tablet, 1 tablet in the morning and 1 in 1/2 tablets in the evening, Disp: 90 tablet, Rfl: 0    ezetimibe (ZETIA) 10 mg tablet, Take 1 tablet (10 mg total) by mouth daily, Disp: 90 tablet, Rfl: 1    furosemide (LASIX) 40 mg tablet, Take 1 tablet (40 mg total) by mouth daily, Disp: 90 tablet, Rfl: 1    gabapentin (NEURONTIN) 600 MG tablet, Take 2 tablets (1,200 mg total) by mouth 3 (three) times a day, Disp: 180 tablet, Rfl: 0    levothyroxine (Euthyrox) 25 mcg tablet, Take 1 tablet (25 mcg total) by mouth daily with Levothyroxine 150 mcg to equal 175 mcg total, Disp: 90 tablet, Rfl: 1    levothyroxine 150 mcg tablet, Take 1 tablet of 150 mcg strength with 1 tablet of 25 mcg strength to make a total dose of 175 mcg daily, Disp: 90 tablet, Rfl: 0    lisinopril (ZESTRIL) 5 mg tablet, Take 1 tablet (5 mg total) by mouth daily, Disp: 90 tablet, Rfl: 0    metFORMIN (GLUCOPHAGE-XR) 500 mg 24 hr tablet, Take 1 tablet (500 mg total) by mouth daily at bedtime, Disp: 90 tablet, Rfl: 1    Multiple Vitamins-Minerals (MENS MULTIVITAMIN PLUS) TABS, Take 1 tablet by mouth daily, Disp: , Rfl:     rivaroxaban (Xarelto) 20 mg tablet, Take 1 tablet (20 mg total) by mouth daily with breakfast, Disp: 90 tablet, Rfl: 0    sildenafil (Viagra) 100 mg tablet, Take 1 tablet (100 mg total) by mouth as needed for erectile dysfunction, Disp: 30 tablet, Rfl: 0    spironolactone (ALDACTONE) 25 mg tablet, Take 1 tablet (25 mg total) by mouth daily, Disp: 90 tablet, Rfl: 0    gabapentin (NEURONTIN) 600 MG tablet, Take 2 tablets (1,200 mg total) by mouth 3 (three) times a day (Patient not taking: Reported on 3/31/2022 ), Disp: 180 tablet, Rfl: 0    zolpidem (AMBIEN) 5 mg tablet, Take 1 tablet (5 mg total) by mouth as needed for sleep (for use during sleep study) for up to 1 day (Patient not taking: Reported on 3/31/2022 ), Disp: 1 tablet, Rfl: 0  No Known Allergies  Vitals:    03/31/22 1501   BP: 114/80   Pulse: 78   Weight: 121 kg (266 lb)   Height: 6' 1" (1 854 m)     Weight (last 2 days)     Date/Time Weight    03/31/22 1501 121 (266)         Blood pressure 114/80, pulse 78, height 6' 1" (1 854 m), weight 121 kg (266 lb)  , Body mass index is 35 09 kg/m²  Labs:  Lab on 12/06/2021   Component Date Value    Sodium 12/06/2021 143     Potassium 12/06/2021 4 9     Chloride 12/06/2021 113*    CO2 12/06/2021 27     ANION GAP 12/06/2021 3*    BUN 12/06/2021 27*    Creatinine 12/06/2021 1 50*    Glucose 12/06/2021 89     Calcium 12/06/2021 10 5*    eGFR 12/06/2021 51     TSH 3RD GENERATON 12/06/2021 1 740     Hemoglobin A1C 12/06/2021 6 2*    EAG 12/06/2021 131     Uric Acid 12/06/2021 5 0      Imaging: No results found      Review of Systems:  Review of Systems   Constitutional: Negative for diaphoresis, fatigue, fever and unexpected weight change  HENT: Negative  Respiratory: Negative for cough, shortness of breath and wheezing  Cardiovascular: Negative for chest pain, palpitations and leg swelling  Gastrointestinal: Negative for abdominal pain, diarrhea and nausea  Musculoskeletal: Negative for gait problem and myalgias  Skin: Negative for rash  Neurological: Negative for dizziness and numbness  Psychiatric/Behavioral: Negative  Physical Exam:  Physical Exam  Constitutional:       Appearance: He is well-developed  HENT:      Head: Normocephalic and atraumatic  Eyes:      Pupils: Pupils are equal, round, and reactive to light  Neck:      Vascular: No JVD  Cardiovascular:      Rate and Rhythm: Regular rhythm  Pulses: Normal pulses  Carotid pulses are 2+ on the right side and 2+ on the left side  Heart sounds: S1 normal and S2 normal    Pulmonary:      Effort: Pulmonary effort is normal       Breath sounds: Normal breath sounds  No wheezing or rales  Abdominal:      General: Bowel sounds are normal       Palpations: Abdomen is soft  Tenderness: There is no abdominal tenderness  Musculoskeletal:         General: No tenderness  Normal range of motion  Cervical back: Normal range of motion and neck supple  Skin:     General: Skin is warm  Neurological:      Mental Status: He is alert and oriented to person, place, and time  Cranial Nerves: No cranial nerve deficit  Deep Tendon Reflexes: Reflexes are normal and symmetric

## 2022-04-14 DIAGNOSIS — E79.0 HYPERURICEMIA: ICD-10-CM

## 2022-04-14 DIAGNOSIS — E11.9 TYPE 2 DIABETES MELLITUS WITHOUT COMPLICATION, WITHOUT LONG-TERM CURRENT USE OF INSULIN (HCC): ICD-10-CM

## 2022-04-14 DIAGNOSIS — E11.29 CONTROLLED TYPE 2 DIABETES MELLITUS WITH MICROALBUMINURIA, WITHOUT LONG-TERM CURRENT USE OF INSULIN (HCC): ICD-10-CM

## 2022-04-14 DIAGNOSIS — O22.30 DVT (DEEP VEIN THROMBOSIS) IN PREGNANCY: ICD-10-CM

## 2022-04-14 DIAGNOSIS — I10 ESSENTIAL HYPERTENSION: ICD-10-CM

## 2022-04-14 DIAGNOSIS — R80.9 CONTROLLED TYPE 2 DIABETES MELLITUS WITH MICROALBUMINURIA, WITHOUT LONG-TERM CURRENT USE OF INSULIN (HCC): ICD-10-CM

## 2022-04-14 DIAGNOSIS — E11.42 DIABETIC PERIPHERAL NEUROPATHY (HCC): ICD-10-CM

## 2022-04-14 PROCEDURE — 4010F ACE/ARB THERAPY RXD/TAKEN: CPT | Performed by: INTERNAL MEDICINE

## 2022-04-14 PROCEDURE — 3066F NEPHROPATHY DOC TX: CPT | Performed by: INTERNAL MEDICINE

## 2022-04-14 RX ORDER — ALLOPURINOL 300 MG/1
300 TABLET ORAL DAILY
Qty: 90 TABLET | Refills: 0 | Status: SHIPPED | OUTPATIENT
Start: 2022-04-14 | End: 2022-07-18

## 2022-04-14 RX ORDER — GABAPENTIN 600 MG/1
1200 TABLET ORAL 3 TIMES DAILY
Qty: 180 TABLET | Refills: 0 | Status: SHIPPED | OUTPATIENT
Start: 2022-04-14

## 2022-04-14 RX ORDER — LISINOPRIL 5 MG/1
5 TABLET ORAL DAILY
Qty: 90 TABLET | Refills: 0 | Status: SHIPPED | OUTPATIENT
Start: 2022-04-14 | End: 2022-07-18

## 2022-04-14 RX ORDER — METFORMIN HYDROCHLORIDE 500 MG/1
500 TABLET, EXTENDED RELEASE ORAL
Qty: 90 TABLET | Refills: 0 | Status: SHIPPED | OUTPATIENT
Start: 2022-04-14 | End: 2022-07-18

## 2022-04-20 ENCOUNTER — REMOTE DEVICE CLINIC VISIT (OUTPATIENT)
Dept: CARDIOLOGY CLINIC | Facility: CLINIC | Age: 57
End: 2022-04-20
Payer: COMMERCIAL

## 2022-04-20 DIAGNOSIS — Z95.0 PRESENCE OF PERMANENT CARDIAC PACEMAKER: Primary | ICD-10-CM

## 2022-04-20 PROCEDURE — 93294 REM INTERROG EVL PM/LDLS PM: CPT | Performed by: INTERNAL MEDICINE

## 2022-04-20 PROCEDURE — 93296 REM INTERROG EVL PM/IDS: CPT | Performed by: INTERNAL MEDICINE

## 2022-04-20 NOTE — PROGRESS NOTES
Results for orders placed or performed in visit on 04/20/22   Cardiac EP device report    Narrative    MDDT DUAL PM/ ACTIVE SYSTEM IS MRI CONDITIONAL  CARELINK TRANSMISSION: BATTERY VOLTAGE ADEQUATE  (13 6 YRS) AP 90%  1%  ALL AVAILABLE LEAD PARAMETERS WITHIN NORMAL LIMITS  NO SIGNIFICANT HIGH RATE EPISODES  54 AT/AF EPISODES DETECTED  HISTORY OF PAF  PATIENT IS ON Jillian Goring  NORMAL DEVICE FUNCTION  ---[place]

## 2022-04-29 DIAGNOSIS — E78.2 MIXED HYPERLIPIDEMIA: ICD-10-CM

## 2022-04-29 DIAGNOSIS — E03.9 ACQUIRED HYPOTHYROIDISM: ICD-10-CM

## 2022-04-29 DIAGNOSIS — E26.9 HYPERALDOSTERONISM (HCC): ICD-10-CM

## 2022-04-29 RX ORDER — EZETIMIBE 10 MG/1
10 TABLET ORAL DAILY
Qty: 90 TABLET | Refills: 0 | Status: SHIPPED | OUTPATIENT
Start: 2022-04-29

## 2022-04-29 RX ORDER — LEVOTHYROXINE SODIUM 0.03 MG/1
25 TABLET ORAL DAILY
Qty: 90 TABLET | Refills: 0 | Status: SHIPPED | OUTPATIENT
Start: 2022-04-29

## 2022-05-02 RX ORDER — SPIRONOLACTONE 25 MG/1
25 TABLET ORAL DAILY
Qty: 90 TABLET | Refills: 0 | OUTPATIENT
Start: 2022-05-02

## 2022-05-02 NOTE — TELEPHONE ENCOUNTER
Patient has not been seen in a year  Does he plan on continuing to follow with us? Also, prior to filling this I would like him to be agreeable to have labs done as I believe it has been some time  Is he agreeable?

## 2022-05-03 ENCOUNTER — TELEPHONE (OUTPATIENT)
Dept: OTHER | Facility: HOSPITAL | Age: 57
End: 2022-05-03

## 2022-05-03 NOTE — TELEPHONE ENCOUNTER
Yazmin attempted to reach patient a couple of times  His VM is not set up and unable to lvm  I will send him a letter

## 2022-05-03 NOTE — LETTER
MEDICINE PHYSICIAN  [address] [phone]      May 3, 2022      Hello Margueritte Goltz Rabenold    We Have been trying to reach you and have been unable to contact you  Please call the office in regards to your refill request's  Sincerely,    Marisol Melissa Nephrology [facility]

## 2022-05-05 ENCOUNTER — OFFICE VISIT (OUTPATIENT)
Dept: SLEEP CENTER | Facility: CLINIC | Age: 57
End: 2022-05-05
Payer: COMMERCIAL

## 2022-05-05 VITALS
TEMPERATURE: 95.2 F | WEIGHT: 266 LBS | DIASTOLIC BLOOD PRESSURE: 70 MMHG | BODY MASS INDEX: 36.03 KG/M2 | HEART RATE: 65 BPM | OXYGEN SATURATION: 97 % | HEIGHT: 72 IN | SYSTOLIC BLOOD PRESSURE: 122 MMHG

## 2022-05-05 DIAGNOSIS — E66.9 OBESITY (BMI 30-39.9): ICD-10-CM

## 2022-05-05 DIAGNOSIS — I10 ESSENTIAL HYPERTENSION: ICD-10-CM

## 2022-05-05 DIAGNOSIS — I48.0 PAROXYSMAL ATRIAL FIBRILLATION (HCC): ICD-10-CM

## 2022-05-05 DIAGNOSIS — G25.81 RLS (RESTLESS LEGS SYNDROME): ICD-10-CM

## 2022-05-05 DIAGNOSIS — G47.34 SLEEP RELATED HYPOXIA: ICD-10-CM

## 2022-05-05 DIAGNOSIS — G47.19 EXCESSIVE DAYTIME SLEEPINESS: ICD-10-CM

## 2022-05-05 DIAGNOSIS — E11.42 DIABETIC PERIPHERAL NEUROPATHY (HCC): ICD-10-CM

## 2022-05-05 DIAGNOSIS — G47.33 OSA (OBSTRUCTIVE SLEEP APNEA): Primary | ICD-10-CM

## 2022-05-05 DIAGNOSIS — R68.2 DRY MOUTH: ICD-10-CM

## 2022-05-05 DIAGNOSIS — F45.8 BRUXISM: ICD-10-CM

## 2022-05-05 DIAGNOSIS — Z86.711 HISTORY OF PULMONARY EMBOLISM: ICD-10-CM

## 2022-05-05 PROCEDURE — 99214 OFFICE O/P EST MOD 30 MIN: CPT | Performed by: INTERNAL MEDICINE

## 2022-05-05 NOTE — PATIENT INSTRUCTIONS

## 2022-05-05 NOTE — PROGRESS NOTES
Follow-Up Note - Shayy Hernandes [address]  62 y o  male  :1965  NTX:[id number]  DOS:2022    CC: I saw this patient for follow-up in clinic today for Sleep disordered breathing, Coexisting Sleep and Medical Problems  A sleep study to titrate Positive airway pressure (PAP) therapy was undertaken  Patient is here to review results and to initiate therapy  The study demonstrated sleep disordered breathing was successfully remediated with PAP at 9 cm H2O  The preceding diagnostic study demonstrated severe obstructive sleep apnea: AHI 43 5 /hour considerably higher while supine  Moderate to loud intensity  snoring  was noted  Minimum oxygen saturation was 69 %  and 156 minutes of total sleep time was spent with saturations less than 90%  PFSH, Problem List, Medications & Allergies were reviewed in EMR  Interval changes: none reported  He  has a past medical history of Arthritis, Chronic cholecystitis, Diabetes mellitus ([facility] ), DVT (deep venous thrombosis) ([facility] ), Gout, History of pulmonary embolism, Hyperlipidemia, Hypertension, Hypothyroidism, Lumbar back pain, MTHFR mutation, Neuropathy, Scab, Sleep apnea, Tarsal tunnel syndrome, right, Tinnitus, Wears glasses, and Wears glasses  He has a current medication list which includes the following prescription(s): allopurinol, atorvastatin, carvedilol, ezetimibe, furosemide, gabapentin, gabapentin, levothyroxine, levothyroxine, lisinopril, metformin, mens multivitamin plus, rivaroxaban, sildenafil, spironolactone, and zolpidem  PHYSIOLOGICAL DATA REVIEW AND INTERPRETATION:    using PAP > 4 hours/night 92%  Estimated LUISA 0 6/hour with pressure of 9 5cm H2O @90th percentile]; Patient has not been using ozone based devices to sanitize the machine  SUBJECTIVE: Regarding use of PAP, Kathi Martinez reports:   · He is experiencing some adverse effects: dry mouth/throat; has not noticed any fibres or foreign material in air line       · He is benefiting from use: sleeping better   · Restless leg symptoms are not disturbing sleep  It is he has diabetic peripheral neuropathy and symptoms have improved with topical capsaicin to the point that he has been able to reduce his gabapentin  · He is not aware of teeth grinding during sleep  Sleep Routine: Conrado Monge reports getting 7 hrs sleep  ; he has no difficulty initiating or maintaining sleep   He arises spontaneously and feels more refreshed since on Rx  Conrado Monge reports significantly improved  Excessive Daytime Sleepiness,   He rated himself at Total score: 7 /24 on the [place] Sleepiness Scale  Habits: reports that he has never smoked  He has never used smokeless tobacco ,  reports current alcohol use ,  reports no history of drug use , Caffeine use:limited ; Exercise routine: regular gets 10 to a 11869 steps a day  ROS: reviewed & as attached  Significant for around 10 lbs intentional weight loss since his last visit  He reported no nasal, respiratory or cardiac symptoms  He notes blood pressure recordings are better  Has diet related acid reflux  Vicky Aspen EXAM: /70 (BP Location: Left arm, Cuff Size: Large)   Pulse 65   Temp (!) 95 2 °F (35 1 °C) (Temporal)   Ht 6' (1 829 m)   Wt 121 kg (266 lb)   SpO2 97%   BMI 36 08 kg/m²     Patient is well groomed; well appearing  CNS: Alert, orientated, clear & coherent speech  Psych: cooperativeand in no distress  Mental state:appears normal   H&N: EOMI; NC/AT:no facial pressure marks, no rashes  Skin/Extrem: col & hydration normal; no edema  Resp: Respiratory effort is normal  Physical findings otherwise essentially unchanged from previous  IMPRESSION: Problem List Items & Comorbidities Addressed this Visit    1  CORIN (obstructive sleep apnea)  PAP DME Resupply/Reorder   2  Sleep related hypoxia     3  RLS (restless legs syndrome)     4  Dry mouth     5  Bruxism     6  Excessive daytime sleepiness     7   Paroxysmal atrial fibrillation (HCC) 8  Essential hypertension     9  History of pulmonary embolism     10  Obesity (BMI 30-39 9)     11  Diabetic peripheral neuropathy ([facility] )         PLAN:  1  I reviewed results of prior studies and physiologic data with the patient  2  I discussed treatment options with risks and benefits  3  Treatment with  PAP is medically necessary and Lucy Dutton is agreable to continue use  4  Care of equipment, methods to improve comfort using PAP and importance of compliance with therapy were discussed  5  Pressure setting: continue 9-12 cmH2O     6  Rx provided to replace  supplies and Care coordinated with DME provider  7  Discussed strategies for weight reduction  8  Follow-up is advised in 1 year or sooner if needed to monitor progress, compliance and to adjust therapy  Thank you for allowing me to participate in the care of this patient  Sincerely,    Authenticated electronically by Maria Herrera MD on 05/42/68   Board Certified Specialist     Portions of the record may have been created with voice recognition software  Occasional wrong word or "sound a like" substitutions may have occurred due to the inherent limitations of voice recognition software  There may also be notations and random deletions of words or characters from malfunctioning software  Read the chart carefully and recognize, using context, where substitutions/deletions have occurred

## 2022-05-05 NOTE — PROGRESS NOTES
Review of Systems      Genitourinary none   Cardiology ankle/leg swelling   Gastrointestinal frequent heartburn/acid reflux   Neurology none   Constitutional none   Integumentary none   Psychiatry none   Musculoskeletal back pain   Pulmonary none   ENT none   Endocrine none   Hematological none

## 2022-05-06 ENCOUNTER — TELEPHONE (OUTPATIENT)
Dept: SLEEP CENTER | Facility: CLINIC | Age: 57
End: 2022-05-06

## 2022-05-06 DIAGNOSIS — E03.9 ACQUIRED HYPOTHYROIDISM: ICD-10-CM

## 2022-05-06 DIAGNOSIS — I48.0 PAROXYSMAL ATRIAL FIBRILLATION (HCC): ICD-10-CM

## 2022-05-06 PROCEDURE — 3066F NEPHROPATHY DOC TX: CPT | Performed by: FAMILY MEDICINE

## 2022-05-06 RX ORDER — LEVOTHYROXINE SODIUM 0.15 MG/1
TABLET ORAL
Qty: 90 TABLET | Refills: 0 | Status: SHIPPED | OUTPATIENT
Start: 2022-05-06 | End: 2022-05-16 | Stop reason: SDUPTHER

## 2022-05-06 RX ORDER — CARVEDILOL 6.25 MG/1
TABLET ORAL
Qty: 90 TABLET | Refills: 0 | Status: SHIPPED | OUTPATIENT
Start: 2022-05-06 | End: 2022-05-16 | Stop reason: SDUPTHER

## 2022-05-16 DIAGNOSIS — I48.0 PAROXYSMAL ATRIAL FIBRILLATION (HCC): ICD-10-CM

## 2022-05-16 DIAGNOSIS — E11.42 DIABETIC PERIPHERAL NEUROPATHY (HCC): ICD-10-CM

## 2022-05-16 DIAGNOSIS — E03.9 ACQUIRED HYPOTHYROIDISM: ICD-10-CM

## 2022-05-16 RX ORDER — GABAPENTIN 600 MG/1
1200 TABLET ORAL 3 TIMES DAILY
Qty: 180 TABLET | Refills: 0 | OUTPATIENT
Start: 2022-05-16

## 2022-05-16 RX ORDER — LEVOTHYROXINE SODIUM 0.15 MG/1
TABLET ORAL
Qty: 90 TABLET | Refills: 0 | Status: SHIPPED | OUTPATIENT
Start: 2022-05-16

## 2022-05-16 RX ORDER — LEVOTHYROXINE SODIUM 0.15 MG/1
TABLET ORAL
Qty: 90 TABLET | Refills: 0 | OUTPATIENT
Start: 2022-05-16

## 2022-05-16 RX ORDER — CARVEDILOL 6.25 MG/1
TABLET ORAL
Qty: 90 TABLET | Refills: 0 | OUTPATIENT
Start: 2022-05-16

## 2022-05-16 RX ORDER — CARVEDILOL 6.25 MG/1
TABLET ORAL
Qty: 90 TABLET | Refills: 0 | Status: SHIPPED | OUTPATIENT
Start: 2022-05-16 | End: 2022-06-24 | Stop reason: SDUPTHER

## 2022-05-16 NOTE — TELEPHONE ENCOUNTER
Patient dosage of gabapentin is too high we need to discuss that the other medicines can be set up for a refill

## 2022-05-20 ENCOUNTER — PATIENT MESSAGE (OUTPATIENT)
Dept: FAMILY MEDICINE CLINIC | Facility: CLINIC | Age: 57
End: 2022-05-20

## 2022-05-20 DIAGNOSIS — E11.42 DIABETIC PERIPHERAL NEUROPATHY (HCC): ICD-10-CM

## 2022-05-20 RX ORDER — GABAPENTIN 600 MG/1
1200 TABLET ORAL 3 TIMES DAILY
Qty: 180 TABLET | Refills: 0 | Status: SHIPPED | OUTPATIENT
Start: 2022-05-20 | End: 2022-05-23 | Stop reason: SDUPTHER

## 2022-05-23 ENCOUNTER — OFFICE VISIT (OUTPATIENT)
Dept: FAMILY MEDICINE CLINIC | Facility: CLINIC | Age: 57
End: 2022-05-23
Payer: COMMERCIAL

## 2022-05-23 VITALS
HEIGHT: 72 IN | WEIGHT: 267 LBS | HEART RATE: 63 BPM | OXYGEN SATURATION: 97 % | DIASTOLIC BLOOD PRESSURE: 88 MMHG | SYSTOLIC BLOOD PRESSURE: 128 MMHG | BODY MASS INDEX: 36.16 KG/M2 | TEMPERATURE: 97.8 F

## 2022-05-23 DIAGNOSIS — E78.2 MIXED HYPERLIPIDEMIA: Primary | ICD-10-CM

## 2022-05-23 DIAGNOSIS — E72.12 METHYLENETETRAHYDROFOLATE REDUCTASE DEFICIENCY (HCC): ICD-10-CM

## 2022-05-23 DIAGNOSIS — E11.42 DIABETIC PERIPHERAL NEUROPATHY (HCC): ICD-10-CM

## 2022-05-23 DIAGNOSIS — M54.50 CHRONIC LOW BACK PAIN, UNSPECIFIED BACK PAIN LATERALITY, UNSPECIFIED WHETHER SCIATICA PRESENT: ICD-10-CM

## 2022-05-23 DIAGNOSIS — E03.9 ACQUIRED HYPOTHYROIDISM: ICD-10-CM

## 2022-05-23 DIAGNOSIS — E11.9 TYPE 2 DIABETES MELLITUS WITHOUT COMPLICATION, WITHOUT LONG-TERM CURRENT USE OF INSULIN (HCC): ICD-10-CM

## 2022-05-23 DIAGNOSIS — E26.9 HYPERALDOSTERONISM (HCC): ICD-10-CM

## 2022-05-23 DIAGNOSIS — G89.29 CHRONIC LOW BACK PAIN, UNSPECIFIED BACK PAIN LATERALITY, UNSPECIFIED WHETHER SCIATICA PRESENT: ICD-10-CM

## 2022-05-23 PROCEDURE — 1036F TOBACCO NON-USER: CPT | Performed by: FAMILY MEDICINE

## 2022-05-23 PROCEDURE — 3074F SYST BP LT 130 MM HG: CPT | Performed by: FAMILY MEDICINE

## 2022-05-23 PROCEDURE — 99214 OFFICE O/P EST MOD 30 MIN: CPT | Performed by: FAMILY MEDICINE

## 2022-05-23 PROCEDURE — 3008F BODY MASS INDEX DOCD: CPT | Performed by: FAMILY MEDICINE

## 2022-05-23 PROCEDURE — 3079F DIAST BP 80-89 MM HG: CPT | Performed by: FAMILY MEDICINE

## 2022-05-23 RX ORDER — GABAPENTIN 600 MG/1
1200 TABLET ORAL 3 TIMES DAILY
Qty: 180 TABLET | Refills: 2 | Status: SHIPPED | OUTPATIENT
Start: 2022-05-23

## 2022-05-23 NOTE — PROGRESS NOTES
Assessment/Plan:    Problem List Items Addressed This Visit        Endocrine    Diabetic peripheral neuropathy ([facility] [address] )      Other Visit Diagnoses     Mixed hyperlipidemia    -  Primary    Type 2 diabetes mellitus without complication, without long-term current use of insulin (HCC)        Chronic low back pain, unspecified back pain laterality, unspecified whether sciatica present        Acquired hypothyroidism               Diagnoses and all orders for this visit:    Mixed hyperlipidemia    Type 2 diabetes mellitus without complication, without long-term current use of insulin (HCC)    Diabetic peripheral neuropathy (HCC)    Chronic low back pain, unspecified back pain laterality, unspecified whether sciatica present    Acquired hypothyroidism        No problem-specific Assessment & Plan notes found for this encounter  Subjective:      Patient ID: Pradip Perez is a 62 y o  male  Mr  Rib in all here follow-up visit we discussed his doses of gabapentin which exceed the recommended daily dosage he has actually been treated with a topical capsaicin wrap to his feet by his podiatrist which seems to be working he has already cut down to 1200 mg 3 times a day he has an issue with his index finger of his left hand I did offer him a consult with Hand surgery he did not except that he does need a pain management change because his doctor is Dr Vinicius Steele in who retired so we will set him up with Dr Johnie Shields shock are      The following portions of the patient's history were reviewed and updated as appropriate:   He has a past medical history of Arthritis, Chronic cholecystitis, Diabetes mellitus ([facility] [address] ), [facility] Gout, History of pulmonary embolism, Hyperlipidemia, Hypertension, Hypothyroidism, Lumbar back pain, MTHFR mutation, Neuropathy, Scab, Sleep apnea, Tarsal tunnel syndrome, right, Tinnitus, Wears glasses, and Wears glasses  ,  does not have any pertinent problems on file  ,   has a past surgical history that includes Cholecystectomy (03/26/2015); Appendectomy; Spinal fusion; Tonsillectomy (10/16/2013); Nasal septum surgery (10/16/2013); Uvulopalatopharyngoplasty; Pfeifer tooth extraction; Bunionectomy (Right, 10/07/2016); Arthrodesis; Knee arthroscopy w/ meniscal repair; Uvulectomy (10/16/2013); pr tarsal tunnel release (Right, 06/25/2018); pr colonoscopy flx dx w/collj spec when pfrmd (N/A, 11/23/2018); Colonoscopy; Back surgery; Knee arthroscopy; pr tarsal tunnel release (Left, 03/13/2020); and Cardiac pacemaker placement (09/01/2021)  ,  family history includes Aneurysm in his brother and mother; Coronary artery disease in his father; Hypertension in his father  ,   reports that he has never smoked  He has never used smokeless tobacco  He reports current alcohol use  He reports that he does not use drugs  ,  has No Known Allergies     Current Outpatient Medications   Medication Sig Dispense Refill    allopurinol (ZYLOPRIM) 300 mg tablet Take 1 tablet (300 mg total) by mouth daily 90 tablet 0    atorvastatin (LIPITOR) 40 mg tablet Take 1 tablet (40 mg total) by mouth daily 90 tablet 0    carvedilol (COREG) 6 25 mg tablet 1 tablet in the morning and 1 in 1/2 tablets in the evening 90 tablet 0    ezetimibe (ZETIA) 10 mg tablet Take 1 tablet (10 mg total) by mouth daily 90 tablet 0    furosemide (LASIX) 40 mg tablet Take 1 tablet (40 mg total) by mouth daily 90 tablet 1    gabapentin (NEURONTIN) 600 MG tablet Take 2 tablets (1,200 mg total) by mouth 3 (three) times a day 180 tablet 0    gabapentin (NEURONTIN) 600 MG tablet Take 2 tablets (1,200 mg total) by mouth in the morning and 2 tablets (1,200 mg total) in the evening and 2 tablets (1,200 mg total) before bedtime   180 tablet 0    levothyroxine (Euthyrox) 25 mcg tablet Take 1 tablet (25 mcg total) by mouth daily with Levothyroxine 150 mcg to equal 175 mcg total 90 tablet 0    levothyroxine 150 mcg tablet Take 1 tablet of 150 mcg strength with 1 tablet of 25 mcg strength to make a total dose of 175 mcg daily 90 tablet 0    lisinopril (ZESTRIL) 5 mg tablet Take 1 tablet (5 mg total) by mouth daily 90 tablet 0    metFORMIN (GLUCOPHAGE-XR) 500 mg 24 hr tablet Take 1 tablet (500 mg total) by mouth daily at bedtime 90 tablet 0    Multiple Vitamins-Minerals (MENS MULTIVITAMIN PLUS) TABS Take 1 tablet by mouth daily      rivaroxaban (Xarelto) 20 mg tablet Take 1 tablet (20 mg total) by mouth daily with breakfast 90 tablet 0    sildenafil (Viagra) 100 mg tablet Take 1 tablet (100 mg total) by mouth as needed for erectile dysfunction 30 tablet 0    spironolactone (ALDACTONE) 25 mg tablet Take 1 tablet (25 mg total) by mouth daily 90 tablet 0    zolpidem (AMBIEN) 5 mg tablet Take 1 tablet (5 mg total) by mouth as needed for sleep (for use during sleep study) for up to 1 day (Patient not taking: Reported on 3/31/2022 ) 1 tablet 0     No current facility-administered medications for this visit  Review of Systems   Constitutional: Negative for activity change, appetite change, diaphoresis, fatigue and fever  HENT: Negative  Eyes: Positive for visual disturbance  Respiratory: Negative for apnea, cough, chest tightness, shortness of breath and wheezing  Cardiovascular: Negative for chest pain, palpitations and leg swelling  Gastrointestinal: Negative for abdominal distention, abdominal pain, anal bleeding, constipation, diarrhea, nausea and vomiting  Endocrine: Negative for cold intolerance, heat intolerance, polydipsia, polyphagia and polyuria  Genitourinary: Negative for difficulty urinating, dysuria, flank pain, hematuria and urgency  Musculoskeletal: Positive for back pain  Negative for arthralgias, gait problem, joint swelling and myalgias  Skin: Negative for color change, rash and wound  Allergic/Immunologic: Negative for environmental allergies, food allergies and immunocompromised state     Neurological: Positive for numbness  Negative for dizziness, seizures, syncope, speech difficulty and headaches  Hematological: Negative for adenopathy  Does not bruise/bleed easily  Psychiatric/Behavioral: Negative for agitation, behavioral problems, hallucinations, sleep disturbance and suicidal ideas  Objective:  Vitals:    05/23/22 1309   BP: 128/88   BP Location: Left arm   Patient Position: Sitting   Cuff Size: Large   Pulse: 63   Temp: 97 8 °F (36 6 °C)   TempSrc: Temporal   SpO2: 97%   Weight: 121 kg (267 lb)   Height: 6' (1 829 m)     Body mass index is 36 21 kg/m²  Physical Exam  Constitutional:       General: He is not in acute distress  Appearance: He is well-developed  He is not diaphoretic  HENT:      Head: Normocephalic  Right Ear: External ear normal       Left Ear: External ear normal       Nose: Nose normal    Eyes:      General: No scleral icterus  Right eye: No discharge  Left eye: No discharge  Conjunctiva/sclera: Conjunctivae normal       Pupils: Pupils are equal, round, and reactive to light  Neck:      Thyroid: No thyromegaly  Trachea: No tracheal deviation  Cardiovascular:      Rate and Rhythm: Normal rate and regular rhythm  Heart sounds: Normal heart sounds  No murmur heard  No friction rub  No gallop  Pulmonary:      Effort: Pulmonary effort is normal  No respiratory distress  Breath sounds: Normal breath sounds  No wheezing  Abdominal:      General: Bowel sounds are normal       Palpations: Abdomen is soft  There is no mass  Tenderness: There is no abdominal tenderness  There is no guarding  Musculoskeletal:         General: No deformity  Cervical back: Normal range of motion  Lymphadenopathy:      Cervical: No cervical adenopathy  Skin:     General: Skin is warm and dry  Findings: No erythema or rash  Neurological:      Mental Status: He is alert and oriented to person, place, and time        Cranial Nerves: No cranial nerve deficit  Psychiatric:         Thought Content:  Thought content normal

## 2022-05-31 ENCOUNTER — LAB (OUTPATIENT)
Dept: LAB | Facility: MEDICAL CENTER | Age: 57
End: 2022-05-31
Payer: COMMERCIAL

## 2022-05-31 DIAGNOSIS — E78.2 MIXED HYPERLIPIDEMIA: ICD-10-CM

## 2022-05-31 DIAGNOSIS — E11.9 TYPE 2 DIABETES MELLITUS WITHOUT COMPLICATION, WITHOUT LONG-TERM CURRENT USE OF INSULIN (HCC): ICD-10-CM

## 2022-05-31 DIAGNOSIS — E03.9 ACQUIRED HYPOTHYROIDISM: ICD-10-CM

## 2022-05-31 LAB
ANION GAP SERPL CALCULATED.3IONS-SCNC: 3 MMOL/L (ref 4–13)
BUN SERPL-MCNC: 41 MG/DL (ref 5–25)
CALCIUM SERPL-MCNC: 10.6 MG/DL (ref 8.3–10.1)
CHLORIDE SERPL-SCNC: 110 MMOL/L (ref 100–108)
CHOLEST SERPL-MCNC: 156 MG/DL
CO2 SERPL-SCNC: 28 MMOL/L (ref 21–32)
CREAT SERPL-MCNC: 1.59 MG/DL (ref 0.6–1.3)
CREAT UR-MCNC: 97 MG/DL
EST. AVERAGE GLUCOSE BLD GHB EST-MCNC: 128 MG/DL
GFR SERPL CREATININE-BSD FRML MDRD: 47 ML/MIN/1.73SQ M
GLUCOSE P FAST SERPL-MCNC: 114 MG/DL (ref 65–99)
HBA1C MFR BLD: 6.1 %
HDLC SERPL-MCNC: 72 MG/DL
LDLC SERPL CALC-MCNC: 61 MG/DL (ref 0–100)
MICROALBUMIN UR-MCNC: 1250 MG/L (ref 0–20)
MICROALBUMIN/CREAT 24H UR: 1289 MG/G CREATININE (ref 0–30)
NONHDLC SERPL-MCNC: 84 MG/DL
POTASSIUM SERPL-SCNC: 4.4 MMOL/L (ref 3.5–5.3)
SODIUM SERPL-SCNC: 141 MMOL/L (ref 136–145)
TRIGL SERPL-MCNC: 116 MG/DL
TSH SERPL DL<=0.05 MIU/L-ACNC: 1.53 UIU/ML (ref 0.45–4.5)

## 2022-05-31 PROCEDURE — 80061 LIPID PANEL: CPT

## 2022-05-31 PROCEDURE — 3062F POS MACROALBUMINURIA REV: CPT | Performed by: FAMILY MEDICINE

## 2022-05-31 PROCEDURE — 84443 ASSAY THYROID STIM HORMONE: CPT

## 2022-05-31 PROCEDURE — 82570 ASSAY OF URINE CREATININE: CPT | Performed by: FAMILY MEDICINE

## 2022-05-31 PROCEDURE — 82043 UR ALBUMIN QUANTITATIVE: CPT | Performed by: FAMILY MEDICINE

## 2022-05-31 PROCEDURE — 80048 BASIC METABOLIC PNL TOTAL CA: CPT | Performed by: FAMILY MEDICINE

## 2022-05-31 PROCEDURE — 83036 HEMOGLOBIN GLYCOSYLATED A1C: CPT | Performed by: FAMILY MEDICINE

## 2022-05-31 PROCEDURE — 36415 COLL VENOUS BLD VENIPUNCTURE: CPT | Performed by: FAMILY MEDICINE

## 2022-05-31 PROCEDURE — 3044F HG A1C LEVEL LT 7.0%: CPT | Performed by: FAMILY MEDICINE

## 2022-06-24 DIAGNOSIS — E78.2 MIXED HYPERLIPIDEMIA: ICD-10-CM

## 2022-06-24 DIAGNOSIS — I48.0 PAROXYSMAL ATRIAL FIBRILLATION (HCC): ICD-10-CM

## 2022-06-24 RX ORDER — CARVEDILOL 6.25 MG/1
TABLET ORAL
Qty: 90 TABLET | Refills: 0 | Status: SHIPPED | OUTPATIENT
Start: 2022-06-24

## 2022-06-24 RX ORDER — ATORVASTATIN CALCIUM 40 MG/1
40 TABLET, FILM COATED ORAL DAILY
Qty: 90 TABLET | Refills: 0 | Status: SHIPPED | OUTPATIENT
Start: 2022-06-24

## 2022-07-17 DIAGNOSIS — E11.9 TYPE 2 DIABETES MELLITUS WITHOUT COMPLICATION, WITHOUT LONG-TERM CURRENT USE OF INSULIN (HCC): ICD-10-CM

## 2022-07-17 DIAGNOSIS — R80.9 CONTROLLED TYPE 2 DIABETES MELLITUS WITH MICROALBUMINURIA, WITHOUT LONG-TERM CURRENT USE OF INSULIN (HCC): ICD-10-CM

## 2022-07-17 DIAGNOSIS — E11.29 CONTROLLED TYPE 2 DIABETES MELLITUS WITH MICROALBUMINURIA, WITHOUT LONG-TERM CURRENT USE OF INSULIN (HCC): ICD-10-CM

## 2022-07-17 DIAGNOSIS — E79.0 HYPERURICEMIA: ICD-10-CM

## 2022-07-17 DIAGNOSIS — I10 ESSENTIAL HYPERTENSION: ICD-10-CM

## 2022-07-17 DIAGNOSIS — O22.30 DVT (DEEP VEIN THROMBOSIS) IN PREGNANCY: ICD-10-CM

## 2022-07-17 PROCEDURE — 3066F NEPHROPATHY DOC TX: CPT | Performed by: ANESTHESIOLOGY

## 2022-07-18 ENCOUNTER — TELEPHONE (OUTPATIENT)
Dept: FAMILY MEDICINE CLINIC | Facility: CLINIC | Age: 57
End: 2022-07-18

## 2022-07-18 DIAGNOSIS — T56.0X1A ACUTE LEAD-INDUCED GOUT INVOLVING TOE, UNSPECIFIED LATERALITY, INITIAL ENCOUNTER: Primary | ICD-10-CM

## 2022-07-18 DIAGNOSIS — M10.179 ACUTE LEAD-INDUCED GOUT INVOLVING TOE, UNSPECIFIED LATERALITY, INITIAL ENCOUNTER: Primary | ICD-10-CM

## 2022-07-18 PROCEDURE — 4010F ACE/ARB THERAPY RXD/TAKEN: CPT | Performed by: ANESTHESIOLOGY

## 2022-07-18 RX ORDER — METFORMIN HYDROCHLORIDE 500 MG/1
TABLET, EXTENDED RELEASE ORAL
Qty: 90 TABLET | Refills: 0 | Status: SHIPPED | OUTPATIENT
Start: 2022-07-18 | End: 2022-10-24 | Stop reason: SDUPTHER

## 2022-07-18 RX ORDER — ALLOPURINOL 300 MG/1
TABLET ORAL
Qty: 90 TABLET | Refills: 0 | Status: SHIPPED | OUTPATIENT
Start: 2022-07-18 | End: 2022-10-24 | Stop reason: SDUPTHER

## 2022-07-18 RX ORDER — PREDNISONE 20 MG/1
TABLET ORAL
Qty: 10 TABLET | Refills: 0 | Status: SHIPPED | OUTPATIENT
Start: 2022-07-18

## 2022-07-18 RX ORDER — RIVAROXABAN 20 MG/1
TABLET, FILM COATED ORAL
Qty: 90 TABLET | Refills: 0 | Status: SHIPPED | OUTPATIENT
Start: 2022-07-18 | End: 2022-10-24 | Stop reason: SDUPTHER

## 2022-07-18 RX ORDER — LISINOPRIL 5 MG/1
TABLET ORAL
Qty: 90 TABLET | Refills: 0 | Status: SHIPPED | OUTPATIENT
Start: 2022-07-18 | End: 2022-10-24 | Stop reason: SDUPTHER

## 2022-07-19 ENCOUNTER — REMOTE DEVICE CLINIC VISIT (OUTPATIENT)
Dept: CARDIOLOGY CLINIC | Facility: CLINIC | Age: 57
End: 2022-07-19
Payer: COMMERCIAL

## 2022-07-19 DIAGNOSIS — Z95.0 PRESENCE OF PERMANENT CARDIAC PACEMAKER: Primary | ICD-10-CM

## 2022-07-19 PROCEDURE — 93296 REM INTERROG EVL PM/IDS: CPT | Performed by: INTERNAL MEDICINE

## 2022-07-19 PROCEDURE — 93294 REM INTERROG EVL PM/LDLS PM: CPT | Performed by: INTERNAL MEDICINE

## 2022-07-19 NOTE — PROGRESS NOTES
Results for orders placed or performed in visit on 07/19/22   Cardiac EP device report    Narrative    MDDT DUAL PM/ ACTIVE SYSTEM IS MRI CONDITIONAL  CARELINK TRANSMISSION: BATTERY VOLTAGE ADEQUATE  (13 3 YRS) AP 86%  1%  ALL AVAILABLE LEAD PARAMETERS WITHIN NORMAL LIMITS  NO SIGNIFICANT HIGH RATE EPISODES  19 AT/AF EPISODES DETECTED  HISTORY OF PAF  PATIENT IS ON [facility]  NORMAL DEVICE FUNCTION  ---[place]

## 2022-07-20 ENCOUNTER — CONSULT (OUTPATIENT)
Dept: PAIN MEDICINE | Facility: CLINIC | Age: 57
End: 2022-07-20
Payer: COMMERCIAL

## 2022-07-20 VITALS
SYSTOLIC BLOOD PRESSURE: 163 MMHG | BODY MASS INDEX: 37.79 KG/M2 | WEIGHT: 279 LBS | HEIGHT: 72 IN | DIASTOLIC BLOOD PRESSURE: 105 MMHG | HEART RATE: 79 BPM

## 2022-07-20 DIAGNOSIS — M51.16 LUMBAR DISC DISEASE WITH RADICULOPATHY: Primary | ICD-10-CM

## 2022-07-20 DIAGNOSIS — M46.1 SACROILIITIS (HCC): ICD-10-CM

## 2022-07-20 PROCEDURE — 3080F DIAST BP >= 90 MM HG: CPT | Performed by: ANESTHESIOLOGY

## 2022-07-20 PROCEDURE — 3077F SYST BP >= 140 MM HG: CPT | Performed by: ANESTHESIOLOGY

## 2022-07-20 PROCEDURE — 99244 OFF/OP CNSLTJ NEW/EST MOD 40: CPT | Performed by: ANESTHESIOLOGY

## 2022-07-20 NOTE — PATIENT INSTRUCTIONS
Core Strengthening Exercises Performed 4 days a week 3 sets 10 repititions  WHAT YOU NEED TO KNOW:   What do I need to know about core strengthening exercises? Your core includes the muscles of your lower back, hip, pelvis, and abdomen  Core strengthening exercises help heal and strengthen these muscles  This helps prevent another injury, and keeps your pelvis, spine, and hips in the correct position  What do I need to know about exercise safety? Talk to your healthcare provider before you start an exercise program  A physical therapist can teach you how to do core strengthening exercises safely  Do the exercises on a mat or firm surface  A firm surface will support your spine and prevent low back pain  Do not do these exercises on a bed  Move slowly and smoothly  Avoid fast or jerky motions  Stop if you feel pain  Core exercises should not be painful  Stop if you feel pain  Breathe normally during core exercises  Do not hold your breath  This may cause an increase in blood pressure and prevent muscle strengthening  Your healthcare provider will tell you when to inhale and exhale during the exercise  Begin all of your exercises with abdominal bracing  Abdominal bracing helps warm up your core muscles  You can also practice abdominal bracing throughout the day  Lie on your back with your knees bent and feet flat on the floor  Place your arms in a relaxed position beside your body  Tighten your abdominal muscles  Pull your belly button in and up toward your spine  Hold for 5 seconds  Relax your muscles  Repeat 10 times  How do I perform core strengthening exercises? Your healthcare provider will tell you how often to do these exercises  The provider will also tell you how many repetitions of each exercise you should do  Hold each exercise for 5 seconds or as directed  As you get stronger, increase your hold to 10 to 15 seconds   You can do some of these exercises on a stability ball, or with a weight  Ask your healthcare provider how to use a stability ball or weight for these exercises:  Bridging:  Lie on your back with your knees bent and feet flat on the floor  Rest your arms at your side  Tighten your buttocks, and then lift your hips 1 inch off the floor  Hold for 5 seconds  When you can do this exercise without pain for 10 seconds, increase the distance you lift your hips  A good goal is to be able to lift your hips so that your shoulders, hips, and knees are in a straight line  Dead bug:  Lie on your back with your knees bent and feet flat on the floor  Place your arms in a relaxed position beside your body  Begin with abdominal bracing  Next, raise one leg, keeping your knee bent  Hold for 5 seconds  Repeat with the other leg  When you can do this exercise without pain for 10 to 15 seconds, you may raise one straight leg and hold  Repeat with the other leg  Quadruped:  Place your hands and knees on the floor  Keep your wrists directly below your shoulders and your knees directly below your hips  Pull your belly button in toward your spine  Do not flatten or arch your back  Tighten your abdominal muscles below your belly button  Hold for 5 seconds  When you can do this exercise without pain for 10 to 15 seconds, you may extend one arm and hold  Repeat on the other side  Side bridge exercises:      Standing side bridge:  Stand next to a wall and extend one arm toward the wall  Place your palm flat on the wall with your fingers pointing upward  Begin with abdominal bracing  Next, without moving your feet, slowly bend your arm to 90 degrees  Hold for 5 seconds  Repeat on the other side  When you can do this exercise without pain for 10 to 15 seconds, you may do the bent leg side bridge on the floor  Bent leg side bridge:  Lie on one side with your legs, hips, and shoulders in a straight line   Prop yourself up onto your forearm so your elbow is directly below your shoulder  Bend your knees back to 90 degrees  Begin with abdominal bracing  Next, lift your hips and balance yourself on your forearm and knees  Hold for 5 seconds  Repeat on the other side  When you can do this exercise without pain for 10 to 15 seconds, you may do the straight leg side bridge on the floor  Straight leg side bridge:  Lie on one side with your legs, hips, and shoulders in a straight line  Prop yourself up onto your forearm so your elbow is directly below your shoulder  Begin with abdominal bracing  Lift your hips off the floor and balance yourself on your forearm and the outside of your flexed foot  Do not let your ankle bend sideways  Hold for 5 seconds  Repeat on the other side  When you can do this exercise without pain for 10 to 15 seconds, ask your healthcare provider for more advanced exercises  Superman:  Lie on your stomach  Extend your arms forward on the floor  Tighten your abdominal muscles and lift your right hand and left leg off the floor  Hold this position  Slowly return to the starting position  Tighten your abdominal muscles and lift your left hand and right leg off the floor  Hold this position  Slowly return to the starting position  Clam:  Lie on your side with your knees bent  Put your bottom arm under your head to keep your neck in line  Put your top hand on your hip to keep your pelvis from moving  Put your heels together, and keep them together during this exercise  Slowly raise your top knee toward the ceiling  Then lower your leg so your knees are together  Repeat this exercise 10 times  Then switch sides and do the exercise 10 times with the other leg  Curl up:  Lie on your back with your knees bent and feet flat on the floor  Place your hands, palms down, underneath your lower back  Next, with your elbows on the floor, lift your shoulders and chest 2 to 3 inches off the floor  Keep your head in line with your shoulders  Hold this position  Slowly return to the starting position  Straight leg raises:  Lie on your back with one leg straight  Bend the other knee and place your foot flat on the floor  Tighten your abdominal muscles  Keep your leg straight and slowly lift it straight up 6 to 12 inches off the floor  Hold this position  Lower your leg slowly  Do as many repetitions as directed on this side  Repeat with the other leg  Plank:  Lie on your stomach  Bend your elbows and place your forearms flat on the floor  Lift your chest, stomach, and knees off the floor  Make sure your elbows are below your shoulders  Your body should be in a straight line  Do not let your hips or lower back sink to the ground  Squeeze your abdominal muscles together and hold for 15 seconds  To make this exercise harder, hold for 30 seconds or lift 1 leg at a time  Bicycles:  Lie on your back  Bend both knees and bring them toward your chest  Your calves should be parallel to the floor  Place the palms of your hands on the back of your head  Straighten your right leg and keep it lifted 2 inches off the floor  Raise your head and shoulders off the floor and twist towards your left  Keep your head and shoulders lifted  Bend your right knee while you straighten your left leg  Keep your left leg 2 inches off the floor  Twist your head and chest towards the left leg  Continue to straighten 1 leg at a time and twist        When should I contact my healthcare provider? You have sharp or worsening pain during exercise or at rest     You have questions or concerns about your condition, care, or exercise program     CARE AGREEMENT:   You have the right to help plan your care  Learn about your health condition and how it may be treated  Discuss treatment options with your healthcare providers to decide what care you want to receive  You always have the right to refuse treatment  The above information is an  only   It is not intended as medical advice for individual conditions or treatments  Talk to your doctor, nurse or pharmacist before following any medical regimen to see if it is safe and effective for you  © Copyright [employer] 2022 Information is for End User's use only and may not be sold, redistributed or otherwise used for commercial purposes  All illustrations and images included in CareNotes® are the copyrighted property of A [employer] A M , Inc  or [employer] [facility]        Epidural Steroid Injection   WHAT YOU NEED TO KNOW:   An epidural steroid injection (LEIGH) is a procedure to inject steroid medicine into the epidural space  The epidural space is between your spinal cord and vertebrae  Steroids reduce inflammation and fluid buildup in your spine that may be causing pain  You may be given pain medicine along with the steroids  ACTIVITY  Do not drive or operate machinery today  No strenuous activity today - bending, lifting, etc   You may resume normal activites starting tomorrow - start slowly and as tolerated  You may shower today, but no tub baths or hot tubs  You may have numbness for several hours from the local anesthetic  Please use caution and common sense, especially with weight-bearing activities  CARE OF THE INJECTION SITE  If you have soreness or pain, apply ice to the area today (20 minutes on/20 minutes off)  Starting tomorrow, you may use warm, moist heat or ice if needed  You may have an increase or change in your discomfort for 36-48 hours after your treatment  Apply ice and continue with any pain medication you have been prescribed  Notify the [facility] if you have any of the following: redness, drainage, swelling, headache, stiff neck or fever above 100°F     SPECIAL INSTRUCTIONS  Our office will contact you in approximately 7 days for a progress report  MEDICATIONS  Continue to take all routine medications  Our office may have instructed you to hold some medications      As no general anesthesia was used in today's procedure, you should not experience any side effects related to anesthesia  If you have a problem specifically related to your procedure, please call our office at [phone]  Problems not related to your procedure should be directed to your primary care physician

## 2022-07-20 NOTE — PROGRESS NOTES
Assessment:  1  Lumbar disc disease with radiculopathy    2  Sacroiliitis ([address] )        Plan:  Patient is a 49-year-old male with complaints of low back pain, right hip pain with chronic pain syndrome secondary to lumbar degenerative disc disease, lumbar radiculopathy, sacroiliitis, peripheral neuropathy bilateral feet presents office for initial consultation  Patient was managed by Dr Marline Langston who performed L4-5 ILESI, SIJ to manage patient's pain  Patient is also being managed by a podiatrist whom uses Allie Andrade for patient's bilateral feet peripheral neuropathy  At this time patient does note significant dull/aching pain in the low back with pain down into bilateral lower extremities worse than left which decreases ability to have a good quality of sleep  Patient also reports significant right hip pain which is exacerbated from standing up from the sitting position, sitting from standing position, laying on the right side  Patient often has to take the pressure off his right hip when sitting for any prolonged period of time  1  We will schedule patient for a L4-5 interlaminar epidural steroid injection  2  We will schedule patient for a right sacroiliac joint steroid injection   3  We will follow-up 1 month after injection  4  Home exercise regimen with the patient focusing on sacroiliac joint pain and core strengthening exercises    Complete risks and benefits including bleeding, infection, tissue reaction, nerve injury and allergic reaction were discussed  The approach was demonstrated using models and literature was provided  Verbal and written consent was obtained  History of Present Illness: The patient is a 62 y o  male who presents for consultation in regards to Back Pain  Symptoms have been present for 15 years  Symptoms began without any precipitating injury or trauma  Pain is reported to be 7 on the numeric rating scale    Symptoms are felt nearly constantly and worst in the no typical pattern  Symptoms are characterized as shooting, sharp, dull/aching and cutting  Symptoms are associated with right leg weakness  Aggravating factors include kneeling, bending, leaning forward, leaning bckward and exercise  Relieving factors include relaxation  No change in symptoms with lying down, standing, sitting, walking, coughing/sneezing and bowel movements  Treatments that have been helpful include prior injections including LESI, SIJ and heat/ice  chiropractic manipulation and home exercise have provided no relief  Medications to relieve symptoms include none  Review of Systems:    Review of Systems   Constitutional: Positive for unexpected weight change  Negative for activity change and diaphoresis  HENT: Negative for congestion, ear discharge, mouth sores, sneezing and voice change  Eyes: Negative for photophobia and discharge  Respiratory: Negative for apnea and stridor  Cardiovascular: Positive for leg swelling  Negative for chest pain  Gastrointestinal: Negative for abdominal distention, blood in stool and rectal pain  Endocrine: Negative for cold intolerance, heat intolerance and polyphagia  Genitourinary: Negative for decreased urine volume, enuresis, flank pain, genital sores and hematuria  Musculoskeletal: Negative for arthralgias  Skin: Negative for color change, pallor and rash  Allergic/Immunologic: Negative for environmental allergies, food allergies and immunocompromised state  Neurological: Positive for numbness (Feet)  Negative for seizures, speech difficulty and light-headedness  Hematological: Negative for adenopathy  Psychiatric/Behavioral: Negative for agitation, confusion, hallucinations, self-injury and suicidal ideas  The patient is not hyperactive  All other systems reviewed and are negative          Past Medical History:   Diagnosis Date    Arthritis     right foot    Chronic cholecystitis     Diabetes mellitus [facility] [address] )     DVT [facility]deep venous thrombosis) [facility]     Gout     History of pulmonary embolism     Hyperlipidemia     Hypertension     Hypothyroidism     Lumbar back pain     MTHFR mutation     Neuropathy     Scab     right arm- no s/s infection    Sleep apnea     no CPAP    Tarsal tunnel syndrome, right     Tinnitus     left ear    Wears glasses     and contacts    Wears glasses        Past Surgical History:   Procedure Laterality Date    APPENDECTOMY      ARTHRODESIS      Lumbar L__    BACK SURGERY      BUNIONECTOMY Right 10/07/2016    Procedure: REMOVAL TIBIAL  SESAMOID BONE  RIGHT FOOT;  Surgeon: Deb Leary DPM;  Location: [facility];  Service:    [address] [place] [address]  09/01/2021    CHOLECYSTECTOMY  03/26/2015    COLONOSCOPY      KNEE ARTHROSCOPY      KNEE ARTHROSCOPY W/ MENISCAL REPAIR      Lateral    NASAL SEPTUM SURGERY  10/16/2013    WA COLONOSCOPY FLX DX W/COLLJ SPEC WHEN PFRMD N/A 11/23/2018    Procedure: COLONOSCOPY;  Surgeon: Nils Moritz, MD;  Location: [facility];  Service: Gastroenterology    WA TARSAL TUNNEL RELEASE Right 06/25/2018    Procedure: RELEASE TARSAL TUNNEL;  Surgeon: Lewis Charlton DPM;  Location: [facility];  Service: Podiatry    WA TARSAL TUNNEL RELEASE Left 03/13/2020    Procedure: RELEASE TARSAL TUNNEL;  Surgeon: Lewis Charlton DPM;  [facility];  Service: [place]  10/16/2013    with Adenoidectomy    UVULECTOMY  10/16/2013    UVULOPALATOPHARYNGOPLASTY      WISDOM TOOTH EXTRACTION         Family History   Problem Relation Age of Onset    Aneurysm Mother         intracranial aneurysm repair    Coronary artery disease Father     Hypertension Father     Aneurysm Brother        Social History     Occupational History    Not on file   Tobacco Use    Smoking status: Never Smoker    Smokeless tobacco: Never Used   Vaping Use    Vaping Use: Never used   Substance and Sexual Activity    Alcohol use: Yes     Comment: weekends    Drug use: No    Sexual activity: Yes         Current Outpatient Medications:     allopurinol (ZYLOPRIM) 300 mg tablet, Take 1 tablet by mouth once daily, Disp: 90 tablet, Rfl: 0    atorvastatin (LIPITOR) 40 mg tablet, Take 1 tablet (40 mg total) by mouth daily, Disp: 90 tablet, Rfl: 0    carvedilol (COREG) 6 25 mg tablet, 1 tablet in the morning and 1 in 1/2 tablets in the evening, Disp: 90 tablet, Rfl: 0    ezetimibe (ZETIA) 10 mg tablet, Take 1 tablet (10 mg total) by mouth daily, Disp: 90 tablet, Rfl: 0    furosemide (LASIX) 40 mg tablet, Take 1 tablet (40 mg total) by mouth daily, Disp: 90 tablet, Rfl: 1    gabapentin (NEURONTIN) 600 MG tablet, Take 2 tablets (1,200 mg total) by mouth 3 (three) times a day, Disp: 180 tablet, Rfl: 0    gabapentin (NEURONTIN) 600 MG tablet, Take 2 tablets (1,200 mg total) by mouth in the morning and 2 tablets (1,200 mg total) in the evening and 2 tablets (1,200 mg total) before bedtime  , Disp: 180 tablet, Rfl: 2    levothyroxine (Euthyrox) 25 mcg tablet, Take 1 tablet (25 mcg total) by mouth daily with Levothyroxine 150 mcg to equal 175 mcg total, Disp: 90 tablet, Rfl: 0    levothyroxine 150 mcg tablet, Take 1 tablet of 150 mcg strength with 1 tablet of 25 mcg strength to make a total dose of 175 mcg daily, Disp: 90 tablet, Rfl: 0    lisinopril (ZESTRIL) 5 mg tablet, Take 1 tablet by mouth once daily, Disp: 90 tablet, Rfl: 0    metFORMIN (GLUCOPHAGE-XR) 500 mg 24 hr tablet, TAKE 1 TABLET BY MOUTH ONCE DAILY AT BEDTIME, Disp: 90 tablet, Rfl: 0    Multiple Vitamins-Minerals (MENS MULTIVITAMIN PLUS) TABS, Take 1 tablet by mouth daily, Disp: , Rfl:     predniSONE 20 mg tablet, 1 t i d  day 1, 1 b i d  day 2 and day 3, 1 day 4 and 5, Disp: 10 tablet, Rfl: 0    sildenafil (Viagra) 100 mg tablet, Take 1 tablet (100 mg total) by mouth as needed for erectile dysfunction, Disp: 30 tablet, Rfl: 0    spironolactone (ALDACTONE) 25 mg tablet, Take 1 tablet (25 mg total) by mouth daily, Disp: 90 tablet, Rfl: 0    Xarelto 20 MG tablet, Take 1 tablet by mouth once daily with breakfast, Disp: 90 tablet, Rfl: 0    zolpidem (AMBIEN) 5 mg tablet, Take 1 tablet (5 mg total) by mouth as needed for sleep (for use during sleep study) for up to 1 day (Patient not taking: Reported on 3/31/2022 ), Disp: 1 tablet, Rfl: 0    No Known Allergies    Physical Exam:    BP (!) 163/105   Pulse 79   Ht 6' (1 829 m)   Wt 127 kg (279 lb)   BMI 37 84 kg/m²     Constitutional: normal, well developed, well nourished, alert, in no distress and non-toxic and no overt pain behavior  and obese  Eyes: anicteric  HEENT: grossly intact  Neck: supple, symmetric, trachea midline and no masses   Pulmonary:even and unlabored  Cardiovascular:No edema or pitting edema present  Skin:Normal without rashes or lesions and well hydrated  Psychiatric:Mood and affect appropriate  Neurologic:Cranial Nerves II-XII grossly intact  Musculoskeletal:normal, positive tenderness to palpation right sacroiliac joint, positive Katerine's, positive Gaenslen's     Lumbar/Sacral Spine examination demonstrates  Decreased range of motion lumbar spine with pain upon: flexion, lateral rotation to the left/right, and bending to the left/right  Bilateral lumbar paraspinals tender to palpation  Muscle spasms noted in the lumbar area bilaterally  4/5 lower extremity strength in all muscle groups bilaterally  Positive seated straight leg raise for bilateral lower extremities  Sensitivity to light touch intact bilateral lower extremities  2+ reflexes in the patella and Achilles  No ankle clonus     Imaging  No orders to display       No orders of the defined types were placed in this encounter

## 2022-07-22 ENCOUNTER — TELEPHONE (OUTPATIENT)
Dept: PAIN MEDICINE | Facility: CLINIC | Age: 57
End: 2022-07-22

## 2022-07-22 NOTE — TELEPHONE ENCOUNTER
Good Morning,    I received an anti-coag hold form and scanned it into the patients chart  Patient is scheduled for an L4-L5 ILESI on 9/1 with Dr Blane Cabral  Please contact patient to discuss  Thanks

## 2022-07-22 NOTE — TELEPHONE ENCOUNTER
Spoke to pt regarding Xarelto hold for 3 days  Writing nurse advised pt last dose will be on Sunday 8/28  Writing nurse reviewed pre procedures as well   Writing nurse advised pt to reach out to [address] with any additional questions/concerns   Pt verbalized understanding of instructions

## 2022-07-28 ENCOUNTER — APPOINTMENT (OUTPATIENT)
Dept: LAB | Facility: MEDICAL CENTER | Age: 57
End: 2022-07-28
Payer: COMMERCIAL

## 2022-07-28 DIAGNOSIS — M10.072 ACUTE IDIOPATHIC GOUT OF LEFT FOOT: ICD-10-CM

## 2022-07-28 LAB
BASOPHILS # BLD AUTO: 0.01 THOUSANDS/ΜL (ref 0–0.1)
BASOPHILS NFR BLD AUTO: 0 % (ref 0–1)
EOSINOPHIL # BLD AUTO: 0.02 THOUSAND/ΜL (ref 0–0.61)
EOSINOPHIL NFR BLD AUTO: 0 % (ref 0–6)
ERYTHROCYTE [DISTWIDTH] IN BLOOD BY AUTOMATED COUNT: 14.9 % (ref 11.6–15.1)
ERYTHROCYTE [SEDIMENTATION RATE] IN BLOOD: 30 MM/HOUR (ref 0–19)
HCT VFR BLD AUTO: 56.7 % (ref 36.5–49.3)
HGB BLD-MCNC: 18.3 G/DL (ref 12–17)
IMM GRANULOCYTES # BLD AUTO: 0.03 THOUSAND/UL (ref 0–0.2)
IMM GRANULOCYTES NFR BLD AUTO: 1 % (ref 0–2)
LYMPHOCYTES # BLD AUTO: 0.92 THOUSANDS/ΜL (ref 0.6–4.47)
LYMPHOCYTES NFR BLD AUTO: 14 % (ref 14–44)
MCH RBC QN AUTO: 30.9 PG (ref 26.8–34.3)
MCHC RBC AUTO-ENTMCNC: 32.3 G/DL (ref 31.4–37.4)
MCV RBC AUTO: 96 FL (ref 82–98)
MONOCYTES # BLD AUTO: 0.3 THOUSAND/ΜL (ref 0.17–1.22)
MONOCYTES NFR BLD AUTO: 5 % (ref 4–12)
NEUTROPHILS # BLD AUTO: 5.14 THOUSANDS/ΜL (ref 1.85–7.62)
NEUTS SEG NFR BLD AUTO: 80 % (ref 43–75)
NRBC BLD AUTO-RTO: 0 /100 WBCS
PLATELET # BLD AUTO: 208 THOUSANDS/UL (ref 149–390)
PMV BLD AUTO: 10.1 FL (ref 8.9–12.7)
RBC # BLD AUTO: 5.93 MILLION/UL (ref 3.88–5.62)
WBC # BLD AUTO: 6.42 THOUSAND/UL (ref 4.31–10.16)

## 2022-07-28 PROCEDURE — 85025 COMPLETE CBC W/AUTO DIFF WBC: CPT

## 2022-07-28 PROCEDURE — 36415 COLL VENOUS BLD VENIPUNCTURE: CPT

## 2022-07-28 PROCEDURE — 85652 RBC SED RATE AUTOMATED: CPT

## 2022-08-04 DIAGNOSIS — I10 ESSENTIAL HYPERTENSION: ICD-10-CM

## 2022-08-04 DIAGNOSIS — N18.31 STAGE 3A CHRONIC KIDNEY DISEASE (HCC): Primary | ICD-10-CM

## 2022-08-04 DIAGNOSIS — E26.9 HYPERALDOSTERONISM (HCC): ICD-10-CM

## 2022-08-04 RX ORDER — SPIRONOLACTONE 25 MG/1
TABLET ORAL
Qty: 90 TABLET | Refills: 0 | Status: SHIPPED | OUTPATIENT
Start: 2022-08-04

## 2022-08-04 RX ORDER — SPIRONOLACTONE 25 MG/1
25 TABLET ORAL DAILY
Qty: 90 TABLET | Refills: 0 | Status: SHIPPED | OUTPATIENT
Start: 2022-08-04 | End: 2022-10-24 | Stop reason: SDUPTHER

## 2022-08-12 ENCOUNTER — APPOINTMENT (OUTPATIENT)
Dept: LAB | Facility: MEDICAL CENTER | Age: 57
End: 2022-08-12
Payer: COMMERCIAL

## 2022-08-12 DIAGNOSIS — E03.9 ACQUIRED HYPOTHYROIDISM: ICD-10-CM

## 2022-08-12 DIAGNOSIS — N18.31 STAGE 3A CHRONIC KIDNEY DISEASE (HCC): ICD-10-CM

## 2022-08-12 DIAGNOSIS — I48.0 PAROXYSMAL ATRIAL FIBRILLATION (HCC): ICD-10-CM

## 2022-08-12 DIAGNOSIS — I10 ESSENTIAL HYPERTENSION: ICD-10-CM

## 2022-08-12 LAB
ANION GAP SERPL CALCULATED.3IONS-SCNC: 4 MMOL/L (ref 4–13)
BACTERIA UR QL AUTO: ABNORMAL /HPF
BASOPHILS # BLD AUTO: 0.04 THOUSANDS/ΜL (ref 0–0.1)
BASOPHILS NFR BLD AUTO: 1 % (ref 0–1)
BILIRUB UR QL STRIP: NEGATIVE
BUN SERPL-MCNC: 39 MG/DL (ref 5–25)
CALCIUM SERPL-MCNC: 10.2 MG/DL (ref 8.3–10.1)
CHLORIDE SERPL-SCNC: 111 MMOL/L (ref 96–108)
CLARITY UR: CLEAR
CO2 SERPL-SCNC: 23 MMOL/L (ref 21–32)
COLOR UR: ABNORMAL
CREAT SERPL-MCNC: 1.68 MG/DL (ref 0.6–1.3)
CREAT UR-MCNC: 98 MG/DL
CREAT UR-MCNC: 98 MG/DL
EOSINOPHIL # BLD AUTO: 0.29 THOUSAND/ΜL (ref 0–0.61)
EOSINOPHIL NFR BLD AUTO: 4 % (ref 0–6)
ERYTHROCYTE [DISTWIDTH] IN BLOOD BY AUTOMATED COUNT: 14.6 % (ref 11.6–15.1)
GFR SERPL CREATININE-BSD FRML MDRD: 44 ML/MIN/1.73SQ M
GLUCOSE P FAST SERPL-MCNC: 120 MG/DL (ref 65–99)
GLUCOSE UR STRIP-MCNC: NEGATIVE MG/DL
HCT VFR BLD AUTO: 54.6 % (ref 36.5–49.3)
HGB BLD-MCNC: 16.7 G/DL (ref 12–17)
HGB UR QL STRIP.AUTO: NEGATIVE
HYALINE CASTS #/AREA URNS LPF: ABNORMAL /LPF
IMM GRANULOCYTES # BLD AUTO: 0.02 THOUSAND/UL (ref 0–0.2)
IMM GRANULOCYTES NFR BLD AUTO: 0 % (ref 0–2)
KETONES UR STRIP-MCNC: NEGATIVE MG/DL
LEUKOCYTE ESTERASE UR QL STRIP: NEGATIVE
LYMPHOCYTES # BLD AUTO: 1.58 THOUSANDS/ΜL (ref 0.6–4.47)
LYMPHOCYTES NFR BLD AUTO: 23 % (ref 14–44)
MCH RBC QN AUTO: 30.1 PG (ref 26.8–34.3)
MCHC RBC AUTO-ENTMCNC: 30.6 G/DL (ref 31.4–37.4)
MCV RBC AUTO: 98 FL (ref 82–98)
MICROALBUMIN UR-MCNC: 939 MG/L (ref 0–20)
MICROALBUMIN/CREAT 24H UR: 958 MG/G CREATININE (ref 0–30)
MONOCYTES # BLD AUTO: 0.72 THOUSAND/ΜL (ref 0.17–1.22)
MONOCYTES NFR BLD AUTO: 10 % (ref 4–12)
NEUTROPHILS # BLD AUTO: 4.29 THOUSANDS/ΜL (ref 1.85–7.62)
NEUTS SEG NFR BLD AUTO: 62 % (ref 43–75)
NITRITE UR QL STRIP: NEGATIVE
NON-SQ EPI CELLS URNS QL MICRO: ABNORMAL /HPF
NRBC BLD AUTO-RTO: 0 /100 WBCS
PH UR STRIP.AUTO: 6 [PH]
PLATELET # BLD AUTO: 186 THOUSANDS/UL (ref 149–390)
PMV BLD AUTO: 9.5 FL (ref 8.9–12.7)
POTASSIUM SERPL-SCNC: 4.7 MMOL/L (ref 3.5–5.3)
PROT UR STRIP-MCNC: ABNORMAL MG/DL
PROT UR-MCNC: 110 MG/DL
PROT/CREAT UR: 1.12 MG/G{CREAT} (ref 0–0.1)
RBC # BLD AUTO: 5.55 MILLION/UL (ref 3.88–5.62)
RBC #/AREA URNS AUTO: ABNORMAL /HPF
SODIUM SERPL-SCNC: 138 MMOL/L (ref 135–147)
SP GR UR STRIP.AUTO: 1.02 (ref 1–1.03)
UROBILINOGEN UR STRIP-ACNC: <2 MG/DL
WBC # BLD AUTO: 6.94 THOUSAND/UL (ref 4.31–10.16)
WBC #/AREA URNS AUTO: ABNORMAL /HPF

## 2022-08-12 PROCEDURE — 82043 UR ALBUMIN QUANTITATIVE: CPT

## 2022-08-12 PROCEDURE — 80048 BASIC METABOLIC PNL TOTAL CA: CPT

## 2022-08-12 PROCEDURE — 84156 ASSAY OF PROTEIN URINE: CPT

## 2022-08-12 PROCEDURE — 3061F NEG MICROALBUMINURIA REV: CPT | Performed by: ANESTHESIOLOGY

## 2022-08-12 PROCEDURE — 82570 ASSAY OF URINE CREATININE: CPT

## 2022-08-12 PROCEDURE — 85025 COMPLETE CBC W/AUTO DIFF WBC: CPT

## 2022-08-12 PROCEDURE — 36415 COLL VENOUS BLD VENIPUNCTURE: CPT

## 2022-08-12 PROCEDURE — 81001 URINALYSIS AUTO W/SCOPE: CPT

## 2022-08-12 RX ORDER — LEVOTHYROXINE SODIUM 0.03 MG/1
25 TABLET ORAL DAILY
Qty: 90 TABLET | Refills: 0 | Status: SHIPPED | OUTPATIENT
Start: 2022-08-12

## 2022-08-12 RX ORDER — CARVEDILOL 6.25 MG/1
TABLET ORAL
Qty: 90 TABLET | Refills: 0 | Status: SHIPPED | OUTPATIENT
Start: 2022-08-12 | End: 2022-09-30 | Stop reason: SDUPTHER

## 2022-08-18 DIAGNOSIS — E11.42 DIABETIC PERIPHERAL NEUROPATHY (HCC): ICD-10-CM

## 2022-08-18 RX ORDER — GABAPENTIN 600 MG/1
600 TABLET ORAL 3 TIMES DAILY
Qty: 180 TABLET | Refills: 0 | Status: SHIPPED | OUTPATIENT
Start: 2022-08-18 | End: 2022-10-06 | Stop reason: SDUPTHER

## 2022-08-18 RX ORDER — GABAPENTIN 600 MG/1
1200 TABLET ORAL 3 TIMES DAILY
Qty: 180 TABLET | Refills: 0 | OUTPATIENT
Start: 2022-08-18

## 2022-08-19 DIAGNOSIS — E03.9 ACQUIRED HYPOTHYROIDISM: ICD-10-CM

## 2022-08-19 RX ORDER — LEVOTHYROXINE SODIUM 0.15 MG/1
TABLET ORAL
Qty: 90 TABLET | Refills: 0 | Status: SHIPPED | OUTPATIENT
Start: 2022-08-19

## 2022-09-14 DIAGNOSIS — E78.2 MIXED HYPERLIPIDEMIA: ICD-10-CM

## 2022-09-15 RX ORDER — EZETIMIBE 10 MG/1
10 TABLET ORAL DAILY
Qty: 90 TABLET | Refills: 0 | Status: SHIPPED | OUTPATIENT
Start: 2022-09-15

## 2022-09-20 ENCOUNTER — OFFICE VISIT (OUTPATIENT)
Dept: NEPHROLOGY | Facility: CLINIC | Age: 57
End: 2022-09-20
Payer: COMMERCIAL

## 2022-09-20 VITALS
WEIGHT: 274 LBS | HEART RATE: 74 BPM | BODY MASS INDEX: 37.11 KG/M2 | SYSTOLIC BLOOD PRESSURE: 130 MMHG | HEIGHT: 72 IN | DIASTOLIC BLOOD PRESSURE: 82 MMHG | OXYGEN SATURATION: 96 %

## 2022-09-20 DIAGNOSIS — N18.32 STAGE 3B CHRONIC KIDNEY DISEASE (HCC): Primary | ICD-10-CM

## 2022-09-20 DIAGNOSIS — R80.9 CONTROLLED TYPE 2 DIABETES MELLITUS WITH MICROALBUMINURIA, WITHOUT LONG-TERM CURRENT USE OF INSULIN (HCC): ICD-10-CM

## 2022-09-20 DIAGNOSIS — E11.29 CONTROLLED TYPE 2 DIABETES MELLITUS WITH MICROALBUMINURIA, WITHOUT LONG-TERM CURRENT USE OF INSULIN (HCC): ICD-10-CM

## 2022-09-20 DIAGNOSIS — R80.9 PROTEINURIA, UNSPECIFIED TYPE: ICD-10-CM

## 2022-09-20 DIAGNOSIS — E83.52 HYPERCALCEMIA: ICD-10-CM

## 2022-09-20 DIAGNOSIS — I10 PRIMARY HYPERTENSION: ICD-10-CM

## 2022-09-20 PROCEDURE — 3079F DIAST BP 80-89 MM HG: CPT | Performed by: INTERNAL MEDICINE

## 2022-09-20 PROCEDURE — 99214 OFFICE O/P EST MOD 30 MIN: CPT | Performed by: INTERNAL MEDICINE

## 2022-09-20 PROCEDURE — 3075F SYST BP GE 130 - 139MM HG: CPT | Performed by: INTERNAL MEDICINE

## 2022-09-20 PROCEDURE — 3066F NEPHROPATHY DOC TX: CPT | Performed by: INTERNAL MEDICINE

## 2022-09-20 NOTE — PATIENT INSTRUCTIONS
Recommend taking BP 2-3 times a week and keeping a log  Call office if BP > 150/90  Kidney function has been stable over the past 1 year, recent function 44%  Will check vitamin-D level due to your mildly elevated calcium  4  Reviewed low potassium diet, tomato, potatoes, orange juice and citrus fruits  You had elevated potassium in the past   5  Will look to increase lisinopril at next visit  No changes made to medications

## 2022-09-20 NOTE — PROGRESS NOTES
Assessment & Plan:    1  Stage 3b chronic kidney disease (HCC)  -     CBC and differential; Future; Expected date: 11/16/2022  -     Comprehensive metabolic panel; Future; Expected date: 11/16/2022  -     Magnesium; Future; Expected date: 11/16/2022  -     Microalbumin / creatinine urine ratio; Future; Expected date: 11/16/2022  -     Phosphorus; Future; Expected date: 11/16/2022  -     Urinalysis with microscopic; Future; Expected date: 11/16/2022  -     PTH, intact; Future; Expected date: 11/16/2022  -     Uric acid; Future; Expected date: 11/16/2022  -     Vitamin D 25 hydroxy; Future; Expected date: 12/20/2022    2  Controlled type 2 diabetes mellitus with microalbuminuria, without long-term current use of insulin ([facility])  -     CBC and differential; Future; Expected date: 11/16/2022  -     Comprehensive metabolic panel; Future; Expected date: 11/16/2022  -     Magnesium; Future; Expected date: 11/16/2022  -     Microalbumin / creatinine urine ratio; Future; Expected date: 11/16/2022  -     Phosphorus; Future; Expected date: 11/16/2022  -     Urinalysis with microscopic; Future; Expected date: 11/16/2022  -     PTH, intact; Future; Expected date: 11/16/2022  -     Uric acid; Future; Expected date: 11/16/2022  -     Vitamin D 25 hydroxy; Future; Expected date: 12/20/2022    3  Primary hypertension  -     Vitamin D 25 hydroxy; Future; Expected date: 12/20/2022    4  Proteinuria, unspecified type    5  Hypercalcemia       1  CKD 3BA3 baseline previously 1 1-1 2 range  In August 2021 he was hyperkalemic with potassium 6 3 and his creatinine was in the 1 6-1 7 range  He was on both Ace and Aldactone which may have contributed  Reported syncopal events around that time and required a pacemaker for symptomatic bradycardia  Creatinine trends have remained in the 1 5-1 7 range, suspect this is need his new baseline with a GFR 44 to 51 mL/min    Most recent creatinine 1 68 with EGFR 44 mL/min    He appears euvolemic today he does have trace ankle edema otherwise moist mucous membranes and his lungs are clear  Etiology of CKD: potential ATN event with syncope, at risk for progressive CKD with proteinuria, DM and HTN, prerenal effect of diuretic and vasomotor effect of ARB  SODIUM 138  POTASSIUM 4 7  CHLORIDE 111 TCO2 23  NO NEED  FOR BICARB GOAL GREATER THAN 22  CALCIUM 10 2 MILDLY ELEVATED SINCE STARTING VITAMIN-D  Recommend  1  Reviewed creatinine progression over the past year  Appears euvolemic at present  Suspect baseline 1 5-1 7 with EGFR 44-51 mL/min  2  Hold on renal imaging at this point, denies any lower urinary tract symptoms and his creatinine trends been stable  3  Continue current fluid intake which is greater than 64 oz per day  4  Discussed increasing his lisinopril to 10 mg per day; he wants to hold off on this change right now  We did review the risk of hyperkalemia  His potassium are upper limit of normal and he is on aldactone  Would look to increase ACE to help with BP, proteinuria and CKD progression  Will reeval in 3 months  Would not add firenone with current potassium trends, not recommended if K>4 8  5  Discussed adding SGLT-2i for CKD and proteinuria  He wants to hold off on any changes for right now  6  Fu in 3 months  2  DM type 2 with CKD  Goal A1C in CKD to avoid hypoglycemia  Recent A1C in June was under excellent control at 6 1 maintained on metformin  Will consider SGLT-2i use in future  Continue lisinopril  3  Primary HTN under reasonable control at present, elevated in July but reported work stress at that time  Ideally titrate up ACE but patient wants to wait  Check BP 2-3 times per week at home  Call if> 150/90  Continue lisinopril 5mg/day, lasix 40mg/day, aldactone 25mg/day and coreg 6 25mg BID  HR 74      4  Hyperaldosteronism continue aldactone 25mg/day  Follow K trends  5  Proteinuria A3 --Microalbumin to creatinine ratio 958 milligram/gram creatinine  Urine protein to creatinine ratio 1 12 g  At risk for progressive CKD  Goal total protein <500mg/day  Look to increase lisinopril as potassium and renal function allows  Hold on firenone at this time with potassium approaching 4 8  May consider SGLT-2i at next visit  6  Hypercalcemia, mild  Check 25 vitamin D level and PTH  Started on vitamin D several months ago at least due to concern for COVID  The benefits, risks and alternatives to the treatment plan were discussed at this visit  Patient was advised of common adverse effects of any medical therapies prescribed  All questions were answered and discussed with the patient and any accompanying family members or caretakers  Subjective:      Patient ID: Kateryna Osei is a 62 y o  male being seen in follow-up for CKD 3A  Last seen by Dr Chepe Seo in April 2021  Creatinine was in 1 1-1 26 range at that time  Does have a history of hyperaldosteronism on spironolactone and essential hypertension  He also has a history of gout maintained on allopurinol  Kristie Hurtado HPI  The interim since 2021, his creatinine has been trending higher  Most recent chemistry on 08/12 showed creatinine 1 68 with an EGFR 44 mL/min  His calcium was 10 2  His creatinine has been in the mid 1 range in the past 6 months  No recent renal imaging  Microalbumin to creatinine ratio 958 milligram/gram creatinine  Urine protein to creatinine ratio 1 12 g    hemoglobin in the 16 range in August   Urinalysis showed 0 3 with hyaline casts, no RBCs, 2+ protein  Last A1c was 6 1 from May  Takes BS at home 115-125 at home  Follows with podiatrist  Denies wound on feet  Reports eye exam recently and told he had slight cataract  He had a pacemaker placed 9/21  He was having symptomatic bradycardia with HR in 30s  Last seen by cardiology in March He had multiple syncopal events prior to pacemaker  Pace maker check 1/22  He is in sinus rhythm, on AC  Echo 8/21 EF 75%   Has been complaint with CPAP in past for sleep apnea  For HTN take coreg 6 25mg BID, lisinopril 5mg/day, spironolactone 25mg/day and lasix 40mg/day  No change to his mediations aside from a reduction in coreg by cardiologist  /82 here HR 74  Two months ago 163/105  For DM, takes metformin 500mg/day  Takes advil dual action two tablets a day  Reports mild ankle edema, no weight gain or LUTS  Denies hematuria  Denies adding sodium to foods  He cut out fast food in large part  He is on xarelto for PE/DVT  Drinks more than 2 quarts of fluid per day  In the past three lab checks calcium elevated at 10 2  He had COVID twice in the past, Dec 2020 and over a month ago  Started taking 25 vitamin d 12/21  The following portions of the patient's history were reviewed and updated as appropriate: allergies, current medications, past family history, past medical history, past social history, past surgical history, and problem list     Review of Systems   Constitutional: Negative  HENT: Negative  Respiratory: Negative  Negative for apnea  Cardiovascular: Negative  Gastrointestinal: Negative  Genitourinary: Negative  Neurological: Negative  All other systems reviewed and are negative  Objective:      /82   Pulse 74   Ht 6' (1 829 m)   Wt 124 kg (274 lb)   SpO2 96%   BMI 37 16 kg/m²          Physical Exam  Vitals and nursing note reviewed  Constitutional:       General: He is not in acute distress  Appearance: He is obese  He is not ill-appearing  HENT:      Head: Atraumatic  Nose: Nose normal       Mouth/Throat:      Mouth: Mucous membranes are moist       Pharynx: Oropharynx is clear  Eyes:      Extraocular Movements: Extraocular movements intact  Conjunctiva/sclera: Conjunctivae normal    Cardiovascular:      Rate and Rhythm: Normal rate and regular rhythm  Heart sounds: No friction rub  Pulmonary:      Breath sounds:  No wheezing, rhonchi or rales  Abdominal:      General: Abdomen is flat  Bowel sounds are normal       Palpations: Abdomen is soft  Musculoskeletal:      Right lower leg: No edema  Left lower leg: No edema  Skin:     General: Skin is warm and dry  Capillary Refill: Capillary refill takes less than 2 seconds  Neurological:      General: No focal deficit present  Mental Status: He is alert and oriented to person, place, and time  Mental status is at baseline  Cranial Nerves: No cranial nerve deficit     Psychiatric:         Mood and Affect: Mood normal          Behavior: Behavior normal              Lab Results   Component Value Date     12/21/2015    SODIUM 138 08/12/2022    K 4 7 08/12/2022     (H) 08/12/2022    CO2 23 08/12/2022    ANIONGAP 10 12/21/2015    AGAP 4 08/12/2022    BUN 39 (H) 08/12/2022    CREATININE 1 68 (H) 08/12/2022    GLUC 89 12/06/2021    GLUF 120 (H) 08/12/2022    CALCIUM 10 2 (H) 08/12/2022    AST 20 08/31/2021    ALT 27 08/31/2021    ALKPHOS 54 08/31/2021    PROT 7 1 06/11/2015    PROT 7 1 06/11/2015    TP 7 5 08/31/2021    BILITOT 0 6 06/11/2015    TBILI 0 61 08/31/2021    EGFR 44 08/12/2022      Lab Results   Component Value Date    CREATININE 1 68 (H) 08/12/2022    CREATININE 1 59 (H) 05/31/2022    CREATININE 1 50 (H) 12/06/2021    CREATININE 1 33 (H) 09/02/2021    CREATININE 1 62 (H) 09/01/2021    CREATININE 1 74 (H) 08/31/2021    CREATININE 1 93 (H) 08/30/2021    CREATININE 1 72 (H) 08/30/2021    CREATININE 1 66 (H) 08/30/2021    CREATININE 1 26 03/08/2021    CREATININE 1 11 01/06/2021    CREATININE 1 26 10/12/2020    CREATININE 1 53 (H) 07/01/2020    CREATININE 1 51 (H) 03/03/2020    CREATININE 1 64 (H) 02/06/2020      Lab Results   Component Value Date    COLORU Light Yellow 08/12/2022    CLARITYU Clear 08/12/2022    SPECGRAV 1 016 08/12/2022    PHUR 6 0 08/12/2022    LEUKOCYTESUR Negative 08/12/2022    NITRITE Negative 08/12/2022    PROTEIN  (2+) (A) 08/12/2022    GLUCOSEU Negative 08/12/2022    KETONESU Negative 08/12/2022    UROBILINOGEN <2 0 08/12/2022    BILIRUBINUR Negative 08/12/2022    BLOODU Negative 08/12/2022    RBCUA None Seen 08/12/2022    WBCUA 1-2 08/12/2022    EPIS Occasional 08/12/2022    BACTERIA None Seen 08/12/2022      No results found for: LABPROT  No results found for: Buzz Blandon  Lab Results   Component Value Date    WBC 6 94 08/12/2022    HGB 16 7 08/12/2022    HCT 54 6 (H) 08/12/2022    MCV 98 08/12/2022     08/12/2022      Lab Results   Component Value Date    HGB 16 7 08/12/2022    HGB 18 3 (H) 07/28/2022    HGB 14 6 09/02/2021    HGB 15 5 09/01/2021    HGB 16 4 08/31/2021      No results found for: IRON, TIBC, FERRITIN   No results found for: PTHCALCIUM, [id number], PHOSPHORUS   Lab Results   Component Value Date    CHOLESTEROL 156 05/31/2022    HDL 72 05/31/2022    LDLCALC 61 05/31/2022    TRIG 116 05/31/2022      Lab Results   Component Value Date    URICACID 5 0 12/06/2021      Lab Results   Component Value Date    HGBA1C 6 1 (H) 05/31/2022      Lab Results   Component Value Date    FREET4 1 43 08/30/2021      Lab Results   Component Value Date    PK Negative 06/11/2015      Lab Results   Component Value Date    PROT 7 1 06/11/2015    PROT 7 1 06/11/2015    UPEP  05/23/2017     The urine total protein is increased  No monoclonal bands noted  Reviewed by: [place] ([id number]) **Electronic Signature**        Portions of the record may have been created with voice recognition software  Occasional wrong word or "sound a like" substitutions may have occurred due to the inherent limitations of voice recognition software  Read the chart carefully and recognize, using context, where substitutions have occurred  If you have any questions, please contact the dictating provider

## 2022-09-30 DIAGNOSIS — I48.0 PAROXYSMAL ATRIAL FIBRILLATION (HCC): ICD-10-CM

## 2022-09-30 DIAGNOSIS — E78.2 MIXED HYPERLIPIDEMIA: ICD-10-CM

## 2022-09-30 RX ORDER — CARVEDILOL 6.25 MG/1
TABLET ORAL
Qty: 90 TABLET | Refills: 0 | Status: SHIPPED | OUTPATIENT
Start: 2022-09-30

## 2022-09-30 RX ORDER — ATORVASTATIN CALCIUM 40 MG/1
40 TABLET, FILM COATED ORAL DAILY
Qty: 90 TABLET | Refills: 0 | Status: SHIPPED | OUTPATIENT
Start: 2022-09-30 | End: 2022-10-06 | Stop reason: SDUPTHER

## 2022-10-06 DIAGNOSIS — E11.42 DIABETIC PERIPHERAL NEUROPATHY (HCC): ICD-10-CM

## 2022-10-06 DIAGNOSIS — E78.2 MIXED HYPERLIPIDEMIA: ICD-10-CM

## 2022-10-06 RX ORDER — ATORVASTATIN CALCIUM 40 MG/1
40 TABLET, FILM COATED ORAL DAILY
Qty: 90 TABLET | Refills: 0 | Status: SHIPPED | OUTPATIENT
Start: 2022-10-06

## 2022-10-06 RX ORDER — GABAPENTIN 600 MG/1
600 TABLET ORAL 3 TIMES DAILY
Qty: 180 TABLET | Refills: 0 | Status: SHIPPED | OUTPATIENT
Start: 2022-10-06

## 2022-10-18 ENCOUNTER — REMOTE DEVICE CLINIC VISIT (OUTPATIENT)
Dept: CARDIOLOGY CLINIC | Facility: CLINIC | Age: 57
End: 2022-10-18
Payer: COMMERCIAL

## 2022-10-18 DIAGNOSIS — Z95.0 PRESENCE OF PERMANENT CARDIAC PACEMAKER: Primary | ICD-10-CM

## 2022-10-18 PROCEDURE — 93294 REM INTERROG EVL PM/LDLS PM: CPT | Performed by: INTERNAL MEDICINE

## 2022-10-18 PROCEDURE — 93296 REM INTERROG EVL PM/IDS: CPT | Performed by: INTERNAL MEDICINE

## 2022-10-18 NOTE — PROGRESS NOTES
MDT DUAL PM/ ACTIVE SYSTEM IS MRI CONDITIONAL   CARELINK TRANSMISSION:  BATTERY VOLTAGE ADEQUATE (12 9 YR )   AP 85 2%  0 6%    ALL LEAD PARAMETERS WITHIN NORMAL LIMITS   NO SIGNIFICANT HIGH RATE EPISODES   NORMAL DEVICE FUNCTION  Hua Galvan

## 2022-10-22 ENCOUNTER — APPOINTMENT (OUTPATIENT)
Dept: LAB | Facility: MEDICAL CENTER | Age: 57
End: 2022-10-22
Payer: COMMERCIAL

## 2022-10-24 DIAGNOSIS — I10 ESSENTIAL HYPERTENSION: ICD-10-CM

## 2022-10-24 DIAGNOSIS — E79.0 HYPERURICEMIA: ICD-10-CM

## 2022-10-24 DIAGNOSIS — E11.29 CONTROLLED TYPE 2 DIABETES MELLITUS WITH MICROALBUMINURIA, WITHOUT LONG-TERM CURRENT USE OF INSULIN (HCC): ICD-10-CM

## 2022-10-24 DIAGNOSIS — O22.30 DVT (DEEP VEIN THROMBOSIS) IN PREGNANCY: ICD-10-CM

## 2022-10-24 DIAGNOSIS — E11.9 TYPE 2 DIABETES MELLITUS WITHOUT COMPLICATION, WITHOUT LONG-TERM CURRENT USE OF INSULIN (HCC): ICD-10-CM

## 2022-10-24 DIAGNOSIS — E26.9 HYPERALDOSTERONISM (HCC): ICD-10-CM

## 2022-10-24 DIAGNOSIS — R80.9 CONTROLLED TYPE 2 DIABETES MELLITUS WITH MICROALBUMINURIA, WITHOUT LONG-TERM CURRENT USE OF INSULIN (HCC): ICD-10-CM

## 2022-10-24 RX ORDER — SPIRONOLACTONE 25 MG/1
25 TABLET ORAL DAILY
Qty: 90 TABLET | Refills: 0 | Status: SHIPPED | OUTPATIENT
Start: 2022-10-24

## 2022-10-24 RX ORDER — ALLOPURINOL 300 MG/1
300 TABLET ORAL DAILY
Qty: 90 TABLET | Refills: 0 | Status: SHIPPED | OUTPATIENT
Start: 2022-10-24

## 2022-10-24 RX ORDER — METFORMIN HYDROCHLORIDE 500 MG/1
500 TABLET, EXTENDED RELEASE ORAL
Qty: 90 TABLET | Refills: 0 | Status: SHIPPED | OUTPATIENT
Start: 2022-10-24

## 2022-10-24 RX ORDER — LISINOPRIL 5 MG/1
5 TABLET ORAL DAILY
Qty: 90 TABLET | Refills: 0 | Status: SHIPPED | OUTPATIENT
Start: 2022-10-24

## 2022-11-06 DIAGNOSIS — I48.0 PAROXYSMAL ATRIAL FIBRILLATION (HCC): ICD-10-CM

## 2022-11-06 DIAGNOSIS — E03.9 ACQUIRED HYPOTHYROIDISM: ICD-10-CM

## 2022-11-07 ENCOUNTER — OFFICE VISIT (OUTPATIENT)
Dept: CARDIOLOGY CLINIC | Facility: HOSPITAL | Age: 57
End: 2022-11-07

## 2022-11-07 VITALS
BODY MASS INDEX: 37.11 KG/M2 | SYSTOLIC BLOOD PRESSURE: 110 MMHG | HEIGHT: 72 IN | DIASTOLIC BLOOD PRESSURE: 64 MMHG | HEART RATE: 86 BPM | WEIGHT: 274 LBS

## 2022-11-07 DIAGNOSIS — Z95.0 PACEMAKER: ICD-10-CM

## 2022-11-07 DIAGNOSIS — G47.33 OBSTRUCTIVE SLEEP APNEA: ICD-10-CM

## 2022-11-07 DIAGNOSIS — I10 PRIMARY HYPERTENSION: ICD-10-CM

## 2022-11-07 DIAGNOSIS — N18.31 STAGE 3A CHRONIC KIDNEY DISEASE (HCC): ICD-10-CM

## 2022-11-07 DIAGNOSIS — I48.0 PAROXYSMAL ATRIAL FIBRILLATION (HCC): Primary | ICD-10-CM

## 2022-11-07 DIAGNOSIS — I51.7 LVH (LEFT VENTRICULAR HYPERTROPHY): ICD-10-CM

## 2022-11-07 RX ORDER — LEVOTHYROXINE SODIUM 0.15 MG/1
TABLET ORAL
Qty: 90 TABLET | Refills: 0 | Status: SHIPPED | OUTPATIENT
Start: 2022-11-07

## 2022-11-07 RX ORDER — CARVEDILOL 6.25 MG/1
TABLET ORAL
Qty: 90 TABLET | Refills: 0 | Status: SHIPPED | OUTPATIENT
Start: 2022-11-07

## 2022-11-07 NOTE — PROGRESS NOTES
Cardiology Follow Up    Ines Wheeler  1965  [id number]  [address] [id number] [phone]    1  Paroxysmal atrial fibrillation (HCC)     2  Primary hypertension     3  Stage 3a chronic kidney disease ([facility] )     4  Pacemaker     5  Obstructive sleep apnea     6  LVH (left ventricular hypertrophy)           Discussion/Summary: All of his assessed cardiac problems are stable  I have reviewed his medications and made no changes  No cardiac testing is ordered  RTO 9 months  Interval History: He is doing well and feels much better since starting CPAP early this year  He denies any palpitations and his pacemaker shows rare PAF  /64  His weight is up from 266 to 274 lbs      ECHO 8/2021 - EF 75 % mild LVH            Patient Active Problem List   Diagnosis   • Controlled type 2 diabetes mellitus with microalbuminuria, without long-term current use of insulin ([place] [address] )   • Diabetic peripheral neuropathy ([place] [address] )   • Erectile dysfunction due to diseases classified elsewhere   • Homozygous for MTHFR gene mutation   • Hypothyroidism (acquired)   • Sacroiliitis ([facility] )   • Tarsal tunnel syndrome of both lower extremities   • Tarsal tunnel syndrome, left   • Venous stasis ulcer of left ankle limited to breakdown of skin with varicose veins (HCC)   • Gout   • Hyperaldosteronism (HCC)   • Diabetic nephropathy associated with type 2 diabetes mellitus (HCC)   • Stage 3a chronic kidney disease (HCC)   • Proteinuria   • Vitamin D deficiency   • Syncopal episodes    • Paroxysmal atrial fibrillation (HCC)   • History of DVT (deep vein thrombosis)   • Sinus bradycardia   • Obstructive sleep apnea   • 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HCC)   • Pacemaker   • Primary hypertension   • LVH (left ventricular hypertrophy)     Past Medical History:   Diagnosis Date   • Arthritis     right foot   • Chronic cholecystitis    • Diabetes mellitus (HCC)    • DVT (deep venous thrombosis) (HCC)    • Gout    • History of pulmonary embolism    • Hyperlipidemia    • Hypertension    • Hypothyroidism    • Lumbar back pain    • MTHFR mutation    • Neuropathy    • Scab     right arm- no s/s infection   • Sleep apnea     no CPAP   • Tarsal tunnel syndrome, right    • Tinnitus     left ear   • Wears glasses     and contacts   • Wears glasses      Social History     Socioeconomic History   • Marital status: Single     Spouse name: Not on file   • Number of children: Not on file   • Years of education: Not on file   • Highest education level: Not on file   Occupational History   • Not on file   Tobacco Use   • Smoking status: Never Smoker   • Smokeless tobacco: Never Used   Vaping Use   • Vaping Use: Never used   Substance and Sexual Activity   • Alcohol use: Yes     Comment: weekends   • Drug use: No   • Sexual activity: Yes   Other Topics Concern   • Not on file   Social History Narrative   • Not on file     Social Determinants of Health     Financial Resource Strain: Not on file   Food Insecurity: Not on file   Transportation Needs: Not on file   Physical Activity: Not on file   Stress: Not on file   Social Connections: Not on file   Intimate Partner Violence: Not on file   Housing Stability: Not on file      Family History   Problem Relation Age of Onset   • Aneurysm Mother         intracranial aneurysm repair   • Coronary artery disease Father    • Hypertension Father    • Aneurysm Brother      Past Surgical History:   Procedure Laterality Date   • APPENDECTOMY     • ARTHRODESIS      Lumbar L__   • BACK SURGERY     • BUNIONECTOMY Right 10/07/2016    Procedure: REMOVAL TIBIAL  SESAMOID BONE  RIGHT FOOT;  Surgeon: Clara Downey DPM;  Location: [facility];  Service:    • CARDIAC PACEMAKER PLACEMENT  09/01/2021   • CHOLECYSTECTOMY  03/26/2015   • COLONOSCOPY     • KNEE ARTHROSCOPY     • KNEE ARTHROSCOPY W/ MENISCAL REPAIR      Lateral   • NASAL SEPTUM SURGERY  10/16/2013   • [place] COLONOSCOPY FLX DX W/COLLJ SPEC WHEN PFRMD N/A 11/23/2018    Procedure: COLONOSCOPY;  Surgeon: Mallory Vogt MD;  Location: [facility];  Service: Gastroenterology   • [place] TARSAL TUNNEL RELEASE Right 06/25/2018    Procedure: RELEASE TARSAL TUNNEL;  Surgeon: Doc Vuong DPM;  Location: [facility];  Service: Podiatry   • [place] TARSAL TUNNEL RELEASE Left 03/13/2020    Procedure: RELEASE TARSAL TUNNEL;  Surgeon: Doc Vuong DPM;  Location: [facility] OR;  Service: Podiatry   • SPINAL FUSION     • TONSILLECTOMY  10/16/2013    with Adenoidectomy   • UVULECTOMY  10/16/2013   • UVULOPALATOPHARYNGOPLASTY     • WISDOM TOOTH EXTRACTION         Current Outpatient Medications:   •  allopurinol (ZYLOPRIM) 300 mg tablet, Take 1 tablet (300 mg total) by mouth daily, Disp: 90 tablet, Rfl: 0  •  atorvastatin (LIPITOR) 40 mg tablet, Take 1 tablet (40 mg total) by mouth daily, Disp: 90 tablet, Rfl: 0  •  carvedilol (COREG) 6 25 mg tablet, 1 tablet in the morning and 1 in 1/2 tablets in the evening, Disp: 90 tablet, Rfl: 0  •  ezetimibe (ZETIA) 10 mg tablet, Take 1 tablet (10 mg total) by mouth daily, Disp: 90 tablet, Rfl: 0  •  furosemide (LASIX) 40 mg tablet, Take 1 tablet (40 mg total) by mouth daily, Disp: 90 tablet, Rfl: 1  •  gabapentin (NEURONTIN) 600 MG tablet, Take 1 tablet (600 mg total) by mouth 3 (three) times a day, Disp: 180 tablet, Rfl: 0  •  levothyroxine (Euthyrox) 25 mcg tablet, Take 1 tablet (25 mcg total) by mouth daily with Levothyroxine 150 mcg to equal 175 mcg total, Disp: 90 tablet, Rfl: 0  •  levothyroxine 150 mcg tablet, Take 1 tablet of 150 mcg strength with 1 tablet of 25 mcg strength to make a total dose of 175 mcg daily, Disp: 90 tablet, Rfl: 0  •  lisinopril (ZESTRIL) 5 mg tablet, Take 1 tablet (5 mg total) by mouth daily, Disp: 90 tablet, Rfl: 0  •  metFORMIN (GLUCOPHAGE-XR) 500 mg 24 hr tablet, Take 1 tablet (500 mg total) by mouth daily at bedtime, Disp: 90 tablet, Rfl: 0  •  rivaroxaban (Xarelto) 20 mg tablet, Take 1 tablet (20 mg total) by mouth daily with breakfast, Disp: 90 tablet, Rfl: 0  •  sildenafil (Viagra) 100 mg tablet, Take 1 tablet (100 mg total) by mouth as needed for erectile dysfunction, Disp: 30 tablet, Rfl: 0  •  spironolactone (ALDACTONE) 25 mg tablet, Take 1 tablet by mouth once daily, Disp: 90 tablet, Rfl: 0  •  Multiple Vitamins-Minerals (MENS MULTIVITAMIN PLUS) TABS, Take 1 tablet by mouth daily (Patient not taking: Reported on 11/7/2022), Disp: , Rfl:   •  predniSONE 20 mg tablet, 1 t i d  day 1, 1 b i d  day 2 and day 3, 1 day 4 and 5 (Patient not taking: Reported on 11/7/2022), Disp: 10 tablet, Rfl: 0  •  spironolactone (ALDACTONE) 25 mg tablet, Take 1 tablet (25 mg total) by mouth daily (Patient not taking: Reported on 11/7/2022), Disp: 90 tablet, Rfl: 0  •  zolpidem (AMBIEN) 5 mg tablet, Take 1 tablet (5 mg total) by mouth as needed for sleep (for use during sleep study) for up to 1 day (Patient not taking: No sig reported), Disp: 1 tablet, Rfl: 0  No Known Allergies  Vitals:    11/07/22 1508   BP: 110/64   Pulse: 86   Weight: 124 kg (274 lb)   Height: 6' (1 829 m)     Weight (last 2 days)     Date/Time Weight    11/07/22 1508 124 (274)         Blood pressure 110/64, pulse 86, height 6' (1 829 m), weight 124 kg (274 lb)  , Body mass index is 37 16 kg/m²      Labs:  Appointment on 08/12/2022   Component Date Value   • Sodium 08/12/2022 138    • Potassium 08/12/2022 4 7    • Chloride 08/12/2022 111 (A)   • CO2 08/12/2022 23    • ANION GAP 08/12/2022 4    • BUN 08/12/2022 39 (A)   • Creatinine 08/12/2022 1 68 (A)   • Glucose, Fasting 08/12/2022 120 (A)   • Calcium 08/12/2022 10 2 (A)   • eGFR 08/12/2022 44    • WBC 08/12/2022 6 94    • RBC 08/12/2022 5 55    • Hemoglobin 08/12/2022 16 7    • Hematocrit 08/12/2022 54 6 (A)   • MCV 08/12/2022 98    • MCH 08/12/2022 30 1    • MCHC 08/12/2022 30 6 (A)   • RDW 08/12/2022 14 6    • MPV 08/12/2022 9 5    • Platelets 03/62/3954 186    • nRBC 08/12/2022 0    • Neutrophils Relative 08/12/2022 62    • Immat GRANS % 08/12/2022 0    • Lymphocytes Relative 08/12/2022 23    • Monocytes Relative 08/12/2022 10    • Eosinophils Relative 08/12/2022 4    • Basophils Relative 08/12/2022 1    • Neutrophils Absolute 08/12/2022 4 29    • Immature Grans Absolute 08/12/2022 0 02    • Lymphocytes Absolute 08/12/2022 1 58    • Monocytes Absolute 08/12/2022 0 72    • Eosinophils Absolute 08/12/2022 0 29    • Basophils Absolute 08/12/2022 0 04    Ancillary Orders on 08/12/2022   Component Date Value   • Hemoglobin A1C 10/22/2022 6 3 (A)   • EAG 10/22/2022 134    • Sodium 10/22/2022 140    • Potassium 10/22/2022 4 4    • Chloride 10/22/2022 114 (A)   • CO2 10/22/2022 24    • ANION GAP 10/22/2022 2 (A)   • BUN 10/22/2022 29 (A)   • Creatinine 10/22/2022 1 39 (A)   • Glucose, Fasting 10/22/2022 131 (A)   • Calcium 10/22/2022 10 4 (A)   • eGFR 10/22/2022 55    • Creatinine, Ur 10/22/2022 143 0    • Microalbum  ,U,Random 10/22/2022 1,470 0 (A)   • Microalb Creat Ratio 10/22/2022 1,028 (A)   Orders Only on 08/04/2022   Component Date Value   • Color, UA 08/12/2022 Light Yellow    • Clarity, UA 08/12/2022 Clear    • Specific Gravity, UA 08/12/2022 1 016    • pH, UA 08/12/2022 6 0    • Leukocytes, UA 08/12/2022 Negative    • Nitrite, UA 08/12/2022 Negative    • Protein, UA 08/12/2022 100 (2+) (A)   • Glucose, UA 08/12/2022 Negative    • Ketones, UA 08/12/2022 Negative    • Urobilinogen, UA 08/12/2022 <2 0    • Bilirubin, UA 08/12/2022 Negative    • Occult Blood, UA 08/12/2022 Negative    • RBC, UA 08/12/2022 None Seen    • WBC, UA 08/12/2022 1-2    • Epithelial Cells 08/12/2022 Occasional    • Bacteria, UA 08/12/2022 None Seen    • Hyaline Casts, UA 08/12/2022 0-3 (A)   • Creatinine, Ur 08/12/2022 98 0    • Protein Urine Random 08/12/2022 110    • Prot/Creat Ratio, Ur 08/12/2022 1 12 (A)   • Creatinine, Ur 08/12/2022 98 0 • Microalbum  ,U,Random 08/12/2022 939 0 (A)   • Microalb Creat Ratio 08/12/2022 958 (A)   Appointment on 07/28/2022   Component Date Value   • Sed Rate 07/28/2022 30 (A)   • WBC 07/28/2022 6 42    • RBC 07/28/2022 5 93 (A)   • Hemoglobin 07/28/2022 18 3 (A)   • Hematocrit 07/28/2022 56 7 (A)   • MCV 07/28/2022 96    • MCH 07/28/2022 30 9    • MCHC 07/28/2022 32 3    • RDW 07/28/2022 14 9    • MPV 07/28/2022 10 1    • Platelets 63/07/7225 208    • nRBC 07/28/2022 0    • Neutrophils Relative 07/28/2022 80 (A)   • Immat GRANS % 07/28/2022 1    • Lymphocytes Relative 07/28/2022 14    • Monocytes Relative 07/28/2022 5    • Eosinophils Relative 07/28/2022 0    • Basophils Relative 07/28/2022 0    • Neutrophils Absolute 07/28/2022 5 14    • Immature Grans Absolute 07/28/2022 0 03    • Lymphocytes Absolute 07/28/2022 0 92    • Monocytes Absolute 07/28/2022 0 30    • Eosinophils Absolute 07/28/2022 0 02    • Basophils Absolute 07/28/2022 0 01    Lab on 05/31/2022   Component Date Value   • Cholesterol 05/31/2022 156    • Triglycerides 05/31/2022 116    • HDL, Direct 05/31/2022 72    • LDL Calculated 05/31/2022 61    • Non-HDL-Chol (CHOL-HDL) 05/31/2022 84    • TSH 3RD GENERATON 05/31/2022 1 530    Office Visit on 05/23/2022   Component Date Value   • Hemoglobin A1C 05/31/2022 6 1 (A)   • EAG 05/31/2022 128    • Sodium 05/31/2022 141    • Potassium 05/31/2022 4 4    • Chloride 05/31/2022 110 (A)   • CO2 05/31/2022 28    • ANION GAP 05/31/2022 3 (A)   • BUN 05/31/2022 41 (A)   • Creatinine 05/31/2022 1 59 (A)   • Glucose, Fasting 05/31/2022 114 (A)   • Calcium 05/31/2022 10 6 (A)   • eGFR 05/31/2022 47    • Creatinine, Ur 05/31/2022 97 0    • Microalbum  ,U,Random 05/31/2022 1,250 0 (A)   • Microalb Creat Ratio 05/31/2022 1,289 (A)     Imaging: Cardiac EP device report    Result Date: 10/18/2022  Narrative: MDDT DUAL PM/ ACTIVE SYSTEM IS MRI CONDITIONAL CARELINK TRANSMISSION:  BATTERY VOLTAGE ADEQUATE (12 9 YR )    AP 85 2%  0  6%   ALL LEAD PARAMETERS WITHIN NORMAL LIMITS  NO SIGNIFICANT HIGH RATE EPISODES  NORMAL DEVICE FUNCTION  RG       Review of Systems:  Review of Systems   Constitutional: Negative for diaphoresis, fatigue, fever and unexpected weight change  HENT: Negative  Respiratory: Negative for cough, shortness of breath and wheezing  Cardiovascular: Negative for chest pain, palpitations and leg swelling  Gastrointestinal: Negative for abdominal pain, diarrhea and nausea  Musculoskeletal: Negative for gait problem and myalgias  Skin: Negative for rash  Neurological: Negative for dizziness and numbness  Psychiatric/Behavioral: Negative  Physical Exam:  Physical Exam  Constitutional:       Appearance: He is well-developed  HENT:      Head: Normocephalic and atraumatic  Eyes:      Pupils: Pupils are equal, round, and reactive to light  Neck:      Vascular: No JVD  Cardiovascular:      Rate and Rhythm: Regular rhythm  Pulses: Normal pulses  Carotid pulses are 2+ on the right side and 2+ on the left side  Heart sounds: S1 normal and S2 normal    Pulmonary:      Effort: Pulmonary effort is normal       Breath sounds: Normal breath sounds  No wheezing or rales  Abdominal:      General: Bowel sounds are normal       Palpations: Abdomen is soft  Tenderness: There is no abdominal tenderness  Musculoskeletal:         General: No tenderness  Normal range of motion  Cervical back: Normal range of motion and neck supple  Skin:     General: Skin is warm  Neurological:      Mental Status: He is alert and oriented to person, place, and time  Cranial Nerves: No cranial nerve deficit  Deep Tendon Reflexes: Reflexes are normal and symmetric

## 2022-11-18 DIAGNOSIS — E11.42 DIABETIC PERIPHERAL NEUROPATHY (HCC): ICD-10-CM

## 2022-11-18 DIAGNOSIS — E03.9 ACQUIRED HYPOTHYROIDISM: ICD-10-CM

## 2022-11-18 RX ORDER — GABAPENTIN 600 MG/1
600 TABLET ORAL 3 TIMES DAILY
Qty: 180 TABLET | Refills: 0 | Status: SHIPPED | OUTPATIENT
Start: 2022-11-18

## 2022-11-18 RX ORDER — LEVOTHYROXINE SODIUM 0.03 MG/1
25 TABLET ORAL DAILY
Qty: 90 TABLET | Refills: 0 | Status: SHIPPED | OUTPATIENT
Start: 2022-11-18

## 2022-11-25 ENCOUNTER — TELEPHONE (OUTPATIENT)
Dept: NEPHROLOGY | Facility: CLINIC | Age: 57
End: 2022-11-25

## 2022-11-25 ENCOUNTER — OFFICE VISIT (OUTPATIENT)
Dept: FAMILY MEDICINE CLINIC | Facility: CLINIC | Age: 57
End: 2022-11-25

## 2022-11-25 VITALS
BODY MASS INDEX: 38.33 KG/M2 | HEIGHT: 72 IN | HEART RATE: 80 BPM | WEIGHT: 283 LBS | OXYGEN SATURATION: 97 % | TEMPERATURE: 97.6 F | DIASTOLIC BLOOD PRESSURE: 84 MMHG | SYSTOLIC BLOOD PRESSURE: 128 MMHG

## 2022-11-25 DIAGNOSIS — I10 PRIMARY HYPERTENSION: ICD-10-CM

## 2022-11-25 DIAGNOSIS — E11.29 CONTROLLED TYPE 2 DIABETES MELLITUS WITH MICROALBUMINURIA, WITHOUT LONG-TERM CURRENT USE OF INSULIN (HCC): ICD-10-CM

## 2022-11-25 DIAGNOSIS — N18.31 STAGE 3A CHRONIC KIDNEY DISEASE (HCC): ICD-10-CM

## 2022-11-25 DIAGNOSIS — R80.9 CONTROLLED TYPE 2 DIABETES MELLITUS WITH MICROALBUMINURIA, WITHOUT LONG-TERM CURRENT USE OF INSULIN (HCC): ICD-10-CM

## 2022-11-25 DIAGNOSIS — E11.21 DIABETIC NEPHROPATHY ASSOCIATED WITH TYPE 2 DIABETES MELLITUS (HCC): ICD-10-CM

## 2022-11-25 DIAGNOSIS — Z15.89 HOMOZYGOUS FOR MTHFR GENE MUTATION: Primary | ICD-10-CM

## 2022-11-25 DIAGNOSIS — G47.33 OBSTRUCTIVE SLEEP APNEA: ICD-10-CM

## 2022-11-25 RX ORDER — FLASH GLUCOSE SENSOR
KIT MISCELLANEOUS
Qty: 1 EACH | Refills: 5 | Status: SHIPPED | OUTPATIENT
Start: 2022-11-25

## 2022-11-25 NOTE — PROGRESS NOTES
BMI Counseling: Body mass index is 38 38 kg/m²  The BMI is above normal  Nutrition recommendations include decreasing portion sizes, encouraging healthy choices of fruits and vegetables, moderation in carbohydrate intake and increasing intake of lean protein  Exercise recommendations include moderate physical activity 150 minutes/week  Rationale for BMI follow-up plan is due to patient being overweight or obese  Depression Screening and Follow-up Plan: Patient was screened for depression during today's encounter  They screened negative with a PHQ-2 score of 0  Assessment/Plan:  Patient's blood sugars are somewhat labile patient has been checking his sugar 2-3 times daily and has requested a continuous glucose monitor to get a better handle on his diabetes    Problem List Items Addressed This Visit        Endocrine    Controlled type 2 diabetes mellitus with microalbuminuria, without long-term current use of insulin ([facility])    Homozygous for MTHFR gene mutation - Primary    Diabetic nephropathy associated with type 2 diabetes mellitus ([address] )       Respiratory    Obstructive sleep apnea       Cardiovascular and Mediastinum    Primary hypertension       Genitourinary    Stage 3a chronic kidney disease ([facility] )        Diagnoses and all orders for this visit:    Homozygous for MTHFR gene mutation    Controlled type 2 diabetes mellitus with microalbuminuria, without long-term current use of insulin ([facility] )    Diabetic nephropathy associated with type 2 diabetes mellitus ([address] )    Obstructive sleep apnea    Stage 3a chronic kidney disease ([facility] )    Primary hypertension        No problem-specific Assessment & Plan notes found for this encounter  Subjective:      Patient ID: Conrado Grossman is a 62 y o  male      Mr Phillip Spring old here for a follow-up of visit regarding his diabetes peripheral neuropathy cardiac dysrhythmia obstructive sleep apnea morbid obesity       The following portions of the patient's history were reviewed and updated as appropriate:   He has a past medical history of Arthritis, Chronic cholecystitis, Diabetes mellitus ([facility] ), DVT (deep venous thrombosis) ([facility] ), Gout, History of pulmonary embolism, Hyperlipidemia, Hypertension, Hypothyroidism, Lumbar back pain, MTHFR mutation, Neuropathy, Scab, Sleep apnea, Tarsal tunnel syndrome, right, Tinnitus, Wears glasses, and Wears glasses  ,  does not have any pertinent problems on file  ,   has a past surgical history that includes Cholecystectomy (03/26/2015); Appendectomy; Spinal fusion; Tonsillectomy (10/16/2013); Nasal septum surgery (10/16/2013); Uvulopalatopharyngoplasty; [place] tooth extraction; Bunionectomy (Right, 10/07/2016); Arthrodesis; Knee arthroscopy w/ meniscal repair; Uvulectomy (10/16/2013); pr tarsal tunnel release (Right, 06/25/2018); pr colonoscopy flx dx w/collj spec when pfrmd (N/A, 11/23/2018); Colonoscopy; Back surgery; Knee arthroscopy; pr tarsal tunnel release (Left, 03/13/2020); and Cardiac pacemaker placement (09/01/2021)  ,  family history includes Aneurysm in his brother and mother; Coronary artery disease in his father; Hypertension in his father  ,   reports that he has never smoked  He has never used smokeless tobacco  He reports current alcohol use  He reports that he does not use drugs  ,  has No Known Allergies     Current Outpatient Medications   Medication Sig Dispense Refill   • allopurinol (ZYLOPRIM) 300 mg tablet Take 1 tablet (300 mg total) by mouth daily 90 tablet 0   • atorvastatin (LIPITOR) 40 mg tablet Take 1 tablet (40 mg total) by mouth daily 90 tablet 0   • carvedilol (COREG) 6 25 mg tablet 1 tablet in the morning and 1 in 1/2 tablets in the evening 90 tablet 0   • ezetimibe (ZETIA) 10 mg tablet Take 1 tablet (10 mg total) by mouth daily 90 tablet 0   • furosemide (LASIX) 40 mg tablet Take 1 tablet (40 mg total) by mouth daily 90 tablet 1   • gabapentin (NEURONTIN) 600 MG tablet Take 1 tablet (600 mg total) by mouth 3 (three) times a day 180 tablet 0   • levothyroxine (Euthyrox) 25 mcg tablet Take 1 tablet (25 mcg total) by mouth daily with Levothyroxine 150 mcg to equal 175 mcg total 90 tablet 0   • levothyroxine 150 mcg tablet Take 1 tablet of 150 mcg strength with 1 tablet of 25 mcg strength to make a total dose of 175 mcg daily 90 tablet 0   • lisinopril (ZESTRIL) 5 mg tablet Take 1 tablet (5 mg total) by mouth daily 90 tablet 0   • metFORMIN (GLUCOPHAGE-XR) 500 mg 24 hr tablet Take 1 tablet (500 mg total) by mouth daily at bedtime 90 tablet 0   • rivaroxaban (Xarelto) 20 mg tablet Take 1 tablet (20 mg total) by mouth daily with breakfast 90 tablet 0   • sildenafil (Viagra) 100 mg tablet Take 1 tablet (100 mg total) by mouth as needed for erectile dysfunction 30 tablet 0   • spironolactone (ALDACTONE) 25 mg tablet Take 1 tablet by mouth once daily 90 tablet 0   • Multiple Vitamins-Minerals (MENS MULTIVITAMIN PLUS) TABS Take 1 tablet by mouth daily (Patient not taking: Reported on 11/7/2022)     • predniSONE 20 mg tablet 1 t i d  day 1, 1 b i d  day 2 and day 3, 1 day 4 and 5 (Patient not taking: Reported on 11/7/2022) 10 tablet 0   • spironolactone (ALDACTONE) 25 mg tablet Take 1 tablet (25 mg total) by mouth daily (Patient not taking: Reported on 11/7/2022) 90 tablet 0   • zolpidem (AMBIEN) 5 mg tablet Take 1 tablet (5 mg total) by mouth as needed for sleep (for use during sleep study) for up to 1 day (Patient not taking: No sig reported) 1 tablet 0     No current facility-administered medications for this visit  Review of Systems   Constitutional: Negative for activity change, appetite change, diaphoresis, fatigue and fever  HENT: Negative  Negative for dental problem  Eyes: Positive for visual disturbance  Wears glasses   Respiratory: Negative for apnea, cough, chest tightness, shortness of breath and wheezing  Cardiovascular: Negative for chest pain, palpitations and leg swelling     Gastrointestinal: Negative for abdominal distention, abdominal pain, anal bleeding, constipation, diarrhea, nausea and vomiting  Endocrine: Negative for cold intolerance, heat intolerance, polydipsia, polyphagia and polyuria  Genitourinary: Negative for difficulty urinating, dysuria, flank pain, hematuria and urgency  Musculoskeletal: Positive for arthralgias  Negative for back pain, gait problem, joint swelling and myalgias  Skin: Negative for color change, rash and wound  Allergic/Immunologic: Negative for environmental allergies, food allergies and immunocompromised state  Neurological: Negative for dizziness, seizures, syncope, speech difficulty, numbness and headaches  Hematological: Negative for adenopathy  Does not bruise/bleed easily  Psychiatric/Behavioral: Negative for agitation, behavioral problems, hallucinations, sleep disturbance and suicidal ideas  Objective:  Vitals:    11/25/22 0924   BP: 128/84   BP Location: Left arm   Patient Position: Sitting   Cuff Size: Large   Pulse: 80   Temp: 97 6 °F (36 4 °C)   TempSrc: Temporal   SpO2: 97%   Weight: 128 kg (283 lb)   Height: 6' (1 829 m)     Body mass index is 38 38 kg/m²  Physical Exam  Constitutional:       General: He is not in acute distress  Appearance: He is well-developed and well-nourished  He is not diaphoretic  HENT:      Head: Normocephalic  Right Ear: External ear normal       Left Ear: External ear normal       Nose: Nose normal       Mouth/Throat:      Mouth: Oropharynx is clear and moist    Eyes:      General: No scleral icterus  Right eye: No discharge  Left eye: No discharge  Extraocular Movements: EOM normal       Conjunctiva/sclera: Conjunctivae normal       Pupils: Pupils are equal, round, and reactive to light  Neck:      Thyroid: No thyromegaly  Trachea: No tracheal deviation  Cardiovascular:      Rate and Rhythm: Normal rate and regular rhythm        Pulses: no weak pulses Dorsalis pedis pulses are 2+ on the right side and 2+ on the left side  Posterior tibial pulses are 2+ on the right side and 2+ on the left side  Heart sounds: Normal heart sounds  No murmur heard  No friction rub  No gallop  Pulmonary:      Effort: Pulmonary effort is normal  No respiratory distress  Breath sounds: Normal breath sounds  No wheezing  Abdominal:      General: Bowel sounds are normal       Palpations: Abdomen is soft  There is no mass  Tenderness: There is no abdominal tenderness  There is no guarding  Musculoskeletal:         General: No deformity or edema  Cervical back: Normal range of motion  Feet:      Right foot:      Skin integrity: No ulcer, skin breakdown, erythema, warmth, callus or dry skin  Left foot:      Skin integrity: No ulcer, skin breakdown, erythema, warmth, callus or dry skin  Lymphadenopathy:      Cervical: No cervical adenopathy  Skin:     General: Skin is warm and dry  Findings: No erythema or rash  Neurological:      Mental Status: He is alert and oriented to person, place, and time  Cranial Nerves: No cranial nerve deficit  Psychiatric:         Mood and Affect: Mood and affect normal          Thought Content: Thought content normal        Patient's shoes and socks removed  Right Foot/Ankle   Right Foot Inspection  Skin Exam: skin normal and skin intact  No dry skin, no warmth, no callus, no erythema, no maceration, no abnormal color, no pre-ulcer, no ulcer and no callus  Toe Exam: ROM and strength within normal limits  Sensory   Vibration: intact  Proprioception: intact  Monofilament testing: intact    Vascular  Capillary refills: < 3 seconds  The right DP pulse is 2+  The right PT pulse is 2+  Left Foot/Ankle  Left Foot Inspection  Skin Exam: skin normal and skin intact  No dry skin, no warmth, no erythema, no maceration, normal color, no pre-ulcer, no ulcer and no callus       Toe Exam: ROM and strength within normal limits  Sensory   Vibration: intact  Proprioception: intact  Monofilament testing: intact    Vascular  Capillary refills: < 3 seconds  The left DP pulse is 2+  The left PT pulse is 2+       Assign Risk Category  No deformity present  No loss of protective sensation  No weak pulses  Risk: 0

## 2022-11-28 ENCOUNTER — TELEPHONE (OUTPATIENT)
Dept: FAMILY MEDICINE CLINIC | Facility: CLINIC | Age: 57
End: 2022-11-28

## 2022-11-28 NOTE — TELEPHONE ENCOUNTER
Called Pt L/M asking him where he wanted his [facility] Rx to be sent? It is currently at  for Pt pick-up - waiting on message response

## 2022-12-03 ENCOUNTER — APPOINTMENT (OUTPATIENT)
Dept: LAB | Facility: MEDICAL CENTER | Age: 57
End: 2022-12-03

## 2022-12-03 DIAGNOSIS — E11.29 CONTROLLED TYPE 2 DIABETES MELLITUS WITH MICROALBUMINURIA, WITHOUT LONG-TERM CURRENT USE OF INSULIN (HCC): ICD-10-CM

## 2022-12-03 DIAGNOSIS — I10 PRIMARY HYPERTENSION: ICD-10-CM

## 2022-12-03 DIAGNOSIS — N18.32 STAGE 3B CHRONIC KIDNEY DISEASE (HCC): ICD-10-CM

## 2022-12-03 DIAGNOSIS — R80.9 CONTROLLED TYPE 2 DIABETES MELLITUS WITH MICROALBUMINURIA, WITHOUT LONG-TERM CURRENT USE OF INSULIN (HCC): ICD-10-CM

## 2022-12-03 LAB
25(OH)D3 SERPL-MCNC: 35.3 NG/ML (ref 30–100)
ALBUMIN SERPL BCP-MCNC: 3.9 G/DL (ref 3.5–5)
ALP SERPL-CCNC: 56 U/L (ref 46–116)
ALT SERPL W P-5'-P-CCNC: 48 U/L (ref 12–78)
ANION GAP SERPL CALCULATED.3IONS-SCNC: 3 MMOL/L (ref 4–13)
AST SERPL W P-5'-P-CCNC: 25 U/L (ref 5–45)
BACTERIA UR QL AUTO: ABNORMAL /HPF
BASOPHILS # BLD AUTO: 0.03 THOUSANDS/ÂΜL (ref 0–0.1)
BASOPHILS NFR BLD AUTO: 1 % (ref 0–1)
BILIRUB SERPL-MCNC: 0.38 MG/DL (ref 0.2–1)
BILIRUB UR QL STRIP: NEGATIVE
BUN SERPL-MCNC: 27 MG/DL (ref 5–25)
CALCIUM SERPL-MCNC: 10.5 MG/DL (ref 8.3–10.1)
CHLORIDE SERPL-SCNC: 109 MMOL/L (ref 96–108)
CLARITY UR: CLEAR
CO2 SERPL-SCNC: 26 MMOL/L (ref 21–32)
COLOR UR: ABNORMAL
CREAT SERPL-MCNC: 1.49 MG/DL (ref 0.6–1.3)
CREAT UR-MCNC: 114 MG/DL
EOSINOPHIL # BLD AUTO: 0.29 THOUSAND/ÂΜL (ref 0–0.61)
EOSINOPHIL NFR BLD AUTO: 6 % (ref 0–6)
ERYTHROCYTE [DISTWIDTH] IN BLOOD BY AUTOMATED COUNT: 13.6 % (ref 11.6–15.1)
GFR SERPL CREATININE-BSD FRML MDRD: 51 ML/MIN/1.73SQ M
GLUCOSE P FAST SERPL-MCNC: 129 MG/DL (ref 65–99)
GLUCOSE UR STRIP-MCNC: NEGATIVE MG/DL
HCT VFR BLD AUTO: 50.3 % (ref 36.5–49.3)
HGB BLD-MCNC: 15.4 G/DL (ref 12–17)
HGB UR QL STRIP.AUTO: NEGATIVE
IMM GRANULOCYTES # BLD AUTO: 0.01 THOUSAND/UL (ref 0–0.2)
IMM GRANULOCYTES NFR BLD AUTO: 0 % (ref 0–2)
KETONES UR STRIP-MCNC: NEGATIVE MG/DL
LEUKOCYTE ESTERASE UR QL STRIP: NEGATIVE
LYMPHOCYTES # BLD AUTO: 1.37 THOUSANDS/ÂΜL (ref 0.6–4.47)
LYMPHOCYTES NFR BLD AUTO: 30 % (ref 14–44)
MAGNESIUM SERPL-MCNC: 2.2 MG/DL (ref 1.6–2.6)
MCH RBC QN AUTO: 29.7 PG (ref 26.8–34.3)
MCHC RBC AUTO-ENTMCNC: 30.6 G/DL (ref 31.4–37.4)
MCV RBC AUTO: 97 FL (ref 82–98)
MICROALBUMIN UR-MCNC: 1610 MG/L (ref 0–20)
MICROALBUMIN/CREAT 24H UR: 1412 MG/G CREATININE (ref 0–30)
MONOCYTES # BLD AUTO: 0.45 THOUSAND/ÂΜL (ref 0.17–1.22)
MONOCYTES NFR BLD AUTO: 10 % (ref 4–12)
NEUTROPHILS # BLD AUTO: 2.43 THOUSANDS/ÂΜL (ref 1.85–7.62)
NEUTS SEG NFR BLD AUTO: 53 % (ref 43–75)
NITRITE UR QL STRIP: NEGATIVE
NON-SQ EPI CELLS URNS QL MICRO: ABNORMAL /HPF
NRBC BLD AUTO-RTO: 0 /100 WBCS
PH UR STRIP.AUTO: 6 [PH]
PHOSPHATE SERPL-MCNC: 3.1 MG/DL (ref 2.7–4.5)
PLATELET # BLD AUTO: 175 THOUSANDS/UL (ref 149–390)
PMV BLD AUTO: 9.8 FL (ref 8.9–12.7)
POTASSIUM SERPL-SCNC: 4.4 MMOL/L (ref 3.5–5.3)
PROT SERPL-MCNC: 6.8 G/DL (ref 6.4–8.4)
PROT UR STRIP-MCNC: ABNORMAL MG/DL
PTH-INTACT SERPL-MCNC: 69.7 PG/ML (ref 18.4–80.1)
RBC # BLD AUTO: 5.18 MILLION/UL (ref 3.88–5.62)
RBC #/AREA URNS AUTO: ABNORMAL /HPF
SODIUM SERPL-SCNC: 138 MMOL/L (ref 135–147)
SP GR UR STRIP.AUTO: 1.02 (ref 1–1.03)
URATE SERPL-MCNC: 4.5 MG/DL (ref 3.5–8.5)
UROBILINOGEN UR STRIP-ACNC: <2 MG/DL
WBC # BLD AUTO: 4.58 THOUSAND/UL (ref 4.31–10.16)
WBC #/AREA URNS AUTO: ABNORMAL /HPF

## 2022-12-06 ENCOUNTER — OFFICE VISIT (OUTPATIENT)
Dept: NEPHROLOGY | Facility: CLINIC | Age: 57
End: 2022-12-06

## 2022-12-06 VITALS
BODY MASS INDEX: 36.71 KG/M2 | DIASTOLIC BLOOD PRESSURE: 78 MMHG | SYSTOLIC BLOOD PRESSURE: 148 MMHG | HEART RATE: 75 BPM | WEIGHT: 277 LBS | HEIGHT: 73 IN | OXYGEN SATURATION: 97 %

## 2022-12-06 DIAGNOSIS — R80.9 CONTROLLED TYPE 2 DIABETES MELLITUS WITH MICROALBUMINURIA, WITHOUT LONG-TERM CURRENT USE OF INSULIN (HCC): ICD-10-CM

## 2022-12-06 DIAGNOSIS — E11.9 TYPE 2 DIABETES MELLITUS WITHOUT COMPLICATION, WITHOUT LONG-TERM CURRENT USE OF INSULIN (HCC): ICD-10-CM

## 2022-12-06 DIAGNOSIS — I10 ESSENTIAL HYPERTENSION: ICD-10-CM

## 2022-12-06 DIAGNOSIS — I10 PRIMARY HYPERTENSION: ICD-10-CM

## 2022-12-06 DIAGNOSIS — E11.29 CONTROLLED TYPE 2 DIABETES MELLITUS WITH MICROALBUMINURIA, WITHOUT LONG-TERM CURRENT USE OF INSULIN (HCC): ICD-10-CM

## 2022-12-06 DIAGNOSIS — N18.31 STAGE 3A CHRONIC KIDNEY DISEASE (HCC): Primary | ICD-10-CM

## 2022-12-06 DIAGNOSIS — E26.9 HYPERALDOSTERONISM (HCC): ICD-10-CM

## 2022-12-06 RX ORDER — LISINOPRIL 10 MG/1
10 TABLET ORAL DAILY
Qty: 30 TABLET | Refills: 3 | Status: SHIPPED | OUTPATIENT
Start: 2022-12-06

## 2022-12-06 NOTE — PROGRESS NOTES
Assessment & Plan:    1  Stage 3a chronic kidney disease [facility])  Assessment & Plan:  Lab Results   Component Value Date    EGFR 51 12/03/2022    EGFR 55 10/22/2022    EGFR 44 08/12/2022    CREATININE 1 49 (H) 12/03/2022    CREATININE 1 39 (H) 10/22/2022    CREATININE 1 68 (H) 08/12/2022   Renal function stable  Proteinuria increasing  Increase lisinopril to 10 mg daily  BMP in 2 weeks to monitor renal function and potassium  Note patient also on spironolactone 25 mg daily  Continue to avoid nephrotoxins  Advised to reduce high potassium food intake  Otherwise, return to clinic in 4-6 months with Dr Mary Brothers with labs prior  Orders:  -     Basic metabolic panel; Future; Expected date: 12/20/2022  -     CBC and differential; Future; Expected date: 05/30/2023  -     Comprehensive metabolic panel; Future; Expected date: 05/30/2023  -     Magnesium; Future; Expected date: 05/30/2023  -     Microalbumin / creatinine urine ratio; Future; Expected date: 05/30/2023  -     Phosphorus; Future; Expected date: 05/30/2023  -     Protein / creatinine ratio, urine; Future; Expected date: 05/30/2023  -     PTH, intact; Future; Expected date: 05/30/2023  -     Urinalysis with microscopic; Future; Expected date: 05/30/2023    2  Controlled type 2 diabetes mellitus with microalbuminuria, without long-term current use of insulin [facility])  Assessment & Plan:    Lab Results   Component Value Date    HGBA1C 6 3 (H) 10/22/2022   Improve glycemic control  Increase ACE-inhibitor given increasing proteinuria  3  Hyperaldosteronism (HCC)  Assessment & Plan:  Continue spironolactone  Monitor for hyperkalemia with dose increase of lisinopril due to proteinuria  4  Primary hypertension    5  Type 2 diabetes mellitus without complication, without long-term current use of insulin (HCC)  -     lisinopril (ZESTRIL) 10 mg tablet; Take 1 tablet (10 mg total) by mouth daily    6   Essential hypertension  -     lisinopril (ZESTRIL) 10 mg tablet; Take 1 tablet (10 mg total) by mouth daily       The benefits, risks and alternatives to the treatment plan were discussed at this visit  Patient was advised of common adverse effects of any medical therapies prescribed  All questions were answered and discussed with the patient and any accompanying family members or caretakers  Subjective:      Patient ID: Alden Clark is a 62 y o  male seen in the [place] office  Patient was last seen by Dr Kedar Deleon on 09/20/2022, which time creatinine ranges around 1 5 mg/dL  They discussed possible increase of lisinopril from 5 mg to 10 mg daily, but held at 5 mg due to risk of hyperkalemia  HPI     Today, patient presents for routine follow-up without acute complaints relating to blood pressure, edema or nephrological concerns  Patient denies any changes in health, medication changes, hospitalizations, surgeries or trip to the emergency room, falls or bone fractures since last visit  Blood pressure is 148/78 with a heart rate of 75  Patient does monitor blood pressures at home and reports 130s/80s  Denies headaches, lightheadedness, dizziness  Patient reports adherence with antihypertensive regimen and denies adverse effects:  Carvedilol 6 25 mg twice daily, furosemide 40 mg daily, lisinopril 5 mg daily, spironolactone 25 mg daily  Patient denies lower extremity swelling  Reviewed and discussed the results of labs performed 12/03/2023 which reveals that renal function stable with creatinine of 1 49 mg/dL with estimated GFR 51 mL/min  History is obtained from patient  The following portions of the patient's history were reviewed and updated as appropriate: allergies, current medications, past family history, past medical history, past social history, past surgical history, and problem list     Review of Systems   Constitutional: Negative for activity change, chills, diaphoresis, fatigue and fever     HENT: Negative for mouth sores and trouble swallowing  Respiratory: Negative for apnea, cough, chest tightness, shortness of breath and wheezing  Cardiovascular: Negative for chest pain, palpitations and leg swelling  Gastrointestinal: Negative for abdominal distention, abdominal pain, blood in stool, constipation, diarrhea and nausea  Genitourinary: Negative for decreased urine volume, difficulty urinating, dysuria, enuresis, frequency, hematuria and urgency  Musculoskeletal: Negative for arthralgias, back pain and joint swelling  Skin: Negative for pallor, rash and wound  Neurological: Negative for dizziness, seizures, light-headedness, numbness and headaches  Hematological: Does not bruise/bleed easily  Psychiatric/Behavioral: Negative for agitation, behavioral problems and confusion  The patient is not nervous/anxious  Objective:      /78 (BP Location: Left arm, Patient Position: Sitting)   Pulse 75   Ht 6' 1" (1 854 m)   Wt 126 kg (277 lb)   SpO2 97%   BMI 36 55 kg/m²          Physical Exam  Vitals and nursing note reviewed  Constitutional:       General: He is awake  He is not in acute distress  Appearance: Normal appearance  He is well-developed  He is not ill-appearing, toxic-appearing or diaphoretic  HENT:      Head: Normocephalic and atraumatic  Jaw: There is normal jaw occlusion  Nose: Nose normal       Mouth/Throat:      Mouth: Mucous membranes are moist       Pharynx: Oropharynx is clear  No oropharyngeal exudate or posterior oropharyngeal erythema  Eyes:      General: Lids are normal  Vision grossly intact  Gaze aligned appropriately  No scleral icterus  Right eye: No discharge  Left eye: No discharge  Extraocular Movements: Extraocular movements intact  Conjunctiva/sclera: Conjunctivae normal       Pupils: Pupils are equal, round, and reactive to light  Neck:      Thyroid: No thyroid mass or thyromegaly        Trachea: Trachea and phonation normal  Cardiovascular:      Rate and Rhythm: Normal rate and regular rhythm  Heart sounds: Normal heart sounds, S1 normal and S2 normal  No murmur heard  No friction rub  No gallop  Pulmonary:      Effort: Pulmonary effort is normal  No respiratory distress  Breath sounds: Normal breath sounds  No stridor  No wheezing, rhonchi or rales  Abdominal:      General: Abdomen is flat  Bowel sounds are normal  There is no distension  Palpations: Abdomen is soft  There is no mass  Tenderness: There is no abdominal tenderness  There is no guarding  Hernia: No hernia is present  Musculoskeletal:         General: Normal range of motion  Cervical back: Normal range of motion and neck supple  No rigidity or tenderness  Right lower leg: No edema  Left lower leg: No edema  Lymphadenopathy:      Cervical: No cervical adenopathy  Skin:     General: Skin is warm and dry  Coloration: Skin is not jaundiced  Findings: No bruising  Nails: There is no clubbing  Neurological:      General: No focal deficit present  Mental Status: He is alert and oriented to person, place, and time  Mental status is at baseline  Psychiatric:         Attention and Perception: Attention and perception normal          Mood and Affect: Mood and affect normal          Speech: Speech normal          Behavior: Behavior normal  Behavior is cooperative  Thought Content:  Thought content normal          Judgment: Judgment normal              Lab Results   Component Value Date     12/21/2015    SODIUM 138 12/03/2022    K 4 4 12/03/2022     (H) 12/03/2022    CO2 26 12/03/2022    ANIONGAP 10 12/21/2015    AGAP 3 (L) 12/03/2022    BUN 27 (H) 12/03/2022    CREATININE 1 49 (H) 12/03/2022    GLUC 89 12/06/2021    GLUF 129 (H) 12/03/2022    CALCIUM 10 5 (H) 12/03/2022    AST 25 12/03/2022    ALT 48 12/03/2022    ALKPHOS 56 12/03/2022    PROT 7 1 06/11/2015    PROT 7 1 06/11/2015    TP 6 8 12/03/2022    BILITOT 0 6 06/11/2015    TBILI 0 38 12/03/2022    EGFR 51 12/03/2022      Lab Results   Component Value Date    CREATININE 1 49 (H) 12/03/2022    CREATININE 1 39 (H) 10/22/2022    CREATININE 1 68 (H) 08/12/2022    CREATININE 1 59 (H) 05/31/2022    CREATININE 1 50 (H) 12/06/2021    CREATININE 1 33 (H) 09/02/2021    CREATININE 1 62 (H) 09/01/2021    CREATININE 1 74 (H) 08/31/2021    CREATININE 1 93 (H) 08/30/2021    CREATININE 1 72 (H) 08/30/2021    CREATININE 1 66 (H) 08/30/2021    CREATININE 1 26 03/08/2021    CREATININE 1 11 01/06/2021    CREATININE 1 26 10/12/2020    CREATININE 1 53 (H) 07/01/2020      Lab Results   Component Value Date    COLORU Light Yellow 12/03/2022    CLARITYU Clear 12/03/2022    SPECGRAV 1 018 12/03/2022    PHUR 6 0 12/03/2022    LEUKOCYTESUR Negative 12/03/2022    NITRITE Negative 12/03/2022    PROTEIN  (2+) (A) 12/03/2022    GLUCOSEU Negative 12/03/2022    KETONESU Negative 12/03/2022    UROBILINOGEN <2 0 12/03/2022    BILIRUBINUR Negative 12/03/2022    BLOODU Negative 12/03/2022    RBCUA None Seen 12/03/2022    WBCUA 1-2 12/03/2022    EPIS Occasional 12/03/2022    BACTERIA None Seen 12/03/2022      No results found for: LABPROT  No results found for: Elian Vj  Lab Results   Component Value Date    WBC 4 58 12/03/2022    HGB 15 4 12/03/2022    HCT 50 3 (H) 12/03/2022    MCV 97 12/03/2022     12/03/2022      Lab Results   Component Value Date    HGB 15 4 12/03/2022    HGB 16 7 08/12/2022    HGB 18 3 (H) 07/28/2022    HGB 14 6 09/02/2021    HGB 15 5 09/01/2021      No results found for: IRON, TIBC, FERRITIN   No results found for: PTHCALCIUM, EIMH06TAYLME, PHOSPHORUS   Lab Results   Component Value Date    CHOLESTEROL 156 05/31/2022    HDL 72 05/31/2022    LDLCALC 61 05/31/2022    TRIG 116 05/31/2022      Lab Results   Component Value Date    URICACID 4 5 12/03/2022      Lab Results   Component Value Date    HGBA1C 6 3 (H) 10/22/2022      Lab Results   Component Value Date    FREET4 1 43 08/30/2021      Lab Results   Component Value Date    PK Negative 06/11/2015      Lab Results   Component Value Date    PROT 7 1 06/11/2015    PROT 7 1 06/11/2015    UPEP  05/23/2017     The urine total protein is increased  No monoclonal bands noted  Reviewed by: Omar Valdovinos MD ([id number]) **Electronic Signature**        Portions of the record may have been created with voice recognition software  Occasional wrong word or "sound a like" substitutions may have occurred due to the inherent limitations of voice recognition software  Read the chart carefully and recognize, using context, where substitutions have occurred  If you have any questions, please contact the dictating provider

## 2022-12-06 NOTE — PATIENT INSTRUCTIONS
Please avoid NSAIDs (nonsteroidal anti-inflammatory drugs), such as Advil (ibuprofen), Aleve (naproxen), Naprosyn, BCs, Goody's powder  Tylenol (acetaminophen) is a safer option for the kidneys  Do not exceed the maximum dose of acetaminophen  Note that this may be in combination with other drugs NSAID medications  Please avoid herbal supplements, such as turmeric  Please consult your nephrologist before taking any over-the-counter medications      Tomatoes, potatoes, OJ, bananas- watch intake due to potassium

## 2022-12-06 NOTE — ASSESSMENT & PLAN NOTE
Continue spironolactone  Monitor for hyperkalemia with dose increase of lisinopril due to proteinuria

## 2022-12-06 NOTE — ASSESSMENT & PLAN NOTE
Lab Results   Component Value Date    EGFR 51 12/03/2022    EGFR 55 10/22/2022    EGFR 44 08/12/2022    CREATININE 1 49 (H) 12/03/2022    CREATININE 1 39 (H) 10/22/2022    CREATININE 1 68 (H) 08/12/2022   Renal function stable  Proteinuria increasing  Increase lisinopril to 10 mg daily  BMP in 2 weeks to monitor renal function and potassium  Note patient also on spironolactone 25 mg daily  Continue to avoid nephrotoxins  Advised to reduce high potassium food intake  Otherwise, return to clinic in 4-6 months with Dr Rodney Ko with labs prior

## 2022-12-14 ENCOUNTER — PATIENT MESSAGE (OUTPATIENT)
Dept: FAMILY MEDICINE CLINIC | Facility: CLINIC | Age: 57
End: 2022-12-14

## 2022-12-23 ENCOUNTER — APPOINTMENT (OUTPATIENT)
Dept: LAB | Facility: MEDICAL CENTER | Age: 57
End: 2022-12-23

## 2022-12-23 DIAGNOSIS — N18.31 STAGE 3A CHRONIC KIDNEY DISEASE (HCC): ICD-10-CM

## 2022-12-23 LAB
ANION GAP SERPL CALCULATED.3IONS-SCNC: 3 MMOL/L (ref 4–13)
BUN SERPL-MCNC: 43 MG/DL (ref 5–25)
CALCIUM SERPL-MCNC: 9.9 MG/DL (ref 8.3–10.1)
CHLORIDE SERPL-SCNC: 110 MMOL/L (ref 96–108)
CO2 SERPL-SCNC: 28 MMOL/L (ref 21–32)
CREAT SERPL-MCNC: 1.74 MG/DL (ref 0.6–1.3)
GFR SERPL CREATININE-BSD FRML MDRD: 42 ML/MIN/1.73SQ M
GLUCOSE P FAST SERPL-MCNC: 133 MG/DL (ref 65–99)
POTASSIUM SERPL-SCNC: 4.8 MMOL/L (ref 3.5–5.3)
SODIUM SERPL-SCNC: 141 MMOL/L (ref 135–147)

## 2022-12-27 ENCOUNTER — TELEPHONE (OUTPATIENT)
Dept: NEPHROLOGY | Facility: CLINIC | Age: 57
End: 2022-12-27

## 2022-12-27 NOTE — TELEPHONE ENCOUNTER
I called patient at [phone] around 2:12pm   I informed patient of results  Patient denies any medication changes or recent illness

## 2022-12-29 ENCOUNTER — PATIENT MESSAGE (OUTPATIENT)
Dept: FAMILY MEDICINE CLINIC | Facility: CLINIC | Age: 57
End: 2022-12-29

## 2023-01-05 DIAGNOSIS — E11.42 DIABETIC PERIPHERAL NEUROPATHY (HCC): ICD-10-CM

## 2023-01-05 DIAGNOSIS — I48.0 PAROXYSMAL ATRIAL FIBRILLATION (HCC): ICD-10-CM

## 2023-01-05 DIAGNOSIS — E79.0 HYPERURICEMIA: ICD-10-CM

## 2023-01-05 DIAGNOSIS — E11.29 CONTROLLED TYPE 2 DIABETES MELLITUS WITH MICROALBUMINURIA, WITHOUT LONG-TERM CURRENT USE OF INSULIN (HCC): ICD-10-CM

## 2023-01-05 DIAGNOSIS — R80.9 CONTROLLED TYPE 2 DIABETES MELLITUS WITH MICROALBUMINURIA, WITHOUT LONG-TERM CURRENT USE OF INSULIN (HCC): ICD-10-CM

## 2023-01-05 DIAGNOSIS — E78.2 MIXED HYPERLIPIDEMIA: ICD-10-CM

## 2023-01-05 RX ORDER — METFORMIN HYDROCHLORIDE 500 MG/1
500 TABLET, EXTENDED RELEASE ORAL
Qty: 90 TABLET | Refills: 0 | Status: SHIPPED | OUTPATIENT
Start: 2023-01-05 | End: 2023-01-07 | Stop reason: SDUPTHER

## 2023-01-05 RX ORDER — EZETIMIBE 10 MG/1
10 TABLET ORAL DAILY
Qty: 90 TABLET | Refills: 0 | Status: SHIPPED | OUTPATIENT
Start: 2023-01-05

## 2023-01-05 RX ORDER — CARVEDILOL 6.25 MG/1
TABLET ORAL
Qty: 90 TABLET | Refills: 0 | Status: SHIPPED | OUTPATIENT
Start: 2023-01-05

## 2023-01-05 RX ORDER — ALLOPURINOL 300 MG/1
300 TABLET ORAL DAILY
Qty: 90 TABLET | Refills: 0 | Status: SHIPPED | OUTPATIENT
Start: 2023-01-05

## 2023-01-05 RX ORDER — ATORVASTATIN CALCIUM 40 MG/1
40 TABLET, FILM COATED ORAL DAILY
Qty: 90 TABLET | Refills: 0 | Status: SHIPPED | OUTPATIENT
Start: 2023-01-05

## 2023-01-05 RX ORDER — GABAPENTIN 600 MG/1
600 TABLET ORAL 3 TIMES DAILY
Qty: 180 TABLET | Refills: 0 | Status: SHIPPED | OUTPATIENT
Start: 2023-01-05

## 2023-01-07 DIAGNOSIS — R80.9 CONTROLLED TYPE 2 DIABETES MELLITUS WITH MICROALBUMINURIA, WITHOUT LONG-TERM CURRENT USE OF INSULIN (HCC): ICD-10-CM

## 2023-01-07 DIAGNOSIS — E11.29 CONTROLLED TYPE 2 DIABETES MELLITUS WITH MICROALBUMINURIA, WITHOUT LONG-TERM CURRENT USE OF INSULIN (HCC): ICD-10-CM

## 2023-01-09 RX ORDER — METFORMIN HYDROCHLORIDE 500 MG/1
500 TABLET, EXTENDED RELEASE ORAL
Qty: 90 TABLET | Refills: 0 | Status: SHIPPED | OUTPATIENT
Start: 2023-01-09

## 2023-01-14 ENCOUNTER — APPOINTMENT (OUTPATIENT)
Dept: LAB | Facility: MEDICAL CENTER | Age: 58
End: 2023-01-14

## 2023-01-24 DIAGNOSIS — O22.30 DVT (DEEP VEIN THROMBOSIS) IN PREGNANCY: ICD-10-CM

## 2023-01-24 DIAGNOSIS — I48.0 PAROXYSMAL ATRIAL FIBRILLATION (HCC): ICD-10-CM

## 2023-01-26 ENCOUNTER — IN-CLINIC DEVICE VISIT (OUTPATIENT)
Dept: CARDIOLOGY CLINIC | Facility: HOSPITAL | Age: 58
End: 2023-01-26

## 2023-01-26 DIAGNOSIS — Z95.0 PRESENCE OF CARDIAC PACEMAKER: Primary | ICD-10-CM

## 2023-01-26 RX ORDER — RIVAROXABAN 20 MG/1
TABLET, FILM COATED ORAL
Qty: 90 TABLET | Refills: 0 | Status: SHIPPED | OUTPATIENT
Start: 2023-01-26

## 2023-01-26 RX ORDER — CARVEDILOL 6.25 MG/1
TABLET ORAL
Qty: 90 TABLET | Refills: 0 | Status: SHIPPED | OUTPATIENT
Start: 2023-01-26

## 2023-01-26 NOTE — PROGRESS NOTES
Results for orders placed or performed in visit on 01/26/23   Cardiac EP device report    Narrative    MDDT DUAL PM/ ACTIVE SYSTEM IS MRI CONDITIONAL  DEVICE INTERROGATED IN THE [place] OFFICE: BATTERY VOLTAGE ADEQUATE (12 7 YRS)  AP: 88 1%  : 0 7% (MVP-ON)  ALL LEAD PARAMETERS WITHIN NORMAL LIMITS  NO SIGNIFICANT HIGH RATE EPISODES (76 AT/AF EPISODES ALL PREVIOUSLY ADDRESS~LAST AF EPISODE 7/3/22)  AF BURDEN: 0 2%  PT TAKES Mary Cristina  EF: 75% (ECHO 8/30/21)  NO PROGRAMMING CHANGES MADE TO DEVICE PARAMETERS  PACEMAKER FUNCTIONING APPROPRIATELY    [address] [place] [address]

## 2023-01-30 DIAGNOSIS — E79.0 HYPERURICEMIA: ICD-10-CM

## 2023-01-30 DIAGNOSIS — E03.9 ACQUIRED HYPOTHYROIDISM: ICD-10-CM

## 2023-01-30 DIAGNOSIS — E26.9 HYPERALDOSTERONISM (HCC): ICD-10-CM

## 2023-01-30 RX ORDER — LEVOTHYROXINE SODIUM 0.15 MG/1
TABLET ORAL
Qty: 90 TABLET | Refills: 0 | Status: SHIPPED | OUTPATIENT
Start: 2023-01-30

## 2023-01-30 RX ORDER — ALLOPURINOL 300 MG/1
300 TABLET ORAL DAILY
Qty: 90 TABLET | Refills: 0 | Status: SHIPPED | OUTPATIENT
Start: 2023-01-30

## 2023-01-30 RX ORDER — SPIRONOLACTONE 25 MG/1
25 TABLET ORAL DAILY
Qty: 90 TABLET | Refills: 0 | Status: SHIPPED | OUTPATIENT
Start: 2023-01-30

## 2023-01-31 ENCOUNTER — PATIENT MESSAGE (OUTPATIENT)
Dept: NEPHROLOGY | Facility: CLINIC | Age: 58
End: 2023-01-31

## 2023-02-02 VITALS — DIASTOLIC BLOOD PRESSURE: 90 MMHG | SYSTOLIC BLOOD PRESSURE: 134 MMHG

## 2023-02-08 DIAGNOSIS — E11.9 TYPE 2 DIABETES MELLITUS WITHOUT COMPLICATION, WITHOUT LONG-TERM CURRENT USE OF INSULIN (HCC): ICD-10-CM

## 2023-02-08 DIAGNOSIS — I10 ESSENTIAL HYPERTENSION: ICD-10-CM

## 2023-02-08 RX ORDER — LISINOPRIL 20 MG/1
20 TABLET ORAL DAILY
Qty: 30 TABLET | Refills: 3 | Status: SHIPPED | OUTPATIENT
Start: 2023-02-08 | End: 2023-03-07

## 2023-02-13 DIAGNOSIS — E11.42 DIABETIC PERIPHERAL NEUROPATHY (HCC): ICD-10-CM

## 2023-02-13 DIAGNOSIS — I48.0 PAROXYSMAL ATRIAL FIBRILLATION (HCC): ICD-10-CM

## 2023-02-13 DIAGNOSIS — E03.9 ACQUIRED HYPOTHYROIDISM: ICD-10-CM

## 2023-02-13 RX ORDER — GABAPENTIN 600 MG/1
600 TABLET ORAL 3 TIMES DAILY
Qty: 180 TABLET | Refills: 0 | Status: SHIPPED | OUTPATIENT
Start: 2023-02-13

## 2023-02-13 RX ORDER — LEVOTHYROXINE SODIUM 0.03 MG/1
25 TABLET ORAL DAILY
Qty: 90 TABLET | Refills: 0 | Status: SHIPPED | OUTPATIENT
Start: 2023-02-13

## 2023-02-13 RX ORDER — CARVEDILOL 6.25 MG/1
TABLET ORAL
Qty: 90 TABLET | Refills: 0 | Status: SHIPPED | OUTPATIENT
Start: 2023-02-13

## 2023-03-07 DIAGNOSIS — I10 ESSENTIAL HYPERTENSION: ICD-10-CM

## 2023-03-07 DIAGNOSIS — E11.9 TYPE 2 DIABETES MELLITUS WITHOUT COMPLICATION, WITHOUT LONG-TERM CURRENT USE OF INSULIN (HCC): ICD-10-CM

## 2023-03-07 RX ORDER — LISINOPRIL 40 MG/1
40 TABLET ORAL DAILY
Qty: 30 TABLET | Refills: 0 | Status: SHIPPED | OUTPATIENT
Start: 2023-03-07 | End: 2023-04-07 | Stop reason: SDUPTHER

## 2023-03-15 DIAGNOSIS — M10.9 ACUTE GOUT OF FOOT, UNSPECIFIED CAUSE, UNSPECIFIED LATERALITY: Primary | ICD-10-CM

## 2023-03-15 RX ORDER — METHYLPREDNISOLONE 4 MG/1
TABLET ORAL
Qty: 21 EACH | Refills: 0 | Status: SHIPPED | OUTPATIENT
Start: 2023-03-15

## 2023-03-23 ENCOUNTER — APPOINTMENT (OUTPATIENT)
Dept: RADIOLOGY | Facility: MEDICAL CENTER | Age: 58
End: 2023-03-23

## 2023-03-23 ENCOUNTER — OFFICE VISIT (OUTPATIENT)
Dept: PODIATRY | Facility: CLINIC | Age: 58
End: 2023-03-23

## 2023-03-23 VITALS — BODY MASS INDEX: 36.71 KG/M2 | HEIGHT: 73 IN | WEIGHT: 277 LBS

## 2023-03-23 DIAGNOSIS — M79.671 PAIN IN BOTH FEET: ICD-10-CM

## 2023-03-23 DIAGNOSIS — D36.10 NEUROMA: ICD-10-CM

## 2023-03-23 DIAGNOSIS — M21.6X2 EQUINUS DEFORMITY OF BOTH FEET: ICD-10-CM

## 2023-03-23 DIAGNOSIS — M79.672 PAIN IN BOTH FEET: Primary | ICD-10-CM

## 2023-03-23 DIAGNOSIS — M21.6X1 EQUINUS DEFORMITY OF BOTH FEET: ICD-10-CM

## 2023-03-23 DIAGNOSIS — E11.29 CONTROLLED TYPE 2 DIABETES MELLITUS WITH MICROALBUMINURIA, WITHOUT LONG-TERM CURRENT USE OF INSULIN (HCC): ICD-10-CM

## 2023-03-23 DIAGNOSIS — M79.671 PAIN IN BOTH FEET: Primary | ICD-10-CM

## 2023-03-23 DIAGNOSIS — M77.32 CALCANEAL SPUR OF LEFT FOOT: ICD-10-CM

## 2023-03-23 DIAGNOSIS — M72.2 PLANTAR FASCIITIS: ICD-10-CM

## 2023-03-23 DIAGNOSIS — R80.9 CONTROLLED TYPE 2 DIABETES MELLITUS WITH MICROALBUMINURIA, WITHOUT LONG-TERM CURRENT USE OF INSULIN (HCC): ICD-10-CM

## 2023-03-23 DIAGNOSIS — M79.672 PAIN IN BOTH FEET: ICD-10-CM

## 2023-03-23 RX ORDER — TRIAMCINOLONE ACETONIDE 40 MG/ML
20 INJECTION, SUSPENSION INTRA-ARTICULAR; INTRAMUSCULAR ONCE
Status: COMPLETED | OUTPATIENT
Start: 2023-03-23 | End: 2023-03-23

## 2023-03-23 RX ORDER — ROPIVACAINE HYDROCHLORIDE 5 MG/ML
0.5 INJECTION, SOLUTION EPIDURAL; INFILTRATION; PERINEURAL ONCE
Status: COMPLETED | OUTPATIENT
Start: 2023-03-23 | End: 2023-03-23

## 2023-03-23 RX ADMIN — TRIAMCINOLONE ACETONIDE 20 MG: 40 INJECTION, SUSPENSION INTRA-ARTICULAR; INTRAMUSCULAR at 09:52

## 2023-03-23 RX ADMIN — ROPIVACAINE HYDROCHLORIDE 0.5 ML: 5 INJECTION, SOLUTION EPIDURAL; INFILTRATION; PERINEURAL at 09:52

## 2023-03-23 NOTE — PATIENT INSTRUCTIONS
Plantar Fasciitis   WHAT YOU NEED TO KNOW:   Plantar fasciitis is swelling of the plantar fascia  The plantar fascia is a thick band of tissue that connects your heel bone to your toes  This part of your foot helps support the arch of your foot and absorbs shock  DISCHARGE INSTRUCTIONS:   Call your doctor if:   Your pain or swelling suddenly increases  You develop knee, hip, or back pain  You have questions or concerns about your condition or care  Medicines: You may  need any of the following:  Acetaminophen  decreases pain and fever  It is available without a doctor's order  Ask how much to take and how often to take it  Follow directions  Read the labels of all other medicines you are using to see if they also contain acetaminophen, or ask your doctor or pharmacist  Acetaminophen can cause liver damage if not taken correctly  NSAIDs , such as ibuprofen, help decrease swelling, pain, and fever  NSAIDs can cause stomach bleeding or kidney problems in certain people  If you take blood thinner medicine, always ask your healthcare provider if NSAIDs are safe for you  Always read the medicine label and follow directions  Take your medicine as directed  Contact your healthcare provider if you think your medicine is not helping or if you have side effects  Tell your provider if you are allergic to any medicine  Keep a list of the medicines, vitamins, and herbs you take  Include the amounts, and when and why you take them  Bring the list or the pill bottles to follow-up visits  Carry your medicine list with you in case of an emergency  Self-care:   Wear your splint or shoe inserts as directed  You may need to wear a splint at night to keep your foot stretched while you sleep  This will help prevent sharp pain first thing in the morning  Shoe inserts will help decrease stress on your plantar fascia when you walk or exercise  Apply ice on your plantar fascia    Ice helps decrease swelling and pain  Fill a water bottle with water and freeze it  Wrap a towel around the bottle or cover it with a pillow case  Roll the water bottle under your foot for 10 minutes each morning and evening  Massage your plantar fascia as directed  This may help decrease swelling and pain  Roll a golf ball under your foot for 10 minutes  Repeat 3 times each day  Go to physical therapy as directed  A physical therapist teaches you exercises to help improve movement and strength, and to decrease pain  Prevent plantar fasciitis:   Maintain a healthy weight  This will help decrease stress on your feet  Ask your healthcare provider what a healthy weight is for you  Ask him or her to help you create a weight loss plan, if needed  Do low-impact exercises  Low-impact exercises decrease stress on your plantar fascia  Examples include swimming or bicycling  Start new activities slowly  Increase the intensity and time gradually  Wear shoes that fit well and support your arch  Replace your shoes before the padding or shock absorption wears out  Do not walk or  bare feet or sandals for long periods of time  Follow up with your doctor as directed:  Write down your questions so you remember to ask them during your visits  © Copyright [employer] 2022 Information is for End User's use only and may not be sold, redistributed or otherwise used for commercial purposes  The above information is an  only  It is not intended as medical advice for individual conditions or treatments  Talk to your doctor, nurse or pharmacist before following any medical regimen to see if it is safe and effective for you  Metatarsalgia   AMBULATORY CARE:   Metatarsalgia  is pain in the ball of your foot, near your second, third, and fourth toes  Common signs and symptoms of metatarsalgia:  Symptoms usually develop over time, but you may have sudden pain from an injury   You may have any of the following:  Pain at the ball of your foot or near your toes that gets worse when you walk or stand, especially on hard surfaces    Pain during exercises such as running    Sharp or shooting pain in your toes that may get worse when you flex your toes    Tingling or numbness in your toes    Feeling like you are walking over rocks, or that you have a bruise    A change in the way you walk because you try to avoid putting pressure on the ball of your foot    Contact your healthcare provider if:   You develop knee, back, or hip pain  You have more pain or redness in the foot  You have questions or concerns about your condition or care  Treatment:  The cause of your metatarsalgia will be treated, if possible  You may also need any of the following:  NSAIDs , such as ibuprofen, help decrease swelling, pain, and fever  This medicine is available with or without a doctor's order  NSAIDs can cause stomach bleeding or kidney problems in certain people  If you take blood thinner medicine, always ask if NSAIDs are safe for you  Always read the medicine label and follow directions  Do not give these medicines to children younger than 6 months without direction from a healthcare provider  Ultrasound  may be used to relieve your pain  Sound waves from the ultrasound can help send heat deeper into your tissues  A steroid injection  may help decrease inflammation  Surgery  may be needed if other treatments do not work  Surgery is used to align the bones near your toes  You may also need surgery to fix a problem such as hammertoe  Manage or prevent metatarsalgia:   Rest your foot  If you play sports, you may not be able to do weight-bearing exercises  Examples include swimming and bike riding  Ask your healthcare provider which exercises are safe for you  Apply ice as directed  Ice helps reduce pain and swelling  Use an ice pack, or put crushed ice in a plastic bag   Cover the pack or bag with a towel before you apply it to your foot  Apply ice for 15 to 20 minutes every hour, or as directed  Use a cane or crutch if directed  These devices may help take pressure off your foot while it heals  Wear proper shoes  Do not wear shoes that are narrow or tight  You may need to wear shoes that are wider than you usually wear  Choose shoes that do not have a raised heel  Shock-absorbing shoes can help prevent injury  These shoes will have extra support under your feet and toes  You can also add shoe cushions inside your shoes or to the bottoms of your feet, near your toes  The cushions may provide more support and make walking or standing more comfortable  Arch supports may help take pressure off your toes  Reach or maintain a healthy weight  Extra weight can put pressure on your feet  Talk to your healthcare provider about a healthy weight for you  Your provider can help you create a safe weight loss plan if you are overweight  Go to physical therapy if directed  A physical therapist can help improve your strength and range of motion  The therapist can also help you improve the way you walk to prevent metatarsalgia from happening again  Your therapist can also teach you exercises to help relieve your pain  Follow up with your doctor as directed:  Write down your questions so you remember to ask them during your visits  © Copyright [employer] 2022 Information is for End User's use only and may not be sold, redistributed or otherwise used for commercial purposes  The above information is an  only  It is not intended as medical advice for individual conditions or treatments  Talk to your doctor, nurse or pharmacist before following any medical regimen to see if it is safe and effective for you

## 2023-03-23 NOTE — PROGRESS NOTES
Assessment/Plan:    No problem-specific Assessment & Plan notes found for this encounter  Diagnoses and all orders for this visit:    Pain in both feet  -     X-ray foot left 3+ views; Future  -     X-ray foot right 3+ views; Future    Plantar fasciitis  -     Ambulatory referral to Physical Therapy; Future  -     triamcinolone acetonide (KENALOG-40) 40 mg/mL injection 20 mg  -     ropivacaine (NAROPIN) 0 5 % injection 0 5 mL  -     P F  Night Splint    Controlled type 2 diabetes mellitus with microalbuminuria, without long-term current use of insulin ([facility])    Equinus deformity of both feet  -     Ambulatory referral to Physical Therapy; Future    Calcaneal spur of left foot  -     Ambulatory referral to Physical Therapy; Future    Neuroma  -     Ambulatory referral to Physical Therapy;  Future      -Injection of the right fourth interspace today, x1  - He is return in 2 weeks for assessment of his right foot pain and assessment of the left foot with injection of the left plantar fascia  - I dispensed a plantar fascia night splint to help decrease his equinus bilaterally  - Continue strict A1c control we will place him in physical therapy for reduction of his equinus, [employer]  - I discussed that he is having forefoot overload on the right and plantar fascial overload on the left both stemming from severe equinus  - X-rays 3 views taken reviewed bilateral feet and do show pes planus orientation with midfoot faulting decrease of calcaneal inclination angle, mild degenerative change of arthritis across midfoot and plantar calcaneal spurring  -I dispensed metatarsal pads and advised to purchase silicone Dr Yaa Montenegro metatarsal pad    Nerve block    Date/Time: 3/23/2023 9:30 AM  Performed by: Fer Abreu DPM  Authorized by: Fer Abreu DPM   Universal Protocol:  Consent given by: patient  Timeout called at: 3/23/2023 9:30 AM   Patient understanding: patient states understanding of the procedure being performed  Patient consent: the patient's understanding of the procedure matches consent given  Patient identity confirmed: verbally with patient      Indications:     Indications:  Pain relief  Location:     Body area:  Lower extremity    Lower extremity nerve:  Digital    Laterality:  Right  Pre-procedure details:     Skin preparation:  Alcohol  Skin anesthesia (see MAR for exact dosages):     Skin anesthesia method:  Topical application  Post-procedure details:     Dressing:  Sterile dressing    Outcome:  Pain improved    Patient tolerance of procedure: Tolerated well, no immediate complications        Subjective:      Patient ID: Enmanuel Rodríguez is a 62 y o  male  Patient presents evaluation management of his bilateral foot pain, he is having Quten[facility] through [facility] for his diabetic neuropathy  He is a well-controlled diabetic with a last A1c of 6 1  He denies any history of ulceration but does have tarsal tunnel and severe neuropathy, he did have attempted tarsal tunnel release which did nothing for him in the past   He presents today complaining of shooting stabbing pains interdigitally between the right 4, 5 toes and also the right 4, 3 toes he is also having first up in the morning pain and pain worse at night on the plantar aspect of the left foot, he does have a history of Planter fasciitis and injection on the left      The following portions of the patient's history were reviewed and updated as appropriate: allergies, current medications, past family history, past medical history, past social history, past surgical history and problem list     Review of Systems   Constitutional: Negative for chills and fever  HENT: Negative for ear pain and sore throat  Eyes: Negative for pain and visual disturbance  Respiratory: Negative for cough and shortness of breath  Cardiovascular: Negative for chest pain and palpitations     Gastrointestinal: Negative for "abdominal pain and vomiting  Genitourinary: Negative for dysuria and hematuria  Musculoskeletal: Negative for arthralgias and back pain  Skin: Negative for color change and rash  Neurological: Negative for seizures and syncope  All other systems reviewed and are negative  Objective:      Ht 6' 1\" (1 854 m)   Wt 126 kg (277 lb)   BMI 36 55 kg/m²          Physical Exam  Vitals reviewed  Constitutional:       Appearance: Normal appearance  He is obese  HENT:      Head: Normocephalic and atraumatic  Nose: Nose normal       Mouth/Throat:      Mouth: Mucous membranes are moist    Eyes:      Pupils: Pupils are equal, round, and reactive to light  Cardiovascular:      Pulses: Normal pulses  Pulmonary:      Effort: Pulmonary effort is normal    Musculoskeletal:      Comments: Pain palpation of the left plantar fascia plantar medial calcaneal origin, positive equinus bilaterally severe able to get to -5 degrees with knee extended  He does have loss of protective sensation of the forefoot  - There is pain with palpation with squeeze of the right foot with paresthesias interdigitally right 4, 5 interspaces  Pes planus orientation foot bilaterally mild tailor's bunion bilaterally   Skin:     Capillary Refill: Capillary refill takes less than 2 seconds  Neurological:      General: No focal deficit present  Mental Status: He is alert and oriented to person, place, and time  Mental status is at baseline             "

## 2023-03-24 DIAGNOSIS — E11.42 DIABETIC PERIPHERAL NEUROPATHY (HCC): ICD-10-CM

## 2023-03-24 RX ORDER — GABAPENTIN 600 MG/1
600 TABLET ORAL 3 TIMES DAILY
Qty: 180 TABLET | Refills: 0 | Status: SHIPPED | OUTPATIENT
Start: 2023-03-24

## 2023-04-07 DIAGNOSIS — E78.2 MIXED HYPERLIPIDEMIA: ICD-10-CM

## 2023-04-07 DIAGNOSIS — R80.9 CONTROLLED TYPE 2 DIABETES MELLITUS WITH MICROALBUMINURIA, WITHOUT LONG-TERM CURRENT USE OF INSULIN (HCC): ICD-10-CM

## 2023-04-07 DIAGNOSIS — I48.0 PAROXYSMAL ATRIAL FIBRILLATION (HCC): ICD-10-CM

## 2023-04-07 DIAGNOSIS — E11.29 CONTROLLED TYPE 2 DIABETES MELLITUS WITH MICROALBUMINURIA, WITHOUT LONG-TERM CURRENT USE OF INSULIN (HCC): ICD-10-CM

## 2023-04-07 RX ORDER — EZETIMIBE 10 MG/1
10 TABLET ORAL DAILY
Qty: 90 TABLET | Refills: 0 | Status: SHIPPED | OUTPATIENT
Start: 2023-04-07

## 2023-04-07 RX ORDER — CARVEDILOL 6.25 MG/1
TABLET ORAL
Qty: 90 TABLET | Refills: 0 | Status: SHIPPED | OUTPATIENT
Start: 2023-04-07

## 2023-04-07 RX ORDER — METFORMIN HYDROCHLORIDE 500 MG/1
500 TABLET, EXTENDED RELEASE ORAL
Qty: 90 TABLET | Refills: 0 | Status: SHIPPED | OUTPATIENT
Start: 2023-04-07

## 2023-04-24 ENCOUNTER — OFFICE VISIT (OUTPATIENT)
Dept: PODIATRY | Facility: CLINIC | Age: 58
End: 2023-04-24

## 2023-04-24 VITALS — WEIGHT: 277 LBS | BODY MASS INDEX: 36.71 KG/M2 | HEIGHT: 73 IN

## 2023-04-24 DIAGNOSIS — D36.10 NEUROMA: ICD-10-CM

## 2023-04-24 DIAGNOSIS — L84 HELOMA MOLLE: ICD-10-CM

## 2023-04-24 DIAGNOSIS — M20.41 HAMMER TOE OF RIGHT FOOT: Primary | ICD-10-CM

## 2023-04-24 DIAGNOSIS — M77.9 CAPSULITIS: ICD-10-CM

## 2023-04-24 DIAGNOSIS — M24.573 EQUINUS CONTRACTURE OF ANKLE: ICD-10-CM

## 2023-04-24 DIAGNOSIS — M72.2 PLANTAR FASCIITIS: ICD-10-CM

## 2023-04-24 RX ORDER — DEXAMETHASONE SODIUM PHOSPHATE 4 MG/ML
2 INJECTION, SOLUTION INTRA-ARTICULAR; INTRALESIONAL; INTRAMUSCULAR; INTRAVENOUS; SOFT TISSUE ONCE
Status: COMPLETED | OUTPATIENT
Start: 2023-04-24 | End: 2023-04-24

## 2023-04-24 RX ORDER — ROPIVACAINE HYDROCHLORIDE 5 MG/ML
1 INJECTION, SOLUTION EPIDURAL; INFILTRATION; PERINEURAL ONCE
Status: COMPLETED | OUTPATIENT
Start: 2023-04-24 | End: 2023-04-24

## 2023-04-24 RX ADMIN — DEXAMETHASONE SODIUM PHOSPHATE 2 MG: 4 INJECTION, SOLUTION INTRA-ARTICULAR; INTRALESIONAL; INTRAMUSCULAR; INTRAVENOUS; SOFT TISSUE at 08:55

## 2023-04-24 RX ADMIN — ROPIVACAINE HYDROCHLORIDE 1 ML: 5 INJECTION, SOLUTION EPIDURAL; INFILTRATION; PERINEURAL at 08:56

## 2023-04-24 NOTE — PROGRESS NOTES
Assessment/Plan:    No problem-specific Assessment & Plan notes found for this encounter  Diagnoses and all orders for this visit:    Hammer toe of right foot    Heloma molle    Neuroma    Equinus contracture of ankle    Plantar fasciitis    Capsulitis  -     dexamethasone (DECADRON) injection 2 mg  -     ropivacaine (NAROPIN) 0 5 % injection 1 mL    Other orders  -     Small joint arthrocentesis        -Unfortunately he is having 3 separate identifiable pathologies in a very small area, he is having capsulitis of the right fifth MTPJ, likely neuroma formation of the right fourth interspace and also a little more like formation on the medial aspect of the right fifth hammertoe  - Callus pared to tolerance today, he does have significant neuropathy and we discussed that he would likely need diabetic shoes and at risk foot care in the future, this needs to be assessed at a different appointment  - Continue current equinus reduction on the left lower extremity, he is very happy with his overall pain relief  He is improving well with conservative care, shoe gear changes anti-inflammatories and night splint  - Continue eccentric exercises and hopefully he will be able to increase his tolerance with injection of the right 3rd-5th MTPJ as below  - Continue oral anti-inflammatories as necessary  - Continue strict blood sugar control  - She is to return in 3 weeks for further assessment of his bilateral foot pain and possible repeat injection of the left heel  Small joint arthrocentesis  Universal Protocol:  Consent: Verbal consent obtained    Risks and benefits: risks, benefits and alternatives were discussed  Consent given by: patient  Patient understanding: patient states understanding of the procedure being performed  Patient consent: the patient's understanding of the procedure matches consent given  Patient identity confirmed: verbally with patient    Supporting Documentation  Indications: pain, joint swelling and diagnostic evaluation   Procedure Details  Location: small toe -   Approach: dorsal    Patient tolerance: patient tolerated the procedure well with no immediate complications  Dressing:  Sterile dressing applied            Subjective:      Patient ID: German Moore is a 62 y o  male  Patient presents for evaluation and management of his bilateral foot pain, he states that he is performing the stretching exercises and using the night splint as directed at home  He is using supportive shoe gear  He is controlling his blood sugars  He states that his last blood sugar was highest at 158 over the past 2 weeks  Currently 98 this morning  States that his left foot has significantly improved with the pain, but his right foot he is having some difficulty performing the Achilles tendon eccentric exercises, and due to his right forefoot pain  He states that he is used the pad and the pad does help his pain he has ordered the pad and he is complaining of pain on the inner aspect of the right fifth toe, pain at the joint of the right fifth toe and also interdigitally right fourth, fifth toes      The following portions of the patient's history were reviewed and updated as appropriate: allergies, current medications, past family history, past medical history, past social history, past surgical history and problem list     Review of Systems   Constitutional: Negative for chills and fever  HENT: Negative for ear pain and sore throat  Eyes: Negative for pain and visual disturbance  Respiratory: Negative for cough and shortness of breath  Cardiovascular: Negative for chest pain and palpitations  Gastrointestinal: Negative for abdominal pain and vomiting  Genitourinary: Negative for dysuria and hematuria  Musculoskeletal: Negative for arthralgias and back pain  Skin: Negative for color change and rash  Neurological: Negative for seizures and syncope     All other systems reviewed and are "negative  Objective:      Ht 6' 1\" (1 854 m)   Wt 126 kg (277 lb)   BMI 36 55 kg/m²          Physical Exam  Musculoskeletal:         General: Tenderness present  Comments: Tenderness with Suresh's click on the right, fourth interspace, there is still remains pathologic clonus bilateral lower extremities, significant hammertoes bilateral feet, there is a severe interdigital callus on the medial aspect of the right fifth toe with an arthritic hammertoe  There is prehemorrhagic change to this callus      There is significant pain with range of motion of the right fifth MTPJ           "

## 2023-04-27 DIAGNOSIS — O22.30 DVT (DEEP VEIN THROMBOSIS) IN PREGNANCY: ICD-10-CM

## 2023-05-03 ENCOUNTER — REMOTE DEVICE CLINIC VISIT (OUTPATIENT)
Dept: CARDIOLOGY CLINIC | Facility: CLINIC | Age: 58
End: 2023-05-03

## 2023-05-03 DIAGNOSIS — Z95.0 PRESENCE OF PERMANENT CARDIAC PACEMAKER: Primary | ICD-10-CM

## 2023-05-03 NOTE — PROGRESS NOTES
Results for orders placed or performed in visit on 05/03/23   Cardiac EP device report    Narrative    MDDT DUAL PM/ ACTIVE SYSTEM IS MRI CONDITIONAL  CARELINK TRANSMISSION: BATTERY VOLTAGE ADEQUATE  (12 3 YRS) AP 86%  1%  ALL AVAILABLE LEAD PARAMETERS WITHIN NORMAL LIMITS  1 VHR EPISODE DETECTED, SVT NOTED  2 FAST A & V EPISODE DETECTED MAX RATE 158 BPM  NORMAL DEVICE FUNCTION  ---BRANTLEY

## 2023-05-10 DIAGNOSIS — E79.0 HYPERURICEMIA: ICD-10-CM

## 2023-05-10 DIAGNOSIS — E78.2 MIXED HYPERLIPIDEMIA: ICD-10-CM

## 2023-05-10 DIAGNOSIS — R80.9 CONTROLLED TYPE 2 DIABETES MELLITUS WITH MICROALBUMINURIA, WITHOUT LONG-TERM CURRENT USE OF INSULIN (HCC): ICD-10-CM

## 2023-05-10 DIAGNOSIS — E26.9 HYPERALDOSTERONISM (HCC): ICD-10-CM

## 2023-05-10 DIAGNOSIS — E11.29 CONTROLLED TYPE 2 DIABETES MELLITUS WITH MICROALBUMINURIA, WITHOUT LONG-TERM CURRENT USE OF INSULIN (HCC): ICD-10-CM

## 2023-05-10 RX ORDER — ALLOPURINOL 300 MG/1
300 TABLET ORAL DAILY
Qty: 90 TABLET | Refills: 0 | Status: SHIPPED | OUTPATIENT
Start: 2023-05-10

## 2023-05-10 RX ORDER — METFORMIN HYDROCHLORIDE 500 MG/1
500 TABLET, EXTENDED RELEASE ORAL
Qty: 90 TABLET | Refills: 0 | Status: SHIPPED | OUTPATIENT
Start: 2023-05-10

## 2023-05-10 RX ORDER — EZETIMIBE 10 MG/1
10 TABLET ORAL DAILY
Qty: 90 TABLET | Refills: 0 | Status: SHIPPED | OUTPATIENT
Start: 2023-05-10

## 2023-05-10 RX ORDER — SPIRONOLACTONE 25 MG/1
25 TABLET ORAL DAILY
Qty: 90 TABLET | Refills: 0 | Status: SHIPPED | OUTPATIENT
Start: 2023-05-10

## 2023-05-12 DIAGNOSIS — I10 ESSENTIAL HYPERTENSION: ICD-10-CM

## 2023-05-12 DIAGNOSIS — E11.9 TYPE 2 DIABETES MELLITUS WITHOUT COMPLICATION, WITHOUT LONG-TERM CURRENT USE OF INSULIN (HCC): ICD-10-CM

## 2023-05-12 RX ORDER — LISINOPRIL 40 MG/1
40 TABLET ORAL DAILY
Qty: 30 TABLET | Refills: 5 | Status: SHIPPED | OUTPATIENT
Start: 2023-05-12 | End: 2023-05-15 | Stop reason: SDUPTHER

## 2023-05-15 ENCOUNTER — RA CDI HCC (OUTPATIENT)
Dept: OTHER | Facility: HOSPITAL | Age: 58
End: 2023-05-15

## 2023-05-15 DIAGNOSIS — E11.9 TYPE 2 DIABETES MELLITUS WITHOUT COMPLICATION, WITHOUT LONG-TERM CURRENT USE OF INSULIN (HCC): ICD-10-CM

## 2023-05-15 DIAGNOSIS — E03.9 ACQUIRED HYPOTHYROIDISM: ICD-10-CM

## 2023-05-15 DIAGNOSIS — I10 ESSENTIAL HYPERTENSION: ICD-10-CM

## 2023-05-15 RX ORDER — LISINOPRIL 40 MG/1
40 TABLET ORAL DAILY
Qty: 90 TABLET | Refills: 1 | Status: SHIPPED | OUTPATIENT
Start: 2023-05-15 | End: 2023-11-15 | Stop reason: SDUPTHER

## 2023-05-15 NOTE — PROGRESS NOTES
[facility]  coding opportunities          Chart Reviewed number of suggestions sent to Provider: 2   E11 42  E11 22, N18 31    Patients Insurance        Commercial Insurance: [employer]

## 2023-05-16 ENCOUNTER — APPOINTMENT (OUTPATIENT)
Dept: LAB | Facility: MEDICAL CENTER | Age: 58
End: 2023-05-16

## 2023-05-16 DIAGNOSIS — E11.9 TYPE 2 DIABETES MELLITUS WITHOUT COMPLICATION, WITHOUT LONG-TERM CURRENT USE OF INSULIN (HCC): ICD-10-CM

## 2023-05-16 DIAGNOSIS — I10 ESSENTIAL HYPERTENSION: ICD-10-CM

## 2023-05-16 DIAGNOSIS — N18.31 STAGE 3A CHRONIC KIDNEY DISEASE (HCC): ICD-10-CM

## 2023-05-16 LAB
ALBUMIN SERPL BCP-MCNC: 3.7 G/DL (ref 3.5–5)
ALP SERPL-CCNC: 59 U/L (ref 46–116)
ALT SERPL W P-5'-P-CCNC: 45 U/L (ref 12–78)
ANION GAP SERPL CALCULATED.3IONS-SCNC: 2 MMOL/L (ref 4–13)
AST SERPL W P-5'-P-CCNC: 31 U/L (ref 5–45)
BACTERIA UR QL AUTO: ABNORMAL /HPF
BASOPHILS # BLD AUTO: 0.03 THOUSANDS/ÂΜL (ref 0–0.1)
BASOPHILS NFR BLD AUTO: 1 % (ref 0–1)
BILIRUB SERPL-MCNC: 0.49 MG/DL (ref 0.2–1)
BILIRUB UR QL STRIP: NEGATIVE
BUN SERPL-MCNC: 39 MG/DL (ref 5–25)
CALCIUM SERPL-MCNC: 11 MG/DL (ref 8.3–10.1)
CHLORIDE SERPL-SCNC: 107 MMOL/L (ref 96–108)
CLARITY UR: CLEAR
CO2 SERPL-SCNC: 28 MMOL/L (ref 21–32)
COLOR UR: ABNORMAL
CREAT SERPL-MCNC: 1.69 MG/DL (ref 0.6–1.3)
CREAT UR-MCNC: 120 MG/DL
EOSINOPHIL # BLD AUTO: 0.3 THOUSAND/ÂΜL (ref 0–0.61)
EOSINOPHIL NFR BLD AUTO: 6 % (ref 0–6)
ERYTHROCYTE [DISTWIDTH] IN BLOOD BY AUTOMATED COUNT: 14.8 % (ref 11.6–15.1)
GFR SERPL CREATININE-BSD FRML MDRD: 43 ML/MIN/1.73SQ M
GLUCOSE P FAST SERPL-MCNC: 106 MG/DL (ref 65–99)
GLUCOSE UR STRIP-MCNC: NEGATIVE MG/DL
HCT VFR BLD AUTO: 49.2 % (ref 36.5–49.3)
HGB BLD-MCNC: 15.6 G/DL (ref 12–17)
HGB UR QL STRIP.AUTO: NEGATIVE
IMM GRANULOCYTES # BLD AUTO: 0.02 THOUSAND/UL (ref 0–0.2)
IMM GRANULOCYTES NFR BLD AUTO: 0 % (ref 0–2)
KETONES UR STRIP-MCNC: NEGATIVE MG/DL
LEUKOCYTE ESTERASE UR QL STRIP: NEGATIVE
LYMPHOCYTES # BLD AUTO: 1.45 THOUSANDS/ÂΜL (ref 0.6–4.47)
LYMPHOCYTES NFR BLD AUTO: 29 % (ref 14–44)
MAGNESIUM SERPL-MCNC: 2.4 MG/DL (ref 1.6–2.6)
MCH RBC QN AUTO: 31.1 PG (ref 26.8–34.3)
MCHC RBC AUTO-ENTMCNC: 31.7 G/DL (ref 31.4–37.4)
MCV RBC AUTO: 98 FL (ref 82–98)
MONOCYTES # BLD AUTO: 0.53 THOUSAND/ÂΜL (ref 0.17–1.22)
MONOCYTES NFR BLD AUTO: 11 % (ref 4–12)
NEUTROPHILS # BLD AUTO: 2.64 THOUSANDS/ÂΜL (ref 1.85–7.62)
NEUTS SEG NFR BLD AUTO: 53 % (ref 43–75)
NITRITE UR QL STRIP: NEGATIVE
NON-SQ EPI CELLS URNS QL MICRO: ABNORMAL /HPF
NRBC BLD AUTO-RTO: 0 /100 WBCS
PH UR STRIP.AUTO: 6 [PH]
PHOSPHATE SERPL-MCNC: 3.5 MG/DL (ref 2.7–4.5)
PLATELET # BLD AUTO: 177 THOUSANDS/UL (ref 149–390)
PMV BLD AUTO: 9.7 FL (ref 8.9–12.7)
POTASSIUM SERPL-SCNC: 5 MMOL/L (ref 3.5–5.3)
PROT SERPL-MCNC: 7.1 G/DL (ref 6.4–8.4)
PROT UR STRIP-MCNC: ABNORMAL MG/DL
PROT UR-MCNC: 120 MG/DL
PROT/CREAT UR: 1 MG/G{CREAT} (ref 0–0.1)
PTH-INTACT SERPL-MCNC: 84.4 PG/ML (ref 18.4–80.1)
RBC # BLD AUTO: 5.01 MILLION/UL (ref 3.88–5.62)
RBC #/AREA URNS AUTO: ABNORMAL /HPF
SODIUM SERPL-SCNC: 137 MMOL/L (ref 135–147)
SP GR UR STRIP.AUTO: 1.02 (ref 1–1.03)
UROBILINOGEN UR STRIP-ACNC: <2 MG/DL
WBC # BLD AUTO: 4.97 THOUSAND/UL (ref 4.31–10.16)
WBC #/AREA URNS AUTO: ABNORMAL /HPF

## 2023-05-21 DIAGNOSIS — I48.0 PAROXYSMAL ATRIAL FIBRILLATION (HCC): ICD-10-CM

## 2023-05-21 DIAGNOSIS — E03.9 ACQUIRED HYPOTHYROIDISM: ICD-10-CM

## 2023-05-22 ENCOUNTER — OFFICE VISIT (OUTPATIENT)
Dept: FAMILY MEDICINE CLINIC | Facility: CLINIC | Age: 58
End: 2023-05-22

## 2023-05-22 VITALS
BODY MASS INDEX: 36.31 KG/M2 | WEIGHT: 275.2 LBS | HEART RATE: 73 BPM | DIASTOLIC BLOOD PRESSURE: 78 MMHG | SYSTOLIC BLOOD PRESSURE: 120 MMHG | TEMPERATURE: 97.8 F | OXYGEN SATURATION: 97 %

## 2023-05-22 DIAGNOSIS — E72.12 METHYLENETETRAHYDROFOLATE REDUCTASE DEFICIENCY (HCC): ICD-10-CM

## 2023-05-22 DIAGNOSIS — E03.9 HYPOTHYROIDISM (ACQUIRED): ICD-10-CM

## 2023-05-22 DIAGNOSIS — R80.9 CONTROLLED TYPE 2 DIABETES MELLITUS WITH MICROALBUMINURIA, WITHOUT LONG-TERM CURRENT USE OF INSULIN (HCC): Primary | ICD-10-CM

## 2023-05-22 DIAGNOSIS — E26.9 HYPERALDOSTERONISM (HCC): ICD-10-CM

## 2023-05-22 DIAGNOSIS — E88.81 METABOLIC SYNDROME: ICD-10-CM

## 2023-05-22 DIAGNOSIS — E11.29 CONTROLLED TYPE 2 DIABETES MELLITUS WITH MICROALBUMINURIA, WITHOUT LONG-TERM CURRENT USE OF INSULIN (HCC): Primary | ICD-10-CM

## 2023-05-22 DIAGNOSIS — I48.0 PAROXYSMAL ATRIAL FIBRILLATION (HCC): ICD-10-CM

## 2023-05-22 RX ORDER — LEVOTHYROXINE SODIUM 0.03 MG/1
25 TABLET ORAL DAILY
Qty: 90 TABLET | Refills: 0 | Status: SHIPPED | OUTPATIENT
Start: 2023-05-22

## 2023-05-22 RX ORDER — CARVEDILOL 6.25 MG/1
TABLET ORAL
Qty: 90 TABLET | Refills: 0 | Status: SHIPPED | OUTPATIENT
Start: 2023-05-22

## 2023-05-22 RX ORDER — LEVOTHYROXINE SODIUM 0.15 MG/1
TABLET ORAL
Qty: 90 TABLET | Refills: 0 | Status: SHIPPED | OUTPATIENT
Start: 2023-05-22

## 2023-05-22 NOTE — PROGRESS NOTES
BMI Counseling: Body mass index is 36 31 kg/m²  The BMI is above normal  Nutrition recommendations include decreasing portion sizes, encouraging healthy choices of fruits and vegetables, moderation in carbohydrate intake and increasing intake of lean protein  Rationale for BMI follow-up plan is due to patient being overweight or obese  Assessment/Plan:    Problem List Items Addressed This Visit        Endocrine    Controlled type 2 diabetes mellitus with microalbuminuria, without long-term current use of insulin (HCC) - Primary    Hypothyroidism (acquired)    Hyperaldosteronism ([facility] )       Cardiovascular and Mediastinum    Paroxysmal atrial fibrillation ([facility] )        Diagnoses and all orders for this visit:    Controlled type 2 diabetes mellitus with microalbuminuria, without long-term current use of insulin (HCC)    Hyperaldosteronism (HCC)    Hypothyroidism (acquired)    Paroxysmal atrial fibrillation ([facility] )        No problem-specific Assessment & Plan notes found for this encounter  Subjective:      Patient ID: Ethel Norton is a 62 y o  male      Mr Pricila Irizarry is here he is very frustrated with his weight he has been working very hard to lose weight his diet sounds like more of a ketogenic diet lots of meats cheeses and low-carb but its not doing much for his weight reduction he is reached a plateau where he cannot lose any more he is trying to exercise he walks during the day at his employer he walks with when he plays golf he does not ride a cart probably his diet needs to be tweaked a little bit and also he may need to give up some of his weekend alcohol consumption patient has not had any recent labs he needs these feet he is seeing Dr Gildardo Thorne for and eyes      The following portions of the patient's history were reviewed and updated as appropriate:   He has a past medical history of Arthritis, Chronic cholecystitis, Diabetes mellitus ([facility] ), DVT (deep venous thrombosis) ([facility] ), Gout, History of pulmonary embolism, Hyperlipidemia, Hypertension, Hypothyroidism, Lumbar back pain, MTHFR mutation, Neuropathy, Scab, Sleep apnea, Tarsal tunnel syndrome, right, Tinnitus, Wears glasses, and Wears glasses  ,  does not have any pertinent problems on file  ,   has a past surgical history that includes Cholecystectomy (03/26/2015); Appendectomy; Spinal fusion; Tonsillectomy (10/16/2013); Nasal septum surgery (10/16/2013); Uvulopalatopharyngoplasty; [place] tooth extraction; Bunionectomy (Right, 10/07/2016); Arthrodesis; Knee arthroscopy w/ meniscal repair; Uvulectomy (10/16/2013); pr release tarsal tunnel (Right, 06/25/2018); pr colonoscopy flx dx w/collj spec when pfrmd (N/A, 11/23/2018); Colonoscopy; Back surgery; Knee arthroscopy; pr release tarsal tunnel (Left, 03/13/2020); and Cardiac pacemaker placement (09/01/2021)  ,  family history includes Aneurysm in his brother and mother; Coronary artery disease in his father; Hypertension in his father  ,   reports that he has never smoked  He has never used smokeless tobacco  He reports current alcohol use  He reports that he does not use drugs  ,  has No Known Allergies     Current Outpatient Medications   Medication Sig Dispense Refill   • allopurinol (ZYLOPRIM) 300 mg tablet Take 1 tablet (300 mg total) by mouth daily 90 tablet 0   • carvedilol (COREG) 6 25 mg tablet 1 tablet in the morning and 1 in 1/2 tablets in the evening 90 tablet 0   • ezetimibe (ZETIA) 10 mg tablet Take 1 tablet (10 mg total) by mouth daily 90 tablet 0   • furosemide (LASIX) 40 mg tablet Take 1 tablet (40 mg total) by mouth daily 90 tablet 1   • gabapentin (NEURONTIN) 600 MG tablet Take 1 tablet (600 mg total) by mouth 3 (three) times a day 180 tablet 0   • levothyroxine (Euthyrox) 25 mcg tablet Take 1 tablet (25 mcg total) by mouth daily with Levothyroxine 150 mcg to equal 175 mcg total 90 tablet 0   • levothyroxine 150 mcg tablet Take 1 tablet of 150 mcg strength with 1 tablet of 25 mcg strength to make a total dose of 175 mcg daily 90 tablet 0   • lisinopril (ZESTRIL) 40 mg tablet Take 1 tablet (40 mg total) by mouth daily 90 tablet 1   • metFORMIN (GLUCOPHAGE-XR) 500 mg 24 hr tablet Take 1 tablet (500 mg total) by mouth daily at bedtime 90 tablet 0   • methylPREDNISolone 4 MG tablet therapy pack Use as directed on package 21 each 0   • rivaroxaban (Xarelto) 20 mg tablet Take 1 tablet (20 mg total) by mouth daily with breakfast 90 tablet 0   • sildenafil (Viagra) 100 mg tablet Take 1 tablet (100 mg total) by mouth as needed for erectile dysfunction 30 tablet 0   • spironolactone (ALDACTONE) 25 mg tablet Take 1 tablet (25 mg total) by mouth daily 90 tablet 0   • atorvastatin (LIPITOR) 40 mg tablet Take 1 tablet (40 mg total) by mouth daily (Patient not taking: Reported on 5/22/2023) 90 tablet 0     No current facility-administered medications for this visit  Review of Systems   Constitutional: Negative for activity change, appetite change, diaphoresis, fatigue and fever  HENT: Negative  Negative for dental problem  Eyes: Positive for visual disturbance  Respiratory: Negative for apnea, cough, chest tightness, shortness of breath and wheezing  Cardiovascular: Positive for palpitations  Negative for chest pain and leg swelling  Gastrointestinal: Negative for abdominal distention, abdominal pain, anal bleeding, constipation, diarrhea, nausea and vomiting  Endocrine: Negative for cold intolerance, heat intolerance, polydipsia, polyphagia and polyuria  Genitourinary: Negative for difficulty urinating, dysuria, flank pain, hematuria and urgency  Musculoskeletal: Negative for arthralgias, back pain, gait problem, joint swelling and myalgias  Skin: Negative for color change, rash and wound  Allergic/Immunologic: Negative for environmental allergies, food allergies and immunocompromised state     Neurological: Negative for dizziness, seizures, syncope, speech difficulty, numbness and headaches  Hematological: Negative for adenopathy  Does not bruise/bleed easily  Psychiatric/Behavioral: Negative for agitation, behavioral problems, hallucinations, sleep disturbance and suicidal ideas  Objective:  Vitals:    05/22/23 1557   BP: 120/78   BP Location: Left arm   Patient Position: Sitting   Cuff Size: Extra-Large   Pulse: 73   Temp: 97 8 °F (36 6 °C)   SpO2: 97%   Weight: 125 kg (275 lb 3 2 oz)     Body mass index is 36 31 kg/m²  Physical Exam  Constitutional:       General: He is not in acute distress  Appearance: He is well-developed  He is obese  He is not diaphoretic  HENT:      Head: Normocephalic  Right Ear: External ear normal       Left Ear: External ear normal       Nose: Nose normal    Eyes:      General: No scleral icterus  Right eye: No discharge  Left eye: No discharge  Conjunctiva/sclera: Conjunctivae normal       Pupils: Pupils are equal, round, and reactive to light  Neck:      Thyroid: No thyromegaly  Trachea: No tracheal deviation  Cardiovascular:      Rate and Rhythm: Normal rate and regular rhythm  Pulses: no weak pulses          Dorsalis pedis pulses are 2+ on the right side and 2+ on the left side  Posterior tibial pulses are 2+ on the right side and 2+ on the left side  Heart sounds: Normal heart sounds  No murmur heard  No friction rub  No gallop  Pulmonary:      Effort: Pulmonary effort is normal  No respiratory distress  Breath sounds: Normal breath sounds  No wheezing  Abdominal:      General: Bowel sounds are normal       Palpations: Abdomen is soft  There is no mass  Tenderness: There is no abdominal tenderness  There is no guarding  Musculoskeletal:         General: No deformity  Cervical back: Normal range of motion  Feet:      Right foot:      Skin integrity: No ulcer, skin breakdown, erythema, warmth, callus or dry skin        Left foot:      Skin integrity: No ulcer, skin breakdown, erythema, warmth, callus or dry skin  Lymphadenopathy:      Cervical: No cervical adenopathy  Skin:     General: Skin is warm and dry  Findings: No erythema or rash  Neurological:      Mental Status: He is alert and oriented to person, place, and time  Cranial Nerves: No cranial nerve deficit  Psychiatric:         Thought Content: Thought content normal        Patient's shoes and socks removed  Right Foot/Ankle   Right Foot Inspection  Skin Exam: skin normal and skin intact  No dry skin, no warmth, no callus, no erythema, no maceration, no abnormal color, no pre-ulcer, no ulcer and no callus  Toe Exam: ROM and strength within normal limits  Sensory   Vibration: intact  Proprioception: intact  Monofilament testing: intact    Vascular  Capillary refills: < 3 seconds  The right DP pulse is 2+  The right PT pulse is 2+  Left Foot/Ankle  Left Foot Inspection  Skin Exam: skin normal and skin intact  No dry skin, no warmth, no erythema, no maceration, normal color, no pre-ulcer, no ulcer and no callus  Toe Exam: ROM and strength within normal limits  Sensory   Vibration: intact  Proprioception: intact  Monofilament testing: intact    Vascular  Capillary refills: < 3 seconds  The left DP pulse is 2+  The left PT pulse is 2+       Assign Risk Category  No deformity present  No loss of protective sensation  No weak pulses  Risk: 0

## 2023-05-23 ENCOUNTER — TELEPHONE (OUTPATIENT)
Dept: ADMINISTRATIVE | Facility: OTHER | Age: 58
End: 2023-05-23

## 2023-05-23 NOTE — LETTER
Diabetic Eye Exam Form    Date Requested: 23  Patient: Gary Raya  Patient : 1965   Referring Provider: Maninder Morse DO      DIABETIC Eye Exam Date _______________________________      Type of Exam MUST be documented for Diabetic Eye Exams  Please CHECK ONE  Retinal Exam       Dilated Retinal Exam       OCT       Optomap-Iris Exam      Fundus Photography       Left Eye - Please check Retinopathy or No Retinopathy        Exam did show retinopathy    Exam did not show retinopathy       Right Eye - Please check Retinopathy or No Retinopathy       Exam did show retinopathy    Exam did not show retinopathy       Comments __________________________________________________________    Practice Providing Exam ______________________________________________    Exam Performed By (print name) _______________________________________      Provider Signature ___________________________________________________      These reports are needed for  compliance  Please fax this completed form and a copy of the Diabetic Eye Exam report to our office located at [place] [address] as soon as possible via Fax [phone] attention Kiesha: Phone [phone]  We thank you for your assistance in treating our mutual patient

## 2023-05-23 NOTE — TELEPHONE ENCOUNTER
Upon review of the In Basket request and the patient's chart, initial outreach has been made via fax to facility  Please see Contacts section for details       Thank you  Ariel Moeller MA

## 2023-05-23 NOTE — TELEPHONE ENCOUNTER
----- Message from Jenna Cramer LPN sent at 4/32/4613  4:33 PM EDT -----  05/22/23 4:34 PM    Hello, our patient Rudy Martinez has had Diabetic Eye Exam completed/performed  Please assist in updating the patient chart by making an External outreach to [facility] located in [place]  The date of service is Within the past year       Thank you,  Jenna Cramer LPN   [facility]

## 2023-05-24 NOTE — TELEPHONE ENCOUNTER
Upon review of the In Basket request we were able to locate, review, and update the patient chart as requested for Diabetic Eye Exam     Any additional questions or concerns should be emailed to the Practice Liaisons via the appropriate education email address, please do not reply via In Basket      Thank you  Aden Goldstein MA

## 2023-06-02 ENCOUNTER — APPOINTMENT (OUTPATIENT)
Dept: LAB | Facility: MEDICAL CENTER | Age: 58
End: 2023-06-02
Payer: COMMERCIAL

## 2023-06-02 DIAGNOSIS — E88.81 METABOLIC SYNDROME: ICD-10-CM

## 2023-06-02 DIAGNOSIS — E03.9 HYPOTHYROIDISM (ACQUIRED): ICD-10-CM

## 2023-06-02 LAB
ALBUMIN SERPL BCP-MCNC: 3.5 G/DL (ref 3.5–5)
ALP SERPL-CCNC: 52 U/L (ref 46–116)
ALT SERPL W P-5'-P-CCNC: 33 U/L (ref 12–78)
ANION GAP SERPL CALCULATED.3IONS-SCNC: 1 MMOL/L (ref 4–13)
AST SERPL W P-5'-P-CCNC: 24 U/L (ref 5–45)
BILIRUB SERPL-MCNC: 0.45 MG/DL (ref 0.2–1)
BUN SERPL-MCNC: 30 MG/DL (ref 5–25)
CALCIUM SERPL-MCNC: 9.6 MG/DL (ref 8.3–10.1)
CHLORIDE SERPL-SCNC: 110 MMOL/L (ref 96–108)
CHOLEST SERPL-MCNC: 226 MG/DL
CO2 SERPL-SCNC: 25 MMOL/L (ref 21–32)
CREAT SERPL-MCNC: 1.6 MG/DL (ref 0.6–1.3)
EST. AVERAGE GLUCOSE BLD GHB EST-MCNC: 123 MG/DL
GFR SERPL CREATININE-BSD FRML MDRD: 46 ML/MIN/1.73SQ M
GLUCOSE P FAST SERPL-MCNC: 114 MG/DL (ref 65–99)
HBA1C MFR BLD: 5.9 %
HDLC SERPL-MCNC: 60 MG/DL
INSULIN SERPL-ACNC: 14.88 UIU/ML (ref 1.9–23)
LDLC SERPL CALC-MCNC: 131 MG/DL (ref 0–100)
NONHDLC SERPL-MCNC: 166 MG/DL
POTASSIUM SERPL-SCNC: 4.9 MMOL/L (ref 3.5–5.3)
PROT SERPL-MCNC: 6.9 G/DL (ref 6.4–8.4)
SODIUM SERPL-SCNC: 136 MMOL/L (ref 135–147)
TRIGL SERPL-MCNC: 174 MG/DL
TSH SERPL DL<=0.05 MIU/L-ACNC: 11.45 UIU/ML (ref 0.45–4.5)

## 2023-06-02 PROCEDURE — 83525 ASSAY OF INSULIN: CPT

## 2023-06-02 PROCEDURE — 36415 COLL VENOUS BLD VENIPUNCTURE: CPT

## 2023-06-02 PROCEDURE — 84443 ASSAY THYROID STIM HORMONE: CPT

## 2023-06-05 ENCOUNTER — CONSULT (OUTPATIENT)
Dept: ENDOCRINOLOGY | Facility: CLINIC | Age: 58
End: 2023-06-05
Payer: COMMERCIAL

## 2023-06-05 VITALS
DIASTOLIC BLOOD PRESSURE: 80 MMHG | HEIGHT: 73 IN | WEIGHT: 273.8 LBS | SYSTOLIC BLOOD PRESSURE: 140 MMHG | HEART RATE: 73 BPM | BODY MASS INDEX: 36.29 KG/M2

## 2023-06-05 DIAGNOSIS — E11.29 CONTROLLED TYPE 2 DIABETES MELLITUS WITH MICROALBUMINURIA, WITHOUT LONG-TERM CURRENT USE OF INSULIN (HCC): Primary | ICD-10-CM

## 2023-06-05 DIAGNOSIS — E03.9 HYPOTHYROIDISM (ACQUIRED): ICD-10-CM

## 2023-06-05 DIAGNOSIS — R80.9 CONTROLLED TYPE 2 DIABETES MELLITUS WITH MICROALBUMINURIA, WITHOUT LONG-TERM CURRENT USE OF INSULIN (HCC): Primary | ICD-10-CM

## 2023-06-05 DIAGNOSIS — E66.01 CLASS 2 SEVERE OBESITY DUE TO EXCESS CALORIES WITH SERIOUS COMORBIDITY AND BODY MASS INDEX (BMI) OF 36.0 TO 36.9 IN ADULT (HCC): ICD-10-CM

## 2023-06-05 PROCEDURE — 99204 OFFICE O/P NEW MOD 45 MIN: CPT | Performed by: STUDENT IN AN ORGANIZED HEALTH CARE EDUCATION/TRAINING PROGRAM

## 2023-06-05 NOTE — PATIENT INSTRUCTIONS
Levothyroxine should be taken 30 minutes before a meal, since dietary fiber and soy products interfere with its absorption  It should not be taken together with medications that inhibit its absorption including calcium or iron supplements, cholestyramine, sucralfate, and aluminum hydroxide  Avoid Calcium supplements, iron supplements, multivitamins or soy products within 4 hours of taking your thyroid hormone pill  These can interfere with absorption of thyroid hormone      START Ozempic 0 25mg weekly x 4 weeks  Then 0 5mg weekly thereafter

## 2023-06-05 NOTE — PROGRESS NOTES
Ashok Baer 62 y o  male MRN: [id number]    Encounter: [id number]      Assessment/Plan      1  Type 2 diabetes c/b DPN, CKD3 - well controlled  Due to high risk ASCVD features and adiposity related chronic disease, Marciano could benefit from incretin based therapy  He has no contraindications for use  Reviewed pro/cons  Recommend initiation of ozempic 0 25 mg weekly x4 weeks, then 0 5 mg weekly thereafter  May adjust dosing in future to maximum effective dosing as tolerated  Lesli Mehta may also benefit from SGLT2i therapy as well in light of history of CKD  He has an upcoming visit with nephrology to discuss as well  2  Class II obesity - not at goal  Lesli Mehta would benefit from pharmacologic AOM  As he has a history of diabetes and has high risk ASCVD features, he would benefit from use of incretin based therapy, as above  Will also recommend referral to nutrition services  3  Hypothyroidism - not at goal  PCP following  Recommend taking levothyroxine 30-minutes prior to first meal  Avoid Calcium supplements, iron supplements, multivitamins or soy products within 4 hours of taking your thyroid hormone pill  These can interfere with absorption of thyroid hormone      Problem List Items Addressed This Visit        Endocrine    Controlled type 2 diabetes mellitus with microalbuminuria, without long-term current use of insulin ([facility]) - Primary    Relevant Medications    semaglutide, 0 25 or 0 5 mg/dose, (Ozempic, 0 25 or 0 5 MG/DOSE,) 2 mg/3 mL injection pen    Other Relevant Orders    Basic metabolic panel Lab Collect    HEMOGLOBIN A1C W/ EAG ESTIMATION Lab Collect    Ambulatory referral to Nutrition Services    Hypothyroidism (acquired)    Relevant Orders    TSH, 3rd generation Lab Collect    T4, free Lab Collect   Other Visit Diagnoses     Class 2 severe obesity due to excess calories with serious comorbidity and body mass index (BMI) of 36 0 to 36 9 in adult ([facility])            RTC 3-4 mo    CC: Diabetes    History of Present Illness     HPI:    Vasu Armas presents today for evaluation of type 2 diabetes and obesity  Diabetes is of several years duration  Marciano reports complications including DPN and CKD3  He is presently on metformin 500 mg nightly  He has made substantive changes to diet, being particularly mindful of maintaining low carbs  He eats 3 meals per day, and reports snacking on nuts and beef jerky  He denies consumption of caloric beverages  He has seen improvement in diabetes control with excellent a1c values, but is struggling with weight gain, which has been progressive despite dietary changes  Portion control may be an issue, particularly with supper  He denies any binge eating episodes  Physical activity is limited to walking  He also likes to golf  Weight related complications include hypertension, dyslipidemia and CORIN  He has not previously met with nutrition services  There is no personal history of pancreatitis  No family history of MTC  Vasu Armas is not interested in bariatric surgery  aVsu Armas also has history of hypothyroidism  He takes levothyroxine 175 mcg daily  His dose has been stable for past 2-years  He is consistent with taking levothyroxine, which he usually takes after breakfast, which may include cheerios with almond milk  There is no family history of thyroid disease  Review of Systems   Constitutional: Positive for unexpected weight change (weight gain)  Negative for diaphoresis  HENT: Negative for trouble swallowing and voice change  Eyes: Negative for visual disturbance  Respiratory: Negative for shortness of breath  Cardiovascular: Negative for chest pain and palpitations  Gastrointestinal: Negative for nausea and vomiting  Endocrine: Negative for polydipsia and polyuria  Neurological: Negative for tremors  All other systems reviewed and are negative        Historical Information   Past Medical History:   Diagnosis Date   • Arthritis     right foot   • Chronic cholecystitis    • Diabetes mellitus ([facility] )    • [facility] thrombosis[facility]    • Gout    • History of pulmonary embolism    • Hyperlipidemia    • Hypertension    • Hypothyroidism    • Lumbar back pain    • MTHFR mutation    • Neuropathy    • Scab     right arm- no s/s infection   • Sleep apnea     no CPAP   • Tarsal tunnel syndrome, right    • Tinnitus     left ear   • Wears glasses     and contacts   • Wears glasses      Past Surgical History:   Procedure Laterality Date   • APPENDECTOMY     • ARTHRODESIS      Lumbar L__   • BACK SURGERY     • BUNIONECTOMY Right 10/07/2016    Procedure: REMOVAL TIBIAL  SESAMOID BONE  RIGHT FOOT;  Surgeon: Thierry Leyva DPM;  Location: [facility];  Service:    • CARDIAC PACEMAKER PLACEMENT  09/01/2021   • CHOLECYSTECTOMY  03/26/2015   • COLONOSCOPY     • KNEE ARTHROSCOPY     • KNEE ARTHROSCOPY W/ MENISCAL REPAIR      Lateral   • NASAL SEPTUM SURGERY  10/16/2013   • WV COLONOSCOPY FLX DX W/COLLJ SPEC WHEN PFRMD N/A 11/23/2018    Procedure: COLONOSCOPY;  Surgeon: Claudio Palafox MD;  Location: [facility];  Service: Gastroenterology   • WV RELEASE TARSAL TUNNEL Right 06/25/2018    Procedure: RELEASE TARSAL TUNNEL;  Surgeon: Josefina Padilla DPM;  Location: [facility];  Service: Podiatry   • WV RELEASE TARSAL TUNNEL Left 03/13/2020    Procedure: RELEASE TARSAL TUNNEL;  Surgeon: Josefina Padilla DPM;  Location: [address] [place] [address] MAIN OR;  Service: Podiatry   • SPINAL FUSION     • TONSILLECTOMY  10/16/2013    with Adenoidectomy   • UVULECTOMY  10/16/2013   • UVULOPALATOPHARYNGOPLASTY     • WISDOM TOOTH EXTRACTION       Social History   Social History     Substance and Sexual Activity   Alcohol Use Yes    Comment: weekends     Social History     Substance and Sexual Activity   Drug Use No     Social History     Tobacco Use   Smoking Status Never   Smokeless Tobacco Never     Family History:   Family History   Problem Relation Age of Onset   • Aneurysm Mother         intracranial aneurysm repair   • Coronary artery disease Father    • Hypertension Father    • Aneurysm Brother        Meds/Allergies   Current Outpatient Medications   Medication Sig Dispense Refill   • allopurinol (ZYLOPRIM) 300 mg tablet Take 1 tablet (300 mg total) by mouth daily 90 tablet 0   • carvedilol (COREG) 6 25 mg tablet 1 tablet in the morning and 1 in 1/2 tablets in the evening 90 tablet 0   • ezetimibe (ZETIA) 10 mg tablet Take 1 tablet (10 mg total) by mouth daily 90 tablet 0   • furosemide (LASIX) 40 mg tablet Take 1 tablet (40 mg total) by mouth daily 90 tablet 1   • gabapentin (NEURONTIN) 600 MG tablet Take 1 tablet (600 mg total) by mouth 3 (three) times a day 180 tablet 0   • levothyroxine (Euthyrox) 25 mcg tablet Take 1 tablet (25 mcg total) by mouth daily with Levothyroxine 150 mcg to equal 175 mcg total 90 tablet 0   • levothyroxine 150 mcg tablet Take 1 tablet of 150 mcg strength with 1 tablet of 25 mcg strength to make a total dose of 175 mcg daily 90 tablet 0   • lisinopril (ZESTRIL) 40 mg tablet Take 1 tablet (40 mg total) by mouth daily 90 tablet 1   • metFORMIN (GLUCOPHAGE-XR) 500 mg 24 hr tablet Take 1 tablet (500 mg total) by mouth daily at bedtime 90 tablet 0   • rivaroxaban (Xarelto) 20 mg tablet Take 1 tablet (20 mg total) by mouth daily with breakfast 90 tablet 0   • semaglutide, 0 25 or 0 5 mg/dose, (Ozempic, 0 25 or 0 5 MG/DOSE,) 2 mg/3 mL injection pen Start ozempic 0 25 mg weekly x4 weeks, then 0 5 mg weekly thereafter 3 mL 0   • sildenafil (Viagra) 100 mg tablet Take 1 tablet (100 mg total) by mouth as needed for erectile dysfunction 30 tablet 0   • spironolactone (ALDACTONE) 25 mg tablet Take 1 tablet (25 mg total) by mouth daily 90 tablet 0   • atorvastatin (LIPITOR) 40 mg tablet Take 1 tablet (40 mg total) by mouth daily (Patient not taking: Reported on 5/22/2023) 90 tablet 0   • methylPREDNISolone 4 MG tablet therapy pack Use as directed on package (Patient not taking: Reported on 6/5/2023) 21 "each 0     No current facility-administered medications for this visit  No Known Allergies    Objective   Vitals: Blood pressure 140/80, pulse 73, height 6' 1\" (1 854 m), weight 124 kg (273 lb 12 8 oz)  Physical Exam  Vitals reviewed  Constitutional:       Appearance: Normal appearance  HENT:      Head: Normocephalic and atraumatic  Eyes:      General: No scleral icterus  Conjunctiva/sclera: Conjunctivae normal    Pulmonary:      Effort: Pulmonary effort is normal  No respiratory distress  Abdominal:      Palpations: Abdomen is soft  Tenderness: There is no abdominal tenderness  Musculoskeletal:      Cervical back: Normal range of motion  Skin:     General: Skin is warm and dry  Neurological:      General: No focal deficit present  Mental Status: He is alert  Psychiatric:         Mood and Affect: Mood normal          Behavior: Behavior normal          The history was obtained from the review of the chart, patient      Lab Results:   Lab Results   Component Value Date/Time    Albumin 3 5 06/02/2023 07:40 AM    Albumin 3 7 05/16/2023 07:17 AM    Albumin 3 9 12/03/2022 07:36 AM    ALT 33 06/02/2023 07:40 AM    ALT 45 05/16/2023 07:17 AM    ALT 48 12/03/2022 07:36 AM    AST 24 06/02/2023 07:40 AM    AST 31 05/16/2023 07:17 AM    AST 25 12/03/2022 07:36 AM    BUN 30 (H) 06/02/2023 07:40 AM    BUN 39 (H) 05/16/2023 07:17 AM    BUN 39 (H) 01/14/2023 08:16 AM    Chloride 110 (H) 06/02/2023 07:40 AM    Chloride 107 05/16/2023 07:17 AM    Chloride 109 (H) 01/14/2023 08:16 AM    CO2 25 06/02/2023 07:40 AM    CO2 28 05/16/2023 07:17 AM    CO2 28 01/14/2023 08:16 AM    Creatinine 1 60 (H) 06/02/2023 07:40 AM    Creatinine 1 69 (H) 05/16/2023 07:17 AM    Creatinine 1 63 (H) 01/14/2023 08:16 AM    Hematocrit 49 2 05/16/2023 07:17 AM    Hematocrit 50 3 (H) 12/03/2022 07:36 AM    Hematocrit 54 6 (H) 08/12/2022 07:30 AM    HDL, Direct 60 06/02/2023 07:40 AM    Hemoglobin 15 6 05/16/2023 07:17 AM    " "Hemoglobin 15 4 12/03/2022 07:36 AM    Hemoglobin 16 7 08/12/2022 07:30 AM    Hemoglobin A1C 5 9 (H) 06/02/2023 07:40 AM    Hemoglobin A1C 6 0 (H) 05/16/2023 07:17 AM    Hemoglobin A1C 6 1 (H) 01/14/2023 08:16 AM    Potassium 4 9 06/02/2023 07:40 AM    Potassium 5 0 05/16/2023 07:17 AM    Potassium 4 5 01/14/2023 08:16 AM    MCV 98 05/16/2023 07:17 AM    MCV 97 12/03/2022 07:36 AM    MCV 98 08/12/2022 07:30 AM    Platelets 555 09/76/2969 07:17 AM    Platelets 356 69/13/3381 07:36 AM    Platelets 889 02/52/1612 07:30 AM    Total Protein 6 9 06/02/2023 07:40 AM    Total Protein 7 1 05/16/2023 07:17 AM    Total Protein 6 8 12/03/2022 07:36 AM    Triglycerides 174 (H) 06/02/2023 07:40 AM    WBC 4 97 05/16/2023 07:17 AM    WBC 4 58 12/03/2022 07:36 AM    WBC 6 94 08/12/2022 07:30 AM     Lab Results   Component Value Date    PPN2ZAEOFIKC 11 447 (H) 06/02/2023           Imaging Studies: I have personally reviewed pertinent reports  Portions of the record may have been created with voice recognition software  Occasional wrong word or \"sound a like\" substitutions may have occurred due to the inherent limitations of voice recognition software  Read the chart carefully and recognize, using context, where substitutions have occurred    "

## 2023-06-05 NOTE — RESULT ENCOUNTER NOTE
Please call the patient regarding his abnormal result    Thyroid is way off need to follow with endocrinology about either adjusting his dose or perhaps he has not been taking his medicines

## 2023-06-06 DIAGNOSIS — E26.9 HYPERALDOSTERONISM (HCC): ICD-10-CM

## 2023-06-06 DIAGNOSIS — E11.42 DIABETIC PERIPHERAL NEUROPATHY (HCC): ICD-10-CM

## 2023-06-06 DIAGNOSIS — E78.2 MIXED HYPERLIPIDEMIA: ICD-10-CM

## 2023-06-06 DIAGNOSIS — E79.0 HYPERURICEMIA: ICD-10-CM

## 2023-06-06 RX ORDER — GABAPENTIN 600 MG/1
600 TABLET ORAL 3 TIMES DAILY
Qty: 180 TABLET | Refills: 0 | Status: SHIPPED | OUTPATIENT
Start: 2023-06-06 | End: 2023-06-08 | Stop reason: SDUPTHER

## 2023-06-06 RX ORDER — ALLOPURINOL 300 MG/1
300 TABLET ORAL DAILY
Qty: 90 TABLET | Refills: 0 | Status: SHIPPED | OUTPATIENT
Start: 2023-06-06 | End: 2023-06-10 | Stop reason: SDUPTHER

## 2023-06-06 RX ORDER — EZETIMIBE 10 MG/1
10 TABLET ORAL DAILY
Qty: 90 TABLET | Refills: 0 | Status: SHIPPED | OUTPATIENT
Start: 2023-06-06 | End: 2023-06-08 | Stop reason: SDUPTHER

## 2023-06-06 RX ORDER — SPIRONOLACTONE 25 MG/1
25 TABLET ORAL DAILY
Qty: 90 TABLET | Refills: 1 | Status: SHIPPED | OUTPATIENT
Start: 2023-06-06

## 2023-06-08 DIAGNOSIS — E78.2 MIXED HYPERLIPIDEMIA: ICD-10-CM

## 2023-06-08 DIAGNOSIS — E11.42 DIABETIC PERIPHERAL NEUROPATHY (HCC): ICD-10-CM

## 2023-06-08 RX ORDER — EZETIMIBE 10 MG/1
10 TABLET ORAL DAILY
Qty: 90 TABLET | Refills: 0 | Status: SHIPPED | OUTPATIENT
Start: 2023-06-08 | End: 2023-06-10 | Stop reason: SDUPTHER

## 2023-06-08 RX ORDER — GABAPENTIN 600 MG/1
600 TABLET ORAL 3 TIMES DAILY
Qty: 180 TABLET | Refills: 0 | Status: SHIPPED | OUTPATIENT
Start: 2023-06-08 | End: 2023-06-12

## 2023-06-10 DIAGNOSIS — E11.42 DIABETIC PERIPHERAL NEUROPATHY (HCC): ICD-10-CM

## 2023-06-10 DIAGNOSIS — E78.2 MIXED HYPERLIPIDEMIA: ICD-10-CM

## 2023-06-10 DIAGNOSIS — E79.0 HYPERURICEMIA: ICD-10-CM

## 2023-06-12 ENCOUNTER — TELEPHONE (OUTPATIENT)
Dept: FAMILY MEDICINE CLINIC | Facility: CLINIC | Age: 58
End: 2023-06-12

## 2023-06-12 RX ORDER — GABAPENTIN 600 MG/1
TABLET ORAL
Qty: 180 TABLET | Refills: 0 | Status: SHIPPED | OUTPATIENT
Start: 2023-06-12 | End: 2023-06-14 | Stop reason: DRUGHIGH

## 2023-06-12 RX ORDER — GABAPENTIN 600 MG/1
600 TABLET ORAL 3 TIMES DAILY
Qty: 180 TABLET | Refills: 0 | Status: SHIPPED | OUTPATIENT
Start: 2023-06-12 | End: 2023-06-14 | Stop reason: DRUGHIGH

## 2023-06-12 RX ORDER — EZETIMIBE 10 MG/1
10 TABLET ORAL DAILY
Qty: 90 TABLET | Refills: 0 | Status: SHIPPED | OUTPATIENT
Start: 2023-06-12 | End: 2023-06-14 | Stop reason: SDUPTHER

## 2023-06-12 RX ORDER — ALLOPURINOL 300 MG/1
300 TABLET ORAL DAILY
Qty: 90 TABLET | Refills: 0 | Status: SHIPPED | OUTPATIENT
Start: 2023-06-12 | End: 2023-06-14 | Stop reason: SDUPTHER

## 2023-06-12 NOTE — TELEPHONE ENCOUNTER
Called Pt to verify that he uses [facility] [address] received fax questioning information

## 2023-06-14 DIAGNOSIS — E78.2 MIXED HYPERLIPIDEMIA: ICD-10-CM

## 2023-06-14 DIAGNOSIS — E11.42 DIABETIC PERIPHERAL NEUROPATHY (HCC): ICD-10-CM

## 2023-06-14 DIAGNOSIS — E79.0 HYPERURICEMIA: ICD-10-CM

## 2023-06-14 RX ORDER — EZETIMIBE 10 MG/1
10 TABLET ORAL DAILY
Qty: 90 TABLET | Refills: 1 | Status: SHIPPED | OUTPATIENT
Start: 2023-06-14

## 2023-06-14 RX ORDER — GABAPENTIN 600 MG/1
600 TABLET ORAL 3 TIMES DAILY
Qty: 270 TABLET | Refills: 0 | Status: SHIPPED | OUTPATIENT
Start: 2023-06-14

## 2023-06-14 RX ORDER — ALLOPURINOL 300 MG/1
300 TABLET ORAL DAILY
Qty: 90 TABLET | Refills: 1 | Status: SHIPPED | OUTPATIENT
Start: 2023-06-14

## 2023-06-14 NOTE — TELEPHONE ENCOUNTER
Needed to call Pt again for more information - [facility] - is looking for more information    Confusion is regarding the name - Ins Card spells name Ernstenald   - Chart spells name Ernstenolkristy    Waiting on return call

## 2023-06-14 NOTE — TELEPHONE ENCOUNTER
Recalled Pt stating that in our chart Pt's 's license is spell [id number] - Now it will be Pt's responsibility to correct the spelling of his name with his insurance company     Requesting return call when information has been changed with ins & update

## 2023-06-14 NOTE — TELEPHONE ENCOUNTER
[facility] was called - Previous Rx's were cancelled -     They recommended that office ad note to pharm regarding insurance in the - Information added

## 2023-06-16 DIAGNOSIS — R80.9 CONTROLLED TYPE 2 DIABETES MELLITUS WITH MICROALBUMINURIA, WITHOUT LONG-TERM CURRENT USE OF INSULIN (HCC): ICD-10-CM

## 2023-06-16 DIAGNOSIS — E11.29 CONTROLLED TYPE 2 DIABETES MELLITUS WITH MICROALBUMINURIA, WITHOUT LONG-TERM CURRENT USE OF INSULIN (HCC): ICD-10-CM

## 2023-06-16 RX ORDER — METFORMIN HYDROCHLORIDE 500 MG/1
500 TABLET, EXTENDED RELEASE ORAL
Qty: 90 TABLET | Refills: 3 | Status: SHIPPED | OUTPATIENT
Start: 2023-06-16

## 2023-06-20 ENCOUNTER — OFFICE VISIT (OUTPATIENT)
Dept: NEPHROLOGY | Facility: CLINIC | Age: 58
End: 2023-06-20
Payer: COMMERCIAL

## 2023-06-20 VITALS
BODY MASS INDEX: 36.45 KG/M2 | SYSTOLIC BLOOD PRESSURE: 126 MMHG | HEART RATE: 89 BPM | OXYGEN SATURATION: 94 % | HEIGHT: 73 IN | WEIGHT: 275 LBS | DIASTOLIC BLOOD PRESSURE: 70 MMHG

## 2023-06-20 DIAGNOSIS — E11.29 CONTROLLED TYPE 2 DIABETES MELLITUS WITH MICROALBUMINURIA, WITHOUT LONG-TERM CURRENT USE OF INSULIN (HCC): ICD-10-CM

## 2023-06-20 DIAGNOSIS — R80.9 CONTROLLED TYPE 2 DIABETES MELLITUS WITH MICROALBUMINURIA, WITHOUT LONG-TERM CURRENT USE OF INSULIN (HCC): ICD-10-CM

## 2023-06-20 DIAGNOSIS — R80.9 PROTEINURIA, UNSPECIFIED TYPE: ICD-10-CM

## 2023-06-20 DIAGNOSIS — E26.9 HYPERALDOSTERONISM (HCC): ICD-10-CM

## 2023-06-20 DIAGNOSIS — I15.9 SECONDARY HYPERTENSION: ICD-10-CM

## 2023-06-20 DIAGNOSIS — N18.30 STAGE 3 CHRONIC KIDNEY DISEASE, UNSPECIFIED WHETHER STAGE 3A OR 3B CKD (HCC): Primary | ICD-10-CM

## 2023-06-20 PROCEDURE — 99214 OFFICE O/P EST MOD 30 MIN: CPT | Performed by: INTERNAL MEDICINE

## 2023-06-20 NOTE — PROGRESS NOTES
Assessment & Plan:    1  Stage 3 chronic kidney disease, unspecified whether stage 3a or 3b CKD ([facility])  -     Urinalysis with microscopic; Future; Expected date: 09/06/2023  -     Uric acid; Future; Expected date: 09/06/2023  -     Albumin / creatinine urine ratio; Future; Expected date: 09/06/2023  -     Comprehensive metabolic panel; Future; Expected date: 09/06/2023    2  Secondary hypertension    3  Hyperaldosteronism ([facility] )    4  Controlled type 2 diabetes mellitus with microalbuminuria, without long-term current use of insulin ([facility] )    5  Proteinuria, unspecified type       1  CKD3 unspecified A3  Cr running in 1 6-1 7 range  Cr 1 6 with eGFR 46 ml/min  Appears euvolemic at present  Reviewed CKD stages, CR and eGFR trends  No changes to medication at this time with recent Ozempic addition  Consider SGLT-2I addition once stable on ozempic  Will need to coordinate with endocrine if any DM medicine should be adjusted if added  Renal function is at baseline  -Metabolic parameters: acceptable  -Bone mineral parameters:acceptable  -Hgb within an acceptable range  -Albumin normal  -U/A 2+ proteinuria  -no signs/sx of UTI  -severe proteinuria--not new 1 gram in May  Follow up CMP, uric acid, UA, alb/cr ratio prior to next visit  Follow up in 3 months  2  Secondary HTN with hyperaldosteronism  BP under excellent control goal <120/80  Currently on lisinopril 40mg/day, lasix 40mg/day and spironolactone 25mg/day  3  Hyperaldosteronism  BP under excellent control  Continue spironolactone at 25mg/day  4  Controled type 2 DM  Follow with endocrine and ozempic added last week  5  Proteinuria, has been ongoing issue even prior to DM diagnosis  Unclear cause, reports a trauma event in high school and started with proteinuria after this  Already on maximal ACE dose which he is tolerating  Do not believe this is biopsy proven  After he is stable on ozempic dosing, would consider adding SGLT-2i  "Avoid adding this now with addition of ozempic 1 week ago  The benefits, risks and alternatives to the treatment plan were discussed at this visit  Patient was advised of common adverse effects of any medical therapies prescribed  All questions were answered and discussed with the patient and any accompanying family members or caretakers  Subjective:      Patient ID: Kari Alonso is a 62 y o  male who presents for follow up in the [place] office  Last seen on 12/6  Cr been running mid 1 range  His proteinuria was worsening and lisinopril increased to 10mg/day at that time  Patient also on spironolactone 25mg/day for hyperaldosteronism  HPI  In interim since last visit, his lisinopril was maximized to 40mg/day which he is tolerating well  Just started ozempic for DM 1 week ago  He is on metformin 500mg night  Pt A1C 5 9 and DM under great control  Currently on lisinopril 40mg/day, lasix 40mg/day and spironolactone 25mg/day  BP under ideal control, he is not following at home  /70 in office with HR 89  Denies UTI/pyelo  Denies DKA  Denies foot infections  He exercises a lot during the day with work, despite this weight not reducing  He had traumatic injury to the kidney in high school  Reports DM less than 5 years  The following portions of the patient's history were reviewed and updated as appropriate: allergies, current medications, past family history, past medical history, past social history, past surgical history, and problem list     Review of Systems   Constitutional: Negative  Genitourinary: Negative  Neurological: Negative  All other systems reviewed and are negative  Objective:      /70 Comment: left arm  Pulse 89   Ht 6' 1\" (1 854 m)   Wt 125 kg (275 lb)   SpO2 94%   BMI 36 28 kg/m²          Physical Exam  Vitals reviewed  Constitutional:       General: He is not in acute distress  Appearance: He is obese     HENT:      Head: " Normocephalic  Nose: Nose normal       Mouth/Throat:      Mouth: Mucous membranes are moist    Cardiovascular:      Rate and Rhythm: Normal rate and regular rhythm  Heart sounds: No friction rub  Pulmonary:      Breath sounds: No wheezing, rhonchi or rales  Abdominal:      General: There is no distension  Tenderness: There is no abdominal tenderness  There is no guarding  Musculoskeletal:      Right lower leg: No edema  Left lower leg: No edema  Skin:     General: Skin is warm  Coloration: Skin is not jaundiced  Findings: No bruising  Neurological:      General: No focal deficit present  Mental Status: He is alert and oriented to person, place, and time  Mental status is at baseline  Cranial Nerves: No cranial nerve deficit               Lab Results   Component Value Date     12/21/2015    SODIUM 136 06/02/2023    K 4 9 06/02/2023     (H) 06/02/2023    CO2 25 06/02/2023    ANIONGAP 10 12/21/2015    AGAP 1 (L) 06/02/2023    BUN 30 (H) 06/02/2023    CREATININE 1 60 (H) 06/02/2023    GLUC 89 12/06/2021    GLUF 114 (H) 06/02/2023    CALCIUM 9 6 06/02/2023    AST 24 06/02/2023    ALT 33 06/02/2023    ALKPHOS 52 06/02/2023    PROT 7 1 06/11/2015    PROT 7 1 06/11/2015    TP 6 9 06/02/2023    BILITOT 0 6 06/11/2015    TBILI 0 45 06/02/2023    EGFR 46 06/02/2023      Lab Results   Component Value Date    CREATININE 1 60 (H) 06/02/2023    CREATININE 1 69 (H) 05/16/2023    CREATININE 1 63 (H) 01/14/2023    CREATININE 1 74 (H) 12/23/2022    CREATININE 1 49 (H) 12/03/2022    CREATININE 1 39 (H) 10/22/2022    CREATININE 1 68 (H) 08/12/2022    CREATININE 1 59 (H) 05/31/2022    CREATININE 1 50 (H) 12/06/2021    CREATININE 1 33 (H) 09/02/2021    CREATININE 1 62 (H) 09/01/2021    CREATININE 1 74 (H) 08/31/2021    CREATININE 1 93 (H) 08/30/2021    CREATININE 1 72 (H) 08/30/2021    CREATININE 1 66 (H) 08/30/2021      Lab Results   Component Value Date    COLORU Light "Yellow 05/16/2023    CLARITYU Clear 05/16/2023    SPECGRAV 1 017 05/16/2023    PHUR 6 0 05/16/2023    LEUKOCYTESUR Negative 05/16/2023    NITRITE Negative 05/16/2023    PROTEIN  (2+) (A) 05/16/2023    GLUCOSEU Negative 05/16/2023    KETONESU Negative 05/16/2023    UROBILINOGEN <2 0 05/16/2023    BILIRUBINUR Negative 05/16/2023    BLOODU Negative 05/16/2023    RBCUA 1-2 05/16/2023    WBCUA 1-2 05/16/2023    EPIS Occasional 05/16/2023    BACTERIA None Seen 05/16/2023      No results found for: \"LABPROT\"  No results found for: \"MICROALBUR\", \"[id number]\"  Lab Results   Component Value Date    WBC 4 97 05/16/2023    HGB 15 6 05/16/2023    HCT 49 2 05/16/2023    MCV 98 05/16/2023     05/16/2023      Lab Results   Component Value Date    HGB 15 6 05/16/2023    HGB 15 4 12/03/2022    HGB 16 7 08/12/2022    HGB 18 3 (H) 07/28/2022    HGB 14 6 09/02/2021      No results found for: \"IRON\", \"TIBC\", \"FERRITIN\"   No results found for: \"PTHCALCIUM\", \"[id number]\", \"PHOSPHORUS\"   Lab Results   Component Value Date    CHOLESTEROL 226 (H) 06/02/2023    HDL 60 06/02/2023    LDLCALC 131 (H) 06/02/2023    TRIG 174 (H) 06/02/2023      Lab Results   Component Value Date    URICACID 4 5 12/03/2022      Lab Results   Component Value Date    HGBA1C 5 9 (H) 06/02/2023      Lab Results   Component Value Date    FREET4 1 43 08/30/2021      Lab Results   Component Value Date    PK Negative 06/11/2015      Lab Results   Component Value Date    PROT 7 1 06/11/2015    PROT 7 1 06/11/2015    UPEP  05/23/2017     The urine total protein is increased  No monoclonal bands noted  Reviewed by: Cassi Welch MD ([id number]) **Electronic Signature**        Portions of the record may have been created with voice recognition software  Occasional wrong word or \"sound a like\" substitutions may have occurred due to the inherent limitations of voice recognition software   Read the chart carefully and recognize, using context, where substitutions have " occurred  If you have any questions, please contact the dictating provider

## 2023-06-20 NOTE — PATIENT INSTRUCTIONS
Your kidney function is stable at 46%  You have significant protein in the urine, that has long been ongoing for many many years  At next visit, will look to start you on Farxiga or jardiance to get this protein down  Avoid motrin/advil/aleve/ibuprofen  Hydrate to 2 quart per day  No changes to medicine right now

## 2023-06-25 DIAGNOSIS — I48.0 PAROXYSMAL ATRIAL FIBRILLATION (HCC): ICD-10-CM

## 2023-06-25 DIAGNOSIS — E03.9 ACQUIRED HYPOTHYROIDISM: ICD-10-CM

## 2023-06-25 DIAGNOSIS — R60.9 EDEMA, UNSPECIFIED TYPE: ICD-10-CM

## 2023-06-26 RX ORDER — LEVOTHYROXINE SODIUM 0.03 MG/1
25 TABLET ORAL DAILY
Qty: 90 TABLET | Refills: 0 | Status: SHIPPED | OUTPATIENT
Start: 2023-06-26

## 2023-06-26 RX ORDER — CARVEDILOL 6.25 MG/1
TABLET ORAL
Qty: 90 TABLET | Refills: 0 | Status: SHIPPED | OUTPATIENT
Start: 2023-06-26

## 2023-06-26 RX ORDER — FUROSEMIDE 40 MG/1
40 TABLET ORAL DAILY
Qty: 90 TABLET | Refills: 0 | Status: SHIPPED | OUTPATIENT
Start: 2023-06-26

## 2023-06-26 RX ORDER — LEVOTHYROXINE SODIUM 0.15 MG/1
TABLET ORAL
Qty: 90 TABLET | Refills: 0 | Status: SHIPPED | OUTPATIENT
Start: 2023-06-26

## 2023-06-28 DIAGNOSIS — O22.30 DVT (DEEP VEIN THROMBOSIS) IN PREGNANCY: ICD-10-CM

## 2023-07-18 ENCOUNTER — APPOINTMENT (OUTPATIENT)
Dept: LAB | Facility: MEDICAL CENTER | Age: 58
End: 2023-07-18
Payer: COMMERCIAL

## 2023-07-18 DIAGNOSIS — R80.9 CONTROLLED TYPE 2 DIABETES MELLITUS WITH MICROALBUMINURIA, WITHOUT LONG-TERM CURRENT USE OF INSULIN (HCC): ICD-10-CM

## 2023-07-18 DIAGNOSIS — N18.30 STAGE 3 CHRONIC KIDNEY DISEASE, UNSPECIFIED WHETHER STAGE 3A OR 3B CKD (HCC): ICD-10-CM

## 2023-07-18 DIAGNOSIS — E03.9 HYPOTHYROIDISM (ACQUIRED): ICD-10-CM

## 2023-07-18 DIAGNOSIS — E11.29 CONTROLLED TYPE 2 DIABETES MELLITUS WITH MICROALBUMINURIA, WITHOUT LONG-TERM CURRENT USE OF INSULIN (HCC): ICD-10-CM

## 2023-07-18 LAB
ANION GAP SERPL CALCULATED.3IONS-SCNC: 3 MMOL/L
BUN SERPL-MCNC: 42 MG/DL (ref 5–25)
CALCIUM SERPL-MCNC: 10.9 MG/DL (ref 8.3–10.1)
CHLORIDE SERPL-SCNC: 111 MMOL/L (ref 96–108)
CO2 SERPL-SCNC: 26 MMOL/L (ref 21–32)
CREAT SERPL-MCNC: 1.96 MG/DL (ref 0.6–1.3)
EST. AVERAGE GLUCOSE BLD GHB EST-MCNC: 123 MG/DL
GFR SERPL CREATININE-BSD FRML MDRD: 36 ML/MIN/1.73SQ M
GLUCOSE P FAST SERPL-MCNC: 101 MG/DL (ref 65–99)
HBA1C MFR BLD: 5.9 %
POTASSIUM SERPL-SCNC: 5.2 MMOL/L (ref 3.5–5.3)
SODIUM SERPL-SCNC: 140 MMOL/L (ref 135–147)
T4 FREE SERPL-MCNC: 1.07 NG/DL (ref 0.61–1.12)
TSH SERPL DL<=0.05 MIU/L-ACNC: 0.28 UIU/ML (ref 0.45–4.5)

## 2023-07-18 PROCEDURE — 36415 COLL VENOUS BLD VENIPUNCTURE: CPT

## 2023-07-18 PROCEDURE — 80048 BASIC METABOLIC PNL TOTAL CA: CPT

## 2023-07-18 PROCEDURE — 83036 HEMOGLOBIN GLYCOSYLATED A1C: CPT

## 2023-07-18 PROCEDURE — 84443 ASSAY THYROID STIM HORMONE: CPT

## 2023-07-18 PROCEDURE — 84439 ASSAY OF FREE THYROXINE: CPT

## 2023-07-20 DIAGNOSIS — N17.9 AKI (ACUTE KIDNEY INJURY) (HCC): Primary | ICD-10-CM

## 2023-07-20 NOTE — PROGRESS NOTES
Repeat chemistry in 1 week. Cr uptrending, recently started on ozempic. Persistent issues with calcium and now with ozempic may be a little dry.

## 2023-07-21 DIAGNOSIS — R80.9 CONTROLLED TYPE 2 DIABETES MELLITUS WITH MICROALBUMINURIA, WITHOUT LONG-TERM CURRENT USE OF INSULIN (HCC): ICD-10-CM

## 2023-07-21 DIAGNOSIS — E03.9 HYPOTHYROIDISM (ACQUIRED): Primary | ICD-10-CM

## 2023-07-21 DIAGNOSIS — E11.29 CONTROLLED TYPE 2 DIABETES MELLITUS WITH MICROALBUMINURIA, WITHOUT LONG-TERM CURRENT USE OF INSULIN (HCC): ICD-10-CM

## 2023-07-24 ENCOUNTER — OFFICE VISIT (OUTPATIENT)
Dept: PODIATRY | Facility: CLINIC | Age: 58
End: 2023-07-24
Payer: COMMERCIAL

## 2023-07-24 VITALS — BODY MASS INDEX: 36.45 KG/M2 | WEIGHT: 275 LBS | HEIGHT: 73 IN

## 2023-07-24 DIAGNOSIS — M20.41 HAMMER TOE OF RIGHT FOOT: ICD-10-CM

## 2023-07-24 DIAGNOSIS — M77.9 CAPSULITIS: ICD-10-CM

## 2023-07-24 DIAGNOSIS — M72.2 PLANTAR FASCIITIS: Primary | ICD-10-CM

## 2023-07-24 DIAGNOSIS — D36.10 NEUROMA: ICD-10-CM

## 2023-07-24 PROCEDURE — 99213 OFFICE O/P EST LOW 20 MIN: CPT | Performed by: PODIATRIST

## 2023-07-24 PROCEDURE — 20600 DRAIN/INJ JOINT/BURSA W/O US: CPT | Performed by: PODIATRIST

## 2023-07-24 RX ORDER — TRIAMCINOLONE ACETONIDE 40 MG/ML
20 INJECTION, SUSPENSION INTRA-ARTICULAR; INTRAMUSCULAR ONCE
Status: COMPLETED | OUTPATIENT
Start: 2023-07-24 | End: 2023-07-24

## 2023-07-24 RX ORDER — ZOLPIDEM TARTRATE 5 MG/1
TABLET ORAL
COMMUNITY

## 2023-07-24 RX ORDER — LIDOCAINE HYDROCHLORIDE 10 MG/ML
1.5 INJECTION, SOLUTION EPIDURAL; INFILTRATION; INTRACAUDAL; PERINEURAL ONCE
Status: COMPLETED | OUTPATIENT
Start: 2023-07-24 | End: 2023-07-24

## 2023-07-24 RX ADMIN — TRIAMCINOLONE ACETONIDE 20 MG: 40 INJECTION, SUSPENSION INTRA-ARTICULAR; INTRAMUSCULAR at 08:20

## 2023-07-24 RX ADMIN — LIDOCAINE HYDROCHLORIDE 1.5 ML: 10 INJECTION, SOLUTION EPIDURAL; INFILTRATION; INTRACAUDAL; PERINEURAL at 08:19

## 2023-07-24 NOTE — PROGRESS NOTES
Assessment/Plan:    No problem-specific Assessment & Plan notes found for this encounter. Diagnoses and all orders for this visit:    Plantar fasciitis  -     MRI ankle/heel left wo contrast; Future    Capsulitis    Hammer toe of right foot    Neuroma  -     lidocaine (PF) (XYLOCAINE-MPF) 1 % injection 1.5 mL  -     triamcinolone acetonide (KENALOG-40) 40 mg/mL injection 20 mg    Other orders  -     zolpidem (AMBIEN) 5 mg tablet; TAKE 1 TABLET BY MOUTH AS NEEDED FOR SLEEP ( FOR USE DURING SLEEP STUDY) FOR UP TO 1 DAY      He has improved with conservative care but is not fully resolving, and his clinical course is lagging behind what I would consider normal  --Third injection of the right fourth interspace today, I will obtain an MRI of the left rear foot to rule out any Baxters any impingement versus posterior tibial tendon tear  - he is to follow-up after MRI  - We will consider repeat injection of the plantar aspect of the left foot versus moving forward ahead with surgical intervention    Nerve block    Date/Time: 7/24/2023 7:45 AM    Performed by: Addi Tabor DPM  Authorized by: Addi Tabor DPM  Universal Protocol:  Consent: Verbal consent obtained.   Risks and benefits: risks, benefits and alternatives were discussed  Consent given by: patient  Patient understanding: patient states understanding of the procedure being performed  Patient consent: the patient's understanding of the procedure matches consent given  Patient identity confirmed: verbally with patient      Indications:     Indications:  Pain relief  Location:     Laterality:  Right  Pre-procedure details:     Skin preparation:  Alcohol    Preparation: Patient was prepped and draped in usual sterile fashion    Skin anesthesia (see MAR for exact dosages):     Skin anesthesia method:  Topical application  Post-procedure details:     Dressing:  Sterile dressing    Outcome:  Pain relieved    Patient tolerance of procedure: Tolerated well, no immediate complications        Subjective:      Patient ID: Kwame Mccarty is a 62 y.o. male. Patient presents for evaluation management of his bilateral foot pain, his diabetes has been well under control and his last A1c was 5.9. Blood sugar was 101, his foot pain however, is not getting appreciably better. He states the he is having a worsening 8 out of 10 on the left, he has had 1 injection on his heel and 2 injections in the interspace on the right fifth, he is using his metatarsal pad and that is helping. But he is still having post attic dyskinesia especially after sitting and denies severe burning as the day goes on but this does continue to interfere with activities of daily living and his ability to lose weight      The following portions of the patient's history were reviewed and updated as appropriate: allergies, current medications, past family history, past medical history, past social history, past surgical history and problem list.    Review of Systems   Constitutional: Negative for chills and fever. HENT: Negative for ear pain and sore throat. Eyes: Negative for pain and visual disturbance. Respiratory: Negative for cough and shortness of breath. Cardiovascular: Negative for chest pain and palpitations. Gastrointestinal: Negative for abdominal pain and vomiting. Genitourinary: Negative for dysuria and hematuria. Musculoskeletal: Negative for arthralgias and back pain. Skin: Negative for color change and rash. Neurological: Negative for seizures and syncope. All other systems reviewed and are negative. Objective:      Ht 6' 1" (1.854 m)   Wt 125 kg (275 lb)   BMI 36.28 kg/m²          Physical Exam  Musculoskeletal:      Comments: No change in his clinical scenario except for overall reduction of equinus bilaterally, left greater than right. He is now able to get around +10 degrees with knee extended, flexed 15-20 with knee bent. Continued pain with palpation along the origin of the plantar fascia and also the insertion of the posterior tibial tendon.   Pain with palpation of the right fourth interspace as well

## 2023-08-02 ENCOUNTER — HOSPITAL ENCOUNTER (OUTPATIENT)
Dept: MRI IMAGING | Facility: HOSPITAL | Age: 58
Discharge: HOME | End: 2023-08-02
Payer: COMMERCIAL

## 2023-08-02 DIAGNOSIS — M72.2 PLANTAR FASCIITIS: ICD-10-CM

## 2023-08-02 PROCEDURE — G1004 CDSM NDSC: HCPCS

## 2023-08-02 PROCEDURE — 73721 MRI JNT OF LWR EXTRE W/O DYE: CPT

## 2023-08-03 ENCOUNTER — REMOTE DEVICE CLINIC VISIT (OUTPATIENT)
Dept: CARDIOLOGY CLINIC | Facility: CLINIC | Age: 58
End: 2023-08-03
Payer: COMMERCIAL

## 2023-08-03 DIAGNOSIS — Z95.0 CARDIAC PACEMAKER IN SITU: Primary | ICD-10-CM

## 2023-08-03 PROCEDURE — 93296 REM INTERROG EVL PM/IDS: CPT | Performed by: INTERNAL MEDICINE

## 2023-08-03 PROCEDURE — 93294 REM INTERROG EVL PM/LDLS PM: CPT | Performed by: INTERNAL MEDICINE

## 2023-08-03 NOTE — PROGRESS NOTES
Results for orders placed or performed in visit on 08/03/23   Cardiac EP device report    Narrative    MDDT DUAL PM/ ACTIVE SYSTEM IS MRI CONDITIONAL  CARELINK TRANSMISSION: BATTERY VOLTAGE ADEQUATE (12.2 YRS). AP-83%, -1%. ALL AVAILABLE LEAD PARAMETERS WITHIN NORMAL LIMITS. NO SIGNIFICANT HIGH RATE EPISODES. NORMAL DEVICE FUNCTION.  GV

## 2023-08-04 ENCOUNTER — TELEPHONE (OUTPATIENT)
Age: 58
End: 2023-08-04

## 2023-08-04 NOTE — TELEPHONE ENCOUNTER
Caller: Patient    Doctor: Keke Ta    Reason for call: Needs to be seen 4 days after MRI. MRI was done this past Wed 8/2. Can we squeeze him in?     This is per Dr Keke Ta    Call back#: [phone]

## 2023-08-07 ENCOUNTER — TELEPHONE (OUTPATIENT)
Dept: PODIATRY | Facility: CLINIC | Age: 58
End: 2023-08-07

## 2023-08-10 ENCOUNTER — OFFICE VISIT (OUTPATIENT)
Dept: PODIATRY | Facility: CLINIC | Age: 58
End: 2023-08-10
Payer: COMMERCIAL

## 2023-08-10 VITALS
DIASTOLIC BLOOD PRESSURE: 89 MMHG | HEIGHT: 73 IN | BODY MASS INDEX: 36.45 KG/M2 | WEIGHT: 275 LBS | SYSTOLIC BLOOD PRESSURE: 141 MMHG | HEART RATE: 63 BPM

## 2023-08-10 DIAGNOSIS — M79.2 NEURITIS: ICD-10-CM

## 2023-08-10 DIAGNOSIS — M72.2 PLANTAR FASCIITIS: Primary | ICD-10-CM

## 2023-08-10 DIAGNOSIS — G57.52 TARSAL TUNNEL SYNDROME OF LEFT SIDE: ICD-10-CM

## 2023-08-10 PROCEDURE — 99214 OFFICE O/P EST MOD 30 MIN: CPT | Performed by: PODIATRIST

## 2023-08-10 RX ORDER — CHLORHEXIDINE GLUCONATE 0.12 MG/ML
15 RINSE ORAL ONCE
OUTPATIENT
Start: 2023-08-10 | End: 2023-08-10

## 2023-08-10 RX ORDER — CHLORHEXIDINE GLUCONATE 4 G/100ML
SOLUTION TOPICAL DAILY PRN
OUTPATIENT
Start: 2023-08-10

## 2023-08-10 NOTE — PROGRESS NOTES
Assessment/Plan:    No problem-specific Assessment & Plan notes found for this encounter. Diagnoses and all orders for this visit:    Plantar fasciitis    Neuritis    Tarsal tunnel syndrome of left side      - MRI reviewed and shows finding c/w infection neuritis, atrophy of the abductor digiti minimized  -We did discuss that he is likely having double crush and having symptoms from proximal spinal issues causing worsening of his foot pain, he does understand this but he states that the Baxters nerve symptoms are unmanageable. - We did discuss risk, he does have a history of DVT and will need to be anticoagulated following surgery  - We will send for clearance and plan for tarsal tunnel release with application of graft, Baxters nerve release and also plantar fasciectomy  -He will be strict nonweightbearing for 2 weeks after surgery until stitches are removed following suture removal he will be weightbearing as tolerated in a boot for 2 weeks and then transition back to shoes anywhere between 4 and 6 weeks postop    Patient was counseled and educated on the condition and the diagnosis. The diagnosis, treatment options and prognosis were discussed with the patient. The patient failed conservative care at this time and wished to proceed with surgical treatment. Explained surgical details and post-op course. Discussed all risks and complications related to the patient's condition and surgery. The benefits of surgery were also discussed. The patient understood that the surgery would not guarantee desirable outcome. All questions and concerns were addressed and the consent was signed. Subjective:      Patient ID: Linette Scott is a 62 y.o. male. Patient points evaluation management of his left foot and ankle, his pain is actually getting worse. He is were having worsening heel pain that worsens as the afternoon goes on and also the day.   He does admit that he has a back fusion and has not had an injection in a while. He does have tingling in all of his nerves but the foot does have a worsening pain and it was a separate presenting type of pathology reports a previous tarsal tunnel by Dr. Matthew Levi      The following portions of the patient's history were reviewed and updated as appropriate: allergies, current medications, past family history, past medical history, past social history, past surgical history and problem list.    Review of Systems   Constitutional: Negative for chills and fever. HENT: Negative for ear pain and sore throat. Eyes: Negative for pain and visual disturbance. Respiratory: Negative for cough and shortness of breath. Cardiovascular: Negative for chest pain and palpitations. Gastrointestinal: Negative for abdominal pain and vomiting. Genitourinary: Negative for dysuria and hematuria. Musculoskeletal: Negative for arthralgias and back pain. Skin: Negative for color change and rash. Neurological: Negative for seizures and syncope. All other systems reviewed and are negative. Objective:      /89 (BP Location: Left arm, Patient Position: Sitting, Cuff Size: Large)   Pulse 63   Ht 6' 1" (1.854 m)   Wt 125 kg (275 lb)   BMI 36.28 kg/m²          Physical Exam  Vitals reviewed. Constitutional:       Appearance: Normal appearance. He is obese. HENT:      Head: Normocephalic and atraumatic. Nose: Nose normal.   Eyes:      Pupils: Pupils are equal, round, and reactive to light. Musculoskeletal:         General: Swelling present. Left lower leg: Edema present. Comments: Positive varicose veins with significant cicatrix on the tarsal tunnel left lower extremity.   Positive varicose veins there is pain with palpation of the origin of Baxters nerves positive Tinel's along this area as well, positive along tarsal tunnel, intermittent dorsal cutaneous nerve, deep peroneal nerve and also sural nerve   Skin:     Capillary Refill: Capillary refill takes less than 2 seconds. Neurological:      General: No focal deficit present. Mental Status: He is alert. Mental status is at baseline.

## 2023-08-11 ENCOUNTER — PREP FOR PROCEDURE (OUTPATIENT)
Dept: OBGYN CLINIC | Facility: CLINIC | Age: 58
End: 2023-08-11

## 2023-08-16 DIAGNOSIS — I48.0 PAROXYSMAL ATRIAL FIBRILLATION (HCC): ICD-10-CM

## 2023-08-17 RX ORDER — CARVEDILOL 6.25 MG/1
TABLET ORAL
Qty: 90 TABLET | Refills: 0 | Status: SHIPPED | OUTPATIENT
Start: 2023-08-17

## 2023-08-24 DIAGNOSIS — R80.9 CONTROLLED TYPE 2 DIABETES MELLITUS WITH MICROALBUMINURIA, WITHOUT LONG-TERM CURRENT USE OF INSULIN (HCC): ICD-10-CM

## 2023-08-24 DIAGNOSIS — E11.29 CONTROLLED TYPE 2 DIABETES MELLITUS WITH MICROALBUMINURIA, WITHOUT LONG-TERM CURRENT USE OF INSULIN (HCC): ICD-10-CM

## 2023-09-16 ENCOUNTER — APPOINTMENT (OUTPATIENT)
Dept: LAB | Facility: MEDICAL CENTER | Age: 58
End: 2023-09-16
Payer: COMMERCIAL

## 2023-09-16 DIAGNOSIS — E03.9 ACQUIRED HYPOTHYROIDISM: ICD-10-CM

## 2023-09-16 DIAGNOSIS — E03.9 HYPOTHYROIDISM (ACQUIRED): ICD-10-CM

## 2023-09-16 DIAGNOSIS — N17.9 AKI (ACUTE KIDNEY INJURY) (HCC): ICD-10-CM

## 2023-09-16 DIAGNOSIS — N18.30 STAGE 3 CHRONIC KIDNEY DISEASE, UNSPECIFIED WHETHER STAGE 3A OR 3B CKD (HCC): ICD-10-CM

## 2023-09-16 LAB
ALBUMIN SERPL BCP-MCNC: 3.8 G/DL (ref 3.5–5)
ALP SERPL-CCNC: 49 U/L (ref 34–104)
ALT SERPL W P-5'-P-CCNC: 22 U/L (ref 7–52)
ANION GAP SERPL CALCULATED.3IONS-SCNC: 5 MMOL/L
AST SERPL W P-5'-P-CCNC: 19 U/L (ref 13–39)
BACTERIA UR QL AUTO: ABNORMAL /HPF
BILIRUB SERPL-MCNC: 0.28 MG/DL (ref 0.2–1)
BILIRUB UR QL STRIP: NEGATIVE
BUN SERPL-MCNC: 23 MG/DL (ref 5–25)
CALCIUM SERPL-MCNC: 10.7 MG/DL (ref 8.4–10.2)
CHLORIDE SERPL-SCNC: 108 MMOL/L (ref 96–108)
CLARITY UR: CLEAR
CO2 SERPL-SCNC: 29 MMOL/L (ref 21–32)
COLOR UR: ABNORMAL
CREAT SERPL-MCNC: 1.39 MG/DL (ref 0.6–1.3)
CREAT UR-MCNC: 139.7 MG/DL
GFR SERPL CREATININE-BSD FRML MDRD: 55 ML/MIN/1.73SQ M
GLUCOSE P FAST SERPL-MCNC: 94 MG/DL (ref 65–99)
GLUCOSE UR STRIP-MCNC: NEGATIVE MG/DL
GRAN CASTS #/AREA URNS LPF: ABNORMAL /[LPF]
HGB UR QL STRIP.AUTO: NEGATIVE
KETONES UR STRIP-MCNC: NEGATIVE MG/DL
LEUKOCYTE ESTERASE UR QL STRIP: NEGATIVE
MICROALBUMIN UR-MCNC: 2229.6 MG/L
MICROALBUMIN/CREAT 24H UR: 1596 MG/G CREATININE (ref 0–30)
MUCOUS THREADS UR QL AUTO: ABNORMAL
NITRITE UR QL STRIP: NEGATIVE
NON-SQ EPI CELLS URNS QL MICRO: ABNORMAL /HPF
PH UR STRIP.AUTO: 6 [PH]
POTASSIUM SERPL-SCNC: 4.8 MMOL/L (ref 3.5–5.3)
PROT SERPL-MCNC: 6.4 G/DL (ref 6.4–8.4)
PROT UR STRIP-MCNC: ABNORMAL MG/DL
RBC #/AREA URNS AUTO: ABNORMAL /HPF
SODIUM SERPL-SCNC: 142 MMOL/L (ref 135–147)
SP GR UR STRIP.AUTO: 1.02 (ref 1–1.03)
T4 FREE SERPL-MCNC: 1.15 NG/DL (ref 0.61–1.12)
TSH SERPL DL<=0.05 MIU/L-ACNC: 0.14 UIU/ML (ref 0.45–4.5)
URATE SERPL-MCNC: 4.5 MG/DL (ref 3.5–8.5)
UROBILINOGEN UR STRIP-ACNC: <2 MG/DL
WBC #/AREA URNS AUTO: ABNORMAL /HPF

## 2023-09-16 PROCEDURE — 84443 ASSAY THYROID STIM HORMONE: CPT

## 2023-09-16 PROCEDURE — 84439 ASSAY OF FREE THYROXINE: CPT

## 2023-09-16 PROCEDURE — 84550 ASSAY OF BLOOD/URIC ACID: CPT

## 2023-09-16 PROCEDURE — 82043 UR ALBUMIN QUANTITATIVE: CPT

## 2023-09-16 PROCEDURE — 81001 URINALYSIS AUTO W/SCOPE: CPT

## 2023-09-16 PROCEDURE — 82570 ASSAY OF URINE CREATININE: CPT

## 2023-09-18 RX ORDER — LEVOTHYROXINE SODIUM 0.03 MG/1
TABLET ORAL
Qty: 90 TABLET | Refills: 0 | Status: SHIPPED | OUTPATIENT
Start: 2023-09-18

## 2023-09-18 RX ORDER — LEVOTHYROXINE SODIUM 0.15 MG/1
TABLET ORAL
Qty: 90 TABLET | Refills: 0 | Status: SHIPPED | OUTPATIENT
Start: 2023-09-18

## 2023-09-24 DIAGNOSIS — E11.29 CONTROLLED TYPE 2 DIABETES MELLITUS WITH MICROALBUMINURIA, WITHOUT LONG-TERM CURRENT USE OF INSULIN: ICD-10-CM

## 2023-09-24 DIAGNOSIS — R80.9 CONTROLLED TYPE 2 DIABETES MELLITUS WITH MICROALBUMINURIA, WITHOUT LONG-TERM CURRENT USE OF INSULIN: ICD-10-CM

## 2023-10-03 ENCOUNTER — OFFICE VISIT (OUTPATIENT)
Dept: NEPHROLOGY | Facility: CLINIC | Age: 58
End: 2023-10-03
Payer: COMMERCIAL

## 2023-10-03 VITALS
DIASTOLIC BLOOD PRESSURE: 82 MMHG | HEIGHT: 72 IN | WEIGHT: 265.6 LBS | SYSTOLIC BLOOD PRESSURE: 134 MMHG | OXYGEN SATURATION: 96 % | BODY MASS INDEX: 35.97 KG/M2 | HEART RATE: 77 BPM

## 2023-10-03 DIAGNOSIS — E83.52 HYPERCALCEMIA: ICD-10-CM

## 2023-10-03 DIAGNOSIS — E26.9 HYPERALDOSTERONISM (HCC): ICD-10-CM

## 2023-10-03 DIAGNOSIS — N18.30 STAGE 3 CHRONIC KIDNEY DISEASE, UNSPECIFIED WHETHER STAGE 3A OR 3B CKD (HCC): Primary | ICD-10-CM

## 2023-10-03 DIAGNOSIS — R80.9 PROTEINURIA, UNSPECIFIED TYPE: ICD-10-CM

## 2023-10-03 DIAGNOSIS — I15.9 SECONDARY HYPERTENSION: ICD-10-CM

## 2023-10-03 PROCEDURE — 99214 OFFICE O/P EST MOD 30 MIN: CPT | Performed by: INTERNAL MEDICINE

## 2023-10-03 NOTE — PATIENT INSTRUCTIONS
Your most recent kidney function has improved to 55%. You have stage 3 kidney disease which looks slightly improved. Your blood pressure is well controlled. Your calcium is elevated. This may reflect a state called primary hyperparathyroidism. Your endocrinologist has been informed this is a concern and will discuss at your next visit. Avoid taking any calcium /TUMS/vitamin D supplements. Hold spironolactone/lisinopril 24 hr prior to surgery and the day of surgery.

## 2023-10-03 NOTE — PROGRESS NOTES
Assessment & Plan:    1. Stage 3 chronic kidney disease, unspecified whether stage 3a or 3b CKD (HCC)  -     Uric acid; Future; Expected date: 01/24/2024  -     Urinalysis with microscopic; Future; Expected date: 01/24/2024  -     Magnesium; Future; Expected date: 01/24/2024  -     Comprehensive metabolic panel; Future; Expected date: 01/24/2024  -     Albumin / creatinine urine ratio; Future; Expected date: 01/24/2024  -     Phosphorus; Future; Expected date: 01/24/2024    2. Proteinuria, unspecified type  -     Uric acid; Future; Expected date: 01/24/2024  -     Urinalysis with microscopic; Future; Expected date: 01/24/2024  -     Magnesium; Future; Expected date: 01/24/2024  -     Comprehensive metabolic panel; Future; Expected date: 01/24/2024  -     Albumin / creatinine urine ratio; Future; Expected date: 01/24/2024  -     Phosphorus; Future; Expected date: 01/24/2024    3. Secondary hypertension    4. Hyperaldosteronism ([address])    5. Hypercalcemia       1. CKD3, A3   Baseline 1.6-1.7 mg/dL. Most recent Cr 1.39 with eGFR 55 ml/min on 9/16. Etiology due to DM nephropathy, potentially obesity related FSGS, HTN nephrosclerosis. Worsening proteinuria noted. Mild hypercalcemia noted, asymptomatic. Recommend: Will hold on addition of SGLT-2I with upcoming surgery and parathyroid eval.  Mild hypercalcemia, uncorrected Ca 10.7, recommend endocrine input on primary HPT. Hold spirinolactone/lisionopril 24 hr prior to surgery and on the day of surgery. BP well controlled at present. Follow up in 3 months or sooner if needed with labs prior. 2. Proteinuria-A3, worsening due to DM nephropathy. Alb/ cr ratio 900mg/g cr in May to 1,596 mg/g cr in September. Continue maximally dosed lisinopril at 40mg/day. Continue spirinolactone 25mg/day at current dose. SGLT-2I would be ideal to help with worsening proteinuria, will not pursue at this time with upcoming surgery and need for HPT evaluation.     Will consider Yoli ram at next visit. 3. Secondary HTN due to hyperaldosteronism  BP well controlled, no changes indicated at present. Hold spirinolactone/lisinopril 24 hr prior to surgery and on the day of surgery. 4. Hyperaldosteronism  Continue spirinolactone 25mg/day. 5. Hypercalcemia suspected to be due to primary HPT. I TT endocrinology to review and this will be discussed at upcoming visit. He is not taking any calcium/vitamin D/TUMS. Uncorrected Ca 10.7 on 9/16. The benefits, risks and alternatives to the treatment plan were discussed at this visit. Patient was advised of common adverse effects of any medical therapies prescribed. All questions were answered and discussed with the patient and any accompanying family members or caretakers. Subjective:      Patient ID: Ray Tobin is a 62 y.o. male presenting for followup in the [facility] office. Patient last seen on 6/20 for CKD 3 with baseline Cr 1.6-1.7. No changes made at last visit. HPI    IN the interim since last visit,ozempic started at last visit and now off lasix. Denies swelling. Cr significantly improved in September likely due to increased water intake and coming off Lasix. Calcium trends remain elevated and PTH was slightly elevated in May. Endocrine contacted regarding concern for primary HPT after labs resulted and will review at next visit. Not taking calcium/vitamin D/Tums. Not drinking cows milk. He cut out dairy. Most recent kidney function stable at 55% from 9/16. Urine protein in the urine is persistently present and slightly worsening with improvement in kidney function. Alb/cr ratio 1,596 mg/g cr up from 900 mg/g cr in May. Currently on spirinolactone 25mg/day and lisinopril 40mg/day. Currently taking coreg 6.25 mg BID. Checks bp intermittently at home intermittently /80. For DM, he is on metformin 500mg qhs and ozempic. BG down to 94 fsting.     Going for foot operation on 10/20 for heel issue . Denies infections. He has a fibroma in the plantar fascia and has neuropathy. Also plantar fasicatisi. The following portions of the patient's history were reviewed and updated as appropriate: allergies, current medications, past family history, past medical history, past social history, past surgical history, and problem list.    Review of Systems   Respiratory: Negative. Cardiovascular: Negative. Genitourinary: Negative. Neurological: Negative. All other systems reviewed and are negative. Objective:      /82 (BP Location: Right arm, Patient Position: Sitting)   Pulse 77   Ht 6' (1.829 m)   Wt 120 kg (265 lb 9.6 oz)   SpO2 96%   BMI 36.02 kg/m²          Physical Exam  Vitals reviewed. Constitutional:       Appearance: He is obese. Cardiovascular:      Rate and Rhythm: Normal rate and regular rhythm. Pulmonary:      Breath sounds: No wheezing, rhonchi or rales. Abdominal:      Palpations: Abdomen is soft. Tenderness: There is no abdominal tenderness. There is no guarding. Musculoskeletal:      Right lower leg: No edema. Left lower leg: No edema. Skin:     General: Skin is warm. Coloration: Skin is not jaundiced. Findings: No bruising or rash. Neurological:      General: No focal deficit present. Mental Status: He is oriented to person, place, and time. Mental status is at baseline. Cranial Nerves: No cranial nerve deficit.              Lab Results   Component Value Date     12/21/2015    SODIUM 142 09/16/2023    K 4.8 09/16/2023     09/16/2023    CO2 29 09/16/2023    ANIONGAP 10 12/21/2015    AGAP 5 09/16/2023    BUN 23 09/16/2023    CREATININE 1.39 (H) 09/16/2023    GLUC 89 12/06/2021    GLUF 94 09/16/2023    CALCIUM 10.7 (H) 09/16/2023    AST 19 09/16/2023    ALT 22 09/16/2023    ALKPHOS 49 09/16/2023    PROT 7.1 06/11/2015    PROT 7.1 06/11/2015    TP 6.4 09/16/2023    BILITOT 0.6 06/11/2015    TBILI 0.28 09/16/2023    EGFR 55 09/16/2023      Lab Results   Component Value Date    CREATININE 1.39 (H) 09/16/2023    CREATININE 1.96 (H) 07/18/2023    CREATININE 1.60 (H) 06/02/2023    CREATININE 1.69 (H) 05/16/2023    CREATININE 1.63 (H) 01/14/2023    CREATININE 1.74 (H) 12/23/2022    CREATININE 1.49 (H) 12/03/2022    CREATININE 1.39 (H) 10/22/2022    CREATININE 1.68 (H) 08/12/2022    CREATININE 1.59 (H) 05/31/2022    CREATININE 1.50 (H) 12/06/2021    CREATININE 1.33 (H) 09/02/2021    CREATININE 1.62 (H) 09/01/2021    CREATININE 1.74 (H) 08/31/2021    CREATININE 1.93 (H) 08/30/2021      Lab Results   Component Value Date    COLORU Light Yellow 09/16/2023    CLARITYU Clear 09/16/2023    SPECGRAV 1.017 09/16/2023    PHUR 6.0 09/16/2023    LEUKOCYTESUR Negative 09/16/2023    NITRITE Negative 09/16/2023    PROTEIN  (3+) (A) 09/16/2023    GLUCOSEU Negative 09/16/2023    KETONESU Negative 09/16/2023    UROBILINOGEN <2.0 09/16/2023    BILIRUBINUR Negative 09/16/2023    BLOODU Negative 09/16/2023    RBCUA None Seen 09/16/2023    WBCUA 1-2 09/16/2023    EPIS Occasional 09/16/2023    BACTERIA None Seen 09/16/2023      No results found for: "LABPROT"  No results found for: "MICROALBUR", "[id number]"  Lab Results   Component Value Date    WBC 4.97 05/16/2023    HGB 15.6 05/16/2023    HCT 49.2 05/16/2023    MCV 98 05/16/2023     05/16/2023      Lab Results   Component Value Date    HGB 15.6 05/16/2023    HGB 15.4 12/03/2022    HGB 16.7 08/12/2022    HGB 18.3 (H) 07/28/2022    HGB 14.6 09/02/2021      No results found for: "IRON", "TIBC", "FERRITIN"   No results found for: "PTHCALCIUM", "[id number]", "PHOSPHORUS"   Lab Results   Component Value Date    CHOLESTEROL 226 (H) 06/02/2023    HDL 60 06/02/2023    LDLCALC 131 (H) 06/02/2023    TRIG 174 (H) 06/02/2023      Lab Results   Component Value Date    URICACID 4.5 09/16/2023      Lab Results   Component Value Date    HGBA1C 5.9 (H) 07/18/2023      Lab Results   Component Value Date    FREET4 1.15 (H) 09/16/2023      Lab Results   Component Value Date    PK Negative 06/11/2015      Lab Results   Component Value Date    PROT 7.1 06/11/2015    PROT 7.1 06/11/2015    UPEP  05/23/2017     The urine total protein is increased. No monoclonal bands noted. Reviewed by: [place] ([id number]) **Electronic Signature**        Portions of the record may have been created with voice recognition software. Occasional wrong word or "sound a like" substitutions may have occurred due to the inherent limitations of voice recognition software. Read the chart carefully and recognize, using context, where substitutions have occurred. If you have any questions, please contact the dictating provider.

## 2023-10-05 ENCOUNTER — OFFICE VISIT (OUTPATIENT)
Dept: ENDOCRINOLOGY | Facility: CLINIC | Age: 58
End: 2023-10-05
Payer: COMMERCIAL

## 2023-10-05 VITALS
SYSTOLIC BLOOD PRESSURE: 130 MMHG | HEART RATE: 80 BPM | OXYGEN SATURATION: 98 % | WEIGHT: 265.2 LBS | BODY MASS INDEX: 35.92 KG/M2 | HEIGHT: 72 IN | DIASTOLIC BLOOD PRESSURE: 76 MMHG

## 2023-10-05 DIAGNOSIS — R80.9 CONTROLLED TYPE 2 DIABETES MELLITUS WITH MICROALBUMINURIA, WITHOUT LONG-TERM CURRENT USE OF INSULIN: Primary | ICD-10-CM

## 2023-10-05 DIAGNOSIS — E03.9 ACQUIRED HYPOTHYROIDISM: ICD-10-CM

## 2023-10-05 DIAGNOSIS — E66.01 CLASS 2 SEVERE OBESITY DUE TO EXCESS CALORIES WITH SERIOUS COMORBIDITY AND BODY MASS INDEX (BMI) OF 36.0 TO 36.9 IN ADULT: ICD-10-CM

## 2023-10-05 DIAGNOSIS — E03.9 HYPOTHYROIDISM (ACQUIRED): ICD-10-CM

## 2023-10-05 DIAGNOSIS — E11.29 CONTROLLED TYPE 2 DIABETES MELLITUS WITH MICROALBUMINURIA, WITHOUT LONG-TERM CURRENT USE OF INSULIN: Primary | ICD-10-CM

## 2023-10-05 DIAGNOSIS — E21.3 HYPERPARATHYROIDISM (HCC): ICD-10-CM

## 2023-10-05 PROCEDURE — 99214 OFFICE O/P EST MOD 30 MIN: CPT | Performed by: STUDENT IN AN ORGANIZED HEALTH CARE EDUCATION/TRAINING PROGRAM

## 2023-10-05 RX ORDER — LEVOTHYROXINE SODIUM 0.15 MG/1
TABLET ORAL
Qty: 90 TABLET | Refills: 0 | Status: SHIPPED | OUTPATIENT
Start: 2023-10-05
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
## 2023-10-05 NOTE — PATIENT INSTRUCTIONS
Decrease levothyroxine 150 mcg daily     Schedule DXA (bone density study)    Labs in 3-months including 24 hour urine (instructions below)    INSTRUCTIONS FOR 24 HOUR URINE COLLECTION     The purpose of this test is to measure the total amount of either hormones or minerals that your body excretes into your urine within a 24 hour period. To do this, you need to collect all the urine that your body makes for 24 hours. 1.        On the first day, when you wake up, note the time. Then go to the bathroom and urinate. This should be flushed down the toilet. It is not part of the collection. 2.        On the first day, all subsequent urine voids need to be saved in the urine jug. Save the entire amount of each urine void. 3.        When you go to sleep that night, if you happen to awaken throughout the night and early morning, those urine voids must also be saved. 4.        On the second morning, awaken at the same time as you did on the first morning and go to the bathroom and urinate. Keep this entire urine void. This is the final part of the collection. 5.        After you have finished the urine collection, keep it cool until you bring it into the laboratory.

## 2023-10-07 DIAGNOSIS — I48.0 PAROXYSMAL ATRIAL FIBRILLATION (HCC): ICD-10-CM

## 2023-10-09 ENCOUNTER — TELEPHONE (OUTPATIENT)
Dept: PODIATRY | Facility: CLINIC | Age: 58
End: 2023-10-09

## 2023-10-09 RX ORDER — CARVEDILOL 6.25 MG/1
TABLET ORAL
Qty: 90 TABLET | Refills: 0 | Status: SHIPPED | OUTPATIENT
Start: 2023-10-09

## 2023-10-09 NOTE — TELEPHONE ENCOUNTER
Caller: Franca Caldwell    Doctor: Lieutenant Akilah DPM    Reason for call: Kennedy Garrett is scheduled for surgery on 10/20/23. He is scheduled to go for clearance on 10/13/23.     Does he need an EKG and chest x-ray    Call back#: [phone]

## 2023-10-10 NOTE — TELEPHONE ENCOUNTER
Patient will be notified that nothing additional is needed as he recently had labs and ecg on file and He will need to see his pcp at on the scheduled date.

## 2023-10-11 ENCOUNTER — PREP FOR PROCEDURE (OUTPATIENT)
Dept: PODIATRY | Facility: CLINIC | Age: 58
End: 2023-10-11

## 2023-10-13 ENCOUNTER — CONSULT (OUTPATIENT)
Dept: FAMILY MEDICINE CLINIC | Facility: CLINIC | Age: 58
End: 2023-10-13
Payer: COMMERCIAL

## 2023-10-13 VITALS
HEART RATE: 68 BPM | SYSTOLIC BLOOD PRESSURE: 118 MMHG | HEIGHT: 72 IN | DIASTOLIC BLOOD PRESSURE: 82 MMHG | WEIGHT: 268 LBS | TEMPERATURE: 98.3 F | OXYGEN SATURATION: 97 % | BODY MASS INDEX: 36.3 KG/M2

## 2023-10-13 DIAGNOSIS — G57.52 TARSAL TUNNEL SYNDROME, LEFT: ICD-10-CM

## 2023-10-13 DIAGNOSIS — Z95.0 PACEMAKER: ICD-10-CM

## 2023-10-13 DIAGNOSIS — G47.33 OBSTRUCTIVE SLEEP APNEA: ICD-10-CM

## 2023-10-13 DIAGNOSIS — E11.21 DIABETIC NEPHROPATHY ASSOCIATED WITH TYPE 2 DIABETES MELLITUS (HCC): ICD-10-CM

## 2023-10-13 DIAGNOSIS — Z01.818 PREOP EXAMINATION: Primary | ICD-10-CM

## 2023-10-13 DIAGNOSIS — E03.9 HYPOTHYROIDISM (ACQUIRED): ICD-10-CM

## 2023-10-13 DIAGNOSIS — Z15.89 HOMOZYGOUS FOR MTHFR GENE MUTATION: ICD-10-CM

## 2023-10-13 PROCEDURE — 99214 OFFICE O/P EST MOD 30 MIN: CPT | Performed by: FAMILY MEDICINE

## 2023-10-13 NOTE — H&P (VIEW-ONLY)
Subjective:     Delfino Stapleton is a 62 y.o. male who presents to the office today for a preoperative consultation at the request of surgeon Dr. Rahel Valencia who plans on performing left tarsal tunnel release procedure on October 20. This consultation is requested for the specific conditions prompting preoperative evaluation (i.e. because of potential affect on operative risk): Hypertension, type 2 diabetes, MTHFR gene mutation, obstructive sleep apnea, status post pacemaker insertion. Planned anesthesia: general. The patient has the following known anesthesia issues:  no prior problems with endotracheal intubation or anesthesia. Patients bleeding risk:  Currently on Xarelto which will be held for 48 hours prior to his surgery . The following portions of the patient's history were reviewed and updated as appropriate: He  has a past medical history of Arthritis, Chronic cholecystitis, CPAP (continuous positive airway pressure) dependence, Diabetes mellitus ([address]), Diabetic nephropathy ([address]), DVT (deep venous thrombosis) ([address]), Gout, History of pulmonary embolism, Hyperlipidemia, Hypertension, Hypothyroidism, Lumbar back pain, MTHFR mutation, Neuropathy, Scab, Sleep apnea, Tarsal tunnel syndrome, right, Tinnitus, Wears glasses, and Wears glasses.   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    Sinus bradycardia 08/31/2021    Obstructive sleep apnea 08/31/2021    Syncopal episodes  08/30/2021    Paroxysmal atrial fibrillation ([address]) 08/30/2021    History of DVT (deep vein thrombosis) 08/30/2021    Vitamin D deficiency 04/12/2021    Hyperaldosteronism ([address]) 10/15/2020    Diabetic nephropathy associated with type 2 diabetes mellitus ([address]) 10/15/2020    Stage 3a chronic kidney disease ([address]) 10/15/2020 Proteinuria 10/15/2020    Gout 06/05/2020    Venous stasis ulcer of left ankle limited to breakdown of skin with varicose veins ([address]) 04/12/2020    Tarsal tunnel syndrome, left 12/12/2019    Tarsal tunnel syndrome of both lower extremities 11/12/2019    Sacroiliitis ([address]) 11/05/2019    Hypothyroidism (acquired) 01/31/2018    Diabetic peripheral neuropathy ([address]) 09/07/2017    Controlled type 2 diabetes mellitus with microalbuminuria, without long-term current use of insulin  06/06/2017    Erectile dysfunction due to diseases classified elsewhere 05/01/2017    Homozygous for MTHFR gene mutation 05/10/2016     He  has a past surgical history that includes Cholecystectomy (03/26/2015); Appendectomy; Spinal fusion; Tonsillectomy (10/16/2013); Nasal septum surgery (10/16/2013); Uvulopalatopharyngoplasty; [place] tooth extraction; Bunionectomy (Right, 10/07/2016); Arthrodesis; Knee arthroscopy w/ meniscal repair; Uvulectomy (10/16/2013); pr release tarsal tunnel (Right, 06/25/2018); pr colonoscopy flx dx w/collj spec when pfrmd (N/A, 11/23/2018); Colonoscopy; Back surgery; Knee arthroscopy; pr release tarsal tunnel (Left, 03/13/2020); and Cardiac pacemaker placement (09/01/2021). His family history includes Aneurysm in his brother and mother; Coronary artery disease in his father; Heart disease in his father; Hypertension in his father. He  reports that he has never smoked. He has never used smokeless tobacco. He reports current alcohol use of about 6.0 standard drinks of alcohol per week. He reports that he does not use drugs.   Current Outpatient Medications   Medication Sig Dispense Refill    allopurinol (ZYLOPRIM) 300 mg tablet Take 1 tablet (300 mg total) by mouth daily 90 tablet 1    carvedilol (COREG) 6.25 mg tablet TAKE 1 TABLET EVERY MORNINGAND 1 AND 1/2 TABLETS IN   THE EVENING 90 tablet 0    ezetimibe (ZETIA) 10 mg tablet Take 1 tablet (10 mg total) by mouth daily 90 tablet 1    gabapentin (NEURONTIN) 600 MG tablet Take 1 tablet (600 mg total) by mouth 3 (three) times a day 270 tablet 0    levothyroxine 150 mcg tablet TAKE 1 TABLET DAILY 90 tablet 0    lisinopril (ZESTRIL) 40 mg tablet Take 1 tablet (40 mg total) by mouth daily 90 tablet 1    metFORMIN (GLUCOPHAGE-XR) 500 mg 24 hr tablet Take 1 tablet (500 mg total) by mouth daily at bedtime 90 tablet 3    rivaroxaban (Xarelto) 20 mg tablet Take 1 tablet (20 mg total) by mouth daily with breakfast 90 tablet 1    semaglutide, 0.25 or 0.5 mg/dose, (Ozempic, 0.25 or 0.5 MG/DOSE,) 2 mg/3 mL injection pen Inject 0.5mg weekly 9 mL 1    sildenafil (Viagra) 100 mg tablet Take 1 tablet (100 mg total) by mouth as needed for erectile dysfunction 30 tablet 0    spironolactone (ALDACTONE) 25 mg tablet Take 1 tablet (25 mg total) by mouth daily 90 tablet 1     No current facility-administered medications for this visit.      Current Outpatient Medications on File Prior to Visit   Medication Sig    allopurinol (ZYLOPRIM) 300 mg tablet Take 1 tablet (300 mg total) by mouth daily    carvedilol (COREG) 6.25 mg tablet TAKE 1 TABLET EVERY MORNINGAND 1 AND 1/2 TABLETS IN   THE EVENING    ezetimibe (ZETIA) 10 mg tablet Take 1 tablet (10 mg total) by mouth daily    gabapentin (NEURONTIN) 600 MG tablet Take 1 tablet (600 mg total) by mouth 3 (three) times a day    levothyroxine 150 mcg tablet TAKE 1 TABLET DAILY    lisinopril (ZESTRIL) 40 mg tablet Take 1 tablet (40 mg total) by mouth daily    metFORMIN (GLUCOPHAGE-XR) 500 mg 24 hr tablet Take 1 tablet (500 mg total) by mouth daily at bedtime    rivaroxaban (Xarelto) 20 mg tablet Take 1 tablet (20 mg total) by mouth daily with breakfast    semaglutide, 0.25 or 0.5 mg/dose, (Ozempic, 0.25 or 0.5 MG/DOSE,) 2 mg/3 mL injection pen Inject 0.5mg weekly    sildenafil (Viagra) 100 mg tablet Take 1 tablet (100 mg total) by mouth as needed for erectile dysfunction    spironolactone (ALDACTONE) 25 mg tablet Take 1 tablet (25 mg total) by mouth daily No current facility-administered medications on file prior to visit. He has No Known Allergies. .  Past Medical History:   Diagnosis Date    Arthritis     right foot    Chronic cholecystitis     CPAP (continuous positive airway pressure) dependence     Diabetes mellitus (HCC)     Diabetic nephropathy (HCC)     DVT (deep venous thrombosis) (HCC)     Gout     History of pulmonary embolism     Hyperlipidemia     Hypertension     Hypothyroidism     Lumbar back pain     MTHFR mutation     Neuropathy     Scab     right arm- no s/s infection    Sleep apnea     Tarsal tunnel syndrome, right     Tinnitus     left ear    Wears glasses     and contacts    Wears glasses        Past Surgical History:   Procedure Laterality Date    APPENDECTOMY      ARTHRODESIS      Lumbar L__    BACK SURGERY      BUNIONECTOMY Right 10/07/2016    Procedure: REMOVAL TIBIAL  SESAMOID BONE  RIGHT FOOT;  Surgeon: Adelso Thomas DPM;  Location[facility];  Service:     CARDIAC PACEMAKER PLACEMENT  09/01/2021    CHOLECYSTECTOMY  03/26/2015    COLONOSCOPY      KNEE ARTHROSCOPY      KNEE ARTHROSCOPY W/ MENISCAL REPAIR      Lateral    NASAL SEPTUM SURGERY  10/16/2013    FL COLONOSCOPY FLX DX W/COLLJ SPEC WHEN PFRMD N/A 11/23/2018    Procedure: COLONOSCOPY;  Surgeon: Quinn Dumont MD;  Location: [facility];  Service: Gastroenterology    FL RELEASE TARSAL TUNNEL Right 06/25/2018    Procedure: RELEASE TARSAL TUNNEL;  Surgeon: Tl Carrillo DPM;  Location[facility];  Service: Podiatry    FL RELEASE TARSAL TUNNEL Left 03/13/2020    Procedure: RELEASE TARSAL TUNNEL;  Surgeon: Tl Carrillo DPM;  Location: [address] [place] [address] MAIN OR;  Service: [address] [facility] [address]  10/16/2013    with Adenoidectomy    UVULECTOMY  10/16/2013    UVULOPALATOPHARYNGOPLASTY      WISDOM TOOTH EXTRACTION         Review of Systems  A comprehensive review of systems was negative except for: Cardiovascular: positive for pacemaker     Objective:  Physical Exam  Constitutional:       General: He is not in acute distress. Appearance: He is well-developed. He is not diaphoretic. HENT:      Head: Normocephalic. Right Ear: External ear normal.      Left Ear: External ear normal.      Nose: Nose normal.   Eyes:      General: No scleral icterus. Right eye: No discharge. Left eye: No discharge. Conjunctiva/sclera: Conjunctivae normal.      Pupils: Pupils are equal, round, and reactive to light. Neck:      Thyroid: No thyromegaly. Trachea: No tracheal deviation. Cardiovascular:      Rate and Rhythm: Normal rate and regular rhythm. Heart sounds: Normal heart sounds. No murmur heard. No friction rub. No gallop. Pulmonary:      Effort: Pulmonary effort is normal. No respiratory distress. Breath sounds: Normal breath sounds. No wheezing. Abdominal:      General: Bowel sounds are normal.      Palpations: Abdomen is soft. There is no mass. Tenderness: There is no abdominal tenderness. There is no guarding. Musculoskeletal:         General: No deformity. Cervical back: Normal range of motion. Lymphadenopathy:      Cervical: No cervical adenopathy. Skin:     General: Skin is warm and dry. Findings: No erythema or rash. Neurological:      Mental Status: He is alert and oriented to person, place, and time. Cranial Nerves: No cranial nerve deficit. Psychiatric:         Thought Content:  Thought content normal.         Cardiographics  ECG:  paced rhythm  Echocardiogram: not done    Imaging  Chest x-ray:  no recent chest x-ray     Lab Review   Appointment on 09/16/2023   Component Date Value    Color, UA 09/16/2023 Light Yellow     Clarity, UA 09/16/2023 Clear     Specific Gravity, UA 09/16/2023 1.017     pH, UA 09/16/2023 6.0     Leukocytes, UA 09/16/2023 Negative     Nitrite, UA 09/16/2023 Negative     Protein, UA 09/16/2023 300 (3+) (A)     Glucose, UA 09/16/2023 Negative     Ketones, UA 09/16/2023 Negative     Urobilinogen, UA 09/16/2023 <2.0     Bilirubin, UA 09/16/2023 Negative     Occult Blood, UA 09/16/2023 Negative     RBC, UA 09/16/2023 None Seen     WBC, UA 09/16/2023 1-2     Epithelial Cells 09/16/2023 Occasional     Bacteria, UA 09/16/2023 None Seen     MUCUS THREADS 09/16/2023 Occasional (A)     Granular Casts, UA 09/16/2023 0-3 (A)     Uric Acid 09/16/2023 4.5     Creatinine, Ur 09/16/2023 139.7     Albumin,U,Random 09/16/2023 2,229.6 (H)     Albumin Creat Ratio 09/16/2023 1,596 (H)     TSH 3RD GENERATON 09/16/2023 0.145 (L)     Free T4 09/16/2023 1.15 (H)         Assessment:     62 y.o. male with planned surgery as above. Known risk factors for perioperative complications: None    Difficulty with intubation is not anticipated. Cardiac Risk Estimation: minimal    Current medications which may produce withdrawal symptoms if withheld perioperatively: none     Plan:  Patient is CLEARED for surgery without any additional cardiac testing. 1. Preoperative workup as follows none. 2. Change in medication regimen before surgery: discontinue Metformin 24 hours before surgery and hold xarelto 48 hours preop . 3. Prophylaxis for cardiac events with perioperative beta-blockers: not indicated. 4. Invasive hemodynamic monitoring perioperatively: not indicated.   5. Deep vein thrombosis prophylaxis postoperatively:regimen to be chosen by surgical team.  6. Other measures:  planned same day surgery

## 2023-10-13 NOTE — PROGRESS NOTES
Subjective:     Mary Marin is a 62 y.o. male who presents to the office today for a preoperative consultation at the request of surgeon Dr. Bonnie Flores who plans on performing left tarsal tunnel release procedure on October 20. This consultation is requested for the specific conditions prompting preoperative evaluation (i.e. because of potential affect on operative risk): Hypertension, type 2 diabetes, MTHFR gene mutation, obstructive sleep apnea, status post pacemaker insertion. Planned anesthesia: general. The patient has the following known anesthesia issues:  no prior problems with endotracheal intubation or anesthesia. Patients bleeding risk:  Currently on Xarelto which will be held for 48 hours prior to his surgery . The following portions of the patient's history were reviewed and updated as appropriate: He  has a past medical history of Arthritis, Chronic cholecystitis, CPAP (continuous positive airway pressure) dependence, Diabetes mellitus ([address]), Diabetic nephropathy ([address]), DVT (deep venous thrombosis) ([address]), Gout, History of pulmonary embolism, Hyperlipidemia, Hypertension, Hypothyroidism, Lumbar back pain, MTHFR mutation, Neuropathy, Scab, Sleep apnea, Tarsal tunnel syndrome, right, Tinnitus, Wears glasses, and Wears glasses.   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    Sinus bradycardia 08/31/2021    Obstructive sleep apnea 08/31/2021    Syncopal episodes  08/30/2021    Paroxysmal atrial fibrillation ([address]) 08/30/2021    History of DVT (deep vein thrombosis) 08/30/2021    Vitamin D deficiency 04/12/2021    Hyperaldosteronism ([address]) 10/15/2020    Diabetic nephropathy associated with type 2 diabetes mellitus ([address]) 10/15/2020    Stage 3a chronic kidney disease ([address]) 10/15/2020 Proteinuria 10/15/2020    Gout 06/05/2020    Venous stasis ulcer of left ankle limited to breakdown of skin with varicose veins ([address]) 04/12/2020    Tarsal tunnel syndrome, left 12/12/2019    Tarsal tunnel syndrome of both lower extremities 11/12/2019    Sacroiliitis ([address]) 11/05/2019    Hypothyroidism (acquired) 01/31/2018    Diabetic peripheral neuropathy ([address]) 09/07/2017    Controlled type 2 diabetes mellitus with microalbuminuria, without long-term current use of insulin  06/06/2017    Erectile dysfunction due to diseases classified elsewhere 05/01/2017    Homozygous for MTHFR gene mutation 05/10/2016     He  has a past surgical history that includes Cholecystectomy (03/26/2015); Appendectomy; Spinal fusion; Tonsillectomy (10/16/2013); Nasal septum surgery (10/16/2013); Uvulopalatopharyngoplasty; [place] tooth extraction; Bunionectomy (Right, 10/07/2016); Arthrodesis; Knee arthroscopy w/ meniscal repair; Uvulectomy (10/16/2013); pr release tarsal tunnel (Right, 06/25/2018); pr colonoscopy flx dx w/collj spec when pfrmd (N/A, 11/23/2018); Colonoscopy; Back surgery; Knee arthroscopy; pr release tarsal tunnel (Left, 03/13/2020); and Cardiac pacemaker placement (09/01/2021). His family history includes Aneurysm in his brother and mother; Coronary artery disease in his father; Heart disease in his father; Hypertension in his father. He  reports that he has never smoked. He has never used smokeless tobacco. He reports current alcohol use of about 6.0 standard drinks of alcohol per week. He reports that he does not use drugs.   Current Outpatient Medications   Medication Sig Dispense Refill    allopurinol (ZYLOPRIM) 300 mg tablet Take 1 tablet (300 mg total) by mouth daily 90 tablet 1    carvedilol (COREG) 6.25 mg tablet TAKE 1 TABLET EVERY MORNINGAND 1 AND 1/2 TABLETS IN   THE EVENING 90 tablet 0    ezetimibe (ZETIA) 10 mg tablet Take 1 tablet (10 mg total) by mouth daily 90 tablet 1    gabapentin (NEURONTIN) 600 MG tablet Take 1 tablet (600 mg total) by mouth 3 (three) times a day 270 tablet 0    levothyroxine 150 mcg tablet TAKE 1 TABLET DAILY 90 tablet 0    lisinopril (ZESTRIL) 40 mg tablet Take 1 tablet (40 mg total) by mouth daily 90 tablet 1    metFORMIN (GLUCOPHAGE-XR) 500 mg 24 hr tablet Take 1 tablet (500 mg total) by mouth daily at bedtime 90 tablet 3    rivaroxaban (Xarelto) 20 mg tablet Take 1 tablet (20 mg total) by mouth daily with breakfast 90 tablet 1    semaglutide, 0.25 or 0.5 mg/dose, (Ozempic, 0.25 or 0.5 MG/DOSE,) 2 mg/3 mL injection pen Inject 0.5mg weekly 9 mL 1    sildenafil (Viagra) 100 mg tablet Take 1 tablet (100 mg total) by mouth as needed for erectile dysfunction 30 tablet 0    spironolactone (ALDACTONE) 25 mg tablet Take 1 tablet (25 mg total) by mouth daily 90 tablet 1     No current facility-administered medications for this visit.      Current Outpatient Medications on File Prior to Visit   Medication Sig    allopurinol (ZYLOPRIM) 300 mg tablet Take 1 tablet (300 mg total) by mouth daily    carvedilol (COREG) 6.25 mg tablet TAKE 1 TABLET EVERY MORNINGAND 1 AND 1/2 TABLETS IN   THE EVENING    ezetimibe (ZETIA) 10 mg tablet Take 1 tablet (10 mg total) by mouth daily    gabapentin (NEURONTIN) 600 MG tablet Take 1 tablet (600 mg total) by mouth 3 (three) times a day    levothyroxine 150 mcg tablet TAKE 1 TABLET DAILY    lisinopril (ZESTRIL) 40 mg tablet Take 1 tablet (40 mg total) by mouth daily    metFORMIN (GLUCOPHAGE-XR) 500 mg 24 hr tablet Take 1 tablet (500 mg total) by mouth daily at bedtime    rivaroxaban (Xarelto) 20 mg tablet Take 1 tablet (20 mg total) by mouth daily with breakfast    semaglutide, 0.25 or 0.5 mg/dose, (Ozempic, 0.25 or 0.5 MG/DOSE,) 2 mg/3 mL injection pen Inject 0.5mg weekly    sildenafil (Viagra) 100 mg tablet Take 1 tablet (100 mg total) by mouth as needed for erectile dysfunction    spironolactone (ALDACTONE) 25 mg tablet Take 1 tablet (25 mg total) by mouth daily No current facility-administered medications on file prior to visit. He has No Known Allergies. .  Past Medical History:   Diagnosis Date    Arthritis     right foot    Chronic cholecystitis     CPAP (continuous positive airway pressure) dependence     Diabetes mellitus (HCC)     Diabetic nephropathy (HCC)     DVT (deep venous thrombosis) (HCC)     Gout     History of pulmonary embolism     Hyperlipidemia     Hypertension     Hypothyroidism     Lumbar back pain     MTHFR mutation     Neuropathy     Scab     right arm- no s/s infection    Sleep apnea     Tarsal tunnel syndrome, right     Tinnitus     left ear    Wears glasses     and contacts    Wears glasses        Past Surgical History:   Procedure Laterality Date    APPENDECTOMY      ARTHRODESIS      Lumbar L__    BACK SURGERY      BUNIONECTOMY Right 10/07/2016    Procedure: REMOVAL TIBIAL  SESAMOID BONE  RIGHT FOOT;  Surgeon: Roxy Rao DPM;  Location: [facility];  Service:     CARDIAC PACEMAKER PLACEMENT  09/01/2021    CHOLECYSTECTOMY  03/26/2015    COLONOSCOPY      KNEE ARTHROSCOPY      KNEE ARTHROSCOPY W/ MENISCAL REPAIR      Lateral    NASAL SEPTUM SURGERY  10/16/2013    WI COLONOSCOPY FLX DX W/COLLJ SPEC WHEN PFRMD N/A 11/23/2018    Procedure: COLONOSCOPY;  Surgeon: Erik Beck MD;  Location: [facility];  Service: Gastroenterology    WI RELEASE TARSAL TUNNEL Right 06/25/2018    Procedure: RELEASE TARSAL TUNNEL;  Surgeon: Violeta Barker DPM;  Location: [facility];  Service: Podiatry    WI RELEASE TARSAL TUNNEL Left 03/13/2020    Procedure: RELEASE TARSAL TUNNEL;  Surgeon: Violeta Barker DPM;  Location: [address] [place] [address] MAIN OR;  Service: [address] [facility] [address]  10/16/2013    with Adenoidectomy    UVULECTOMY  10/16/2013    UVULOPALATOPHARYNGOPLASTY      WISDOM TOOTH EXTRACTION         Review of Systems  A comprehensive review of systems was negative except for: Cardiovascular: positive for pacemaker     Objective:  Physical Exam  Constitutional:       General: He is not in acute distress. Appearance: He is well-developed. He is not diaphoretic. HENT:      Head: Normocephalic. Right Ear: External ear normal.      Left Ear: External ear normal.      Nose: Nose normal.   Eyes:      General: No scleral icterus. Right eye: No discharge. Left eye: No discharge. Conjunctiva/sclera: Conjunctivae normal.      Pupils: Pupils are equal, round, and reactive to light. Neck:      Thyroid: No thyromegaly. Trachea: No tracheal deviation. Cardiovascular:      Rate and Rhythm: Normal rate and regular rhythm. Heart sounds: Normal heart sounds. No murmur heard. No friction rub. No gallop. Pulmonary:      Effort: Pulmonary effort is normal. No respiratory distress. Breath sounds: Normal breath sounds. No wheezing. Abdominal:      General: Bowel sounds are normal.      Palpations: Abdomen is soft. There is no mass. Tenderness: There is no abdominal tenderness. There is no guarding. Musculoskeletal:         General: No deformity. Cervical back: Normal range of motion. Lymphadenopathy:      Cervical: No cervical adenopathy. Skin:     General: Skin is warm and dry. Findings: No erythema or rash. Neurological:      Mental Status: He is alert and oriented to person, place, and time. Cranial Nerves: No cranial nerve deficit. Psychiatric:         Thought Content:  Thought content normal.         Cardiographics  ECG:  paced rhythm  Echocardiogram: not done    Imaging  Chest x-ray:  no recent chest x-ray     Lab Review   Appointment on 09/16/2023   Component Date Value    Color, UA 09/16/2023 Light Yellow     Clarity, UA 09/16/2023 Clear     Specific Gravity, UA 09/16/2023 1.017     pH, UA 09/16/2023 6.0     Leukocytes, UA 09/16/2023 Negative     Nitrite, UA 09/16/2023 Negative     Protein, UA 09/16/2023 300 (3+) (A)     Glucose, UA 09/16/2023 Negative     Ketones, UA 09/16/2023 Negative     Urobilinogen, UA 09/16/2023 <2.0     Bilirubin, UA 09/16/2023 Negative     Occult Blood, UA 09/16/2023 Negative     RBC, UA 09/16/2023 None Seen     WBC, UA 09/16/2023 1-2     Epithelial Cells 09/16/2023 Occasional     Bacteria, UA 09/16/2023 None Seen     MUCUS THREADS 09/16/2023 Occasional (A)     Granular Casts, UA 09/16/2023 0-3 (A)     Uric Acid 09/16/2023 4.5     Creatinine, Ur 09/16/2023 139.7     Albumin,U,Random 09/16/2023 2,229.6 (H)     Albumin Creat Ratio 09/16/2023 1,596 (H)     TSH 3RD GENERATON 09/16/2023 0.145 (L)     Free T4 09/16/2023 1.15 (H)         Assessment:     62 y.o. male with planned surgery as above. Known risk factors for perioperative complications: None    Difficulty with intubation is not anticipated. Cardiac Risk Estimation: minimal    Current medications which may produce withdrawal symptoms if withheld perioperatively: none     Plan:  Patient is CLEARED for surgery without any additional cardiac testing. 1. Preoperative workup as follows none. 2. Change in medication regimen before surgery: discontinue Metformin 24 hours before surgery and hold xarelto 48 hours preop . 3. Prophylaxis for cardiac events with perioperative beta-blockers: not indicated. 4. Invasive hemodynamic monitoring perioperatively: not indicated.   5. Deep vein thrombosis prophylaxis postoperatively:regimen to be chosen by surgical team.  6. Other measures:  planned same day surgery

## 2023-10-16 ENCOUNTER — TELEPHONE (OUTPATIENT)
Dept: CARDIOLOGY CLINIC | Facility: HOSPITAL | Age: 58
End: 2023-10-16

## 2023-10-20 ENCOUNTER — PREP FOR PROCEDURE (OUTPATIENT)
Dept: OBGYN CLINIC | Facility: CLINIC | Age: 58
End: 2023-10-20

## 2023-10-23 DIAGNOSIS — R80.9 CONTROLLED TYPE 2 DIABETES MELLITUS WITH MICROALBUMINURIA, WITHOUT LONG-TERM CURRENT USE OF INSULIN: ICD-10-CM

## 2023-10-23 DIAGNOSIS — E11.29 CONTROLLED TYPE 2 DIABETES MELLITUS WITH MICROALBUMINURIA, WITHOUT LONG-TERM CURRENT USE OF INSULIN: ICD-10-CM

## 2023-10-25 ENCOUNTER — ANESTHESIA EVENT (OUTPATIENT)
Dept: PERIOP | Facility: HOSPITAL | Age: 58
End: 2023-10-25
Payer: COMMERCIAL

## 2023-10-26 ENCOUNTER — HOSPITAL ENCOUNTER (OUTPATIENT)
Facility: HOSPITAL | Age: 58
Setting detail: OUTPATIENT SURGERY
Discharge: HOME | End: 2023-10-26
Attending: PODIATRIST | Admitting: PODIATRIST
Payer: COMMERCIAL

## 2023-10-26 ENCOUNTER — ANESTHESIA (OUTPATIENT)
Dept: PERIOP | Facility: HOSPITAL | Age: 58
End: 2023-10-26
Payer: COMMERCIAL

## 2023-10-26 VITALS
WEIGHT: 268 LBS | HEART RATE: 61 BPM | OXYGEN SATURATION: 94 % | RESPIRATION RATE: 18 BRPM | DIASTOLIC BLOOD PRESSURE: 96 MMHG | HEIGHT: 72 IN | TEMPERATURE: 98.4 F | SYSTOLIC BLOOD PRESSURE: 157 MMHG | BODY MASS INDEX: 36.3 KG/M2

## 2023-10-26 DIAGNOSIS — Z98.890 POST-OPERATIVE STATE: Primary | ICD-10-CM

## 2023-10-26 LAB
GLUCOSE SERPL-MCNC: 109 MG/DL (ref 65–140)
GLUCOSE SERPL-MCNC: 89 MG/DL (ref 65–140)

## 2023-10-26 PROCEDURE — 28035 DECOMPRESSION OF TIBIA NERVE: CPT | Performed by: PODIATRIST

## 2023-10-26 PROCEDURE — 28008 INCISION OF FOOT FASCIA: CPT | Performed by: PODIATRIST

## 2023-10-26 PROCEDURE — 82948 REAGENT STRIP/BLOOD GLUCOSE: CPT

## 2023-10-26 PROCEDURE — 99024 POSTOP FOLLOW-UP VISIT: CPT | Performed by: PODIATRIST

## 2023-10-26 RX ORDER — LIDOCAINE HYDROCHLORIDE 20 MG/ML
INJECTION, SOLUTION EPIDURAL; INFILTRATION; INTRACAUDAL; PERINEURAL AS NEEDED
Status: DISCONTINUED | OUTPATIENT
Start: 2023-10-26 | End: 2023-10-26 | Stop reason: HOSPADM

## 2023-10-26 RX ORDER — SODIUM CHLORIDE, SODIUM LACTATE, POTASSIUM CHLORIDE, CALCIUM CHLORIDE 600; 310; 30; 20 MG/100ML; MG/100ML; MG/100ML; MG/100ML
INJECTION, SOLUTION INTRAVENOUS CONTINUOUS PRN
Status: DISCONTINUED | OUTPATIENT
Start: 2023-10-26 | End: 2023-10-26

## 2023-10-26 RX ORDER — OXYCODONE HYDROCHLORIDE AND ACETAMINOPHEN 5; 325 MG/1; MG/1
1 TABLET ORAL EVERY 4 HOURS PRN
Qty: 20 TABLET | Refills: 0 | Status: SHIPPED | OUTPATIENT
Start: 2023-10-26 | End: 2023-11-03

## 2023-10-26 RX ORDER — ONDANSETRON 2 MG/ML
INJECTION INTRAMUSCULAR; INTRAVENOUS AS NEEDED
Status: DISCONTINUED | OUTPATIENT
Start: 2023-10-26 | End: 2023-10-26

## 2023-10-26 RX ORDER — FENTANYL CITRATE/PF 50 MCG/ML
50 SYRINGE (ML) INJECTION
Status: DISCONTINUED | OUTPATIENT
Start: 2023-10-26 | End: 2023-10-26 | Stop reason: HOSPADM

## 2023-10-26 RX ORDER — MIDAZOLAM HYDROCHLORIDE 2 MG/2ML
INJECTION, SOLUTION INTRAMUSCULAR; INTRAVENOUS AS NEEDED
Status: DISCONTINUED | OUTPATIENT
Start: 2023-10-26 | End: 2023-10-26

## 2023-10-26 RX ORDER — OXYCODONE HYDROCHLORIDE AND ACETAMINOPHEN 5; 325 MG/1; MG/1
1 TABLET ORAL ONCE AS NEEDED
Status: DISCONTINUED | OUTPATIENT
Start: 2023-10-26 | End: 2023-10-26 | Stop reason: HOSPADM

## 2023-10-26 RX ORDER — FENTANYL CITRATE 50 UG/ML
INJECTION, SOLUTION INTRAMUSCULAR; INTRAVENOUS AS NEEDED
Status: DISCONTINUED | OUTPATIENT
Start: 2023-10-26 | End: 2023-10-26

## 2023-10-26 RX ORDER — BUPIVACAINE HYDROCHLORIDE 5 MG/ML
INJECTION, SOLUTION EPIDURAL; INTRACAUDAL AS NEEDED
Status: DISCONTINUED | OUTPATIENT
Start: 2023-10-26 | End: 2023-10-26 | Stop reason: HOSPADM

## 2023-10-26 RX ORDER — METOCLOPRAMIDE HYDROCHLORIDE 5 MG/ML
10 INJECTION INTRAMUSCULAR; INTRAVENOUS ONCE AS NEEDED
Status: DISCONTINUED | OUTPATIENT
Start: 2023-10-26 | End: 2023-10-26 | Stop reason: HOSPADM

## 2023-10-26 RX ORDER — CHLORHEXIDINE GLUCONATE ORAL RINSE 1.2 MG/ML
15 SOLUTION DENTAL ONCE
Status: COMPLETED | OUTPATIENT
Start: 2023-10-26 | End: 2023-10-26

## 2023-10-26 RX ORDER — PROPOFOL 10 MG/ML
INJECTION, EMULSION INTRAVENOUS AS NEEDED
Status: DISCONTINUED | OUTPATIENT
Start: 2023-10-26 | End: 2023-10-26

## 2023-10-26 RX ORDER — SUCCINYLCHOLINE/SOD CL,ISO/PF 100 MG/5ML
SYRINGE (ML) INTRAVENOUS AS NEEDED
Status: DISCONTINUED | OUTPATIENT
Start: 2023-10-26 | End: 2023-10-26

## 2023-10-26 RX ORDER — MAGNESIUM HYDROXIDE 1200 MG/15ML
LIQUID ORAL AS NEEDED
Status: DISCONTINUED | OUTPATIENT
Start: 2023-10-26 | End: 2023-10-26 | Stop reason: HOSPADM

## 2023-10-26 RX ORDER — DEXAMETHASONE SODIUM PHOSPHATE 10 MG/ML
INJECTION, SOLUTION INTRAMUSCULAR; INTRAVENOUS AS NEEDED
Status: DISCONTINUED | OUTPATIENT
Start: 2023-10-26 | End: 2023-10-26

## 2023-10-26 RX ORDER — HYDROMORPHONE HCL/PF 1 MG/ML
0.5 SYRINGE (ML) INJECTION
Status: COMPLETED | OUTPATIENT
Start: 2023-10-26 | End: 2023-10-26

## 2023-10-26 RX ORDER — LIDOCAINE HYDROCHLORIDE 10 MG/ML
INJECTION, SOLUTION EPIDURAL; INFILTRATION; INTRACAUDAL; PERINEURAL AS NEEDED
Status: DISCONTINUED | OUTPATIENT
Start: 2023-10-26 | End: 2023-10-26

## 2023-10-26 RX ORDER — SODIUM CHLORIDE, SODIUM LACTATE, POTASSIUM CHLORIDE, CALCIUM CHLORIDE 600; 310; 30; 20 MG/100ML; MG/100ML; MG/100ML; MG/100ML
125 INJECTION, SOLUTION INTRAVENOUS CONTINUOUS
Status: DISCONTINUED | OUTPATIENT
Start: 2023-10-26 | End: 2023-10-26 | Stop reason: HOSPADM

## 2023-10-26 RX ORDER — CHLORHEXIDINE GLUCONATE 4 G/100ML
SOLUTION TOPICAL DAILY PRN
Status: DISCONTINUED | OUTPATIENT
Start: 2023-10-26 | End: 2023-10-26 | Stop reason: HOSPADM

## 2023-10-26 RX ADMIN — Medication 100 MG: at 09:58

## 2023-10-26 RX ADMIN — CHLORHEXIDINE GLUCONATE 0.12% ORAL RINSE 15 ML: 1.2 LIQUID ORAL at 08:19

## 2023-10-26 RX ADMIN — FENTANYL CITRATE 50 MCG: 50 INJECTION, SOLUTION INTRAMUSCULAR; INTRAVENOUS at 11:38

## 2023-10-26 RX ADMIN — SODIUM CHLORIDE, SODIUM LACTATE, POTASSIUM CHLORIDE, AND CALCIUM CHLORIDE 125 ML/HR: .6; .31; .03; .02 INJECTION, SOLUTION INTRAVENOUS at 08:28

## 2023-10-26 RX ADMIN — MIDAZOLAM 2 MG: 1 INJECTION INTRAMUSCULAR; INTRAVENOUS at 09:35

## 2023-10-26 RX ADMIN — HYDROMORPHONE HYDROCHLORIDE 0.5 MG: 1 INJECTION, SOLUTION INTRAMUSCULAR; INTRAVENOUS; SUBCUTANEOUS at 12:00

## 2023-10-26 RX ADMIN — CEFAZOLIN 3000 MG: 1 INJECTION, POWDER, FOR SOLUTION INTRAMUSCULAR; INTRAVENOUS at 10:00

## 2023-10-26 RX ADMIN — PROPOFOL 200 MG: 10 INJECTION, EMULSION INTRAVENOUS at 09:58

## 2023-10-26 RX ADMIN — FENTANYL CITRATE 50 MCG: 50 INJECTION, SOLUTION INTRAMUSCULAR; INTRAVENOUS at 11:22

## 2023-10-26 RX ADMIN — HYDROMORPHONE HYDROCHLORIDE 0.5 MG: 1 INJECTION, SOLUTION INTRAMUSCULAR; INTRAVENOUS; SUBCUTANEOUS at 11:44

## 2023-10-26 RX ADMIN — FENTANYL CITRATE 50 MCG: 50 INJECTION, SOLUTION INTRAMUSCULAR; INTRAVENOUS at 11:34

## 2023-10-26 RX ADMIN — SODIUM CHLORIDE, SODIUM LACTATE, POTASSIUM CHLORIDE, AND CALCIUM CHLORIDE: .6; .31; .03; .02 INJECTION, SOLUTION INTRAVENOUS at 09:35

## 2023-10-26 RX ADMIN — HYDROMORPHONE HYDROCHLORIDE 0.5 MG: 1 INJECTION, SOLUTION INTRAMUSCULAR; INTRAVENOUS; SUBCUTANEOUS at 11:24

## 2023-10-26 RX ADMIN — DEXAMETHASONE SODIUM PHOSPHATE 10 MG: 10 INJECTION, SOLUTION INTRAMUSCULAR; INTRAVENOUS at 09:58

## 2023-10-26 RX ADMIN — HYDROMORPHONE HYDROCHLORIDE 0.5 MG: 1 INJECTION, SOLUTION INTRAMUSCULAR; INTRAVENOUS; SUBCUTANEOUS at 11:49

## 2023-10-26 RX ADMIN — FENTANYL CITRATE 50 MCG: 50 INJECTION, SOLUTION INTRAMUSCULAR; INTRAVENOUS at 09:58

## 2023-10-26 RX ADMIN — LIDOCAINE HYDROCHLORIDE 50 MG: 10 INJECTION, SOLUTION EPIDURAL; INFILTRATION; INTRACAUDAL; PERINEURAL at 09:58

## 2023-10-26 RX ADMIN — ONDANSETRON 4 MG: 2 INJECTION INTRAMUSCULAR; INTRAVENOUS at 09:58

## 2023-10-26 NOTE — DISCHARGE SUMMARY
Discharge Summary Outpatient Procedure Podiatry -   [facility] [place] 62 y.o. male MRN: [id number]  Unit/Bed#: OR POOL Encounter: [id number]    Admission Date: 10/26/2023     Admitting Diagnosis: Plantar fasciitis [M72.2]  Neuritis [M79.2]  Tarsal tunnel syndrome of left side [G57.52]    Discharge Diagnosis: same    Procedures Performed: RELEASE TARSAL TUNNEL: 67262 (CPT®)  RELEASE FASCIA PLANTAR/FASCIOTOMY:  (left)    Complications: none    Condition at Discharge: stable    Discharge instructions/Information to patient and family:   See after visit summary for information provided to patient and family. Provisions for Follow-Up Care/Important appointments:  See after visit summary for information related to follow-up care and any pertinent home health orders. Discharge Medications:  See after visit summary for reconciled discharge medications provided to patient and family.

## 2023-10-26 NOTE — OP NOTE
OPERATIVE REPORT - Podiatry  PATIENT NAME: Beth Sams    :  1965  MRN: [id number]  Pt Location: [facility] OR ROOM 02    SURGERY DATE: 10/26/2023    Surgeon(s) and Role:     * Rishi Bowers DPM - Primary     * Ivett Christianson DPM - Assisting    Pre-op Diagnosis:  Plantar fasciitis [M72.2]  Neuritis [M79.2]  Tarsal tunnel syndrome of left side [G57.52]    Post-Op Diagnosis Codes:     * Plantar fasciitis [M72.2]     * Neuritis [M79.2]     * Tarsal tunnel syndrome of left side [G57.52]    Procedure(s) (LRB):  RELEASE TARSAL TUNNEL (Left)  RELEASE FASCIA PLANTAR/FASCIOTOMY (Left)    Specimen(s):  * No specimens in log *    Estimated Blood Loss:   20 mL    Drains:  * No LDAs found *    Anesthesia Type:   Conscious Sedation  with 10 ml of 2% Lidocaine and 0.25% Bupivacaine in a 1:1 mixture    Hemostasis:  Calf tourniquet 250mmHg  Electrocautery    Materials:  3-0 Monocryl suture  3-0 Nylon suture    Operative Findings:  Consistent with pre op diagnosis    Complications:   None    Procedure and Technique:     Under mild sedation, the patient was brought into the operating room and placed on the operating room table in the supine position. IV sedation was achieved by anesthesia team and a universal timeout was performed where all parties are in agreement of correct patient, correct procedure and correct site. A pneumatic tourniquet was then placed over the patient's left calf with ample padding. A proximal tibial nerve and V block was performed consisting of 10 ml of 2% Lidocaine and 0.25% Bupivacaine in a 1:1 mixture. The foot and ankle was then prepped and draped in the usual aseptic manner. An esmarch bandage was used to exsangunate the foot and ankle and the pneumatic tourniquet was then inflated to 250mmHg. Attention was then directed to the left medial ankle.  A curvilinear longitudinal incision was drawn posterior to the medial malleolus along the course of the tibial nerve from just proximal to the ankle extending distally into the medial arch, just distal to the plantar heel fat pad. This skin incision was made with a 15 blade. Blunt and sharp dissection was carried through subcutaneous tissues down to the flexor retinaculum with care taken to protect neurovascular structures and cauterize superficial veins as needed. A small incision was then made in the retinaculum and the underlying tibial neurovascular bundle was visualized. The flexor retinaculum was then incised along the course of the neurovascular bundle extending from just proximal to the ankle to the merrill pedis distally with care taken to protect the underlying structures. The abductor hallucis fascia was transected along the course of the tibial nerve to relieve any areas of compression/tension on the nerve. The plane abductor hallucis muscle belly was then retracted dorsally and the quadratus plantae fascia was visualized and transected. The medial plantar fascia band was also identified and transected. There were no further areas of tension/compression along the course of the nerve that could be identified with manual probing. The site was irrigated with normal saline. Subcutaneous closure was obtained using 3-0 Monocryl suture in interrupted fashion. Skin closure was obtained using 3-0 Nylon suture in interrupted fashion. The site was dressed with adaptic, 4x4 gauze, areli, and 4in ACE. A well padded posterior splint was then applied. The tourniquet was deflated at approximately 52 min and normal hyperemic response was noted to all digits. The patient tolerated the procedure and anesthesia well without immediate complications and transferred to PACU with vital signs stable. Dr. Lianet Yoon was present during the entire procedure and participated in all key aspects. SIGNATURE: Stephanie Drew DPM  DATE: October 26, 2023  TIME: 12:06 PM      Portions of the record may have been created with voice recognition software.  Occasional wrong word or "sound a like" substitutions may have occurred due to the inherent limitations of voice recognition software. Read the chart carefully and recognize, using context, where substitutions have occurred.

## 2023-10-26 NOTE — ANESTHESIA POSTPROCEDURE EVALUATION
Post-Op Assessment Note    CV Status:  Stable  Pain Score: 8    Pain management: inadequate     Mental Status:  Alert and awake   Hydration Status:  Euvolemic   PONV Controlled:  Controlled   Airway Patency:  Patent      Post Op Vitals Reviewed: Yes      Staff: CRNA         No notable events documented.     BP   60   Temp  98.6   Pulse  60   Resp   18   SpO2   98

## 2023-10-26 NOTE — INTERVAL H&P NOTE
H&P reviewed. After examining the patient I find no changes in the patients condition since the H&P had been written.     Vitals:    10/26/23 0755   BP: 156/92   Pulse: 71   Resp: 20   Temp: (!) 97.3 °F (36.3 °C)   SpO2: 98%

## 2023-10-26 NOTE — ANESTHESIA PREPROCEDURE EVALUATION
Procedure:  RELEASE TARSAL TUNNEL (Left: Leg Lower)  RELEASE FASCIA PLANTAR/FASCIOTOMY (Left: Foot)    Relevant Problems   CARDIO   (+) Paroxysmal atrial fibrillation (HCC)   (+) Primary hypertension   (+) Sinus bradycardia      ENDO   (+) Controlled type 2 diabetes mellitus with microalbuminuria, without long-term current use of insulin    (+) Hypothyroidism (acquired)      /RENAL   (+) Diabetic nephropathy associated with type 2 diabetes mellitus (HCC)   (+) Stage 3a chronic kidney disease (HCC)      MUSCULOSKELETAL   (+) Gout   (+) Sacroiliitis (HCC)      NEURO/PSYCH   (+) Diabetic peripheral neuropathy (HCC)      PULMONARY   (+) Obstructive sleep apnea        Physical Exam    Airway    Mallampati score: II  TM Distance: >3 FB  Neck ROM: full     Dental   No notable dental hx     Cardiovascular      Pulmonary      Other Findings      Anesthesia Plan  ASA Score- 3     Anesthesia Type- general with ASA Monitors. Additional Monitors:     Airway Plan: ETT. Plan Factors-Exercise tolerance (METS): >4 METS. Chart reviewed. EKG reviewed. Existing labs reviewed. Patient summary reviewed. Patient is not a current smoker. There is medical exclusion for perioperative obstructive sleep apnea risk education. Induction- intravenous and rapid sequence induction. Postoperative Plan- Plan for postoperative opioid use. Informed Consent- Anesthetic plan and risks discussed with patient. I personally reviewed this patient with the CRNA. Discussed and agreed on the Anesthesia Plan with the CRNA. Toro Graves

## 2023-10-26 NOTE — DISCHARGE INSTR - AVS FIRST PAGE
Discharge Instructions - Podiatry    Weight Bearing Status: Non weight bearing to the left foot. Leave dressings & splint intact. Pain: Continue analgesics as directed    Follow-up appointment instructions: Please go to your post op visit in about 1 week with Dr. Josue Yoder. Contact sooner if any increase in pain, or signs of infection occur    Wound Care: Leave dressings clean, dry, and intact to your follow up visit. Keep the foot elevated as much as possible in the first 48 hours post op to minimize bleeding, swelling, and pain.

## 2023-10-27 DIAGNOSIS — E11.29 CONTROLLED TYPE 2 DIABETES MELLITUS WITH MICROALBUMINURIA, WITHOUT LONG-TERM CURRENT USE OF INSULIN: ICD-10-CM

## 2023-10-27 DIAGNOSIS — R80.9 CONTROLLED TYPE 2 DIABETES MELLITUS WITH MICROALBUMINURIA, WITHOUT LONG-TERM CURRENT USE OF INSULIN: ICD-10-CM

## 2023-10-31 ENCOUNTER — OFFICE VISIT (OUTPATIENT)
Dept: URGENT CARE | Facility: MEDICAL CENTER | Age: 58
End: 2023-10-31
Payer: COMMERCIAL

## 2023-10-31 VITALS
RESPIRATION RATE: 18 BRPM | TEMPERATURE: 98.7 F | HEART RATE: 85 BPM | BODY MASS INDEX: 36.3 KG/M2 | DIASTOLIC BLOOD PRESSURE: 70 MMHG | OXYGEN SATURATION: 98 % | HEIGHT: 72 IN | SYSTOLIC BLOOD PRESSURE: 140 MMHG | WEIGHT: 268 LBS

## 2023-10-31 DIAGNOSIS — B34.9 VIRAL ILLNESS: Primary | ICD-10-CM

## 2023-10-31 DIAGNOSIS — R50.9 FEVER, UNSPECIFIED FEVER CAUSE: ICD-10-CM

## 2023-10-31 LAB
SARS-COV-2 AG UPPER RESP QL IA: NEGATIVE
VALID CONTROL: NORMAL

## 2023-10-31 PROCEDURE — 87811 SARS-COV-2 COVID19 W/OPTIC: CPT | Performed by: PHYSICIAN ASSISTANT

## 2023-10-31 PROCEDURE — G0381 LEV 2 HOSP TYPE B ED VISIT: HCPCS | Performed by: PHYSICIAN ASSISTANT

## 2023-10-31 NOTE — PATIENT INSTRUCTIONS
If symptoms progress test yourself for Covid on day, you can call your PCP to have test done  Tylenol as needed for fever or pain  Drink plenty of fluids  Over the Counter cold medication to control symptoms  If symptoms fail to improve follow up with PCP  If symptoms worsen have yourself rechecked

## 2023-10-31 NOTE — PROGRESS NOTES
[facility]        NAME: Kristie Cooper is a 62 y.o. male  : 1965    MRN: [id number]  DATE: 2023  TIME: 5:34 PM    Assessment and Plan   Viral illness [B34.9]  1. Viral illness        2. Fever, unspecified fever cause  Poct Covid 19 Rapid Antigen Test            Patient Instructions     If symptoms progress test yourself for Covid on day, you can call your PCP to have test done  Tylenol as needed for fever or pain  Drink plenty of fluids  Over the Counter cold medication to control symptoms  If symptoms fail to improve follow up with PCP  If symptoms worsen have yourself rechecked     Follow up with PCP in 3-5 days. Proceed to  ER if symptoms worsen. Chief Complaint     Chief Complaint   Patient presents with    Fever     Fever and head ache chills and body aches that started on Saturday morning. Had ankle sx on Thursday. Spoke to the surgeon and told him to come to the care now          History of Present Illness       Patient who is 6 days post foot surgery presents with a 5 day hx of fever, chills, headache and diminished appetite. Review of Systems   Review of Systems   Constitutional:  Positive for appetite change, chills and fever. HENT:  Negative for congestion, rhinorrhea and sore throat. Respiratory:  Negative for cough. Gastrointestinal:  Positive for nausea and vomiting. Negative for diarrhea. Genitourinary:  Negative for difficulty urinating. Musculoskeletal:  Positive for myalgias. Neurological:  Positive for headaches.          Current Medications       Current Outpatient Medications:     allopurinol (ZYLOPRIM) 300 mg tablet, Take 1 tablet (300 mg total) by mouth daily, Disp: 90 tablet, Rfl: 1    carvedilol (COREG) 6.25 mg tablet, TAKE 1 TABLET EVERY MORNINGAND 1 AND 1/2 TABLETS IN   THE EVENING, Disp: 90 tablet, Rfl: 0    ezetimibe (ZETIA) 10 mg tablet, Take 1 tablet (10 mg total) by mouth daily, Disp: 90 tablet, Rfl: 1    gabapentin (NEURONTIN) 600 MG tablet, Take 1 tablet (600 mg total) by mouth 3 (three) times a day, Disp: 270 tablet, Rfl: 0    levothyroxine 150 mcg tablet, TAKE 1 TABLET DAILY, Disp: 90 tablet, Rfl: 0    lisinopril (ZESTRIL) 40 mg tablet, Take 1 tablet (40 mg total) by mouth daily, Disp: 90 tablet, Rfl: 1    metFORMIN (GLUCOPHAGE-XR) 500 mg 24 hr tablet, Take 1 tablet (500 mg total) by mouth daily at bedtime, Disp: 90 tablet, Rfl: 3    oxyCODONE-acetaminophen (Percocet) 5-325 mg per tablet, Take 1 tablet by mouth every 4 (four) hours as needed for moderate pain for up to 7 days Max Daily Amount: 6 tablets, Disp: 20 tablet, Rfl: 0    rivaroxaban (Xarelto) 20 mg tablet, Take 1 tablet (20 mg total) by mouth daily with breakfast, Disp: 90 tablet, Rfl: 1    semaglutide, 0.25 or 0.5 mg/dose, (Ozempic, 0.25 or 0.5 MG/DOSE,) 2 mg/3 mL injection pen, Inject 0.5mg weekly, Disp: 9 mL, Rfl: 0    sildenafil (Viagra) 100 mg tablet, Take 1 tablet (100 mg total) by mouth as needed for erectile dysfunction, Disp: 30 tablet, Rfl: 0    spironolactone (ALDACTONE) 25 mg tablet, Take 1 tablet (25 mg total) by mouth daily, Disp: 90 tablet, Rfl: 1    Current Allergies     Allergies as of 10/31/2023    (No Known Allergies)            The following portions of the patient's history were reviewed and updated as appropriate: allergies, current medications, past family history, past medical history, past social history, past surgical history and problem list.     Past Medical History:   Diagnosis Date    Arthritis     right foot    Chronic cholecystitis     CPAP (continuous positive airway pressure) dependence     Diabetes mellitus (HCC)     Diabetic nephropathy (HCC)     DVT (deep venous thrombosis) (HCC)     Gout     History of pulmonary embolism     Hyperlipidemia     Hypertension     Hypothyroidism     Lumbar back pain     MTHFR mutation     Neuropathy     Scab     right arm- no s/s infection    Sleep apnea     Tarsal tunnel syndrome, right     Tinnitus     left ear    Wears glasses     and contacts    Wears glasses        Past Surgical History:   Procedure Laterality Date    APPENDECTOMY      ARTHRODESIS      Lumbar L__    BACK SURGERY      BUNIONECTOMY Right 10/07/2016    Procedure: REMOVAL TIBIAL  SESAMOID BONE  RIGHT FOOT;  Surgeon: Deborah Silva DPM;  [facility];  Service:     CARDIAC PACEMAKER PLACEMENT  09/01/2021    CHOLECYSTECTOMY  03/26/2015    COLONOSCOPY      KNEE ARTHROSCOPY      KNEE ARTHROSCOPY W/ MENISCAL REPAIR      Lateral    NASAL SEPTUM SURGERY  10/16/2013    PLANTAR FASCIA SURGERY Left 10/26/2023    Procedure: RELEASE FASCIA PLANTAR/FASCIOTOMY;  Surgeon: Raul Salgado DPM;  [facility]: [facility];  Service: Podiatry    [facility] WHEN [facility] N/A 11/23/2018    Procedure: COLONOSCOPY;  Surgeon: Jian Beck MD;  Location: [facility];  Service: Gastroenterology    WV RELEASE TARSAL TUNNEL Right 06/25/2018    Procedure: RELEASE TARSAL TUNNEL;  Surgeon: Maria R Diaz DPM;  [facility];  Service: Podiatry    [place] RELEASE TARSAL TUNNEL Left 03/13/2020    Procedure: RELEASE TARSAL TUNNEL;  Surgeon: Maria R Diaz DPM;  Location: [address] [place] [address] MAIN OR;  Service: Podiatry    [place] RELEASE TARSAL TUNNEL Left 10/26/2023    Procedure: RELEASE TARSAL TUNNEL;  Surgeon: Raul Salgado DPM;  Location: [facility];  Service: [address]  10/16/2013    with Adenoidectomy    UVULECTOMY  10/16/2013    UVULOPALATOPHARYNGOPLASTY      WISDOM TOOTH EXTRACTION         Family History   Problem Relation Age of Onset    Aneurysm Mother         intracranial aneurysm repair    Coronary artery disease Father     Hypertension Father     Heart disease Father     Aneurysm Brother          Medications have been verified.         Objective   /70   Pulse 85   Temp 98.7 °F (37.1 °C)   Resp 18   Ht 6' (1.829 m)   Wt 122 kg (268 lb)   SpO2 98%   BMI 36.35 kg/m²   No LMP for male patient. Physical Exam     Physical Exam  Vitals and nursing note reviewed. Constitutional:       Appearance: Normal appearance. HENT:      Head: Normocephalic and atraumatic. Right Ear: Tympanic membrane normal.      Left Ear: Tympanic membrane normal.      Nose: Nose normal.      Mouth/Throat:      Mouth: Mucous membranes are moist.      Pharynx: Oropharynx is clear. Eyes:      Conjunctiva/sclera: Conjunctivae normal.   Cardiovascular:      Rate and Rhythm: Normal rate and regular rhythm. Heart sounds: Normal heart sounds. Pulmonary:      Effort: Pulmonary effort is normal.      Breath sounds: Normal breath sounds. Musculoskeletal:      Cervical back: Neck supple. Lymphadenopathy:      Cervical: No cervical adenopathy. Skin:     General: Skin is warm. Neurological:      Mental Status: He is alert.

## 2023-11-01 DIAGNOSIS — E11.42 DIABETIC PERIPHERAL NEUROPATHY (HCC): ICD-10-CM

## 2023-11-01 NOTE — PROGRESS NOTES
Cardiology Follow Up    Randolph Koenig  1965  [id number]  [address] [place] [address] [facility] [place] [address]  [phone]    1. Paroxysmal atrial fibrillation (HCC)  POCT ECG      2. Controlled type 2 diabetes mellitus with microalbuminuria, without long-term current use of insulin         3. Obstructive sleep apnea        4. Sinus bradycardia        5. Primary hypertension        6. Pacemaker        7. Hyperlipidemia, unspecified hyperlipidemia type            Discussion/Summary:    PAF with prior hx of tachy/luzma now s/p PPM  Prior hx of PAF with tachy/luzma syndrome and conversion pauses s/p [employer] DC PPM placed 9-2021  Recent device check from today shows AP 82% and  2%; 1 episode NSVT 10 beats  He continues on carvedilol 6.25 mg BID and Xarelto for stroke prevention  EKG today is A-paced rate of 68    Essential HTN  controlled  Continue carvedilol 6.25 mg QD and spironolactone 25 mg QD, lisinopril 40 mg QD,   Low sodium diet was reinforced    Hyperlipidemia  Lipid panel from June 2023  Triglycerides 174  HDL 60  LDL is 131 up from 61 in May 2022  He is on Zetia 10 mg QD and not on a statin as he has been intolerant to crestor and lipitor in the past having had myalgias  Given his dx of DM goal LDL would be 70 or less  We will look into cost of PCSK9 inhibitors    CORIN  He is complaint with CPAP    DM  HgbA1c 5.9  Defer to PCP    He will return to the office in one year to follow up with Dr. Abner Hernandez    Interval History:   Randolph Koenig is a 62 y.o. male with PMH of DM, PAF, tachy/luzma syndrome s/p PPM placement, HTN, HLD, DVT, CORIN, hypothyroidism and CKD who returns to the office today for routine follow up visit. Today, the patient states he feels well from a cardiac standpoint.   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 He also has several acres of land at home and is doing a lot of outside yard work. He can perform these activities with no complaints of chest discomfort, dyspnea on exertion or palpitations. He has no lower extremity edema orthopnea and no dizziness/lightheadedness or near syncope/syncope. The patient does note that once in the past 2 weeks he did have a very brief episode of a fluttering sensation as he was falling asleep. Most recent device check does show 10 beat run of NSVT but does not give a date and the patient is unaware of exactly what date he did feel the sensation. It is likely that these 2 possibly correlate. The patient denies any other palpitations over the past several months except for this 1 incident. He also notes he may have been a little dehydrated that day. Most recent blood work from September shows adequate electrolyte function. His thyroid function had been low and endocrinology team did decrease levothyroxine level and he has repeat lab work pending. I did asked the patient to remain better hydrated throughout the day and perhaps utilize no sugar or electrolyte beverages. I asked him to contact our office if he is noticing this fluttering sensation more often. He will continue his carvedilol. His blood pressure acceptable today on current medication regimen and low-sodium diet was reinforced. Given his diagnosis of diabetes his LDL goal would be 70 or less and most recent lipid panel notes it is 131. He has trialed Crestor and Lipitor in the past but has had myalgias and is currently on Zetia 10 mg only. I discussed use of PCSK9 inhibitors and he is agreeable and we will check his cost with his insurance company.     Medical Problems       Problem List       Controlled type 2 diabetes mellitus with microalbuminuria, without long-term current use of insulin     Overview Signed 2/12/2019  7:22 AM by Gini Murdock, DO     Does not check his sugars at home             Lab Results   Component Value Date    HGBA1C 5.9 (H) 07/18/2023         Diabetic peripheral neuropathy ([address])      Lab Results   Component Value Date    HGBA1C 5.9 (H) 07/18/2023         Erectile dysfunction due to diseases classified elsewhere    Homozygous for MTHFR gene mutation    Hypothyroidism (acquired)    Overview Signed 2/12/2019  7:23 AM by Toshia Ho,      Symptoms not present          Sacroiliitis ([address])    Tarsal tunnel syndrome of both lower extremities    Tarsal tunnel syndrome, left    Overview Signed 12/12/2019 10:12 AM by Shay Penn, DPM     Added automatically from request for surgery [id number]         Venous stasis ulcer of left ankle limited to breakdown of skin with varicose veins (HCC)    Gout    Hyperaldosteronism ([address])    Diabetic nephropathy associated with type 2 diabetes mellitus ([address])      Lab Results   Component Value Date    HGBA1C 5.9 (H) 07/18/2023         Stage 3a chronic kidney disease ([address])    Lab Results   Component Value Date    EGFR 55 09/16/2023    EGFR 36 07/18/2023    EGFR 46 06/02/2023    CREATININE 1.39 (H) 09/16/2023    CREATININE 1.96 (H) 07/18/2023    CREATININE 1.60 (H) 06/02/2023         Proteinuria    Vitamin D deficiency    Syncopal episodes     Paroxysmal atrial fibrillation (HCC)    History of DVT (deep vein thrombosis)    Sinus bradycardia    Obstructive sleep apnea    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HCC)    Pacemaker    Primary hypertension    LVH (left ventricular hypertrophy)    Plantar fasciitis    Post-operative state        Past Medical History:   Diagnosis Date    Arthritis     right foot    Chronic cholecystitis     CPAP (continuous positive airway pressure) dependence     Diabetes mellitus ([address])     Diabetic nephropathy (HCC)     DVT (deep venous thrombosis) (HCC)     Gout     History of pulmonary embolism     Hyperlipidemia     Hypertension     Hypothyroidism     Lumbar back pain     MTHFR mutation Neuropathy     Scab     right arm- no s/s infection    Sleep apnea     Tarsal tunnel syndrome, right     Tinnitus     left ear    Wears glasses     and contacts    Wears glasses      Social History     Socioeconomic History    Marital status: Single     Spouse name: Not on file    Number of children: Not on file    Years of education: Not on file    Highest education level: Not on file   Occupational History    Not on file   Tobacco Use    Smoking status: Never    Smokeless tobacco: Never   Vaping Use    Vaping Use: Never used   Substance and Sexual Activity    Alcohol use:  Yes     Alcohol/week: 6.0 standard drinks of alcohol     Types: 6 Cans of beer per week     Comment: weekends    Drug use: No    Sexual activity: Yes     Partners: Female     Birth control/protection: Other   Other Topics Concern    Not on file   Social History Narrative    Not on file     Social Determinants of Health     Financial Resource Strain: Not on file   Food Insecurity: Not on file   Transportation Needs: Not on file   Physical Activity: Not on file   Stress: Not on file   Social Connections: Not on file   Intimate Partner Violence: Not on file   Housing Stability: Not on file      Family History   Problem Relation Age of Onset    Aneurysm Mother         intracranial aneurysm repair    Coronary artery disease Father     Hypertension Father     Heart disease Father     Aneurysm Brother      Past Surgical History:   Procedure Laterality Date    APPENDECTOMY      ARTHRODESIS      Lumbar L__    BACK SURGERY      BUNIONECTOMY Right 10/07/2016    Procedure: REMOVAL TIBIAL  SESAMOID BONE  RIGHT FOOT;  Surgeon: Abida James DPM;  [facility];  Service:     CARDIAC PACEMAKER PLACEMENT  09/01/2021    CHOLECYSTECTOMY  03/26/2015    COLONOSCOPY      KNEE ARTHROSCOPY      KNEE ARTHROSCOPY W/ MENISCAL REPAIR      Lateral    NASAL SEPTUM SURGERY  10/16/2013    PLANTAR FASCIA SURGERY Left 10/26/2023    Procedure: RELEASE FASCIA PLANTAR/FASCIOTOMY;  Surgeon: Jama Benavides DPM;  [facility];  Service: Podiatry    [place] COLONOSCOPY FLX DX W/[facility] [address] PFRMD N/A 11/23/2018    Procedure: COLONOSCOPY;  Surgeon: Mariposa Mcnamara MD;  [facility];  Service: Gastroenterology    AK RELEASE TARSAL TUNNEL Right 06/25/2018    Procedure: RELEASE TARSAL TUNNEL;  Surgeon: Stevan Velasquez DPM;  [facility];  Service: Podiatry    AK RELEASE TARSAL TUNNEL Left 03/13/2020    Procedure: RELEASE TARSAL TUNNEL;  Surgeon: Stevan Velasquez DPM;  Location: [address] [place] [address] [place] [facility] OR;  Service: Podiatry    AK RELEASE TARSAL TUNNEL Left 10/26/2023    Procedure: RELEASE TARSAL TUNNEL;  Surgeon: Jama Benavides DPM;  [facility];  Service: [address]  10/16/2013    with Adenoidectomy    UVULECTOMY  10/16/2013    UVULOPALATOPHARYNGOPLASTY      WISDOM TOOTH EXTRACTION         Current Outpatient Medications:     allopurinol (ZYLOPRIM) 300 mg tablet, Take 1 tablet (300 mg total) by mouth daily, Disp: 90 tablet, Rfl: 1    carvedilol (COREG) 6.25 mg tablet, TAKE 1 TABLET EVERY MORNINGAND 1 AND 1/2 TABLETS IN   THE EVENING, Disp: 90 tablet, Rfl: 0    ezetimibe (ZETIA) 10 mg tablet, Take 1 tablet (10 mg total) by mouth daily, Disp: 90 tablet, Rfl: 1    gabapentin (NEURONTIN) 600 MG tablet, TAKE 2 TABLETS BY MOUTH THREE TIMES DAILY, Disp: 180 tablet, Rfl: 0    levothyroxine 150 mcg tablet, TAKE 1 TABLET DAILY, Disp: 90 tablet, Rfl: 0    lisinopril (ZESTRIL) 40 mg tablet, Take 1 tablet (40 mg total) by mouth daily, Disp: 90 tablet, Rfl: 1    metFORMIN (GLUCOPHAGE-XR) 500 mg 24 hr tablet, Take 1 tablet (500 mg total) by mouth daily at bedtime, Disp: 90 tablet, Rfl: 3    oxyCODONE-acetaminophen (Percocet) 5-325 mg per tablet, Take 1 tablet by mouth every 4 (four) hours as needed for moderate pain for up to 7 days Max Daily Amount: 6 tablets, Disp: 20 tablet, Rfl: 0    rivaroxaban (Xarelto) 20 mg tablet, Take 1 tablet (20 mg total) by mouth daily with breakfast, Disp: 90 tablet, Rfl: 1    semaglutide, 0.25 or 0.5 mg/dose, (Ozempic, 0.25 or 0.5 MG/DOSE,) 2 mg/3 mL injection pen, Inject 0.5mg weekly, Disp: 9 mL, Rfl: 0    sildenafil (Viagra) 100 mg tablet, Take 1 tablet (100 mg total) by mouth as needed for erectile dysfunction, Disp: 30 tablet, Rfl: 0    spironolactone (ALDACTONE) 25 mg tablet, Take 1 tablet (25 mg total) by mouth daily, Disp: 90 tablet, Rfl: 1  No Known Allergies    Labs:     Chemistry        Component Value Date/Time     12/21/2015 1313    K 4.8 09/16/2023 0717    K 4.6 12/21/2015 1313     09/16/2023 0717     12/21/2015 1313    CO2 29 09/16/2023 0717    CO2 27.7 12/21/2015 1313    BUN 23 09/16/2023 0717    BUN 15 12/21/2015 1313    CREATININE 1.39 (H) 09/16/2023 0717    CREATININE 1.20 12/21/2015 1313        Component Value Date/Time    CALCIUM 10.7 (H) 09/16/2023 0717    CALCIUM 9.9 12/21/2015 1313    ALKPHOS 49 09/16/2023 0717    ALKPHOS 73 06/11/2015 0713    AST 19 09/16/2023 0717    AST 31 06/11/2015 0713    ALT 22 09/16/2023 0717    ALT 50 06/11/2015 0713    BILITOT 0.6 06/11/2015 0713            No results found for: "CHOL"  Lab Results   Component Value Date    HDL 60 06/02/2023    HDL 72 05/31/2022    HDL 55 01/06/2021     Lab Results   Component Value Date    LDLCALC 131 (H) 06/02/2023    LDLCALC 61 05/31/2022    LDLCALC 60 01/06/2021     Lab Results   Component Value Date    TRIG 174 (H) 06/02/2023    TRIG 116 05/31/2022    TRIG 96 01/06/2021     No results found for: "CHOLHDL"    Imaging: No results found. ECG:  A-paced rhythm rate of 68      Review of Systems   Constitutional: Negative for chills, fever and malaise/fatigue. HENT:  Negative for congestion. Cardiovascular:  Negative for chest pain, dyspnea on exertion, leg swelling, orthopnea and palpitations. Respiratory:  Negative for cough, shortness of breath (no SOB at rest) and wheezing.     Endocrine: Negative. Hematologic/Lymphatic: Negative. Skin: Negative. Musculoskeletal: Negative. Gastrointestinal:  Negative for bloating, abdominal pain, nausea and vomiting. Genitourinary: Negative. Neurological:  Negative for dizziness and light-headedness. Psychiatric/Behavioral: Negative. All other systems reviewed and are negative. Vitals:    11/03/23 1528   BP: 122/82   Pulse:      Vitals:    11/03/23 1501   Weight: 118 kg (261 lb 3.2 oz)     Height: 6' (182.9 cm)   Body mass index is 35.43 kg/m². Physical Exam:  Physical Exam  Vitals reviewed. Constitutional:       General: He is not in acute distress. HENT:      Head: Normocephalic and atraumatic. Mouth/Throat:      Mouth: Mucous membranes are moist.   Cardiovascular:      Rate and Rhythm: Normal rate and regular rhythm. Heart sounds: Normal heart sounds, S1 normal and S2 normal. No murmur heard. Pulmonary:      Effort: Pulmonary effort is normal. No respiratory distress. Breath sounds: Normal breath sounds. Abdominal:      General: Bowel sounds are normal. There is no distension. Palpations: Abdomen is soft. Musculoskeletal:         General: Normal range of motion. Cervical back: Normal range of motion and neck supple. Right lower leg: No edema. Left lower leg: No edema. Skin:     General: Skin is warm and dry. Neurological:      Mental Status: He is alert and oriented to person, place, and time.    Psychiatric:         Mood and Affect: Mood normal.

## 2023-11-02 RX ORDER — GABAPENTIN 600 MG/1
1200 TABLET ORAL 3 TIMES DAILY
Qty: 180 TABLET | Refills: 0 | Status: SHIPPED | OUTPATIENT
Start: 2023-11-02

## 2023-11-03 ENCOUNTER — REMOTE DEVICE CLINIC VISIT (OUTPATIENT)
Dept: CARDIOLOGY CLINIC | Facility: CLINIC | Age: 58
End: 2023-11-03
Payer: COMMERCIAL

## 2023-11-03 ENCOUNTER — OFFICE VISIT (OUTPATIENT)
Dept: CARDIOLOGY CLINIC | Facility: HOSPITAL | Age: 58
End: 2023-11-03
Payer: COMMERCIAL

## 2023-11-03 VITALS
BODY MASS INDEX: 35.38 KG/M2 | SYSTOLIC BLOOD PRESSURE: 122 MMHG | HEIGHT: 72 IN | HEART RATE: 68 BPM | WEIGHT: 261.2 LBS | DIASTOLIC BLOOD PRESSURE: 82 MMHG

## 2023-11-03 DIAGNOSIS — Z95.0 CARDIAC PACEMAKER IN SITU: Primary | ICD-10-CM

## 2023-11-03 DIAGNOSIS — E78.5 HYPERLIPIDEMIA, UNSPECIFIED HYPERLIPIDEMIA TYPE: ICD-10-CM

## 2023-11-03 DIAGNOSIS — E11.29 CONTROLLED TYPE 2 DIABETES MELLITUS WITH MICROALBUMINURIA, WITHOUT LONG-TERM CURRENT USE OF INSULIN: ICD-10-CM

## 2023-11-03 DIAGNOSIS — R80.9 CONTROLLED TYPE 2 DIABETES MELLITUS WITH MICROALBUMINURIA, WITHOUT LONG-TERM CURRENT USE OF INSULIN: ICD-10-CM

## 2023-11-03 DIAGNOSIS — R00.1 SINUS BRADYCARDIA: ICD-10-CM

## 2023-11-03 DIAGNOSIS — I48.0 PAROXYSMAL ATRIAL FIBRILLATION (HCC): Primary | ICD-10-CM

## 2023-11-03 DIAGNOSIS — G47.33 OBSTRUCTIVE SLEEP APNEA: ICD-10-CM

## 2023-11-03 DIAGNOSIS — I10 PRIMARY HYPERTENSION: ICD-10-CM

## 2023-11-03 DIAGNOSIS — Z95.0 PACEMAKER: ICD-10-CM

## 2023-11-03 PROCEDURE — 93000 ELECTROCARDIOGRAM COMPLETE: CPT | Performed by: NURSE PRACTITIONER

## 2023-11-03 PROCEDURE — 93296 REM INTERROG EVL PM/IDS: CPT | Performed by: INTERNAL MEDICINE

## 2023-11-03 PROCEDURE — 93294 REM INTERROG EVL PM/LDLS PM: CPT | Performed by: INTERNAL MEDICINE

## 2023-11-03 PROCEDURE — 99214 OFFICE O/P EST MOD 30 MIN: CPT | Performed by: NURSE PRACTITIONER

## 2023-11-03 NOTE — PROGRESS NOTES
Results for orders placed or performed in visit on 11/03/23   Cardiac EP device report    Narrative    MDDT DUAL PM/ ACTIVE SYSTEM IS MRI CONDITIONAL  CARELINK TRANSMISSION: BATTERY VOLTAGE ADEQUATE (12 YRS). AP-82%, -2%. ALL AVAILABLE LEAD PARAMETERS WITHIN NORMAL LIMITS. 1 NSVT EPISODE FOR 10 BEATS, AVG CL~310MS. EF-75% (ECHO 8/30/21). PT ON XARELTO & CARVEDILOL. NORMAL DEVICE FUNCTION.  GV

## 2023-11-09 ENCOUNTER — OFFICE VISIT (OUTPATIENT)
Dept: PODIATRY | Facility: CLINIC | Age: 58
End: 2023-11-09

## 2023-11-09 VITALS
HEIGHT: 72 IN | OXYGEN SATURATION: 100 % | WEIGHT: 261 LBS | BODY MASS INDEX: 35.35 KG/M2 | RESPIRATION RATE: 16 BRPM | HEART RATE: 76 BPM

## 2023-11-09 DIAGNOSIS — M72.2 PLANTAR FASCIITIS: ICD-10-CM

## 2023-11-09 DIAGNOSIS — G57.52 TARSAL TUNNEL SYNDROME OF LEFT SIDE: Primary | ICD-10-CM

## 2023-11-09 DIAGNOSIS — M79.2 NEURITIS: ICD-10-CM

## 2023-11-09 PROCEDURE — 99024 POSTOP FOLLOW-UP VISIT: CPT | Performed by: PODIATRIST

## 2023-11-09 NOTE — PROGRESS NOTES
Assessment/Plan:      Diagnoses and all orders for this visit:    Tarsal tunnel syndrome of left side  -     Ambulatory referral to Physical Therapy; Future    Neuritis  -     Ambulatory referral to Physical Therapy; Future    Plantar fasciitis  -     Ambulatory referral to Physical Therapy; Future      -Doing very well postoperatively, sutures removed today no evidence of dehiscence, Band-Aid for comfort may wash dry etc.  - Return in 2 weeks for check, he may be weightbearing as tolerated in shoes but rigid shoes no barefoot at this time  - Due to the slowly increasing diabetic neuropathic pain, I am concerned about some scar formation on this area and I will place in physical therapy for deep tissue massage and scar tissue management    Subjective:     Patient ID: Garcia Padilla is a 62 y.o. male. Patient transfer evaluation management of postoperative left foot status post Baxters nerve release. His pain has drastically improved. Has been overall very compliant with weightbearing status, presented today in his splint. Having mild diabetic neuropathy pain at night, a scale of 5 or 6 out of 10. Getting a little bit worse in surgery. Review of Systems   Constitutional:  Negative for chills and fever. HENT:  Negative for ear pain and sore throat. Eyes:  Negative for pain and visual disturbance. Respiratory:  Negative for cough and shortness of breath. Cardiovascular:  Negative for chest pain and palpitations. Gastrointestinal:  Negative for abdominal pain and vomiting. Genitourinary:  Negative for dysuria and hematuria. Musculoskeletal:  Negative for arthralgias and back pain. Skin:  Negative for color change and rash. Neurological:  Negative for seizures and syncope. All other systems reviewed and are negative. Objective:     Physical Exam  Vitals reviewed. Constitutional:       Appearance: Normal appearance. He is obese.    HENT:      Head: Normocephalic and atraumatic. Nose: Nose normal.      Mouth/Throat:      Mouth: Mucous membranes are moist.   Eyes:      Pupils: Pupils are equal, round, and reactive to light. Abdominal:      General: Abdomen is flat. Musculoskeletal:         General: Swelling present. Comments: Incision is healed, no evidence of dehiscence following suture removal. Eccymosis consistent with post operative. Course. Digital range of motion intact the postoperative foot looks amazing   Skin:     Capillary Refill: Capillary refill takes 2 to 3 seconds. Neurological:      General: No focal deficit present. Mental Status: He is alert and oriented to person, place, and time. Mental status is at baseline. no

## 2023-11-15 DIAGNOSIS — I48.0 PAROXYSMAL ATRIAL FIBRILLATION (HCC): ICD-10-CM

## 2023-11-15 DIAGNOSIS — E11.9 TYPE 2 DIABETES MELLITUS WITHOUT COMPLICATION, WITHOUT LONG-TERM CURRENT USE OF INSULIN (HCC): ICD-10-CM

## 2023-11-15 DIAGNOSIS — I10 ESSENTIAL HYPERTENSION: ICD-10-CM

## 2023-11-15 RX ORDER — LISINOPRIL 40 MG/1
40 TABLET ORAL DAILY
Qty: 90 TABLET | Refills: 3 | Status: SHIPPED | OUTPATIENT
Start: 2023-11-15

## 2023-11-15 RX ORDER — CARVEDILOL 6.25 MG/1
TABLET ORAL
Qty: 90 TABLET | Refills: 0 | Status: SHIPPED | OUTPATIENT
Start: 2023-11-15

## 2023-11-24 ENCOUNTER — TELEPHONE (OUTPATIENT)
Age: 58
End: 2023-11-24

## 2023-11-24 NOTE — TELEPHONE ENCOUNTER
Caller: Killian Vieira    Doctor/Office: Dr. Ashu Gonzalez    #: [phone]    Escalation: Appointment/Went to [facility] office for appt today and would never make it on time to [facility] office so I RS to 12/6/23. If Dr wants him seen sooner please call him back and RS . He is post op.  Thanks Itraconazole Counseling:  I discussed with the patient the risks of itraconazole including but not limited to liver damage, nausea/vomiting, neuropathy, and severe allergy.  The patient understands that this medication is best absorbed when taken with acidic beverages such as non-diet cola or ginger ale.  The patient understands that monitoring is required including baseline LFTs and repeat LFTs at intervals.  The patient understands that they are to contact us or the primary physician if concerning signs are noted.

## 2023-11-26 DIAGNOSIS — N52.9 ERECTILE DYSFUNCTION, UNSPECIFIED ERECTILE DYSFUNCTION TYPE: ICD-10-CM

## 2023-11-27 RX ORDER — SILDENAFIL 100 MG/1
100 TABLET, FILM COATED ORAL AS NEEDED
Qty: 30 TABLET | Refills: 0 | Status: SHIPPED | OUTPATIENT
Start: 2023-11-27

## 2023-11-28 ENCOUNTER — EVALUATION (OUTPATIENT)
Dept: PHYSICAL THERAPY | Facility: CLINIC | Age: 58
End: 2023-11-28
Payer: COMMERCIAL

## 2023-11-28 DIAGNOSIS — M72.2 PLANTAR FASCIITIS: ICD-10-CM

## 2023-11-28 DIAGNOSIS — M79.2 NEURITIS: ICD-10-CM

## 2023-11-28 DIAGNOSIS — G57.52 TARSAL TUNNEL SYNDROME OF LEFT SIDE: Primary | ICD-10-CM

## 2023-11-28 PROCEDURE — 97161 PT EVAL LOW COMPLEX 20 MIN: CPT

## 2023-11-28 PROCEDURE — 97110 THERAPEUTIC EXERCISES: CPT

## 2023-11-28 NOTE — PROGRESS NOTES
PT Evaluation     Today's date: 2023  Patient name: Deandra Echeverria  : 1965  MRN: [id number]  Referring provider: Radha Alejo  Dx:   Encounter Diagnosis     ICD-10-CM    1. Tarsal tunnel syndrome of left side  G57.52 Ambulatory referral to Physical Therapy      2. Neuritis  M79.2 Ambulatory referral to Physical Therapy      3. Plantar fasciitis  M72.2 Ambulatory referral to Physical Therapy          Start Time: 0815  Stop Time: 0900  Total time in clinic (min): 45 minutes    Assessment  Assessment details: The patient is a 62 y.o. male presenting to Physical Therapy today s/p left tarsal tunnel and plantar fascia release on 10/26/23. Upon completion of the initial evaluation the patient is demonstrating with functional limitations in L ankle mobility, L ankle strength, scar mobility, and pain. He is currently having difficulty with functional activities such as walking for a longer period of time due to symptomatology. Patient would benefit from a course of skilled Physical Therapy services to address functional limitations to return to prior level of function.  Thank you for your referral.   Impairments: abnormal gait, abnormal muscle firing, abnormal muscle tone, abnormal or restricted ROM, abnormal movement, activity intolerance, impaired balance, impaired physical strength, lacks appropriate home exercise program, pain with function and weight-bearing intolerance    Symptom irritability: lowUnderstanding of Dx/Px/POC: good   Prognosis: good    Goals  ST.) Patient will initiate HEP Independently   2.) Patient will have a 50% reduction in overall symptoms   3.) Patient will demonstrate improved strength evidenced by a 1-2 MMT grade increase  4.) Patient will demonstrate improvement in balance control evidenced by SLS time >/= 30 seconds on the LLE  5.) Patient will improve standing tolerance >/= 1 hour without symptoms     LT.) Patient will be d/c to an HEP independently  2.) Patient will improve functional abilities evidenced by FOTO scores  3.) Eliminate pain with functional activity   4.) Patient will demonstrate improved ability to lift, push, pull, and carry without symptoms  5.) Return to [address] [place] [address] details: Plan of care was reviewed and discussed thoroughly with the patient on their current condition. Patient was instructed on a HEP with written instructions. Patient is in agreement with PT recommendations and will attend Physical Therapy 2x/week for the next 8 weeks to address current deficits. Patient would benefit from: PT eval and skilled physical therapy  Referral necessary: No  Planned modality interventions: cryotherapy and thermotherapy: hydrocollator packs  Planned therapy interventions: balance, flexibility, functional ROM exercises, gait training, graded activity, graded exercise, graded motor, home exercise program, joint mobilization, manual therapy, neuromuscular re-education, patient education, self care, strengthening, stretching, therapeutic activities, therapeutic exercise and therapeutic training  Frequency: 2x week  Duration in weeks: 8  Plan of Care beginning date: 11/28/2023  Plan of Care expiration date: 1/23/2024  Treatment plan discussed with: patient      Subjective Evaluation    History of Present Illness  Mechanism of injury: surgery  Mechanism of injury: The patient reports today with L foot pain that started approximately 8 years ago. He states having a consult with his Doctor when pain first started and had a tarsal tunnel release that provided relief of his symptoms. He notes more recently, the pain started to progressively worsen in his heel and arch of the foot. He notes having a consult with Dr. Lisa Diaz and having a tarsal tunnel as well as plantar fascia release on 10-26-23. Since surgery, patient notes tightness around the ankle joint that increases after work as well tenderness along the incision.  Overall, he notes pain is much more manageable. Recurrent probem    Quality of life: good    Patient Goals  Patient goals for therapy: decreased pain, improved balance, increased motion, increased strength and independence with ADLs/IADLs    Pain  Current pain ratin  At best pain ratin  At worst pain ratin  Location: L Heel, Achilles  Quality: tight and discomfort  Relieving factors: rest and relaxation  Aggravating factors: walking    Social Support    Employment status: working    Diagnostic Tests  MRI studies: abnormal  Treatments  Current treatment: physical therapy      Objective     Observations   Left Ankle/Foot   Positive for incision. Negative for drainage. Additional Observation Details  Medial ankle incision is healing well with no notable drainage. Minimal ecchymosis. Palpation   Left   Hypertonic in the lateral gastrocnemius, medial gastrocnemius and soleus. Tenderness of the posterior tibialis. Tenderness   Left Ankle/Foot   Tenderness in the medial malleolus, posterior tibial tendon and proximal Achilles. No tenderness in the lateral malleolus and plantar fascia. Neurological Testing     Sensation     Ankle/Foot   Left Ankle/Foot   Intact: light touch    Right Ankle/Foot   Intact: light touch     Active Range of Motion   Left Hip   Normal active range of motion    Right Hip   Normal active range of motion  Left Knee   Normal active range of motion    Right Knee   Normal active range of motion  Left Ankle/Foot   Dorsiflexion (ke): -5 degrees with pain  Plantar flexion: 20 degrees   Inversion: 15 degrees   Eversion: 10 degrees     Right Ankle/Foot   Dorsiflexion (ke): WFL  Plantar flexion: WFL  Inversion: WFL  Eversion: WFL    Passive Range of Motion   Left Ankle/Foot    Dorsiflexion (ke): -3 degrees with pain  Plantar flexion: 25 degrees   Inversion: 20 degrees   Eversion: 15 degrees     Joint Play   Left Ankle/Foot  Hypomobile in the talocrural joint and subtalar joint.      Strength/Myotome Testing Left Hip   Normal muscle strength    Right Hip   Normal muscle strength    Left Knee   Normal strength    Right Knee   Normal strength    Left Ankle/Foot   Dorsiflexion: 3  Plantar flexion: 3  Inversion: 3  Eversion: 3    Right Ankle/Foot   Normal strength    Ambulation     Observational Gait     Additional Observational Gait Details  Patient is ambulating w/o an AD. He is ascending/descending stairs reciprocally. He demonstrates decreased stance time on L foot with gait mechanics.     Functional Assessment        Single Leg Stance   Left: 15 seconds      Flowsheet Rows      Flowsheet Row Most Recent Value   PT/OT G-Codes    Current Score 56   Projected Score 67               Precautions: Tarsal Tunnel & Plantar fascia release 10/26, Diabetic neuropathy associated w/ Type 2 Diabetes, Pacemaker      Manuals 11/28            L Ankle PROM             STM                                       Neuro Re-Ed             Fitter Board              SLS             Tandem Stance                                                                 Ther Ex             Gastroc Stretch 3x20"            Soleus Stretch 3x20"            ABC's x1            Plantar Fascia Self STM             TB Inv/Ev/DF/PF             Towel Scrunches             Inv/Ev Iso             SB for muscular endurance             HEP Instruction BM            Ther Activity                                       Gait Training                                       Modalities             Ice

## 2023-12-05 ENCOUNTER — OFFICE VISIT (OUTPATIENT)
Dept: PHYSICAL THERAPY | Facility: CLINIC | Age: 58
End: 2023-12-05
Payer: COMMERCIAL

## 2023-12-05 DIAGNOSIS — G57.52 TARSAL TUNNEL SYNDROME OF LEFT SIDE: Primary | ICD-10-CM

## 2023-12-05 DIAGNOSIS — M72.2 PLANTAR FASCIITIS: ICD-10-CM

## 2023-12-05 DIAGNOSIS — M79.2 NEURITIS: ICD-10-CM

## 2023-12-05 PROCEDURE — 97140 MANUAL THERAPY 1/> REGIONS: CPT

## 2023-12-05 PROCEDURE — 97110 THERAPEUTIC EXERCISES: CPT

## 2023-12-05 NOTE — PROGRESS NOTES
Daily Note     Today's date: 2023  Patient name: Patricia Sam  : 1965  MRN: [id number]  Referring provider: Tanner Amato  Dx:   Encounter Diagnosis     ICD-10-CM    1. Tarsal tunnel syndrome of left side  G57.52       2. Neuritis  M79.2       3. Plantar fasciitis  M72.2           Start Time: 1630  Stop Time: 8798  Total time in clinic (min): 45 minutes    Subjective: Patient reports tightness in the left ankle this afternoon. He notes performance of his home exercise program is going well this far. Objective: See treatment diary below      Assessment: Tolerated treatment well with a minimal increase in symptomatology. We initiated soft tissue mobilization today with focus on scar mobility. Patient tolerated this well. We will continue to progress patient within tolerance to address functional limitations in the L ankle. He would benefit from continued PT. Plan: Continue per plan of care.       Precautions: Tarsal Tunnel & Plantar fascia release 10/26, Diabetic neuropathy associated w/ Type 2 Diabetes, Pacemaker      Manuals             L Ankle PROM  BM           STM  BM                                     Neuro Re-Ed             Fitter Board              SLS               Tandem Stance                                                                 Ther Ex             Gastroc Stretch 3x20" 3x20" ea w 1/2 foam            Soleus Stretch 3x20" 3x20" ea           ABC's x1 x1           Plantar Fascia Self STM  2' ea           TB Inv/Ev/DF/PF             Standing HR/TR  2x10            Towel Scrunches              Inv/Ev Iso  3" Hold x10            SB for muscular endurance  10' L4           HEP Instruction BM            Ther Activity                                       Gait Training                                         Modalities             Ice

## 2023-12-06 ENCOUNTER — OFFICE VISIT (OUTPATIENT)
Dept: PODIATRY | Facility: CLINIC | Age: 58
End: 2023-12-06

## 2023-12-06 VITALS
WEIGHT: 261 LBS | HEIGHT: 72 IN | SYSTOLIC BLOOD PRESSURE: 166 MMHG | DIASTOLIC BLOOD PRESSURE: 114 MMHG | HEART RATE: 89 BPM | BODY MASS INDEX: 35.35 KG/M2

## 2023-12-06 DIAGNOSIS — G57.52 TARSAL TUNNEL SYNDROME OF LEFT SIDE: Primary | ICD-10-CM

## 2023-12-06 DIAGNOSIS — M79.2 NEURITIS: ICD-10-CM

## 2023-12-06 PROCEDURE — 99024 POSTOP FOLLOW-UP VISIT: CPT | Performed by: PODIATRIST

## 2023-12-06 NOTE — PROGRESS NOTES
Assessment/Plan:      Diagnoses and all orders for this visit:    Tarsal tunnel syndrome of left side    Neuritis      -He is to continue with deep massage, we did discuss scar tissue management for the next year year and a half to prevent rebounding down of the tarsal tunnel  - Continue physical therapy  - No further issues to the surgical site, except for swelling, he is to wear his continued chronic compression stockings  - Return as needed for continued at risk footcare as necessary    Subjective:     Patient ID: Ray Tobin is a 62 y.o. male. Patient presents for evaluation management of the left lower extremity, date of surgery 10/26 with tarsal tunnel and Baxters nerve release. No pain        Review of Systems   Constitutional:  Negative for chills and fever. HENT:  Negative for ear pain and sore throat. Eyes:  Negative for pain and visual disturbance. Respiratory:  Negative for cough and shortness of breath. Cardiovascular:  Negative for chest pain and palpitations. Gastrointestinal:  Negative for abdominal pain and vomiting. Genitourinary:  Negative for dysuria and hematuria. Musculoskeletal:  Negative for arthralgias and back pain. Skin:  Negative for color change and rash. Neurological:  Negative for seizures and syncope. All other systems reviewed and are negative. Objective:     Physical Exam  Musculoskeletal:      Comments: No Tinel's, still swelling possible Vicryl reaction exuberant swelling to the left tarsal tunnel.

## 2023-12-08 ENCOUNTER — OFFICE VISIT (OUTPATIENT)
Dept: PHYSICAL THERAPY | Facility: CLINIC | Age: 58
End: 2023-12-08
Payer: COMMERCIAL

## 2023-12-08 DIAGNOSIS — M79.2 NEURITIS: ICD-10-CM

## 2023-12-08 DIAGNOSIS — M72.2 PLANTAR FASCIITIS: ICD-10-CM

## 2023-12-08 DIAGNOSIS — G57.52 TARSAL TUNNEL SYNDROME OF LEFT SIDE: Primary | ICD-10-CM

## 2023-12-08 PROCEDURE — 97110 THERAPEUTIC EXERCISES: CPT

## 2023-12-08 PROCEDURE — 97112 NEUROMUSCULAR REEDUCATION: CPT

## 2023-12-08 PROCEDURE — 97140 MANUAL THERAPY 1/> REGIONS: CPT

## 2023-12-08 NOTE — PROGRESS NOTES
Daily Note     Today's date: 2023  Patient name: Gely Montes De Oca  : 1965  MRN: [id number]  Referring provider: Tomy Gaviria  Dx:   Encounter Diagnosis     ICD-10-CM    1. Tarsal tunnel syndrome of left side  G57.52       2. Neuritis  M79.2       3. Plantar fasciitis  M72.2                      Subjective: Pt reports "feeling good with his progressions as the initial therapy has helped."      Objective: See treatment diary below. Current program appropriate as pt responding well. Assessment: Tolerated treatment fair. Patient would benefit from continued PT      Plan: Progress treatment as tolerated.        Precautions: Tarsal Tunnel & Plantar fascia release 10/26, Diabetic neuropathy associated w/ Type 2 Diabetes, Pacemaker      Manuals           L Ankle PROM  BM TS          STM  BM TS                                    Neuro Re-Ed             Fitter Board              SLS               Tandem Stance                                                                 Ther Ex             Gastroc Stretch 3x20" 3x20" ea w 1/2 foam  3x20' ea w 2/2 foam          Soleus Stretch 3x20" 3x20" ea 3x20"          ABC's x1 x1 x1          Plantar Fascia Self STM  2' ea 2" ea          TB Inv/Ev/DF/PF             Standing HR/TR  2x10  2x10          Towel Scrunches              Inv/Ev Iso  3" Hold x10  3" Hold x10          SB for muscular endurance  10' L4 10' L4          HEP Instruction BM            Ther Activity                                       Gait Training                                         Modalities             Ice Magnesium Citrate

## 2023-12-12 ENCOUNTER — APPOINTMENT (OUTPATIENT)
Dept: PHYSICAL THERAPY | Facility: CLINIC | Age: 58
End: 2023-12-12
Payer: COMMERCIAL

## 2023-12-12 NOTE — PROGRESS NOTES
"Daily Note     Today's date: 2023  Patient name: Fawad Baer  : 1965  MRN: [id number]  Referring provider: Inderjit Ramirez*  Dx: No diagnosis found.               Subjective: ***      Objective: See treatment diary below      Assessment: Tolerated treatment {Tolerated treatment :[id number]}. Patient {assessment:[id number]}      Plan: Continue per plan of care.      Precautions: Tarsal Tunnel & Plantar fascia release 10/26, Diabetic neuropathy associated w/ Type 2 Diabetes, Pacemaker      Manuals          L Ankle PROM  BM TS          STM  BM TS                                    Neuro Re-Ed             Fitter Board              SLS               Tandem Stance                                                                 Ther Ex             Gastroc Stretch 3x20\" 3x20\" ea w 1/2 foam  3x20' ea w 2/2 foam          Soleus Stretch 3x20\" 3x20\" ea 3x20\"          ABC's x1 x1 x1          Plantar Fascia Self STM  2' ea 2\" ea          TB Inv/Ev/DF/PF             Standing HR/TR  2x10  2x10          Towel Scrunches              Inv/Ev Iso  3\" Hold x10  3\" Hold x10          SB for muscular endurance  10' L4 10' L4          HEP Instruction BM            Ther Activity                                       Gait Training                                         Modalities             Ice                                    "

## 2023-12-15 ENCOUNTER — OFFICE VISIT (OUTPATIENT)
Dept: PHYSICAL THERAPY | Facility: CLINIC | Age: 58
End: 2023-12-15
Payer: COMMERCIAL

## 2023-12-15 DIAGNOSIS — E78.2 MIXED HYPERLIPIDEMIA: ICD-10-CM

## 2023-12-15 DIAGNOSIS — M72.2 PLANTAR FASCIITIS: ICD-10-CM

## 2023-12-15 DIAGNOSIS — M79.2 NEURITIS: ICD-10-CM

## 2023-12-15 DIAGNOSIS — G57.52 TARSAL TUNNEL SYNDROME OF LEFT SIDE: Primary | ICD-10-CM

## 2023-12-15 DIAGNOSIS — E26.9 HYPERALDOSTERONISM (HCC): ICD-10-CM

## 2023-12-15 DIAGNOSIS — E79.0 HYPERURICEMIA: ICD-10-CM

## 2023-12-15 PROCEDURE — 97112 NEUROMUSCULAR REEDUCATION: CPT

## 2023-12-15 PROCEDURE — 97110 THERAPEUTIC EXERCISES: CPT

## 2023-12-15 PROCEDURE — 97140 MANUAL THERAPY 1/> REGIONS: CPT

## 2023-12-15 RX ORDER — ALLOPURINOL 300 MG/1
300 TABLET ORAL DAILY
Qty: 90 TABLET | Refills: 0 | Status: SHIPPED | OUTPATIENT
Start: 2023-12-15

## 2023-12-15 RX ORDER — EZETIMIBE 10 MG/1
10 TABLET ORAL DAILY
Qty: 90 TABLET | Refills: 0 | Status: SHIPPED | OUTPATIENT
Start: 2023-12-15

## 2023-12-15 RX ORDER — SPIRONOLACTONE 25 MG/1
25 TABLET ORAL DAILY
Qty: 90 TABLET | Refills: 1 | Status: SHIPPED | OUTPATIENT
Start: 2023-12-15

## 2023-12-15 NOTE — PROGRESS NOTES
Daily Note     Today's date: 12/15/2023  Patient name: Nguyen Ramos  : 1965  MRN: [id number]  Referring provider: Jessica Guardado  Dx:   Encounter Diagnosis     ICD-10-CM    1. Tarsal tunnel syndrome of left side  G57.52       2. Neuritis  M79.2       3. Plantar fasciitis  M72.2                      Subjective: Pt states his "ankle is coming around."      Objective: See treatment diary below. Added resistance to ankle motions. Pt issued LV 3 for HEP. Assessment: Tolerated treatment well. Patient would benefit from continued PT      Plan: Progress treatment as tolerated.        Precautions: Tarsal Tunnel & Plantar fascia release 10/26, Diabetic neuropathy associated w/ Type 2 Diabetes, Pacemaker      Manuals 11/28 12/5  12/8 12/15         L Ankle PROM  BM TS TS         STM  BM TS TS                                   Neuro Re-Ed             Fitter Board              SLS               Tandem Stance                                                                 Ther Ex             Gastroc Stretch 3x20" 3x20" ea w 1/2 foam  3x20' ea w 2/2 foam 3x20" ea w 2/2 foam         Soleus Stretch 3x20" 3x20" ea 3x20" 3x20"         ABC's x1 x1 x1 x1         Plantar Fascia Self STM  2' ea 2" ea 2" ea         TB Inv/Ev/DF/PF    LV 3 2/10         Standing HR/TR  2x10  2x10 2x10         Towel Scrunches              Inv/Ev Iso  3" Hold x10  3" Hold x10 3" hold x10         SB for muscular endurance  10' L4 10' L4 10L4         HEP Instruction BM            Ther Activity                                       Gait Training                                         Modalities             Ice

## 2023-12-19 ENCOUNTER — OFFICE VISIT (OUTPATIENT)
Dept: PHYSICAL THERAPY | Facility: CLINIC | Age: 58
End: 2023-12-19
Payer: COMMERCIAL

## 2023-12-19 DIAGNOSIS — M79.2 NEURITIS: ICD-10-CM

## 2023-12-19 DIAGNOSIS — M72.2 PLANTAR FASCIITIS: ICD-10-CM

## 2023-12-19 DIAGNOSIS — G57.52 TARSAL TUNNEL SYNDROME OF LEFT SIDE: Primary | ICD-10-CM

## 2023-12-19 PROCEDURE — 97140 MANUAL THERAPY 1/> REGIONS: CPT

## 2023-12-19 PROCEDURE — 97110 THERAPEUTIC EXERCISES: CPT

## 2023-12-19 NOTE — PROGRESS NOTES
"Daily Note     Today's date: 2023  Patient name: Fawad Baer  : 1965  MRN: [id number]  Referring provider: Inderjit Ramirez*  Dx:   Encounter Diagnosis     ICD-10-CM    1. Tarsal tunnel syndrome of left side  G57.52       2. Neuritis  M79.2       3. Plantar fasciitis  M72.2           Start Time: 1630  Stop Time: 1720  Total time in clinic (min): 50 minutes    Subjective: Patient reports a slight increase in L foot soreness after last treatment session. He notes overall pain levels have been manageable. He states continued application of moisturizer on scar.      Objective: See treatment diary below      Assessment: Tolerated treatment well. Patient tolerated deep scar tissue mobilization well today with a minimal increase in symptomatology. He continues to respond well to Physical Therapy treatment and is making appropriate progressions in L ankle mobility. We will continue to progress patient within tolerance to address functional limitations. He would benefit from continued PT.      Plan: Continue per plan of care.      Precautions: Tarsal Tunnel & Plantar fascia release 10/26, Diabetic neuropathy associated w/ Type 2 Diabetes, Pacemaker      Manuals 11/28 12/5  12/8 12/15 12/19        L Ankle PROM  BM TS TS BM        STM  BM TS TS BM                                  Neuro Re-Ed             Fitter Board              SLS               Tandem Stance                                                                 Ther Ex             Gastroc Stretch 3x20\" 3x20\" ea w 1/2 foam  3x20' ea w 2/2 foam 3x20\" ea w 2/2 foam 3x20\" ea w/ 1/2 foam         Soleus Stretch 3x20\" 3x20\" ea 3x20\" 3x20\" 3x20\" ea        ABC's x1 x1 x1 x1 HEP        Plantar Fascia Self STM  2' ea 2' ea 2' ea 3' ea        TB Inv/Ev/DF/PF    LV 3 2/10 L3 2x10        Standing HR/TR  2x10  2x10 2x10 2x10         Towel Scrunches              Inv/Ev Iso  3\" Hold x10  3\" Hold x10 3\" hold x10 3\" Hold 2x10         SB for muscular " endurance  10' L4 10' L4 10L4 10' L4        HEP Instruction BM            Ther Activity                                       Gait Training                                         Modalities             Ice

## 2023-12-22 ENCOUNTER — OFFICE VISIT (OUTPATIENT)
Dept: PHYSICAL THERAPY | Facility: CLINIC | Age: 58
End: 2023-12-22
Payer: COMMERCIAL

## 2023-12-22 DIAGNOSIS — M79.2 NEURITIS: ICD-10-CM

## 2023-12-22 DIAGNOSIS — G57.52 TARSAL TUNNEL SYNDROME OF LEFT SIDE: Primary | ICD-10-CM

## 2023-12-22 DIAGNOSIS — M72.2 PLANTAR FASCIITIS: ICD-10-CM

## 2023-12-22 PROCEDURE — 97140 MANUAL THERAPY 1/> REGIONS: CPT

## 2023-12-22 PROCEDURE — 97110 THERAPEUTIC EXERCISES: CPT

## 2023-12-22 NOTE — PROGRESS NOTES
"Daily Note     Today's date: 2023  Patient name: Fawad Baer  : 1965  MRN: [id number]  Referring provider: Inderjit Ramirez*  Dx:   Encounter Diagnosis     ICD-10-CM    1. Tarsal tunnel syndrome of left side  G57.52       2. Neuritis  M79.2       3. Plantar fasciitis  M72.2           Start Time: 1100  Stop Time: 1150  Total time in clinic (min): 50 minutes    Subjective: Patient reports continued progress in ankle and scar mobility.      Objective: See treatment diary below      Assessment: Tolerated treatment well. Patient continues to respond well to manual therapy and deep scar tissue mobilization. He is continuing to make appropriate progressions with Physical Therapy treatment. Patient would benefit from continued PT.      Plan: Continue per plan of care.      Precautions: Tarsal Tunnel & Plantar fascia release 10/26, Diabetic neuropathy associated w/ Type 2 Diabetes, Pacemaker      Manuals 11/28 12/5  12/8 12/15 12/19 12/22       L Ankle PROM  BM TS TS BM BM       STM  BM TS TS BM BM                                  Neuro Re-Ed             Fitter Board              SLS               Tandem Stance                                                                 Ther Ex             Gastroc Stretch 3x20\" 3x20\" ea w 1/2 foam  3x20' ea w 2/2 foam 3x20\" ea w 2/2 foam 3x20\" ea w/ 1/2 foam  3x20\" ea w/ 1/2 foam        Soleus Stretch 3x20\" 3x20\" ea 3x20\" 3x20\" 3x20\" ea 3x20\" ea       ABC's x1 x1 x1 x1 HEP        Plantar Fascia Self STM  2' ea 2' ea 2' ea 3' ea 3' ea       TB Inv/Ev/DF/PF    LV 3 2/10 L3 2x10 L3 2x10        Standing HR/TR  2x10  2x10 2x10 2x10  2x10       Towel Scrunches              Inv/Ev Iso  3\" Hold x10  3\" Hold x10 3\" hold x10 3\" Hold 2x10  3\" 2x10 Hold        SB for muscular endurance  10' L4 10' L4 10L4 10' L4 10' L4       HEP Instruction BM            Ther Activity                                       Gait Training                                         Modalities      "        Ice

## 2023-12-23 DIAGNOSIS — E11.42 DIABETIC PERIPHERAL NEUROPATHY (HCC): ICD-10-CM

## 2023-12-26 ENCOUNTER — APPOINTMENT (OUTPATIENT)
Dept: PHYSICAL THERAPY | Facility: CLINIC | Age: 58
End: 2023-12-26
Payer: COMMERCIAL

## 2023-12-26 DIAGNOSIS — E11.29 CONTROLLED TYPE 2 DIABETES MELLITUS WITH MICROALBUMINURIA, WITHOUT LONG-TERM CURRENT USE OF INSULIN: ICD-10-CM

## 2023-12-26 DIAGNOSIS — R80.9 CONTROLLED TYPE 2 DIABETES MELLITUS WITH MICROALBUMINURIA, WITHOUT LONG-TERM CURRENT USE OF INSULIN: ICD-10-CM

## 2023-12-26 RX ORDER — METFORMIN HYDROCHLORIDE 500 MG/1
500 TABLET, EXTENDED RELEASE ORAL
Qty: 90 TABLET | Refills: 0 | Status: SHIPPED | OUTPATIENT
Start: 2023-12-26

## 2023-12-26 RX ORDER — GABAPENTIN 600 MG/1
1200 TABLET ORAL 3 TIMES DAILY
Qty: 180 TABLET | Refills: 0 | Status: SHIPPED | OUTPATIENT
Start: 2023-12-26

## 2023-12-29 ENCOUNTER — OFFICE VISIT (OUTPATIENT)
Dept: PHYSICAL THERAPY | Facility: CLINIC | Age: 58
End: 2023-12-29
Payer: COMMERCIAL

## 2023-12-29 DIAGNOSIS — M72.2 PLANTAR FASCIITIS: ICD-10-CM

## 2023-12-29 DIAGNOSIS — M79.2 NEURITIS: ICD-10-CM

## 2023-12-29 DIAGNOSIS — G57.52 TARSAL TUNNEL SYNDROME OF LEFT SIDE: Primary | ICD-10-CM

## 2023-12-29 PROCEDURE — 97140 MANUAL THERAPY 1/> REGIONS: CPT

## 2023-12-29 PROCEDURE — 97110 THERAPEUTIC EXERCISES: CPT

## 2023-12-29 PROCEDURE — 97112 NEUROMUSCULAR REEDUCATION: CPT

## 2023-12-29 NOTE — PROGRESS NOTES
"Daily Note     Today's date: 2023  Patient name: Fawad Baer  : 1965  MRN: [id number]  Referring provider: Inderjit Ramirez*  Dx:   Encounter Diagnosis     ICD-10-CM    1. Tarsal tunnel syndrome of left side  G57.52       2. Neuritis  M79.2       3. Plantar fasciitis  M72.2                      Subjective: Pt reports \"the foot/ankle is getting better.\"      Objective: See treatment diary below.       Assessment: Tolerated treatment fair. Patient would benefit from continued PT      Plan: Progress treatment as tolerated.       Precautions: Tarsal Tunnel & Plantar fascia release 10/26, Diabetic neuropathy associated w/ Type 2 Diabetes, Pacemaker      Manuals 11/28 12/5  12/8 12/15 12/19 12/22 12/29      L Ankle PROM  BM TS TS BM BM TS      STM  BM TS TS BM BM  TS                                Neuro Re-Ed             Fitter Board              SLS               Tandem Stance                                                                 Ther Ex             Gastroc Stretch 3x20\" 3x20\" ea w 1/2 foam  3x20' ea w 2/2 foam 3x20\" ea w 2/2 foam 3x20\" ea w/ 1/2 foam  3x20\" ea w/ 1/2 foam  3x20\" ea W 1/2 foam      Soleus Stretch 3x20\" 3x20\" ea 3x20\" 3x20\" 3x20\" ea 3x20\" ea 3x20\"      ABC's x1 x1 x1 x1 HEP        Plantar Fascia Self STM  2' ea 2' ea 2' ea 3' ea 3' ea 3' ea      TB Inv/Ev/DF/PF    LV 3 2/10 L3 2x10 L3 2x10  L3 n2x10      Standing HR/TR  2x10  2x10 2x10 2x10  2x10 2x10      Towel Scrunches              Inv/Ev Iso  3\" Hold x10  3\" Hold x10 3\" hold x10 3\" Hold 2x10  3\" 2x10 Hold  3' 2x10      SB for muscular endurance  10' L4 10' L4 10L4 10' L4 10' L4 10' L4      HEP Instruction BM            Ther Activity                                       Gait Training                                         Modalities             Ice                                          "

## 2024-01-02 ENCOUNTER — OFFICE VISIT (OUTPATIENT)
Dept: PHYSICAL THERAPY | Facility: CLINIC | Age: 59
End: 2024-01-02
Payer: COMMERCIAL

## 2024-01-02 DIAGNOSIS — G57.52 TARSAL TUNNEL SYNDROME OF LEFT SIDE: Primary | ICD-10-CM

## 2024-01-02 DIAGNOSIS — M79.2 NEURITIS: ICD-10-CM

## 2024-01-02 DIAGNOSIS — M72.2 PLANTAR FASCIITIS: ICD-10-CM

## 2024-01-02 PROCEDURE — 97110 THERAPEUTIC EXERCISES: CPT

## 2024-01-02 PROCEDURE — 97140 MANUAL THERAPY 1/> REGIONS: CPT

## 2024-01-02 NOTE — PROGRESS NOTES
"Daily Note     Today's date: 2024  Patient name: Fawad Baer  : 1965  MRN: [id number]  Referring provider: Inderjit Ramirez*  Dx:   Encounter Diagnosis     ICD-10-CM    1. Tarsal tunnel syndrome of left side  G57.52       2. Neuritis  M79.2       3. Plantar fasciitis  M72.2           Start Time: 1630  Stop Time: 1720  Total time in clinic (min): 50 minutes    Subjective: Patient reports continued progress in strength and mobility of the L foot with Physical Therapy treatment.       Objective: See treatment diary below      Assessment: Patient continues to respond well to Physical Therapy treatment and is making appropriate progressions in L ankle mobility as well as strength. We will continue to progress patient within tolerance. He would benefit from continued PT.      Plan: Continue per plan of care.      Precautions: Tarsal Tunnel & Plantar fascia release 10/26, Diabetic neuropathy associated w/ Type 2 Diabetes, Pacemaker      Manuals 11/28 12/5  12/8 12/15 12/19 12/22 12/29 1/2     L Ankle PROM  BM TS TS BM BM TS BM     STM  BM TS TS BM BM  TS BM                               Neuro Re-Ed             Fitter Board              SLS               Tandem Stance                                                                 Ther Ex             Gastroc Stretch 3x20\" 3x20\" ea w 1/2 foam  3x20' ea w 2/2 foam 3x20\" ea w 2/2 foam 3x20\" ea w/ 1/2 foam  3x20\" ea w/ 1/2 foam  3x20\" ea W 1/2 foam 3x20\" ea w 1/2 foam     Soleus Stretch 3x20\" 3x20\" ea 3x20\" 3x20\" 3x20\" ea 3x20\" ea 3x20\" 3x20\" ea     Plantar Fascia Self STM  2' ea 2' ea 2' ea 3' ea 3' ea 3' ea 3' ea     TB Inv/Ev/DF/PF    LV 3 2/10 L3 2x10 L3 2x10  L3 n2x10 L4 2x10     Standing HR/TR  2x10  2x10 2x10 2x10  2x10 2x10 2x10     Towel Scrunches              Inv/Ev Iso  3\" Hold x10  3\" Hold x10 3\" hold x10 3\" Hold 2x10  3\" 2x10 Hold  3' 2x10 3\" Holds 2x10     SB for muscular endurance  10' L4 10' L4 10L4 10' L4 10' L4 10' L4 10' L4     HEP " Instruction BM            Ther Activity                                       Gait Training                                         Modalities             Ice

## 2024-01-03 DIAGNOSIS — O22.30 DVT (DEEP VEIN THROMBOSIS) IN PREGNANCY: ICD-10-CM

## 2024-01-04 ENCOUNTER — OFFICE VISIT (OUTPATIENT)
Dept: PHYSICAL THERAPY | Facility: CLINIC | Age: 59
End: 2024-01-04
Payer: COMMERCIAL

## 2024-01-04 DIAGNOSIS — M72.2 PLANTAR FASCIITIS: ICD-10-CM

## 2024-01-04 DIAGNOSIS — G57.52 TARSAL TUNNEL SYNDROME OF LEFT SIDE: Primary | ICD-10-CM

## 2024-01-04 DIAGNOSIS — M79.2 NEURITIS: ICD-10-CM

## 2024-01-04 PROCEDURE — 97112 NEUROMUSCULAR REEDUCATION: CPT

## 2024-01-04 PROCEDURE — 97110 THERAPEUTIC EXERCISES: CPT

## 2024-01-04 PROCEDURE — 97140 MANUAL THERAPY 1/> REGIONS: CPT

## 2024-01-04 NOTE — PROGRESS NOTES
"Daily Note     Today's date: 2024  Patient name: Fawad Baer  : 1965  MRN: [id number]  Referring provider: Inderjit Ramirez*  Dx:   Encounter Diagnosis     ICD-10-CM    1. Tarsal tunnel syndrome of left side  G57.52       2. Neuritis  M79.2       3. Plantar fasciitis  M72.2                      Subjective: Patient reports that his L foot pain is in the plantar fascia in the arch area. He sees improvement where he can walk longer distances at work w/o pain in his L foot and ankle.       Objective: See treatment diary below. Incorporated standing stability balance into program today.      Assessment: Tolerated treatment well. Patient would benefit from continued PT to improve L ankle/foot stability and ROM for functionality and endurance to ambulate longer distances w/o pain. He has limited stability in a SLS on a firm surface at this time due to L ankle/foot weakness while in this position. Continue to progress as able and to tolerance.      Plan: Continue per plan of care.      Precautions: Tarsal Tunnel & Plantar fascia release 10/26, Diabetic neuropathy associated w/ Type 2 Diabetes, Pacemaker      Manuals 11/28 12/5  12/8 12/15 12/19 12/22 12/29 1/2 1/4    L Ankle PROM  BM TS TS BM BM TS BM CM    STM  BM TS TS BM BM  TS BM CM                              Neuro Re-Ed             Fitter Board              SLS           20\"x2 ea firm    Tandem Stance         30\"x2 ea firm                                                        Ther Ex             Gastroc Stretch 3x20\" 3x20\" ea w 1/2 foam  3x20' ea w 2/2 foam 3x20\" ea w 2/2 foam 3x20\" ea w/ 1/2 foam  3x20\" ea w/ 1/2 foam  3x20\" ea W 1/2 foam 3x20\" ea w 1/2 foam 3x20\" ea w 1/2 foam    Soleus Stretch 3x20\" 3x20\" ea 3x20\" 3x20\" 3x20\" ea 3x20\" ea 3x20\" 3x20\" ea 3x20\" ea    Plantar Fascia Self STM  2' ea 2' ea 2' ea 3' ea 3' ea 3' ea 3' ea 3' ea    TB Inv/Ev/DF/PF    LV 3 2/10 L3 2x10 L3 2x10  L3 n2x10 L4 2x10 L5 2x10    Standing HR/TR  2x10  2x10 " "2x10 2x10  2x10 2x10 2x10 2x10    Towel Scrunches              Inv/Ev Iso  3\" Hold x10  3\" Hold x10 3\" hold x10 3\" Hold 2x10  3\" 2x10 Hold  3' 2x10 3\" Holds 2x10 3\" holds 2x10    SB for muscular endurance  10' L4 10' L4 10L4 10' L4 10' L4 10' L4 10' L4 L4 10'    HEP Instruction BM            Ther Activity                                       Gait Training                                         Modalities             Ice                                              "

## 2024-01-09 NOTE — ASSESSMENT & PLAN NOTE
Lab Results   Component Value Date    HGBA1C 6 3 (H) 10/22/2022   Improve glycemic control  Increase ACE-inhibitor given increasing proteinuria  Noted- thank you for the update

## 2024-01-10 ENCOUNTER — APPOINTMENT (OUTPATIENT)
Dept: PHYSICAL THERAPY | Facility: CLINIC | Age: 59
End: 2024-01-10
Payer: COMMERCIAL

## 2024-01-10 DIAGNOSIS — I48.0 PAROXYSMAL ATRIAL FIBRILLATION (HCC): ICD-10-CM

## 2024-01-10 DIAGNOSIS — E03.9 ACQUIRED HYPOTHYROIDISM: ICD-10-CM

## 2024-01-10 RX ORDER — LEVOTHYROXINE SODIUM 0.15 MG/1
TABLET ORAL
Qty: 90 TABLET | Refills: 0 | Status: SHIPPED | OUTPATIENT
Start: 2024-01-10

## 2024-01-10 RX ORDER — CARVEDILOL 6.25 MG/1
TABLET ORAL
Qty: 90 TABLET | Refills: 0 | Status: SHIPPED | OUTPATIENT
Start: 2024-01-10

## 2024-01-11 ENCOUNTER — APPOINTMENT (OUTPATIENT)
Dept: LAB | Facility: MEDICAL CENTER | Age: 59
End: 2024-01-11
Payer: COMMERCIAL

## 2024-01-11 DIAGNOSIS — R80.9 CONTROLLED TYPE 2 DIABETES MELLITUS WITH MICROALBUMINURIA, WITHOUT LONG-TERM CURRENT USE OF INSULIN: ICD-10-CM

## 2024-01-11 DIAGNOSIS — N18.30 STAGE 3 CHRONIC KIDNEY DISEASE, UNSPECIFIED WHETHER STAGE 3A OR 3B CKD (HCC): ICD-10-CM

## 2024-01-11 DIAGNOSIS — E03.9 ACQUIRED HYPOTHYROIDISM: ICD-10-CM

## 2024-01-11 DIAGNOSIS — R80.9 PROTEINURIA, UNSPECIFIED TYPE: ICD-10-CM

## 2024-01-11 DIAGNOSIS — E21.3 HYPERPARATHYROIDISM (HCC): ICD-10-CM

## 2024-01-11 DIAGNOSIS — E11.29 CONTROLLED TYPE 2 DIABETES MELLITUS WITH MICROALBUMINURIA, WITHOUT LONG-TERM CURRENT USE OF INSULIN: ICD-10-CM

## 2024-01-11 LAB
25(OH)D3 SERPL-MCNC: 23.3 NG/ML (ref 30–100)
BACTERIA UR QL AUTO: ABNORMAL /HPF
BILIRUB UR QL STRIP: NEGATIVE
CA-I BLD-SCNC: 1.32 MMOL/L (ref 1.12–1.32)
CLARITY UR: CLEAR
COLOR UR: ABNORMAL
CREAT UR-MCNC: 103 MG/DL
GLUCOSE UR STRIP-MCNC: NEGATIVE MG/DL
HGB UR QL STRIP.AUTO: NEGATIVE
HYALINE CASTS #/AREA URNS LPF: ABNORMAL /LPF
KETONES UR STRIP-MCNC: NEGATIVE MG/DL
LEUKOCYTE ESTERASE UR QL STRIP: NEGATIVE
MAGNESIUM SERPL-MCNC: 2 MG/DL (ref 1.9–2.7)
MICROALBUMIN UR-MCNC: 2596.2 MG/L
MICROALBUMIN/CREAT 24H UR: 2521 MG/G CREATININE (ref 0–30)
NITRITE UR QL STRIP: NEGATIVE
NON-SQ EPI CELLS URNS QL MICRO: ABNORMAL /HPF
PH UR STRIP.AUTO: 6 [PH]
PHOSPHATE SERPL-MCNC: 3.4 MG/DL (ref 2.7–4.5)
PROT UR STRIP-MCNC: ABNORMAL MG/DL
PTH-INTACT SERPL-MCNC: 107.2 PG/ML (ref 12–88)
RBC #/AREA URNS AUTO: ABNORMAL /HPF
SP GR UR STRIP.AUTO: 1.02 (ref 1–1.03)
T4 FREE SERPL-MCNC: 0.86 NG/DL (ref 0.61–1.12)
TSH SERPL DL<=0.05 MIU/L-ACNC: 4.3 UIU/ML (ref 0.45–4.5)
URATE SERPL-MCNC: 4 MG/DL (ref 3.5–8.5)
UROBILINOGEN UR STRIP-ACNC: <2 MG/DL
WBC #/AREA URNS AUTO: ABNORMAL /HPF

## 2024-01-11 PROCEDURE — 83036 HEMOGLOBIN GLYCOSYLATED A1C: CPT

## 2024-01-11 PROCEDURE — 82043 UR ALBUMIN QUANTITATIVE: CPT

## 2024-01-11 PROCEDURE — 83970 ASSAY OF PARATHORMONE: CPT

## 2024-01-11 PROCEDURE — 82330 ASSAY OF CALCIUM: CPT

## 2024-01-11 PROCEDURE — 84100 ASSAY OF PHOSPHORUS: CPT

## 2024-01-11 PROCEDURE — 81001 URINALYSIS AUTO W/SCOPE: CPT

## 2024-01-11 PROCEDURE — 82306 VITAMIN D 25 HYDROXY: CPT

## 2024-01-11 PROCEDURE — 80053 COMPREHEN METABOLIC PANEL: CPT

## 2024-01-11 PROCEDURE — 36415 COLL VENOUS BLD VENIPUNCTURE: CPT

## 2024-01-11 PROCEDURE — 84550 ASSAY OF BLOOD/URIC ACID: CPT

## 2024-01-11 PROCEDURE — 84439 ASSAY OF FREE THYROXINE: CPT

## 2024-01-11 PROCEDURE — 82570 ASSAY OF URINE CREATININE: CPT

## 2024-01-11 PROCEDURE — 83735 ASSAY OF MAGNESIUM: CPT

## 2024-01-11 PROCEDURE — 84443 ASSAY THYROID STIM HORMONE: CPT

## 2024-01-12 ENCOUNTER — APPOINTMENT (OUTPATIENT)
Dept: PHYSICAL THERAPY | Facility: CLINIC | Age: 59
End: 2024-01-12
Payer: COMMERCIAL

## 2024-01-12 ENCOUNTER — OFFICE VISIT (OUTPATIENT)
Dept: ENDOCRINOLOGY | Facility: CLINIC | Age: 59
End: 2024-01-12
Payer: COMMERCIAL

## 2024-01-12 ENCOUNTER — APPOINTMENT (OUTPATIENT)
Dept: LAB | Facility: MEDICAL CENTER | Age: 59
End: 2024-01-12
Payer: COMMERCIAL

## 2024-01-12 VITALS
HEIGHT: 72 IN | HEART RATE: 76 BPM | WEIGHT: 268 LBS | SYSTOLIC BLOOD PRESSURE: 146 MMHG | BODY MASS INDEX: 36.3 KG/M2 | OXYGEN SATURATION: 98 % | DIASTOLIC BLOOD PRESSURE: 100 MMHG

## 2024-01-12 DIAGNOSIS — M79.672 PAIN IN BOTH FEET: ICD-10-CM

## 2024-01-12 DIAGNOSIS — M79.671 PAIN IN BOTH FEET: ICD-10-CM

## 2024-01-12 DIAGNOSIS — E21.3 HYPERPARATHYROIDISM (HCC): ICD-10-CM

## 2024-01-12 DIAGNOSIS — E72.12 METHYLENETETRAHYDROFOLATE REDUCTASE DEFICIENCY (HCC): ICD-10-CM

## 2024-01-12 DIAGNOSIS — G57.52 TARSAL TUNNEL SYNDROME OF LEFT SIDE: Primary | ICD-10-CM

## 2024-01-12 DIAGNOSIS — E03.9 HYPOTHYROIDISM (ACQUIRED): ICD-10-CM

## 2024-01-12 DIAGNOSIS — E55.9 VITAMIN D INSUFFICIENCY: ICD-10-CM

## 2024-01-12 DIAGNOSIS — E11.29 CONTROLLED TYPE 2 DIABETES MELLITUS WITH MICROALBUMINURIA, WITHOUT LONG-TERM CURRENT USE OF INSULIN: Primary | ICD-10-CM

## 2024-01-12 DIAGNOSIS — R80.9 CONTROLLED TYPE 2 DIABETES MELLITUS WITH MICROALBUMINURIA, WITHOUT LONG-TERM CURRENT USE OF INSULIN: Primary | ICD-10-CM

## 2024-01-12 DIAGNOSIS — M72.2 PLANTAR FASCIITIS: ICD-10-CM

## 2024-01-12 LAB
CALCIUM 24H UR-MCNC: 49.35 MG/24 HRS (ref 100–300)
CREAT 24H UR-MRATE: 2.3 G/24HR (ref 0.8–1.8)
EST. AVERAGE GLUCOSE BLD GHB EST-MCNC: 131 MG/DL
HBA1C MFR BLD: 6.2 %
SPECIMEN VOL UR: 2350 ML
SPECIMEN VOL UR: 2350 ML

## 2024-01-12 PROCEDURE — 99214 OFFICE O/P EST MOD 30 MIN: CPT | Performed by: STUDENT IN AN ORGANIZED HEALTH CARE EDUCATION/TRAINING PROGRAM

## 2024-01-12 PROCEDURE — 82570 ASSAY OF URINE CREATININE: CPT

## 2024-01-12 PROCEDURE — 82340 ASSAY OF CALCIUM IN URINE: CPT

## 2024-01-12 RX ORDER — MELATONIN
2000 DAILY
Qty: 60 TABLET | Refills: 3 | Status: SHIPPED | OUTPATIENT
Start: 2024-01-12 | End: 2024-02-11
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
## 2024-01-12 NOTE — PATIENT INSTRUCTIONS
Start vitamin D3 2000 units daily    Obtain DXA study (bone density study). Call central scheduling to arrange: [phone]    Increase Ozempic 1 mg weekly    Talk to Dr. Pierce about SGLT2i

## 2024-01-13 LAB
ALBUMIN SERPL BCP-MCNC: 4.3 G/DL (ref 3.5–5)
ALP SERPL-CCNC: 62 U/L (ref 34–104)
ALT SERPL W P-5'-P-CCNC: 36 U/L (ref 7–52)
ANION GAP SERPL CALCULATED.3IONS-SCNC: 7 MMOL/L
AST SERPL W P-5'-P-CCNC: 29 U/L (ref 13–39)
BILIRUB SERPL-MCNC: 0.56 MG/DL (ref 0.2–1)
BUN SERPL-MCNC: 19 MG/DL (ref 5–25)
CALCIUM SERPL-MCNC: 10.8 MG/DL (ref 8.4–10.2)
CHLORIDE SERPL-SCNC: 104 MMOL/L (ref 96–108)
CO2 SERPL-SCNC: 30 MMOL/L (ref 21–32)
CREAT SERPL-MCNC: 1.23 MG/DL (ref 0.6–1.3)
GFR SERPL CREATININE-BSD FRML MDRD: 64 ML/MIN/1.73SQ M
GLUCOSE P FAST SERPL-MCNC: 94 MG/DL (ref 65–99)
POTASSIUM SERPL-SCNC: 4.4 MMOL/L (ref 3.5–5.3)
PROT SERPL-MCNC: 7 G/DL (ref 6.4–8.4)
SODIUM SERPL-SCNC: 141 MMOL/L (ref 135–147)

## 2024-01-17 ENCOUNTER — APPOINTMENT (OUTPATIENT)
Dept: PHYSICAL THERAPY | Facility: CLINIC | Age: 59
End: 2024-01-17
Payer: COMMERCIAL

## 2024-01-17 ENCOUNTER — TELEPHONE (OUTPATIENT)
Dept: NEPHROLOGY | Facility: CLINIC | Age: 59
End: 2024-01-17

## 2024-01-17 DIAGNOSIS — R80.9 NEPHROTIC RANGE PROTEINURIA: ICD-10-CM

## 2024-01-17 DIAGNOSIS — N18.31 STAGE 3A CHRONIC KIDNEY DISEASE (HCC): Primary | ICD-10-CM

## 2024-01-18 NOTE — TELEPHONE ENCOUNTER
I called and spoke with Marciano regarding the following information:    ----- Message from Gayle Pierce DO sent at 1/12/2024  7:35 PM EST -----  Please call patient, his kidney function is stable but his protein in the urine is worsening. Please ask if he has had a kidney biopsy in the past. We should consider a more extensive workup if he has not had a kidney biopsy to understand the cause of his protein in the urine which may be diabetes but also could be another cause.       Marciano is aware of recent lab results. He states he never had a kidney biopsy in the past. He was inquiring what are the risks of getting a biopsy? He states if it is safe- he would like to proceed with the extensive work up. Please advise.   
I called and spoke with Marciano. He is aware that the procedure will be discussed with him in depth at his upcoming appointment on 02/09/2024. He is aware to complete labs on his chart prior to his upcoming appointment. He states he will go to the lab on Saturday 01/20/2024 to complete.   
98

## 2024-01-19 ENCOUNTER — APPOINTMENT (OUTPATIENT)
Dept: PHYSICAL THERAPY | Facility: CLINIC | Age: 59
End: 2024-01-19
Payer: COMMERCIAL

## 2024-01-24 ENCOUNTER — APPOINTMENT (OUTPATIENT)
Dept: PHYSICAL THERAPY | Facility: CLINIC | Age: 59
End: 2024-01-24
Payer: COMMERCIAL

## 2024-01-26 ENCOUNTER — APPOINTMENT (OUTPATIENT)
Dept: PHYSICAL THERAPY | Facility: CLINIC | Age: 59
End: 2024-01-26
Payer: COMMERCIAL

## 2024-01-27 ENCOUNTER — APPOINTMENT (OUTPATIENT)
Dept: LAB | Facility: MEDICAL CENTER | Age: 59
End: 2024-01-27
Payer: COMMERCIAL

## 2024-01-27 DIAGNOSIS — N18.31 STAGE 3A CHRONIC KIDNEY DISEASE (HCC): ICD-10-CM

## 2024-01-27 DIAGNOSIS — R80.9 NEPHROTIC RANGE PROTEINURIA: Primary | ICD-10-CM

## 2024-01-27 LAB
C3 SERPL-MCNC: 134 MG/DL (ref 87–200)
C4 SERPL-MCNC: 36 MG/DL (ref 19–52)
CRP SERPL QL: 2.3 MG/L
IGA SERPL-MCNC: 472 MG/DL (ref 66–433)
IGG SERPL-MCNC: 734 MG/DL (ref 635–1741)
IGM SERPL-MCNC: 72 MG/DL (ref 45–281)

## 2024-01-27 PROCEDURE — 86705 HEP B CORE ANTIBODY IGM: CPT

## 2024-01-27 PROCEDURE — 86162 COMPLEMENT TOTAL (CH50): CPT

## 2024-01-27 PROCEDURE — 82784 ASSAY IGA/IGD/IGG/IGM EACH: CPT

## 2024-01-27 PROCEDURE — 86334 IMMUNOFIX E-PHORESIS SERUM: CPT

## 2024-01-27 PROCEDURE — 86780 TREPONEMA PALLIDUM: CPT

## 2024-01-27 PROCEDURE — 86140 C-REACTIVE PROTEIN: CPT

## 2024-01-27 PROCEDURE — 87340 HEPATITIS B SURFACE AG IA: CPT

## 2024-01-27 PROCEDURE — 36415 COLL VENOUS BLD VENIPUNCTURE: CPT

## 2024-01-27 PROCEDURE — 86335 IMMUNFIX E-PHORSIS/URINE/CSF: CPT

## 2024-01-27 PROCEDURE — 84166 PROTEIN E-PHORESIS/URINE/CSF: CPT

## 2024-01-27 PROCEDURE — 86235 NUCLEAR ANTIGEN ANTIBODY: CPT

## 2024-01-27 PROCEDURE — 86803 HEPATITIS C AB TEST: CPT

## 2024-01-27 PROCEDURE — 86160 COMPLEMENT ANTIGEN: CPT

## 2024-01-27 PROCEDURE — 84165 PROTEIN E-PHORESIS SERUM: CPT

## 2024-01-27 PROCEDURE — 86037 ANCA TITER EACH ANTIBODY: CPT

## 2024-01-27 PROCEDURE — 86704 HEP B CORE ANTIBODY TOTAL: CPT

## 2024-01-27 PROCEDURE — 86038 ANTINUCLEAR ANTIBODIES: CPT

## 2024-01-27 PROCEDURE — 83520 IMMUNOASSAY QUANT NOS NONAB: CPT

## 2024-01-27 PROCEDURE — 83521 IG LIGHT CHAINS FREE EACH: CPT

## 2024-01-28 LAB
HBV CORE AB SER QL: NORMAL
HBV CORE IGM SER QL: NORMAL
HBV SURFACE AG SER QL: NORMAL
HCV AB SER QL: NORMAL
TREPONEMA PALLIDUM IGG+IGM AB [PRESENCE] IN SERUM OR PLASMA BY IMMUNOASSAY: NORMAL

## 2024-01-30 ENCOUNTER — TELEPHONE (OUTPATIENT)
Dept: FAMILY MEDICINE CLINIC | Facility: CLINIC | Age: 59
End: 2024-01-30

## 2024-01-30 ENCOUNTER — TELEPHONE (OUTPATIENT)
Dept: NEPHROLOGY | Facility: CLINIC | Age: 59
End: 2024-01-30

## 2024-01-30 LAB
ALBUMIN SERPL ELPH-MCNC: 3.76 G/DL (ref 3.2–5.1)
ALBUMIN SERPL ELPH-MCNC: 60.6 % (ref 48–70)
ALBUMIN UR ELPH-MCNC: 85.7 %
ALPHA1 GLOB MFR UR ELPH: 3.4 %
ALPHA1 GLOB SERPL ELPH-MCNC: 0.24 G/DL (ref 0.15–0.47)
ALPHA1 GLOB SERPL ELPH-MCNC: 3.8 % (ref 1.8–7)
ALPHA2 GLOB MFR UR ELPH: 4.9 %
ALPHA2 GLOB SERPL ELPH-MCNC: 0.6 G/DL (ref 0.42–1.04)
ALPHA2 GLOB SERPL ELPH-MCNC: 9.6 % (ref 5.9–14.9)
B-GLOBULIN MFR UR ELPH: 1.1 %
BETA GLOB ABNORMAL SERPL ELPH-MCNC: 0.45 G/DL (ref 0.31–0.57)
BETA1 GLOB SERPL ELPH-MCNC: 7.3 % (ref 4.7–7.7)
BETA2 GLOB SERPL ELPH-MCNC: 7.3 % (ref 3.1–7.9)
BETA2+GAMMA GLOB SERPL ELPH-MCNC: 0.45 G/DL (ref 0.2–0.58)
GAMMA GLOB ABNORMAL SERPL ELPH-MCNC: 0.71 G/DL (ref 0.4–1.66)
GAMMA GLOB MFR UR ELPH: 4.9 %
GAMMA GLOB SERPL ELPH-MCNC: 11.4 % (ref 6.9–22.3)
GBM AB SER IA-ACNC: <0.2 UNITS (ref 0–0.9)
HISTONE IGG SER IA-ACNC: 0.9 UNITS (ref 0–0.9)
IGG/ALB SER: 1.54 {RATIO} (ref 1.1–1.8)
INTERPRETATION UR IFE-IMP: NORMAL
INTERPRETATION UR IFE-IMP: NORMAL
KAPPA LC FREE SER-MCNC: 48.9 MG/L (ref 3.3–19.4)
KAPPA LC FREE/LAMBDA FREE SER: 1.57 {RATIO} (ref 0.26–1.65)
LAMBDA LC FREE SERPL-MCNC: 31.1 MG/L (ref 5.7–26.3)
MISCELLANEOUS LAB TEST RESULT: NORMAL
PROT PATTERN SERPL ELPH-IMP: ABNORMAL
PROT PATTERN UR ELPH-IMP: NORMAL
PROT SERPL-MCNC: 6.2 G/DL (ref 6.4–8.2)
PROT UR-MCNC: 209.7 MG/DL

## 2024-01-30 PROCEDURE — 86334 IMMUNOFIX E-PHORESIS SERUM: CPT | Performed by: PATHOLOGY

## 2024-01-30 PROCEDURE — 84166 PROTEIN E-PHORESIS/URINE/CSF: CPT | Performed by: PATHOLOGY

## 2024-01-30 PROCEDURE — 84165 PROTEIN E-PHORESIS SERUM: CPT | Performed by: PATHOLOGY

## 2024-01-30 NOTE — TELEPHONE ENCOUNTER
----- Message from Gayle Pierce DO sent at 1/30/2024 12:45 PM EST -----  I called and spoke with patient, he is aware of lab results. Some of his autoimmune workup is still in progress but is so far negative.

## 2024-01-31 ENCOUNTER — APPOINTMENT (OUTPATIENT)
Dept: PHYSICAL THERAPY | Facility: CLINIC | Age: 59
End: 2024-01-31
Payer: COMMERCIAL

## 2024-01-31 LAB
C-ANCA TITR SER IF: NORMAL TITER
CH50 SERPL-ACNC: 59 U/ML
MYELOPEROXIDASE AB SER IA-ACNC: <0.2 UNITS (ref 0–0.9)
P-ANCA ATYPICAL TITR SER IF: NORMAL TITER
P-ANCA TITR SER IF: NORMAL TITER
PROTEINASE3 AB SER IA-ACNC: <0.2 UNITS (ref 0–0.9)

## 2024-02-07 DIAGNOSIS — E11.42 DIABETIC PERIPHERAL NEUROPATHY (HCC): ICD-10-CM

## 2024-02-07 DIAGNOSIS — E55.9 VITAMIN D INSUFFICIENCY: ICD-10-CM

## 2024-02-07 RX ORDER — MELATONIN
2000 DAILY
Qty: 60 TABLET | Refills: 0 | Status: SHIPPED | OUTPATIENT
Start: 2024-02-07 | End: 2024-03-08

## 2024-02-07 RX ORDER — GABAPENTIN 600 MG/1
1200 TABLET ORAL 3 TIMES DAILY
Qty: 180 TABLET | Refills: 0 | Status: SHIPPED | OUTPATIENT
Start: 2024-02-07

## 2024-02-20 ENCOUNTER — IN-CLINIC DEVICE VISIT (OUTPATIENT)
Dept: CARDIOLOGY CLINIC | Facility: HOSPITAL | Age: 59
End: 2024-02-20
Payer: COMMERCIAL

## 2024-02-20 DIAGNOSIS — Z95.0 PRESENCE OF CARDIAC PACEMAKER: Primary | ICD-10-CM

## 2024-02-20 PROCEDURE — 93280 PM DEVICE PROGR EVAL DUAL: CPT | Performed by: INTERNAL MEDICINE

## 2024-02-20 NOTE — PROGRESS NOTES
Results for orders placed or performed in visit on 02/20/24   Cardiac EP device report    Narrative    MDDT DUAL PM/ ACTIVE SYSTEM IS MRI CONDITIONAL  DEVICE INTERROGATED IN THE [facility]: BATTERY VOLTAGE ADEQUATE (11.7 YRS). AP: 84.1%. : 1.3% (MVP-ON). ALL LEAD PARAMETERS WITHIN NORMAL LIMITS. NO SIGNIFICANT HIGH RATE EPISODES (VT EPISODE PREVIOUSLY ADDRESSED). NO PROGRAMMING CHANGES MADE TO DEVICE PARAMETERS. NORMAL DEVICE FUNCTION. CH

## 2024-02-24 DIAGNOSIS — E11.29 CONTROLLED TYPE 2 DIABETES MELLITUS WITH MICROALBUMINURIA, WITHOUT LONG-TERM CURRENT USE OF INSULIN: ICD-10-CM

## 2024-02-24 DIAGNOSIS — I48.0 PAROXYSMAL ATRIAL FIBRILLATION (HCC): ICD-10-CM

## 2024-02-24 DIAGNOSIS — R80.9 CONTROLLED TYPE 2 DIABETES MELLITUS WITH MICROALBUMINURIA, WITHOUT LONG-TERM CURRENT USE OF INSULIN: ICD-10-CM

## 2024-02-24 DIAGNOSIS — E11.9 TYPE 2 DIABETES MELLITUS WITHOUT COMPLICATION, WITHOUT LONG-TERM CURRENT USE OF INSULIN (HCC): ICD-10-CM

## 2024-02-24 DIAGNOSIS — I10 ESSENTIAL HYPERTENSION: ICD-10-CM

## 2024-02-24 RX ORDER — CARVEDILOL 6.25 MG/1
TABLET ORAL
Qty: 90 TABLET | Refills: 0 | Status: SHIPPED | OUTPATIENT
Start: 2024-02-24

## 2024-02-26 RX ORDER — LISINOPRIL 40 MG/1
40 TABLET ORAL DAILY
Qty: 90 TABLET | Refills: 0 | Status: SHIPPED | OUTPATIENT
Start: 2024-02-26

## 2024-02-28 ENCOUNTER — TELEPHONE (OUTPATIENT)
Dept: NEPHROLOGY | Facility: CLINIC | Age: 59
End: 2024-02-28

## 2024-02-28 DIAGNOSIS — N18.31 STAGE 3A CHRONIC KIDNEY DISEASE (HCC): Primary | ICD-10-CM

## 2024-03-11 ENCOUNTER — TRANSITIONAL CARE MANAGEMENT (OUTPATIENT)
Dept: FAMILY MEDICINE CLINIC | Facility: CLINIC | Age: 59
End: 2024-03-11

## 2024-03-13 ENCOUNTER — APPOINTMENT (OUTPATIENT)
Dept: LAB | Facility: HOSPITAL | Age: 59
End: 2024-03-13
Payer: COMMERCIAL

## 2024-03-13 ENCOUNTER — LAB (OUTPATIENT)
Dept: LAB | Facility: MEDICAL CENTER | Age: 59
End: 2024-03-13
Payer: COMMERCIAL

## 2024-03-13 ENCOUNTER — OFFICE VISIT (OUTPATIENT)
Dept: FAMILY MEDICINE CLINIC | Facility: CLINIC | Age: 59
End: 2024-03-13
Payer: COMMERCIAL

## 2024-03-13 VITALS
BODY MASS INDEX: 35.62 KG/M2 | TEMPERATURE: 96.8 F | HEIGHT: 72 IN | HEART RATE: 71 BPM | OXYGEN SATURATION: 94 % | DIASTOLIC BLOOD PRESSURE: 62 MMHG | SYSTOLIC BLOOD PRESSURE: 110 MMHG | WEIGHT: 263 LBS

## 2024-03-13 DIAGNOSIS — K68.3 RETROPERITONEAL HEMATOMA: ICD-10-CM

## 2024-03-13 DIAGNOSIS — N17.9 ACUTE KIDNEY INJURY (HCC): ICD-10-CM

## 2024-03-13 DIAGNOSIS — D64.9 ANEMIA, UNSPECIFIED TYPE: ICD-10-CM

## 2024-03-13 DIAGNOSIS — D62 ACUTE BLOOD LOSS AS CAUSE OF POSTOPERATIVE ANEMIA: ICD-10-CM

## 2024-03-13 DIAGNOSIS — Z76.89 ENCOUNTER FOR SUPPORT AND COORDINATION OF TRANSITION OF CARE: Primary | ICD-10-CM

## 2024-03-13 DIAGNOSIS — N17.9 ACUTE KIDNEY INJURY (HCC): Primary | ICD-10-CM

## 2024-03-13 DIAGNOSIS — I48.0 PAROXYSMAL ATRIAL FIBRILLATION (HCC): ICD-10-CM

## 2024-03-13 LAB
ANION GAP SERPL CALCULATED.3IONS-SCNC: 9 MMOL/L (ref 4–13)
BASOPHILS # BLD AUTO: 0.04 THOUSANDS/ÂΜL (ref 0–0.1)
BASOPHILS NFR BLD AUTO: 0 % (ref 0–1)
BUN SERPL-MCNC: 43 MG/DL (ref 5–25)
CALCIUM SERPL-MCNC: 11 MG/DL (ref 8.4–10.2)
CHLORIDE SERPL-SCNC: 99 MMOL/L (ref 96–108)
CO2 SERPL-SCNC: 27 MMOL/L (ref 21–32)
CREAT SERPL-MCNC: 2.59 MG/DL (ref 0.6–1.3)
EOSINOPHIL # BLD AUTO: 0.27 THOUSAND/ÂΜL (ref 0–0.61)
EOSINOPHIL NFR BLD AUTO: 2 % (ref 0–6)
ERYTHROCYTE [DISTWIDTH] IN BLOOD BY AUTOMATED COUNT: 14.7 % (ref 11.6–15.1)
GFR SERPL CREATININE-BSD FRML MDRD: 26 ML/MIN/1.73SQ M
GLUCOSE SERPL-MCNC: 94 MG/DL (ref 65–140)
HCT VFR BLD AUTO: 38.6 % (ref 36.5–49.3)
HGB BLD-MCNC: 11.9 G/DL (ref 12–17)
IMM GRANULOCYTES # BLD AUTO: 0.17 THOUSAND/UL (ref 0–0.2)
IMM GRANULOCYTES NFR BLD AUTO: 1 % (ref 0–2)
LYMPHOCYTES # BLD AUTO: 1.74 THOUSANDS/ÂΜL (ref 0.6–4.47)
LYMPHOCYTES NFR BLD AUTO: 13 % (ref 14–44)
MCH RBC QN AUTO: 30.1 PG (ref 26.8–34.3)
MCHC RBC AUTO-ENTMCNC: 30.8 G/DL (ref 31.4–37.4)
MCV RBC AUTO: 98 FL (ref 82–98)
MONOCYTES # BLD AUTO: 1.52 THOUSAND/ÂΜL (ref 0.17–1.22)
MONOCYTES NFR BLD AUTO: 12 % (ref 4–12)
NEUTROPHILS # BLD AUTO: 9.41 THOUSANDS/ÂΜL (ref 1.85–7.62)
NEUTS SEG NFR BLD AUTO: 72 % (ref 43–75)
NRBC BLD AUTO-RTO: 0 /100 WBCS
PLATELET # BLD AUTO: 338 THOUSANDS/UL (ref 149–390)
PMV BLD AUTO: 9.1 FL (ref 8.9–12.7)
POTASSIUM SERPL-SCNC: 4.9 MMOL/L (ref 3.5–5.3)
RBC # BLD AUTO: 3.95 MILLION/UL (ref 3.88–5.62)
SODIUM SERPL-SCNC: 135 MMOL/L (ref 135–147)
WBC # BLD AUTO: 13.15 THOUSAND/UL (ref 4.31–10.16)

## 2024-03-13 PROCEDURE — 99496 TRANSJ CARE MGMT HIGH F2F 7D: CPT | Performed by: FAMILY MEDICINE

## 2024-03-13 PROCEDURE — 85025 COMPLETE CBC W/AUTO DIFF WBC: CPT

## 2024-03-13 PROCEDURE — 80048 BASIC METABOLIC PNL TOTAL CA: CPT | Performed by: FAMILY MEDICINE

## 2024-03-13 PROCEDURE — 36415 COLL VENOUS BLD VENIPUNCTURE: CPT | Performed by: FAMILY MEDICINE

## 2024-03-13 RX ORDER — TRAMADOL HYDROCHLORIDE 50 MG/1
50 TABLET ORAL EVERY 8 HOURS PRN
Qty: 20 TABLET | Refills: 0 | Status: SHIPPED | OUTPATIENT
Start: 2024-03-13

## 2024-03-13 NOTE — RESULT ENCOUNTER NOTE
Please call the patient regarding his abnormal result.  BUN and creatinine are slightly elevated compared to 6 days ago make sure to drink about 64 ounces of water daily to flush out the kidneys and I would also put the spironolactone on hold because it is a diuretic.  I would repeat the BMP Monday

## 2024-03-13 NOTE — RESULT ENCOUNTER NOTE
Please call the patient regarding his abnormal result.  The blood count is holding stable at 11.9 the white blood count is slightly elevated at 13,000 but this is most likely a stress reaction to having the retroperitoneal blood I would repeat the blood count on Monday also

## 2024-03-13 NOTE — PROGRESS NOTES
Assessment & Plan     1. Encounter for support and coordination of transition of care    2. Acute blood loss as cause of postoperative anemia  -     CBC and differential; Future    3. Anemia, unspecified type  -     Basic metabolic panel    4. Acute kidney injury (HCC)  -     Basic metabolic panel    5. Paroxysmal atrial fibrillation (HCC)    6. Retroperitoneal hematoma  -     traMADol (Ultram) 50 mg tablet; Take 1 tablet (50 mg total) by mouth every 8 (eight) hours as needed for moderate pain         Subjective     Transitional Care Management Review:   Fawad Baer is a 58 y.o. male here for TCM follow up.     During the TCM phone call patient stated:  TCM Call       Date and time call was made  3/11/2024  9:15 AM    Hospital care reviewed  Records reviewed  Pt scheduled for MRI abd w w/o contrast 4/4/24 at 10:30 am [facility], also to have a BMP which he is having done this week.    Patient was hospitialized at  [facility]    Date of Admission  03/04/24    Date of discharge  03/07/24    Diagnosis  Internal bleeding    Disposition  Home    Were the patients medications reviewed and updated  No  Hospital stopped xaralto    Current Symptoms  Neausea    Neausea severity  Mild          TCM Call       Post hospital issues  None; Reduced activity  Cannot lift more than 50 lbs    Should patient be enrolled in anticoag monitoring?  No    Scheduled for follow up?  Yes    Patients specialists  Other (comment)    Cardiologist name  DR ARMSTRONG/ [facility]    Nephrologist name  Dr Navarro appt 10/27 at 3:30    Other specialists names  Surgical urologist, Vinod Lama Jr appt 4/11/24 at 9:30 am [facility]    Did you obtain your prescribed medications  No    Why were you unable to obtain your medications  Did not add any new medications, only tylenol for pain    Do you need help managing your prescriptions or medications  No    Is transportation to your appointment needed  No    I have  advised the patient to call PCP with any new or worsening symptoms  Anais Miller, Practice Coordinator          Transition of care hospital record reviewed patient suffered a spontaneous nontraumatic retroperitoneal hemorrhage also questionable left adrenal mass patient has follow-up already set up with urology hematology and nephrology I did order a BMP and a CBC to monitor him and also came to light that he has been doing some ibuprofen and I did caution him not to do that take straight Tylenol and I will give him a prescription for tramadol when it is really has really severe pain      Review of Systems   Constitutional:  Negative for activity change, appetite change, diaphoresis, fatigue and fever.   HENT: Negative.     Eyes: Negative.    Respiratory:  Negative for apnea, cough, chest tightness, shortness of breath and wheezing.    Cardiovascular:  Negative for chest pain, palpitations and leg swelling.   Gastrointestinal:  Positive for abdominal pain. Negative for abdominal distention, anal bleeding, constipation, diarrhea, nausea and vomiting.        Retroperitoneal hematoma spontaneous   Endocrine: Negative for cold intolerance, heat intolerance, polydipsia, polyphagia and polyuria.   Genitourinary:  Negative for difficulty urinating, dysuria, flank pain, hematuria and urgency.   Musculoskeletal:  Negative for arthralgias, back pain, gait problem, joint swelling and myalgias.   Skin:  Negative for color change, rash and wound.   Allergic/Immunologic: Negative for environmental allergies, food allergies and immunocompromised state.   Neurological:  Negative for dizziness, seizures, syncope, speech difficulty, numbness and headaches.   Hematological:  Negative for adenopathy. Does not bruise/bleed easily.   Psychiatric/Behavioral:  Negative for agitation, behavioral problems, hallucinations, sleep disturbance and suicidal ideas.        Objective     /62 (BP Location: Right arm, Patient Position: Sitting,  Cuff Size: Large)   Pulse 71   Temp (!) 96.8 °F (36 °C) (Temporal)   Ht 6' (1.829 m)   Wt 119 kg (263 lb)   SpO2 94%   BMI 35.67 kg/m²      Physical Exam  Constitutional:       Appearance: He is well-developed.   HENT:      Head: Normocephalic and atraumatic.      Right Ear: External ear normal.      Left Ear: External ear normal.      Nose: Nose normal.   Eyes:      Conjunctiva/sclera: Conjunctivae normal.      Pupils: Pupils are equal, round, and reactive to light.   Cardiovascular:      Rate and Rhythm: Normal rate and regular rhythm.      Heart sounds: Normal heart sounds. No murmur heard.     No friction rub.   Pulmonary:      Effort: Pulmonary effort is normal. No respiratory distress.      Breath sounds: Normal breath sounds. No wheezing or rales.   Chest:      Chest wall: No tenderness.   Abdominal:      General: Bowel sounds are normal.      Palpations: Abdomen is soft.   Musculoskeletal:         General: Normal range of motion.      Cervical back: Normal range of motion and neck supple.   Skin:     General: Skin is warm and dry.      Capillary Refill: Capillary refill takes 2 to 3 seconds.   Neurological:      Mental Status: He is alert and oriented to person, place, and time.   Psychiatric:         Behavior: Behavior normal.         Thought Content: Thought content normal.         Judgment: Judgment normal.       Medications have been reviewed by provider in current encounter    Agustín Loredo DO

## 2024-03-21 DIAGNOSIS — E79.0 HYPERURICEMIA: ICD-10-CM

## 2024-03-21 DIAGNOSIS — E55.9 VITAMIN D INSUFFICIENCY: ICD-10-CM

## 2024-03-21 RX ORDER — MELATONIN
2000 DAILY
Qty: 180 TABLET | Refills: 1 | Status: SHIPPED | OUTPATIENT
Start: 2024-03-21 | End: 2024-09-17

## 2024-03-21 RX ORDER — ALLOPURINOL 300 MG/1
300 TABLET ORAL DAILY
Qty: 90 TABLET | Refills: 0 | Status: SHIPPED | OUTPATIENT
Start: 2024-03-21

## 2024-03-25 ENCOUNTER — TRANSITIONAL CARE MANAGEMENT (OUTPATIENT)
Dept: FAMILY MEDICINE CLINIC | Facility: CLINIC | Age: 59
End: 2024-03-25

## 2024-04-04 DIAGNOSIS — E11.42 DIABETIC PERIPHERAL NEUROPATHY (HCC): ICD-10-CM

## 2024-04-04 DIAGNOSIS — E11.29 CONTROLLED TYPE 2 DIABETES MELLITUS WITH MICROALBUMINURIA, WITHOUT LONG-TERM CURRENT USE OF INSULIN  (HCC): ICD-10-CM

## 2024-04-04 DIAGNOSIS — R80.9 CONTROLLED TYPE 2 DIABETES MELLITUS WITH MICROALBUMINURIA, WITHOUT LONG-TERM CURRENT USE OF INSULIN  (HCC): ICD-10-CM

## 2024-04-04 DIAGNOSIS — K68.3 RETROPERITONEAL HEMATOMA: ICD-10-CM

## 2024-04-04 PROCEDURE — 3066F NEPHROPATHY DOC TX: CPT | Performed by: ANESTHESIOLOGY

## 2024-04-04 RX ORDER — GABAPENTIN 600 MG/1
600 TABLET ORAL 3 TIMES DAILY
Qty: 270 TABLET | Refills: 1 | Status: SHIPPED | OUTPATIENT
Start: 2024-04-04

## 2024-04-04 RX ORDER — TRAMADOL HYDROCHLORIDE 50 MG/1
50 TABLET ORAL EVERY 8 HOURS PRN
Qty: 20 TABLET | Refills: 0 | Status: SHIPPED | OUTPATIENT
Start: 2024-04-04

## 2024-04-04 RX ORDER — METFORMIN HYDROCHLORIDE 500 MG/1
500 TABLET, EXTENDED RELEASE ORAL
Qty: 90 TABLET | Refills: 0 | Status: SHIPPED | OUTPATIENT
Start: 2024-04-04

## 2024-04-12 ENCOUNTER — OFFICE VISIT (OUTPATIENT)
Dept: ENDOCRINOLOGY | Facility: CLINIC | Age: 59
End: 2024-04-12
Payer: COMMERCIAL

## 2024-04-12 VITALS
HEIGHT: 72 IN | HEART RATE: 81 BPM | DIASTOLIC BLOOD PRESSURE: 90 MMHG | SYSTOLIC BLOOD PRESSURE: 134 MMHG | BODY MASS INDEX: 35.35 KG/M2 | WEIGHT: 261 LBS | OXYGEN SATURATION: 98 %

## 2024-04-12 DIAGNOSIS — E03.9 HYPOTHYROIDISM (ACQUIRED): ICD-10-CM

## 2024-04-12 DIAGNOSIS — E66.01 CLASS 2 SEVERE OBESITY DUE TO EXCESS CALORIES WITH SERIOUS COMORBIDITY AND BODY MASS INDEX (BMI) OF 36.0 TO 36.9 IN ADULT (HCC): ICD-10-CM

## 2024-04-12 DIAGNOSIS — R80.9 CONTROLLED TYPE 2 DIABETES MELLITUS WITH MICROALBUMINURIA, WITHOUT LONG-TERM CURRENT USE OF INSULIN  (HCC): Primary | ICD-10-CM

## 2024-04-12 DIAGNOSIS — E55.9 VITAMIN D INSUFFICIENCY: ICD-10-CM

## 2024-04-12 DIAGNOSIS — E21.3 HYPERPARATHYROIDISM (HCC): ICD-10-CM

## 2024-04-12 DIAGNOSIS — E11.29 CONTROLLED TYPE 2 DIABETES MELLITUS WITH MICROALBUMINURIA, WITHOUT LONG-TERM CURRENT USE OF INSULIN  (HCC): Primary | ICD-10-CM

## 2024-04-12 DIAGNOSIS — E11.40 PAINFUL DIABETIC NEUROPATHY (HCC): ICD-10-CM

## 2024-04-12 PROCEDURE — 99214 OFFICE O/P EST MOD 30 MIN: CPT | Performed by: STUDENT IN AN ORGANIZED HEALTH CARE EDUCATION/TRAINING PROGRAM
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
## 2024-04-19 ENCOUNTER — LAB (OUTPATIENT)
Dept: LAB | Facility: MEDICAL CENTER | Age: 59
End: 2024-04-19
Payer: COMMERCIAL

## 2024-04-19 DIAGNOSIS — R80.9 CONTROLLED TYPE 2 DIABETES MELLITUS WITH MICROALBUMINURIA, WITHOUT LONG-TERM CURRENT USE OF INSULIN  (HCC): ICD-10-CM

## 2024-04-19 DIAGNOSIS — E11.29 CONTROLLED TYPE 2 DIABETES MELLITUS WITH MICROALBUMINURIA, WITHOUT LONG-TERM CURRENT USE OF INSULIN  (HCC): ICD-10-CM

## 2024-04-19 DIAGNOSIS — E03.9 HYPOTHYROIDISM (ACQUIRED): ICD-10-CM

## 2024-04-19 DIAGNOSIS — N18.31 STAGE 3A CHRONIC KIDNEY DISEASE (HCC): ICD-10-CM

## 2024-04-19 DIAGNOSIS — E55.9 VITAMIN D INSUFFICIENCY: ICD-10-CM

## 2024-04-19 DIAGNOSIS — E78.2 MIXED HYPERLIPIDEMIA: ICD-10-CM

## 2024-04-19 DIAGNOSIS — D64.9 ANEMIA, UNSPECIFIED TYPE: Primary | ICD-10-CM

## 2024-04-19 DIAGNOSIS — E21.3 HYPERPARATHYROIDISM (HCC): ICD-10-CM

## 2024-04-19 DIAGNOSIS — N17.9 ACUTE KIDNEY INJURY (HCC): ICD-10-CM

## 2024-04-19 DIAGNOSIS — R80.9 NEPHROTIC RANGE PROTEINURIA: ICD-10-CM

## 2024-04-19 LAB
25(OH)D3 SERPL-MCNC: 22.5 NG/ML (ref 30–100)
ALBUMIN SERPL BCP-MCNC: 3.3 G/DL (ref 3.5–5)
ALP SERPL-CCNC: 53 U/L (ref 34–104)
ALT SERPL W P-5'-P-CCNC: 21 U/L (ref 7–52)
ANION GAP SERPL CALCULATED.3IONS-SCNC: 7 MMOL/L (ref 4–13)
AST SERPL W P-5'-P-CCNC: 20 U/L (ref 13–39)
BASOPHILS # BLD AUTO: 0.03 THOUSANDS/ÂΜL (ref 0–0.1)
BASOPHILS NFR BLD AUTO: 1 % (ref 0–1)
BILIRUB SERPL-MCNC: 0.35 MG/DL (ref 0.2–1)
BUN SERPL-MCNC: 26 MG/DL (ref 5–25)
CA-I BLD-SCNC: 1.33 MMOL/L (ref 1.12–1.32)
CALCIUM ALBUM COR SERPL-MCNC: 10.8 MG/DL (ref 8.3–10.1)
CALCIUM SERPL-MCNC: 10.2 MG/DL (ref 8.4–10.2)
CHLORIDE SERPL-SCNC: 101 MMOL/L (ref 96–108)
CHOLEST SERPL-MCNC: 152 MG/DL
CO2 SERPL-SCNC: 29 MMOL/L (ref 21–32)
CREAT SERPL-MCNC: 1.51 MG/DL (ref 0.6–1.3)
EOSINOPHIL # BLD AUTO: 0.22 THOUSAND/ÂΜL (ref 0–0.61)
EOSINOPHIL NFR BLD AUTO: 4 % (ref 0–6)
ERYTHROCYTE [DISTWIDTH] IN BLOOD BY AUTOMATED COUNT: 16.8 % (ref 11.6–15.1)
EST. AVERAGE GLUCOSE BLD GHB EST-MCNC: 97 MG/DL
GFR SERPL CREATININE-BSD FRML MDRD: 49 ML/MIN/1.73SQ M
GLUCOSE P FAST SERPL-MCNC: 84 MG/DL (ref 65–99)
HBA1C MFR BLD: 5 %
HCT VFR BLD AUTO: 44.7 % (ref 36.5–49.3)
HDLC SERPL-MCNC: 28 MG/DL
HGB BLD-MCNC: 13.6 G/DL (ref 12–17)
IMM GRANULOCYTES # BLD AUTO: 0.01 THOUSAND/UL (ref 0–0.2)
IMM GRANULOCYTES NFR BLD AUTO: 0 % (ref 0–2)
LDLC SERPL CALC-MCNC: 85 MG/DL (ref 0–100)
LYMPHOCYTES # BLD AUTO: 0.88 THOUSANDS/ÂΜL (ref 0.6–4.47)
LYMPHOCYTES NFR BLD AUTO: 18 % (ref 14–44)
MCH RBC QN AUTO: 30.6 PG (ref 26.8–34.3)
MCHC RBC AUTO-ENTMCNC: 30.4 G/DL (ref 31.4–37.4)
MCV RBC AUTO: 101 FL (ref 82–98)
MONOCYTES # BLD AUTO: 0.9 THOUSAND/ÂΜL (ref 0.17–1.22)
MONOCYTES NFR BLD AUTO: 18 % (ref 4–12)
NEUTROPHILS # BLD AUTO: 2.92 THOUSANDS/ÂΜL (ref 1.85–7.62)
NEUTS SEG NFR BLD AUTO: 59 % (ref 43–75)
NRBC BLD AUTO-RTO: 0 /100 WBCS
PHOSPHATE SERPL-MCNC: 4.2 MG/DL (ref 2.7–4.5)
PLATELET # BLD AUTO: 189 THOUSANDS/UL (ref 149–390)
PMV BLD AUTO: 10.7 FL (ref 8.9–12.7)
POTASSIUM SERPL-SCNC: 4.3 MMOL/L (ref 3.5–5.3)
PROT SERPL-MCNC: 6.6 G/DL (ref 6.4–8.4)
PTH-INTACT SERPL-MCNC: 87.6 PG/ML (ref 12–88)
RBC # BLD AUTO: 4.44 MILLION/UL (ref 3.88–5.62)
SODIUM SERPL-SCNC: 137 MMOL/L (ref 135–147)
T4 FREE SERPL-MCNC: 1 NG/DL (ref 0.61–1.12)
TRIGL SERPL-MCNC: 194 MG/DL
TSH SERPL DL<=0.05 MIU/L-ACNC: 19.7 UIU/ML (ref 0.45–4.5)
WBC # BLD AUTO: 4.96 THOUSAND/UL (ref 4.31–10.16)

## 2024-04-19 PROCEDURE — 83516 IMMUNOASSAY NONANTIBODY: CPT

## 2024-04-19 PROCEDURE — 80061 LIPID PANEL: CPT

## 2024-04-19 PROCEDURE — 83036 HEMOGLOBIN GLYCOSYLATED A1C: CPT

## 2024-04-19 PROCEDURE — 3044F HG A1C LEVEL LT 7.0%: CPT | Performed by: ANESTHESIOLOGY

## 2024-04-19 PROCEDURE — 85025 COMPLETE CBC W/AUTO DIFF WBC: CPT

## 2024-04-19 RX ORDER — EZETIMIBE 10 MG/1
10 TABLET ORAL DAILY
Qty: 90 TABLET | Refills: 0 | Status: SHIPPED | OUTPATIENT
Start: 2024-04-19

## 2024-04-22 DIAGNOSIS — E55.9 VITAMIN D INSUFFICIENCY: ICD-10-CM

## 2024-04-22 DIAGNOSIS — D53.1 MEGALOBLASTIC ANEMIA: Primary | ICD-10-CM

## 2024-04-22 DIAGNOSIS — E03.9 HYPOTHYROIDISM (ACQUIRED): Primary | ICD-10-CM

## 2024-04-22 LAB — PLA2R IGG SER IA-ACNC: <1.8 RU/ML (ref 0–19.9)

## 2024-04-22 RX ORDER — LEVOTHYROXINE SODIUM 175 UG/1
TABLET ORAL
Qty: 90 TABLET | Refills: 1 | Status: SHIPPED | OUTPATIENT
Start: 2024-04-22

## 2024-04-22 NOTE — RESULT ENCOUNTER NOTE
Please call the patient regarding his abnormal result.  Marciano's white blood count and his globin have normalized that is great keep up the good work his size of his red blood cells is slightly elevated so he may be a little deficient in B12 make sure he is eating fresh vitamin fresh vegetables and fruits and if he gets any lab work in the near future I would like him to add a B12 and a folate level and without I will put the orders in the chart is a fasting blood test

## 2024-04-24 DIAGNOSIS — I48.0 PAROXYSMAL ATRIAL FIBRILLATION (HCC): ICD-10-CM

## 2024-04-24 RX ORDER — CARVEDILOL 6.25 MG/1
TABLET ORAL
Qty: 90 TABLET | Refills: 1 | Status: SHIPPED | OUTPATIENT
Start: 2024-04-24

## 2024-04-29 ENCOUNTER — OFFICE VISIT (OUTPATIENT)
Dept: PAIN MEDICINE | Facility: CLINIC | Age: 59
End: 2024-04-29
Payer: COMMERCIAL

## 2024-04-29 VITALS
BODY MASS INDEX: 35.35 KG/M2 | SYSTOLIC BLOOD PRESSURE: 159 MMHG | DIASTOLIC BLOOD PRESSURE: 105 MMHG | HEIGHT: 72 IN | RESPIRATION RATE: 16 BRPM | HEART RATE: 65 BPM | WEIGHT: 261 LBS

## 2024-04-29 DIAGNOSIS — E11.40 PAINFUL DIABETIC NEUROPATHY (HCC): ICD-10-CM

## 2024-04-29 PROCEDURE — 99214 OFFICE O/P EST MOD 30 MIN: CPT | Performed by: ANESTHESIOLOGY

## 2024-04-29 PROCEDURE — 3080F DIAST BP >= 90 MM HG: CPT | Performed by: ANESTHESIOLOGY

## 2024-04-29 PROCEDURE — 3077F SYST BP >= 140 MM HG: CPT | Performed by: ANESTHESIOLOGY

## 2024-04-29 RX ORDER — AMITRIPTYLINE HYDROCHLORIDE 25 MG/1
25 TABLET, FILM COATED ORAL
Qty: 30 TABLET | Refills: 1 | Status: SHIPPED | OUTPATIENT
Start: 2024-04-29 | End: 2024-05-29

## 2024-04-29 NOTE — PROGRESS NOTES
Assessment:  1. Painful diabetic neuropathy (HCC)        Plan:  Patient is a 57-year-old male with complaints of low back pain, right hip pain with chronic pain syndrome secondary to lumbar degenerative disc disease, lumbar radiculopathy, sacroiliitis, peripheral neuropathy bilateral feet presents office for follow-up visit.  Patient continues peripheral neuropathy both feet.  Patient has tried and failed Lyrica and Cymbalta.  Patient also tried and failed epidural steroid injections for his pain.  The only thing that was provide patient with any relief for Qutenza however his insurance does not cover Qutenza applications to his feet for his pain.  At this time the best option for control of his neuropathic pain would be either a neuropathic agent or spinal cord stimulator.  1.  We will try amitriptyline 25 mg p.o. nightly for his peripheral neuropathy.  2.  Patient was provided with spinal cord stimulator information for him to review.  He will call back if he is interested in proceeding which we will then order the necessary diagnostic imaging and plan for psych evaluation.  3.  Follow-up in 1 month        History of Present Illness:  The patient is a 59 y.o. male who presents for a follow up office visit in regards to Foot Pain (BILATERAL).   The patient’s current symptoms include 7 out of 10 constant burning, sharp, numbness, pins-and-needles without any particular time pattern.    Current pain medications includes: gabapentin.  The patient reports that this regimen is providing 30% pain relief.  The patient is reporting no side effects from this pain medication regimen.    I have personally reviewed and/or updated the patient's past medical history, past surgical history, family history, social history, current medications, allergies, and vital signs today.         Review of Systems  Review of Systems   Musculoskeletal:  Positive for gait problem.   All other systems reviewed and are negative.          Past  Medical History:   Diagnosis Date    Arthritis     right foot    Chronic cholecystitis     CPAP (continuous positive airway pressure) dependence     Diabetes mellitus (HCC)     Diabetic nephropathy (HCC)     Disease of thyroid gland 1990    DVT (deep venous thrombosis) (HCC)     Gout     History of pulmonary embolism     Hyperlipidemia     Hypertension     Hypothyroidism     Lumbar back pain     MTHFR mutation     Neuropathy     Neuropathy in diabetes (HCC) 2018    Scab     right arm- no s/s infection    Sleep apnea     Tarsal tunnel syndrome, right     Tinnitus     left ear    Wears glasses     and contacts    Wears glasses        Past Surgical History:   Procedure Laterality Date    APPENDECTOMY      ARTHRODESIS      Lumbar L__    BACK SURGERY      BUNIONECTOMY Right 10/07/2016    Procedure: REMOVAL TIBIAL  SESAMOID BONE  RIGHT FOOT;  Surgeon: Vicente Aguirre DPM;  [facility];  Service:     CARDIAC PACEMAKER PLACEMENT  09/01/2021    CHOLECYSTECTOMY  03/26/2015    COLONOSCOPY      KNEE ARTHROSCOPY      KNEE ARTHROSCOPY W/ MENISCAL REPAIR      Lateral    NASAL SEPTUM SURGERY  10/16/2013    PLANTAR FASCIA SURGERY Left 10/26/2023    Procedure: RELEASE FASCIA PLANTAR/FASCIOTOMY;  Surgeon: Inderjit Ramirez DPM;  Location: [facility];  Service: Podiatry    OH COLONOSCOPY FLX DX W/COLLJ SPEC WHEN PFRMD N/A 11/23/2018    Procedure: COLONOSCOPY;  Surgeon: Manjit Dietrich MD;  [facility]: [facility];  Service: Gastroenterology    OH RELEASE TARSAL TUNNEL Right 06/25/2018    Procedure: RELEASE TARSAL TUNNEL;  Surgeon: Cliff Bernabe DPM;  [facility];  Service: Podiatry    OH RELEASE TARSAL TUNNEL Left 03/13/2020    Procedure: RELEASE TARSAL TUNNEL;  Surgeon: Cliff Bernabe DPM;  Location:  [facility] OR;  Service: Podiatry    OH RELEASE TARSAL TUNNEL Left 10/26/2023    Procedure: RELEASE TARSAL TUNNEL;  Surgeon: Inderjit Ramirez DPM;  [facility]: [facility];  Service: Podiatry    SPINAL  FUSION      TONSILLECTOMY  10/16/2013    with Adenoidectomy    UVULECTOMY  10/16/2013    UVULOPALATOPHARYNGOPLASTY      WISDOM TOOTH EXTRACTION         Family History   Problem Relation Age of Onset    Aneurysm Mother         intracranial aneurysm repair    Coronary artery disease Father     Hypertension Father     Heart disease Father     Aneurysm Brother        Social History     Occupational History    Not on file   Tobacco Use    Smoking status: Never    Smokeless tobacco: Never   Vaping Use    Vaping status: Never Used   Substance and Sexual Activity    Alcohol use: Yes     Alcohol/week: 6.0 standard drinks of alcohol     Types: 6 Cans of beer per week     Comment: weekends    Drug use: No    Sexual activity: Yes     Partners: Female     Birth control/protection: Other         Current Outpatient Medications:     allopurinol (ZYLOPRIM) 300 mg tablet, Take 1 tablet (300 mg total) by mouth daily, Disp: 90 tablet, Rfl: 0    apixaban (Eliquis) 5 mg, Take 5 mg by mouth 2 (two) times a day, Disp: , Rfl:     carvedilol (COREG) 6.25 mg tablet, Take 1 tablet in the morning and 1-1/2 tablets in the evening, Disp: 90 tablet, Rfl: 1    cholecalciferol 1,000 units tablet, Take 4 tablets (4,000 Units total) by mouth daily, Disp: 180 tablet, Rfl: 1    ezetimibe (ZETIA) 10 mg tablet, Take 1 tablet (10 mg total) by mouth daily, Disp: 90 tablet, Rfl: 0    gabapentin (NEURONTIN) 600 MG tablet, Take 1 tablet (600 mg total) by mouth 3 (three) times a day, Disp: 270 tablet, Rfl: 1    levothyroxine 175 mcg tablet, Take one tablet daily on empty stomach 1 hour before breakfast and 4 hours apart from calcium and vitamin D supplementation, Disp: 90 tablet, Rfl: 1    lisinopril (ZESTRIL) 40 mg tablet, Take 1 tablet (40 mg total) by mouth daily, Disp: 90 tablet, Rfl: 0    metFORMIN (GLUCOPHAGE-XR) 500 mg 24 hr tablet, Take 1 tablet (500 mg total) by mouth daily at bedtime, Disp: 90 tablet, Rfl: 0    semaglutide, 1 mg/dose, (Ozempic, 1  MG/DOSE,) 4 mg/3 mL injection pen, Inject 0.75 mL (1 mg total) under the skin every 7 days, Disp: 3 mL, Rfl: 5    sildenafil (Viagra) 100 mg tablet, Take 1 tablet (100 mg total) by mouth as needed for erectile dysfunction, Disp: 30 tablet, Rfl: 0    spironolactone (ALDACTONE) 25 mg tablet, Take 1 tablet (25 mg total) by mouth daily, Disp: 90 tablet, Rfl: 1    traMADol (Ultram) 50 mg tablet, Take 1 tablet (50 mg total) by mouth every 8 (eight) hours as needed for moderate pain, Disp: 20 tablet, Rfl: 0    rivaroxaban (Xarelto) 20 mg tablet, Take 1 tablet (20 mg total) by mouth daily with breakfast (Patient not taking: Reported on 3/13/2024), Disp: 90 tablet, Rfl: 0    No Known Allergies    Physical Exam:    BP (!) 159/105   Pulse 65   Resp 16   Ht 6' (1.829 m)   Wt 118 kg (261 lb)   BMI 35.40 kg/m²     Constitutional:normal, well developed, well nourished, alert, in no distress and non-toxic and no overt pain behavior. and obese  Eyes:anicteric  HEENT:grossly intact  Neck:supple, symmetric, trachea midline and no masses   Pulmonary:even and unlabored  Cardiovascular:No edema or pitting edema present  Skin:Normal without rashes or lesions and well hydrated  Psychiatric:Mood and affect appropriate  Neurologic:Cranial Nerves II-XII grossly intact  Musculoskeletal:antalgic      Imaging    Study Result    Narrative & Impression   RIGHT FOOT     INDICATION:   M79.671: Pain in right foot  M79.672: Pain in left foot.     COMPARISON:  10/7/2016     VIEWS:  XR FOOT 3+ VW RIGHT         FINDINGS:     There is no acute fracture or dislocation.     Calcaneal spur(s) noted.  1st MTP degenerative change also noted.     No lytic or blastic osseous lesion.     Soft tissues are unremarkable.     IMPRESSION:     No acute osseous abnormality.     Degenerative changes as described.     Workstation performed: [id number]     Study Result    Narrative & Impression   LEFT FOOT     INDICATION:   M79.671: Pain in right foot  M79.672: Pain  in left foot.     COMPARISON:  1/20/2014     VIEWS:  XR FOOT 3+ VW LEFT         FINDINGS:     There is no acute fracture or dislocation.     Degenerative changes of the 1st MTP joint.  Retrocalcaneal spur also noted.     No lytic or blastic osseous lesion.     Soft tissues are unremarkable.     IMPRESSION:     No acute osseous abnormality.     Degenerative changes as described.                 Workstation performed: [id number]         No orders to display       No orders of the defined types were placed in this encounter.

## 2024-05-14 ENCOUNTER — HOSPITAL ENCOUNTER (OUTPATIENT)
Dept: BONE DENSITY | Facility: HOSPITAL | Age: 59
Discharge: HOME | End: 2024-05-14
Attending: STUDENT IN AN ORGANIZED HEALTH CARE EDUCATION/TRAINING PROGRAM
Payer: COMMERCIAL

## 2024-05-14 VITALS — BODY MASS INDEX: 34.54 KG/M2 | HEIGHT: 72 IN | WEIGHT: 255 LBS

## 2024-05-14 DIAGNOSIS — E21.3 HYPERPARATHYROIDISM (HCC): ICD-10-CM

## 2024-05-14 PROCEDURE — 77080 DXA BONE DENSITY AXIAL: CPT

## 2024-05-21 ENCOUNTER — REMOTE DEVICE CLINIC VISIT (OUTPATIENT)
Dept: CARDIOLOGY CLINIC | Facility: CLINIC | Age: 59
End: 2024-05-21
Payer: COMMERCIAL

## 2024-05-21 DIAGNOSIS — Z95.0 PRESENCE OF CARDIAC PACEMAKER: Primary | ICD-10-CM

## 2024-05-21 PROCEDURE — 93296 REM INTERROG EVL PM/IDS: CPT | Performed by: INTERNAL MEDICINE

## 2024-05-21 PROCEDURE — 93294 REM INTERROG EVL PM/LDLS PM: CPT | Performed by: INTERNAL MEDICINE

## 2024-05-21 NOTE — PROGRESS NOTES
Results for orders placed or performed in visit on 05/21/24   Cardiac EP device report    Narrative    MDDT DUAL PM/ ACTIVE SYSTEM IS MRI CONDITIONAL  CARELINK TRANSMISSION: BATTERY VOLTAGE ADEQUATE (11.3 YRS). AP: 75.8%. : 0.6% (MVP-ON). ALL AVAILABLE LEAD PARAMETERS WITHIN NORMAL LIMITS. 1 VT EPISODE W/ EGM SHOWING NSVT 7 BEATS @ 160 BPM. PT TAKES COREG, ELIQUIS. EF: 60-65% (ECHO 3/16/24). NORMAL DEVICE FUNCTION. CH

## 2024-05-24 DIAGNOSIS — K68.3 RETROPERITONEAL HEMATOMA: ICD-10-CM

## 2024-05-24 DIAGNOSIS — I48.0 PAROXYSMAL ATRIAL FIBRILLATION (HCC): ICD-10-CM

## 2024-05-24 RX ORDER — CARVEDILOL 6.25 MG/1
TABLET ORAL
Qty: 90 TABLET | Refills: 1 | Status: SHIPPED | OUTPATIENT
Start: 2024-05-24

## 2024-05-24 RX ORDER — TRAMADOL HYDROCHLORIDE 50 MG/1
50 TABLET ORAL EVERY 8 HOURS PRN
Qty: 20 TABLET | Refills: 0 | Status: SHIPPED | OUTPATIENT
Start: 2024-05-24

## 2024-05-28 RX ORDER — DEXAMETHASONE 1 MG
1 TABLET ORAL
COMMUNITY
Start: 2024-05-15

## 2024-05-28 RX ORDER — SULFAMETHOXAZOLE AND TRIMETHOPRIM 800; 160 MG/1; MG/1
1 TABLET ORAL
COMMUNITY
Start: 2024-05-15

## 2024-05-29 ENCOUNTER — APPOINTMENT (OUTPATIENT)
Dept: RADIOLOGY | Facility: MEDICAL CENTER | Age: 59
End: 2024-05-29
Payer: COMMERCIAL

## 2024-05-29 ENCOUNTER — CONSULT (OUTPATIENT)
Dept: PAIN MEDICINE | Facility: CLINIC | Age: 59
End: 2024-05-29
Payer: COMMERCIAL

## 2024-05-29 VITALS
BODY MASS INDEX: 34.4 KG/M2 | HEIGHT: 72 IN | SYSTOLIC BLOOD PRESSURE: 170 MMHG | WEIGHT: 254 LBS | DIASTOLIC BLOOD PRESSURE: 115 MMHG | HEART RATE: 76 BPM | RESPIRATION RATE: 16 BRPM

## 2024-05-29 DIAGNOSIS — M54.9 MID BACK PAIN: ICD-10-CM

## 2024-05-29 DIAGNOSIS — M54.9 MID BACK PAIN: Primary | ICD-10-CM

## 2024-05-29 DIAGNOSIS — E11.40 PAINFUL DIABETIC NEUROPATHY (HCC): ICD-10-CM

## 2024-05-29 DIAGNOSIS — G89.4 CHRONIC PAIN SYNDROME: ICD-10-CM

## 2024-05-29 PROCEDURE — 72072 X-RAY EXAM THORAC SPINE 3VWS: CPT

## 2024-05-29 PROCEDURE — 99214 OFFICE O/P EST MOD 30 MIN: CPT | Performed by: ANESTHESIOLOGY

## 2024-05-29 RX ORDER — AMITRIPTYLINE HYDROCHLORIDE 50 MG/1
50 TABLET, FILM COATED ORAL
Qty: 30 TABLET | Refills: 1 | Status: CANCELLED | OUTPATIENT
Start: 2024-05-29 | End: 2024-06-28

## 2024-05-29 NOTE — PROGRESS NOTES
Assessment:  1. Mid back pain    2. Painful diabetic neuropathy (HCC)    3. Chronic pain syndrome        Plan:  Patient is a 59-year-old male with complaints of low back pain, right hip pain with chronic pain syndrome secondary to lumbar degenerative disc disease, lumbar radiculopathy, sacroiliitis, peripheral neuropathy bilateral feet presents office for follow-up visit.  Patient continues peripheral neuropathy both feet.  Patient denies any significant relief from amitriptyline.  At this point patient has exhausted medical management for neuropathic pain.  We will go forward with interventional management of diabetic peripheral neuropathy.  1.  We will provide patient with a spinal cord stimulator brochure  2.  We will schedule patient with a psych evaluation preparation for spinal cord stimulator implantation  3.  We will order an MRI of the thoracic spine and a x-ray of the thoracic spine in preparation for spinal cord stimulator implant  4.  Follow-up in 1 month for preoperative spinal cord stimulator management                History of Present Illness:  The patient is a 59 y.o. male who presents for a follow up office visit in regards to Foot Pain (bilateral).   The patient’s current symptoms include 7 out of 10 constant burning, sharp, numbness, pins-and-needles worse in the evening and nighttime.    Current pain medications includes: Amitriptyline 25 mg.  The patient reports that this regimen is providing 20% pain relief.  The patient is reporting no side effects from this pain medication regimen.    I have personally reviewed and/or updated the patient's past medical history, past surgical history, family history, social history, current medications, allergies, and vital signs today.         Review of Systems  Review of Systems   Musculoskeletal:  Positive for gait problem.   All other systems reviewed and are negative.          Past Medical History:   Diagnosis Date    Arthritis     right foot    Chronic  cholecystitis     CPAP (continuous positive airway pressure) dependence     Diabetes mellitus (HCC)     Diabetic nephropathy (HCC)     Disease of thyroid gland 1990    DVT (deep venous thrombosis) (HCC)     Gout     History of pulmonary embolism     Hyperlipidemia     Hypertension     Hypothyroidism     Lumbar back pain     MTHFR mutation     Neuropathy     Neuropathy in diabetes (HCC) 2018    Scab     right arm- no s/s infection    Sleep apnea     Tarsal tunnel syndrome, right     Tinnitus     left ear    Wears glasses     and contacts    Wears glasses        Past Surgical History:   Procedure Laterality Date    APPENDECTOMY      ARTHRODESIS      Lumbar L__    BACK SURGERY      BUNIONECTOMY Right 10/07/2016    Procedure: REMOVAL TIBIAL  SESAMOID BONE  RIGHT FOOT;  Surgeon: Vicente Aguirre DPM;  [facility];  Service:     CARDIAC PACEMAKER PLACEMENT  09/01/2021    CHOLECYSTECTOMY  03/26/2015    COLONOSCOPY      KNEE ARTHROSCOPY      KNEE ARTHROSCOPY W/ MENISCAL REPAIR      Lateral    NASAL SEPTUM SURGERY  10/16/2013    PLANTAR FASCIA SURGERY Left 10/26/2023    Procedure: RELEASE FASCIA PLANTAR/FASCIOTOMY;  Surgeon: Inderjit Ramirez DPM;  Location: [facility];  Service: Podiatry    PA COLONOSCOPY FLX DX W/COLLJ SPEC WHEN PFRMD N/A 11/23/2018    Procedure: COLONOSCOPY;  Surgeon: Manjit Dietrich MD;  Location: [facility];  Service: Gastroenterology    PA RELEASE TARSAL TUNNEL Right 06/25/2018    Procedure: RELEASE TARSAL TUNNEL;  Surgeon: Cliff Bernabe DPM;  Location: [facility];  Service: Podiatry    PA RELEASE TARSAL TUNNEL Left 03/13/2020    Procedure: RELEASE TARSAL TUNNEL;  Surgeon: Cliff Bernabe DPM;  Location:  [facility] OR;  Service: Podiatry    PA RELEASE TARSAL TUNNEL Left 10/26/2023    Procedure: RELEASE TARSAL TUNNEL;  Surgeon: Inderjit Ramirez DPM;  Location: [facility];  Service: Podiatry    SPINAL FUSION      TONSILLECTOMY  10/16/2013    with Adenoidectomy    UVULECTOMY   10/16/2013    UVULOPALATOPHARYNGOPLASTY      WISDOM TOOTH EXTRACTION         Family History   Problem Relation Age of Onset    Aneurysm Mother         intracranial aneurysm repair    Coronary artery disease Father     Hypertension Father     Heart disease Father     Aneurysm Brother        Social History     Occupational History    Not on file   Tobacco Use    Smoking status: Never    Smokeless tobacco: Never   Vaping Use    Vaping status: Never Used   Substance and Sexual Activity    Alcohol use: Yes     Alcohol/week: 6.0 standard drinks of alcohol     Types: 6 Cans of beer per week     Comment: weekends    Drug use: No    Sexual activity: Yes     Partners: Female     Birth control/protection: Other         Current Outpatient Medications:     allopurinol (ZYLOPRIM) 300 mg tablet, Take 1 tablet (300 mg total) by mouth daily, Disp: 90 tablet, Rfl: 0    amitriptyline (ELAVIL) 25 mg tablet, Take 1 tablet (25 mg total) by mouth daily at bedtime, Disp: 30 tablet, Rfl: 1    apixaban (Eliquis) 5 mg, Take 5 mg by mouth 2 (two) times a day, Disp: , Rfl:     carvedilol (COREG) 6.25 mg tablet, Take 1 tablet in the morning and 1-1/2 tablets in the evening, Disp: 90 tablet, Rfl: 1    cholecalciferol 1,000 units tablet, Take 4 tablets (4,000 Units total) by mouth daily, Disp: 180 tablet, Rfl: 1    dexamethasone (DECADRON) 1 mg tablet, Take 1 mg by mouth, Disp: , Rfl:     ezetimibe (ZETIA) 10 mg tablet, Take 1 tablet (10 mg total) by mouth daily, Disp: 90 tablet, Rfl: 0    gabapentin (NEURONTIN) 600 MG tablet, Take 1 tablet (600 mg total) by mouth 3 (three) times a day, Disp: 270 tablet, Rfl: 1    levothyroxine 175 mcg tablet, Take one tablet daily on empty stomach 1 hour before breakfast and 4 hours apart from calcium and vitamin D supplementation, Disp: 90 tablet, Rfl: 1    lisinopril (ZESTRIL) 40 mg tablet, Take 1 tablet (40 mg total) by mouth daily, Disp: 90 tablet, Rfl: 0    metFORMIN (GLUCOPHAGE-XR) 500 mg 24 hr tablet, Take  1 tablet (500 mg total) by mouth daily at bedtime, Disp: 90 tablet, Rfl: 0    Multiple Vitamins-Minerals (MULTIVITAMIN ADULTS PO), , Disp: , Rfl:     semaglutide, 1 mg/dose, (Ozempic, 1 MG/DOSE,) 4 mg/3 mL injection pen, Inject 0.75 mL (1 mg total) under the skin every 7 days, Disp: 3 mL, Rfl: 5    sildenafil (Viagra) 100 mg tablet, Take 1 tablet (100 mg total) by mouth as needed for erectile dysfunction, Disp: 30 tablet, Rfl: 0    spironolactone (ALDACTONE) 25 mg tablet, Take 1 tablet (25 mg total) by mouth daily, Disp: 90 tablet, Rfl: 1    sulfamethoxazole-trimethoprim (BACTRIM DS) 800-160 mg per tablet, Take 1 tablet by mouth, Disp: , Rfl:     traMADol (Ultram) 50 mg tablet, Take 1 tablet (50 mg total) by mouth every 8 (eight) hours as needed for moderate pain, Disp: 20 tablet, Rfl: 0    rivaroxaban (Xarelto) 20 mg tablet, Take 1 tablet (20 mg total) by mouth daily with breakfast (Patient not taking: Reported on 3/13/2024), Disp: 90 tablet, Rfl: 0    No Known Allergies    Physical Exam:    BP (!) 170/115   Pulse 76   Resp 16   Ht 6' (1.829 m)   Wt 115 kg (254 lb)   BMI 34.45 kg/m²     Constitutional:normal, well developed, well nourished, alert, in no distress and non-toxic and no overt pain behavior. and obese  Eyes:anicteric  HEENT:grossly intact  Neck:supple, symmetric, trachea midline and no masses   Pulmonary:even and unlabored  Cardiovascular:No edema or pitting edema present  Skin:Normal without rashes or lesions and well hydrated  Psychiatric:Mood and affect appropriate  Neurologic:Cranial Nerves II-XII grossly intact  Musculoskeletal:antalgic        Imaging      Study Result    Narrative & Impression   RIGHT FOOT     INDICATION:   M79.671: Pain in right foot  M79.672: Pain in left foot.     COMPARISON:  10/7/2016     VIEWS:  XR FOOT 3+ VW RIGHT         FINDINGS:     There is no acute fracture or dislocation.     Calcaneal spur(s) noted.  1st MTP degenerative change also noted.     No lytic or  blastic osseous lesion.     Soft tissues are unremarkable.     IMPRESSION:     No acute osseous abnormality.     Degenerative changes as described.     Workstation performed: [id number]          Study Result    Narrative & Impression   LEFT FOOT     INDICATION:   M79.671: Pain in right foot  M79.672: Pain in left foot.     COMPARISON:  1/20/2014     VIEWS:  XR FOOT 3+ VW LEFT         FINDINGS:     There is no acute fracture or dislocation.     Degenerative changes of the 1st MTP joint.  Retrocalcaneal spur also noted.     No lytic or blastic osseous lesion.     Soft tissues are unremarkable.     IMPRESSION:     No acute osseous abnormality.     Degenerative changes as described.                 Workstation performed: [id number]         No orders to display       No orders of the defined types were placed in this encounter.

## 2024-06-07 ENCOUNTER — HOSPITAL ENCOUNTER (OUTPATIENT)
Dept: NUCLEAR MEDICINE | Facility: HOSPITAL | Age: 59
Discharge: HOME | End: 2024-06-07
Attending: STUDENT IN AN ORGANIZED HEALTH CARE EDUCATION/TRAINING PROGRAM
Payer: COMMERCIAL

## 2024-06-07 DIAGNOSIS — E21.3 HYPERPARATHYROIDISM (HCC): ICD-10-CM

## 2024-06-07 PROCEDURE — A9500 TC99M SESTAMIBI: HCPCS

## 2024-06-07 PROCEDURE — 78071 PARATHYRD PLANAR W/WO SUBTRJ: CPT

## 2024-06-10 DIAGNOSIS — E21.3 HYPERPARATHYROIDISM (HCC): Primary | ICD-10-CM

## 2024-06-14 ENCOUNTER — TELEPHONE (OUTPATIENT)
Age: 59
End: 2024-06-14

## 2024-06-14 DIAGNOSIS — I48.0 PAROXYSMAL ATRIAL FIBRILLATION (HCC): Primary | ICD-10-CM

## 2024-06-14 DIAGNOSIS — R80.9 CONTROLLED TYPE 2 DIABETES MELLITUS WITH MICROALBUMINURIA, WITHOUT LONG-TERM CURRENT USE OF INSULIN  (HCC): ICD-10-CM

## 2024-06-14 DIAGNOSIS — E11.29 CONTROLLED TYPE 2 DIABETES MELLITUS WITH MICROALBUMINURIA, WITHOUT LONG-TERM CURRENT USE OF INSULIN  (HCC): ICD-10-CM

## 2024-06-17 DIAGNOSIS — E11.42 DIABETIC PERIPHERAL NEUROPATHY (HCC): ICD-10-CM

## 2024-06-17 RX ORDER — GABAPENTIN 600 MG/1
600 TABLET ORAL 3 TIMES DAILY
Qty: 270 TABLET | Refills: 0 | Status: SHIPPED | OUTPATIENT
Start: 2024-06-17

## 2024-06-28 ENCOUNTER — NURSE TRIAGE (OUTPATIENT)
Age: 59
End: 2024-06-28

## 2024-06-28 ENCOUNTER — OFFICE VISIT (OUTPATIENT)
Dept: NEPHROLOGY | Facility: CLINIC | Age: 59
End: 2024-06-28
Payer: COMMERCIAL

## 2024-06-28 VITALS
HEIGHT: 72 IN | BODY MASS INDEX: 33.72 KG/M2 | DIASTOLIC BLOOD PRESSURE: 80 MMHG | WEIGHT: 249 LBS | SYSTOLIC BLOOD PRESSURE: 128 MMHG | OXYGEN SATURATION: 96 % | HEART RATE: 64 BPM

## 2024-06-28 DIAGNOSIS — E11.21 DIABETIC NEPHROPATHY ASSOCIATED WITH TYPE 2 DIABETES MELLITUS (HCC): ICD-10-CM

## 2024-06-28 DIAGNOSIS — E83.42 HYPOMAGNESEMIA: ICD-10-CM

## 2024-06-28 DIAGNOSIS — R80.1 PERSISTENT PROTEINURIA: ICD-10-CM

## 2024-06-28 DIAGNOSIS — E55.9 VITAMIN D INSUFFICIENCY: ICD-10-CM

## 2024-06-28 DIAGNOSIS — N25.81 SECONDARY HYPERPARATHYROIDISM OF RENAL ORIGIN (HCC): ICD-10-CM

## 2024-06-28 DIAGNOSIS — I10 PRIMARY HYPERTENSION: Primary | ICD-10-CM

## 2024-06-28 DIAGNOSIS — N18.31 STAGE 3A CHRONIC KIDNEY DISEASE (HCC): ICD-10-CM

## 2024-06-28 DIAGNOSIS — E26.9 HYPERALDOSTERONISM (HCC): ICD-10-CM

## 2024-06-28 DIAGNOSIS — E55.9 VITAMIN D DEFICIENCY: ICD-10-CM

## 2024-06-28 DIAGNOSIS — R60.0 LEG EDEMA: ICD-10-CM

## 2024-06-28 PROCEDURE — 99214 OFFICE O/P EST MOD 30 MIN: CPT

## 2024-06-28 RX ORDER — SPIRONOLACTONE 25 MG/1
25 TABLET ORAL DAILY
COMMUNITY

## 2024-06-28 RX ORDER — FUROSEMIDE 20 MG/1
20 TABLET ORAL DAILY
Qty: 90 TABLET | Refills: 1 | Status: SHIPPED | OUTPATIENT
Start: 2024-06-28

## 2024-06-28 NOTE — PROGRESS NOTES
OFFICE FOLLOW UP - Nephrology   Fawad RONALD Baer 59 y.o. male MRN: [id number]       ASSESSMENT/PLAN:    Marciano was seen today for follow-up.    Diagnoses and all orders for this visit:    Primary hypertension    Stage 3a chronic kidney disease (HCC)  -     Basic metabolic panel; Future  -     Basic metabolic panel; Future    Persistent proteinuria  -     Albumin / creatinine urine ratio; Future  -     Urinalysis with microscopic; Future  -     Albumin / creatinine urine ratio; Future  -     Urinalysis with microscopic; Future    Vitamin D deficiency  -     Vitamin D 25 hydroxy; Future  -     Vitamin D 25 hydroxy; Future    Hyperaldosteronism (HCC)  -     Basic metabolic panel; Future  -     Basic metabolic panel; Future    Diabetic nephropathy associated with type 2 diabetes mellitus (HCC)  -     Basic metabolic panel; Future  -     Basic metabolic panel; Future    Vitamin D insufficiency    Leg edema  -     furosemide (LASIX) 20 mg tablet; Take 1 tablet (20 mg total) by mouth daily As needed for leg edema    Secondary hyperparathyroidism of renal origin (HCC)  -     Phosphorus; Future  -     PTH, intact; Future  -     Phosphorus; Future  -     PTH, intact; Future    Hypomagnesemia  -     Magnesium; Future  -     Magnesium; Future          Stage IIIa chronic kidney disease, baseline creatinine 1.6-1.7 mg/dL  Etiology: diabetic nephropathy, potentially obesity-related FSGS, HTN nephrosclerosis.   Creatinine 1.51 mg/dL, appropriate, GFR 49 mg/dL, 4/19.     UA 3+ protein, 3-5 hyaline casts, 1/11. UACR 2521 mg/g, increased, 1/11.  CT/US  Continue to avoid NSAIDs and nephrotoxins. Stay well hydrated.   Consider restarting lisinopril after checking labs this week.     Essential hypertension  Office blood pressure 128/80 mmHg  Home blood pressure has not been checking at home recently  Continue 2 gm sodium restriction.   Continue carvedilol 6.25 mg twice daily. Restarted taking  spironolactone 25 mg daily 2 days ago, he will  contact provider conducting dexamethasone suppression test to confirm if he should stop taking prior to test.    Persistent proteinuria  UACR 2521 mg/g, 1/11  Recheck BMP with consideration to restart lisinopril.     Primary hyperparathyroidism   Persistent hypercalcemia workup by endocrinology noting left  parathyroid gland nodule with surgical consultation in July.     Vitamin D deficiency  Vitamin D 22.5 ng/mL, decreased, 4/19. Continue cholecalciferol 5000 units daily.    Bilateral lower extremity edema  Bilateral lower extremities examine trace BLE edema.  Start lasix 20 mg daily as needed. States previously on lasix 40 mg per day, last took over one year ago. Advised to contact office if 20 mg was not effective with fluid removal.     Hyperaldosteronism   Had left adrenal gland rupture with retroperitoneal hemorrhage requiring emergency hospitalization at [facility] in March 2024. Has dexamethasone suppression test scheduled for next week.     Type 2 diabetes mellitus   HA1C 5.0%, appropriate, 4/19  Continue management as per endocrinology.      PATIENT INSTRUCTIONS:  Patient Instructions   Pleasure seeing you today.     Please have lab work completed at your convenience in the next week or two. Will consider restarting lisinopril depending on lab results.     Continue taking carvedilol as prescribed for blood pressure.     Start lasix 20 gm daily as needed for leg edema.   Check with provider who is conducting dexamethasone suppression test to determine if you should be taking spironolactone.     Please return to see Dr. Pierce in approximately 6 months with labs prior to that visit.         HPI: Fawad Baer is a 59 y.o. male who is here for bilateral lower leg edema, stage 3 chronic kidney disease, .     Denies recent hospitalizations, emergency department visits or illness. States overall has been feeling well.     ROS:   A complete review of systems was done. Pertinent positives and negatives as noted in  the HPI, otherwise the review of systems is negative.    Constitutional: Denies fever, excessive fatigue, or unintentional weight loss.  HEENT: Denies headache, dizziness, lightheadedness, or blurred vision.  Respiratory: Denies cough, shortness of breath, wheezing or chest pain.  Cardiovascular: Denies palpitation, chest pain, cramping or edema.  Gastrointestinal: Denies abdominal pain, nausea, vomiting, diarrhea, constipation, or changes in appetite or bowel habits.  Genitourinary: Denies changes in urinary frequency or amount, dysuria, hematuria, flank pain, abdominal pain, difficulty with urinary stream or incomplete bladder emptying.  Musculoskeletal: Denies joint pain, muscle weakness, back pain or cramping.  Neurological: Denies headaches, dizziness, numbness, or tingling.  Psychiatric: Denies depression, anxiety, or changes in mood or behavior.  Endocrine: Denies heat or cold intolerance, excessive thirst or excessive hunger.  Hematological/lymphatic: Denies easy bruising, bleeding or swollen lymph nodes.  Integumentary: Denies skin lesions, rashes or wounds.        Physical Exam:   Vitals:    06/28/24 1444   BP: 128/80   BP Location: Right arm   Patient Position: Sitting   Pulse: 64   SpO2: 96%   Weight: 113 kg (249 lb)   Height: 6' (1.829 m)     Body mass index is 33.77 kg/m².    General: no acute distress   Eyes: conjunctivae pink, anicteric sclerae  ENT: mucous membranes moist  Neck: supple, no JVD  Chest: clear to auscultation bilaterally with no wheezes, rale or rhochi  CVS: regular rate and rhythm   Abdomen: soft, non-tender, non-distended  Extremities: no lower extremity edema   Skin: no rash  Neuro: awake and alert       Allergies: Patient has no known allergies.    Medications:   Current Outpatient Medications:     allopurinol (ZYLOPRIM) 300 mg tablet, Take 1 tablet (300 mg total) by mouth daily, Disp: 90 tablet, Rfl: 1    apixaban (Eliquis) 5 mg, Take 1 tablet (5 mg total) by mouth 2 (two) times a  day, Disp: 60 tablet, Rfl: 3    carvedilol (COREG) 6.25 mg tablet, Take 1 tablet in the morning and 1-1/2 tablets in the evening, Disp: 90 tablet, Rfl: 1    cholecalciferol 1,000 units tablet, Take 4 tablets (4,000 Units total) by mouth daily (Patient taking differently: Take 5,000 Units by mouth daily), Disp: 180 tablet, Rfl: 1    dexamethasone (DECADRON) 1 mg tablet, Take 1 mg by mouth, Disp: , Rfl:     ezetimibe (ZETIA) 10 mg tablet, Take 1 tablet (10 mg total) by mouth daily, Disp: 90 tablet, Rfl: 0    furosemide (LASIX) 20 mg tablet, Take 1 tablet (20 mg total) by mouth daily As needed for leg edema, Disp: 90 tablet, Rfl: 1    gabapentin (NEURONTIN) 600 MG tablet, Take 1 tablet (600 mg total) by mouth 3 (three) times a day, Disp: 270 tablet, Rfl: 0    levothyroxine 175 mcg tablet, Take one tablet daily on empty stomach 1 hour before breakfast and 4 hours apart from calcium and vitamin D supplementation, Disp: 90 tablet, Rfl: 1    metFORMIN (GLUCOPHAGE-XR) 500 mg 24 hr tablet, Take 1 tablet (500 mg total) by mouth daily at bedtime, Disp: 90 tablet, Rfl: 0    semaglutide, 1 mg/dose, (Ozempic, 1 MG/DOSE,) 4 mg/3 mL injection pen, Inject 0.75 mL (1 mg total) under the skin every 7 days, Disp: 3 mL, Rfl: 5    sildenafil (Viagra) 100 mg tablet, Take 1 tablet (100 mg total) by mouth as needed for erectile dysfunction, Disp: 30 tablet, Rfl: 0    spironolactone (ALDACTONE) 25 mg tablet, Take 25 mg by mouth daily, Disp: , Rfl:     traMADol (Ultram) 50 mg tablet, Take 1 tablet (50 mg total) by mouth every 8 (eight) hours as needed for moderate pain, Disp: 20 tablet, Rfl: 0    Past Medical History:   Diagnosis Date    Arthritis     right foot    Chronic cholecystitis     CPAP (continuous positive airway pressure) dependence     Diabetes mellitus (HCC)     Diabetic nephropathy (HCC)     Disease of thyroid gland 1990    DVT (deep venous thrombosis) (HCC)     Gout     History of pulmonary embolism     Hyperlipidemia      Hypertension     Hypothyroidism     Lumbar back pain     MTHFR mutation     Neuropathy     Neuropathy in diabetes (HCC) 2018    Scab     right arm- no s/s infection    Sleep apnea     Tarsal tunnel syndrome, right     Tinnitus     left ear    Wears glasses     and contacts    Wears glasses      Past Surgical History:   Procedure Laterality Date    APPENDECTOMY      ARTHRODESIS      Lumbar L__    BACK SURGERY      BUNIONECTOMY Right 10/07/2016    Procedure: REMOVAL TIBIAL  SESAMOID BONE  RIGHT FOOT;  Surgeon: Vicente Aguirre DPM;  Location[facility];  Service:     CARDIAC PACEMAKER PLACEMENT  09/01/2021    CHOLECYSTECTOMY  03/26/2015    COLONOSCOPY      KNEE ARTHROSCOPY      KNEE ARTHROSCOPY W/ MENISCAL REPAIR      Lateral    NASAL SEPTUM SURGERY  10/16/2013    PLANTAR FASCIA SURGERY Left 10/26/2023    Procedure: RELEASE FASCIA PLANTAR/FASCIOTOMY;  Surgeon: Inderjit Ramirez DPM;  Location: [facility];  Service: Podiatry    AK COLONOSCOPY FLX DX W/COLLJ SPEC WHEN PFRMD N/A 11/23/2018    Procedure: COLONOSCOPY;  Surgeon: Manjit Dietrich MD;  [facility]: [facility];  Service: Gastroenterology    AK RELEASE TARSAL TUNNEL Right 06/25/2018    Procedure: RELEASE TARSAL TUNNEL;  Surgeon: Cliff Bernabe DPM;  [facility];  Service: Podiatry    AK RELEASE TARSAL TUNNEL Left 03/13/2020    Procedure: RELEASE TARSAL TUNNEL;  Surgeon: Cliff Bernabe DPM;  Location:  [facility];  Service: Podiatry    AK RELEASE TARSAL TUNNEL Left 10/26/2023    Procedure: RELEASE TARSAL TUNNEL;  Surgeon: Inderjit Ramirez DPM;  [facility]: [facility];  Service: Podiatry    SPINAL FUSION      TONSILLECTOMY  10/16/2013    with Adenoidectomy    UVULECTOMY  10/16/2013    UVULOPALATOPHARYNGOPLASTY      WISDOM TOOTH EXTRACTION       Family History   Problem Relation Age of Onset    Aneurysm Mother         intracranial aneurysm repair    Coronary artery disease Father     Hypertension Father     Heart disease Father      "Aneurysm Brother       reports that he has never smoked. He has never used smokeless tobacco. He reports current alcohol use of about 6.0 standard drinks of alcohol per week. He reports that he does not use drugs.      Lab Results:  Results for orders placed or performed in visit on 04/19/24   Hemoglobin A1C   Result Value Ref Range    Hemoglobin A1C 5.0 Normal 4.0-5.6%; PreDiabetic 5.7-6.4%; Diabetic >=6.5%; Glycemic control for adults with diabetes <7.0% %    EAG 97 mg/dl   Lipid Panel with Direct LDL reflex Lab Collect   Result Value Ref Range    Cholesterol 152 See Comment mg/dL    Triglycerides 194 (H) See Comment mg/dL    HDL, Direct 28 (L) >=40 mg/dL    LDL Calculated 85 0 - 100 mg/dL   Phospholipase A2 Receptor Antibodies   Result Value Ref Range    PHOSPHOLIPASE A2 RECEPTOR ELI, S <1.8 0.0 - 19.9 RU/mL   CBC and differential   Result Value Ref Range    WBC 4.96 4.31 - 10.16 Thousand/uL    RBC 4.44 3.88 - 5.62 Million/uL    Hemoglobin 13.6 12.0 - 17.0 g/dL    Hematocrit 44.7 36.5 - 49.3 %     (H) 82 - 98 fL    MCH 30.6 26.8 - 34.3 pg    MCHC 30.4 (L) 31.4 - 37.4 g/dL    RDW 16.8 (H) 11.6 - 15.1 %    MPV 10.7 8.9 - 12.7 fL    Platelets 189 149 - 390 Thousands/uL    nRBC 0 /100 WBCs    Segmented % 59 43 - 75 %    Immature Grans % 0 0 - 2 %    Lymphocytes % 18 14 - 44 %    Monocytes % 18 (H) 4 - 12 %    Eosinophils Relative 4 0 - 6 %    Basophils Relative 1 0 - 1 %    Absolute Neutrophils 2.92 1.85 - 7.62 Thousands/µL    Absolute Immature Grans 0.01 0.00 - 0.20 Thousand/uL    Absolute Lymphocytes 0.88 0.60 - 4.47 Thousands/µL    Absolute Monocytes 0.90 0.17 - 1.22 Thousand/µL    Eosinophils Absolute 0.22 0.00 - 0.61 Thousand/µL    Basophils Absolute 0.03 0.00 - 0.10 Thousands/µL             Invalid input(s): \"ALBUMIN\"      Portions of the record may have been created with voice recognition software. Occasional wrong word or \"sound a like\" substitutions may have occurred due to the inherent limitations " of voice recognition software. Read the chart carefully and recognize, using context, where substitutions have occurred.If you have any questions, please contact the dictating provider.

## 2024-06-28 NOTE — PATIENT INSTRUCTIONS
Pleasure seeing you today.     Please have lab work completed at your convenience in the next week or two. Will consider restarting lisinopril depending on lab results.     Continue taking carvedilol as prescribed for blood pressure.     Start lasix 20 gm daily as needed for leg edema.   Check with provider who is conducting dexamethasone suppression test to determine if you should be taking spironolactone.     Please return to see Dr. Pierce in approximately 6 months with labs prior to that visit.

## 2024-06-28 NOTE — TELEPHONE ENCOUNTER
"Patient calling and was scheduled for an appointment today at 2:15pm. He said he had some leg swelling that started last week, and is on both ankles. Denies chest pain and SOB. This can be discussed at his visit today. He said he had lab work in February of this year, and it was never gone over with him. If he can get an update on this while at his appointment today.       Reason for Disposition   Patient wants to be seen    Answer Assessment - Initial Assessment Questions  1. ONSET: \"When did the swelling start?\" (e.g., minutes, hours, days)      Week ago   2. LOCATION: \"What part of the leg is swollen?\"  \"Are both legs swollen or just one leg?\"      Ankles, both, but more on the left.  3. SEVERITY: \"How bad is the swelling?\" (e.g., localized; mild, moderate, severe)   - Localized - small area of swelling localized to one leg   - MILD pedal edema - swelling limited to foot and ankle, pitting edema < 1/4 inch (6 mm) deep, rest and elevation eliminate most or all swelling   - MODERATE edema - swelling of lower leg to knee, pitting edema > 1/4 inch (6 mm) deep, rest and elevation only partially reduce swelling   - SEVERE edema - swelling extends above knee, facial or hand swelling present       mild  4. REDNESS: \"Does the swelling look red or infected?\"      denies  5. PAIN: \"Is the swelling painful to touch?\" If Yes, ask: \"How painful is it?\"   (Scale 1-10; mild, moderate or severe)      Denies   6. FEVER: \"Do you have a fever?\" If Yes, ask: \"What is it, how was it measured, and when did it start?\"       denies  7. CAUSE: \"What do you think is causing the leg swelling?\"      unsure  8. MEDICAL HISTORY: \"Do you have a history of heart failure, kidney disease, liver failure, or cancer?\"      Kidney disease   9. RECURRENT SYMPTOM: \"Have you had leg swelling before?\" If Yes, ask: \"When was the last time?\" \"What happened that time?\"      Years ago   10. OTHER SYMPTOMS: \"Do you have any other symptoms?\" (e.g., chest pain, " difficulty breathing)        Denies    Protocols used: Leg Swelling and Edema-ADULT-[place]

## 2024-06-29 ENCOUNTER — LAB (OUTPATIENT)
Dept: LAB | Facility: MEDICAL CENTER | Age: 59
End: 2024-06-29
Payer: COMMERCIAL

## 2024-06-29 DIAGNOSIS — E55.9 VITAMIN D DEFICIENCY: ICD-10-CM

## 2024-06-29 DIAGNOSIS — E03.9 HYPOTHYROIDISM (ACQUIRED): ICD-10-CM

## 2024-06-29 DIAGNOSIS — R80.1 PERSISTENT PROTEINURIA: ICD-10-CM

## 2024-06-29 DIAGNOSIS — E26.9 HYPERALDOSTERONISM (HCC): ICD-10-CM

## 2024-06-29 DIAGNOSIS — N25.81 SECONDARY HYPERPARATHYROIDISM OF RENAL ORIGIN (HCC): ICD-10-CM

## 2024-06-29 DIAGNOSIS — D53.1 MEGALOBLASTIC ANEMIA: ICD-10-CM

## 2024-06-29 DIAGNOSIS — N18.31 STAGE 3A CHRONIC KIDNEY DISEASE (HCC): ICD-10-CM

## 2024-06-29 DIAGNOSIS — E83.42 HYPOMAGNESEMIA: ICD-10-CM

## 2024-06-29 DIAGNOSIS — E11.21 DIABETIC NEPHROPATHY ASSOCIATED WITH TYPE 2 DIABETES MELLITUS (HCC): ICD-10-CM

## 2024-06-29 LAB
25(OH)D3 SERPL-MCNC: 32.5 NG/ML (ref 30–100)
ANION GAP SERPL CALCULATED.3IONS-SCNC: 5 MMOL/L (ref 4–13)
BACTERIA UR QL AUTO: ABNORMAL /HPF
BILIRUB UR QL STRIP: NEGATIVE
BUN SERPL-MCNC: 23 MG/DL (ref 5–25)
CALCIUM SERPL-MCNC: 9.9 MG/DL (ref 8.4–10.2)
CHLORIDE SERPL-SCNC: 107 MMOL/L (ref 96–108)
CLARITY UR: ABNORMAL
CO2 SERPL-SCNC: 26 MMOL/L (ref 21–32)
COLOR UR: ABNORMAL
CREAT SERPL-MCNC: 1.28 MG/DL (ref 0.6–1.3)
CREAT UR-MCNC: 62.6 MG/DL
FOLATE SERPL-MCNC: 6.7 NG/ML
GFR SERPL CREATININE-BSD FRML MDRD: 60 ML/MIN/1.73SQ M
GLUCOSE P FAST SERPL-MCNC: 87 MG/DL (ref 65–99)
GLUCOSE UR STRIP-MCNC: NEGATIVE MG/DL
HGB UR QL STRIP.AUTO: ABNORMAL
KETONES UR STRIP-MCNC: NEGATIVE MG/DL
LEUKOCYTE ESTERASE UR QL STRIP: ABNORMAL
MAGNESIUM SERPL-MCNC: 2.1 MG/DL (ref 1.9–2.7)
MICROALBUMIN UR-MCNC: 1750.5 MG/L
MICROALBUMIN/CREAT 24H UR: 2796 MG/G CREATININE (ref 0–30)
NITRITE UR QL STRIP: NEGATIVE
NON-SQ EPI CELLS URNS QL MICRO: ABNORMAL /HPF
PH UR STRIP.AUTO: 6 [PH]
PHOSPHATE SERPL-MCNC: 3.4 MG/DL (ref 2.7–4.5)
POTASSIUM SERPL-SCNC: 5 MMOL/L (ref 3.5–5.3)
PROT UR STRIP-MCNC: ABNORMAL MG/DL
PTH-INTACT SERPL-MCNC: 108.7 PG/ML (ref 12–88)
RBC #/AREA URNS AUTO: ABNORMAL /HPF
SODIUM SERPL-SCNC: 138 MMOL/L (ref 135–147)
SP GR UR STRIP.AUTO: 1.01 (ref 1–1.03)
T4 FREE SERPL-MCNC: 1.2 NG/DL (ref 0.61–1.12)
TRANS CELLS #/AREA URNS HPF: PRESENT /[HPF]
TSH SERPL DL<=0.05 MIU/L-ACNC: 1.35 UIU/ML (ref 0.45–4.5)
UROBILINOGEN UR STRIP-ACNC: <2 MG/DL
VIT B12 SERPL-MCNC: 476 PG/ML (ref 180–914)
WBC #/AREA URNS AUTO: ABNORMAL /HPF
WBC CLUMPS # UR AUTO: PRESENT /UL

## 2024-06-29 PROCEDURE — 82043 UR ALBUMIN QUANTITATIVE: CPT

## 2024-06-29 PROCEDURE — 84439 ASSAY OF FREE THYROXINE: CPT

## 2024-06-29 PROCEDURE — 82607 VITAMIN B-12: CPT

## 2024-06-29 PROCEDURE — 84443 ASSAY THYROID STIM HORMONE: CPT

## 2024-06-29 PROCEDURE — 82306 VITAMIN D 25 HYDROXY: CPT

## 2024-06-29 PROCEDURE — 81001 URINALYSIS AUTO W/SCOPE: CPT

## 2024-06-29 PROCEDURE — 83735 ASSAY OF MAGNESIUM: CPT

## 2024-06-29 PROCEDURE — 83970 ASSAY OF PARATHORMONE: CPT

## 2024-06-29 PROCEDURE — 82746 ASSAY OF FOLIC ACID SERUM: CPT

## 2024-06-29 PROCEDURE — 80048 BASIC METABOLIC PNL TOTAL CA: CPT

## 2024-06-29 PROCEDURE — 82570 ASSAY OF URINE CREATININE: CPT

## 2024-06-29 PROCEDURE — 36415 COLL VENOUS BLD VENIPUNCTURE: CPT

## 2024-06-29 PROCEDURE — 84100 ASSAY OF PHOSPHORUS: CPT

## 2024-07-01 ENCOUNTER — TELEPHONE (OUTPATIENT)
Dept: OTHER | Facility: HOSPITAL | Age: 59
End: 2024-07-01

## 2024-07-01 DIAGNOSIS — N18.30 STAGE 3 CHRONIC KIDNEY DISEASE, UNSPECIFIED WHETHER STAGE 3A OR 3B CKD (HCC): ICD-10-CM

## 2024-07-01 DIAGNOSIS — R80.9 PROTEINURIA: Primary | ICD-10-CM

## 2024-07-01 PROCEDURE — 3066F NEPHROPATHY DOC TX: CPT | Performed by: NURSE PRACTITIONER

## 2024-07-01 PROCEDURE — 4010F ACE/ARB THERAPY RXD/TAKEN: CPT | Performed by: NURSE PRACTITIONER

## 2024-07-01 RX ORDER — LISINOPRIL 10 MG/1
10 TABLET ORAL DAILY
Qty: 30 TABLET | Refills: 2 | Status: SHIPPED | OUTPATIENT
Start: 2024-07-01

## 2024-07-02 ENCOUNTER — TELEPHONE (OUTPATIENT)
Age: 59
End: 2024-07-02

## 2024-07-02 NOTE — TELEPHONE ENCOUNTER
Contacted the patient to R/S his appointment on 07/12/2024. The decision tree made a mistake and the patient needs to be seen in a “Parathyroid” slot, not new patient slot.     Please R/S the patient in a parathyroid slot.

## 2024-07-11 ENCOUNTER — HOSPITAL ENCOUNTER (OUTPATIENT)
Dept: MRI IMAGING | Facility: HOSPITAL | Age: 59
Discharge: HOME | End: 2024-07-11

## 2024-07-12 PROBLEM — E21.3 HYPERPARATHYROIDISM (HCC): Status: ACTIVE | Noted: 2024-07-12

## 2024-07-17 ENCOUNTER — OFFICE VISIT (OUTPATIENT)
Dept: SURGICAL ONCOLOGY | Facility: CLINIC | Age: 59
End: 2024-07-17
Payer: COMMERCIAL

## 2024-07-17 VITALS
DIASTOLIC BLOOD PRESSURE: 88 MMHG | HEIGHT: 72 IN | WEIGHT: 254 LBS | TEMPERATURE: 97.2 F | OXYGEN SATURATION: 95 % | SYSTOLIC BLOOD PRESSURE: 138 MMHG | BODY MASS INDEX: 34.4 KG/M2 | HEART RATE: 79 BPM

## 2024-07-17 DIAGNOSIS — E21.3 HYPERPARATHYROIDISM (HCC): Primary | ICD-10-CM

## 2024-07-17 PROCEDURE — 99204 OFFICE O/P NEW MOD 45 MIN: CPT | Performed by: SURGERY

## 2024-07-17 NOTE — PROGRESS NOTES
Surgical Oncology Consult       [address]  CANCER CARE [facility]  [address]  [facility] [address]    Fawad Baer  1965  [id number]  [address]  [facility] SURGICAL ONCOLOGY [place]  [address]  [facility] [address]    Chief Complaint   Patient presents with    Follow-up     New problem       Assessment/Plan:    No problem-specific Assessment & Plan notes found for this encounter.       Diagnoses and all orders for this visit:    Hyperparathyroidism (HCC)  -     Ambulatory Referral to Surgical Oncology  -     CT parathyroid study w wo contrast; Future      Advance Care Planning/Advance Directives:  Discussed disease status, cancer treatment plans and/or cancer treatment goals with the patient.     Oncology History    No history exists.       History of Present Illness: Patient is a 59-year-old man referred for primary hyperparathyroidism.  He was found to have achiness, fatigue, and issues with memory corresponding to elevated calcium levels.  PTH chills were finally elevated which prompted a sestamibi scan.  He is referred for workup and treatment of possible right-sided parathyroid adenoma.  No history of kidney stones.  No GI complaints.  No personal or family history of any malignancies.    Review of Systems   Constitutional:  Positive for fatigue.   HENT: Negative.     Eyes: Negative.    Respiratory: Negative.     Cardiovascular: Negative.    Gastrointestinal: Negative.    Endocrine: Negative.    Genitourinary: Negative.    Musculoskeletal: Negative.    Skin: Negative.    Allergic/Immunologic: Negative.    Neurological: Negative.    Hematological: Negative.    Psychiatric/Behavioral: Negative.     All other systems reviewed and are negative.        Patient Active Problem List   Diagnosis    Controlled type 2 diabetes mellitus with microalbuminuria, without long-term current use of insulin  (HCC)    Painful diabetic  neuropathy (HCC)    Erectile dysfunction due to diseases classified elsewhere    Homozygous for MTHFR gene mutation    Hypothyroidism (acquired)    Sacroiliitis (HCC)    Tarsal tunnel syndrome of both lower extremities    Tarsal tunnel syndrome, left    Venous stasis ulcer of left ankle limited to breakdown of skin with varicose veins (HCC)    Gout    Hyperaldosteronism (HCC)    Diabetic nephropathy associated with type 2 diabetes mellitus (HCC)    Stage 3a chronic kidney disease (HCC)    Proteinuria    Vitamin D deficiency    Syncopal episodes     Paroxysmal atrial fibrillation (HCC)    History of DVT (deep vein thrombosis)    Sinus bradycardia    Obstructive sleep apnea    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HCC)    Pacemaker    Primary hypertension    LVH (left ventricular hypertrophy)    Plantar fasciitis    Post-operative state    Tarsal tunnel syndrome of left side    Pain in both feet    Chronic pain syndrome    Mid back pain    Hyperparathyroidism (HCC)     Past Medical History:   Diagnosis Date    Arthritis     right foot    Chronic cholecystitis     CPAP (continuous positive airway pressure) dependence     Diabetes mellitus (HCC)     Diabetic nephropathy (HCC)     Disease of thyroid gland 1990    DVT (deep venous thrombosis) (HCC)     Gout     History of pulmonary embolism     Hyperlipidemia     Hypertension     Hypothyroidism     Lumbar back pain     MTHFR mutation     Neuropathy     Neuropathy in diabetes (HCC) 2018    Scab     right arm- no s/s infection    Sleep apnea     Tarsal tunnel syndrome, right     Tinnitus     left ear    Wears glasses     and contacts    Wears glasses      Past Surgical History:   Procedure Laterality Date    APPENDECTOMY      ARTHRODESIS      Lumbar L__    BACK SURGERY      BUNIONECTOMY Right 10/07/2016    Procedure: REMOVAL TIBIAL  SESAMOID BONE  RIGHT FOOT;  Surgeon: Vicente Aguirre DPM;  Location: [facility];  Service:     CARDIAC PACEMAKER PLACEMENT   09/01/2021    CHOLECYSTECTOMY  03/26/2015    COLONOSCOPY      KNEE ARTHROSCOPY      KNEE ARTHROSCOPY W/ MENISCAL REPAIR      Lateral    NASAL SEPTUM SURGERY  10/16/2013    PLANTAR FASCIA SURGERY Left 10/26/2023    Procedure: RELEASE FASCIA PLANTAR/FASCIOTOMY;  Surgeon: Inderjit Ramirez DPM;  Location: [facility];  Service: Podiatry    MI COLONOSCOPY FLX DX W/COLLJ SPEC WHEN PFRMD N/A 11/23/2018    Procedure: COLONOSCOPY;  Surgeon: Manjit Dietrich MD;  Location: [facility];  Service: Gastroenterology    MI RELEASE TARSAL TUNNEL Right 06/25/2018    Procedure: RELEASE TARSAL TUNNEL;  Surgeon: Cliff Bernabe DPM;  Location: [facility];  Service: Podiatry    MI RELEASE TARSAL TUNNEL Left 03/13/2020    Procedure: RELEASE TARSAL TUNNEL;  Surgeon: Cliff Bernabe DPM;  Location:  MAIN OR;  Service: Podiatry    MI RELEASE TARSAL TUNNEL Left 10/26/2023    Procedure: RELEASE TARSAL TUNNEL;  Surgeon: Inderjit Ramirez DPM;  Location: [facility];  Service: Podiatry    SPINAL FUSION      TONSILLECTOMY  10/16/2013    with Adenoidectomy    UVULECTOMY  10/16/2013    UVULOPALATOPHARYNGOPLASTY      WISDOM TOOTH EXTRACTION       Family History   Problem Relation Age of Onset    Aneurysm Mother         intracranial aneurysm repair    Coronary artery disease Father     Hypertension Father     Heart disease Father     Aneurysm Brother      Social History     Socioeconomic History    Marital status: Single     Spouse name: Not on file    Number of children: Not on file    Years of education: Not on file    Highest education level: Not on file   Occupational History    Not on file   Tobacco Use    Smoking status: Never    Smokeless tobacco: Never   Vaping Use    Vaping status: Never Used   Substance and Sexual Activity    Alcohol use: Yes     Alcohol/week: 6.0 standard drinks of alcohol     Types: 6 Cans of beer per week     Comment: weekends    Drug use: No    Sexual activity: Yes     Partners: Female     Birth  control/protection: Other   Other Topics Concern    Not on file   Social History Narrative    Not on file     Social Determinants of Health     Financial Resource Strain: Low Risk  (3/16/2024)    [facility]    Overall Financial Resource Strain (CARDIA)     Difficulty of Paying Living Expenses: Not hard at all   Food Insecurity: No Food Insecurity (3/16/2024)    [facility] from [facility], [facility]    Hunger Vital Sign     Worried About Running Out of Food in the Last Year: Never true     Ran Out of Food in the Last Year: Never true   Transportation Needs: No Transportation Needs (3/16/2024)    Received from [facility], [facility]    PRAPARE - Transportation     Lack of Transportation (Medical): No     Lack of Transportation (Non-Medical): No   Physical Activity: Not on file   Stress: Not on file   Social Connections: Not on file   Intimate Partner Violence: Not At Risk (3/16/2024)    Received from [facility], [facility]    Humiliation, Afraid, Rape, and Kick questionnaire     Fear of Current or Ex-Partner: No     Emotionally Abused: No     Physically Abused: No     Sexually Abused: No   Housing Stability: Low Risk  (3/16/2024)    [facility] from [facility], [facility]    Housing Stability Vital Sign     Unable to Pay for Housing in the Last Year: No     Number of Places Lived in the Last Year: 1     Unstable Housing in the Last Year: No       Current Outpatient Medications:     allopurinol (ZYLOPRIM) 300 mg tablet, Take 1 tablet (300 mg total) by mouth daily, Disp: 90 tablet, Rfl: 1    apixaban (Eliquis) 5 mg, Take 1 tablet (5 mg total) by mouth 2 (two) times a day, Disp: 60 tablet, Rfl: 3    carvedilol (COREG) 6.25 mg tablet, Take 1 tablet in the morning and 1-1/2 tablets in the evening, Disp: 90 tablet, Rfl: 1     ezetimibe (ZETIA) 10 mg tablet, Take 1 tablet (10 mg total) by mouth daily, Disp: 90 tablet, Rfl: 0    furosemide (LASIX) 20 mg tablet, Take 1 tablet (20 mg total) by mouth daily As needed for leg edema, Disp: 90 tablet, Rfl: 1    gabapentin (NEURONTIN) 600 MG tablet, Take 1 tablet (600 mg total) by mouth 3 (three) times a day, Disp: 270 tablet, Rfl: 0    levothyroxine 175 mcg tablet, Take one tablet daily on empty stomach 1 hour before breakfast and 4 hours apart from calcium and vitamin D supplementation, Disp: 90 tablet, Rfl: 1    lisinopril (ZESTRIL) 10 mg tablet, Take 1 tablet (10 mg total) by mouth daily, Disp: 30 tablet, Rfl: 2    semaglutide, 1 mg/dose, (Ozempic, 1 MG/DOSE,) 4 mg/3 mL injection pen, Inject 0.75 mL (1 mg total) under the skin every 7 days, Disp: 3 mL, Rfl: 5    sildenafil (Viagra) 100 mg tablet, Take 1 tablet (100 mg total) by mouth as needed for erectile dysfunction, Disp: 30 tablet, Rfl: 0    traMADol (Ultram) 50 mg tablet, Take 1 tablet (50 mg total) by mouth every 8 (eight) hours as needed for moderate pain, Disp: 20 tablet, Rfl: 0    cholecalciferol 1,000 units tablet, Take 4 tablets (4,000 Units total) by mouth daily (Patient taking differently: Take 5,000 Units by mouth daily), Disp: 180 tablet, Rfl: 1    dexamethasone (DECADRON) 1 mg tablet, Take 1 mg by mouth (Patient not taking: Reported on 7/17/2024), Disp: , Rfl:     metFORMIN (GLUCOPHAGE-XR) 500 mg 24 hr tablet, Take 1 tablet (500 mg total) by mouth daily at bedtime (Patient not taking: Reported on 7/17/2024), Disp: 90 tablet, Rfl: 0    spironolactone (ALDACTONE) 25 mg tablet, Take 25 mg by mouth daily, Disp: , Rfl:   No Known Allergies  Vitals:    07/17/24 0904   BP: 138/88   Pulse: 79   Temp: (!) 97.2 °F (36.2 °C)   SpO2: 95%       Physical Exam  Vitals reviewed.   Constitutional:       Appearance: Normal appearance.   HENT:      Head: Normocephalic and atraumatic.      Right Ear: External ear normal.      Left Ear: External  ear normal.      Nose: Nose normal.   Eyes:      Extraocular Movements: Extraocular movements intact.      Pupils: Pupils are equal, round, and reactive to light.   Cardiovascular:      Rate and Rhythm: Normal rate and regular rhythm.      Heart sounds: Normal heart sounds.   Pulmonary:      Effort: Pulmonary effort is normal.      Breath sounds: Normal breath sounds.   Abdominal:      General: Abdomen is flat.      Palpations: Abdomen is soft.   Musculoskeletal:         General: Normal range of motion.      Cervical back: Normal range of motion and neck supple.   Skin:     General: Skin is warm and dry.   Neurological:      General: No focal deficit present.      Mental Status: He is alert and oriented to person, place, and time.   Psychiatric:         Mood and Affect: Mood normal.         Behavior: Behavior normal.         Pathology:  none    Labs:  Lab Results   Component Value Date    .7 (H) 06/29/2024    CALCIUM 9.9 06/29/2024    PHOS 3.4 06/29/2024     PARATHYROID SCAN     INDICATION:  E21.3: Hyperparathyroidism, unspecified     COMPARISON:  None.     TECHNIQUE:   Following the intravenous administration of 26.1 mCi Tc-99m Cardiolite, anterior and bilateral anterior oblique projection images of the neck and mediastinum were obtained at approximately 10 minutes post injection followed at 2 hours post   injection by static anterior and bilateral oblique projections as well as images reconstructed in the coronal, sagittal and axial projections.     FINDINGS:     Immediate: Minimal thyroid uptake bilaterally.     Delayed: Delayed planar images demonstrate normal washout of the radiotracer from the thyroid. Best seen on the delayed SPECT images, there is a small focus of activity in the region of the right lower thyroid pole. This may represent a parathyroid   adenoma in the appropriate clinical setting.        IMPRESSION:     1. Best seen on the delayed SPECT images, there is a small focus of activity in  the region of the right lower thyroid pole. This may represent a parathyroid adenoma in the appropriate clinical setting.           Workstation performed: [id number]       Imaging    Imaging    XR chest pa & lateral    Result Date: 7/16/2024  Narrative: Chest x-ray--2 views - PA and lateral views. INDICATION: MRI clearance. Pacemaker. Comparison:    CT body 3/4/2024. FINDINGS: Two-view chest shows no active finding. Curvilinear scar at the left lung base again noted, stable to CT 3/2024. Otherwise the lungs appear well expanded and clear. No infiltrate, atelectasis, failure or effusion.  No pulmonary mass.  (No pulmonary nodule by chest x-ray.)  Unremarkable midline trachea. Mild stable cardiomegaly. Satisfactory left-sided bipolar pacemaker. Unremarkable thoracic aorta.  Unremarkable whitley and mediastinum. No adenopathy.    Impression: IMPRESSION:  Two-view chest shows no active finding. Curvilinear scar at the left lung base again noted, stable to CT 3/2024.   Otherwise the lungs appear clear. Mild stable cardiomegaly. Satisfactory left-sided bipolar pacemaker. Workstation:[id number]    I reviewed the above laboratory and imaging data.    Discussion/Summary: Primary hyperparathyroidism, suspected target on the right side.  Candidate for minimally invasive parathyroidectomy.  Will schedule for surgery and obtain preoperative CAT scan for further preparation.

## 2024-07-20 ENCOUNTER — APPOINTMENT (OUTPATIENT)
Dept: LAB | Facility: MEDICAL CENTER | Age: 59
End: 2024-07-20
Payer: COMMERCIAL

## 2024-07-20 DIAGNOSIS — R80.9 PROTEINURIA: ICD-10-CM

## 2024-07-20 DIAGNOSIS — N18.30 STAGE 3 CHRONIC KIDNEY DISEASE, UNSPECIFIED WHETHER STAGE 3A OR 3B CKD (HCC): ICD-10-CM

## 2024-07-20 LAB
ANION GAP SERPL CALCULATED.3IONS-SCNC: 11 MMOL/L (ref 4–13)
BUN SERPL-MCNC: 27 MG/DL (ref 5–25)
CALCIUM SERPL-MCNC: 10.2 MG/DL (ref 8.4–10.2)
CHLORIDE SERPL-SCNC: 105 MMOL/L (ref 96–108)
CO2 SERPL-SCNC: 23 MMOL/L (ref 21–32)
CREAT SERPL-MCNC: 1.26 MG/DL (ref 0.6–1.3)
GFR SERPL CREATININE-BSD FRML MDRD: 62 ML/MIN/1.73SQ M
GLUCOSE P FAST SERPL-MCNC: 113 MG/DL (ref 65–99)
POTASSIUM SERPL-SCNC: 4.4 MMOL/L (ref 3.5–5.3)
SODIUM SERPL-SCNC: 139 MMOL/L (ref 135–147)

## 2024-07-20 PROCEDURE — 36415 COLL VENOUS BLD VENIPUNCTURE: CPT

## 2024-07-20 PROCEDURE — 80048 BASIC METABOLIC PNL TOTAL CA: CPT

## 2024-07-22 ENCOUNTER — TELEPHONE (OUTPATIENT)
Dept: NEPHROLOGY | Facility: CLINIC | Age: 59
End: 2024-07-22

## 2024-07-22 DIAGNOSIS — N18.31 STAGE 3A CHRONIC KIDNEY DISEASE (HCC): Primary | ICD-10-CM

## 2024-07-22 NOTE — TELEPHONE ENCOUNTER
----- Message from SARA Franco sent at 7/21/2024  1:23 PM EDT -----  Please advise patient his most recent kidney function is stable with a creatinine of 1.26 mg/dL and a GFR of 62 mm/min.  Please advise he can restart taking lisinopril at this time and recheck BMP again in 2 weeks.  Please ask him to check blood pressure 3-4 times a week and call with results.  Please enter BMP for 2 weeks.  Thank you.

## 2024-07-22 NOTE — TELEPHONE ENCOUNTER
Called and spoke to pt. Aware of results, his most recent kidney function is stable with a creatinine of 1.26 mg/dL and a GFR of 62 mm/min.  Please advise he can restart taking lisinopril at this time and recheck BMP again in 2 weeks.  Please ask him to check blood pressure 3-4 times a week and call with results.

## 2024-07-24 ENCOUNTER — HOSPITAL ENCOUNTER (OUTPATIENT)
Dept: RADIOLOGY | Facility: HOSPITAL | Age: 59
Discharge: HOME | End: 2024-07-24
Attending: SURGERY
Payer: COMMERCIAL

## 2024-07-24 DIAGNOSIS — E21.3 HYPERPARATHYROIDISM (HCC): ICD-10-CM

## 2024-07-24 PROCEDURE — 70492 CT SFT TSUE NCK W/O & W/DYE: CPT

## 2024-07-24 RX ADMIN — IOHEXOL 100 ML: 350 INJECTION, SOLUTION INTRAVENOUS at 08:35

## 2024-07-25 ENCOUNTER — RA CDI HCC (OUTPATIENT)
Dept: OTHER | Facility: HOSPITAL | Age: 59
End: 2024-07-25

## 2024-07-25 NOTE — PROGRESS NOTES
HCC coding opportunities          Chart Reviewed number of suggestions sent to Provider: 2     Patients Insurance        Commercial Insurance: [employer]     E11.42  E11.22

## 2024-07-26 DIAGNOSIS — E11.29 CONTROLLED TYPE 2 DIABETES MELLITUS WITH MICROALBUMINURIA, WITHOUT LONG-TERM CURRENT USE OF INSULIN  (HCC): ICD-10-CM

## 2024-07-26 DIAGNOSIS — R80.9 CONTROLLED TYPE 2 DIABETES MELLITUS WITH MICROALBUMINURIA, WITHOUT LONG-TERM CURRENT USE OF INSULIN  (HCC): ICD-10-CM

## 2024-07-26 RX ORDER — METFORMIN HYDROCHLORIDE 500 MG/1
500 TABLET, EXTENDED RELEASE ORAL
Qty: 100 TABLET | Refills: 1 | Status: SHIPPED | OUTPATIENT
Start: 2024-07-26

## 2024-07-30 ENCOUNTER — HOSPITAL ENCOUNTER (OUTPATIENT)
Dept: RADIOLOGY | Facility: HOSPITAL | Age: 59
Discharge: HOME | End: 2024-07-30
Payer: COMMERCIAL

## 2024-07-30 DIAGNOSIS — E11.40 PAINFUL DIABETIC NEUROPATHY (HCC): ICD-10-CM

## 2024-07-30 DIAGNOSIS — M54.9 MID BACK PAIN: ICD-10-CM

## 2024-07-30 PROCEDURE — 72146 MRI CHEST SPINE W/O DYE: CPT

## 2024-07-30 NOTE — NURSING NOTE
[employer] device interrogation for MRI done by ISABELLA Miller, RAJESH clinical specialist. Device set to MRI safe mode @85 bpm    MRI thoracic spine without contrast complete. Pt tolerated well.     VSS throughout scan. Pt alert and oriented on room air. No complaints or visible signs of distress.    Device reprogrammed to prior settings per Cardiology by ISABELLA Miller RN.

## 2024-08-01 ENCOUNTER — TELEPHONE (OUTPATIENT)
Dept: PAIN MEDICINE | Facility: CLINIC | Age: 59
End: 2024-08-01

## 2024-08-01 ENCOUNTER — PATIENT MESSAGE (OUTPATIENT)
Dept: ENDOCRINOLOGY | Facility: CLINIC | Age: 59
End: 2024-08-01

## 2024-08-01 DIAGNOSIS — R80.9 CONTROLLED TYPE 2 DIABETES MELLITUS WITH MICROALBUMINURIA, WITHOUT LONG-TERM CURRENT USE OF INSULIN  (HCC): Primary | ICD-10-CM

## 2024-08-01 DIAGNOSIS — E11.29 CONTROLLED TYPE 2 DIABETES MELLITUS WITH MICROALBUMINURIA, WITHOUT LONG-TERM CURRENT USE OF INSULIN  (HCC): Primary | ICD-10-CM

## 2024-08-01 NOTE — TELEPHONE ENCOUNTER
----- Message from Barbara Kocher, CRNP sent at 8/1/2024  1:34 PM EDT -----  Please call patient and let him know that recent MRI of the thoracic spine was essentially normal.  MRI did  a 1.3 cm left adrenal nodule for which correlation with a noncontrast CT exam is recommended.  Please advise him to follow-up with PCP regarding this finding to plan for further evaluation.

## 2024-08-02 ENCOUNTER — OFFICE VISIT (OUTPATIENT)
Dept: PAIN MEDICINE | Facility: CLINIC | Age: 59
End: 2024-08-02
Payer: COMMERCIAL

## 2024-08-02 VITALS
WEIGHT: 255.4 LBS | RESPIRATION RATE: 16 BRPM | HEIGHT: 72 IN | OXYGEN SATURATION: 97 % | TEMPERATURE: 97.5 F | HEART RATE: 76 BPM | BODY MASS INDEX: 34.59 KG/M2 | DIASTOLIC BLOOD PRESSURE: 73 MMHG | SYSTOLIC BLOOD PRESSURE: 123 MMHG

## 2024-08-02 DIAGNOSIS — E11.40 PAINFUL DIABETIC NEUROPATHY (HCC): ICD-10-CM

## 2024-08-02 DIAGNOSIS — G89.4 CHRONIC PAIN SYNDROME: Primary | ICD-10-CM

## 2024-08-02 PROCEDURE — 99213 OFFICE O/P EST LOW 20 MIN: CPT | Performed by: NURSE PRACTITIONER

## 2024-08-02 NOTE — PROGRESS NOTES
Assessment:  1. Chronic pain syndrome    2. Painful diabetic neuropathy (HCC)        Plan:  While the patient was in the office today, I did have a thorough conversation regarding their chronic pain syndrome, medication management, and treatment plan options.  Patient is being seen for a follow-up visit.  He was last seen here on 5/29/2024 at which time the decision was made to move forward with trial spinal cord stimulator.  Patient recently had MRI of his thoracic spine.  MRI incidentally picked up a 1.3 cm left adrenal nodule.  Patient was already aware of this and follows with urology regarding this issue.    Patient understands that he will still require a preop psychological evaluation and needs to have an educational meeting with the spinal cord stimulator representative.  I reached out to our spinal cord stimulator coordinator to make her aware that patient still needs educational meeting and psych eval.    Continue gabapentin 600 mg 3 times daily.  He previously tried Lyrica, amitriptyline, Cymbalta.  All were discontinued due to lack of efficacy.  He reports minimal improvement with gabapentin.      History of Present Illness:  The patient is a 59 y.o. male who presents for a follow up office visit in regards to Foot Pain (Bilateral foot pain).   The patient’s current symptoms include complaints of pain in both feet.  Current pain level is a 7/10.  Quality of pain is described as burning, numb, pins-and-needles.    Current pain medications includes: Gabapentin 600 mg.  It is prescribed 3 times daily, but patient takes 2 pills twice daily..  The patient reports that this regimen is providing 10-15% pain relief.  The patient is reporting no side effects from this pain medication regimen.    I have personally reviewed and/or updated the patient's past medical history, past surgical history, family history, social history, current medications, allergies, and vital signs today.         Review of Systems  Review  of Systems   Constitutional: Negative.    HENT: Negative.     Eyes: Negative.    Respiratory: Negative.     Cardiovascular: Negative.    Gastrointestinal: Negative.    Endocrine: Negative.    Genitourinary: Negative.    Musculoskeletal: Negative.    Skin: Negative.    Allergic/Immunologic: Negative.    Neurological: Negative.    Hematological: Negative.    Psychiatric/Behavioral: Negative.             Past Medical History:   Diagnosis Date    Arthritis     right foot    Chronic cholecystitis     CPAP (continuous positive airway pressure) dependence     Diabetes mellitus (HCC)     Diabetic nephropathy (HCC)     Disease of thyroid gland 1990    DVT (deep venous thrombosis) (HCC)     Gout     History of pulmonary embolism     Hyperlipidemia     Hypertension     Hypothyroidism     Lumbar back pain     MTHFR mutation     Neuropathy     Neuropathy in diabetes (HCC) 2018    Scab     right arm- no s/s infection    Sleep apnea     Tarsal tunnel syndrome, right     Tinnitus     left ear    Wears glasses     and contacts    Wears glasses        Past Surgical History:   Procedure Laterality Date    APPENDECTOMY      ARTHRODESIS      Lumbar L__    BACK SURGERY      BUNIONECTOMY Right 10/07/2016    Procedure: REMOVAL TIBIAL  SESAMOID BONE  RIGHT FOOT;  Surgeon: Vicente Aguirre DPM;  [facility];  Service:     CARDIAC PACEMAKER PLACEMENT  09/01/2021    CHOLECYSTECTOMY  03/26/2015    COLONOSCOPY      KNEE ARTHROSCOPY      KNEE ARTHROSCOPY W/ MENISCAL REPAIR      Lateral    NASAL SEPTUM SURGERY  10/16/2013    PLANTAR FASCIA SURGERY Left 10/26/2023    Procedure: RELEASE FASCIA PLANTAR/FASCIOTOMY;  Surgeon: Inderjit Ramirez DPM;  Location: [facility];  Service: Podiatry    FL COLONOSCOPY FLX DX W/COLLJ SPEC WHEN PFRMD N/A 11/23/2018    Procedure: COLONOSCOPY;  Surgeon: Manjit Dietrich MD;  Location: [facility];  Service: Gastroenterology    FL RELEASE TARSAL TUNNEL Right 06/25/2018    Procedure: RELEASE TARSAL  TUNNEL;  Surgeon: Cliff Bernabe DPM;  [facility];  Service: Podiatry    SC RELEASE TARSAL TUNNEL Left 03/13/2020    Procedure: RELEASE TARSAL TUNNEL;  Surgeon: Cliff Bernabe DPM;  [facility];  Service: Podiatry    SC RELEASE TARSAL TUNNEL Left 10/26/2023    Procedure: RELEASE TARSAL TUNNEL;  Surgeon: Inderjit Ramirez DPM;  [facility];  Service: Podiatry    SPINAL FUSION      TONSILLECTOMY  10/16/2013    with Adenoidectomy    UVULECTOMY  10/16/2013    UVULOPALATOPHARYNGOPLASTY      WISDOM TOOTH EXTRACTION         Family History   Problem Relation Age of Onset    Aneurysm Mother         intracranial aneurysm repair    Coronary artery disease Father     Hypertension Father     Heart disease Father     Aneurysm Brother        Social History     Occupational History    Not on file   Tobacco Use    Smoking status: Never    Smokeless tobacco: Never   Vaping Use    Vaping status: Never Used   Substance and Sexual Activity    Alcohol use: Yes     Alcohol/week: 6.0 standard drinks of alcohol     Types: 6 Cans of beer per week     Comment: weekends    Drug use: No    Sexual activity: Yes     Partners: Female     Birth control/protection: Other         Current Outpatient Medications:     allopurinol (ZYLOPRIM) 300 mg tablet, Take 1 tablet (300 mg total) by mouth daily, Disp: 90 tablet, Rfl: 1    apixaban (Eliquis) 5 mg, Take 1 tablet (5 mg total) by mouth 2 (two) times a day, Disp: 60 tablet, Rfl: 3    carvedilol (COREG) 6.25 mg tablet, Take 1 tablet in the morning and 1-1/2 tablets in the evening, Disp: 90 tablet, Rfl: 1    ezetimibe (ZETIA) 10 mg tablet, Take 1 tablet (10 mg total) by mouth daily, Disp: 100 tablet, Rfl: 1    furosemide (LASIX) 20 mg tablet, Take 1 tablet (20 mg total) by mouth daily As needed for leg edema, Disp: 90 tablet, Rfl: 1    gabapentin (NEURONTIN) 600 MG tablet, Take 1 tablet (600 mg total) by mouth 3 (three) times a day, Disp: 270 tablet, Rfl: 0     levothyroxine 175 mcg tablet, Take one tablet daily on empty stomach 1 hour before breakfast and 4 hours apart from calcium and vitamin D supplementation, Disp: 90 tablet, Rfl: 1    lisinopril (ZESTRIL) 10 mg tablet, Take 1 tablet (10 mg total) by mouth daily, Disp: 30 tablet, Rfl: 2    metFORMIN (GLUCOPHAGE-XR) 500 mg 24 hr tablet, Take 1 tablet (500 mg total) by mouth daily at bedtime, Disp: 100 tablet, Rfl: 1    semaglutide, 2 mg/dose, (Ozempic, 2 MG/DOSE,) 8 mg/ mL injection pen, Inject 0.75 mL (2 mg total) under the skin every 7 days, Disp: 3 mL, Rfl: 2    sildenafil (Viagra) 100 mg tablet, Take 1 tablet (100 mg total) by mouth as needed for erectile dysfunction, Disp: 30 tablet, Rfl: 0    traMADol (Ultram) 50 mg tablet, Take 1 tablet (50 mg total) by mouth every 8 (eight) hours as needed for moderate pain, Disp: 20 tablet, Rfl: 0    dexamethasone (DECADRON) 1 mg tablet, Take 1 mg by mouth (Patient not taking: Reported on 7/17/2024), Disp: , Rfl:     No Known Allergies    Physical Exam:    /73 (BP Location: Right arm, Patient Position: Sitting, Cuff Size: Large)   Pulse 76   Temp 97.5 °F (36.4 °C) (Temporal)   Resp 16   Ht 6' (1.829 m)   Wt 116 kg (255 lb 6.4 oz)   SpO2 97%   BMI 34.64 kg/m²     Constitutional:normal, well developed, well nourished, alert, in no distress and non-toxic and no overt pain behavior.  Eyes:anicteric  HEENT:grossly intact  Neck:supple, symmetric, trachea midline and no masses   Pulmonary:even and unlabored  Cardiovascular:No edema or pitting edema present  Skin:Normal without rashes or lesions and well hydrated  Psychiatric:Mood and affect appropriate  Neurologic:Cranial Nerves II-XII grossly intact  Musculoskeletal:normal    Imaging    Narrative & Impression   MRI THORACIC SPINE WITHOUT CONTRAST     INDICATION: E11.40: Type 2 diabetes mellitus with diabetic neuropathy, unspecified  M54.9: Dorsalgia, unspecified.     COMPARISON: Thoracic radiographs 5/29/2024      TECHNIQUE:  Multiplanar, multisequence imaging of the thoracic spine was performed. .     IMAGE QUALITY: Diagnostic.     FINDINGS:     ALIGNMENT: Normal alignment of the thoracic spine.  No compression fracture.  No subluxation.  No scoliosis.     MARROW SIGNAL:  Normal marrow signal is identified within the visualized bony structures.  No discrete marrow lesion.     THORACIC CORD: Normal signal within the thoracic cord.     PARAVERTEBRAL SOFT TISSUES:  Normal.     THORACIC DEGENERATIVE CHANGE: No focal disc herniation, canal stenosis or foraminal narrowing. No degenerative changes.     OTHER FINDINGS: A 1.3 cm left adrenal nodule.     IMPRESSION:     No focal disc thoracic herniation, central canal stenosis, or neural foraminal narrowing.     The thoracic cord appears normal in caliber and signal with no evidence of focal impingement.     A 1.3 cm left adrenal nodule is noted, suboptimally characterized on this examination. Correlation with a noncontrast CT examination can be considered on an emergent basis.              THORACIC SPINE     INDICATION:   Dorsalgia, unspecified. Chronic pain syndrome.      COMPARISON:  None.     VIEWS:  XR SPINE THORACIC 3 VW  Images: 4     FINDINGS:     There is no fracture or pathologic bone lesion.     Thoracic vertebral alignment is within normal limits.     Age related degenerative changes are seen.      There is no displacement of the paraspinal line.      The pedicles appear intact.     IMPRESSION:        No acute osseous abnormality.  Degenerative changes as described.     Electronically signed: 05/29/2024 10:42 AM Raz Peralta MPH,MD      No orders to display       No orders of the defined types were placed in this encounter.

## 2024-08-08 ENCOUNTER — TELEPHONE (OUTPATIENT)
Dept: PAIN MEDICINE | Facility: CLINIC | Age: 59
End: 2024-08-08

## 2024-08-08 NOTE — TELEPHONE ENCOUNTER
----- Message from Barbara Kocher, CRNP sent at 8/2/2024  7:39 AM EDT -----  Regarding: SCS trial  Hi,  They are hoping to move forward with trial spinal cord stimulator.  Patient already had MRI of his thoracic spine.  He will need psych eval and educational meeting with [employer] representative.  Please reach out to him to set up.  Thanks,   [facility]

## 2024-08-08 NOTE — TELEPHONE ENCOUNTER
S/w pt, he is calling [facility] in [place] for psych eval.  MRI completed.  Pt takes eliquis- staff message sent to PCP Dr. Loredo for anti coag hold approval.  Pt to expect call from Steve for SCS education.

## 2024-08-17 ENCOUNTER — APPOINTMENT (OUTPATIENT)
Dept: LAB | Facility: MEDICAL CENTER | Age: 59
End: 2024-08-17
Payer: COMMERCIAL

## 2024-08-17 DIAGNOSIS — R80.9 PROTEINURIA: ICD-10-CM

## 2024-08-17 DIAGNOSIS — N18.30 STAGE 3 CHRONIC KIDNEY DISEASE, UNSPECIFIED WHETHER STAGE 3A OR 3B CKD (HCC): ICD-10-CM

## 2024-08-17 DIAGNOSIS — N18.31 STAGE 3A CHRONIC KIDNEY DISEASE (HCC): ICD-10-CM

## 2024-08-17 LAB
ANION GAP SERPL CALCULATED.3IONS-SCNC: 8 MMOL/L (ref 4–13)
BUN SERPL-MCNC: 30 MG/DL (ref 5–25)
CALCIUM SERPL-MCNC: 10.1 MG/DL (ref 8.4–10.2)
CHLORIDE SERPL-SCNC: 105 MMOL/L (ref 96–108)
CO2 SERPL-SCNC: 26 MMOL/L (ref 21–32)
CREAT SERPL-MCNC: 1.46 MG/DL (ref 0.6–1.3)
GFR SERPL CREATININE-BSD FRML MDRD: 51 ML/MIN/1.73SQ M
GLUCOSE P FAST SERPL-MCNC: 123 MG/DL (ref 65–99)
POTASSIUM SERPL-SCNC: 4.3 MMOL/L (ref 3.5–5.3)
SODIUM SERPL-SCNC: 139 MMOL/L (ref 135–147)

## 2024-08-17 PROCEDURE — 80048 BASIC METABOLIC PNL TOTAL CA: CPT

## 2024-08-17 PROCEDURE — 36415 COLL VENOUS BLD VENIPUNCTURE: CPT

## 2024-08-19 DIAGNOSIS — R60.0 LEG EDEMA: ICD-10-CM

## 2024-08-19 RX ORDER — FUROSEMIDE 20 MG
20 TABLET ORAL DAILY
Qty: 90 TABLET | Refills: 1 | Status: SHIPPED | OUTPATIENT
Start: 2024-08-19

## 2024-08-20 ENCOUNTER — REMOTE DEVICE CLINIC VISIT (OUTPATIENT)
Dept: CARDIOLOGY CLINIC | Facility: CLINIC | Age: 59
End: 2024-08-20
Payer: COMMERCIAL

## 2024-08-20 ENCOUNTER — TELEPHONE (OUTPATIENT)
Dept: NEPHROLOGY | Facility: CLINIC | Age: 59
End: 2024-08-20

## 2024-08-20 DIAGNOSIS — E11.42 DIABETIC PERIPHERAL NEUROPATHY (HCC): ICD-10-CM

## 2024-08-20 DIAGNOSIS — Z95.0 CARDIAC PACEMAKER IN SITU: Primary | ICD-10-CM

## 2024-08-20 PROCEDURE — 93294 REM INTERROG EVL PM/LDLS PM: CPT | Performed by: INTERNAL MEDICINE

## 2024-08-20 PROCEDURE — 93296 REM INTERROG EVL PM/IDS: CPT | Performed by: INTERNAL MEDICINE

## 2024-08-20 RX ORDER — GABAPENTIN 600 MG/1
600 TABLET ORAL 3 TIMES DAILY
Qty: 270 TABLET | Refills: 1 | Status: SHIPPED | OUTPATIENT
Start: 2024-08-20

## 2024-08-20 NOTE — PROGRESS NOTES
Results for orders placed or performed in visit on 08/20/24   Cardiac EP device report    Narrative    MDDT DUAL PM/ ACTIVE SYSTEM IS MRI CONDITIONAL  CARELINK TRANSMISSION: BATTERY VOLTAGE ADEQUATE (11 YRS). AP-73%, -1%. ALL AVAILABLE LEAD PARAMETERS WITHIN NORMAL LIMITS. NO NEW SIGNIFICANT HIGH RATE EPISODES. NORMAL DEVICE FUNCTION. GV

## 2024-08-20 NOTE — TELEPHONE ENCOUNTER
----- Message from Aidan Tripathi PA-C sent at 8/20/2024  2:57 PM EDT -----  Kidney function mildly elevated however noted to be near her baseline value

## 2024-08-20 NOTE — TELEPHONE ENCOUNTER
Called and spoke to pt. Aware of results, Kidney function mildly elevated however noted to be near her baseline value. No further advice needed this time.

## 2024-08-30 DIAGNOSIS — R80.9 CONTROLLED TYPE 2 DIABETES MELLITUS WITH MICROALBUMINURIA, WITHOUT LONG-TERM CURRENT USE OF INSULIN  (HCC): ICD-10-CM

## 2024-08-30 DIAGNOSIS — E11.29 CONTROLLED TYPE 2 DIABETES MELLITUS WITH MICROALBUMINURIA, WITHOUT LONG-TERM CURRENT USE OF INSULIN  (HCC): ICD-10-CM

## 2024-09-04 DIAGNOSIS — R60.0 LEG EDEMA: ICD-10-CM

## 2024-09-05 ENCOUNTER — TRANSCRIBE ORDERS (OUTPATIENT)
Dept: PAIN MEDICINE | Facility: CLINIC | Age: 59
End: 2024-09-05

## 2024-09-05 RX ORDER — FUROSEMIDE 20 MG
20 TABLET ORAL DAILY
Qty: 90 TABLET | Refills: 1 | Status: SHIPPED | OUTPATIENT
Start: 2024-09-05 | End: 2024-09-09 | Stop reason: SDUPTHER

## 2024-09-09 DIAGNOSIS — R60.0 LEG EDEMA: ICD-10-CM

## 2024-09-09 RX ORDER — FUROSEMIDE 20 MG
20 TABLET ORAL DAILY
Qty: 90 TABLET | Refills: 1 | Status: SHIPPED | OUTPATIENT
Start: 2024-09-09

## 2024-09-09 NOTE — TELEPHONE ENCOUNTER
Medication refill    furosemide (LASIX) 20 mg tablet     Patient requesting the script be sent again as it was not received.  [facility] in [place].

## 2024-09-17 DIAGNOSIS — I48.0 PAROXYSMAL ATRIAL FIBRILLATION (HCC): ICD-10-CM

## 2024-09-18 RX ORDER — CARVEDILOL 6.25 MG/1
TABLET ORAL
Qty: 90 TABLET | Refills: 0 | Status: SHIPPED | OUTPATIENT
Start: 2024-09-18

## 2024-09-19 ENCOUNTER — PREP FOR PROCEDURE (OUTPATIENT)
Dept: PAIN MEDICINE | Facility: CLINIC | Age: 59
End: 2024-09-19

## 2024-09-19 DIAGNOSIS — G89.4 CHRONIC PAIN SYNDROME: Primary | ICD-10-CM

## 2024-09-19 DIAGNOSIS — E13.42 DIABETIC POLYNEUROPATHY ASSOCIATED WITH OTHER SPECIFIED DIABETES MELLITUS (HCC): ICD-10-CM

## 2024-09-30 ENCOUNTER — TELEPHONE (OUTPATIENT)
Dept: PAIN MEDICINE | Facility: CLINIC | Age: 59
End: 2024-09-30

## 2024-09-30 NOTE — TELEPHONE ENCOUNTER
----- Message from Barbara Kocher, CRNP sent at 9/27/2024  2:26 PM EDT -----  Regarding: RE: SCS trial- insurance issue  That is fine.  Sorry.  Yes,  I saw him in August for a medication refill.  I did mention that he was planning to pursue spinal cord stimulator.  However, I did not discuss it with him in any detail.  Dr. Vinson saw him in May at which time the decision was made to pursue spinal cord stimulator as an option.  Thanks,  [facility]  ----- Message -----  From: Kadie Campbell MA  Sent: 9/27/2024   2:22 PM EDT  To: Jing Vinson MD; Barbara Kocher, CRNP  Subject: RE: SCS trial- insurance issue                   Hello,  There's an office visit dated 08/02/24 and that mentions the SCS trial.  I called pt and LMOM to see if he could come in for an office visit to get this documented.  ----- Message -----  From: Barbara Kocher, CRNP  Sent: 9/27/2024  12:51 PM EDT  To: Kadie Campbell MA; Jing Vinson MD  Subject: RE: SCS trial- insurance issue                   Hi!  I only saw this patient 1 time in May for a follow-up visit to renew gabapentin.  The decision was already made at that point to proceed with spinal cord stimulator.  We really did not discuss cord stimulator in any detail at that time.  Also/FYI... he would have not seen an orthopedic spinal surgeon as his diagnosis is diabetic neuropathy not lumbar radiculopathy.  ThanksAur[facility]  ----- Message -----  From: Kadie Campbell MA  Sent: 9/26/2024   2:12 PM EDT  To: Jing Vinson MD; Barbara Kocher, CRNP  Subject: FW: SCS trial- insurance issue                   Hello,  Please see the below message- are we able to add this to an office note for insurance submission? This case was withdrawn as of now.  Thank you!  ----- Message -----  From: Kadie Campbell MA  Sent: 9/23/2024  10:13 AM EDT  To: Jing Vinson MD; Barbara Kocher, CRNP  Subject: SCS trial- insurance issue                       Hello,  I received the  "following from pts insurance: They are saying clinicals are \"MISSING DEFINITIVE PLAN AND RISK BENEFIT DISCUSSION FROM ORDERING PROVIDER- need notes from ortho spine indicating no further spine surgery recommended\".    Is this something that we are able to add to our notes?  "

## 2024-10-01 ENCOUNTER — TELEPHONE (OUTPATIENT)
Dept: PAIN MEDICINE | Facility: CLINIC | Age: 59
End: 2024-10-01

## 2024-10-01 NOTE — TELEPHONE ENCOUNTER
----- Message from Agustín Loredo DO sent at 10/1/2024  6:47 AM EDT -----  Regarding: RE: ELIQUIS HOLD- SCS TRIAL  Good morning Kadie patient may hold his Eliquis but restart as soon as possible when safe  ----- Message -----  From: Kadie Campbell MA  Sent: 9/19/2024   5:00 PM EDT  To: Agustín Loredo DO  Subject: ELIQUIS HOLD- SCS TRIAL                          Hello,  Patient is being considered for a Spinal Cord Stimulator Trial for the management of his pain. However, the patient is currently on an anti-coagulant per your orders. Your permission to discontinue this anti-coagulant is necessary in order to proceed with this procedure. We will instruct the patient to restart their anti-coagulant immediately after their trial lead is removed.     Patient would need to hold ELIQUIS FOR 3 days prior, AND 6 days during trial for a total of 9 days.      Do you give permission for the above patient to discontinue the anti-coagulant for the duration specified above? Please advise.  Thank you.

## 2024-10-03 ENCOUNTER — OFFICE VISIT (OUTPATIENT)
Dept: PAIN MEDICINE | Facility: CLINIC | Age: 59
End: 2024-10-03
Payer: COMMERCIAL

## 2024-10-03 VITALS
RESPIRATION RATE: 16 BRPM | WEIGHT: 254 LBS | OXYGEN SATURATION: 99 % | SYSTOLIC BLOOD PRESSURE: 136 MMHG | HEART RATE: 63 BPM | TEMPERATURE: 97.5 F | DIASTOLIC BLOOD PRESSURE: 90 MMHG | HEIGHT: 72 IN | BODY MASS INDEX: 34.4 KG/M2

## 2024-10-03 DIAGNOSIS — E11.21 DIABETIC NEPHROPATHY ASSOCIATED WITH TYPE 2 DIABETES MELLITUS (HCC): ICD-10-CM

## 2024-10-03 DIAGNOSIS — E11.40 PAINFUL DIABETIC NEUROPATHY (HCC): ICD-10-CM

## 2024-10-03 DIAGNOSIS — G89.4 CHRONIC PAIN SYNDROME: Primary | ICD-10-CM

## 2024-10-03 PROCEDURE — 99214 OFFICE O/P EST MOD 30 MIN: CPT | Performed by: NURSE PRACTITIONER

## 2024-10-03 NOTE — PROGRESS NOTES
Assessment:  1. Chronic pain syndrome    2. Diabetic nephropathy associated with type 2 diabetes mellitus (HCC)    3. Painful diabetic neuropathy (HCC)        Plan:  While the patient was in the office today, I did have a thorough conversation regarding their chronic pain syndrome, medication management, and treatment plan options.  Patient is being seen for a follow-up visit.  He is scheduled for trial spinal cord stimulator which is on hold at this point due to insurance denial.  Patient has a history of chronic pain in both feet related to diabetic neuropathy.  He has been on a number of medications in the past including Lyrica, Cymbalta, amitriptyline.  All were discontinued due to lack of efficacy.  He was being treated with Qutenza every 3 months which was very effective and reduced his pain by up to 75%.  Unfortunately, his insurance would no longer pay for it.  In an attempt to control his pain.  He underwent right tarsal tunnel release x 1 and left tarsal tunnel release x 2 without any improvement in his symptoms.    A previous EMG done in 2018 revealed early to mild peripheral neuropathy and bilateral tarsal tunnel syndrome.  No radiculopathy.    He was initially seen here for consultation by Dr. Vinson on 5/29/2024 at which time the decision was made to proceed with trial spinal cord stimulator.  At this point, patient is interested in proceeding with a trial spinal cord stimulator as previously discussed.  Complete risks and benefits including bleeding, infection, tissue reaction, nerve injury and allergic reaction were discussed.  The approach was demonstrated using models.  The patient was agreeable and verbalized an understanding.    Continue gabapentin 600 mg 3 times daily.  He did not require refill of this medication during today's visit.      History of Present Illness:  The patient is a 59 y.o. male who presents for a follow up office visit in regards to Back Pain and Foot Pain (bilateral).    The patient’s current symptoms include complaints of pain in both feet.  Current pain level is a 7/10.  Quality of pain is described as burning, numb, pins-and-needles.    Current pain medications includes: Gabapentin 600 mg 3 times daily.  The patient reports that this regimen is providing 25% pain relief.  The patient is reporting no side effects from this pain medication regimen.    I have personally reviewed and/or updated the patient's past medical history, past surgical history, family history, social history, current medications, allergies, and vital signs today.         Review of Systems  Review of Systems   Musculoskeletal:  Positive for back pain and gait problem.        Bilateral foot pain   All other systems reviewed and are negative.          Past Medical History:   Diagnosis Date    Arthritis     right foot    Chronic cholecystitis     CPAP (continuous positive airway pressure) dependence     Diabetes mellitus ([facility])     Diabetic nephropathy ([facility])     Disease of thyroid gland 1990    DVT (deep venous thrombosis) ([facility])     Gout     History of pulmonary embolism     Hyperlipidemia     Hypertension     Hypothyroidism     Lumbar back pain     MTHFR mutation     Neuropathy     Neuropathy in diabetes ([facility]) 2018    Scab     right arm- no s/s infection    Sleep apnea     Tarsal tunnel syndrome, right     Tinnitus     left ear    Wears glasses     and contacts    Wears glasses        Past Surgical History:   Procedure Laterality Date    APPENDECTOMY      ARTHRODESIS      Lumbar L__    BACK SURGERY      BUNIONECTOMY Right 10/07/2016    Procedure: REMOVAL TIBIAL  SESAMOID BONE  RIGHT FOOT;  Surgeon: Vicente Aguirre DPM;  [facility];  Service:     CARDIAC PACEMAKER PLACEMENT  09/01/2021    CHOLECYSTECTOMY  03/26/2015    COLONOSCOPY      KNEE ARTHROSCOPY      KNEE ARTHROSCOPY W/ MENISCAL REPAIR      Lateral    NASAL SEPTUM SURGERY  10/16/2013    PLANTAR FASCIA SURGERY Left 10/26/2023    Procedure:  RELEASE FASCIA PLANTAR/FASCIOTOMY;  Surgeon: Inderjit Ramirez DPM;  [facility];  Service: Podiatry    MS COLONOSCOPY FLX DX W/COLLJ SPEC WHEN PFRMD N/A 11/23/2018    Procedure: COLONOSCOPY;  Surgeon: Manjit Dietrich MD;  [facility];  Service: Gastroenterology    MS RELEASE TARSAL TUNNEL Right 06/25/2018    Procedure: RELEASE TARSAL TUNNEL;  Surgeon: Cliff Bernabe DPM;  [facility];  Service: Podiatry    MS RELEASE TARSAL TUNNEL Left 03/13/2020    Procedure: RELEASE TARSAL TUNNEL;  Surgeon: Cliff Bernabe DPM;  Location:  [facility];  Service: Podiatry    MS RELEASE TARSAL TUNNEL Left 10/26/2023    Procedure: RELEASE TARSAL TUNNEL;  Surgeon: Inderjit Ramirez DPM;  [facility];  Service: Podiatry    SPINAL FUSION      TONSILLECTOMY  10/16/2013    with Adenoidectomy    UVULECTOMY  10/16/2013    UVULOPALATOPHARYNGOPLASTY      WISDOM TOOTH EXTRACTION         Family History   Problem Relation Age of Onset    Aneurysm Mother         intracranial aneurysm repair    Coronary artery disease Father     Hypertension Father     Heart disease Father     Aneurysm Brother        Social History     Occupational History    Not on file   Tobacco Use    Smoking status: Never    Smokeless tobacco: Never   Vaping Use    Vaping status: Never Used   Substance and Sexual Activity    Alcohol use: Yes     Alcohol/week: 6.0 standard drinks of alcohol     Types: 6 Cans of beer per week     Comment: weekends    Drug use: No    Sexual activity: Yes     Partners: Female     Birth control/protection: Other         Current Outpatient Medications:     allopurinol (ZYLOPRIM) 300 mg tablet, Take 1 tablet (300 mg total) by mouth daily, Disp: 90 tablet, Rfl: 1    apixaban (Eliquis) 5 mg, Take 1 tablet (5 mg total) by mouth 2 (two) times a day, Disp: 60 tablet, Rfl: 3    carvedilol (COREG) 6.25 mg tablet, TAKE 1 TABLET BY MOUTH ONCE DAILY IN THE MORNING AND 1 & 1/2 (ONE & ONE-HALF) TABLET ONCE  DAILY IN THE EVENING, Disp: 90 tablet, Rfl: 0    ciprofloxacin (CIPRO) 500 mg tablet, Take 500 mg by mouth 2 (two) times a day, Disp: , Rfl:     ezetimibe (ZETIA) 10 mg tablet, Take 1 tablet (10 mg total) by mouth daily, Disp: 100 tablet, Rfl: 1    furosemide (LASIX) 20 mg tablet, Take 1 tablet (20 mg total) by mouth daily As needed for leg edema, Disp: 90 tablet, Rfl: 1    gabapentin (NEURONTIN) 600 MG tablet, Take 1 tablet (600 mg total) by mouth 3 (three) times a day, Disp: 270 tablet, Rfl: 1    levothyroxine 175 mcg tablet, Take one tablet daily on empty stomach 1 hour before breakfast and 4 hours apart from calcium and vitamin D supplementation, Disp: 90 tablet, Rfl: 1    lisinopril (ZESTRIL) 10 mg tablet, Take 1 tablet (10 mg total) by mouth daily, Disp: 30 tablet, Rfl: 2    metFORMIN (GLUCOPHAGE-XR) 500 mg 24 hr tablet, Take 1 tablet (500 mg total) by mouth daily at bedtime, Disp: 100 tablet, Rfl: 1    semaglutide, 2 mg/dose, (Ozempic, 2 MG/DOSE,) 8 mg/ mL injection pen, Inject 0.75 mL (2 mg total) under the skin every 7 days, Disp: 3 mL, Rfl: 5    sildenafil (Viagra) 100 mg tablet, Take 1 tablet (100 mg total) by mouth as needed for erectile dysfunction, Disp: 30 tablet, Rfl: 0    traMADol (Ultram) 50 mg tablet, Take 1 tablet (50 mg total) by mouth every 8 (eight) hours as needed for moderate pain, Disp: 20 tablet, Rfl: 0    dexamethasone (DECADRON) 1 mg tablet, Take 1 mg by mouth (Patient not taking: Reported on 7/17/2024), Disp: , Rfl:     No Known Allergies    Physical Exam:    /90   Pulse 63   Temp 97.5 °F (36.4 °C)   Resp 16   Ht 6' (1.829 m)   Wt 115 kg (254 lb)   SpO2 99%   BMI 34.45 kg/m²     Constitutional:normal, well developed, well nourished, alert, in no distress and non-toxic and no overt pain behavior. and overweight  Eyes:anicteric  HEENT:grossly intact  Neck:supple, symmetric, trachea midline and no masses   Pulmonary:even and unlabored  Cardiovascular:No edema or pitting edema  present  Skin:Normal without rashes or lesions and well hydrated  Psychiatric:Mood and affect appropriate  Neurologic:Cranial Nerves II-XII grossly intact  Musculoskeletal: It is slow and guarded.  Decreased sensation to light touch in both feet.    Imaging      Study Result    Narrative & Impression   MRI THORACIC SPINE WITHOUT CONTRAST     INDICATION: E11.40: Type 2 diabetes mellitus with diabetic neuropathy, unspecified  M54.9: Dorsalgia, unspecified.     COMPARISON: Thoracic radiographs 5/29/2024     TECHNIQUE:  Multiplanar, multisequence imaging of the thoracic spine was performed. .     IMAGE QUALITY: Diagnostic.     FINDINGS:     ALIGNMENT: Normal alignment of the thoracic spine.  No compression fracture.  No subluxation.  No scoliosis.     MARROW SIGNAL:  Normal marrow signal is identified within the visualized bony structures.  No discrete marrow lesion.     THORACIC CORD: Normal signal within the thoracic cord.     PARAVERTEBRAL SOFT TISSUES:  Normal.     THORACIC DEGENERATIVE CHANGE: No focal disc herniation, canal stenosis or foraminal narrowing. No degenerative changes.     OTHER FINDINGS: A 1.3 cm left adrenal nodule.     IMPRESSION:     No focal disc thoracic herniation, central canal stenosis, or neural foraminal narrowing.     The thoracic cord appears normal in caliber and signal with no evidence of focal impingement.     A 1.3 cm left adrenal nodule is noted, suboptimally characterized on this examination. Correlation with a noncontrast CT examination can be considered on an emergent basis.           Workstation performed: [id number]            No orders to display       No orders of the defined types were placed in this encounter.

## 2024-10-03 NOTE — PROGRESS NOTES
Assessment    No diagnosis found.    Plan  The spinal cord stimulator trial was discussed in depth with the patient. The patient was advised that a stimulator lead will be placed percutaneously through an epidural needle, with intra-op stimulation done to confirm appropriate lead placement.  The lead will then be connected to an external generator and secured to the skin.  The patient was advised that an industry clinical specialist will be present for the trial, program the stimulator and explain how to use it.  During the trial, the patient was instructed to perform their activities of daily living, but limit twisting and bending motions, and no lifting greater than 10 pounds.  The patient was advised to refrain from tub baths, showers, swimming, and hot tubs during the trial. The patient will return to the office for trial evaluation and lead removal on ***. At that time, if the trial is successful, the patient will be referred to {Stim Surgeon:307218} for permanent spinal cord stimulator placement.     Pre-procedure instructions were reviewed with the patient. The patient was given a prescription for blood work to be done by ***. Complete risks and benefits including bleeding, infection, headache, tissue reaction, nerve injury and allergic reaction were discussed. The approach was demonstrated using models and literature was provided. The patient was advised that they would receive pre-procedure antibiotics and a prescription to take during the week of the trial.    The patient was advised to hold *** starting on *** and for the duration of the trial    The patient will next follow- up on *** for the stimulator trial.    No orders of the defined types were placed in this encounter.      History of Present Illness  The patient is a 59 y.o. male scheduled for an {Spine Cord Stim Trial:16997} spinal cord stimulator trial to treat chronic pain from ***.  The current pain pattern includes ***.   The patient's goals  for the trial include ***.    Pain Assessment Measures   Numeric Rating Scale ***   Oswestry Disability Index Score/Neck Disability Index Score ***   PROMIS-29   Physical Function ***   Anxiety ***   Depression ***   Fatigue ***   Sleep Disturbance ***   Ability to Participate in Social Roles And Activities ***   Pain Interference ***     The patient is currently on ***; an anti-coagulation hold was approved by ***    I have personally reviewed and/or updated the patient's past medical history, past surgical history, family history, social history, current medications, allergies, and vital signs today.     Review of Systems     Past Medical History:   Diagnosis Date    Arthritis     right foot    Chronic cholecystitis     CPAP (continuous positive airway pressure) dependence     Diabetes mellitus (HCC)     Diabetic nephropathy (HCC)     Disease of thyroid gland 1990    DVT (deep venous thrombosis) (HCC)     Gout     History of pulmonary embolism     Hyperlipidemia     Hypertension     Hypothyroidism     Lumbar back pain     MTHFR mutation     Neuropathy     Neuropathy in diabetes (HCC) 2018    Scab     right arm- no s/s infection    Sleep apnea     Tarsal tunnel syndrome, right     Tinnitus     left ear    Wears glasses     and contacts    Wears glasses        Past Surgical History:   Procedure Laterality Date    APPENDECTOMY      ARTHRODESIS      Lumbar L__    BACK SURGERY      BUNIONECTOMY Right 10/07/2016    Procedure: REMOVAL TIBIAL  SESAMOID BONE  RIGHT FOOT;  Surgeon: Vicente Aguirre DPM;  Location: [facility];  Service:     CARDIAC PACEMAKER PLACEMENT  09/01/2021    CHOLECYSTECTOMY  03/26/2015    COLONOSCOPY      KNEE ARTHROSCOPY      KNEE ARTHROSCOPY W/ MENISCAL REPAIR      Lateral    NASAL SEPTUM SURGERY  10/16/2013    PLANTAR FASCIA SURGERY Left 10/26/2023    Procedure: RELEASE FASCIA PLANTAR/FASCIOTOMY;  Surgeon: Inderjit Ramirez DPM;  Location: [facility];  Service: Podiatry    GA  COLONOSCOPY FLX DX W/COLLJ SPEC WHEN PFRMD N/A 11/23/2018    Procedure: COLONOSCOPY;  Surgeon: Manjit Dietrich MD;  Location: [facility];  Service: Gastroenterology    IA RELEASE TARSAL TUNNEL Right 06/25/2018    Procedure: RELEASE TARSAL TUNNEL;  Surgeon: Cliff Bernabe DPM;  Location: [facility];  Service: Podiatry    IA RELEASE TARSAL TUNNEL Left 03/13/2020    Procedure: RELEASE TARSAL TUNNEL;  Surgeon: Cliff Bernabe DPM;  Location:  MAIN OR;  Service: Podiatry    IA RELEASE TARSAL TUNNEL Left 10/26/2023    Procedure: RELEASE TARSAL TUNNEL;  Surgeon: Inderjit Ramirez DPM;  Location: [facility];  Service: Podiatry    SPINAL FUSION      TONSILLECTOMY  10/16/2013    with Adenoidectomy    UVULECTOMY  10/16/2013    UVULOPALATOPHARYNGOPLASTY      WISDOM TOOTH EXTRACTION         Family History   Problem Relation Age of Onset    Aneurysm Mother         intracranial aneurysm repair    Coronary artery disease Father     Hypertension Father     Heart disease Father     Aneurysm Brother        Social History     Occupational History    Not on file   Tobacco Use    Smoking status: Never    Smokeless tobacco: Never   Vaping Use    Vaping status: Never Used   Substance and Sexual Activity    Alcohol use: Yes     Alcohol/week: 6.0 standard drinks of alcohol     Types: 6 Cans of beer per week     Comment: weekends    Drug use: No    Sexual activity: Yes     Partners: Female     Birth control/protection: Other         Current Outpatient Medications:     allopurinol (ZYLOPRIM) 300 mg tablet, Take 1 tablet (300 mg total) by mouth daily, Disp: 90 tablet, Rfl: 1    apixaban (Eliquis) 5 mg, Take 1 tablet (5 mg total) by mouth 2 (two) times a day, Disp: 60 tablet, Rfl: 3    carvedilol (COREG) 6.25 mg tablet, TAKE 1 TABLET BY MOUTH ONCE DAILY IN THE MORNING AND 1 & 1/2 (ONE & ONE-HALF) TABLET ONCE DAILY IN THE EVENING, Disp: 90 tablet, Rfl: 0    ciprofloxacin (CIPRO) 500 mg tablet, Take 500 mg by mouth 2 (two) times a day,  "Disp: , Rfl:     ezetimibe (ZETIA) 10 mg tablet, Take 1 tablet (10 mg total) by mouth daily, Disp: 100 tablet, Rfl: 1    furosemide (LASIX) 20 mg tablet, Take 1 tablet (20 mg total) by mouth daily As needed for leg edema, Disp: 90 tablet, Rfl: 1    gabapentin (NEURONTIN) 600 MG tablet, Take 1 tablet (600 mg total) by mouth 3 (three) times a day, Disp: 270 tablet, Rfl: 1    levothyroxine 175 mcg tablet, Take one tablet daily on empty stomach 1 hour before breakfast and 4 hours apart from calcium and vitamin D supplementation, Disp: 90 tablet, Rfl: 1    lisinopril (ZESTRIL) 10 mg tablet, Take 1 tablet (10 mg total) by mouth daily, Disp: 30 tablet, Rfl: 2    metFORMIN (GLUCOPHAGE-XR) 500 mg 24 hr tablet, Take 1 tablet (500 mg total) by mouth daily at bedtime, Disp: 100 tablet, Rfl: 1    semaglutide, 2 mg/dose, (Ozempic, 2 MG/DOSE,) 8 mg/ mL injection pen, Inject 0.75 mL (2 mg total) under the skin every 7 days, Disp: 3 mL, Rfl: 5    sildenafil (Viagra) 100 mg tablet, Take 1 tablet (100 mg total) by mouth as needed for erectile dysfunction, Disp: 30 tablet, Rfl: 0    traMADol (Ultram) 50 mg tablet, Take 1 tablet (50 mg total) by mouth every 8 (eight) hours as needed for moderate pain, Disp: 20 tablet, Rfl: 0    dexamethasone (DECADRON) 1 mg tablet, Take 1 mg by mouth (Patient not taking: Reported on 2024), Disp: , Rfl:     No Known Allergies    Physical Exam    There were no vitals taken for this visit.    Constitutional:{General Appearance:63724::\"normal, well developed, well nourished, alert, in no distress and non-toxic and no overt pain behavior.\"}  Eyes:{Sclera:30274::\"anicteric\"}  HEENT:{Hearin::\"grossly intact\"}  Neck:{Neck:32129::\"supple, symmetric, trachea midline and no masses \"}  Pulmonary:{Respiratory effort:55472::\"even and unlabored\"}  Cardiovascular:{Examination of Extremities:50251::\"No edema or pitting edema present\"}  Skin:{Skin and Subcutaneous tissues:63693::\"Normal without rashes or " "lesions and well hydrated\"}  Psychiatric:{Mood and Affect:13365::\"Mood and affect appropriate\"}  Neurologic:{Cranial Nerves:47589::\"Cranial Nerves II-XII grossly intact\"}  Musculoskeletal:{Gait and Station:94850::\"normal\"}    Imaging      Study Result    Narrative & Impression   MRI THORACIC SPINE WITHOUT CONTRAST     INDICATION: E11.40: Type 2 diabetes mellitus with diabetic neuropathy, unspecified  M54.9: Dorsalgia, unspecified.     COMPARISON: Thoracic radiographs 5/29/2024     TECHNIQUE:  Multiplanar, multisequence imaging of the thoracic spine was performed. .     IMAGE QUALITY: Diagnostic.     FINDINGS:     ALIGNMENT: Normal alignment of the thoracic spine.  No compression fracture.  No subluxation.  No scoliosis.     MARROW SIGNAL:  Normal marrow signal is identified within the visualized bony structures.  No discrete marrow lesion.     THORACIC CORD: Normal signal within the thoracic cord.     PARAVERTEBRAL SOFT TISSUES:  Normal.     THORACIC DEGENERATIVE CHANGE: No focal disc herniation, canal stenosis or foraminal narrowing. No degenerative changes.     OTHER FINDINGS: A 1.3 cm left adrenal nodule.     IMPRESSION:     No focal disc thoracic herniation, central canal stenosis, or neural foraminal narrowing.     The thoracic cord appears normal in caliber and signal with no evidence of focal impingement.     A 1.3 cm left adrenal nodule is noted, suboptimally characterized on this examination. Correlation with a noncontrast CT examination can be considered on an emergent basis.           Workstation performed: [id number]          "

## 2024-10-07 DIAGNOSIS — Z79.01 CHRONIC ANTICOAGULATION: ICD-10-CM

## 2024-10-07 DIAGNOSIS — Z79.899 ENCOUNTER FOR LONG-TERM (CURRENT) USE OF MEDICATIONS: ICD-10-CM

## 2024-10-07 DIAGNOSIS — E13.49 OTHER DIABETIC NEUROLOGICAL COMPLICATION ASSOCIATED WITH OTHER SPECIFIED DIABETES MELLITUS (HCC): Primary | ICD-10-CM

## 2024-10-08 ENCOUNTER — TELEPHONE (OUTPATIENT)
Dept: PAIN MEDICINE | Facility: CLINIC | Age: 59
End: 2024-10-08

## 2024-10-08 DIAGNOSIS — G89.4 CHRONIC PAIN SYNDROME: Primary | ICD-10-CM

## 2024-10-08 DIAGNOSIS — E11.21 DIABETIC NEPHROPATHY ASSOCIATED WITH TYPE 2 DIABETES MELLITUS (HCC): ICD-10-CM

## 2024-10-08 NOTE — TELEPHONE ENCOUNTER
I did not cancel his hospital appt for the 10/16. I just cancelled his office visit. Please see staff message I sent about having to reschedule his trial due to infection and being on antibiotic for month of October.   Patient did want to know if if the trial was approved by his insurance?  Thank you

## 2024-10-08 NOTE — TELEPHONE ENCOUNTER
Hello,  Despite pts diagnosis being diabetic polyneuropathy, insurance is requiring that he be seen by a surgeon for documentation that he is not a candidate for surgery- response below.  Are you able to refer to a surgeon for this?

## 2024-10-08 NOTE — TELEPHONE ENCOUNTER
Spoke to pt and updated him of the referral to neurosurgery- he is going to call them and I advised I will submit that note to insurance once that visits done.

## 2024-10-21 DIAGNOSIS — I48.0 PAROXYSMAL ATRIAL FIBRILLATION (HCC): ICD-10-CM

## 2024-10-21 DIAGNOSIS — E03.9 HYPOTHYROIDISM (ACQUIRED): ICD-10-CM

## 2024-10-22 ENCOUNTER — CONSULT (OUTPATIENT)
Dept: NEUROSURGERY | Facility: CLINIC | Age: 59
End: 2024-10-22
Payer: COMMERCIAL

## 2024-10-22 ENCOUNTER — PREP FOR PROCEDURE (OUTPATIENT)
Dept: PAIN MEDICINE | Facility: CLINIC | Age: 59
End: 2024-10-22

## 2024-10-22 VITALS
TEMPERATURE: 98 F | BODY MASS INDEX: 35.08 KG/M2 | SYSTOLIC BLOOD PRESSURE: 170 MMHG | DIASTOLIC BLOOD PRESSURE: 80 MMHG | HEART RATE: 92 BPM | HEIGHT: 72 IN | WEIGHT: 259 LBS | OXYGEN SATURATION: 97 %

## 2024-10-22 DIAGNOSIS — E11.21 DIABETIC NEPHROPATHY ASSOCIATED WITH TYPE 2 DIABETES MELLITUS (HCC): ICD-10-CM

## 2024-10-22 DIAGNOSIS — G89.4 CHRONIC PAIN SYNDROME: ICD-10-CM

## 2024-10-22 PROCEDURE — 99204 OFFICE O/P NEW MOD 45 MIN: CPT | Performed by: PHYSICIAN ASSISTANT

## 2024-10-22 RX ORDER — LEVOTHYROXINE SODIUM 175 UG/1
TABLET ORAL
Qty: 90 TABLET | Refills: 1 | Status: SHIPPED | OUTPATIENT
Start: 2024-10-22

## 2024-10-22 RX ORDER — CARVEDILOL 6.25 MG/1
TABLET ORAL
Qty: 90 TABLET | Refills: 0 | Status: SHIPPED | OUTPATIENT
Start: 2024-10-22

## 2024-10-22 RX ORDER — APIXABAN 5 MG/1
5 TABLET, FILM COATED ORAL 2 TIMES DAILY
Qty: 60 TABLET | Refills: 0 | Status: SHIPPED | OUTPATIENT
Start: 2024-10-22

## 2024-10-22 NOTE — ASSESSMENT & PLAN NOTE
-Patient with chronic/longstanding bilateral foot paresthesia, numbness burning sensation in the setting of diabetic mellitus.  His blood glucose is now controlled with A1c of 5%.  Patient tried multiple medications for neuropathy.  -See detailed plan above    Lab Results   Component Value Date    HGBA1C 5.0 04/19/2024       Orders:    Ambulatory referral to Neurosurgery
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
## 2024-10-23 ENCOUNTER — TELEPHONE (OUTPATIENT)
Dept: SURGICAL ONCOLOGY | Facility: CLINIC | Age: 59
End: 2024-10-23

## 2024-10-23 NOTE — TELEPHONE ENCOUNTER
Good morning! Medical clearance request faxed to [phone] for planned Parathyroidectomy surgery with Dr. Segovia on 12/4/24. Eliquis hold instructions also requested. Please let me know if there is another more appropriate fax # I should send too. Thank you in advance!

## 2024-10-24 ENCOUNTER — RA CDI HCC (OUTPATIENT)
Dept: OTHER | Facility: HOSPITAL | Age: 59
End: 2024-10-24

## 2024-10-24 NOTE — PROGRESS NOTES
HCC coding opportunities          Chart Reviewed number of suggestions sent to Provider: 2     Patients Insurance        Commercial Insurance: [facility] [employer]     E11.22  E11.42

## 2024-10-28 DIAGNOSIS — N52.9 ERECTILE DYSFUNCTION, UNSPECIFIED ERECTILE DYSFUNCTION TYPE: ICD-10-CM

## 2024-10-30 ENCOUNTER — OFFICE VISIT (OUTPATIENT)
Dept: SURGICAL ONCOLOGY | Facility: CLINIC | Age: 59
End: 2024-10-30
Payer: COMMERCIAL

## 2024-10-30 ENCOUNTER — TELEPHONE (OUTPATIENT)
Age: 59
End: 2024-10-30

## 2024-10-30 VITALS
HEIGHT: 72 IN | SYSTOLIC BLOOD PRESSURE: 150 MMHG | HEART RATE: 67 BPM | OXYGEN SATURATION: 99 % | TEMPERATURE: 96.8 F | BODY MASS INDEX: 35.35 KG/M2 | WEIGHT: 261 LBS | DIASTOLIC BLOOD PRESSURE: 90 MMHG

## 2024-10-30 DIAGNOSIS — E21.3 HYPERPARATHYROIDISM (HCC): Primary | ICD-10-CM

## 2024-10-30 PROCEDURE — 99214 OFFICE O/P EST MOD 30 MIN: CPT | Performed by: SURGERY

## 2024-10-30 RX ORDER — TRAMADOL HYDROCHLORIDE 50 MG/1
50 TABLET ORAL EVERY 6 HOURS PRN
Qty: 10 TABLET | Refills: 0 | Status: SHIPPED | OUTPATIENT
Start: 2024-10-30 | End: 2024-10-31

## 2024-10-30 NOTE — TELEPHONE ENCOUNTER
I guess it mentions it there in the directions, 1 tablet by mouth daily, the max dose for the day is 20 mg.

## 2024-10-30 NOTE — TELEPHONE ENCOUNTER
I called and spoke with Adrian from the [facility]. He is aware of what is the max dose for Furosemide is. He had no other questions.

## 2024-10-30 NOTE — TELEPHONE ENCOUNTER
[facility] pharmacy calling asking what the max daily dose of the furosemide is? They said the directions state take 1 tablet (20mg total) by mouth daily as needed for leg edema. They are asking what the max dose is?    Number [phone]  Fax [phone]

## 2024-10-30 NOTE — PROGRESS NOTES
Surgical Oncology Consult                                        [address]  [facility] SURGICAL [facility]COL[facility]  [address]  [facility] [id number]     Fawad Baer  1965  [id number]  [address]  [facility]GICAL [facility]  [address]  [facility] [address]          Chief Complaint   Patient presents with    Follow-up       New problem         Assessment/Plan:     No problem-specific Assessment & Plan notes found for this encounter.           Assessment  Diagnoses and all orders for this visit:     Hyperparathyroidism (HCC)  -     Ambulatory Referral to Surgical Oncology  -     CT parathyroid study w wo contrast; Future        Advance Care Planning/Advance Directives:  Discussed disease status, cancer treatment plans and/or cancer treatment goals with the patient.          Oncology History     No history exists.         History of Present Illness: Patient is a 59-year-old man referred for primary hyperparathyroidism.  He was found to have achiness, fatigue, and issues with memory corresponding to elevated calcium levels.  PTH chills were finally elevated which prompted a sestamibi scan.  He is referred for workup and treatment of possible right-sided parathyroid adenoma.  No history of kidney stones.  No GI complaints.  No personal or family history of any malignancies.     Review of Systems   Constitutional:  Positive for fatigue.   HENT: Negative.     Eyes: Negative.    Respiratory: Negative.     Cardiovascular: Negative.    Gastrointestinal: Negative.    Endocrine: Negative.    Genitourinary: Negative.    Musculoskeletal: Negative.    Skin: Negative.    Allergic/Immunologic: Negative.    Neurological: Negative.    Hematological: Negative.    Psychiatric/Behavioral: Negative.     All other systems reviewed and are negative.           Problem List       Patient Active Problem List   Diagnosis    Controlled type 2 diabetes mellitus with  microalbuminuria, without long-term current use of insulin  (HCC)    Painful diabetic neuropathy (HCC)    Erectile dysfunction due to diseases classified elsewhere    Homozygous for MTHFR gene mutation    Hypothyroidism (acquired)    Sacroiliitis (HCC)    Tarsal tunnel syndrome of both lower extremities    Tarsal tunnel syndrome, left    Venous stasis ulcer of left ankle limited to breakdown of skin with varicose veins (HCC)    Gout    Hyperaldosteronism (HCC)    Diabetic nephropathy associated with type 2 diabetes mellitus (HCC)    Stage 3a chronic kidney disease (HCC)    Proteinuria    Vitamin D deficiency    Syncopal episodes     Paroxysmal atrial fibrillation (HCC)    History of DVT (deep vein thrombosis)    Sinus bradycardia    Obstructive sleep apnea    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HCC)    Pacemaker    Primary hypertension    LVH (left ventricular hypertrophy)    Plantar fasciitis    Post-operative state    Tarsal tunnel syndrome of left side    Pain in both feet    Chronic pain syndrome    Mid back pain    Hyperparathyroidism (HCC)         Medical History        Past Medical History:   Diagnosis Date    Arthritis       right foot    Chronic cholecystitis      CPAP (continuous positive airway pressure) dependence      Diabetes mellitus (HCC)      Diabetic nephropathy (HCC)      Disease of thyroid gland 1990    DVT (deep venous thrombosis) (HCC)      Gout      History of pulmonary embolism      Hyperlipidemia      Hypertension      Hypothyroidism      Lumbar back pain      MTHFR mutation      Neuropathy      Neuropathy in diabetes (HCC) 2018    Scab       right arm- no s/s infection    Sleep apnea      Tarsal tunnel syndrome, right      Tinnitus       left ear    Wears glasses       and contacts    Wears glasses           Surgical History         Past Surgical History:   Procedure Laterality Date    APPENDECTOMY        ARTHRODESIS         Lumbar L__    BACK SURGERY        BUNIONECTOMY Right  10/07/2016     Procedure: REMOVAL TIBIAL  SESAMOID BONE  RIGHT FOOT;  Surgeon: Vicente Aguirre DPM;  Location: [facility];  Service:     CARDIAC PACEMAKER PLACEMENT   09/01/2021    CHOLECYSTECTOMY   03/26/2015    COLONOSCOPY        KNEE ARTHROSCOPY        KNEE ARTHROSCOPY W/ MENISCAL REPAIR         Lateral    NASAL SEPTUM SURGERY   10/16/2013    PLANTAR FASCIA SURGERY Left 10/26/2023     Procedure: RELEASE FASCIA PLANTAR/FASCIOTOMY;  Surgeon: Inderjit Ramirez DPM;  Location: [facility];  Service: Podiatry    OR COLONOSCOPY FLX DX W/COLLJ SPEC WHEN PFRMD N/A 11/23/2018     Procedure: COLONOSCOPY;  Surgeon: Manjit Dietrich MD;  Location: [facility];  Service: Gastroenterology    OR RELEASE TARSAL TUNNEL Right 06/25/2018     Procedure: RELEASE TARSAL TUNNEL;  Surgeon: Cliff Bernabe DPM;  Location: [facility];  Service: Podiatry    OR RELEASE TARSAL TUNNEL Left 03/13/2020     Procedure: RELEASE TARSAL TUNNEL;  Surgeon: Cliff Bernabe DPM;  Location: [facility];  Service: Podiatry    OR RELEASE TARSAL TUNNEL Left 10/26/2023     Procedure: RELEASE TARSAL TUNNEL;  Surgeon: Inderjit Ramirez DPM;  Location: [facility];  Service: Podiatry    SPINAL FUSION        TONSILLECTOMY   10/16/2013     with Adenoidectomy    UVULECTOMY   10/16/2013    UVULOPALATOPHARYNGOPLASTY        WISDOM TOOTH EXTRACTION             Family History         Family History   Problem Relation Age of Onset    Aneurysm Mother           intracranial aneurysm repair    Coronary artery disease Father      Hypertension Father      Heart disease Father      Aneurysm Brother           Social History               Socioeconomic History    Marital status: Single       Spouse name: Not on file    Number of children: Not on file    Years of education: Not on file    Highest education level: Not on file   Occupational History    Not on file   Tobacco Use    Smoking status: Never    Smokeless tobacco: Never   Vaping Use    Vaping status:  Never Used   Substance and Sexual Activity    Alcohol use: Yes       Alcohol/week: 6.0 standard drinks of alcohol       Types: 6 Cans of beer per week       Comment: weekends    Drug use: No    Sexual activity: Yes       Partners: Female       Birth control/protection: Other   Other Topics Concern    Not on file   Social History Narrative    Not on file      Social Determinants of Health           Financial Resource Strain: Low Risk  (3/16/2024)     [facility]     Overall Financial Resource Strain (CARDIA)      Difficulty of Paying Living Expenses: Not hard at all   Food Insecurity: No Food Insecurity (3/16/2024)     [facility]     Hunger Vital Sign      Worried About Running Out of Food in the Last Year: Never true      Ran Out of Food in the Last Year: Never true   Transportation Needs: No Transportation Needs (3/16/2024)     [facility]     PRAPARE - Transportation      Lack of Transportation (Medical): No      Lack of Transportation (Non-Medical): No   Physical Activity: Not on file   Stress: Not on file   Social Connections: Not on file   Intimate Partner Violence: Not At Risk (3/16/2024)     [facility]     Humiliation, Afraid, Rape, and Kick questionnaire      Fear of Current or Ex-Partner: No      Emotionally Abused: No      Physically Abused: No      Sexually Abused: No   Housing Stability: Low Risk  (3/16/2024)     [facility]     Housing Stability Vital Sign      Unable to Pay for Housing in the Last Year: No      Number of Places Lived in the Last Year: 1      Unstable Housing in the Last Year: No           Current Medications      Current Outpatient Medications:     allopurinol (ZYLOPRIM) 300 mg  tablet, Take 1 tablet (300 mg total) by mouth daily, Disp: 90 tablet, Rfl: 1    apixaban (Eliquis) 5 mg, Take 1 tablet (5 mg total) by mouth 2 (two) times a day, Disp: 60 tablet, Rfl: 3    carvedilol (COREG) 6.25 mg tablet, Take 1 tablet in the morning and 1-1/2 tablets in the evening, Disp: 90 tablet, Rfl: 1    ezetimibe (ZETIA) 10 mg tablet, Take 1 tablet (10 mg total) by mouth daily, Disp: 90 tablet, Rfl: 0    furosemide (LASIX) 20 mg tablet, Take 1 tablet (20 mg total) by mouth daily As needed for leg edema, Disp: 90 tablet, Rfl: 1    gabapentin (NEURONTIN) 600 MG tablet, Take 1 tablet (600 mg total) by mouth 3 (three) times a day, Disp: 270 tablet, Rfl: 0    levothyroxine 175 mcg tablet, Take one tablet daily on empty stomach 1 hour before breakfast and 4 hours apart from calcium and vitamin D supplementation, Disp: 90 tablet, Rfl: 1    lisinopril (ZESTRIL) 10 mg tablet, Take 1 tablet (10 mg total) by mouth daily, Disp: 30 tablet, Rfl: 2    semaglutide, 1 mg/dose, (Ozempic, 1 MG/DOSE,) 4 mg/3 mL injection pen, Inject 0.75 mL (1 mg total) under the skin every 7 days, Disp: 3 mL, Rfl: 5    sildenafil (Viagra) 100 mg tablet, Take 1 tablet (100 mg total) by mouth as needed for erectile dysfunction, Disp: 30 tablet, Rfl: 0    traMADol (Ultram) 50 mg tablet, Take 1 tablet (50 mg total) by mouth every 8 (eight) hours as needed for moderate pain, Disp: 20 tablet, Rfl: 0    cholecalciferol 1,000 units tablet, Take 4 tablets (4,000 Units total) by mouth daily (Patient taking differently: Take 5,000 Units by mouth daily), Disp: 180 tablet, Rfl: 1    dexamethasone (DECADRON) 1 mg tablet, Take 1 mg by mouth (Patient not taking: Reported on 7/17/2024), Disp: , Rfl:     metFORMIN (GLUCOPHAGE-XR) 500 mg 24 hr tablet, Take 1 tablet (500 mg total) by mouth daily at bedtime (Patient not taking: Reported on 7/17/2024), Disp: 90 tablet, Rfl: 0    spironolactone (ALDACTONE) 25 mg tablet, Take 25 mg by mouth daily, Disp: , Rfl:       No Known Allergies      Vitals:   /90 (BP Location: Left arm, Patient Position: Sitting, Cuff Size: Large)   Pulse 67   Temp (!) 96.8 °F (36 °C) (Temporal)   Ht 6' (1.829 m)   Wt 118 kg (261 lb)   SpO2 99%   BMI 35.40 kg/m²        Physical Exam  Vitals reviewed.   Constitutional:       Appearance: Normal appearance.   HENT:      Head: Normocephalic and atraumatic.      Right Ear: External ear normal.      Left Ear: External ear normal.      Nose: Nose normal.   Eyes:      Extraocular Movements: Extraocular movements intact.      Pupils: Pupils are equal, round, and reactive to light.   Cardiovascular:      Rate and Rhythm: Normal rate and regular rhythm.      Heart sounds: Normal heart sounds.   Pulmonary:      Effort: Pulmonary effort is normal.      Breath sounds: Normal breath sounds.   Abdominal:      General: Abdomen is flat.      Palpations: Abdomen is soft.   Musculoskeletal:         General: Normal range of motion.      Cervical back: Normal range of motion and neck supple.   Skin:     General: Skin is warm and dry.   Neurological:      General: No focal deficit present.      Mental Status: He is alert and oriented to person, place, and time.   Psychiatric:         Mood and Affect: Mood normal.         Behavior: Behavior normal.            Pathology:  none     Labs:        Lab Results   Component Value Date     .7 (H) 06/29/2024     CALCIUM 9.9 06/29/2024     PHOS 3.4 06/29/2024      PARATHYROID SCAN     INDICATION:  E21.3: Hyperparathyroidism, unspecified     COMPARISON:  None.     TECHNIQUE:   Following the intravenous administration of 26.1 mCi Tc-99m Cardiolite, anterior and bilateral anterior oblique projection images of the neck and mediastinum were obtained at approximately 10 minutes post injection followed at 2 hours post   injection by static anterior and bilateral oblique projections as well as images reconstructed in the coronal, sagittal and axial projections.      FINDINGS:     Immediate: Minimal thyroid uptake bilaterally.     Delayed: Delayed planar images demonstrate normal washout of the radiotracer from the thyroid. Best seen on the delayed SPECT images, there is a small focus of activity in the region of the right lower thyroid pole. This may represent a parathyroid   adenoma in the appropriate clinical setting.        IMPRESSION:     1. Best seen on the delayed SPECT images, there is a small focus of activity in the region of the right lower thyroid pole. This may represent a parathyroid adenoma in the appropriate clinical setting.           Workstation performed: [id number]        Imaging     Imaging     XR chest pa & lateral     Result Date: 7/16/2024  Narrative: Chest x-ray--2 views - PA and lateral views. INDICATION: MRI clearance. Pacemaker. Comparison:    CT body 3/4/2024. FINDINGS: Two-view chest shows no active finding. Curvilinear scar at the left lung base again noted, stable to CT 3/2024. Otherwise the lungs appear well expanded and clear. No infiltrate, atelectasis, failure or effusion.  No pulmonary mass.  (No pulmonary nodule by chest x-ray.)  Unremarkable midline trachea. Mild stable cardiomegaly. Satisfactory left-sided bipolar pacemaker. Unremarkable thoracic aorta.  Unremarkable whitley and mediastinum. No adenopathy.     Impression: IMPRESSION:  Two-view chest shows no active finding. Curvilinear scar at the left lung base again noted, stable to CT 3/2024.   Otherwise the lungs appear clear. Mild stable cardiomegaly. Satisfactory left-sided bipolar pacemaker. Workstation:[id number]     I reviewed the above laboratory and imaging data.     Discussion/Summary: Primary hyperparathyroidism, suspected target on the right side.  Candidate for minimally invasive parathyroidectomy.  He is amenable to this.  Risk and benefits of middle invasive right parathyroidectomy, possible 4 gland exploration with intraoperative PTH monitoring were discussed with him.  All  questions answered and consent signed at this visit.             Electronically signed by Og Segovia MD

## 2024-10-31 ENCOUNTER — TRANSCRIBE ORDERS (OUTPATIENT)
Dept: PAIN MEDICINE | Facility: CLINIC | Age: 59
End: 2024-10-31

## 2024-10-31 ENCOUNTER — OFFICE VISIT (OUTPATIENT)
Dept: FAMILY MEDICINE CLINIC | Facility: CLINIC | Age: 59
End: 2024-10-31
Payer: COMMERCIAL

## 2024-10-31 VITALS
HEART RATE: 76 BPM | BODY MASS INDEX: 35.35 KG/M2 | SYSTOLIC BLOOD PRESSURE: 174 MMHG | OXYGEN SATURATION: 97 % | WEIGHT: 261 LBS | HEIGHT: 72 IN | TEMPERATURE: 95.8 F | DIASTOLIC BLOOD PRESSURE: 120 MMHG

## 2024-10-31 DIAGNOSIS — E78.2 MIXED HYPERLIPIDEMIA: ICD-10-CM

## 2024-10-31 DIAGNOSIS — R80.9 CONTROLLED TYPE 2 DIABETES MELLITUS WITH MICROALBUMINURIA, WITHOUT LONG-TERM CURRENT USE OF INSULIN  (HCC): Primary | ICD-10-CM

## 2024-10-31 DIAGNOSIS — E21.3 HYPERPARATHYROIDISM (HCC): ICD-10-CM

## 2024-10-31 DIAGNOSIS — E11.29 CONTROLLED TYPE 2 DIABETES MELLITUS WITH MICROALBUMINURIA, WITHOUT LONG-TERM CURRENT USE OF INSULIN  (HCC): Primary | ICD-10-CM

## 2024-10-31 LAB — SL AMB POCT HEMOGLOBIN AIC: 5.7 (ref ?–6.5)

## 2024-10-31 PROCEDURE — 99396 PREV VISIT EST AGE 40-64: CPT | Performed by: FAMILY MEDICINE

## 2024-10-31 PROCEDURE — 83036 HEMOGLOBIN GLYCOSYLATED A1C: CPT | Performed by: FAMILY MEDICINE

## 2024-10-31 NOTE — PROGRESS NOTES
Adult Annual Physical  Name: Fawad Baer      : 1965      MRN: [id number]  Encounter Provider: Agustín Loredo DO  Encounter Date: 10/31/2024   Encounter department: [facility]    Assessment & Plan  Controlled type 2 diabetes mellitus with microalbuminuria, without long-term current use of insulin  (HCC)    Lab Results   Component Value Date    HGBA1C 5.7 10/31/2024       Orders:    POCT hemoglobin A1c    Hyperparathyroidism (HCC)         Immunizations and preventive care screenings were discussed with patient today. Appropriate education was printed on patient's after visit summary.    Discussed risks and benefits of prostate cancer screening. We discussed the controversial history of PSA screening for prostate cancer in the [place] as well as the risk of over detection and over treatment of prostate cancer by way of PSA screening.  The patient understands that PSA blood testing is an imperfect way to screen for prostate cancer and that elevated PSA levels in the blood may also be caused by infection, inflammation, prostatic trauma or manipulation, urological procedures, or by benign prostatic enlargement.    The role of the digital rectal examination in prostate cancer screening was also discussed and I discussed with him that there is large interobserver variability in the findings of digital rectal examination.    Counseling:  Alcohol/drug use: discussed moderation in alcohol intake, the recommendations for healthy alcohol use, and avoidance of illicit drug use.  Dental Health: discussed importance of regular tooth brushing, flossing, and dental visits.  Injury prevention: discussed safety/seat belts, safety helmets, smoke detectors, carbon monoxide detectors, and smoking near bedding or upholstery.  Sexual health: discussed sexually transmitted diseases, partner selection, use of condoms, avoidance of unintended pregnancy, and contraceptive alternatives.  Exercise: the importance  of regular exercise/physical activity was discussed. Recommend exercise 3-5 times per week for at least 30 minutes.       Depression Screening and Follow-up Plan: Patient was screened for depression during today's encounter. They screened negative with a PHQ-2 score of 1.        History of Present Illness     Adult Annual Physical:  Patient presents for annual physical.     Diet and Physical Activity:  - Diet/Nutrition: well balanced diet and limited junk food.  - Exercise: walking.    Depression Screening:  - PHQ-2 Score: 1    General Health:  - Sleep: 4-6 hours of sleep on average.  - Hearing: normal hearing bilateral ears.  - Vision: goes for regular eye exams.    Review of Systems  Current Outpatient Medications on File Prior to Visit   Medication Sig Dispense Refill    allopurinol (ZYLOPRIM) 300 mg tablet Take 1 tablet (300 mg total) by mouth daily 90 tablet 1    carvedilol (COREG) 6.25 mg tablet TAKE 1 TABLET BY MOUTH ONCE DAILY IN THE MORNING AND 1 & 1/2 (ONE & ONE-HALF) ONCE DAILY IN THE EVENING 90 tablet 0    Eliquis 5 MG Take 1 tablet by mouth twice daily 60 tablet 0    ezetimibe (ZETIA) 10 mg tablet Take 1 tablet (10 mg total) by mouth daily 100 tablet 1    furosemide (LASIX) 20 mg tablet Take 1 tablet (20 mg total) by mouth daily As needed for leg edema 90 tablet 1    gabapentin (NEURONTIN) 600 MG tablet Take 1 tablet (600 mg total) by mouth 3 (three) times a day 270 tablet 1    levothyroxine 175 mcg tablet TAKE 1 TABLET BY MOUTH ONCE DAILY ON AN EMPTY STOMACH 1 HOUR BEFORE BREAKFAST AND 4 HOURS APART FROM CALCIUM AND VITAMIN D SUPPLEMENTATION 90 tablet 1    lisinopril (ZESTRIL) 10 mg tablet Take 1 tablet (10 mg total) by mouth daily 30 tablet 2    metFORMIN (GLUCOPHAGE-XR) 500 mg 24 hr tablet Take 1 tablet (500 mg total) by mouth daily at bedtime 100 tablet 1    semaglutide, 2 mg/dose, (Ozempic, 2 MG/DOSE,) 8 mg/ mL injection pen Inject 0.75 mL (2 mg total) under the skin every 7 days 3 mL 5     sildenafil (Viagra) 100 mg tablet Take 1 tablet (100 mg total) by mouth as needed for erectile dysfunction 30 tablet 0    [DISCONTINUED] dexamethasone (DECADRON) 1 mg tablet Take 1 mg by mouth      [DISCONTINUED] traMADol (Ultram) 50 mg tablet Take 1 tablet (50 mg total) by mouth every 8 (eight) hours as needed for moderate pain 20 tablet 0    [DISCONTINUED] traMADol (Ultram) 50 mg tablet Take 1 tablet (50 mg total) by mouth every 6 (six) hours as needed for moderate pain 10 tablet 0     No current facility-administered medications on file prior to visit.      Social History     Tobacco Use    Smoking status: Never    Smokeless tobacco: Never   Vaping Use    Vaping status: Never Used   Substance and Sexual Activity    Alcohol use: Yes     Alcohol/week: 6.0 standard drinks of alcohol     Types: 6 Cans of beer per week     Comment: weekends    Drug use: No    Sexual activity: Yes     Partners: Female     Birth control/protection: Other       Objective     BP (!) 174/120 (BP Location: Left arm, Patient Position: Sitting, Cuff Size: Standard)   Pulse 76   Temp (!) 95.8 °F (35.4 °C) (Temporal)   Ht 6' (1.829 m)   Wt 118 kg (261 lb)   SpO2 97%   BMI 35.40 kg/m²     Physical Exam  Constitutional:       Appearance: He is ill-appearing.   HENT:      Head: Normocephalic.      Nose: Congestion present.   Cardiovascular:      Rate and Rhythm: Normal rate and regular rhythm.   Pulmonary:      Effort: Pulmonary effort is normal.      Breath sounds: Normal breath sounds.   Musculoskeletal:      Right lower leg: Edema present.      Left lower leg: Edema present.   Neurological:      General: No focal deficit present.      Mental Status: He is alert and oriented to person, place, and time.   Psychiatric:         Mood and Affect: Mood normal.         Behavior: Behavior normal.         Thought Content: Thought content normal.         Judgment: Judgment normal.

## 2024-10-31 NOTE — ASSESSMENT & PLAN NOTE
Lab Results   Component Value Date    HGBA1C 5.7 10/31/2024       Orders:    POCT hemoglobin A1c

## 2024-11-01 ENCOUNTER — TELEPHONE (OUTPATIENT)
Dept: ADMINISTRATIVE | Facility: OTHER | Age: 59
End: 2024-11-01

## 2024-11-01 NOTE — LETTER
Diabetic Eye Exam Form    Date Requested: 24  Patient: Fawad Baer  Patient : 1965   Referring Provider: Agustín Loredo DO      DIABETIC Eye Exam Date _______________________________      Type of Exam MUST be documented for Diabetic Eye Exams. Please CHECK ONE.     Retinal Exam       Dilated Retinal Exam       OCT       Optomap-Iris Exam      Fundus Photography       Left Eye - Please check Retinopathy or No Retinopathy        Exam did show retinopathy    Exam did not show retinopathy       Right Eye - Please check Retinopathy or No Retinopathy       Exam did show retinopathy    Exam did not show retinopathy       Comments __________________________________________________________    Practice Providing Exam ______________________________________________    Exam Performed By (print name) _______________________________________      Provider Signature ___________________________________________________      These reports are needed for  compliance.  Please fax this completed form and a copy of the Diabetic Eye Exam report to our office located at [address] [place] [address] as soon as possible via Fax [phone] attention Arianna: Phone [phone]  We thank you for your assistance in treating our mutual patient.

## 2024-11-01 NOTE — LETTER
Diabetic Eye Exam Form    Date Requested: 24  Patient: Fawad Baer  Patient : 1965   Referring Provider: Agustín Loredo DO      DIABETIC Eye Exam Date _______________________________      Type of Exam MUST be documented for Diabetic Eye Exams. Please CHECK ONE.     Retinal Exam       Dilated Retinal Exam       OCT       Optomap-Iris Exam      Fundus Photography       Left Eye - Please check Retinopathy or No Retinopathy        Exam did show retinopathy    Exam did not show retinopathy       Right Eye - Please check Retinopathy or No Retinopathy       Exam did show retinopathy    Exam did not show retinopathy       Comments __________________________________________________________    Practice Providing Exam ______________________________________________    Exam Performed By (print name) _______________________________________      Provider Signature ___________________________________________________      These reports are needed for  compliance.  Please fax this completed form and a copy of the Diabetic Eye Exam report to our office located at [address] [place] [address] as soon as possible via Fax [phone] attention Arianna: Phone [phone]  We thank you for your assistance in treating our mutual patient.

## 2024-11-01 NOTE — LETTER
Diabetic Foot Exam Form    Date Requested: 24  Patient: Fawad Baer  Patient : 1965   Referring Provider: Agustín Loredo DO    Diabetic Foot Exam Performed with shoes and socks removed        Yes         No     Date of Diabetic Foot Exam ______________________________  Risk Score ____________________________________________    Left Foot       Visual Inspection         Monofilament Testing Sensory Exam        Pedal Pulses         Additional Comments         Right Foot      Visual Inspection         Monofilament Testing Sensory Exam       Pedal Pulses         Additional Comments         Comments __________________________________________________________    Practice Providing Exam ______________________________________________    Exam Performed By (print name) _______________________________________      Provider Signature ___________________________________________________      These reports are needed for  compliance.    Please fax this completed form and a copy of the Diabetic Foot Exam report to our office located at [address] [place] [address] as soon as possible via Fax [phone] ernie Keller: Phone [phone]    We thank you for your assistance in treating our mutual patient.

## 2024-11-01 NOTE — TELEPHONE ENCOUNTER
----- Message from Kellen GRANADOS sent at 10/31/2024 10:14 AM EDT -----  10/31/24 10:14 AM    Hello, our patient Fawad Baer has had Diabetic Eye Exam completed/performed. Please assist in updating the patient chart by making an External outreach to Dr Escobar  facility located in [place]. The date of service is beginning of the year.    Thank you,  Kellen Betancourt MA  [facility]

## 2024-11-01 NOTE — TELEPHONE ENCOUNTER
DM Eye: Upon review of the In Basket request and the patient's chart, initial outreach has been made via fax to facility. Please see Contacts section for details.       DM Foot: Upon review of the In Basket request and the patient's chart, initial outreach has been made via fax to facility. Please see Contacts section for details. Dr. Ramirez is an internal provider. Patient last seen 12/6/23 for tarsal tunnel, but no dm foot exam performed. Outreach made to [facility] where patient was previously seen.    Thank you  Arianna Smallwood MA

## 2024-11-01 NOTE — TELEPHONE ENCOUNTER
----- Message from Kellen GRANADOS sent at 10/31/2024 10:14 AM EDT -----  10/31/24 10:15 AM    Hello, our patient Fawad Baer has had Diabetic Foot Exam completed/performed. Please assist in updating the patient chart by making an External outreach to Dr Ramirez facility located in [place]. The date of service is spring.    Thank you,  Kellen Betancourt MA  [facility]

## 2024-11-02 DIAGNOSIS — R80.9 PROTEINURIA: ICD-10-CM

## 2024-11-04 ENCOUNTER — TELEPHONE (OUTPATIENT)
Dept: SURGICAL ONCOLOGY | Facility: CLINIC | Age: 59
End: 2024-11-04

## 2024-11-04 RX ORDER — LISINOPRIL 10 MG/1
10 TABLET ORAL DAILY
Qty: 30 TABLET | Refills: 5 | Status: SHIPPED | OUTPATIENT
Start: 2024-11-04

## 2024-11-04 NOTE — TELEPHONE ENCOUNTER
Left a message to update regarding obtaining medical clearance from PCP prior to surgery on 12/4 with Dr. Segovia. I let him know that he can get his labs, EKG, and chest Xray done in the next week or so, so that his PCP can review the results for his medical clearance. I also made him aware that he may receive a call from his PCP office to schedule an appointment to determine medical clearance. Left my direct number in case of questions.

## 2024-11-05 ENCOUNTER — TELEPHONE (OUTPATIENT)
Dept: PAIN MEDICINE | Facility: CLINIC | Age: 59
End: 2024-11-05

## 2024-11-06 NOTE — TELEPHONE ENCOUNTER
DM Foot: Upon review of the In Basket request we were able to locate, review, and update the patient chart as requested for Diabetic Foot Exam.    Any additional questions or concerns should be emailed to the Practice Liaisons via the appropriate education email address, please do not reply via In Basket.    Thank you  Arianna Smallwood MA   [facility]

## 2024-11-11 ENCOUNTER — APPOINTMENT (OUTPATIENT)
Dept: LAB | Facility: CLINIC | Age: 59
End: 2024-11-11
Payer: COMMERCIAL

## 2024-11-11 ENCOUNTER — OFFICE VISIT (OUTPATIENT)
Dept: LAB | Facility: CLINIC | Age: 59
End: 2024-11-11
Payer: COMMERCIAL

## 2024-11-11 DIAGNOSIS — E21.3 HYPERPARATHYROIDISM (HCC): ICD-10-CM

## 2024-11-11 DIAGNOSIS — Z79.01 CHRONIC ANTICOAGULATION: ICD-10-CM

## 2024-11-11 DIAGNOSIS — E13.49 OTHER DIABETIC NEUROLOGICAL COMPLICATION ASSOCIATED WITH OTHER SPECIFIED DIABETES MELLITUS (HCC): ICD-10-CM

## 2024-11-11 DIAGNOSIS — Z79.899 ENCOUNTER FOR LONG-TERM (CURRENT) USE OF MEDICATIONS: ICD-10-CM

## 2024-11-11 LAB
ALBUMIN SERPL BCG-MCNC: 3.9 G/DL (ref 3.5–5)
ALP SERPL-CCNC: 71 U/L (ref 34–104)
ALT SERPL W P-5'-P-CCNC: 18 U/L (ref 7–52)
ANION GAP SERPL CALCULATED.3IONS-SCNC: 7 MMOL/L (ref 4–13)
APTT PPP: 31 SECONDS (ref 23–34)
AST SERPL W P-5'-P-CCNC: 19 U/L (ref 13–39)
ATRIAL RATE: 62 BPM
BASOPHILS # BLD AUTO: 0.05 THOUSANDS/ÂΜL (ref 0–0.1)
BASOPHILS NFR BLD AUTO: 1 % (ref 0–1)
BILIRUB SERPL-MCNC: 0.46 MG/DL (ref 0.2–1)
BUN SERPL-MCNC: 39 MG/DL (ref 5–25)
CALCIUM SERPL-MCNC: 11.2 MG/DL (ref 8.4–10.2)
CHLORIDE SERPL-SCNC: 103 MMOL/L (ref 96–108)
CHOLEST SERPL-MCNC: 214 MG/DL
CO2 SERPL-SCNC: 28 MMOL/L (ref 21–32)
CREAT SERPL-MCNC: 1.48 MG/DL (ref 0.6–1.3)
EOSINOPHIL # BLD AUTO: 0.4 THOUSAND/ÂΜL (ref 0–0.61)
EOSINOPHIL NFR BLD AUTO: 4 % (ref 0–6)
ERYTHROCYTE [DISTWIDTH] IN BLOOD BY AUTOMATED COUNT: 14 % (ref 11.6–15.1)
EST. AVERAGE GLUCOSE BLD GHB EST-MCNC: 120 MG/DL
GFR SERPL CREATININE-BSD FRML MDRD: 51 ML/MIN/1.73SQ M
GLUCOSE P FAST SERPL-MCNC: 107 MG/DL (ref 65–99)
HBA1C MFR BLD: 5.8 %
HCT VFR BLD AUTO: 54.4 % (ref 36.5–49.3)
HDLC SERPL-MCNC: 60 MG/DL
HGB BLD-MCNC: 17.1 G/DL (ref 12–17)
IMM GRANULOCYTES # BLD AUTO: 0.05 THOUSAND/UL (ref 0–0.2)
IMM GRANULOCYTES NFR BLD AUTO: 1 % (ref 0–2)
INR PPP: 1.05 (ref 0.85–1.19)
LDLC SERPL CALC-MCNC: 119 MG/DL (ref 0–100)
LYMPHOCYTES # BLD AUTO: 1.69 THOUSANDS/ÂΜL (ref 0.6–4.47)
LYMPHOCYTES NFR BLD AUTO: 16 % (ref 14–44)
MCH RBC QN AUTO: 29.3 PG (ref 26.8–34.3)
MCHC RBC AUTO-ENTMCNC: 31.4 G/DL (ref 31.4–37.4)
MCV RBC AUTO: 93 FL (ref 82–98)
MONOCYTES # BLD AUTO: 0.78 THOUSAND/ÂΜL (ref 0.17–1.22)
MONOCYTES NFR BLD AUTO: 7 % (ref 4–12)
NEUTROPHILS # BLD AUTO: 7.93 THOUSANDS/ÂΜL (ref 1.85–7.62)
NEUTS SEG NFR BLD AUTO: 71 % (ref 43–75)
NONHDLC SERPL-MCNC: 154 MG/DL
NRBC BLD AUTO-RTO: 0 /100 WBCS
P AXIS: 54 DEGREES
PLATELET # BLD AUTO: 166 THOUSANDS/UL (ref 149–390)
PMV BLD AUTO: 10.1 FL (ref 8.9–12.7)
POTASSIUM SERPL-SCNC: 4.8 MMOL/L (ref 3.5–5.3)
PR INTERVAL: 166 MS
PROT SERPL-MCNC: 7.4 G/DL (ref 6.4–8.4)
PROTHROMBIN TIME: 14 SECONDS (ref 12.3–15)
QRS AXIS: 7 DEGREES
QRSD INTERVAL: 96 MS
QT INTERVAL: 412 MS
QTC INTERVAL: 419 MS
RBC # BLD AUTO: 5.83 MILLION/UL (ref 3.88–5.62)
SODIUM SERPL-SCNC: 138 MMOL/L (ref 135–147)
T WAVE AXIS: 69 DEGREES
TRIGL SERPL-MCNC: 175 MG/DL
VENTRICULAR RATE: 62 BPM
WBC # BLD AUTO: 10.9 THOUSAND/UL (ref 4.31–10.16)

## 2024-11-11 PROCEDURE — 93005 ELECTROCARDIOGRAM TRACING: CPT

## 2024-11-11 PROCEDURE — 85025 COMPLETE CBC W/AUTO DIFF WBC: CPT

## 2024-11-11 PROCEDURE — 93010 ELECTROCARDIOGRAM REPORT: CPT | Performed by: INTERNAL MEDICINE

## 2024-11-11 PROCEDURE — 36415 COLL VENOUS BLD VENIPUNCTURE: CPT

## 2024-11-11 PROCEDURE — 80053 COMPREHEN METABOLIC PANEL: CPT

## 2024-11-11 PROCEDURE — 83036 HEMOGLOBIN GLYCOSYLATED A1C: CPT

## 2024-11-11 PROCEDURE — 85610 PROTHROMBIN TIME: CPT

## 2024-11-11 PROCEDURE — 85730 THROMBOPLASTIN TIME PARTIAL: CPT

## 2024-11-12 ENCOUNTER — RA CDI HCC (OUTPATIENT)
Dept: OTHER | Facility: HOSPITAL | Age: 59
End: 2024-11-12

## 2024-11-12 ENCOUNTER — APPOINTMENT (OUTPATIENT)
Dept: RADIOLOGY | Facility: CLINIC | Age: 59
End: 2024-11-12
Payer: COMMERCIAL

## 2024-11-12 DIAGNOSIS — E21.3 HYPERPARATHYROIDISM (HCC): ICD-10-CM

## 2024-11-12 PROCEDURE — 71046 X-RAY EXAM CHEST 2 VIEWS: CPT

## 2024-11-12 NOTE — PROGRESS NOTES
HCC coding opportunities          Chart Reviewed number of suggestions sent to Provider: 2  E11.42  E11.22     Patients Insurance        Commercial Insurance: [employer]

## 2024-11-14 NOTE — TELEPHONE ENCOUNTER
DM Eye: As a follow-up, a second attempt has been made for outreach via fax to facility. Please see Contacts section for details.    Thank you  Arianna Smallwood MA

## 2024-11-15 ENCOUNTER — TELEPHONE (OUTPATIENT)
Dept: FAMILY MEDICINE CLINIC | Facility: CLINIC | Age: 59
End: 2024-11-15

## 2024-11-15 VITALS — SYSTOLIC BLOOD PRESSURE: 124 MMHG | DIASTOLIC BLOOD PRESSURE: 86 MMHG

## 2024-11-15 DIAGNOSIS — I10 PRIMARY HYPERTENSION: Primary | ICD-10-CM

## 2024-11-15 RX ORDER — SPIRONOLACTONE 25 MG/1
25 TABLET ORAL DAILY
Status: CANCELLED
Start: 2024-11-15

## 2024-11-15 NOTE — TELEPHONE ENCOUNTER
Lft voicemail and myc msg, pts appt  on 11/19/24 was moved from 10:20 am to 11:20 am for pre op clearance. Asked him to call office to reschedule if this time does not work.

## 2024-11-15 NOTE — TELEPHONE ENCOUNTER
Pt in for B/P check - 124/86    Pt states he restarted the Spironolactone 25 mg daily a week ago, medication was D/C 3/4/24 after adrenal gland rupture.

## 2024-11-18 RX ORDER — SPIRONOLACTONE 25 MG/1
25 TABLET ORAL DAILY
Start: 2024-11-18 | End: 2024-11-19 | Stop reason: SINTOL

## 2024-11-19 ENCOUNTER — CONSULT (OUTPATIENT)
Dept: FAMILY MEDICINE CLINIC | Facility: CLINIC | Age: 59
End: 2024-11-19
Payer: COMMERCIAL

## 2024-11-19 ENCOUNTER — PREP FOR PROCEDURE (OUTPATIENT)
Dept: SURGICAL ONCOLOGY | Facility: CLINIC | Age: 59
End: 2024-11-19

## 2024-11-19 ENCOUNTER — REMOTE DEVICE CLINIC VISIT (OUTPATIENT)
Dept: CARDIOLOGY CLINIC | Facility: CLINIC | Age: 59
End: 2024-11-19
Payer: COMMERCIAL

## 2024-11-19 ENCOUNTER — RESULTS FOLLOW-UP (OUTPATIENT)
Dept: CARDIOLOGY CLINIC | Facility: CLINIC | Age: 59
End: 2024-11-19

## 2024-11-19 VITALS
SYSTOLIC BLOOD PRESSURE: 120 MMHG | WEIGHT: 258 LBS | HEART RATE: 76 BPM | BODY MASS INDEX: 34.95 KG/M2 | DIASTOLIC BLOOD PRESSURE: 86 MMHG | TEMPERATURE: 97.4 F | OXYGEN SATURATION: 98 % | HEIGHT: 72 IN

## 2024-11-19 DIAGNOSIS — E11.29 CONTROLLED TYPE 2 DIABETES MELLITUS WITH MICROALBUMINURIA, WITHOUT LONG-TERM CURRENT USE OF INSULIN  (HCC): Primary | ICD-10-CM

## 2024-11-19 DIAGNOSIS — Z95.0 PRESENCE OF PERMANENT CARDIAC PACEMAKER: Primary | ICD-10-CM

## 2024-11-19 DIAGNOSIS — R80.9 CONTROLLED TYPE 2 DIABETES MELLITUS WITH MICROALBUMINURIA, WITHOUT LONG-TERM CURRENT USE OF INSULIN  (HCC): Primary | ICD-10-CM

## 2024-11-19 DIAGNOSIS — Z01.818 PREOP EXAMINATION: ICD-10-CM

## 2024-11-19 PROCEDURE — 99214 OFFICE O/P EST MOD 30 MIN: CPT | Performed by: FAMILY MEDICINE

## 2024-11-19 PROCEDURE — 93296 REM INTERROG EVL PM/IDS: CPT | Performed by: INTERNAL MEDICINE

## 2024-11-19 PROCEDURE — 93294 REM INTERROG EVL PM/LDLS PM: CPT | Performed by: INTERNAL MEDICINE

## 2024-11-19 NOTE — PROGRESS NOTES
Results for orders placed or performed in visit on 11/19/24   Cardiac EP device report    Narrative    MDDT DUAL PM/ ACTIVE SYSTEM IS MRI CONDITIONAL  CARELINK TRANSMISSION: BATTERY VOLTAGE ADEQUATE. (10.9 YRS) AP 75%  1%. ALL AVAILABLE LEAD PARAMETERS WITHIN NORMAL LIMITS. NO SIGNIFICANT HIGH RATE EPISODES. NORMAL DEVICE FUNCTION.---BRANTLEY

## 2024-11-19 NOTE — PROGRESS NOTES
Pre-operative Clearance  Name: Fawad Baer      : 1965      MRN: [id number]  Encounter Provider: Agustín Loredo DO  Encounter Date: 2024   Encounter department: [facility]    Assessment & Plan  Preop examination         Controlled type 2 diabetes mellitus with microalbuminuria, without long-term current use of insulin  (HCC)    Lab Results   Component Value Date    HGBA1C 5.8 (H) 2024            Pre-operative Clearance:     Revised Cardiac Risk Index:  RCI RISK CLASS I (0 risk factors, risk of major cardiac complications approximately 0.5%)    Medication Instructions:   - ACE Inhibitors or ARBs: Continue this medication up to the evening before surgery/procedure, but do not take the morning of the day of surgery.  - Antiepileptic meds: Continue to take this medication on your normal schedule.  - Beta blockers:  Continue to take this medication on your normal schedule.  - Direct Xa Inhibitor (ie, Eliquis, Xarelto): Stop taking medication 3 days prior to procedure/surgery.  - Diuretics: Continue taking this medication up to the evening before surgery/procedure, but do not take in the morning of the day of surgery/procedure.  - Gout meds: Continue to take this medication on your normal schedule.  - Hyperlipidemia meds: Continue to take this medication on your normal schedule.  - Thyroid meds: Continue to take this medication on your normal schedule.      Medicine Instructions for Adults with Diabetes (NO Bowel Prep)    Follow these instructions when a BOWEL PREP is NOT required for your procedure or surgery!    NOTE:  GLP Agonists taken weekly: do not take in the 7 days before your procedure. **Bariatric surgery: do not take 4 weeks prior to your procedure.    SGLT-2 Inhibitors: do not take in the 4 days before your procedure    On the Day Before Surgery/Procedure  If you are having a procedure (e.g., Colonoscopy) or surgery which DOES NOT require a bowel prep, follow the directions  below based on the type of medicine you take for your diabetes.  Type of Medicine You Take Examples What to Do   Pre-Mixed Insulin Intermediate  Siiukta68/25, Wplvkwn45/30, Novolog 70/30, Regular Insulin Take 1/2 your regular dose the evening before our procedure.   Rapid/Fast Acting  Insulin and/or Long-Acting Insulin Humalog U200, NovoLog, Apidra,  Lantus, Levemir, Tresiba, Toujeo,  Fias, Basaglar Take your FULL regular dose the day before procedure.   Oral Diabetic Medicines (sulfonylurea) Glipizide/Glimepiride/  Glucotrol Take your regular dose with dinner the evening before your procedure.   Other Oral Diabetic Medicines Metformin, Glucophage, Glucophage  XR, Riomet, Glumetza, Actose,  Avandia, Gl set, Prandin Take your regular dose with dinner the evening before your procedure   GLP Agonists Adlyxin, Byetta, Bydureon,  Ozempic, Soliqua, Tanzeum,  Trulicity, Victoza, Saxenda,  Rybelsus, Wegovy, Mounjaro, Zepbound If taken daily, take as normal  If taken weekly, do not take this medicine for 7 days before your procedure including the day of the procedure (resume taking after the procedure). **Bariatric surgery: do not take 4 weeks prior to procedure   SGLT-2 Inhibitors Jardiance, Invokana, Farxiga, Steglatro, Brenzavvy, Qtern, Segluromet Glyxambi, Synjardy, Synjardy XR, Invokamet, InvokametXR, Trijary XR, Xigduo X Do not take for 4 days before your procedure including the day of the procedure (resume taking after the procedure)   This educational material has been approved by the Patient Education Advisory Committee.    On the Day of Surgery/Procedure  Follow the directions below based on the type of medicine you take for your diabetes.  Type of Medicine You Take  Examples What to Do   Long-Acting Insulin Lantus, Levemir, Tresiba,  Toujeo, Basaglar, Semglee If you normally take your Long Acting Insulin in the morning, take the full dose as scheduled.   GLP-I Agonists Adlyxin, Byetta, Bydureon,  Ozempic, Soliqua,  Tanzeum,  Trulicity, Victoza, Saxenda,  Rybelsus, Mounjaro Do NOT take this medicine on the day of your procedure (resume taking after the procedure)   Except for the morning Long-Acting Insulin, DO NOT take ANY diabetic medicine on the day of your procedure unless you were instructed by the doctor who manages your diabetes medicines.  Continue to check your blood sugars.  If you have an insulin pump, ask your endocrinologist for instructions at least 3 days before your procedure. NOTE: If you are not able to ask your endocrinologist in advance, on the day of the procedure set your insulin pump to your basal rate only. Bring your insulin pump supplies to the hospital.         History of Present Illness     Mr. Zhang old here for a preoperative evaluation for a parathyroidectomy to be accomplished on December for 2024 by Dr. Segovia    Pre-op Exam  Surgery: Parathyroidectomy  Anticipated Date of Surgery: December 4, 2024  Surgeon: Dr. Segovia    Previous history of bleeding disorders or clots?: Yes  Previous Anesthesia reaction?: No  Prolonged steroid use in the last 6 months?: No    Assessment of Cardiac Risk:   - Unstable or severe angina or MI in the last 6 weeks or history of stent placement in the last year?: No   - Decompensated heart failure (e.g. New onset heart failure, NYHA  Class IV heart failure, or worsening existing heart failure)?: No  - Significant arrhythmias such as high grade AV block, symptomatic ventricular arrhythmia, newly recognized ventricular tachycardia, supraventricular tachycardia with resting heart rate >100, or symptomatic bradycardia?: No  - Severe heart valve disease including aortic stenosis or symptomatic mitral stenosis?: No      Pre-operative Risk Factors:  Elevated-risk surgery: No    History of cerebrovascular disease: No    History of ischemic heart disease: No  Pre-operative treatment with insulin: No  Pre-operative creatinine >2 mg/dL: No    History of congestive heart failure:  No    Medications of Perioperative Concern:   Anti-coagulants (Coumadin, Xarelto, Pradaxa, Eliquis, Lixiana)    Review of Systems   Constitutional:  Negative for activity change, appetite change, diaphoresis, fatigue and fever.   HENT: Negative.  Negative for dental problem.    Eyes: Negative.  Negative for visual disturbance.   Respiratory:  Negative for apnea, cough, chest tightness, shortness of breath and wheezing.    Cardiovascular:  Negative for chest pain, palpitations and leg swelling.   Gastrointestinal:  Negative for abdominal distention, abdominal pain, anal bleeding, constipation, diarrhea, nausea and vomiting.   Endocrine: Negative for cold intolerance, heat intolerance, polydipsia, polyphagia and polyuria.        Hyperparathyroidism   Genitourinary:  Negative for difficulty urinating, dysuria, flank pain, hematuria and urgency.   Musculoskeletal:  Negative for arthralgias, back pain, gait problem, joint swelling and myalgias.   Skin:  Negative for color change, rash and wound.   Allergic/Immunologic: Negative for environmental allergies, food allergies and immunocompromised state.   Neurological:  Negative for dizziness, seizures, syncope, speech difficulty, numbness and headaches.   Hematological:  Negative for adenopathy. Does not bruise/bleed easily.   Psychiatric/Behavioral:  Negative for agitation, behavioral problems, hallucinations, sleep disturbance and suicidal ideas.      Past Medical History   Past Medical History:   Diagnosis Date    Arthritis     right foot    Chronic cholecystitis     CPAP (continuous positive airway pressure) dependence     Diabetes mellitus (HCC)     Diabetic nephropathy (HCC)     Disease of thyroid gland 1990    DVT (deep venous thrombosis) ([facility])     Gout     History of pulmonary embolism     Hyperlipidemia     Hypertension     Hypothyroidism     Lumbar back pain     MTHFR mutation     Neuropathy     Neuropathy in diabetes ([facility]) 2018    Scab     right arm- no s/s  infection    Sleep apnea     Tarsal tunnel syndrome, right     Tinnitus     left ear    Wears glasses     and contacts    Wears glasses      Past Surgical History:   Procedure Laterality Date    APPENDECTOMY      ARTHRODESIS      Lumbar L__    BACK SURGERY      BUNIONECTOMY Right 10/07/2016    Procedure: REMOVAL TIBIAL  SESAMOID BONE  RIGHT FOOT;  Surgeon: Vicente Aguirre DPM;  Location: [facility];  Service:     CARDIAC PACEMAKER PLACEMENT  09/01/2021    CHOLECYSTECTOMY  03/26/2015    COLONOSCOPY      KNEE ARTHROSCOPY      KNEE ARTHROSCOPY W/ MENISCAL REPAIR      Lateral    NASAL SEPTUM SURGERY  10/16/2013    PLANTAR FASCIA SURGERY Left 10/26/2023    Procedure: RELEASE FASCIA PLANTAR/FASCIOTOMY;  Surgeon: Inderjit Ramirez DPM;  Location: [facility];  Service: Podiatry    PA COLONOSCOPY FLX DX W/COLLJ SPEC WHEN PFRMD N/A 11/23/2018    Procedure: COLONOSCOPY;  Surgeon: Manjit Dietrich MD;  Location: [facility];  Service: Gastroenterology    PA RELEASE TARSAL TUNNEL Right 06/25/2018    Procedure: RELEASE TARSAL TUNNEL;  Surgeon: Cliff Bernabe DPM;  Location: [facility];  Service: Podiatry    PA RELEASE TARSAL TUNNEL Left 03/13/2020    Procedure: RELEASE TARSAL TUNNEL;  Surgeon: Cliff Bernabe DPM;  Location:  MAIN OR;  Service: Podiatry    PA RELEASE TARSAL TUNNEL Left 10/26/2023    Procedure: RELEASE TARSAL TUNNEL;  Surgeon: Inderjit Ramirez DPM;  Location: [facility];  Service: Podiatry    SPINAL FUSION      TONSILLECTOMY  10/16/2013    with Adenoidectomy    UVULECTOMY  10/16/2013    UVULOPALATOPHARYNGOPLASTY      WISDOM TOOTH EXTRACTION       Family History   Problem Relation Age of Onset    Aneurysm Mother         intracranial aneurysm repair    Coronary artery disease Father     Hypertension Father     Heart disease Father     Aneurysm Brother      Social History     Tobacco Use    Smoking status: Never    Smokeless tobacco: Never   Vaping Use    Vaping status: Never Used   Substance  and Sexual Activity    Alcohol use: Yes     Alcohol/week: 6.0 standard drinks of alcohol     Types: 6 Cans of beer per week     Comment: weekends    Drug use: No    Sexual activity: Yes     Partners: Female     Birth control/protection: Other     Current Outpatient Medications on File Prior to Visit   Medication Sig    allopurinol (ZYLOPRIM) 300 mg tablet Take 1 tablet (300 mg total) by mouth daily    carvedilol (COREG) 6.25 mg tablet TAKE 1 TABLET BY MOUTH ONCE DAILY IN THE MORNING AND 1 & 1/2 (ONE & ONE-HALF) ONCE DAILY IN THE EVENING    Eliquis 5 MG Take 1 tablet by mouth twice daily    ezetimibe (ZETIA) 10 mg tablet Take 1 tablet (10 mg total) by mouth daily    furosemide (LASIX) 20 mg tablet Take 1 tablet (20 mg total) by mouth daily As needed for leg edema    gabapentin (NEURONTIN) 600 MG tablet Take 1 tablet (600 mg total) by mouth 3 (three) times a day    levothyroxine 175 mcg tablet TAKE 1 TABLET BY MOUTH ONCE DAILY ON AN EMPTY STOMACH 1 HOUR BEFORE BREAKFAST AND 4 HOURS APART FROM CALCIUM AND VITAMIN D SUPPLEMENTATION    lisinopril (ZESTRIL) 10 mg tablet Take 1 tablet (10 mg total) by mouth daily    metFORMIN (GLUCOPHAGE-XR) 500 mg 24 hr tablet Take 1 tablet (500 mg total) by mouth daily at bedtime    semaglutide, 2 mg/dose, (Ozempic, 2 MG/DOSE,) 8 mg/ mL injection pen Inject 0.75 mL (2 mg total) under the skin every 7 days    sildenafil (Viagra) 100 mg tablet Take 1 tablet (100 mg total) by mouth as needed for erectile dysfunction    [DISCONTINUED] spironolactone (ALDACTONE) 25 mg tablet Take 1 tablet (25 mg total) by mouth daily     No Known Allergies  Objective   /86 (BP Location: Left arm, Patient Position: Sitting, Cuff Size: Large)   Pulse 76   Temp (!) 97.4 °F (36.3 °C) (Temporal)   Ht 6' (1.829 m)   Wt 117 kg (258 lb)   SpO2 98%   BMI 34.99 kg/m²     Physical Exam  Constitutional:       Appearance: He is well-developed.   HENT:      Head: Normocephalic and atraumatic.      Right Ear:  External ear normal.      Left Ear: External ear normal.      Nose: Nose normal.   Eyes:      Conjunctiva/sclera: Conjunctivae normal.      Pupils: Pupils are equal, round, and reactive to light.   Cardiovascular:      Rate and Rhythm: Normal rate and regular rhythm.      Heart sounds: Normal heart sounds. No murmur heard.     No friction rub.   Pulmonary:      Effort: Pulmonary effort is normal. No respiratory distress.      Breath sounds: Normal breath sounds. No wheezing or rales.   Chest:      Chest wall: No tenderness.   Abdominal:      General: Bowel sounds are normal.      Palpations: Abdomen is soft.   Musculoskeletal:         General: Normal range of motion.      Cervical back: Normal range of motion and neck supple.   Skin:     General: Skin is warm and dry.      Capillary Refill: Capillary refill takes 2 to 3 seconds.   Neurological:      Mental Status: He is alert and oriented to person, place, and time.   Psychiatric:         Behavior: Behavior normal.         Thought Content: Thought content normal.         Judgment: Judgment normal.           Agustín Loredo,

## 2024-11-20 ENCOUNTER — PREP FOR PROCEDURE (OUTPATIENT)
Dept: SURGICAL ONCOLOGY | Facility: CLINIC | Age: 59
End: 2024-11-20

## 2024-11-21 NOTE — TELEPHONE ENCOUNTER
DM Eye: Upon review of the In Basket request we were able to locate, review, and update the patient chart as requested for Diabetic Eye Exam.    Any additional questions or concerns should be emailed to the Practice Liaisons via the appropriate education email address, please do not reply via In Basket.    Thank you  Arianna Smallwood MA   [facility]

## 2024-11-25 RX ORDER — CYANOCOBALAMIN (VITAMIN B-12) 5000 MCG
TABLET,DISINTEGRATING ORAL DAILY
COMMUNITY

## 2024-11-25 NOTE — PRE-PROCEDURE INSTRUCTIONS
Pre-Surgery Instructions:   Medication Instructions    allopurinol (ZYLOPRIM) 300 mg tablet Take day of surgery. With sip of water     carvedilol (COREG) 6.25 mg tablet Take day of surgery. With sip of water     Cyanocobalamin (Vitamin B-12) 5000 MCG TBDP Stop taking 7 days prior to surgery.    Eliquis 5 MG Instructed pt that PCP advised 48 hr hold - need to verify with anes - pt will need call confirming hold time     ezetimibe (ZETIA) 10 mg tablet Take day of surgery. With sip of water     furosemide (LASIX) 20 mg tablet Hold day of surgery.    gabapentin (NEURONTIN) 600 MG tablet Take day of surgery. With sip of water     levothyroxine 175 mcg tablet Take day of surgery. With sip of water     lisinopril (ZESTRIL) 10 mg tablet Hold day of surgery.    metFORMIN (GLUCOPHAGE-XR) 500 mg 24 hr tablet Take night before surgery    semaglutide, 2 mg/dose, (Ozempic, 2 MG/DOSE,) 8 mg/ mL injection pen Stop taking 7 days prior to surgery. Pt reports last dose 11/24/24     sildenafil (Viagra) 100 mg tablet Instructed to not use 24 hrs prior to surgery     Pt instructed on use of cleanser for DOS and instructions for showering both night before and DOS reviewed with pt - pt verbalized understanding of instructions given  Pt also instructed on no hair or body products on skin for DOS.  No shaving with a razor blade for 7 days prior to surgery to decrease any incident of infection - electric razor is ok to use up till 24 hrs prior to DOS.  Pt instructed that if with any changes in health status between now and DOS - notify surgeon office.  Tylenol is ok to use as needed up to and including DOS with sips of water - if needed - did instruct NO NSAIDS to be used at least 3 days prior to surgery - or if given a longer hold time of NSAIDS from MD please follow MD hold instructions.  Instructed to bring photo ID and medical insurance card for DOS as forms of identification for DOS.  Also instructed pt on no jewelry or body piercings,  no valuables on body for DOS. Contact lenses, if worn - need to be removed for DOS.  Pt instructed does need transport home after surgery- pt is not allowed to drive.    Medication instructions for day surgery reviewed. Please use only a sip of water to take your instructed medications. Avoid all over the counter vitamins, supplements and NSAIDS for one week prior to surgery per anesthesia guidelines. Tylenol is ok to take as needed.     You will receive a call one business day prior to surgery with an arrival time and hospital directions. If your surgery is scheduled on a Monday, the hospital will be calling you on the Friday prior to your surgery. If you have not heard from anyone by 8pm, please call the hospital supervisor through the hospital  at [phone]. ([place] [phone] or [place] [phone]).    Do not eat or drink anything after midnight the night before your surgery, including candy, mints, lifesavers, or chewing gum. Do not drink alcohol 24hrs before your surgery. Try not to smoke at least 24hrs before your surgery.       Follow the pre surgery showering instructions as listed in the “My Surgical Experience Booklet” or otherwise provided by your surgeon's office. Do not use a blade to shave the surgical area 1 week before surgery. It is okay to use a clean electric clippers up to 24 hours before surgery. Do not apply any lotions, creams, including makeup, cologne, deodorant, or perfumes after showering on the day of your surgery. Do not use dry shampoo, hair spray, hair gel, or any type of hair products.     No contact lenses, eye make-up, or artificial eyelashes. Remove nail polish, including gel polish, and any artificial, gel, or acrylic nails if possible. Remove all jewelry including rings and body piercing jewelry.     Wear causal clothing that is easy to take on and off. Consider your type of surgery.    Keep any valuables, jewelry, piercings at home. Please bring any specially  ordered equipment (sling, braces) if indicated.    Arrange for a responsible person to drive you to and from the hospital on the day of your surgery. Please confirm the visitor policy for the day of your procedure when you receive your phone call with an arrival time.     Call the surgeon's office with any new illnesses, exposures, or additional questions prior to surgery.    Please reference your “My Surgical Experience Booklet” for additional information to prepare for your upcoming surgery.

## 2024-11-26 ENCOUNTER — ANESTHESIA EVENT (OUTPATIENT)
Dept: PERIOP | Facility: HOSPITAL | Age: 59
End: 2024-11-26
Payer: COMMERCIAL

## 2024-11-27 DIAGNOSIS — R80.9 PROTEINURIA: ICD-10-CM

## 2024-11-27 DIAGNOSIS — I48.0 PAROXYSMAL ATRIAL FIBRILLATION (HCC): ICD-10-CM

## 2024-11-27 RX ORDER — LISINOPRIL 10 MG/1
10 TABLET ORAL DAILY
Qty: 90 TABLET | Refills: 1 | Status: SHIPPED | OUTPATIENT
Start: 2024-11-27

## 2024-12-03 ENCOUNTER — TELEPHONE (OUTPATIENT)
Dept: PAIN MEDICINE | Facility: CLINIC | Age: 59
End: 2024-12-03

## 2024-12-03 ENCOUNTER — TRANSCRIBE ORDERS (OUTPATIENT)
Dept: PAIN MEDICINE | Facility: CLINIC | Age: 59
End: 2024-12-03

## 2024-12-03 NOTE — TELEPHONE ENCOUNTER
Received approval for SCS trial after appeals process (scanned under media). OK to proceed as scheduled on 12/19.    Does pt need to have any updated labs?

## 2024-12-03 NOTE — ANESTHESIA PREPROCEDURE EVALUATION
Procedure:  MINIMALLY INVASIVE RIGHT PARATHYROIDECTOMY, PSB 4 GLAND EXPLORATION (Right: Neck)  INTRAOP PTH MONITORING (Neck)    Relevant Problems   ANESTHESIA (within normal limits)      CARDIO   (+) Pacemaker   (+) Paroxysmal atrial fibrillation (HCC)   (+) Primary hypertension   (+) Sinus bradycardia      ENDO   (+) Controlled type 2 diabetes mellitus with microalbuminuria, without long-term current use of insulin  (HCC)   (+) Hyperparathyroidism (HCC)   (+) Hypothyroidism (acquired)      /RENAL   (+) Diabetic nephropathy associated with type 2 diabetes mellitus (HCC)   (+) Stage 3a chronic kidney disease (HCC)      MUSCULOSKELETAL   (+) Gout   (+) Mid back pain      NEURO/PSYCH   (+) Chronic pain syndrome   (+) Painful diabetic neuropathy (HCC)      PULMONARY   (+) Obstructive sleep apnea      Neurology/Sleep   (+) Tarsal tunnel syndrome of both lower extremities      Other Pediatrics   (+) Homozygous for MTHFR gene mutation      Rheumatology   (+) Plantar fasciitis      Other   (+) History of DVT (deep vein thrombosis)        Physical Exam    Airway    Mallampati score: III         Dental       Cardiovascular      Pulmonary      Other Findings        Anesthesia Plan  ASA Score- 3     Anesthesia Type- general with ASA Monitors.         Additional Monitors: arterial line.    Airway Plan: ETT.           Plan Factors-    Chart reviewed.   Existing labs reviewed. Patient summary reviewed.    Patient is not a current smoker.              Induction- intravenous.    Postoperative Plan-         Informed Consent- Anesthetic plan and risks discussed with patient.

## 2024-12-04 ENCOUNTER — TELEPHONE (OUTPATIENT)
Age: 59
End: 2024-12-04

## 2024-12-04 ENCOUNTER — HOSPITAL ENCOUNTER (OUTPATIENT)
Facility: HOSPITAL | Age: 59
Setting detail: OUTPATIENT SURGERY
Discharge: HOME | End: 2024-12-04
Attending: SURGERY | Admitting: SURGERY
Payer: COMMERCIAL

## 2024-12-04 ENCOUNTER — ANESTHESIA (OUTPATIENT)
Dept: PERIOP | Facility: HOSPITAL | Age: 59
End: 2024-12-04
Payer: COMMERCIAL

## 2024-12-04 VITALS
TEMPERATURE: 97 F | HEART RATE: 60 BPM | HEIGHT: 72 IN | RESPIRATION RATE: 16 BRPM | DIASTOLIC BLOOD PRESSURE: 69 MMHG | BODY MASS INDEX: 35.5 KG/M2 | OXYGEN SATURATION: 94 % | WEIGHT: 262.13 LBS | SYSTOLIC BLOOD PRESSURE: 154 MMHG

## 2024-12-04 DIAGNOSIS — E21.3 HYPERPARATHYROIDISM (HCC): ICD-10-CM

## 2024-12-04 LAB
CALCIUM SERPL-MCNC: 10.2 MG/DL (ref 8.4–10.2)
GLUCOSE SERPL-MCNC: 105 MG/DL (ref 65–140)
GLUCOSE SERPL-MCNC: 154 MG/DL (ref 65–140)
PTH-INTACT P EXCISION SERPL-MCNC: 111.2 PG/ML (ref 12–88)
PTH-INTACT P EXCISION SERPL-MCNC: 15.3 PG/ML (ref 12–88)
PTH-INTACT P EXCISION SERPL-MCNC: 16.6 PG/ML (ref 12–88)
PTH-INTACT P EXCISION SERPL-MCNC: 17.7 PG/ML (ref 12–88)
PTH-INTACT P EXCISION SERPL-MCNC: 22.6 PG/ML (ref 12–88)
PTH-INTACT P EXCISION SERPL-MCNC: 32.4 PG/ML (ref 12–88)
PTH-INTACT P EXCISION SERPL-MCNC: 70.4 PG/ML (ref 12–88)
PTH-INTACT P EXCISION SERPL-MCNC: 96.6 PG/ML (ref 12–88)
PTH-INTACT P EXCISION SERPL-MCNC: 98 PG/ML (ref 12–88)
PTH-INTACT P EXCISION SERPL-MCNC: 99 PG/ML (ref 12–88)

## 2024-12-04 PROCEDURE — 88331 PATH CONSLTJ SURG 1 BLK 1SPC: CPT | Performed by: PATHOLOGY

## 2024-12-04 PROCEDURE — 60500 EXPLORE PARATHYROID GLANDS: CPT | Performed by: SURGERY

## 2024-12-04 PROCEDURE — 82948 REAGENT STRIP/BLOOD GLUCOSE: CPT

## 2024-12-04 PROCEDURE — 83970 ASSAY OF PARATHORMONE: CPT | Performed by: SURGERY

## 2024-12-04 PROCEDURE — 88305 TISSUE EXAM BY PATHOLOGIST: CPT | Performed by: PATHOLOGY

## 2024-12-04 RX ORDER — FENTANYL CITRATE/PF 50 MCG/ML
25 SYRINGE (ML) INJECTION
Status: DISCONTINUED | OUTPATIENT
Start: 2024-12-04 | End: 2024-12-04 | Stop reason: HOSPADM

## 2024-12-04 RX ORDER — OXYCODONE HYDROCHLORIDE 5 MG/1
5 TABLET ORAL EVERY 4 HOURS PRN
Refills: 0 | Status: DISCONTINUED | OUTPATIENT
Start: 2024-12-04 | End: 2024-12-04 | Stop reason: HOSPADM

## 2024-12-04 RX ORDER — BUPIVACAINE HYDROCHLORIDE 2.5 MG/ML
INJECTION, SOLUTION EPIDURAL; INFILTRATION; INTRACAUDAL AS NEEDED
Status: DISCONTINUED | OUTPATIENT
Start: 2024-12-04 | End: 2024-12-04 | Stop reason: HOSPADM

## 2024-12-04 RX ORDER — SODIUM CHLORIDE 9 MG/ML
125 INJECTION, SOLUTION INTRAVENOUS CONTINUOUS
Status: DISCONTINUED | OUTPATIENT
Start: 2024-12-04 | End: 2024-12-04 | Stop reason: HOSPADM

## 2024-12-04 RX ORDER — ONDANSETRON 2 MG/ML
4 INJECTION INTRAMUSCULAR; INTRAVENOUS ONCE AS NEEDED
Status: DISCONTINUED | OUTPATIENT
Start: 2024-12-04 | End: 2024-12-04 | Stop reason: HOSPADM

## 2024-12-04 RX ORDER — PROPOFOL 10 MG/ML
INJECTION, EMULSION INTRAVENOUS AS NEEDED
Status: DISCONTINUED | OUTPATIENT
Start: 2024-12-04 | End: 2024-12-04

## 2024-12-04 RX ORDER — PROPOFOL 10 MG/ML
INJECTION, EMULSION INTRAVENOUS CONTINUOUS PRN
Status: DISCONTINUED | OUTPATIENT
Start: 2024-12-04 | End: 2024-12-04

## 2024-12-04 RX ORDER — LIDOCAINE HYDROCHLORIDE 20 MG/ML
INJECTION, SOLUTION EPIDURAL; INFILTRATION; INTRACAUDAL; PERINEURAL AS NEEDED
Status: DISCONTINUED | OUTPATIENT
Start: 2024-12-04 | End: 2024-12-04

## 2024-12-04 RX ORDER — FENTANYL CITRATE 50 UG/ML
INJECTION, SOLUTION INTRAMUSCULAR; INTRAVENOUS AS NEEDED
Status: DISCONTINUED | OUTPATIENT
Start: 2024-12-04 | End: 2024-12-04

## 2024-12-04 RX ORDER — METOCLOPRAMIDE HYDROCHLORIDE 5 MG/ML
INJECTION INTRAMUSCULAR; INTRAVENOUS AS NEEDED
Status: DISCONTINUED | OUTPATIENT
Start: 2024-12-04 | End: 2024-12-04

## 2024-12-04 RX ORDER — DEXAMETHASONE SODIUM PHOSPHATE 10 MG/ML
INJECTION, SOLUTION INTRAMUSCULAR; INTRAVENOUS AS NEEDED
Status: DISCONTINUED | OUTPATIENT
Start: 2024-12-04 | End: 2024-12-04

## 2024-12-04 RX ORDER — SODIUM CHLORIDE, SODIUM LACTATE, POTASSIUM CHLORIDE, CALCIUM CHLORIDE 600; 310; 30; 20 MG/100ML; MG/100ML; MG/100ML; MG/100ML
INJECTION, SOLUTION INTRAVENOUS CONTINUOUS PRN
Status: DISCONTINUED | OUTPATIENT
Start: 2024-12-04 | End: 2024-12-04

## 2024-12-04 RX ORDER — HYDROMORPHONE HCL/PF 1 MG/ML
SYRINGE (ML) INJECTION AS NEEDED
Status: DISCONTINUED | OUTPATIENT
Start: 2024-12-04 | End: 2024-12-04

## 2024-12-04 RX ORDER — HYDROMORPHONE HCL/PF 1 MG/ML
0.5 SYRINGE (ML) INJECTION
Refills: 0 | Status: DISCONTINUED | OUTPATIENT
Start: 2024-12-04 | End: 2024-12-04 | Stop reason: HOSPADM

## 2024-12-04 RX ORDER — ONDANSETRON 2 MG/ML
INJECTION INTRAMUSCULAR; INTRAVENOUS AS NEEDED
Status: DISCONTINUED | OUTPATIENT
Start: 2024-12-04 | End: 2024-12-04

## 2024-12-04 RX ORDER — SUCCINYLCHOLINE/SOD CL,ISO/PF 100 MG/5ML
SYRINGE (ML) INTRAVENOUS AS NEEDED
Status: DISCONTINUED | OUTPATIENT
Start: 2024-12-04 | End: 2024-12-04

## 2024-12-04 RX ORDER — ACETAMINOPHEN 325 MG/1
975 TABLET ORAL EVERY 6 HOURS PRN
Status: DISCONTINUED | OUTPATIENT
Start: 2024-12-04 | End: 2024-12-04 | Stop reason: HOSPADM

## 2024-12-04 RX ORDER — MIDAZOLAM HYDROCHLORIDE 2 MG/2ML
INJECTION, SOLUTION INTRAMUSCULAR; INTRAVENOUS AS NEEDED
Status: DISCONTINUED | OUTPATIENT
Start: 2024-12-04 | End: 2024-12-04

## 2024-12-04 RX ORDER — OXYCODONE HYDROCHLORIDE 10 MG/1
10 TABLET ORAL EVERY 4 HOURS PRN
Refills: 0 | Status: DISCONTINUED | OUTPATIENT
Start: 2024-12-04 | End: 2024-12-04 | Stop reason: HOSPADM

## 2024-12-04 RX ADMIN — SODIUM CHLORIDE 125 ML/HR: 0.9 INJECTION, SOLUTION INTRAVENOUS at 06:53

## 2024-12-04 RX ADMIN — LIDOCAINE HYDROCHLORIDE 100 MG: 20 INJECTION, SOLUTION EPIDURAL; INFILTRATION; INTRACAUDAL at 08:12

## 2024-12-04 RX ADMIN — Medication 120 MG: at 08:13

## 2024-12-04 RX ADMIN — HYDROMORPHONE HYDROCHLORIDE 0.5 MG: 1 INJECTION, SOLUTION INTRAMUSCULAR; INTRAVENOUS; SUBCUTANEOUS at 10:11

## 2024-12-04 RX ADMIN — PROPOFOL 200 MG: 10 INJECTION, EMULSION INTRAVENOUS at 08:12

## 2024-12-04 RX ADMIN — FENTANYL CITRATE 100 MCG: 50 INJECTION, SOLUTION INTRAMUSCULAR; INTRAVENOUS at 08:12

## 2024-12-04 RX ADMIN — SODIUM CHLORIDE, SODIUM LACTATE, POTASSIUM CHLORIDE, AND CALCIUM CHLORIDE: .6; .31; .03; .02 INJECTION, SOLUTION INTRAVENOUS at 08:27

## 2024-12-04 RX ADMIN — ONDANSETRON 4 MG: 2 INJECTION INTRAMUSCULAR; INTRAVENOUS at 08:15

## 2024-12-04 RX ADMIN — PHENYLEPHRINE HYDROCHLORIDE 100 MCG: 10 INJECTION INTRAVENOUS at 08:26

## 2024-12-04 RX ADMIN — PROPOFOL 100 MCG/KG/MIN: 10 INJECTION, EMULSION INTRAVENOUS at 08:15

## 2024-12-04 RX ADMIN — METOCLOPRAMIDE 10 MG: 5 INJECTION, SOLUTION INTRAMUSCULAR; INTRAVENOUS at 08:05

## 2024-12-04 RX ADMIN — MIDAZOLAM 2 MG: 1 INJECTION INTRAMUSCULAR; INTRAVENOUS at 08:05

## 2024-12-04 RX ADMIN — PHENYLEPHRINE HYDROCHLORIDE 100 MCG: 10 INJECTION INTRAVENOUS at 08:28

## 2024-12-04 RX ADMIN — REMIFENTANIL HYDROCHLORIDE 0.2 MCG/KG/MIN: 1 INJECTION, POWDER, LYOPHILIZED, FOR SOLUTION INTRAVENOUS at 09:40

## 2024-12-04 RX ADMIN — PROPOFOL 60 MCG/KG/MIN: 10 INJECTION, EMULSION INTRAVENOUS at 09:23

## 2024-12-04 RX ADMIN — DEXAMETHASONE SODIUM PHOSPHATE 10 MG: 10 INJECTION INTRAMUSCULAR; INTRAVENOUS at 08:22

## 2024-12-04 RX ADMIN — REMIFENTANIL HYDROCHLORIDE 0.2 MCG/KG/MIN: 1 INJECTION, POWDER, LYOPHILIZED, FOR SOLUTION INTRAVENOUS at 08:15

## 2024-12-04 NOTE — TELEPHONE ENCOUNTER
Ally from [facility] called pt was to have surgery 12/19 OR spot opened 12/18 and pt post op on 12/18 would need to be rescheduled.

## 2024-12-04 NOTE — ANESTHESIA PROCEDURE NOTES
Arterial Line Insertion    Performed by: Nara Nagy CRNA  Authorized by: Norm Calle DO  Patient identity confirmed: verbally with patient, arm band and hospital-assigned identification number  Preparation: Patient was prepped and draped in the usual sterile fashion.  Indications: multiple ABGs  Indications comments: PTH blood draws  Orientation:  Right  Location: radial artery  Procedure Details:  Zachary's test normal: yes  Needle gauge: 20  Seldinger technique: Seldinger technique used  Number of attempts: 2    Post-procedure:  Post-procedure: dressing applied  Waveform: good waveform and waveform confirmed  Patient tolerance: Patient tolerated the procedure well with no immediate complications

## 2024-12-04 NOTE — DISCHARGE INSTR - AVS FIRST PAGE
No dressing is necessary because your skin is skin sealed with surgical glue.  You may shower starting 24 hours after your surgery.  Do not scrub the incision, gently wash with soap and water and rinse.  Do not soak the incision including baths, hot tubs, swimming, until your postoperative visit.  You may use over-the-counter pain medicines such as Tylenol and ibuprofen as directed by the bottle.  It is okay to take Tylenol and ibuprofen concurrently.  Keep your postop appointment and arrive 15 minutes early.     Call the clinic if you have significant swelling at the surgical site or call 911 / go to the ER if the swelling is causing breathing trouble

## 2024-12-04 NOTE — OP NOTE
OPERATIVE REPORT  PATIENT NAME: Fawad Baer    :  1965  MRN: [id number]  Pt Location: [facility]OM 01    SURGERY DATE: 2024    Surgeons and Role:     * Og Segovia MD - Primary    Preop Diagnosis:  Hyperparathyroidism (HCC) [E21.3]    Post-Op Diagnosis Codes:     * Hyperparathyroidism (HCC) [E21.3]    Procedure(s):  4 GLAND EXPLORATION, 2-GLAND PARATHYROIDECTOMY  INTRAOP PTH MONITORING    Specimen(s):  ID Type Source Tests Collected by Time Destination   1 : right parathyroid Tissue Parathyroid TISSUE EXAM Og Segovia MD 2024 0911    2 : left parathyroid Tissue Parathyroid TISSUE EXAM Og Segovia MD 2024 0931    3 : Left Superior Parathyroid - FROZEN Tissue Parathyroid TISSUE EXAM Og Segovia MD 2024 0953        Estimated Blood Loss:   Minimal    Drains:  * No LDAs found *    Anesthesia Type:   General    Operative Indications:  Hyperparathyroidism (HCC) [E21.3]      Operative Findings:  950 mg right and left parathyroid glands (superior)    Component      Latest Ref Rng 2024  8:02 AM 2024  9:17 AM 2024  9:22 AM 2024  9:27 AM 2024  9:58 AM 2024  10:02 AM   PTH INTRAOP      12.0 - 88.0 pg/mL 99.0 (H)  111.2 (H)  98.0 (H)  96.6 (H)  70.4  32.4      Component      Latest Ref Rng 2024  10:07 AM   PTH INTRAOP      12.0 - 88.0 pg/mL 22.6       Legend:  (H) High      Complications:   None    Procedure and Technique:  The patient was brought to the OR and identified by proper time-out. Following this he was intubated by the anesthesia team, then was prepped and draped with the neck exposed in the extended position. Local anesthesia was given, then a 4 cm incision was made sharply 2 finger breaths above the sternal notch. Cautery was used to dissect through the dermis subcutaneous tissue. Wheatlanner retractor was placed. The platysma was then transected with cautery. Strap muscles were then divided the midline. This exposed the surface of  the thyroid. We dissected the strap muscles off the anterolateral aspect of the right thyroid lobe. This enabled us to dissect between the thyroid and the strap muscles. The thyroid gland was then retracted medially and anteriorly which exposed what appeared to be a large parathyroid gland along the midpole of the thyroid gland. This was dissected out from surrounding tissue in hemostatic fashion with cautery. Vascular pedicle was visualized. The pedicle was clipped. PTH levels were drawn at the start of the case, 0 min, 5 min, and 10 min after the gland was resected.  PTH levels did not decrease significantly despite frozen section confirming 950 mg hyperplastic parathyroid tissue.  Given another target on the left side seen on preoperative CAT scan, we opted to explore the left side of the neck.      Strap muscles were mobilized off the anterolateral aspect of the left thyroid lobe.  The gland was retracted medially and anteriorly.  We found what appeared to be a large parathyroid gland next to the esophagus at the level of the midpoint of the thyroid gland.  This was dissected out from surrounding tissue.  Vascular pedicle was visualized and clipped.  PTH levels were drawn at 0, 5, and 10 minutes post removal of this gland.  Frozen section confirmed 950 mg hyperplastic parathyroid tissue.  Levels decreased appropriately to normal range upon 10 min post resection of the gland.     We look for to other parathyroid glands and sent a frozen section of what may have been another parathyroid gland on the left side which came back as lymph node.  Given normalization of PTH levels, we opted to terminate the procedure at this point.    We then irrigated the field and closed using 3-0 Vicryl to close the strap muscles the midline followed by 3-0 Vicryl to close the platysma, followed by 4-0 Vicryl close the dermis, followed by 5-0 Monocryl to close skin in subcuticular fashion. Benzoin and Steri-Strips were then applied in  the usual fashion. The patient was awakened transferred to the recovery room in stable condition.     I was present for the entire procedure.    Patient Disposition:  PACU              SIGNATURE: Og Segovia MD  DATE: December 4, 2024  TIME: 10:21 AM

## 2024-12-04 NOTE — ANESTHESIA POSTPROCEDURE EVALUATION
Post-Op Assessment Note    CV Status:  Stable  Pain Score: 0    Pain management: adequate       Mental Status:  Alert and awake   Hydration Status:  Euvolemic   PONV Controlled:  Controlled   Airway Patency:  Patent and adequate  Two or more mitigation strategies used for obstructive sleep apnea   Post Op Vitals Reviewed: Yes    No anethesia notable event occurred.    Staff: CRNA           Last Filed PACU Vitals:  Vitals Value Taken Time   Temp 97.4    Pulse 62 12/04/24 1042   /78 12/04/24 1041   Resp 20    SpO2 93    Vitals shown include unfiled device data.    Modified Octavio:  No data recorded    .4

## 2024-12-05 DIAGNOSIS — E11.42 DIABETIC PERIPHERAL NEUROPATHY (HCC): ICD-10-CM

## 2024-12-06 ENCOUNTER — RA CDI HCC (OUTPATIENT)
Dept: OTHER | Facility: HOSPITAL | Age: 59
End: 2024-12-06

## 2024-12-06 DIAGNOSIS — E66.01 MORBID OBESITY (HCC): Primary | ICD-10-CM

## 2024-12-06 PROCEDURE — 88305 TISSUE EXAM BY PATHOLOGIST: CPT | Performed by: PATHOLOGY

## 2024-12-06 RX ORDER — GABAPENTIN 600 MG/1
600 TABLET ORAL 3 TIMES DAILY
Qty: 270 TABLET | Refills: 1 | Status: SHIPPED | OUTPATIENT
Start: 2024-12-06

## 2024-12-06 NOTE — TELEPHONE ENCOUNTER
Spoke with Marciano to reschedule Post Op. Preferred [place] office. Rescheduled to next available in [place], 1/3/25, ok per Jessica. Marciano needed a late appointment, accepted 3:30P.

## 2024-12-06 NOTE — PROGRESS NOTES
HCC coding opportunities          Chart Reviewed number of suggestions sent to Provider: 3     Patients Insurance        Commercial Insurance: [employer]     E11.42  E11.22  E66.01

## 2024-12-09 ENCOUNTER — TELEPHONE (OUTPATIENT)
Age: 59
End: 2024-12-09

## 2024-12-09 NOTE — TELEPHONE ENCOUNTER
How does the incision look? WNL    Do you have fever or chills? No    Are you having any pain? No    Do you have a drain(s)? No    Verify post-op appointment date and time  [x]    Do you have any other questions or concerns? None.    **NOTE TO TRIAGER: If patient requires further triage, based upon the answers above, move to appropriate triage protocol.

## 2024-12-13 ENCOUNTER — OFFICE VISIT (OUTPATIENT)
Dept: FAMILY MEDICINE CLINIC | Facility: CLINIC | Age: 59
End: 2024-12-13
Payer: COMMERCIAL

## 2024-12-13 VITALS
BODY MASS INDEX: 35.89 KG/M2 | OXYGEN SATURATION: 98 % | HEART RATE: 86 BPM | HEIGHT: 72 IN | DIASTOLIC BLOOD PRESSURE: 88 MMHG | SYSTOLIC BLOOD PRESSURE: 132 MMHG | TEMPERATURE: 98.1 F | WEIGHT: 265 LBS

## 2024-12-13 DIAGNOSIS — Z01.818 PREOP EXAMINATION: Primary | ICD-10-CM

## 2024-12-13 PROCEDURE — 99214 OFFICE O/P EST MOD 30 MIN: CPT | Performed by: FAMILY MEDICINE

## 2024-12-13 NOTE — PROGRESS NOTES
Pre-operative Clearance  Name: Fawad Baer      : 1965      MRN: [id number]  Encounter Provider: Agustín Loredo DO  Encounter Date: 2024   Encounter department: [facility]    Assessment & Plan  Preop examination  Patient is medically cleared for his proposed spinal cord trial he will hold his Eliquis for 60 hours prior to the proposed procedure and should resume the Eliquis as soon as possible after the procedure.  If he will need prolonged hold on Eliquis he will need Lovenox bridging       Pre-operative Clearance:     Medication Instructions:   - ACE Inhibitors or ARBs: Continue this medication up to the evening before surgery/procedure, but do not take the morning of the day of surgery.  - Antiepileptic meds: Continue to take this medication on your normal schedule.  - Beta blockers:  Continue to take this medication on your normal schedule.  - Direct Xa Inhibitor (ie, Eliquis, Xarelto): Stop taking medication 3 days prior to procedure/surgery.  - Diuretics: Continue taking this medication up to the evening before surgery/procedure, but do not take in the morning of the day of surgery/procedure.  - Gout meds: Continue to take this medication on your normal schedule.  - Hyperlipidemia meds: Continue to take this medication on your normal schedule.  - Thyroid meds: Continue to take this medication on your normal schedule.      Medicine Instructions for Adults with Diabetes (NO Bowel Prep)    Follow these instructions when a BOWEL PREP is NOT required for your procedure or surgery!    NOTE:  GLP Agonists taken weekly: do not take in the 7 days before your procedure. **Bariatric surgery: do not take 4 weeks prior to your procedure.    SGLT-2 Inhibitors: do not take in the 4 days before your procedure    On the Day Before Surgery/Procedure  If you are having a procedure (e.g., Colonoscopy) or surgery which DOES NOT require a bowel prep, follow the directions below based on the type of  medicine you take for your diabetes.  Type of Medicine You Take Examples What to Do   Pre-Mixed Insulin Intermediate  Eayhabg48/25, Eprbuca05/30, Novolog 70/30, Regular Insulin Take 1/2 your regular dose the evening before our procedure.   Rapid/Fast Acting  Insulin and/or Long-Acting Insulin Humalog U200, NovoLog, Apidra,  Lantus, Levemir, Tresiba, Toujeo,  Fias, Basaglar Take your FULL regular dose the day before procedure.   Oral Diabetic Medicines (sulfonylurea) Glipizide/Glimepiride/  Glucotrol Take your regular dose with dinner the evening before your procedure.   Other Oral Diabetic Medicines Metformin, Glucophage, Glucophage  XR, Riomet, Glumetza, Actose,  Avandia, Gl set, Prandin Take your regular dose with dinner the evening before your procedure   GLP Agonists Adlyxin, Byetta, Bydureon,  Ozempic, Soliqua, Tanzeum,  Trulicity, Victoza, Saxenda,  Rybelsus, Wegovy, Mounjaro, Zepbound If taken daily, take as normal  If taken weekly, do not take this medicine for 7 days before your procedure including the day of the procedure (resume taking after the procedure). **Bariatric surgery: do not take 4 weeks prior to procedure   SGLT-2 Inhibitors Jardiance, Invokana, Farxiga, Steglatro, Brenzavvy, Qtern, Segluromet Glyxambi, Synjardy, Synjardy XR, Invokamet, InvokametXR, Trijary XR, Xigduo X Do not take for 4 days before your procedure including the day of the procedure (resume taking after the procedure)   This educational material has been approved by the Patient Education Advisory Committee.    On the Day of Surgery/Procedure  Follow the directions below based on the type of medicine you take for your diabetes.  Type of Medicine You Take  Examples What to Do   Long-Acting Insulin Lantus, Levemir, Tresiba,  Toujeo, Basaglar, Semglee If you normally take your Long Acting Insulin in the morning, take the full dose as scheduled.   GLP-I Agonists Adlyxin, Byetta, Bydureon,  Ozempic, Soliqua, Tanzeum,  Trulicity,  Darin Gallegos,  Rybelsus, Mounjaro Do NOT take this medicine on the day of your procedure (resume taking after the procedure)   Except for the morning Long-Acting Insulin, DO NOT take ANY diabetic medicine on the day of your procedure unless you were instructed by the doctor who manages your diabetes medicines.  Continue to check your blood sugars.  If you have an insulin pump, ask your endocrinologist for instructions at least 3 days before your procedure. NOTE: If you are not able to ask your endocrinologist in advance, on the day of the procedure set your insulin pump to your basal rate only. Bring your insulin pump supplies to the hospital.         History of Present Illness     Mr. Zhang old here for follow-up from parathyroidectomy and also preop for a spinal cord stimulator trial to be done on December 18      Review of Systems   Constitutional:  Positive for activity change.   HENT:  Negative for dental problem, hearing loss and trouble swallowing.    Eyes:  Negative for visual disturbance.   Respiratory:  Negative for cough and shortness of breath.    Cardiovascular:  Negative for chest pain, palpitations and leg swelling.   Musculoskeletal:  Positive for back pain.   Neurological:  Positive for weakness. Negative for dizziness, tremors and headaches.   Psychiatric/Behavioral:  Negative for dysphoric mood.      Past Medical History   Past Medical History:   Diagnosis Date    Arthritis     right foot    Chronic cholecystitis     Chronic kidney disease     11/25/24 per chart Stage 3    CPAP (continuous positive airway pressure) dependence     Diabetes mellitus (HCC)     Type 2    Diabetic nephropathy (HCC)     Disease of thyroid gland 1990    Diverticulitis of colon     DVT (deep venous thrombosis) (HCC)     11/25/24 per pt hx of 3/2024 due left adrenal gland ruptured - taken off of Xarelto for 2 weeks developed blood clot Left calf and both lungs    Gout     History of pulmonary embolus (PE) 03/2024     ruptured adrenal gland - taken off of Xarelto for 2 weeks and developed blood clot in left leg and both lungs    Hyperlipidemia     Hypertension     Hypothyroidism     MTHFR mutation     Neuropathy     Neuropathy in diabetes (HCC) 2018    Scab     right arm- no s/s infection    Sleep apnea     Tarsal tunnel syndrome, right     11/25/24 bilateral per pt    Tinnitus     left ear    Wears glasses     and contacts    Wears glasses      Past Surgical History:   Procedure Laterality Date    APPENDECTOMY      ARTHRODESIS      Lumbar L__    BACK SURGERY      BUNIONECTOMY Right 10/07/2016    Procedure: REMOVAL TIBIAL  SESAMOID BONE  RIGHT FOOT;  Surgeon: Vicente Aguirre DPM;  Location: [facility];  Service:     CARDIAC PACEMAKER PLACEMENT  09/01/2021    CHG ASSAY OF PARATHORMONE N/A 12/4/2024    Procedure: INTRAOP PTH MONITORING;  Surgeon: Og Sgeovia MD;  Location: [facility];  Service: Surgical Oncology    CHOLECYSTECTOMY  03/26/2015    COLONOSCOPY      KNEE ARTHROSCOPY      KNEE ARTHROSCOPY W/ MENISCAL REPAIR      Lateral    NASAL SEPTUM SURGERY  10/16/2013    PLANTAR FASCIA SURGERY Left 10/26/2023    Procedure: RELEASE FASCIA PLANTAR/FASCIOTOMY;  Surgeon: Inderjit Ramirez DPM;  Location: [facility];  Service: Podiatry    TN COLONOSCOPY FLX DX W/COLLJ SPEC WHEN PFRMD N/A 11/23/2018    Procedure: COLONOSCOPY;  Surgeon: Manjit Dietrich MD;  Location: [facility];  Service: Gastroenterology    TN PARATHYROIDECTOMY/EXPLORATION PARATHYROIDS Right 12/4/2024    Procedure: MINIMALLY INVASIVE RIGHT PARATHYROIDECTOMY, PSB 4 GLAND EXPLORATION;  Surgeon: Og Segovia MD;  [facility];  Service: Surgical Oncology    TN RELEASE TARSAL TUNNEL Right 06/25/2018    Procedure: RELEASE TARSAL TUNNEL;  Surgeon: Cliff Bernabe DPM;  [facility]: [facility];  Service: Podiatry    TN RELEASE TARSAL TUNNEL Left 03/13/2020    Procedure: RELEASE TARSAL TUNNEL;  Surgeon: Cliff Bernabe DPM;  Location:  MAIN OR;   Service: Podiatry    RI RELEASE TARSAL TUNNEL Left 10/26/2023    Procedure: RELEASE TARSAL TUNNEL;  Surgeon: Inderjit Ramirez DPM;  Location: [facility];  Service: Podiatry    SPINAL FUSION      TONSILLECTOMY  10/16/2013    with Adenoidectomy    UVULECTOMY  10/16/2013    UVULOPALATOPHARYNGOPLASTY      WISDOM TOOTH EXTRACTION       Family History   Problem Relation Age of Onset    Aneurysm Mother         intracranial aneurysm repair    Coronary artery disease Father     Hypertension Father     Heart disease Father     Aneurysm Brother     Anesthesia problems Neg Hx      Social History     Tobacco Use    Smoking status: Never    Smokeless tobacco: Never    Tobacco comments:     Never a smoker or use of any tobacco products per pt    Vaping Use    Vaping status: Never Used   Substance and Sexual Activity    Alcohol use: Yes     Alcohol/week: 6.0 standard drinks of alcohol     Types: 6 Cans of beer per week     Comment: weekends-11/25/24 Per pt rare use    Drug use: No     Comment: Denies any drug use per pt    Sexual activity: Yes     Partners: Female     Birth control/protection: Other     Comment: Denies any chest pain or shortness of breath with activity     Current Outpatient Medications on File Prior to Visit   Medication Sig    allopurinol (ZYLOPRIM) 300 mg tablet Take 1 tablet (300 mg total) by mouth daily    apixaban (Eliquis) 5 mg Take 1 tablet (5 mg total) by mouth 2 (two) times a day    carvedilol (COREG) 6.25 mg tablet TAKE 1 TABLET BY MOUTH ONCE DAILY IN THE MORNING AND 1 & 1/2 (ONE & ONE-HALF) ONCE DAILY IN THE EVENING    Cyanocobalamin (Vitamin B-12) 5000 MCG TBDP Take by mouth in the morning    ezetimibe (ZETIA) 10 mg tablet Take 1 tablet (10 mg total) by mouth daily    furosemide (LASIX) 20 mg tablet Take 1 tablet (20 mg total) by mouth daily As needed for leg edema    gabapentin (NEURONTIN) 600 MG tablet Take 1 tablet (600 mg total) by mouth 3 (three) times a day    levothyroxine 175 mcg  tablet TAKE 1 TABLET BY MOUTH ONCE DAILY ON AN EMPTY STOMACH 1 HOUR BEFORE BREAKFAST AND 4 HOURS APART FROM CALCIUM AND VITAMIN D SUPPLEMENTATION    lisinopril (ZESTRIL) 10 mg tablet Take 1 tablet (10 mg total) by mouth daily    metFORMIN (GLUCOPHAGE-XR) 500 mg 24 hr tablet Take 1 tablet (500 mg total) by mouth daily at bedtime    semaglutide, 2 mg/dose, (Ozempic, 2 MG/DOSE,) 8 mg/ mL injection pen Inject 0.75 mL (2 mg total) under the skin every 7 days    sildenafil (Viagra) 100 mg tablet Take 1 tablet (100 mg total) by mouth as needed for erectile dysfunction     No Known Allergies  Objective   /88 (BP Location: Left arm, Patient Position: Sitting, Cuff Size: Large)   Pulse 86   Temp 98.1 °F (36.7 °C) (Temporal)   Ht 6' (1.829 m)   Wt 120 kg (265 lb)   SpO2 98%   BMI 35.94 kg/m²     Physical Exam  Constitutional:       Appearance: Normal appearance. He is obese.   HENT:      Mouth/Throat:      Comments: Mallapati type IV airway  Cardiovascular:      Rate and Rhythm: Normal rate and regular rhythm.   Pulmonary:      Effort: Pulmonary effort is normal.      Breath sounds: Normal breath sounds.   Neurological:      Mental Status: He is alert.           Agustín Loredo,

## 2024-12-18 ENCOUNTER — APPOINTMENT (OUTPATIENT)
Dept: RADIOLOGY | Facility: HOSPITAL | Age: 59
End: 2024-12-18
Payer: COMMERCIAL

## 2024-12-18 ENCOUNTER — HOSPITAL ENCOUNTER (OUTPATIENT)
Facility: HOSPITAL | Age: 59
Setting detail: OUTPATIENT SURGERY
Discharge: HOME | End: 2024-12-18
Attending: ANESTHESIOLOGY | Admitting: ANESTHESIOLOGY
Payer: COMMERCIAL

## 2024-12-18 VITALS
HEART RATE: 63 BPM | BODY MASS INDEX: 35.89 KG/M2 | RESPIRATION RATE: 18 BRPM | WEIGHT: 265 LBS | DIASTOLIC BLOOD PRESSURE: 96 MMHG | TEMPERATURE: 97.5 F | SYSTOLIC BLOOD PRESSURE: 160 MMHG | OXYGEN SATURATION: 97 % | HEIGHT: 72 IN

## 2024-12-18 DIAGNOSIS — E11.21 DIABETIC NEPHROPATHY ASSOCIATED WITH TYPE 2 DIABETES MELLITUS (HCC): ICD-10-CM

## 2024-12-18 DIAGNOSIS — G89.4 CHRONIC PAIN SYNDROME: Primary | ICD-10-CM

## 2024-12-18 DIAGNOSIS — Z29.9 UNSPECIFIED PROPHYLACTIC OR TREATMENT MEASURE: Primary | ICD-10-CM

## 2024-12-18 LAB — GLUCOSE SERPL-MCNC: 80 MG/DL (ref 65–140)

## 2024-12-18 PROCEDURE — 63650 IMPLANT NEUROELECTRODES: CPT | Performed by: ANESTHESIOLOGY

## 2024-12-18 PROCEDURE — C1897 LEAD, NEUROSTIM TEST KIT: HCPCS | Performed by: ANESTHESIOLOGY

## 2024-12-18 PROCEDURE — 82948 REAGENT STRIP/BLOOD GLUCOSE: CPT

## 2024-12-18 DEVICE — TRIAL LEAD KIT, 50CM WITH 5MM SPACING
Type: IMPLANTABLE DEVICE | Site: SPINE LUMBAR | Status: FUNCTIONAL
Brand: NEVRO®

## 2024-12-18 RX ORDER — BUPIVACAINE HYDROCHLORIDE 2.5 MG/ML
INJECTION, SOLUTION EPIDURAL; INFILTRATION; INTRACAUDAL AS NEEDED
Status: DISCONTINUED | OUTPATIENT
Start: 2024-12-18 | End: 2024-12-18 | Stop reason: HOSPADM

## 2024-12-18 RX ORDER — SODIUM CHLORIDE, SODIUM LACTATE, POTASSIUM CHLORIDE, CALCIUM CHLORIDE 600; 310; 30; 20 MG/100ML; MG/100ML; MG/100ML; MG/100ML
50 INJECTION, SOLUTION INTRAVENOUS ONCE
Status: COMPLETED | OUTPATIENT
Start: 2024-12-18 | End: 2024-12-18

## 2024-12-18 RX ORDER — CEFAZOLIN SODIUM 2 G/50ML
2000 SOLUTION INTRAVENOUS ONCE
Status: COMPLETED | OUTPATIENT
Start: 2024-12-18 | End: 2024-12-18

## 2024-12-18 RX ORDER — CEPHALEXIN 500 MG/1
500 CAPSULE ORAL EVERY 6 HOURS SCHEDULED
Qty: 28 CAPSULE | Refills: 0 | Status: SHIPPED | OUTPATIENT
Start: 2024-12-18 | End: 2024-12-25

## 2024-12-18 RX ORDER — LIDOCAINE HYDROCHLORIDE 10 MG/ML
INJECTION, SOLUTION EPIDURAL; INFILTRATION; INTRACAUDAL; PERINEURAL AS NEEDED
Status: DISCONTINUED | OUTPATIENT
Start: 2024-12-18 | End: 2024-12-18 | Stop reason: HOSPADM

## 2024-12-18 RX ADMIN — SODIUM CHLORIDE, SODIUM LACTATE, POTASSIUM CHLORIDE, AND CALCIUM CHLORIDE 50 ML/HR: .6; .31; .03; .02 INJECTION, SOLUTION INTRAVENOUS at 14:27

## 2024-12-18 RX ADMIN — CEFAZOLIN SODIUM 2000 MG: 2 SOLUTION INTRAVENOUS at 14:27

## 2024-12-18 NOTE — H&P
Assessment:  Diabetic neuropathy associated with type 2 diabetes  Painful diabetic neuropathy    Plan:  Fawad Baer is a 59 y.o. male with complaints of bilateral feet pain presents to surgical center for procedure.  We will perform a Procedure(s) (LRB):  NEVRO SCS TRIAL (Bilateral)   2. Follow-up 1 month after injection    Complete risks and benefits including bleeding, infection, tissue reaction, nerve injury and allergic reaction were discussed. The approach was demonstrated using models and literature was provided. Verbal and written consent was obtained.    My impressions and treatment recommendations were discussed in detail with the patient who verbalized understanding and had no further questions.  Discharge instructions were provided. I personally saw and examined the patient and I agree with the above discussed plan of care.    No orders of the defined types were placed in this encounter.    No orders of the defined types were placed in this encounter.      History of Present Illness:  Fawad Baer is a 59 y.o. male who presents for a follow up office visit in regards to bilateral foot pain.   The patient’s current symptoms include 8 out of 10 sharp, stabbing, throbbing pain during particular time pattern.      I have personally reviewed and/or updated the patient's past medical history, past surgical history, family history, social history, current medications, allergies, and vital signs today.     Review of Systems   Neurological:  Positive for numbness.        Tingling, burning sensation   All other systems reviewed and are negative.      Patient Active Problem List   Diagnosis    Controlled type 2 diabetes mellitus with microalbuminuria, without long-term current use of insulin  (HCC)    Painful diabetic neuropathy (HCC)    Erectile dysfunction due to diseases classified elsewhere    Homozygous for MTHFR gene mutation    Hypothyroidism (acquired)    Sacroiliitis (HCC)    Tarsal tunnel  syndrome of both lower extremities    Tarsal tunnel syndrome, left    Venous stasis ulcer of left ankle limited to breakdown of skin with varicose veins (HCC)    Gout    Hyperaldosteronism (HCC)    Diabetic nephropathy associated with type 2 diabetes mellitus (HCC)    Stage 3a chronic kidney disease (HCC)    Proteinuria    Vitamin D deficiency    Syncopal episodes     Paroxysmal atrial fibrillation (HCC)    History of DVT (deep vein thrombosis)    Sinus bradycardia    Obstructive sleep apnea    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HCC)    Pacemaker    Primary hypertension    LVH (left ventricular hypertrophy)    Plantar fasciitis    Post-operative state    Tarsal tunnel syndrome of left side    Pain in both feet    Chronic pain syndrome    Mid back pain    Hyperparathyroidism (HCC)    Morbid obesity (HCC)       Past Medical History:   Diagnosis Date    Arthritis     right foot    Chronic cholecystitis     Chronic kidney disease     11/25/24 per chart Stage 3    CPAP (continuous positive airway pressure) dependence     Diabetes mellitus (HCC)     Type 2    Diabetic nephropathy (HCC)     Disease of thyroid gland 1990    Diverticulitis of colon     DVT (deep venous thrombosis) (Former[facility])     11/25/24 per pt hx of 3/2024 due left adrenal gland ruptured - taken off of Xarelto for 2 weeks developed blood clot Left calf and both lungs    Gout     History of pulmonary embolus (PE) 03/2024    ruptured adrenal gland - taken off of Xarelto for 2 weeks and developed blood clot in left leg and both lungs    Hyperlipidemia     Hypertension     Hypothyroidism     MTHFR mutation     Neuropathy     Neuropathy in diabetes ([facility]) 2018    Scab     right arm- no s/s infection    Sleep apnea     Tarsal tunnel syndrome, right     11/25/24 bilateral per pt    Tinnitus     left ear    Wears glasses     and contacts    Wears glasses        Past Surgical History:   Procedure Laterality Date    APPENDECTOMY      ARTHRODESIS      Lumbar  L__    BACK SURGERY      BUNIONECTOMY Right 10/07/2016    Procedure: REMOVAL TIBIAL  SESAMOID BONE  RIGHT FOOT;  Surgeon: Vicente Aguirre DPM;  [facility];  Service:     CARDIAC PACEMAKER PLACEMENT  09/01/2021    CHG ASSAY OF PARATHORMONE N/A 12/4/2024    Procedure: INTRAOP PTH MONITORING;  Surgeon: Og Segovia MD;  [facility];  Service: Surgical Oncology    CHOLECYSTECTOMY  03/26/2015    COLONOSCOPY      KNEE ARTHROSCOPY      KNEE ARTHROSCOPY W/ MENISCAL REPAIR      Lateral    NASAL SEPTUM SURGERY  10/16/2013    PLANTAR FASCIA SURGERY Left 10/26/2023    Procedure: RELEASE FASCIA PLANTAR/FASCIOTOMY;  Surgeon: Inderjit Ramirez DPM;  [facility]: [facility];  Service: Podiatry    HI COLONOSCOPY FLX DX W/COLLJ SPEC WHEN PFRMD N/A 11/23/2018    Procedure: COLONOSCOPY;  Surgeon: Manjit Dietrich MD;  [facility]: [facility];  Service: Gastroenterology    HI PARATHYROIDECTOMY/EXPLORATION PARATHYROIDS Right 12/4/2024    Procedure: MINIMALLY INVASIVE RIGHT PARATHYROIDECTOMY, PSB 4 GLAND EXPLORATION;  Surgeon: Og Segovia MD;  [facility];  Service: Surgical Oncology    HI RELEASE TARSAL TUNNEL Right 06/25/2018    Procedure: RELEASE TARSAL TUNNEL;  Surgeon: Cliff Bernabe DPM;  [facility];  Service: Podiatry    HI RELEASE TARSAL TUNNEL Left 03/13/2020    Procedure: RELEASE TARSAL TUNNEL;  Surgeon: Cliff Bernabe DPM;  [facility];  Service: Podiatry    HI RELEASE TARSAL TUNNEL Left 10/26/2023    Procedure: RELEASE TARSAL TUNNEL;  Surgeon: Inderjit Ramirez DPM;  [facility];  Service: Podiatry    SPINAL FUSION      TONSILLECTOMY  10/16/2013    with Adenoidectomy    UVULECTOMY  10/16/2013    UVULOPALATOPHARYNGOPLASTY      WISDOM TOOTH EXTRACTION         Family History   Problem Relation Age of Onset    Aneurysm Mother         intracranial aneurysm repair    Coronary artery disease Father     Hypertension Father     Heart disease Father      Aneurysm Brother     Anesthesia problems Neg Hx        Social History     Occupational History    Not on file   Tobacco Use    Smoking status: Never    Smokeless tobacco: Never    Tobacco comments:     Never a smoker or use of any tobacco products per pt    Vaping Use    Vaping status: Never Used   Substance and Sexual Activity    Alcohol use: Yes     Alcohol/week: 6.0 standard drinks of alcohol     Types: 6 Cans of beer per week     Comment: weekends-11/25/24 Per pt rare use    Drug use: No     Comment: Denies any drug use per pt    Sexual activity: Yes     Partners: Female     Birth control/protection: Other     Comment: Denies any chest pain or shortness of breath with activity       No current facility-administered medications on file prior to encounter.     Current Outpatient Medications on File Prior to Encounter   Medication Sig    allopurinol (ZYLOPRIM) 300 mg tablet Take 1 tablet (300 mg total) by mouth daily    carvedilol (COREG) 6.25 mg tablet TAKE 1 TABLET BY MOUTH ONCE DAILY IN THE MORNING AND 1 & 1/2 (ONE & ONE-HALF) ONCE DAILY IN THE EVENING    ezetimibe (ZETIA) 10 mg tablet Take 1 tablet (10 mg total) by mouth daily    furosemide (LASIX) 20 mg tablet Take 1 tablet (20 mg total) by mouth daily As needed for leg edema    levothyroxine 175 mcg tablet TAKE 1 TABLET BY MOUTH ONCE DAILY ON AN EMPTY STOMACH 1 HOUR BEFORE BREAKFAST AND 4 HOURS APART FROM CALCIUM AND VITAMIN D SUPPLEMENTATION    metFORMIN (GLUCOPHAGE-XR) 500 mg 24 hr tablet Take 1 tablet (500 mg total) by mouth daily at bedtime    semaglutide, 2 mg/dose, (Ozempic, 2 MG/DOSE,) 8 mg/ mL injection pen Inject 0.75 mL (2 mg total) under the skin every 7 days    sildenafil (Viagra) 100 mg tablet Take 1 tablet (100 mg total) by mouth as needed for erectile dysfunction       No Known Allergies    Physical Exam:    /83   Pulse 64   Temp (!) 97.3 °F (36.3 °C) (Temporal)   Resp 18   Ht 6' (1.829 m)   Wt 120 kg (265 lb)   SpO2 96%   BMI  35.94 kg/m²     Constitutional:normal, well developed, well nourished, alert, in no distress and non-toxic and no overt pain behavior. and obese  Eyes:anicteric  HEENT:grossly intact  Neck:supple, symmetric, trachea midline and no masses   Pulmonary:even and unlabored  Cardiovascular:No edema or pitting edema present  Skin:Normal without rashes or lesions and well hydrated  Psychiatric:Mood and affect appropriate  Neurologic:Cranial Nerves II-XII grossly intact  Musculoskeletal:antalgic

## 2024-12-18 NOTE — DISCHARGE INSTR - AVS FIRST PAGE
"SPINE AND PAIN: SPINAL CORD STIMULATION/PERIPHERAL NERVE STIMULATION TRIAL/ PERMANENT IMPLANT DISCHARGE INSTRUCTIONS    ACTIVITY RESTRICTIONS DURING TRIAL PERIOD  Do not drive with the SCS \"on\".  Do not bend or twist at the waist.  Do not lift anything that weighs over 5 pounds.  When you lie down, \"log roll\" (keep spine aligned) to change positions. Do not sleep on your stomach.  Limit stair climbing and strenuous activity.    CARE OF THE INJECTION SITE  CHECK THE DRESSING DAILY. It should intact.  Notify the [facility] if you have any of the following: redness, drainage, swelling, chills or fever over 100°F.  Do not change dressing, only reinforce edges if necessary.  Keep the dressing dry. Do not shower, (sponge baths only) until evaluated in office.    SPECIAL INSTRUCTIONS  Take your temperature daily.  Follow-up for lead removal scheduled for 12/25/2024  The  box is expensive. DO NOT drop or get wet.  Do not pull on the cable or leads. Do not disconnect the cable and lead.   Do activities that normally cause you to have pain and try different  settings.  Obtain post procedure x-ray shortly after procedure if ordered by physician.     MEDICATIONS  Continue to take all routine medications.  Our office may have instructed you to hold some medications.  You may resume _______________________________________________.  Take antiobiotic as prescribed: _______Keflex 500 mg p.o. 4 times daily ___.    If you have any problems specifically related to your procedure, please call our office at [phone]. Problems not related to your procedure should be directed at your primary care physician.    Contact the company representative with any questions regarding settings or operation of the external  box.    As no general anesthesia was used in today's procedure, you should not experience any side effects related to anesthesia.    "

## 2024-12-19 DIAGNOSIS — Z00.6 ENCOUNTER FOR EXAMINATION FOR NORMAL COMPARISON OR CONTROL IN CLINICAL RESEARCH PROGRAM: ICD-10-CM

## 2024-12-20 NOTE — OP NOTE
OPERATIVE REPORT  PATIENT NAME: Fawad Baer    :  1965  MRN: [id number]  Pt Location: [facility] 01    SURGERY DATE: 2024    Surgeons and Role:     * Jing Vinson MD - Primary    Preop Diagnosis:  Chronic pain syndrome [G89.4]  Diabetic polyneuropathy associated with other specified diabetes mellitus (HCC) [E13.42]    Post-Op Diagnosis Codes:     * Chronic pain syndrome [G89.4]     * Diabetic polyneuropathy associated with other specified diabetes mellitus (HCC) [E13.42]    Procedure(s):  Bilateral - NEVRO SCS TRIAL    Specimen(s):  * No specimens in log *    Estimated Blood Loss:   Minimal    Drains:  * No LDAs found *    Anesthesia Type:   Local    Operative Indications:  Chronic pain syndrome [G89.4]  Diabetic polyneuropathy associated with other specified diabetes mellitus (HCC) [E13.42]      Operative Findings:  same      Complications:   None    Procedure and Technique:  Nevro Two Lead Thoracic Spinal Cord Stimulator Trial    The patient was identified and evaluated in the preoperative holing area. Risks, benefits, and alternatives, and a team approach were discussed with the patient, and the pertinent surgical site was verified and marked with initials. The patient had the opportunity to ask questions, and wished to proceed.    The patient was transported to the procedure room, at which time they were placed in the prone position after appropriate IV placement. The patient received preoperative antibiotics in the form of Ancef 2g. The patient was then prepped and draped with Chloraprep, Prevail, and sterile drapes. A procedural pause was conducted to verify: patient identification, patient allergies, completion of antibiotic prophylaxis, and site of entry.    Under fluoroscopic guidance, the T12-L1 interspace was identified. The skin and underlying subcutaneous tissue was anesthetized by an infiltration of a 1:1 mixture of 1% lidocaine and 0.25% bupivacaine with 1:200,000  epinephrine additive. A 14-gauge Coude epidural needle was advanced into the interlaminar space at T12-L1 without difficulty. Epidural access was obtained using a loss of resistance technique to saline. The epidural spinal cord stimulation lead was advanced to the bottom of T8. The lead was midline.    Under fluoroscopic guidance, the T12-L1 interspace was identified. The skin and underlying subcutaneous tissue was anesthetized by an infiltration of a 1:1 mixture of 1% lidocaine and 0.25% bupivacaine with 1:200,000 epinephrine additive. A 14-gauge Coude epidural needle was advanced into the interlaminar space at T12-L1 without difficulty. Epidural access was obtained using a loss of resistance technique to saline. The epidural spinal cord stimulation lead was advanced to the middle of T9. The lead was midline.    Impedance was checked and multiple electric combinations were utilized to provide paresthesia coverage of the patientâ€™s area of pain. The needle was removed and he lead was secured to the back with the use of Steri-strips, StayFix dressing, and Tegaderm. The patient was taken to the recovery area in excellent condition.     The patient tolerated the procedure well without evidence of complication. The patient was transferred from the procedure table without difficulty. After an appropriate amount of observation in the recovery area, the patient was dismissed home.              I was present for the entire procedure.    Patient Disposition:  hemodynamically stable             SIGNATURE: Jing Vinson MD  DATE: December 19, 2024  TIME: 9:46 PM

## 2024-12-26 ENCOUNTER — OFFICE VISIT (OUTPATIENT)
Age: 59
End: 2024-12-26

## 2024-12-26 VITALS — WEIGHT: 269 LBS | BODY MASS INDEX: 36.44 KG/M2 | HEIGHT: 72 IN

## 2024-12-26 DIAGNOSIS — R60.0 LEG EDEMA: ICD-10-CM

## 2024-12-26 DIAGNOSIS — E79.0 HYPERURICEMIA: ICD-10-CM

## 2024-12-26 DIAGNOSIS — G89.4 CHRONIC PAIN SYNDROME: Primary | ICD-10-CM

## 2024-12-26 DIAGNOSIS — I48.0 PAROXYSMAL ATRIAL FIBRILLATION (HCC): ICD-10-CM

## 2024-12-26 DIAGNOSIS — E11.21 DIABETIC NEPHROPATHY ASSOCIATED WITH TYPE 2 DIABETES MELLITUS (HCC): ICD-10-CM

## 2024-12-26 PROCEDURE — 99024 POSTOP FOLLOW-UP VISIT: CPT | Performed by: NURSE PRACTITIONER

## 2024-12-26 NOTE — PROGRESS NOTES
Assessment  No diagnosis found.    Plan  The patient had a successful Nevro spinal cord stimulator trial.  The patient will be referred to Dr Blount for permanent implantation.  The patient will then follow back in our office 6 weeks after permanent implantation for re-evaluation.    The patient was advised to complete their antibiotics course and observe lead insertion sites for signs and symptoms of infection such as redness, swelling, drainage, warmth    No orders of the defined types were placed in this encounter.        History of Present Illness    The patient is a 59 y.o. male status post Nevro spinal cord stimulator percutaneous lead placement on 12/18/24 .  The patient's reported an overall reduction in pain of at least 60% during the trial.  The patient reported functional improvement included decreased pain and improved function.  Improved sleep.    Pain Assessment Measures   Numeric Rating Scale 4   Oswestry Disability Index Score/Neck Disability Index Score 9   PROMIS-29   Physical Function 16   Anxiety 4   Depression 4   Fatigue 7   Sleep Disturbance 7   Ability to Participate in Social Roles And Activities 16   Pain Interference 3     Review of Systems   Constitutional:  Negative for fever.   HENT:  Negative for sinus pain.    Eyes:  Negative for redness.   Respiratory:  Negative for shortness of breath.    Cardiovascular:  Negative for palpitations.   Gastrointestinal:  Negative for abdominal pain and nausea.   Endocrine: Negative for polydipsia.   Genitourinary:  Negative for difficulty urinating.   Musculoskeletal:  Negative for gait problem and myalgias.   Skin:  Negative for rash.   Neurological:  Negative for seizures and headaches.   Psychiatric/Behavioral:  Negative for decreased concentration. The patient is not nervous/anxious.    All other systems reviewed and are negative.      Patient Active Problem List   Diagnosis    Controlled type 2 diabetes mellitus with microalbuminuria, without  long-term current use of insulin  (HCC)    Painful diabetic neuropathy (HCC)    Erectile dysfunction due to diseases classified elsewhere    Homozygous for MTHFR gene mutation    Hypothyroidism (acquired)    Sacroiliitis (HCC)    Tarsal tunnel syndrome of both lower extremities    Tarsal tunnel syndrome, left    Venous stasis ulcer of left ankle limited to breakdown of skin with varicose veins (HCC)    Gout    Hyperaldosteronism (HCC)    Diabetic nephropathy associated with type 2 diabetes mellitus (HCC)    Stage 3a chronic kidney disease (HCC)    Proteinuria    Vitamin D deficiency    Syncopal episodes     Paroxysmal atrial fibrillation (HCC)    History of DVT (deep vein thrombosis)    Sinus bradycardia    Obstructive sleep apnea    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HCC)    Pacemaker    Primary hypertension    LVH (left ventricular hypertrophy)    Plantar fasciitis    Post-operative state    Tarsal tunnel syndrome of left side    Pain in both feet    Chronic pain syndrome    Mid back pain    Hyperparathyroidism (HCC)    Morbid obesity (HCC)        Past Medical History:   Diagnosis Date    Arthritis     right foot    Chronic cholecystitis     Chronic kidney disease     11/25/24 per chart Stage 3    CPAP (continuous positive airway pressure) dependence     Diabetes mellitus (HCC)     Type 2    Diabetic nephropathy (HCC)     Disease of thyroid gland 1990    Diverticulitis of colon     DVT (deep venous thrombosis) (HCC)     11/25/24 per pt hx of 3/2024 due left adrenal gland ruptured - taken off of Xarelto for 2 weeks developed blood clot Left calf and both lungs    Gout     History of pulmonary embolus (PE) 03/2024    ruptured adrenal gland - taken off of Xarelto for 2 weeks and developed blood clot in left leg and both lungs    Hyperlipidemia     Hypertension     Hypothyroidism     MTHFR mutation     Neuropathy     Neuropathy in diabetes (HCC) 2018    Scab     right arm- no s/s infection    Sleep apnea      Tarsal tunnel syndrome, right     11/25/24 bilateral per pt    Tinnitus     left ear    Wears glasses     and contacts    Wears glasses        Past Surgical History:   Procedure Laterality Date    APPENDECTOMY      ARTHRODESIS      Lumbar L__    BACK SURGERY      BUNIONECTOMY Right 10/07/2016    Procedure: REMOVAL TIBIAL  SESAMOID BONE  RIGHT FOOT;  Surgeon: Vicente Aguirre DPM;  [facility];  Service:     CARDIAC PACEMAKER PLACEMENT  09/01/2021    CHG ASSAY OF PARATHORMONE N/A 12/4/2024    Procedure: INTRAOP PTH MONITORING;  Surgeon: Og Segovia MD;  [facility];  Service: Surgical Oncology    CHOLECYSTECTOMY  03/26/2015    COLONOSCOPY      KNEE ARTHROSCOPY      KNEE ARTHROSCOPY W/ MENISCAL REPAIR      Lateral    NASAL SEPTUM SURGERY  10/16/2013    PLANTAR FASCIA SURGERY Left 10/26/2023    Procedure: RELEASE FASCIA PLANTAR/FASCIOTOMY;  Surgeon: Inderjit Ramirez DPM;  [facility];  Service: Podiatry    LA COLONOSCOPY FLX DX W/COLLJ SPEC WHEN PFRMD N/A 11/23/2018    Procedure: COLONOSCOPY;  Surgeon: Manjit Dietrich MD;  [facility];  Service: Gastroenterology    LA PARATHYROIDECTOMY/EXPLORATION PARATHYROIDS Right 12/4/2024    Procedure: MINIMALLY INVASIVE RIGHT PARATHYROIDECTOMY, PSB 4 GLAND EXPLORATION;  Surgeon: Og Segovia MD;  [facility];  Service: Surgical Oncology    LA PRQ IMPLTJ NSTIM ELECTRODE ARRAY EPIDURAL Bilateral 12/18/2024    Procedure: NEVRO SCS TRIAL;  Surgeon: Jing Vinson MD;  [facility];  Service: Pain Management     LA RELEASE TARSAL TUNNEL Right 06/25/2018    Procedure: RELEASE TARSAL TUNNEL;  Surgeon: Cliff Bernabe DPM;  [facility];  Service: Podiatry    LA RELEASE TARSAL TUNNEL Left 03/13/2020    Procedure: RELEASE TARSAL TUNNEL;  Surgeon: Cliff Bernabe DPM;  Location:  [facility] OR;  Service: Podiatry    LA RELEASE TARSAL TUNNEL Left 10/26/2023    Procedure: RELEASE TARSAL TUNNEL;  Surgeon:  Inderjit Ramirez DPM;  [facility];  Service: Podiatry    SPINAL FUSION      TONSILLECTOMY  10/16/2013    with Adenoidectomy    UVULECTOMY  10/16/2013    UVULOPALATOPHARYNGOPLASTY      WISDOM TOOTH EXTRACTION         Family History   Problem Relation Age of Onset    Aneurysm Mother         intracranial aneurysm repair    Coronary artery disease Father     Hypertension Father     Heart disease Father     Aneurysm Brother     Anesthesia problems Neg Hx        Social History     Occupational History    Not on file   Tobacco Use    Smoking status: Never    Smokeless tobacco: Never    Tobacco comments:     Never a smoker or use of any tobacco products per pt    Vaping Use    Vaping status: Never Used   Substance and Sexual Activity    Alcohol use: Yes     Alcohol/week: 6.0 standard drinks of alcohol     Types: 6 Cans of beer per week     Comment: weekends-11/25/24 Per pt rare use    Drug use: No     Comment: Denies any drug use per pt    Sexual activity: Yes     Partners: Female     Birth control/protection: Other     Comment: Denies any chest pain or shortness of breath with activity         Current Outpatient Medications:     allopurinol (ZYLOPRIM) 300 mg tablet, Take 1 tablet (300 mg total) by mouth daily, Disp: 90 tablet, Rfl: 1    apixaban (Eliquis) 5 mg, Take 1 tablet (5 mg total) by mouth 2 (two) times a day, Disp: 60 tablet, Rfl: 0    carvedilol (COREG) 6.25 mg tablet, TAKE 1 TABLET BY MOUTH ONCE DAILY IN THE MORNING AND 1 & 1/2 (ONE & ONE-HALF) ONCE DAILY IN THE EVENING, Disp: 90 tablet, Rfl: 0    Cyanocobalamin (Vitamin B-12) 5000 MCG TBDP, Take by mouth in the morning, Disp: , Rfl:     ezetimibe (ZETIA) 10 mg tablet, Take 1 tablet (10 mg total) by mouth daily, Disp: 100 tablet, Rfl: 1    furosemide (LASIX) 20 mg tablet, Take 1 tablet (20 mg total) by mouth daily As needed for leg edema, Disp: 90 tablet, Rfl: 1    gabapentin (NEURONTIN) 600 MG tablet, Take 1 tablet (600 mg total) by mouth 3  (three) times a day, Disp: 270 tablet, Rfl: 1    levothyroxine 175 mcg tablet, TAKE 1 TABLET BY MOUTH ONCE DAILY ON AN EMPTY STOMACH 1 HOUR BEFORE BREAKFAST AND 4 HOURS APART FROM CALCIUM AND VITAMIN D SUPPLEMENTATION, Disp: 90 tablet, Rfl: 1    lisinopril (ZESTRIL) 10 mg tablet, Take 1 tablet (10 mg total) by mouth daily, Disp: 90 tablet, Rfl: 1    metFORMIN (GLUCOPHAGE-XR) 500 mg 24 hr tablet, Take 1 tablet (500 mg total) by mouth daily at bedtime, Disp: 100 tablet, Rfl: 1    semaglutide, 2 mg/dose, (Ozempic, 2 MG/DOSE,) 8 mg/ mL injection pen, Inject 0.75 mL (2 mg total) under the skin every 7 days, Disp: 3 mL, Rfl: 5    sildenafil (Viagra) 100 mg tablet, Take 1 tablet (100 mg total) by mouth as needed for erectile dysfunction, Disp: 30 tablet, Rfl: 0    No Known Allergies      Physical Exam    Ht 6' (1.829 m)   BMI 35.94 kg/m²     Thoracic Spine:  Spinal cord stimulator lead removed with tip intact; no erythema, tenderness or signs of infection; area cleansed with alcohol and bandage placed

## 2024-12-27 RX ORDER — FUROSEMIDE 20 MG/1
TABLET ORAL
Qty: 90 TABLET | Refills: 1 | Status: SHIPPED | OUTPATIENT
Start: 2024-12-27

## 2024-12-27 RX ORDER — ALLOPURINOL 300 MG/1
300 TABLET ORAL DAILY
Qty: 90 TABLET | Refills: 1 | Status: SHIPPED | OUTPATIENT
Start: 2024-12-27

## 2024-12-27 RX ORDER — CARVEDILOL 6.25 MG/1
TABLET ORAL
Qty: 90 TABLET | Refills: 1 | Status: SHIPPED | OUTPATIENT
Start: 2024-12-27

## 2024-12-30 ENCOUNTER — TELEPHONE (OUTPATIENT)
Age: 59
End: 2024-12-30

## 2024-12-30 DIAGNOSIS — I48.0 PAROXYSMAL ATRIAL FIBRILLATION (HCC): ICD-10-CM

## 2024-12-30 PROBLEM — Z90.89 S/P PARATHYROIDECTOMY: Status: ACTIVE | Noted: 2024-12-30

## 2024-12-30 PROBLEM — Z98.890 S/P PARATHYROIDECTOMY: Status: ACTIVE | Noted: 2024-12-30

## 2025-01-02 ENCOUNTER — OFFICE VISIT (OUTPATIENT)
Dept: NEUROSURGERY | Facility: CLINIC | Age: 60
End: 2025-01-02
Payer: COMMERCIAL

## 2025-01-02 VITALS
DIASTOLIC BLOOD PRESSURE: 86 MMHG | TEMPERATURE: 97.6 F | HEIGHT: 72 IN | OXYGEN SATURATION: 97 % | WEIGHT: 269 LBS | HEART RATE: 71 BPM | BODY MASS INDEX: 36.44 KG/M2 | SYSTOLIC BLOOD PRESSURE: 142 MMHG | RESPIRATION RATE: 18 BRPM

## 2025-01-02 DIAGNOSIS — Z79.899 ENCOUNTER FOR LONG-TERM (CURRENT) USE OF MEDICATIONS: ICD-10-CM

## 2025-01-02 DIAGNOSIS — E11.21 DIABETIC NEPHROPATHY ASSOCIATED WITH TYPE 2 DIABETES MELLITUS (HCC): ICD-10-CM

## 2025-01-02 DIAGNOSIS — G89.4 CHRONIC PAIN SYNDROME: ICD-10-CM

## 2025-01-02 DIAGNOSIS — Z01.812 PRE-OPERATIVE LABORATORY EXAMINATION: Primary | ICD-10-CM

## 2025-01-02 DIAGNOSIS — Z79.01 LONG TERM (CURRENT) USE OF ANTICOAGULANTS: ICD-10-CM

## 2025-01-02 PROCEDURE — 99213 OFFICE O/P EST LOW 20 MIN: CPT | Performed by: STUDENT IN AN ORGANIZED HEALTH CARE EDUCATION/TRAINING PROGRAM

## 2025-01-02 RX ORDER — CHLORHEXIDINE GLUCONATE ORAL RINSE 1.2 MG/ML
15 SOLUTION DENTAL ONCE
OUTPATIENT
Start: 2025-01-02 | End: 2025-01-02

## 2025-01-02 NOTE — PROGRESS NOTES
Name: Fawad Baer      : 1965      MRN: [id number]  Encounter Provider: Natalio Rosas MD  Encounter Date: 2025   Encounter department: [facility]  :  Assessment & Plan  Chronic pain syndrome  Fawad Baer is a 59 year old male with a history of chronic pain and LE diabetic neuropathy referred for consideration of permanent SCS placement. He recently completed a trial with Dr. Palomo and reports >60% efficacy. Specifically he noted improvement in his neuropathic pain and his ability to sleep. I did review his MRI T spine which shows no anatomic contraindication to SCS placement. I reviewed his neuropsychologic assessment which deems him an appropriate candidate. I reviewed the fluoroscopic imaging from the trial which shows 2 leads, 1 at the bottom of T8 and 1 at mid T9. Given his successful trial I would recommend permanent SCS placement. We discussed risks including bleeding, infection, CSF leak, devic malfunction and loss of efficacy. This will be an outpatient procedure performed under general anesthesia. After discussion of the risks and the procedure he is agreeable to proceed. We will work to schedule a surgical date.   Orders:    Ambulatory Referral to Neurosurgery    Case request operating room: Placement of percutaneous spinal cord stimulator with battery in the right flank; Standing    Diabetic nephropathy associated with type 2 diabetes mellitus (HCC)  As above, successful SCS trial, will plan for permanent SCS placement.  Lab Results   Component Value Date    HGBA1C 5.8 (H) 2024       Orders:    Ambulatory Referral to Neurosurgery    Case request operating room: Placement of percutaneous spinal cord stimulator with battery in the right flank; Standing        History of Present Illness   HPI  Fawad Baer is a 59 year old male with a history of chronic pain and LE diabetic neuropathy referred for consideration of permanent  SCS placement. See assessment and plan.   Review of Systems   HENT:  Negative for trouble swallowing.    Musculoskeletal:  Positive for back pain. Negative for gait problem and myalgias.   Neurological:  Positive for numbness (n+t b/l feet). Negative for weakness.        Burning sensation in feet    Hematological:  Bruises/bleeds easily (eliquis 5 mg).   All other systems reviewed and are negative.   I have personally reviewed the MA's review of systems and made changes as necessary.         Objective   Resp 18   Ht 6' (1.829 m)   Wt 122 kg (269 lb)   BMI 36.48 kg/m²     Physical Exam  Neurological Exam  Awake and alert  Oriented and appropriate  5/5 throughout

## 2025-01-02 NOTE — ASSESSMENT & PLAN NOTE
As above, successful SCS trial, will plan for permanent SCS placement.  Lab Results   Component Value Date    HGBA1C 5.8 (H) 11/11/2024       Orders:    Ambulatory Referral to Neurosurgery    Case request operating room: Placement of percutaneous spinal cord stimulator with battery in the right flank; Standing

## 2025-01-02 NOTE — ASSESSMENT & PLAN NOTE
Fawad Baer is a 59 year old male with a history of chronic pain and LE diabetic neuropathy referred for consideration of permanent SCS placement. He recently completed a trial with Dr. Cardona and JAYY and reports >60% efficacy. Specifically he noted improvement in his neuropathic pain and his ability to sleep. I did review his MRI T spine which shows no anatomic contraindication to SCS placement. I reviewed his neuropsychologic assessment which deems him an appropriate candidate. I reviewed the fluoroscopic imaging from the trial which shows 2 leads, 1 at the bottom of T8 and 1 at mid T9. Given his successful trial I would recommend permanent SCS placement. We discussed risks including bleeding, infection, CSF leak, devic malfunction and loss of efficacy. This will be an outpatient procedure performed under general anesthesia. After discussion of the risks and the procedure he is agreeable to proceed. We will work to schedule a surgical date.   Orders:    Ambulatory Referral to Neurosurgery    Case request operating room: Placement of percutaneous spinal cord stimulator with battery in the right flank; Standing

## 2025-01-03 ENCOUNTER — OFFICE VISIT (OUTPATIENT)
Dept: PODIATRY | Facility: CLINIC | Age: 60
End: 2025-01-03
Payer: COMMERCIAL

## 2025-01-03 ENCOUNTER — APPOINTMENT (OUTPATIENT)
Dept: LAB | Facility: MEDICAL CENTER | Age: 60
End: 2025-01-03
Payer: COMMERCIAL

## 2025-01-03 ENCOUNTER — OFFICE VISIT (OUTPATIENT)
Dept: SURGICAL ONCOLOGY | Facility: CLINIC | Age: 60
End: 2025-01-03

## 2025-01-03 VITALS
BODY MASS INDEX: 36.44 KG/M2 | WEIGHT: 269 LBS | RESPIRATION RATE: 16 BRPM | OXYGEN SATURATION: 97 % | HEIGHT: 72 IN | HEART RATE: 86 BPM | TEMPERATURE: 97.6 F

## 2025-01-03 VITALS
RESPIRATION RATE: 15 BRPM | TEMPERATURE: 97.7 F | OXYGEN SATURATION: 96 % | WEIGHT: 264 LBS | HEIGHT: 72 IN | BODY MASS INDEX: 35.76 KG/M2 | HEART RATE: 73 BPM | DIASTOLIC BLOOD PRESSURE: 92 MMHG | SYSTOLIC BLOOD PRESSURE: 144 MMHG

## 2025-01-03 DIAGNOSIS — N25.81 SECONDARY HYPERPARATHYROIDISM OF RENAL ORIGIN (HCC): ICD-10-CM

## 2025-01-03 DIAGNOSIS — R80.1 PERSISTENT PROTEINURIA: ICD-10-CM

## 2025-01-03 DIAGNOSIS — Z90.89 S/P PARATHYROIDECTOMY: Primary | ICD-10-CM

## 2025-01-03 DIAGNOSIS — Z98.890 S/P PARATHYROIDECTOMY: Primary | ICD-10-CM

## 2025-01-03 DIAGNOSIS — E55.9 VITAMIN D DEFICIENCY: ICD-10-CM

## 2025-01-03 DIAGNOSIS — E11.21 DIABETIC NEPHROPATHY ASSOCIATED WITH TYPE 2 DIABETES MELLITUS (HCC): ICD-10-CM

## 2025-01-03 DIAGNOSIS — Z00.6 ENCOUNTER FOR EXAMINATION FOR NORMAL COMPARISON OR CONTROL IN CLINICAL RESEARCH PROGRAM: ICD-10-CM

## 2025-01-03 DIAGNOSIS — L84 HELOMA MOLLE: Primary | ICD-10-CM

## 2025-01-03 DIAGNOSIS — N18.31 STAGE 3A CHRONIC KIDNEY DISEASE (HCC): ICD-10-CM

## 2025-01-03 DIAGNOSIS — E83.42 HYPOMAGNESEMIA: ICD-10-CM

## 2025-01-03 DIAGNOSIS — E26.9 HYPERALDOSTERONISM (HCC): ICD-10-CM

## 2025-01-03 DIAGNOSIS — M20.5X1 CLAW TOE, RIGHT: ICD-10-CM

## 2025-01-03 LAB
25(OH)D3 SERPL-MCNC: 19.8 NG/ML (ref 30–100)
ANION GAP SERPL CALCULATED.3IONS-SCNC: 11 MMOL/L (ref 4–13)
BACTERIA UR QL AUTO: ABNORMAL /HPF
BILIRUB UR QL STRIP: NEGATIVE
BUN SERPL-MCNC: 27 MG/DL (ref 5–25)
CALCIUM SERPL-MCNC: 9.9 MG/DL (ref 8.4–10.2)
CHLORIDE SERPL-SCNC: 104 MMOL/L (ref 96–108)
CLARITY UR: CLEAR
CO2 SERPL-SCNC: 27 MMOL/L (ref 21–32)
COLOR UR: ABNORMAL
CREAT SERPL-MCNC: 1.41 MG/DL (ref 0.6–1.3)
GFR SERPL CREATININE-BSD FRML MDRD: 54 ML/MIN/1.73SQ M
GLUCOSE SERPL-MCNC: 113 MG/DL (ref 65–140)
GLUCOSE UR STRIP-MCNC: NEGATIVE MG/DL
HGB UR QL STRIP.AUTO: ABNORMAL
KETONES UR STRIP-MCNC: NEGATIVE MG/DL
LEUKOCYTE ESTERASE UR QL STRIP: NEGATIVE
MAGNESIUM SERPL-MCNC: 2.2 MG/DL (ref 1.9–2.7)
MUCOUS THREADS UR QL AUTO: ABNORMAL
NITRITE UR QL STRIP: NEGATIVE
NON-SQ EPI CELLS URNS QL MICRO: ABNORMAL /HPF
PH UR STRIP.AUTO: 6 [PH]
PHOSPHATE SERPL-MCNC: 3.9 MG/DL (ref 2.7–4.5)
POTASSIUM SERPL-SCNC: 4.4 MMOL/L (ref 3.5–5.3)
PROT UR STRIP-MCNC: ABNORMAL MG/DL
RBC #/AREA URNS AUTO: ABNORMAL /HPF
SODIUM SERPL-SCNC: 142 MMOL/L (ref 135–147)
SP GR UR STRIP.AUTO: 1.01 (ref 1–1.03)
UROBILINOGEN UR STRIP-ACNC: <2 MG/DL
WBC #/AREA URNS AUTO: ABNORMAL /HPF

## 2025-01-03 PROCEDURE — 81001 URINALYSIS AUTO W/SCOPE: CPT

## 2025-01-03 PROCEDURE — 99213 OFFICE O/P EST LOW 20 MIN: CPT | Performed by: PODIATRIST

## 2025-01-03 PROCEDURE — 36415 COLL VENOUS BLD VENIPUNCTURE: CPT

## 2025-01-03 PROCEDURE — 83735 ASSAY OF MAGNESIUM: CPT

## 2025-01-03 PROCEDURE — 99024 POSTOP FOLLOW-UP VISIT: CPT | Performed by: SURGERY

## 2025-01-03 PROCEDURE — 82570 ASSAY OF URINE CREATININE: CPT

## 2025-01-03 PROCEDURE — 84100 ASSAY OF PHOSPHORUS: CPT

## 2025-01-03 PROCEDURE — 82043 UR ALBUMIN QUANTITATIVE: CPT

## 2025-01-03 PROCEDURE — 80048 BASIC METABOLIC PNL TOTAL CA: CPT

## 2025-01-03 PROCEDURE — 83970 ASSAY OF PARATHORMONE: CPT

## 2025-01-03 PROCEDURE — 82306 VITAMIN D 25 HYDROXY: CPT

## 2025-01-03 PROCEDURE — 11055 PARING/CUTG B9 HYPRKER LES 1: CPT | Performed by: PODIATRIST

## 2025-01-03 RX ORDER — UREA 40 %
CREAM (GRAM) TOPICAL DAILY
Qty: 28 G | Refills: 0 | Status: SHIPPED | OUTPATIENT
Start: 2025-01-03

## 2025-01-03 NOTE — PROGRESS NOTES
Name: Fawad Baer      : 1965      MRN: [id number]  Encounter Provider: Rachael Caruso DPM  Encounter Date: 1/3/2025   Encounter department: [facility] [place]  :  Assessment & Plan  Heloma molle    Orders:    urea (CARMOL) 40 %; Apply topically daily Apply to callused area    Claw toe, right             IMPRESSION:  Right fifth digit claw toe  Right medial IPJ porokeratosis    PLAN:  Patient counseled on calluses and porokeratosis  Patient counseled on fifth digit adductovarus and claw toe and how rubbing on the fourth digit can cause painful calluses and lesions if left untreated  Lesion debrided to patient's tolerance without incident, no underlying ulceration  Counseled on use of offloading padding and callus pads dispensed  Patient counseled on the importance of wide toebox shoes  Rx urea 40% cream for application to hyperkeratotic area.  Instructions were given for conservative care which was also demonstrated during the clinical visit.   I recommend stiff bottomed sneakers/shoes (ex Asics, Vionic, New balance, Rebolledo, etc) for daily use and Lesley Mckeon (recovery slides) for in home use.   Prior podiatry notes reviewed  Follow-up as needed if lesion reoccurs  Lesion Destruction    Date/Time: 1/3/2025 9:15 AM    Performed by: Rachael Caruso DPM  Authorized by: Rachael Caruso DPM  Universal Protocol:  procedure performed by consultantConsent: Verbal consent obtained.  Risks and benefits: risks, benefits and alternatives were discussed  Consent given by: patient  Patient understanding: patient states understanding of the procedure being performed  Patient identity confirmed: verbally with patient    Procedure Details - Lesion Destruction:     Number of Lesions:  1  Lesion 1:     Body area:  Lower extremity    Lower extremity location:  R little toe    Initial size (mm):  0.5    Final defect size (mm):  0.5    Malignancy: benign hyperkeratotic lesion      Destruction method:  scissors used for extraction       No underlying wound, healthy epithelialized tissue        History of Present Illness   HPI  Fawad Baer is a 59 y.o. male who presents presents for painful right fifth toe.  Patient states he has developed increasing pain of the right toe in the interdigital space over the last several months.  He has not treated it in any way.  He denies any redness, swelling, or drainage.  He denies any history of injury to the area.  Patient presents today for further evaluation and management          Review of Systems  Constitutional:  Negative for chills and fever.   HENT:  Negative for ear pain and sore throat.    Eyes:  Negative for pain and visual disturbance.   Respiratory:  Negative for cough and shortness of breath.    Cardiovascular:  Negative for chest pain and palpitations.   Gastrointestinal:  Negative for abdominal pain and vomiting.   Genitourinary:  Negative for dysuria and hematuria.   Musculoskeletal:  Negative for arthralgias and back pain.   Skin:  Negative for color change and rash.   Neurological:  Negative for seizures and syncope.   All other systems reviewed and are negative.       Objective   Pulse 86   Temp 97.6 °F (36.4 °C)   Resp 16   Ht 6' (1.829 m)   Wt 122 kg (269 lb)   SpO2 97%   BMI 36.48 kg/m²      Physical Exam  Constitutional:       Appearance: He is not ill-appearing.   Cardiovascular:      Comments: DP/PT 1/4 bilaterally  Skin temperature gradient even from knees to digits bilaterally  Pulmonary:      Effort: No respiratory distress.   Musculoskeletal:         General: No swelling.      Comments: Right fifth digit with mild adductovarus claw toe deformity   Skin:     Capillary Refill: Capillary refill takes less than 2 seconds.      Findings: Lesion present.      Comments: Hyperkeratotic lesion medial fifth toe consistent with interdigital corn.  No drainage, no crepitus, no fluctuance, no erythema, no edema  Central area of bruising without  underlying ulceration  Lesion tender with palpation   Neurological:      Sensory: No sensory deficit.

## 2025-01-03 NOTE — PROGRESS NOTES
Surgical Oncology Follow Up       [address]  [facility] SURGICAL ONCOLOGY [place]  [address]  [facility] [address]    Fawad Baer  1965  [id number]  [address]  [facility] SURGICAL ONCOLOGY [place]  [address]  [facility] [address]    Chief Complaint   Patient presents with    Post-op       Assessment/Plan:    No problem-specific Assessment & Plan notes found for this encounter.       Diagnoses and all orders for this visit:    S/P parathyroidectomy  -     PTH, intact; Future  -     Calcium; Future  -     Vitamin D 1,25 dihydroxy; Future      Advance Care Planning/Advance Directives:  Discussed disease status, cancer treatment plans and/or cancer treatment goals with the patient.     Oncology History    No history exists.       History of Present Illness: Patient is a 59-year-old man here status post 2 gland parathyroidectomy.  -Interval History: No issues or complaints and surgery.  Energy levels and aches and pains have improved since surgery.    Review of Systems:  Review of Systems   HENT:  Negative for trouble swallowing and voice change.    All other systems reviewed and are negative.      Patient Active Problem List   Diagnosis    Controlled type 2 diabetes mellitus with microalbuminuria, without long-term current use of insulin  (HCC)    Painful diabetic neuropathy (HCC)    Erectile dysfunction due to diseases classified elsewhere    Homozygous for MTHFR gene mutation    Hypothyroidism (acquired)    Sacroiliitis (HCC)    Tarsal tunnel syndrome of both lower extremities    Tarsal tunnel syndrome, left    Venous stasis ulcer of left ankle limited to breakdown of skin with varicose veins (HCC)    Gout    Hyperaldosteronism (HCC)    Diabetic nephropathy associated with type 2 diabetes mellitus (HCC)    Stage 3a chronic kidney disease (HCC)    Proteinuria    Vitamin D deficiency    Syncopal episodes     Paroxysmal atrial fibrillation (HCC)     History of DVT (deep vein thrombosis)    Sinus bradycardia    Obstructive sleep apnea    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HCC)    Pacemaker    Primary hypertension    LVH (left ventricular hypertrophy)    Plantar fasciitis    Post-operative state    Tarsal tunnel syndrome of left side    Pain in both feet    Chronic pain syndrome    Mid back pain    Hyperparathyroidism (HCC)    Morbid obesity ([facility])    S/P parathyroidectomy     Past Medical History:   Diagnosis Date    Arthritis     right foot    Chronic cholecystitis     Chronic kidney disease     11/25/24 per chart Stage 3    CPAP (continuous positive airway pressure) dependence     Diabetes mellitus ([facility])     Type 2    Diabetic nephropathy ([facility])     Disease of thyroid gland 1990    Diverticulitis of colon     DVT (deep venous thrombosis) ([facility])     11/25/24 per pt hx of 3/2024 due left adrenal gland ruptured - taken off of Xarelto for 2 weeks developed blood clot Left calf and both lungs    Gout     History of pulmonary embolus (PE) 03/2024    ruptured adrenal gland - taken off of Xarelto for 2 weeks and developed blood clot in left leg and both lungs    Hyperlipidemia     Hypertension     Hypothyroidism     MTHFR mutation     Neuropathy     Neuropathy in diabetes ([facility]) 2018    Scab     right arm- no s/s infection    Sleep apnea     Tarsal tunnel syndrome, right     11/25/24 bilateral per pt    Tinnitus     left ear    Wears glasses     and contacts    Wears glasses      Past Surgical History:   Procedure Laterality Date    APPENDECTOMY      ARTHRODESIS      Lumbar L__    BACK SURGERY      BUNIONECTOMY Right 10/07/2016    Procedure: REMOVAL TIBIAL  SESAMOID BONE  RIGHT FOOT;  Surgeon: Vicente Aguirre DPM;  Location: [facility];  Service:     CARDIAC PACEMAKER PLACEMENT  09/01/2021    CHG ASSAY OF PARATHORMONE N/A 12/4/2024    Procedure: INTRAOP PTH MONITORING;  Surgeon: Og Segovia MD;  Location: [facility];  Service: Surgical Oncology     CHOLECYSTECTOMY  03/26/2015    COLONOSCOPY      KNEE ARTHROSCOPY      KNEE ARTHROSCOPY W/ MENISCAL REPAIR      Lateral    NASAL SEPTUM SURGERY  10/16/2013    PLANTAR FASCIA SURGERY Left 10/26/2023    Procedure: RELEASE FASCIA PLANTAR/FASCIOTOMY;  Surgeon: Inderjit Ramirez DPM;  Location: [facility];  Service: Podiatry    IN COLONOSCOPY FLX DX W/COLLJ SPEC WHEN PFRMD N/A 11/23/2018    Procedure: COLONOSCOPY;  Surgeon: Manjit Dietrich MD;  Location: [facility];  Service: Gastroenterology    IN PARATHYROIDECTOMY/EXPLORATION PARATHYROIDS Right 12/4/2024    Procedure: MINIMALLY INVASIVE RIGHT PARATHYROIDECTOMY, PSB 4 GLAND EXPLORATION;  Surgeon: Og Segovia MD;  [facility];  Service: Surgical Oncology    IN PRQ IMPLTJ NSTIM ELECTRODE ARRAY EPIDURAL Bilateral 12/18/2024    Procedure: NEVRO SCS TRIAL;  Surgeon: Jing Vinson MD;  Location: [facility];  Service: Pain Management     IN RELEASE TARSAL TUNNEL Right 06/25/2018    Procedure: RELEASE TARSAL TUNNEL;  Surgeon: Cilff Bernabe DPM;  [facility]: [facility];  Service: Podiatry    IN RELEASE TARSAL TUNNEL Left 03/13/2020    Procedure: RELEASE TARSAL TUNNEL;  Surgeon: Cliff Bernabe DPM;  Location: [facility];  Service: Podiatry    IN RELEASE TARSAL TUNNEL Left 10/26/2023    Procedure: RELEASE TARSAL TUNNEL;  Surgeon: Inderjit Ramirez DPM;  Location: [facility];  Service: Podiatry    SPINAL FUSION      TONSILLECTOMY  10/16/2013    with Adenoidectomy    UVULECTOMY  10/16/2013    UVULOPALATOPHARYNGOPLASTY      WISDOM TOOTH EXTRACTION       Family History   Problem Relation Age of Onset    Aneurysm Mother         intracranial aneurysm repair    Coronary artery disease Father     Hypertension Father     Heart disease Father     Aneurysm Brother     Anesthesia problems Neg Hx      Social History     Socioeconomic History    Marital status: Single     Spouse name: Not on file    Number of children: Not on file    Years of education:  Not on file    Highest education level: Not on file   Occupational History    Not on file   Tobacco Use    Smoking status: Never    Smokeless tobacco: Never    Tobacco comments:     Never a smoker or use of any tobacco products per pt    Vaping Use    Vaping status: Never Used   Substance and Sexual Activity    Alcohol use: Yes     Alcohol/week: 6.0 standard drinks of alcohol     Types: 6 Cans of beer per week     Comment: weekends-11/25/24 Per pt rare use    Drug use: No     Comment: Denies any drug use per pt    Sexual activity: Yes     Partners: Female     Birth control/protection: Other     Comment: Denies any chest pain or shortness of breath with activity   Other Topics Concern    Not on file   Social History Narrative    Not on file     Social Drivers of Health     Financial Resource Strain: Low Risk  (3/16/2024)    [facility]    Overall Financial Resource Strain (CARDIA)     Difficulty of Paying Living Expenses: Not hard at all   Food Insecurity: No Food Insecurity (3/16/2024)    [facility]    Hunger Vital Sign     Worried About Running Out of Food in the Last Year: Never true     Ran Out of Food in the Last Year: Never true   Transportation Needs: No Transportation Needs (3/16/2024)    [facility]    PRAPARE - Transportation     Lack of Transportation (Medical): No     Lack of Transportation (Non-Medical): No   Physical Activity: Not on file   Stress: Not on file   Social Connections: Not on file   Intimate Partner Violence: Not At Risk (3/16/2024)    [facility]    Humiliation, Afraid, Rape, and Kick questionnaire     Fear of Current or Ex-Partner: No     Emotionally Abused: No     Physically Abused: No     Sexually Abused: No   Housing Stability: Low Risk   (3/16/2024)    [facility]    Housing Stability Vital Sign     Unable to Pay for Housing in the Last Year: No     Number of Places Lived in the Last Year: 1     Unstable Housing in the Last Year: No       Current Outpatient Medications:     allopurinol (ZYLOPRIM) 300 mg tablet, Take 1 tablet (300 mg total) by mouth daily, Disp: 90 tablet, Rfl: 1    apixaban (Eliquis) 5 mg, Take 1 tablet (5 mg total) by mouth 2 (two) times a day, Disp: 180 tablet, Rfl: 1    carvedilol (COREG) 6.25 mg tablet, TAKE 1 TABLET BY MOUTH ONCE DAILY IN THE MORNING AND 1 & 1/2 (ONE & ONE-HALF) ONCE DAILY IN THE EVENING, Disp: 90 tablet, Rfl: 1    ezetimibe (ZETIA) 10 mg tablet, Take 1 tablet (10 mg total) by mouth daily, Disp: 100 tablet, Rfl: 1    furosemide (LASIX) 20 mg tablet, Take 1 tablet by mouth as needed for leg edema. Maximum daily dose 1 tablet., Disp: 90 tablet, Rfl: 1    gabapentin (NEURONTIN) 600 MG tablet, Take 1 tablet (600 mg total) by mouth 3 (three) times a day, Disp: 270 tablet, Rfl: 1    levothyroxine 175 mcg tablet, TAKE 1 TABLET BY MOUTH ONCE DAILY ON AN EMPTY STOMACH 1 HOUR BEFORE BREAKFAST AND 4 HOURS APART FROM CALCIUM AND VITAMIN D SUPPLEMENTATION, Disp: 90 tablet, Rfl: 1    lisinopril (ZESTRIL) 10 mg tablet, Take 1 tablet (10 mg total) by mouth daily, Disp: 90 tablet, Rfl: 1    metFORMIN (GLUCOPHAGE-XR) 500 mg 24 hr tablet, Take 1 tablet (500 mg total) by mouth daily at bedtime, Disp: 100 tablet, Rfl: 1    semaglutide, 2 mg/dose, (Ozempic, 2 MG/DOSE,) 8 mg/ mL injection pen, Inject 0.75 mL (2 mg total) under the skin every 7 days, Disp: 3 mL, Rfl: 5    sildenafil (Viagra) 100 mg tablet, Take 1 tablet (100 mg total) by mouth as needed for erectile dysfunction, Disp: 30 tablet, Rfl: 0    Cyanocobalamin (Vitamin B-12) 5000 MCG TBDP, Take by mouth in the morning (Patient not taking: Reported on 1/2/2025), Disp: , Rfl:   No Known Allergies  Vitals:    01/03/25  1533   BP: 144/92   Pulse: 73   Resp: 15   Temp: 97.7 °F (36.5 °C)   SpO2: 96%       Physical Exam  Neck:      Comments: Incision clean dry intact  Musculoskeletal:      Cervical back: Normal range of motion and neck supple.           Results:  Labs:  Lab Results   Component Value Date    PTH 22.6 12/04/2024    CALCIUM 10.2 12/04/2024    PHOS 3.4 06/29/2024     Component  Ref Range & Units (hover)  Resulting Agency   Case Report   Surgical Pathology Report                         Case: [id number]                                   Authorizing Provider:  Og Segovia MD        Collected:           12/04/2024 0911               Ordering Location:     [facility]        Received:            12/04/2024 0921                                      [facility]                                                     Pathologist:           Gabe Walker MD                                                                 Intraop:               Gabe Walker MD                                                                 Specimens:   A) - Parathyroid, right parathyroid                                                                  B) - Parathyroid, left parathyroid                                                                  C) - Parathyroid, Left Superior Parathyroid - FROZEN                                      Final Diagnosis   A. Right parathyroid:  - Hypercellular parathyroid tissue, 0.95g.      B. Left parathyroid:  - Hypercellular parathyroid tissue, 0.95g.       C. Left Superior Parathyroid:  - Lymph node tissue (seen only on frozen slide).         Comments:   This is an appended report. These results have been appended to a previously preliminary verified report.      Electronically signed by Gabe  Chandrakant Walker MD on 12/6/2024 at 1106 EST         Imaging  [place] spine and pain procedure  Result Date: 12/18/2024  Narrative: A spine and pain study was performed by the Department of Spine and Pain. Please refer to the report for the official interpretation. The images are stored for archival purposes only. Study images were not formally reviewed by the Radiology Department.    I reviewed the above laboratory and imaging data.    Discussion/Summary: 59-year-old man status post parathyroidectomy.  Doing well.  Follow-up in 6 months with blood work to assess for durability of operation at that time.  All questions answered and copy of path report given to him for his records.

## 2025-01-04 LAB
CREAT UR-MCNC: 75.2 MG/DL
MICROALBUMIN UR-MCNC: 1829.7 MG/L
MICROALBUMIN/CREAT 24H UR: 2433 MG/G CREATININE (ref 0–30)
PTH-INTACT SERPL-MCNC: 44.3 PG/ML (ref 12–88)

## 2025-01-06 DIAGNOSIS — E11.29 CONTROLLED TYPE 2 DIABETES MELLITUS WITH MICROALBUMINURIA, WITHOUT LONG-TERM CURRENT USE OF INSULIN  (HCC): ICD-10-CM

## 2025-01-06 DIAGNOSIS — R80.9 CONTROLLED TYPE 2 DIABETES MELLITUS WITH MICROALBUMINURIA, WITHOUT LONG-TERM CURRENT USE OF INSULIN  (HCC): ICD-10-CM

## 2025-01-06 RX ORDER — METFORMIN HYDROCHLORIDE 500 MG/1
500 TABLET, EXTENDED RELEASE ORAL
Qty: 100 TABLET | Refills: 1 | Status: SHIPPED | OUTPATIENT
Start: 2025-01-06

## 2025-01-10 ENCOUNTER — OFFICE VISIT (OUTPATIENT)
Dept: CARDIOLOGY CLINIC | Facility: CLINIC | Age: 60
End: 2025-01-10
Payer: COMMERCIAL

## 2025-01-10 VITALS
SYSTOLIC BLOOD PRESSURE: 126 MMHG | BODY MASS INDEX: 35.4 KG/M2 | HEART RATE: 80 BPM | WEIGHT: 261 LBS | DIASTOLIC BLOOD PRESSURE: 80 MMHG | OXYGEN SATURATION: 96 %

## 2025-01-10 DIAGNOSIS — G47.33 OBSTRUCTIVE SLEEP APNEA: ICD-10-CM

## 2025-01-10 DIAGNOSIS — Z95.0 PACEMAKER: ICD-10-CM

## 2025-01-10 DIAGNOSIS — I51.7 LVH (LEFT VENTRICULAR HYPERTROPHY): ICD-10-CM

## 2025-01-10 DIAGNOSIS — R00.1 SINUS BRADYCARDIA: ICD-10-CM

## 2025-01-10 DIAGNOSIS — I48.0 PAROXYSMAL ATRIAL FIBRILLATION (HCC): Primary | ICD-10-CM

## 2025-01-10 DIAGNOSIS — Z01.810 PREOP CARDIOVASCULAR EXAM: ICD-10-CM

## 2025-01-10 DIAGNOSIS — I10 PRIMARY HYPERTENSION: ICD-10-CM

## 2025-01-10 PROCEDURE — 99214 OFFICE O/P EST MOD 30 MIN: CPT | Performed by: INTERNAL MEDICINE

## 2025-01-10 NOTE — LETTER
"January 11, 2025     Natalio Rosas MD  [address] [facility]   [address]  [facility] [address]    Patient: Fawad Baer   YOB: 1965   Date of Visit: 1/10/2025       Dear Dr. Rosas:    Thank you for referring Fawad Baer to me for evaluation. Below are my notes for this consultation.    If you have questions, please do not hesitate to call me. I look forward to following your patient along with you.         Sincerely,        Pranay Angel MD        CC: No Recipients    Pranay Angel MD  1/11/2025  4:18 PM  Incomplete                                             Cardiology Follow Up    Fawad Baer  1965  [id number]  [facility] [place]  [address] [place] [address]  [address]  [facility] [id number] [phone]    1. Paroxysmal atrial fibrillation (HCC)  POCT ECG      2. LVH (left ventricular hypertrophy)        3. Primary hypertension        4. Sinus bradycardia        5. Obstructive sleep apnea        6. Pacemaker        7. Preop cardiovascular exam              Discussion/Summary:From a cardiac standpoint, he is cleared to undergo placement of spinal cord stimulator.  He advised him to hold him Eliquis for 3 days prior to the procedure and restart it ASAP after the procedure when his bleeding risk is acceptable.  No further cardiac testing is needed.  RTO 1 year.      Interval History: His cardiac status has remained stable. He has PAF and was on Xarelto. He developed a retroperitoneal bleed in 3/2024 thought due to a \" ruptured adrenal gland. \" His Xarelto was held.   He then developed a R DVT and extensive b/l pulmonary emboli.   ECHO 3/16/2024 - EF 60 - 65 %, PASP 39 mmHg. No RV enlargement or RV dysfxn    He has been on Eliquis 5 mg BID since.    He is scheduled spinal cord stimulator placement.    He denies Cp, SOB, palpitations.    ECG today - Atrial paced rhythm     Patient Active Problem List   Diagnosis   • Controlled type 2 diabetes mellitus " The ECG revealed a sinus rhythm. The ECG rate was 85 bpm. with microalbuminuria, without long-term current use of insulin  (HCC)   • Painful diabetic neuropathy (HCC)   • Erectile dysfunction due to diseases classified elsewhere   • Homozygous for MTHFR gene mutation   • Hypothyroidism (acquired)   • Sacroiliitis (HCC)   • Tarsal tunnel syndrome of both lower extremities   • Tarsal tunnel syndrome, left   • Venous stasis ulcer of left ankle limited to breakdown of skin with varicose veins (HCC)   • Gout   • Hyperaldosteronism (HCC)   • Diabetic nephropathy associated with type 2 diabetes mellitus (HCC)   • Stage 3a chronic kidney disease (HCC)   • Proteinuria   • Vitamin D deficiency   • Syncopal episodes    • Paroxysmal atrial fibrillation (HCC)   • History of DVT (deep vein thrombosis)   • Sinus bradycardia   • Obstructive sleep apnea   • 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HCC)   • Pacemaker   • Primary hypertension   • LVH (left ventricular hypertrophy)   • Plantar fasciitis   • Post-operative state   • Tarsal tunnel syndrome of left side   • Pain in both feet   • Chronic pain syndrome   • Mid back pain   • Hyperparathyroidism (HCC)   • Morbid obesity (HCC)   • S/P parathyroidectomy   • Preop cardiovascular exam     Past Medical History:   Diagnosis Date   • Arthritis     right foot   • Chronic cholecystitis    • Chronic kidney disease     11/25/24 per chart Stage 3   • CPAP (continuous positive airway pressure) dependence    • Diabetes mellitus (HCC)     Type 2   • Diabetic nephropathy (HCC)    • Disease of thyroid gland 1990   • Diverticulitis of colon    • DVT (deep venous thrombosis) (HCC)     11/25/24 per pt hx of 3/2024 due left adrenal gland ruptured - taken off of Xarelto for 2 weeks developed blood clot Left calf and both lungs   • Gout    • History of pulmonary embolus (PE) 03/2024    ruptured adrenal gland - taken off of Xarelto for 2 weeks and developed blood clot in left leg and both lungs   • Hyperlipidemia    • Hypertension    •  Hypothyroidism    • MTHFR mutation    • Neuropathy    • Neuropathy in diabetes (HCC) 2018   • Scab     right arm- no s/s infection   • Sleep apnea    • Tarsal tunnel syndrome, right     11/25/24 bilateral per pt   • Tinnitus     left ear   • Wears glasses     and contacts   • Wears glasses      Social History     Socioeconomic History   • Marital status: Single     Spouse name: Not on file   • Number of children: Not on file   • Years of education: Not on file   • Highest education level: Not on file   Occupational History   • Not on file   Tobacco Use   • Smoking status: Never   • Smokeless tobacco: Never   • Tobacco comments:     Never a smoker or use of any tobacco products per pt    Vaping Use   • Vaping status: Never Used   Substance and Sexual Activity   • Alcohol use: Yes     Alcohol/week: 6.0 standard drinks of alcohol     Types: 6 Cans of beer per week     Comment: weekends-11/25/24 Per pt rare use   • Drug use: No     Comment: Denies any drug use per pt   • Sexual activity: Yes     Partners: Female     Birth control/protection: Other     Comment: Denies any chest pain or shortness of breath with activity   Other Topics Concern   • Not on file   Social History Narrative   • Not on file     Social Drivers of Health     Financial Resource Strain: Low Risk  (3/16/2024)    [facility]    Overall Financial Resource Strain (CARDIA)    • Difficulty of Paying Living Expenses: Not hard at all   Food Insecurity: No Food Insecurity (3/16/2024)    [facility]    Hunger Vital Sign    • Worried About Running Out of Food in the Last Year: Never true    • Ran Out of Food in the Last Year: Never true   Transportation Needs: No Transportation Needs (3/16/2024)    [facility]    PRAPARE - Transportation    • Lack of Transportation (Medical): No     • Lack of Transportation (Non-Medical): No   Physical Activity: Not on file   Stress: Not on file   Social Connections: Not on file   Intimate Partner Violence: Not At Risk (3/16/2024)    [facility]    Humiliation, Afraid, Rape, and Kick questionnaire    • Fear of Current or Ex-Partner: No    • Emotionally Abused: No    • Physically Abused: No    • Sexually Abused: No   Housing Stability: Low Risk  (3/16/2024)    [facility]    Housing Stability Vital Sign    • Unable to Pay for Housing in the Last Year: No    • Number of Places Lived in the Last Year: 1    • Unstable Housing in the Last Year: No      Family History   Problem Relation Age of Onset   • Aneurysm Mother         intracranial aneurysm repair   • Coronary artery disease Father    • Hypertension Father    • Heart disease Father    • Aneurysm Brother    • Anesthesia problems Neg Hx      Past Surgical History:   Procedure Laterality Date   • APPENDECTOMY     • ARTHRODESIS      Lumbar L__   • BACK SURGERY     • BUNIONECTOMY Right 10/07/2016    Procedure: REMOVAL TIBIAL  SESAMOID BONE  RIGHT FOOT;  Surgeon: Vicente Aguirre DPM;  Location[facility];  Service:    • CARDIAC PACEMAKER PLACEMENT  09/01/2021   • CHG ASSAY OF PARATHORMONE N/A 12/4/2024    Procedure: INTRAOP PTH MONITORING;  Surgeon: Og Segovia MD;  Location: [facility];  Service: Surgical Oncology   • CHOLECYSTECTOMY  03/26/2015   • COLONOSCOPY     • KNEE ARTHROSCOPY     • KNEE ARTHROSCOPY W/ MENISCAL REPAIR      Lateral   • NASAL SEPTUM SURGERY  10/16/2013   • PLANTAR FASCIA SURGERY Left 10/26/2023    Procedure: RELEASE FASCIA PLANTAR/FASCIOTOMY;  Surgeon: Inderjit Ramirez DPM;  Location: [facility];  Service: Podiatry   • GA COLONOSCOPY FLX DX W/COLLJ SPEC WHEN PFRMD N/A 11/23/2018    Procedure: COLONOSCOPY;  Surgeon: Manjit Dietrich MD;  Location: [facility];  Service: Gastroenterology   • SD PARATHYROIDECTOMY/EXPLORATION PARATHYROIDS Right 12/4/2024    Procedure: MINIMALLY INVASIVE RIGHT PARATHYROIDECTOMY, PSB 4 GLAND EXPLORATION;  Surgeon: Og Segovia MD;  [facility];  Service: Surgical Oncology   • SD PRQ IMPLTJ NSTIM ELECTRODE ARRAY EPIDURAL Bilateral 12/18/2024    Procedure: NEVRO SCS TRIAL;  Surgeon: Jing Vinson MD;  [facility];  Service: Pain Management    • SD RELEASE TARSAL TUNNEL Right 06/25/2018    Procedure: RELEASE TARSAL TUNNEL;  Surgeon: Cliff Bernabe DPM;  [facility];  Service: Podiatry   • SD RELEASE TARSAL TUNNEL Left 03/13/2020    Procedure: RELEASE TARSAL TUNNEL;  Surgeon: Cliff Bernabe DPM;  Location: [facility];  Service: Podiatry   • SD RELEASE TARSAL TUNNEL Left 10/26/2023    Procedure: RELEASE TARSAL TUNNEL;  Surgeon: Inderjit Ramirez DPM;  [facility];  Service: Podiatry   • SPINAL FUSION     • TONSILLECTOMY  10/16/2013    with Adenoidectomy   • UVULECTOMY  10/16/2013   • UVULOPALATOPHARYNGOPLASTY     • WISDOM TOOTH EXTRACTION         Current Outpatient Medications:   •  allopurinol (ZYLOPRIM) 300 mg tablet, Take 1 tablet (300 mg total) by mouth daily, Disp: 90 tablet, Rfl: 1  •  apixaban (Eliquis) 5 mg, Take 1 tablet (5 mg total) by mouth 2 (two) times a day, Disp: 180 tablet, Rfl: 1  •  carvedilol (COREG) 6.25 mg tablet, TAKE 1 TABLET BY MOUTH ONCE DAILY IN THE MORNING AND 1 & 1/2 (ONE & ONE-HALF) ONCE DAILY IN THE EVENING, Disp: 90 tablet, Rfl: 1  •  ezetimibe (ZETIA) 10 mg tablet, Take 1 tablet (10 mg total) by mouth daily, Disp: 100 tablet, Rfl: 1  •  furosemide (LASIX) 20 mg tablet, Take 1 tablet by mouth as needed for leg edema. Maximum daily dose 1 tablet., Disp: 90 tablet, Rfl: 1  •  gabapentin (NEURONTIN) 600 MG tablet, Take 1 tablet (600 mg total) by mouth 3 (three) times a day, Disp: 270 tablet, Rfl: 1  •  levothyroxine 175 mcg tablet, TAKE 1 TABLET BY  MOUTH ONCE DAILY ON AN EMPTY STOMACH 1 HOUR BEFORE BREAKFAST AND 4 HOURS APART FROM CALCIUM AND VITAMIN D SUPPLEMENTATION, Disp: 90 tablet, Rfl: 1  •  lisinopril (ZESTRIL) 10 mg tablet, Take 1 tablet (10 mg total) by mouth daily, Disp: 90 tablet, Rfl: 1  •  metFORMIN (GLUCOPHAGE-XR) 500 mg 24 hr tablet, Take 1 tablet (500 mg total) by mouth daily at bedtime, Disp: 100 tablet, Rfl: 1  •  semaglutide, 2 mg/dose, (Ozempic, 2 MG/DOSE,) 8 mg/ mL injection pen, Inject 0.75 mL (2 mg total) under the skin every 7 days, Disp: 3 mL, Rfl: 5  •  sildenafil (Viagra) 100 mg tablet, Take 1 tablet (100 mg total) by mouth as needed for erectile dysfunction, Disp: 30 tablet, Rfl: 0  •  urea (CARMOL) 40 %, Apply topically daily Apply to callused area, Disp: 28 g, Rfl: 0  •  Cyanocobalamin (Vitamin B-12) 5000 MCG TBDP, Take by mouth in the morning (Patient not taking: Reported on 1/2/2025), Disp: , Rfl:   No Known Allergies  Vitals:    01/10/25 1318   BP: 126/80   BP Location: Left arm   Patient Position: Sitting   Cuff Size: Standard   Pulse: 80   SpO2: 96%   Weight: 118 kg (261 lb)     Weight (last 2 days)       Date/Time Weight    01/10/25 1318 118 (261)           Blood pressure 126/80, pulse 80, weight 118 kg (261 lb), SpO2 96%., Body mass index is 35.4 kg/m².    Labs:  Appointment on 01/03/2025   Component Date Value   • Creatinine, Ur 01/03/2025 75.2    • Albumin,U,Random 01/03/2025 1,829.7 (H)    • Albumin Creat Ratio 01/03/2025 2,433 (H)    • Sodium 01/03/2025 142    • Potassium 01/03/2025 4.4    • Chloride 01/03/2025 104    • CO2 01/03/2025 27    • ANION GAP 01/03/2025 11    • BUN 01/03/2025 27 (H)    • Creatinine 01/03/2025 1.41 (H)    • Glucose 01/03/2025 113    • Calcium 01/03/2025 9.9    • eGFR 01/03/2025 54    • Magnesium 01/03/2025 2.2    • Phosphorus 01/03/2025 3.9    • PTH 01/03/2025 44.3    • Vit D, 25-Hydroxy 01/03/2025 19.8 (L)    • Color, UA 01/03/2025 Light Yellow    • Clarity, UA 01/03/2025 Clear    • Specific  Gravity, UA 01/03/2025 1.013    • pH, UA 01/03/2025 6.0    • Leukocytes, UA 01/03/2025 Negative    • Nitrite, UA 01/03/2025 Negative    • Protein, UA 01/03/2025 300 (3+) (A)    • Glucose, UA 01/03/2025 Negative    • Ketones, UA 01/03/2025 Negative    • Urobilinogen, UA 01/03/2025 <2.0    • Bilirubin, UA 01/03/2025 Negative    • Occult Blood, UA 01/03/2025 Moderate (A)    • RBC, UA 01/03/2025 30-50 (A)    • WBC, UA 01/03/2025 1-2    • Epithelial Cells 01/03/2025 Occasional    • Bacteria, UA 01/03/2025 None Seen    • MUCUS THREADS 01/03/2025 Occasional (A)    Admission on 12/18/2024, Discharged on 12/18/2024   Component Date Value   • POC Glucose 12/18/2024 80    Admission on 12/04/2024, Discharged on 12/04/2024   Component Date Value   • POC Glucose 12/04/2024 105    • PTH INTRAOP 12/04/2024 99.0 (H)    • Case Report 12/04/2024                      Value:Surgical Pathology Report                         Case: [id number]                                  Authorizing Provider:  Og Segovia MD        Collected:           12/04/2024 0911              Ordering Location:     [facility]        Received:            12/04/2024 [address] [place] [address] Operating Room                                                     Pathologist:           Gabe Walker MD                                                                 Intraop:               Gabe Walker MD                                                                 Specimens:   A) - Parathyroid, right parathyroid                                                                 B) - Parathyroid, left parathyroid                                                                                            C) - Parathyroid, Left Superior Parathyroid  - FROZEN                                      • Final Diagnosis 12/04/2024                      Value:A. Right parathyroid:  - Hypercellular parathyroid tissue, 0.95g.     B. Left parathyroid:  - Hypercellular parathyroid tissue, 0.95g.      C. Left Superior Parathyroid:  - Lymph node tissue (seen only on frozen slide).     • Additional Information 12/04/2024                      Value:All reported additional testing was performed with appropriately reactive controls.  These tests were developed and their performance characteristics determined by [facility] or appropriate performing facility, though some tests may be performed on tissues which have not been validated for performance characteristics (such as staining performed on alcohol exposed cell blocks and decalcified tissues).  Results should be interpreted with caution and in the context of the patients’ clinical condition. These tests may not be cleared or approved by the U.S. Food and Drug Administration, though the FDA has determined that such clearance or approval is not necessary. These tests are used for clinical purposes and they should not be regarded as investigational or for research. This laboratory has been approved by [place] [id number], designated as a high-complexity laboratory and is qualified to perform these tests.  .  Interpretation performed at [facility], [address] [facility] [address]     • Intraoperative Consultat* 12/04/2024                      Value:AF1:  - Hypercellular parathyroid tissue, 0.95g.    CVytlacil  Interpretation performed at [address] [address]    BF1:  AF1:  - Hypercellular parathyroid tissue, 0.95g.    CV[facility]  Interpretation performed at [address] [address]    CF1:  - Lymph node tissue,    CV[facility]il  Interpretation performed at [address] [address]       • Gross Description  "12/04/2024                      Value:A.  The specimen is received fresh for frozen section, labeled with the patient's name and hospital number, and is designated \" right parathyroid\".  The specimen consists of a tan-yellow nodular and fatty tissue fragment measuring 1.7 cm which weighs 0.95 g.  The entire specimen is frozen and submitted for histologic examination in 1 cassette.  B.  The specimen is received fresh for frozen section, labeled with the patient's name and hospital number, and is designated \" left parathyroid\".  The specimen consists of a tan nodular soft tissue fragment measuring 1.9 cm which weighs 0.95 g.  The entire specimen is frozen and submitted for histologic examination in 1 cassette.  C.  The specimen is received fresh for frozen section, labeled with the patient's name and hospital number, and is designated \" left superior parathyroid\".  The specimen consists of a tan-yellow somewhat nodular soft tissue fragment measuring 1 cm which weighs 0.11 g.  The entire specimen is frozen and submitted for                           histologic examination in 1 cassette.    Note: The estimated total formalin fixation time based upon information provided by the submitting clinician and the standard processing schedule is under 72 hours.    MCrites     • PTH INTRAOP 12/04/2024 111.2 (H)    • PTH INTRAOP 12/04/2024 98.0 (H)    • PTH INTRAOP 12/04/2024 96.6 (H)    • PTH INTRAOP 12/04/2024 70.4    • PTH INTRAOP 12/04/2024 32.4    • PTH INTRAOP 12/04/2024 22.6    • PTH INTRAOP 12/04/2024 17.7    • PTH INTRAOP 12/04/2024 16.6    • PTH INTRAOP 12/04/2024 15.3    • Calcium 12/04/2024 10.2    • POC Glucose 12/04/2024 154 (H)    Office Visit on 11/11/2024   Component Date Value   • Ventricular Rate 11/11/2024 62    • Atrial Rate 11/11/2024 62    • DC Interval 11/11/2024 166    • QRSD Interval 11/11/2024 96    • QT Interval 11/11/2024 412    • QTC Interval 11/11/2024 419    • P Axis 11/11/2024 54    • QRS Axis " 11/11/2024 7    • T Wave [place] 11/11/2024 69    Appointment on 11/11/2024   Component Date Value   • WBC 11/11/2024 10.90 (H)    • RBC 11/11/2024 5.83 (H)    • Hemoglobin 11/11/2024 17.1 (H)    • Hematocrit 11/11/2024 54.4 (H)    • MCV 11/11/2024 93    • MCH 11/11/2024 29.3    • MCHC 11/11/2024 31.4    • RDW 11/11/2024 14.0    • MPV 11/11/2024 10.1    • Platelets 11/11/2024 166    • nRBC 11/11/2024 0    • Segmented % 11/11/2024 71    • Immature Grans % 11/11/2024 1    • Lymphocytes % 11/11/2024 16    • Monocytes % 11/11/2024 7    • Eosinophils Relative 11/11/2024 4    • Basophils Relative 11/11/2024 1    • Absolute Neutrophils 11/11/2024 7.93 (H)    • Absolute Immature Grans 11/11/2024 0.05    • Absolute Lymphocytes 11/11/2024 1.69    • Absolute Monocytes 11/11/2024 0.78    • Eosinophils Absolute 11/11/2024 0.40    • Basophils Absolute 11/11/2024 0.05    • Sodium 11/11/2024 138    • Potassium 11/11/2024 4.8    • Chloride 11/11/2024 103    • CO2 11/11/2024 28    • ANION GAP 11/11/2024 7    • BUN 11/11/2024 39 (H)    • Creatinine 11/11/2024 1.48 (H)    • Glucose, Fasting 11/11/2024 107 (H)    • Calcium 11/11/2024 11.2 (H)    • AST 11/11/2024 19    • ALT 11/11/2024 18    • Alkaline Phosphatase 11/11/2024 71    • Total Protein 11/11/2024 7.4    • Albumin 11/11/2024 3.9    • Total Bilirubin 11/11/2024 0.46    • eGFR 11/11/2024 51    • Hemoglobin A1C 11/11/2024 5.8 (H)    • EAG 11/11/2024 120    • PTT 11/11/2024 31    • Protime 11/11/2024 14.0    • INR 11/11/2024 1.05    Telephone on 11/01/2024   Component Date Value   • Severity 12/19/2023 Normal    • Right Eye Diabetic Retin* 12/19/2023 None    • Left Eye Diabetic Retino* 12/19/2023 None    Office Visit on 10/31/2024   Component Date Value   • Hemoglobin A1C 10/31/2024 5.7    • Cholesterol 11/11/2024 214 (H)    • Triglycerides 11/11/2024 175 (H)    • HDL, Direct 11/11/2024 60    • LDL Calculated 11/11/2024 119 (H)    • Non-HDL-Chol (CHOL-HDL) 11/11/2024 154     Appointment on 08/17/2024   Component Date Value   • Sodium 08/17/2024 139    • Potassium 08/17/2024 4.3    • Chloride 08/17/2024 105    • CO2 08/17/2024 26    • ANION GAP 08/17/2024 8    • BUN 08/17/2024 30 (H)    • Creatinine 08/17/2024 1.46 (H)    • Glucose, Fasting 08/17/2024 123 (H)    • Calcium 08/17/2024 10.1    • eGFR 08/17/2024 51    Appointment on 07/20/2024   Component Date Value   • Sodium 07/20/2024 139    • Potassium 07/20/2024 4.4    • Chloride 07/20/2024 105    • CO2 07/20/2024 23    • ANION GAP 07/20/2024 11    • BUN 07/20/2024 27 (H)    • Creatinine 07/20/2024 1.26    • Glucose, Fasting 07/20/2024 113 (H)    • Calcium 07/20/2024 10.2    • eGFR 07/20/2024 62      Imaging: FL spine and pain procedure  Result Date: 12/18/2024  Narrative: A spine and pain study was performed by the Department of Spine and Pain. Please refer to the report for the official interpretation. The images are stored for archival purposes only. Study images were not formally reviewed by the Radiology Department.      Review of Systems:  Review of Systems   Constitutional:  Negative for diaphoresis, fatigue, fever and unexpected weight change.   HENT: Negative.     Respiratory:  Negative for cough, shortness of breath and wheezing.    Cardiovascular:  Negative for chest pain, palpitations and leg swelling.   Gastrointestinal:  Negative for abdominal pain, diarrhea and nausea.   Musculoskeletal:  Negative for gait problem and myalgias.   Skin:  Negative for rash.   Neurological:  Negative for dizziness and numbness.   Psychiatric/Behavioral: Negative.         Physical Exam:  Physical Exam  Constitutional:       Appearance: He is well-developed.   HENT:      Head: Normocephalic and atraumatic.   Eyes:      Pupils: Pupils are equal, round, and reactive to light.   Neck:      Vascular: No JVD.   Cardiovascular:      Rate and Rhythm: Regular rhythm.      Pulses: Normal pulses.           Carotid pulses are 2+ on the right side and  2+ on the left side.     Heart sounds: S1 normal and S2 normal.   Pulmonary:      Effort: Pulmonary effort is normal.      Breath sounds: Normal breath sounds. No wheezing or rales.   Abdominal:      General: Bowel sounds are normal.      Palpations: Abdomen is soft.   Musculoskeletal:         General: No tenderness. Normal range of motion.      Cervical back: Normal range of motion and neck supple.   Skin:     General: Skin is warm.   Neurological:      Mental Status: He is alert and oriented to person, place, and time.      Cranial Nerves: No cranial nerve deficit.      Deep Tendon Reflexes: Reflexes are normal and symmetric.

## 2025-01-10 NOTE — LETTER
Cardiology Pre Operative Clearance      PRE OPERATIVE CARDIAC RISK ASSESSMENT    01/10/25    Fawad RONALD Baer  1965  [id number]    Date of Surgery: 01/22/2025    Type of Surgery: : Placement of percutaneous spinal cord stimulator with battery in the right flank (Right: Spine Thoracic)     Surgeon: Dr. Natalio Rosas     No Cardiac Contraindication for Planned Surgical Procedures    Anticoagulation:  Eliquis 5mg hold    Physician Comment: He may hold Eliquis for 3 days prior to surgery. Restart ASAP after surgery when bleeding risk is acceptable.    Electronically Signed: Pranay Angel MD

## 2025-01-11 ENCOUNTER — APPOINTMENT (OUTPATIENT)
Dept: LAB | Facility: MEDICAL CENTER | Age: 60
End: 2025-01-11
Payer: COMMERCIAL

## 2025-01-11 DIAGNOSIS — E11.21 DIABETIC NEPHROPATHY ASSOCIATED WITH TYPE 2 DIABETES MELLITUS (HCC): ICD-10-CM

## 2025-01-11 DIAGNOSIS — G89.4 CHRONIC PAIN SYNDROME: ICD-10-CM

## 2025-01-11 DIAGNOSIS — Z79.899 ENCOUNTER FOR LONG-TERM (CURRENT) USE OF MEDICATIONS: ICD-10-CM

## 2025-01-11 DIAGNOSIS — Z01.812 PRE-OPERATIVE LABORATORY EXAMINATION: ICD-10-CM

## 2025-01-11 DIAGNOSIS — Z79.01 LONG TERM (CURRENT) USE OF ANTICOAGULANTS: ICD-10-CM

## 2025-01-11 PROBLEM — Z01.810 PREOP CARDIOVASCULAR EXAM: Status: ACTIVE | Noted: 2025-01-11

## 2025-01-11 LAB
ALBUMIN SERPL BCG-MCNC: 3.8 G/DL (ref 3.5–5)
ALP SERPL-CCNC: 59 U/L (ref 34–104)
ALT SERPL W P-5'-P-CCNC: 20 U/L (ref 7–52)
ANION GAP SERPL CALCULATED.3IONS-SCNC: 8 MMOL/L (ref 4–13)
APTT PPP: 29 SECONDS (ref 23–34)
AST SERPL W P-5'-P-CCNC: 18 U/L (ref 13–39)
BACTERIA UR QL AUTO: ABNORMAL /HPF
BASOPHILS # BLD AUTO: 0.04 THOUSANDS/ΜL (ref 0–0.1)
BASOPHILS NFR BLD AUTO: 1 % (ref 0–1)
BILIRUB SERPL-MCNC: 0.29 MG/DL (ref 0.2–1)
BILIRUB UR QL STRIP: NEGATIVE
BUN SERPL-MCNC: 31 MG/DL (ref 5–25)
CALCIUM SERPL-MCNC: 9.4 MG/DL (ref 8.4–10.2)
CHLORIDE SERPL-SCNC: 104 MMOL/L (ref 96–108)
CLARITY UR: CLEAR
CO2 SERPL-SCNC: 27 MMOL/L (ref 21–32)
COLOR UR: ABNORMAL
CREAT SERPL-MCNC: 1.49 MG/DL (ref 0.6–1.3)
EOSINOPHIL # BLD AUTO: 0.35 THOUSAND/ΜL (ref 0–0.61)
EOSINOPHIL NFR BLD AUTO: 6 % (ref 0–6)
ERYTHROCYTE [DISTWIDTH] IN BLOOD BY AUTOMATED COUNT: 14.6 % (ref 11.6–15.1)
EST. AVERAGE GLUCOSE BLD GHB EST-MCNC: 131 MG/DL
GFR SERPL CREATININE-BSD FRML MDRD: 50 ML/MIN/1.73SQ M
GLUCOSE P FAST SERPL-MCNC: 112 MG/DL (ref 65–99)
GLUCOSE UR STRIP-MCNC: NEGATIVE MG/DL
HBA1C MFR BLD: 6.2 %
HCT VFR BLD AUTO: 51.1 % (ref 36.5–49.3)
HGB BLD-MCNC: 16.1 G/DL (ref 12–17)
HGB UR QL STRIP.AUTO: ABNORMAL
IMM GRANULOCYTES # BLD AUTO: 0.01 THOUSAND/UL (ref 0–0.2)
IMM GRANULOCYTES NFR BLD AUTO: 0 % (ref 0–2)
INR PPP: 0.94 (ref 0.85–1.19)
KETONES UR STRIP-MCNC: NEGATIVE MG/DL
LEUKOCYTE ESTERASE UR QL STRIP: NEGATIVE
LYMPHOCYTES # BLD AUTO: 1.96 THOUSANDS/ΜL (ref 0.6–4.47)
LYMPHOCYTES NFR BLD AUTO: 34 % (ref 14–44)
MCH RBC QN AUTO: 29.9 PG (ref 26.8–34.3)
MCHC RBC AUTO-ENTMCNC: 31.5 G/DL (ref 31.4–37.4)
MCV RBC AUTO: 95 FL (ref 82–98)
MONOCYTES # BLD AUTO: 0.45 THOUSAND/ΜL (ref 0.17–1.22)
MONOCYTES NFR BLD AUTO: 8 % (ref 4–12)
NEUTROPHILS # BLD AUTO: 3.02 THOUSANDS/ΜL (ref 1.85–7.62)
NEUTS SEG NFR BLD AUTO: 51 % (ref 43–75)
NITRITE UR QL STRIP: NEGATIVE
NON-SQ EPI CELLS URNS QL MICRO: ABNORMAL /HPF
NRBC BLD AUTO-RTO: 0 /100 WBCS
PH UR STRIP.AUTO: 6 [PH]
PLATELET # BLD AUTO: 184 THOUSANDS/UL (ref 149–390)
PMV BLD AUTO: 10.3 FL (ref 8.9–12.7)
POTASSIUM SERPL-SCNC: 4.5 MMOL/L (ref 3.5–5.3)
PROT SERPL-MCNC: 6.5 G/DL (ref 6.4–8.4)
PROT UR STRIP-MCNC: ABNORMAL MG/DL
PROTHROMBIN TIME: 12.9 SECONDS (ref 12.3–15)
RBC # BLD AUTO: 5.38 MILLION/UL (ref 3.88–5.62)
RBC #/AREA URNS AUTO: ABNORMAL /HPF
SODIUM SERPL-SCNC: 139 MMOL/L (ref 135–147)
SP GR UR STRIP.AUTO: 1.01 (ref 1–1.03)
UROBILINOGEN UR STRIP-ACNC: <2 MG/DL
WBC # BLD AUTO: 5.83 THOUSAND/UL (ref 4.31–10.16)
WBC #/AREA URNS AUTO: ABNORMAL /HPF

## 2025-01-11 PROCEDURE — 36415 COLL VENOUS BLD VENIPUNCTURE: CPT

## 2025-01-11 PROCEDURE — 93000 ELECTROCARDIOGRAM COMPLETE: CPT | Performed by: INTERNAL MEDICINE

## 2025-01-11 PROCEDURE — 85025 COMPLETE CBC W/AUTO DIFF WBC: CPT

## 2025-01-11 PROCEDURE — 83036 HEMOGLOBIN GLYCOSYLATED A1C: CPT

## 2025-01-11 PROCEDURE — 85610 PROTHROMBIN TIME: CPT

## 2025-01-11 PROCEDURE — 85730 THROMBOPLASTIN TIME PARTIAL: CPT

## 2025-01-11 PROCEDURE — 80053 COMPREHEN METABOLIC PANEL: CPT

## 2025-01-11 PROCEDURE — 81001 URINALYSIS AUTO W/SCOPE: CPT

## 2025-01-11 NOTE — PROGRESS NOTES
"                                           Cardiology Follow Up    Fawad Baer  1965  [id number]  [facility]  [address]  [address]  [facility] [address] [phone]    1. Paroxysmal atrial fibrillation (HCC)  POCT ECG      2. LVH (left ventricular hypertrophy)        3. Primary hypertension        4. Sinus bradycardia        5. Obstructive sleep apnea        6. Pacemaker        7. Preop cardiovascular exam              Discussion/Summary:From a cardiac standpoint, he is cleared to undergo placement of spinal cord stimulator.  He advised him to hold him Eliquis for 3 days prior to the procedure and restart it ASAP after the procedure when his bleeding risk is acceptable.  No further cardiac testing is needed.  RTO 1 year.      Interval History: His cardiac status has remained stable. He has PAF and was on Xarelto. He developed a retroperitoneal bleed in 3/2024 thought due to a \" ruptured adrenal gland. \" His Xarelto was held.   He then developed a R DVT and extensive b/l pulmonary emboli.   ECHO 3/16/2024 - EF 60 - 65 %, PASP 39 mmHg. No RV enlargement or RV dysfxn    He has been on Eliquis 5 mg BID since.    He is scheduled spinal cord stimulator placement.    He denies Cp, SOB, palpitations.    ECG today - Atrial paced rhythm     Patient Active Problem List   Diagnosis    Controlled type 2 diabetes mellitus with microalbuminuria, without long-term current use of insulin  (HCC)    Painful diabetic neuropathy (HCC)    Erectile dysfunction due to diseases classified elsewhere    Homozygous for MTHFR gene mutation    Hypothyroidism (acquired)    Sacroiliitis (HCC)    Tarsal tunnel syndrome of both lower extremities    Tarsal tunnel syndrome, left    Venous stasis ulcer of left ankle limited to breakdown of skin with varicose veins (HCC)    Gout    Hyperaldosteronism (HCC)    Diabetic nephropathy associated with type 2 diabetes mellitus (HCC)    Stage 3a " chronic kidney disease (HCC)    Proteinuria    Vitamin D deficiency    Syncopal episodes     Paroxysmal atrial fibrillation (HCC)    History of DVT (deep vein thrombosis)    Sinus bradycardia    Obstructive sleep apnea    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[facility])    Pacemaker    Primary hypertension    LVH (left ventricular hypertrophy)    Plantar fasciitis    Post-operative state    Tarsal tunnel syndrome of left side    Pain in both feet    Chronic pain syndrome    Mid back pain    Hyperparathyroidism (HCC)    Morbid obesity ([facility])    S/P parathyroidectomy    Preop cardiovascular exam     Past Medical History:   Diagnosis Date    Arthritis     right foot    Chronic cholecystitis     Chronic kidney disease     11/25/24 per chart Stage 3    CPAP (continuous positive airway pressure) dependence     Diabetes mellitus ([facility])     Type 2    Diabetic nephropathy ([facility])     Disease of thyroid gland 1990    Diverticulitis of colon     DVT (deep venous thrombosis) ([facility])     11/25/24 per pt hx of 3/2024 due left adrenal gland ruptured - taken off of Xarelto for 2 weeks developed blood clot Left calf and both lungs    Gout     History of pulmonary embolus (PE) 03/2024    ruptured adrenal gland - taken off of Xarelto for 2 weeks and developed blood clot in left leg and both lungs    Hyperlipidemia     Hypertension     Hypothyroidism     MTHFR mutation     Neuropathy     Neuropathy in diabetes ([facility]) 2018    Scab     right arm- no s/s infection    Sleep apnea     Tarsal tunnel syndrome, right     11/25/24 bilateral per pt    Tinnitus     left ear    Wears glasses     and contacts    Wears glasses      Social History     Socioeconomic History    Marital status: Single     Spouse name: Not on file    Number of children: Not on file    Years of education: Not on file    Highest education level: Not on file   Occupational History    Not on file   Tobacco Use    Smoking status: Never    Smokeless tobacco: Never    Tobacco  comments:     Never a smoker or use of any tobacco products per pt    Vaping Use    Vaping status: Never Used   Substance and Sexual Activity    Alcohol use: Yes     Alcohol/week: 6.0 standard drinks of alcohol     Types: 6 Cans of beer per week     Comment: weekends-11/25/24 Per pt rare use    Drug use: No     Comment: Denies any drug use per pt    Sexual activity: Yes     Partners: Female     Birth control/protection: Other     Comment: Denies any chest pain or shortness of breath with activity   Other Topics Concern    Not on file   Social History Narrative    Not on file     Social Drivers of Health     Financial Resource Strain: Low Risk  (3/16/2024)    [facility]    Overall Financial Resource Strain (CARDIA)     Difficulty of Paying Living Expenses: Not hard at all   Food Insecurity: No Food Insecurity (3/16/2024)    [facility]    Hunger Vital Sign     Worried About Running Out of Food in the Last Year: Never true     Ran Out of Food in the Last Year: Never true   Transportation Needs: No Transportation Needs (3/16/2024)    [facility]    PRAPARE - Transportation     Lack of Transportation (Medical): No     Lack of Transportation (Non-Medical): No   Physical Activity: Not on file   Stress: Not on file   Social Connections: Not on file   Intimate Partner Violence: Not At Risk (3/16/2024)    [facility]    Humiliation, Afraid, Rape, and Kick questionnaire     Fear of Current or Ex-Partner: No     Emotionally Abused: No     Physically Abused: No     Sexually Abused: No   Housing Stability: Low Risk  (3/16/2024)    [facility]    Housing Stability Vital Sign     Unable to Pay for Housing in the Last Year: No      Number of Places Lived in the Last Year: 1     Unstable Housing in the Last Year: No      Family History   Problem Relation Age of Onset    Aneurysm Mother         intracranial aneurysm repair    Coronary artery disease Father     Hypertension Father     Heart disease Father     Aneurysm Brother     Anesthesia problems Neg Hx      Past Surgical History:   Procedure Laterality Date    APPENDECTOMY      ARTHRODESIS      Lumbar L__    BACK SURGERY      BUNIONECTOMY Right 10/07/2016    Procedure: REMOVAL TIBIAL  SESAMOID BONE  RIGHT FOOT;  Surgeon: Vicente Aguirre DPM;  Location: [facility];  Service:     CARDIAC PACEMAKER PLACEMENT  09/01/2021    CHG ASSAY OF PARATHORMONE N/A 12/4/2024    Procedure: INTRAOP PTH MONITORING;  Surgeon: Og Segovia MD;  Location: [facility];  Service: Surgical Oncology    CHOLECYSTECTOMY  03/26/2015    COLONOSCOPY      KNEE ARTHROSCOPY      KNEE ARTHROSCOPY W/ MENISCAL REPAIR      Lateral    NASAL SEPTUM SURGERY  10/16/2013    PLANTAR FASCIA SURGERY Left 10/26/2023    Procedure: RELEASE FASCIA PLANTAR/FASCIOTOMY;  Surgeon: Inderjit Ramirez DPM;  Location: [facility];  Service: Podiatry    FL COLONOSCOPY FLX DX W/COLLJ SPEC WHEN PFRMD N/A 11/23/2018    Procedure: COLONOSCOPY;  Surgeon: Manjit Dietrich MD;  Location: [facility];  Service: Gastroenterology    FL PARATHYROIDECTOMY/EXPLORATION PARATHYROIDS Right 12/4/2024    Procedure: MINIMALLY INVASIVE RIGHT PARATHYROIDECTOMY, PSB 4 GLAND EXPLORATION;  Surgeon: Og Segovia MD;  [facility];  Service: Surgical Oncology    FL PRQ IMPLTJ NSTIM ELECTRODE ARRAY EPIDURAL Bilateral 12/18/2024    Procedure: NEVRO SCS TRIAL;  Surgeon: Jing Vinson MD;  Location: [facility];  Service: Pain Management     FL RELEASE TARSAL TUNNEL Right 06/25/2018    Procedure: RELEASE TARSAL TUNNEL;  Surgeon: Cliff Bernabe DPM;  [facility];  Service: Podiatry    FL RELEASE TARSAL TUNNEL Left 03/13/2020    Procedure:  RELEASE TARSAL TUNNEL;  Surgeon: Cliff Bernabe DPM;  Location:  MAIN OR;  Service: Podiatry    IA RELEASE TARSAL TUNNEL Left 10/26/2023    Procedure: RELEASE TARSAL TUNNEL;  Surgeon: Inderjit Ramirez DPM;  Location: [facility];  Service: Podiatry    SPINAL FUSION      TONSILLECTOMY  10/16/2013    with Adenoidectomy    UVULECTOMY  10/16/2013    UVULOPALATOPHARYNGOPLASTY      WISDOM TOOTH EXTRACTION         Current Outpatient Medications:     allopurinol (ZYLOPRIM) 300 mg tablet, Take 1 tablet (300 mg total) by mouth daily, Disp: 90 tablet, Rfl: 1    apixaban (Eliquis) 5 mg, Take 1 tablet (5 mg total) by mouth 2 (two) times a day, Disp: 180 tablet, Rfl: 1    carvedilol (COREG) 6.25 mg tablet, TAKE 1 TABLET BY MOUTH ONCE DAILY IN THE MORNING AND 1 & 1/2 (ONE & ONE-HALF) ONCE DAILY IN THE EVENING, Disp: 90 tablet, Rfl: 1    ezetimibe (ZETIA) 10 mg tablet, Take 1 tablet (10 mg total) by mouth daily, Disp: 100 tablet, Rfl: 1    furosemide (LASIX) 20 mg tablet, Take 1 tablet by mouth as needed for leg edema. Maximum daily dose 1 tablet., Disp: 90 tablet, Rfl: 1    gabapentin (NEURONTIN) 600 MG tablet, Take 1 tablet (600 mg total) by mouth 3 (three) times a day, Disp: 270 tablet, Rfl: 1    levothyroxine 175 mcg tablet, TAKE 1 TABLET BY MOUTH ONCE DAILY ON AN EMPTY STOMACH 1 HOUR BEFORE BREAKFAST AND 4 HOURS APART FROM CALCIUM AND VITAMIN D SUPPLEMENTATION, Disp: 90 tablet, Rfl: 1    lisinopril (ZESTRIL) 10 mg tablet, Take 1 tablet (10 mg total) by mouth daily, Disp: 90 tablet, Rfl: 1    metFORMIN (GLUCOPHAGE-XR) 500 mg 24 hr tablet, Take 1 tablet (500 mg total) by mouth daily at bedtime, Disp: 100 tablet, Rfl: 1    semaglutide, 2 mg/dose, (Ozempic, 2 MG/DOSE,) 8 mg/ mL injection pen, Inject 0.75 mL (2 mg total) under the skin every 7 days, Disp: 3 mL, Rfl: 5    sildenafil (Viagra) 100 mg tablet, Take 1 tablet (100 mg total) by mouth as needed for erectile dysfunction, Disp: 30 tablet, Rfl: 0    urea (CARMOL)  40 %, Apply topically daily Apply to callused area, Disp: 28 g, Rfl: 0    Cyanocobalamin (Vitamin B-12) 5000 MCG TBDP, Take by mouth in the morning (Patient not taking: Reported on 1/2/2025), Disp: , Rfl:   No Known Allergies  Vitals:    01/10/25 1318   BP: 126/80   BP Location: Left arm   Patient Position: Sitting   Cuff Size: Standard   Pulse: 80   SpO2: 96%   Weight: 118 kg (261 lb)     Weight (last 2 days)       Date/Time Weight    01/10/25 1318 118 (261)           Blood pressure 126/80, pulse 80, weight 118 kg (261 lb), SpO2 96%., Body mass index is 35.4 kg/m².    Labs:  Appointment on 01/03/2025   Component Date Value    Creatinine, Ur 01/03/2025 75.2     Albumin,U,Random 01/03/2025 1,829.7 (H)     Albumin Creat Ratio 01/03/2025 2,433 (H)     Sodium 01/03/2025 142     Potassium 01/03/2025 4.4     Chloride 01/03/2025 104     CO2 01/03/2025 27     ANION GAP 01/03/2025 11     BUN 01/03/2025 27 (H)     Creatinine 01/03/2025 1.41 (H)     Glucose 01/03/2025 113     Calcium 01/03/2025 9.9     eGFR 01/03/2025 54     Magnesium 01/03/2025 2.2     Phosphorus 01/03/2025 3.9     PTH 01/03/2025 44.3     Vit D, 25-Hydroxy 01/03/2025 19.8 (L)     Color, UA 01/03/2025 Light Yellow     Clarity, UA 01/03/2025 Clear     Specific Gravity, UA 01/03/2025 1.013     pH, UA 01/03/2025 6.0     Leukocytes, UA 01/03/2025 Negative     Nitrite, UA 01/03/2025 Negative     Protein, UA 01/03/2025 300 (3+) (A)     Glucose, UA 01/03/2025 Negative     Ketones, UA 01/03/2025 Negative     Urobilinogen, UA 01/03/2025 <2.0     Bilirubin, UA 01/03/2025 Negative     Occult Blood, UA 01/03/2025 Moderate (A)     RBC, UA 01/03/2025 30-50 (A)     WBC, UA 01/03/2025 1-2     Epithelial Cells 01/03/2025 Occasional     Bacteria, UA 01/03/2025 None Seen     MUCUS THREADS 01/03/2025 Occasional (A)    Admission on 12/18/2024, Discharged on 12/18/2024   Component Date Value    POC Glucose 12/18/2024 80    Admission on 12/04/2024, Discharged on 12/04/2024    Component Date Value    POC Glucose 12/04/2024 105     PTH INTRAOP 12/04/2024 99.0 (H)     Case Report 12/04/2024                      Value:Surgical Pathology Report                         Case: [id number]                                  Authorizing Provider:  Og Segovia MD        Collected:           12/04/2024 0911              Ordering Location:     [facility]        Received:            12/04/2024 0921                                     [facility]                                                     Pathologist:           Gabe Walker MD                                                                 Intraop:               Gabe Walker MD                                                                 Specimens:   A) - Parathyroid, right parathyroid                                                                 B) - Parathyroid, left parathyroid                                                                                            C) - Parathyroid, Left Superior Parathyroid - FROZEN                                       Final Diagnosis 12/04/2024                      Value:A. Right parathyroid:  - Hypercellular parathyroid tissue, 0.95g.     B. Left parathyroid:  - Hypercellular parathyroid tissue, 0.95g.      C. Left Superior Parathyroid:  - Lymph node tissue (seen only on frozen slide).      Additional Information 12/04/2024                      Value:All reported additional testing was performed with appropriately reactive controls.  These tests were developed and their performance characteristics determined by [facility] or appropriate performing facility, though some tests may be performed on tissues which have not been validated for performance  "characteristics (such as staining performed on alcohol exposed cell blocks and decalcified tissues).  Results should be interpreted with caution and in the context of the patients’ clinical condition. These tests may not be cleared or approved by the U.S. Food and Drug Administration, though the FDA has determined that such clearance or approval is not necessary. These tests are used for clinical purposes and they should not be regarded as investigational or for research. This laboratory has been approved by [place] [id number], designated as a high-complexity laboratory and is qualified to perform these tests.  .  Interpretation performed [facility] [address] [facility] [address]      Intraoperative Consultat* 12/04/2024                      Value:AF1:  - Hypercellular parathyroid tissue, 0.95g.    [place] [address]    BF1:  AF1:  - Hypercellular parathyroid tissue, 0.95g.    [place] [address]    CF1:  - Lymph node tissue,    [place] [address]        Gross Description 12/04/2024                      Value:A.  The specimen is received fresh for frozen section, labeled with the patient's name and hospital number, and is designated \" right parathyroid\".  The specimen consists of a tan-yellow nodular and fatty tissue fragment measuring 1.7 cm which weighs 0.95 g.  The entire specimen is frozen and submitted for histologic examination in 1 cassette.  B.  The specimen is received fresh for frozen section, labeled with the patient's name and hospital number, and is designated \" left parathyroid\".  The specimen consists of a tan nodular soft tissue fragment measuring 1.9 cm which weighs 0.95 g.  The entire specimen is frozen and submitted for histologic examination in 1 " "cassette.  C.  The specimen is received fresh for frozen section, labeled with the patient's name and hospital number, and is designated \" left superior parathyroid\".  The specimen consists of a tan-yellow somewhat nodular soft tissue fragment measuring 1 cm which weighs 0.11 g.  The entire specimen is frozen and submitted for                           histologic examination in 1 cassette.    Note: The estimated total formalin fixation time based upon information provided by the submitting clinician and the standard processing schedule is under 72 hours.    MCrites      PTH INTRAOP 12/04/2024 111.2 (H)     PTH INTRAOP 12/04/2024 98.0 (H)     PTH INTRAOP 12/04/2024 96.6 (H)     PTH INTRAOP 12/04/2024 70.4     PTH INTRAOP 12/04/2024 32.4     PTH INTRAOP 12/04/2024 22.6     PTH INTRAOP 12/04/2024 17.7     PTH INTRAOP 12/04/2024 16.6     PTH INTRAOP 12/04/2024 15.3     Calcium 12/04/2024 10.2     POC Glucose 12/04/2024 154 (H)    Office Visit on 11/11/2024   Component Date Value    Ventricular Rate 11/11/2024 62     Atrial Rate 11/11/2024 62     WY Interval 11/11/2024 166     QRSD Interval 11/11/2024 96     QT Interval 11/11/2024 412     QTC Interval 11/11/2024 419     P Axis 11/11/2024 54     QRS Axis 11/11/2024 7     T Wave Barney 11/11/2024 69    Appointment on 11/11/2024   Component Date Value    WBC 11/11/2024 10.90 (H)     RBC 11/11/2024 5.83 (H)     Hemoglobin 11/11/2024 17.1 (H)     Hematocrit 11/11/2024 54.4 (H)     MCV 11/11/2024 93     MCH 11/11/2024 29.3     MCHC 11/11/2024 31.4     RDW 11/11/2024 14.0     MPV 11/11/2024 10.1     Platelets 11/11/2024 166     nRBC 11/11/2024 0     Segmented % 11/11/2024 71     Immature Grans % 11/11/2024 1     Lymphocytes % 11/11/2024 16     Monocytes % 11/11/2024 7     Eosinophils Relative 11/11/2024 4     Basophils Relative 11/11/2024 1     Absolute Neutrophils 11/11/2024 7.93 (H)     Absolute Immature Grans 11/11/2024 0.05     Absolute Lymphocytes 11/11/2024 1.69     " Absolute Monocytes 11/11/2024 0.78     Eosinophils Absolute 11/11/2024 0.40     Basophils Absolute 11/11/2024 0.05     Sodium 11/11/2024 138     Potassium 11/11/2024 4.8     Chloride 11/11/2024 103     CO2 11/11/2024 28     ANION GAP 11/11/2024 7     BUN 11/11/2024 39 (H)     Creatinine 11/11/2024 1.48 (H)     Glucose, Fasting 11/11/2024 107 (H)     Calcium 11/11/2024 11.2 (H)     AST 11/11/2024 19     ALT 11/11/2024 18     Alkaline Phosphatase 11/11/2024 71     Total Protein 11/11/2024 7.4     Albumin 11/11/2024 3.9     Total Bilirubin 11/11/2024 0.46     eGFR 11/11/2024 51     Hemoglobin A1C 11/11/2024 5.8 (H)     EAG 11/11/2024 120     PTT 11/11/2024 31     Protime 11/11/2024 14.0     INR 11/11/2024 1.05    Telephone on 11/01/2024   Component Date Value    Severity 12/19/2023 Normal     Right Eye Diabetic Retin* 12/19/2023 None     Left Eye Diabetic Retino* 12/19/2023 None    Office Visit on 10/31/2024   Component Date Value    Hemoglobin A1C 10/31/2024 5.7     Cholesterol 11/11/2024 214 (H)     Triglycerides 11/11/2024 175 (H)     HDL, Direct 11/11/2024 60     LDL Calculated 11/11/2024 119 (H)     Non-HDL-Chol (CHOL-HDL) 11/11/2024 154    Appointment on 08/17/2024   Component Date Value    Sodium 08/17/2024 139     Potassium 08/17/2024 4.3     Chloride 08/17/2024 105     CO2 08/17/2024 26     ANION GAP 08/17/2024 8     BUN 08/17/2024 30 (H)     Creatinine 08/17/2024 1.46 (H)     Glucose, Fasting 08/17/2024 123 (H)     Calcium 08/17/2024 10.1     eGFR 08/17/2024 51    Appointment on 07/20/2024   Component Date Value    Sodium 07/20/2024 139     Potassium 07/20/2024 4.4     Chloride 07/20/2024 105     CO2 07/20/2024 23     ANION GAP 07/20/2024 11     BUN 07/20/2024 27 (H)     Creatinine 07/20/2024 1.26     Glucose, Fasting 07/20/2024 113 (H)     Calcium 07/20/2024 10.2     eGFR 07/20/2024 62      Imaging: FL spine and pain procedure  Result Date: 12/18/2024  Narrative: A spine and pain study was performed by the  Department of Spine and Pain. Please refer to the report for the official interpretation. The images are stored for archival purposes only. Study images were not formally reviewed by the Radiology Department.      Review of Systems:  Review of Systems   Constitutional:  Negative for diaphoresis, fatigue, fever and unexpected weight change.   HENT: Negative.     Respiratory:  Negative for cough, shortness of breath and wheezing.    Cardiovascular:  Negative for chest pain, palpitations and leg swelling.   Gastrointestinal:  Negative for abdominal pain, diarrhea and nausea.   Musculoskeletal:  Negative for gait problem and myalgias.   Skin:  Negative for rash.   Neurological:  Negative for dizziness and numbness.   Psychiatric/Behavioral: Negative.         Physical Exam:  Physical Exam  Constitutional:       Appearance: He is well-developed.   HENT:      Head: Normocephalic and atraumatic.   Eyes:      Pupils: Pupils are equal, round, and reactive to light.   Neck:      Vascular: No JVD.   Cardiovascular:      Rate and Rhythm: Regular rhythm.      Pulses: Normal pulses.           Carotid pulses are 2+ on the right side and 2+ on the left side.     Heart sounds: S1 normal and S2 normal.   Pulmonary:      Effort: Pulmonary effort is normal.      Breath sounds: Normal breath sounds. No wheezing or rales.   Abdominal:      General: Bowel sounds are normal.      Palpations: Abdomen is soft.   Musculoskeletal:         General: No tenderness. Normal range of motion.      Cervical back: Normal range of motion and neck supple.   Skin:     General: Skin is warm.   Neurological:      Mental Status: He is alert and oriented to person, place, and time.      Cranial Nerves: No cranial nerve deficit.      Deep Tendon Reflexes: Reflexes are normal and symmetric.

## 2025-01-13 LAB
APOB+LDLR+PCSK9 GENE MUT ANL BLD/T: NOT DETECTED
BRCA1+BRCA2 DEL+DUP + FULL MUT ANL BLD/T: NOT DETECTED
MLH1+MSH2+MSH6+PMS2 GN DEL+DUP+FUL M: NOT DETECTED

## 2025-01-14 DIAGNOSIS — R60.0 LEG EDEMA: ICD-10-CM

## 2025-01-15 ENCOUNTER — OFFICE VISIT (OUTPATIENT)
Age: 60
End: 2025-01-15
Payer: COMMERCIAL

## 2025-01-15 VITALS
WEIGHT: 265 LBS | SYSTOLIC BLOOD PRESSURE: 128 MMHG | TEMPERATURE: 98.7 F | HEART RATE: 82 BPM | DIASTOLIC BLOOD PRESSURE: 76 MMHG | HEIGHT: 72 IN | BODY MASS INDEX: 35.89 KG/M2 | OXYGEN SATURATION: 98 %

## 2025-01-15 DIAGNOSIS — E11.21 DIABETIC NEPHROPATHY ASSOCIATED WITH TYPE 2 DIABETES MELLITUS (HCC): Primary | ICD-10-CM

## 2025-01-15 DIAGNOSIS — R31.9 HEMATURIA: ICD-10-CM

## 2025-01-15 DIAGNOSIS — I12.9 HYPERTENSIVE KIDNEY DISEASE: ICD-10-CM

## 2025-01-15 DIAGNOSIS — N18.30 STAGE 3 CHRONIC KIDNEY DISEASE, UNSPECIFIED WHETHER STAGE 3A OR 3B CKD (HCC): ICD-10-CM

## 2025-01-15 DIAGNOSIS — R80.1 PERSISTENT PROTEINURIA: ICD-10-CM

## 2025-01-15 DIAGNOSIS — E21.3 HYPERPARATHYROIDISM (HCC): ICD-10-CM

## 2025-01-15 DIAGNOSIS — E26.9 HYPERALDOSTERONISM (HCC): ICD-10-CM

## 2025-01-15 PROCEDURE — 99214 OFFICE O/P EST MOD 30 MIN: CPT | Performed by: INTERNAL MEDICINE

## 2025-01-15 RX ORDER — FUROSEMIDE 20 MG/1
20 TABLET ORAL DAILY
Qty: 90 TABLET | Refills: 1 | Status: SHIPPED | OUTPATIENT
Start: 2025-01-15

## 2025-01-15 NOTE — PROGRESS NOTES
Assessment & Plan:    1. Diabetic nephropathy associated with type 2 diabetes mellitus (HCC)  -     Urinalysis with microscopic; Future; Expected date: 05/06/2025  -     Albumin / creatinine urine ratio; Future; Expected date: 05/06/2025  -     Comprehensive metabolic panel; Future; Expected date: 05/06/2025  -     CBC and differential; Future; Expected date: 05/06/2025  -     PTH, intact; Future; Expected date: 05/06/2025  -     Uric acid; Future; Expected date: 05/06/2025  2. Hypertensive kidney disease  -     Urinalysis with microscopic; Future; Expected date: 05/06/2025  -     Albumin / creatinine urine ratio; Future; Expected date: 05/06/2025  -     Comprehensive metabolic panel; Future; Expected date: 05/06/2025  -     CBC and differential; Future; Expected date: 05/06/2025  -     PTH, intact; Future; Expected date: 05/06/2025  -     Uric acid; Future; Expected date: 05/06/2025  3. Hematuria  4. Stage 3 chronic kidney disease, unspecified whether stage 3a or 3b CKD ([facility])  -     Urinalysis with microscopic; Future; Expected date: 05/06/2025  -     Albumin / creatinine urine ratio; Future; Expected date: 05/06/2025  -     Comprehensive metabolic panel; Future; Expected date: 05/06/2025  -     CBC and differential; Future; Expected date: 05/06/2025  -     PTH, intact; Future; Expected date: 05/06/2025  -     Uric acid; Future; Expected date: 05/06/2025  5. Hyperaldosteronism ([facility])  6. Hyperparathyroidism ([facility])  7. Persistent proteinuria  -     Urinalysis with microscopic; Future; Expected date: 05/06/2025  -     Albumin / creatinine urine ratio; Future; Expected date: 05/06/2025  -     Comprehensive metabolic panel; Future; Expected date: 05/06/2025  -     CBC and differential; Future; Expected date: 05/06/2025  -     PTH, intact; Future; Expected date: 05/06/2025  -     Uric acid; Future; Expected date: 05/06/2025     Diabetic nephropathy with type 2 DM  Consider SGLT2i use but holding for now until UTI  /hematuria clarified.  Continue Ozempic therapy per endocrine.  Continue Lisinopril 10mg/day.      2. HTN renal disease   BP well controlled at present, goal<130/80. BP appropriate off chantell.     3. Hematuria  Followup with urology management. SP recent MRI prostate.     4. CKD3  Baseline 1.6-1.7 mg/dL.  Etiology attributed to DKD, obesity related FSGS, HTN nephrosclerosis.  Cr 1.49 with eGFR 50 ml/min. Metabolic parameters stable. Volume status stable.     Reviewed CKD stages, Cr and eGFR trends.  Reviewed UACR trends,  FU in 3 months for consideration SGLT2I.  Continue lisinopril 10mg/day  Continue Ozempic therapy and encourage weight loss.    5. Hyperaldosteronism  BP well controlled at present.   Currently off chantell therapy. Follow trends, goal<130/80.    6. HPT sp parathyroidectomy  Bone pain improved sp parathyroidectomy.  Continue appropriate fu with endocrine.    7.Persistent proteinuria attributed to DKD/obesity related with FSGS.  Not biopsy proven.   Reviewed concerning trends with patient.  Would not pursue SGLT2i at present with upcoming surgery but consider use in 3 months.    The benefits, risks and alternatives to the treatment plan were discussed at this visit. Patient was advised of common adverse effects of any medical therapies prescribed. All questions were answered and discussed with the patient and any accompanying family members or caretakers.      Subjective:      Patient ID: Fawad Baer is a 59 y.o. male presents for follow up in the [facility] office for CKD management.    HPI      He is having blood in the semen and urine and patient had MRI.  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 Xarelto held and changed to Eliquis.    BP well controlled and he is off Bennett. Currently on Lisinopril 10mg/day, Coreg 6.25 mg and 12.5mg in PM.     He is going for spinal cord stimulator in 1 week with Dr Rosas.    He did have parathyroidectomy and reports less bone pain with  therapy.     He is on lasix 40mg/day for volume management, this has helped with swelling.    He is on Ozempic 2mg /week, He was 285 lb and went to 250 lb. Now he is up to 260 due to dietary indiscretions with the holidays.    The following portions of the patient's history were reviewed and updated as appropriate: allergies, current medications, past family history, past medical history, past social history, past surgical history, and problem list.    Review of Systems      Objective:      /76 (BP Location: Right arm, Patient Position: Sitting, Cuff Size: Large)   Pulse 82   Temp 98.7 °F (37.1 °C) (Temporal)   Ht 6' (1.829 m)   Wt 120 kg (265 lb)   SpO2 98%   BMI 35.94 kg/m²          Physical Exam  Vitals reviewed.   Constitutional:       General: He is not in acute distress.     Appearance: He is not ill-appearing.   HENT:      Head: Normocephalic and atraumatic.      Nose: Nose normal.      Mouth/Throat:      Mouth: Mucous membranes are moist.      Pharynx: Oropharynx is clear.   Eyes:      Extraocular Movements: Extraocular movements intact.      Conjunctiva/sclera: Conjunctivae normal.   Cardiovascular:      Rate and Rhythm: Normal rate and regular rhythm.      Heart sounds:      No friction rub.   Pulmonary:      Breath sounds: Normal breath sounds. No wheezing, rhonchi or rales.   Abdominal:      Palpations: Abdomen is soft.      Tenderness: There is no abdominal tenderness. There is no guarding.   Musculoskeletal:      Right lower leg: No edema.      Left lower leg: No edema.   Skin:     General: Skin is warm and dry.      Coloration: Skin is not jaundiced.      Findings: No bruising.   Neurological:      General: No focal deficit present.      Mental Status: He is alert and oriented to person, place, and time.      Cranial Nerves: No cranial nerve deficit.             Lab Results   Component Value Date     12/21/2015    SODIUM 139 01/11/2025    K 4.5 01/11/2025     01/11/2025    CO2  "27 01/11/2025    ANIONGAP 10 12/21/2015    AGAP 8 01/11/2025    BUN 31 (H) 01/11/2025    CREATININE 1.49 (H) 01/11/2025    GLUC 113 01/03/2025    GLUF 112 (H) 01/11/2025    CALCIUM 9.4 01/11/2025    AST 18 01/11/2025    ALT 20 01/11/2025    ALKPHOS 59 01/11/2025    PROT 7.1 06/11/2015    PROT 7.1 06/11/2015    TP 6.5 01/11/2025    BILITOT 0.6 06/11/2015    TBILI 0.29 01/11/2025    EGFR 50 01/11/2025      Lab Results   Component Value Date    CREATININE 1.49 (H) 01/11/2025    CREATININE 1.41 (H) 01/03/2025    CREATININE 1.48 (H) 11/11/2024    CREATININE 1.46 (H) 08/17/2024    CREATININE 1.26 07/20/2024    CREATININE 1.28 06/29/2024    CREATININE 1.51 (H) 04/19/2024    CREATININE 1.61 (H) 03/20/2024    CREATININE 1.67 (H) 03/19/2024    CREATININE 1.83 (H) 03/18/2024    CREATININE 1.59 (H) 03/17/2024    CREATININE 1.69 (H) 03/16/2024    CREATININE 1.85 (H) 03/16/2024    CREATININE 1.92 (H) 03/16/2024    CREATININE 2.59 (H) 03/13/2024      Lab Results   Component Value Date    COLORU Light Yellow 01/11/2025    CLARITYU Clear 01/11/2025    SPECGRAV 1.014 01/11/2025    PHUR 6.0 01/11/2025    LEUKOCYTESUR Negative 01/11/2025    NITRITE Negative 01/11/2025    PROTEIN  (2+) (A) 01/11/2025    GLUCOSEU Negative 01/11/2025    KETONESU Negative 01/11/2025    UROBILINOGEN <2.0 01/11/2025    BILIRUBINUR Negative 01/11/2025    BLOODU Moderate (A) 01/11/2025    RBCUA 10-20 (A) 01/11/2025    WBCUA 1-2 01/11/2025    EPIS Occasional 01/11/2025    BACTERIA None Seen 01/11/2025      No results found for: \"LABPROT\"  No results found for: \"MICROALBUR\", \"[id number]\"  Lab Results   Component Value Date    WBC 5.83 01/11/2025    HGB 16.1 01/11/2025    HCT 51.1 (H) 01/11/2025    MCV 95 01/11/2025     01/11/2025      Lab Results   Component Value Date    HGB 16.1 01/11/2025    HGB 17.1 (H) 11/11/2024    HGB 13.6 04/19/2024    HGB 11.0 (L) 03/20/2024    HGB 10.9 (L) 03/19/2024      Lab Results   Component Value Date    FERRITIN 428.0 " "(H) 03/28/2024      No results found for: \"PTHCALCIUM\", \"[id number]\", \"PHOSPHORUS\"   Lab Results   Component Value Date    CHOLESTEROL 214 (H) 11/11/2024    HDL 60 11/11/2024    LDLCALC 119 (H) 11/11/2024    TRIG 175 (H) 11/11/2024      Lab Results   Component Value Date    URICACID 4.0 01/11/2024      Lab Results   Component Value Date    HGBA1C 6.2 (H) 01/11/2025      Lab Results   Component Value Date    FREET4 1.20 (H) 06/29/2024      Lab Results   Component Value Date    PK Negative 06/11/2015      Lab Results   Component Value Date    PROT 7.1 06/11/2015    PROT 7.1 06/11/2015    UPEP See Comment 01/27/2024        Portions of the record may have been created with voice recognition software. Occasional wrong word or \"sound a like\" substitutions may have occurred due to the inherent limitations of voice recognition software. Read the chart carefully and recognize, using context, where substitutions have occurred. If you have any questions, please contact the dictating provider.      "

## 2025-01-15 NOTE — PRE-PROCEDURE INSTRUCTIONS
Pre-Surgery Instructions:   Medication Instructions    allopurinol (ZYLOPRIM) 300 mg tablet Take day of surgery.    apixaban (Eliquis) 5 mg Instructions provided by MD    carvedilol (COREG) 6.25 mg tablet Take day of surgery.    ezetimibe (ZETIA) 10 mg tablet Take day of surgery.    furosemide (LASIX) 20 mg tablet Hold day of surgery.    gabapentin (NEURONTIN) 600 MG tablet Take day of surgery.    levothyroxine 175 mcg tablet Take day of surgery.    lisinopril (ZESTRIL) 10 mg tablet Hold day of surgery.    metFORMIN (GLUCOPHAGE-XR) 500 mg 24 hr tablet Hold day of surgery.    semaglutide, 2 mg/dose, (Ozempic, 2 MG/DOSE,) 8 mg/ mL injection pen Weekly on Sundays-will skip 1/19    sildenafil (Viagra) 100 mg tablet Stop taking 1 day prior to surgery.    urea (CARMOL) 40 % Hold day of surgery.   Medication instructions for day surgery reviewed. Please use only a sip of water to take your instructed medications. Avoid all over the counter vitamins, supplements and NSAIDS for one week prior to surgery per anesthesia guidelines. Tylenol is ok to take as needed.     You will receive a call one business day prior to surgery with an arrival time and hospital directions. If your surgery is scheduled on a Monday, the hospital will be calling you on the Friday prior to your surgery. If you have not heard from anyone by 8pm, please call the hospital supervisor through the hospital  at [phone]. ([place] [phone] or [place] [phone]).    Do not eat or drink anything after midnight the night before your surgery, including candy, mints, lifesavers, or chewing gum. Do not drink alcohol 24hrs before your surgery. Try not to smoke at least 24hrs before your surgery.       Follow the pre surgery showering instructions as listed in the “My Surgical Experience Booklet” or otherwise provided by your surgeon's office. Do not use a blade to shave the surgical area 1 week before surgery. It is okay to use a clean electric  clippers up to 24 hours before surgery. Do not apply any lotions, creams, including makeup, cologne, deodorant, or perfumes after showering on the day of your surgery. Do not use dry shampoo, hair spray, hair gel, or any type of hair products.     No contact lenses, eye make-up, or artificial eyelashes. Remove nail polish, including gel polish, and any artificial, gel, or acrylic nails if possible. Remove all jewelry including rings and body piercing jewelry.     Wear causal clothing that is easy to take on and off. Consider your type of surgery.    Keep any valuables, jewelry, piercings at home. Please bring any specially ordered equipment (sling, braces) if indicated.    Arrange for a responsible person to drive you to and from the hospital on the day of your surgery. Please confirm the visitor policy for the day of your procedure when you receive your phone call with an arrival time.     Call the surgeon's office with any new illnesses, exposures, or additional questions prior to surgery.    Please reference your “My Surgical Experience Booklet” for additional information to prepare for your upcoming surgery.

## 2025-01-15 NOTE — PATIENT INSTRUCTIONS
We discussed protein in the urine which is a concern but Farxiga or Jardiance would be a significant concern with infections. Will not change medications at present with surgery upcoming.   Will do shorter follow up, hopefully be done with surgeries and have a plan to start a medication after that.

## 2025-01-17 ENCOUNTER — CONSULT (OUTPATIENT)
Dept: FAMILY MEDICINE CLINIC | Facility: CLINIC | Age: 60
End: 2025-01-17
Payer: COMMERCIAL

## 2025-01-17 VITALS
OXYGEN SATURATION: 99 % | WEIGHT: 275 LBS | BODY MASS INDEX: 37.25 KG/M2 | HEART RATE: 68 BPM | DIASTOLIC BLOOD PRESSURE: 108 MMHG | SYSTOLIC BLOOD PRESSURE: 168 MMHG | HEIGHT: 72 IN | TEMPERATURE: 97.5 F

## 2025-01-17 DIAGNOSIS — E11.21 DIABETIC NEPHROPATHY ASSOCIATED WITH TYPE 2 DIABETES MELLITUS (HCC): ICD-10-CM

## 2025-01-17 DIAGNOSIS — G62.9 PERIPHERAL POLYNEUROPATHY: Primary | ICD-10-CM

## 2025-01-17 DIAGNOSIS — I10 ESSENTIAL HYPERTENSION: ICD-10-CM

## 2025-01-17 PROCEDURE — 99213 OFFICE O/P EST LOW 20 MIN: CPT | Performed by: FAMILY MEDICINE

## 2025-01-17 NOTE — PROGRESS NOTES
Pre-operative Clearance  Name: Fawad Baer      : 1965      MRN: [id number]  Encounter Provider: Agustín Loredo DO  Encounter Date: 2025   Encounter department: [facility]    Assessment & Plan    Pre-operative Clearance:     Revised Cardiac Risk Index:  RCI RISK CLASS II (1 risk factor, risk of major cardiac complications approximately 1.3%)    Medication Instructions:   - ACE Inhibitors or ARBs: Continue this medication up to the evening before surgery/procedure, but do not take the morning of the day of surgery.  - Antiepileptic meds: Continue to take this medication on your normal schedule.  - Beta blockers:  Continue to take this medication on your normal schedule.  - Direct Xa Inhibitor (ie, Eliquis, Xarelto): Stop taking medication 3 days prior to procedure/surgery.  - Diuretics: Continue taking this medication up to the evening before surgery/procedure, but do not take in the morning of the day of surgery/procedure.  - Gout meds: Continue to take this medication on your normal schedule.  - Hyperlipidemia meds: Continue to take this medication on your normal schedule.  - Thyroid meds: Continue to take this medication on your normal schedule.      Medicine Instructions for Adults with Diabetes (NO Bowel Prep)    Follow these instructions when a BOWEL PREP is NOT required for your procedure or surgery!    NOTE:  GLP Agonists taken weekly: do not take in the 7 days before your procedure. **Bariatric surgery: do not take 4 weeks prior to your procedure.    SGLT-2 Inhibitors: do not take in the 4 days before your procedure    On the Day Before Surgery/Procedure  If you are having a procedure (e.g., Colonoscopy) or surgery which DOES NOT require a bowel prep, follow the directions below based on the type of medicine you take for your diabetes.  Type of Medicine You Take Examples What to Do   Pre-Mixed Insulin Intermediate  Vgltybp74/25, Xbgplgc44/30, Novolog 70/30, Regular Insulin Take  1/2 your regular dose the evening before our procedure.   Rapid/Fast Acting  Insulin and/or Long-Acting Insulin Humalog U200, NovoLog, Apidra,  Lantus, Levemir, Tresiba, Toujeo,  Fias, Basaglar Take your FULL regular dose the day before procedure.   Oral Diabetic Medicines (sulfonylurea) Glipizide/Glimepiride/  Glucotrol Take your regular dose with dinner the evening before your procedure.   Other Oral Diabetic Medicines Metformin, Glucophage, Glucophage  XR, Riomet, Glumetza, Actose,  Avandia, Gl set, Prandin Take your regular dose with dinner the evening before your procedure   GLP Agonists Adlyxin, Byetta, Bydureon,  Ozempic, Soliqua, Tanzeum,  Trulicity, Victoza, Saxenda,  Rybelsus, Wegovy, Mounjaro, Zepbound If taken daily, take as normal  If taken weekly, do not take this medicine for 7 days before your procedure including the day of the procedure (resume taking after the procedure). **Bariatric surgery: do not take 4 weeks prior to procedure   SGLT-2 Inhibitors Jardiance, Invokana, Farxiga, Steglatro, Brenzavvy, Qtern, Segluromet Glyxambi, Synjardy, Synjardy XR, Invokamet, InvokametXR, Trijary XR, Xigduo X Do not take for 4 days before your procedure including the day of the procedure (resume taking after the procedure)   This educational material has been approved by the Patient Education Advisory Committee.    On the Day of Surgery/Procedure  Follow the directions below based on the type of medicine you take for your diabetes.  Type of Medicine You Take  Examples What to Do   Long-Acting Insulin Lantus, Levemir, Tresiba,  Toujeo, Basaglar, Semglee If you normally take your Long Acting Insulin in the morning, take the full dose as scheduled.   GLP-I Agonists Adlyxin, Byetta, Bydureon,  Ozempic, Soliqua, Tanzeum,  Trulicity, Victoza, Saxenda,  Rybelsus, Mounjaro Do NOT take this medicine on the day of your procedure (resume taking after the procedure)   Except for the morning Long-Acting Insulin, DO NOT take  ANY diabetic medicine on the day of your procedure unless you were instructed by the doctor who manages your diabetes medicines.  Continue to check your blood sugars.  If you have an insulin pump, ask your endocrinologist for instructions at least 3 days before your procedure. NOTE: If you are not able to ask your endocrinologist in advance, on the day of the procedure set your insulin pump to your basal rate only. Bring your insulin pump supplies to the hospital.         History of Present Illness     Mr. Zhang old here for a preoperative clearance for a spinal cord stimulator he just had lab work for his nephrologist and his endocrinologist that he also had an EKG done for his cardiologist there is he is completely instructed in holding his anticoagulants so he should not need bridging for his procedure he has no upper respiratory or lower respiratory infections and is medically stable for his proposed spinal cord stimulator    Pre-op Exam  Surgery: Insertion of spinal cord stimulator  Anticipated Date of Surgery: January 22, 2025  Surgeon: Dr. Rosas    Previous history of bleeding disorders or clots?: Yes  Previous Anesthesia reaction?: No  Prolonged steroid use in the last 6 months?: No    Assessment of Cardiac Risk:   - Unstable or severe angina or MI in the last 6 weeks or history of stent placement in the last year?: No   - Decompensated heart failure (e.g. New onset heart failure, NYHA  Class IV heart failure, or worsening existing heart failure)?: No  - Significant arrhythmias such as high grade AV block, symptomatic ventricular arrhythmia, newly recognized ventricular tachycardia, supraventricular tachycardia with resting heart rate >100, or symptomatic bradycardia?: Yes    - Severe heart valve disease including aortic stenosis or symptomatic mitral stenosis?: No      Pre-operative Risk Factors:  Elevated-risk surgery: Yes    History of cerebrovascular disease: No    History of ischemic heart disease:  No  Pre-operative treatment with insulin: No  Pre-operative creatinine >2 mg/dL: No    History of congestive heart failure: No    Duke Activity Status Index (DASI):   DASI Total Score: 58.2  METs: 9.9    Medications of Perioperative Concern:   Anti-coagulants (Coumadin, Xarelto, Pradaxa, Eliquis, Lixiana), Beta blockers, Metformin, Insulin and GLP agonists    Review of Systems   Constitutional:  Negative for activity change, appetite change, diaphoresis, fatigue and fever.   HENT:  Positive for dental problem.    Eyes:  Positive for visual disturbance.   Respiratory:  Negative for apnea, cough, chest tightness, shortness of breath and wheezing.    Cardiovascular:  Positive for leg swelling. Negative for chest pain and palpitations.   Gastrointestinal:  Negative for abdominal distention, abdominal pain, anal bleeding, constipation, diarrhea, nausea and vomiting.   Endocrine: Negative for cold intolerance, heat intolerance, polydipsia, polyphagia and polyuria.   Genitourinary:  Negative for difficulty urinating, dysuria, flank pain, hematuria and urgency.        Area of hemorrhage in prostate gland   Musculoskeletal:  Positive for arthralgias. Negative for gait problem, joint swelling and myalgias.   Skin:  Negative for color change, rash and wound.   Allergic/Immunologic: Negative for environmental allergies, food allergies and immunocompromised state.   Neurological:  Negative for dizziness, seizures, syncope, speech difficulty, numbness and headaches.        Bilateral neuropathy both feet   Hematological:  Negative for adenopathy. Does not bruise/bleed easily.   Psychiatric/Behavioral:  Negative for agitation, behavioral problems, hallucinations, sleep disturbance and suicidal ideas.      Past Medical History   Past Medical History:   Diagnosis Date    Arthritis     right foot    Chronic cholecystitis     Chronic kidney disease     11/25/24 per chart Stage 3    CPAP (continuous positive airway pressure) dependence      Diabetes mellitus (HCC)     Type 2    Diabetic nephropathy (HCC)     Disease of thyroid gland 1990    Diverticulitis of colon     DVT (deep venous thrombosis) ([facility])     11/25/24 per pt hx of 3/2024 due left adrenal gland ruptured - taken off of Xarelto for 2 weeks developed blood clot Left calf and both lungs    Gout     History of pulmonary embolus (PE) 03/2024    ruptured adrenal gland - taken off of Xarelto for 2 weeks and developed blood clot in left leg and both lungs    Hyperlipidemia     Hypertension     Hypothyroidism     MTHFR mutation     Neuropathy     Neuropathy in diabetes ([facility]) 2018    Scab     right arm- no s/s infection    Sleep apnea     Tarsal tunnel syndrome, right     11/25/24 bilateral per pt    Tinnitus     left ear    Wears glasses     and contacts    Wears glasses      Past Surgical History:   Procedure Laterality Date    APPENDECTOMY      ARTHRODESIS      Lumbar L__    BACK SURGERY      BUNIONECTOMY Right 10/07/2016    Procedure: REMOVAL TIBIAL  SESAMOID BONE  RIGHT FOOT;  Surgeon: Vicente Aguirre DPM;  Location: [facility];  Service:     CARDIAC PACEMAKER PLACEMENT  09/01/2021    CHG ASSAY OF PARATHORMONE N/A 12/4/2024    Procedure: INTRAOP PTH MONITORING;  Surgeon: Og Segovia MD;  Location: [facility];  Service: Surgical Oncology    CHOLECYSTECTOMY  03/26/2015    COLONOSCOPY      KNEE ARTHROSCOPY      KNEE ARTHROSCOPY W/ MENISCAL REPAIR      Lateral    NASAL SEPTUM SURGERY  10/16/2013    PLANTAR FASCIA SURGERY Left 10/26/2023    Procedure: RELEASE FASCIA PLANTAR/FASCIOTOMY;  Surgeon: Inderjit Ramirez DPM;  Location: [facility];  Service: Podiatry    KY COLONOSCOPY FLX DX W/COLLJ SPEC WHEN PFRMD N/A 11/23/2018    Procedure: COLONOSCOPY;  Surgeon: Manjit Dietrich MD;  [facility]: [facility];  Service: Gastroenterology    KY PARATHYROIDECTOMY/EXPLORATION PARATHYROIDS Right 12/4/2024    Procedure: MINIMALLY INVASIVE RIGHT PARATHYROIDECTOMY, PSB 4 GLAND EXPLORATION;  Surgeon:  Og Segovia MD;  [facility];  Service: Surgical Oncology    SC PRQ IMPLTJ NSTIM ELECTRODE ARRAY EPIDURAL Bilateral 12/18/2024    Procedure: NEVRO SCS TRIAL;  Surgeon: Jing Vinson MD;  [facility]: [facility];  Service: Pain Management     SC RELEASE TARSAL TUNNEL Right 06/25/2018    Procedure: RELEASE TARSAL TUNNEL;  Surgeon: Cliff Bernabe DPM;  [facility];  Service: Podiatry    SC RELEASE TARSAL TUNNEL Left 03/13/2020    Procedure: RELEASE TARSAL TUNNEL;  Surgeon: Cliff Bernabe DPM;  Location:  MAIN OR;  Service: Podiatry    SC RELEASE TARSAL TUNNEL Left 10/26/2023    Procedure: RELEASE TARSAL TUNNEL;  Surgeon: Inderjit Ramirez DPM;  Location: [facility];  Service: Podiatry    SPINAL FUSION      TONSILLECTOMY  10/16/2013    with Adenoidectomy    UVULECTOMY  10/16/2013    UVULOPALATOPHARYNGOPLASTY      WISDOM TOOTH EXTRACTION       Family History   Problem Relation Age of Onset    Aneurysm Mother         intracranial aneurysm repair    Coronary artery disease Father     Hypertension Father     Heart disease Father     Aneurysm Brother     Anesthesia problems Neg Hx      Social History     Tobacco Use    Smoking status: Never    Smokeless tobacco: Never    Tobacco comments:     Never a smoker or use of any tobacco products per pt    Vaping Use    Vaping status: Never Used   Substance and Sexual Activity    Alcohol use: Yes     Alcohol/week: 6.0 standard drinks of alcohol     Types: 6 Cans of beer per week     Comment: weekends-11/25/24 Per pt rare use    Drug use: No     Comment: Denies any drug use per pt    Sexual activity: Yes     Partners: Female     Birth control/protection: Other     Comment: Denies any chest pain or shortness of breath with activity     Current Outpatient Medications on File Prior to Visit   Medication Sig    allopurinol (ZYLOPRIM) 300 mg tablet Take 1 tablet (300 mg total) by mouth daily    apixaban (Eliquis) 5 mg Take 1 tablet (5 mg total) by  mouth 2 (two) times a day    carvedilol (COREG) 6.25 mg tablet TAKE 1 TABLET BY MOUTH ONCE DAILY IN THE MORNING AND 1 & 1/2 (ONE & ONE-HALF) ONCE DAILY IN THE EVENING    ezetimibe (ZETIA) 10 mg tablet Take 1 tablet (10 mg total) by mouth daily    furosemide (LASIX) 20 mg tablet Take 1 tablet (20 mg total) by mouth daily    gabapentin (NEURONTIN) 600 MG tablet Take 1 tablet (600 mg total) by mouth 3 (three) times a day    levothyroxine 175 mcg tablet TAKE 1 TABLET BY MOUTH ONCE DAILY ON AN EMPTY STOMACH 1 HOUR BEFORE BREAKFAST AND 4 HOURS APART FROM CALCIUM AND VITAMIN D SUPPLEMENTATION    lisinopril (ZESTRIL) 10 mg tablet Take 1 tablet (10 mg total) by mouth daily    metFORMIN (GLUCOPHAGE-XR) 500 mg 24 hr tablet Take 1 tablet (500 mg total) by mouth daily at bedtime    semaglutide, 2 mg/dose, (Ozempic, 2 MG/DOSE,) 8 mg/ mL injection pen Inject 0.75 mL (2 mg total) under the skin every 7 days    sildenafil (Viagra) 100 mg tablet Take 1 tablet (100 mg total) by mouth as needed for erectile dysfunction    urea (CARMOL) 40 % Apply topically daily Apply to callused area    [DISCONTINUED] Cyanocobalamin (Vitamin B-12) 5000 MCG TBDP Take by mouth in the morning (Patient not taking: Reported on 1/17/2025)     No Known Allergies  Objective   BP (!) 168/108 (BP Location: Left arm, Patient Position: Sitting, Cuff Size: Large)   Pulse 68   Temp 97.5 °F (36.4 °C) (Temporal)   Ht 6' (1.829 m)   Wt 125 kg (275 lb)   SpO2 99%   BMI 37.30 kg/m²     Physical Exam  Constitutional:       Appearance: He is well-developed. He is obese. He is ill-appearing.   HENT:      Head: Normocephalic and atraumatic.      Right Ear: External ear normal.      Left Ear: External ear normal.      Nose: Nose normal.   Eyes:      Conjunctiva/sclera: Conjunctivae normal.      Pupils: Pupils are equal, round, and reactive to light.   Cardiovascular:      Rate and Rhythm: Normal rate and regular rhythm.      Heart sounds: Normal heart sounds. No murmur  heard.     No friction rub.   Pulmonary:      Effort: Pulmonary effort is normal. No respiratory distress.      Breath sounds: Normal breath sounds. No wheezing or rales.   Chest:      Chest wall: No tenderness.   Abdominal:      General: Bowel sounds are normal.      Palpations: Abdomen is soft.   Musculoskeletal:         General: Normal range of motion.      Cervical back: Normal range of motion and neck supple.   Skin:     General: Skin is warm and dry.      Capillary Refill: Capillary refill takes 2 to 3 seconds.   Neurological:      Mental Status: He is alert and oriented to person, place, and time.   Psychiatric:         Behavior: Behavior normal.         Thought Content: Thought content normal.         Judgment: Judgment normal.     Patient is medically cleared for his proposed procedure.  He will not have bridging with Lovenox but will hold his Eliquis for 3 days prior to the procedure.  He has been instructed by endocrine on holding his diabetic meds including metformin semaglutide.      Agustín Loredo DO

## 2025-01-17 NOTE — PATIENT INSTRUCTIONS
Pre-operative Medication Instructions    Avoid herbs or non-directed vitamins one week prior to surgery  Avoid aspirin containing medications or non-steroidal anti-inflammatory drugs one week preceding surgery  May take tylenol for pain up until the night before surgery    ACE Inhibitors or ARBs     Medication Name     lisinopril (ZESTRIL) 10 mg tablet      Continue this medication up to the evening before surgery/procedure, but do not take the morning of the day of surgery.    Seizure Medication     Medication Name     gabapentin (NEURONTIN) 600 MG tablet      Continue to take this medication on your normal schedule.  If this is an oral medication and you take it in the morning, then you may take this medicine with a sip of water.    Beta Blockers     Medication Name     carvedilol (COREG) 6.25 mg tablet      Continue to take this heart medication on your normal schedule.  If this is an oral medication and you take it in the morning, then you may take this medicine with a sip of water.    Direct Xa Inhibitor     Medication Name     apixaban (Eliquis) 5 mg      STOP taking this medication at least 3 days prior to surgery/procedure with prescribing Physician and Surgeon consultation.    Diuretics     Medication Name     furosemide (LASIX) 20 mg tablet      Continue this medication up to the evening before surgery/procedure, but do not take the morning of the day of surgery.    Anti-Gout Meds     Medication Name     allopurinol (ZYLOPRIM) 300 mg tablet      Continue to take this medication on your normal schedule.  If this is an oral medication and you take it in the morning, then you may take this medicine with a sip of water.    Cholesterol lowering meds     Medication Name     ezetimibe (ZETIA) 10 mg tablet      Continue to take this medication on your normal schedule.  If this is an oral medication and you take it in the morning, then you may take this medicine with a sip of water.    Thyroid Medications      Medication Name     levothyroxine 175 mcg tablet      Continue to take this medication on your normal schedule.  If this is an oral medication and you take it in the morning, then you may take this medicine with a sip of water.    Diabetic Medications     Medication Name     metFORMIN (GLUCOPHAGE-XR) 500 mg 24 hr tablet     semaglutide, 2 mg/dose, (Ozempic, 2 MG/DOSE,) 8 mg/ mL injection pen        Medicine Instructions for Adults with Diabetes (NO Bowel Prep)    Follow these instructions when a BOWEL PREP is NOT required for your procedure or surgery!    NOTE:  GLP Agonists taken weekly: do not take in the 7 days before your procedure. **Bariatric surgery: do not take 4 weeks prior to your procedure.    SGLT-2 Inhibitors: do not take in the 4 days before your procedure    On the Day Before Surgery/Procedure  If you are having a procedure (e.g., Colonoscopy) or surgery which DOES NOT require a bowel prep, follow the directions below based on the type of medicine you take for your diabetes.  Type of Medicine You Take Examples What to Do   Pre-Mixed Insulin Intermediate  Szfulfo46/25, Nyqonim81/30, Novolog 70/30, Regular Insulin Take 1/2 your regular dose the evening before our procedure.   Rapid/Fast Acting  Insulin and/or Long-Acting Insulin Humalog U200, NovoLog, Apidra,  Lantus, Levemir, Tresiba, Toujeo,  Fias, Basaglar Take your FULL regular dose the day before procedure.   Oral Diabetic Medicines (sulfonylurea) Glipizide/Glimepiride/  Glucotrol Take your regular dose with dinner the evening before your procedure.   Other Oral Diabetic Medicines Metformin, Glucophage, Glucophage  XR, Riomet, Glumetza, Actose,  Avandia, Gl set, Prandin Take your regular dose with dinner the evening before your procedure   GLP Agonists Adlyxin, Byetta, Bydureon,  Ozempic, Soliqua, Tanzeum,  Trulicity, Victoza, Saxenda,  Rybelsus, Wegovy, Mounjaro, Zepbound If taken daily, take as normal  If taken weekly, do not take this medicine  for 7 days before your procedure including the day of the procedure (resume taking after the procedure). **Bariatric surgery: do not take 4 weeks prior to procedure   SGLT-2 Inhibitors Jardiance, Invokana, Farxiga, Steglatro, Brenzavvy, Qtern, Segluromet Glyxambi, Synjardy, Synjardy XR, Invokamet, InvokametXR, Trijary XR, Xigduo X Do not take for 4 days before your procedure including the day of the procedure (resume taking after the procedure)   This educational material has been approved by the Patient Education Advisory Committee.    On the Day of Surgery/Procedure  Follow the directions below based on the type of medicine you take for your diabetes.  Type of Medicine You Take  Examples What to Do   Long-Acting Insulin Lantus, Levemir, Tresiba,  Toujeo, Basaglar, Semglee If you normally take your Long Acting Insulin in the morning, take the full dose as scheduled.   GLP-I Agonists Adlyxin, Byetta, Bydureon,  Ozempic, Soliqua, Tanzeum,  Trulicity, Victoza, Saxenda,  Rybelsus, Mounjaro Do NOT take this medicine on the day of your procedure (resume taking after the procedure)   Except for the morning Long-Acting Insulin, DO NOT take ANY diabetic medicine on the day of your procedure unless you were instructed by the doctor who manages your diabetes medicines.  Continue to check your blood sugars.  If you have an insulin pump, ask your endocrinologist for instructions at least 3 days before your procedure. NOTE: If you are not able to ask your endocrinologist in advance, on the day of the procedure set your insulin pump to your basal rate only. Bring your insulin pump supplies to the hospital.    If you have any questions about taking your diabetes medicines prior to your procedure, please contact the doctor who manages your diabetes medicines.

## 2025-01-20 ENCOUNTER — OFFICE VISIT (OUTPATIENT)
Dept: ENDOCRINOLOGY | Facility: CLINIC | Age: 60
End: 2025-01-20
Payer: COMMERCIAL

## 2025-01-20 VITALS
SYSTOLIC BLOOD PRESSURE: 140 MMHG | OXYGEN SATURATION: 98 % | TEMPERATURE: 97.8 F | HEART RATE: 68 BPM | BODY MASS INDEX: 37.25 KG/M2 | HEIGHT: 72 IN | WEIGHT: 275 LBS | DIASTOLIC BLOOD PRESSURE: 78 MMHG

## 2025-01-20 DIAGNOSIS — E66.812 CLASS 2 SEVERE OBESITY DUE TO EXCESS CALORIES WITH SERIOUS COMORBIDITY AND BODY MASS INDEX (BMI) OF 36.0 TO 36.9 IN ADULT (HCC): ICD-10-CM

## 2025-01-20 DIAGNOSIS — E21.3 HYPERPARATHYROIDISM (HCC): ICD-10-CM

## 2025-01-20 DIAGNOSIS — E03.9 HYPOTHYROIDISM (ACQUIRED): ICD-10-CM

## 2025-01-20 DIAGNOSIS — R80.9 CONTROLLED TYPE 2 DIABETES MELLITUS WITH MICROALBUMINURIA, WITHOUT LONG-TERM CURRENT USE OF INSULIN  (HCC): Primary | ICD-10-CM

## 2025-01-20 DIAGNOSIS — E66.01 CLASS 2 SEVERE OBESITY DUE TO EXCESS CALORIES WITH SERIOUS COMORBIDITY AND BODY MASS INDEX (BMI) OF 36.0 TO 36.9 IN ADULT (HCC): ICD-10-CM

## 2025-01-20 DIAGNOSIS — E11.29 CONTROLLED TYPE 2 DIABETES MELLITUS WITH MICROALBUMINURIA, WITHOUT LONG-TERM CURRENT USE OF INSULIN  (HCC): Primary | ICD-10-CM

## 2025-01-20 PROCEDURE — 99214 OFFICE O/P EST MOD 30 MIN: CPT | Performed by: STUDENT IN AN ORGANIZED HEALTH CARE EDUCATION/TRAINING PROGRAM

## 2025-01-20 NOTE — PROGRESS NOTES
Name: Fawad Baer      : 1965      MRN: [id number]  Encounter Provider: William Farley DO  Encounter Date: 2025   Encounter department: [facility]  :  Assessment & Plan  Controlled type 2 diabetes mellitus with microalbuminuria, without long-term current use of insulin  (HCC)  Good control. Patient is at acceptable risk to proceed with planned surgical intervention of spinal cord stimulator placement. No other changes in management today.   Lab Results   Component Value Date    HGBA1C 6.2 (H) 2025     Orders:  •  Comprehensive metabolic panel; Future  •  Hemoglobin A1C; Future    Class 2 severe obesity due to excess calories with serious comorbidity and body mass index (BMI) of 36.0 to 36.9 in adult (HCC)  Slight worsening. Will follow       Hypothyroidism (acquired)  Update TFTs on current Rx LT4  Orders:  •  Comprehensive metabolic panel; Future  •  T4, free; Future  •  TSH, 3rd generation; Future    Hyperparathyroidism (HCC)  Sp 2-gland parathyroidectomy with biochemical cure.          RTC 4-mo    History of Present Illness   HPI  Fawad Baer is a 59 y.o. male who presents for T2D c/b CKD, hypothyroidism, and primary hyperparathyroidism. He is sp 2 gland parathyroidectomy (right and left superior). Post-op calcium and PTH are in normal limits. Last DXA wnl 2025. He notes improvements in fatigue and myalgias.     Over holidays, he mentions worsening of weight. He is on metformin 500 mg bid, and ozempic 2 mg weekly. He is following with nephrology. He is being considered for SGLT2i but on hold for now due to UTI/hematuria. He mentions blood also with ejaculate. He is seeing urology and recently on prostate MRI. He is on ACEi.     He is having a percutaneous spinal cord simulator for neuropathy.         Review of Systems       Objective   /78 (BP Location: Left arm, Patient Position: Sitting)   Pulse 68   Temp 97.8 °F (36.6  °C)   Ht 6' (1.829 m)   Wt 125 kg (275 lb)   SpO2 98%   BMI 37.30 kg/m²      Physical Exam  Vitals reviewed.   Constitutional:       General: He is not in acute distress.     Appearance: Normal appearance.   HENT:      Head: Normocephalic and atraumatic.      Nose: Nose normal.   Eyes:      General: No scleral icterus.     Conjunctiva/sclera: Conjunctivae normal.   Pulmonary:      Effort: Pulmonary effort is normal. No respiratory distress.   Musculoskeletal:         General: No deformity.      Cervical back: Normal range of motion.   Skin:     General: Skin is warm and dry.   Neurological:      Mental Status: He is alert.   Psychiatric:         Mood and Affect: Mood normal.         Behavior: Behavior normal.       Component      Latest Ref Rng 1/11/2025   Sodium      135 - 147 mmol/L 139    Potassium      3.5 - 5.3 mmol/L 4.5    Chloride      96 - 108 mmol/L 104    Carbon Dioxide      21 - 32 mmol/L 27    ANION GAP      4 - 13 mmol/L 8    BUN      5 - 25 mg/dL 31 (H)    Creatinine      0.60 - 1.30 mg/dL 1.49 (H)    GLUCOSE, FASTING      65 - 99 mg/dL 112 (H)    Calcium      8.4 - 10.2 mg/dL 9.4    AST      13 - 39 U/L 18    ALT      7 - 52 U/L 20    ALK PHOS      34 - 104 U/L 59    Total Protein      6.4 - 8.4 g/dL 6.5    Albumin      3.5 - 5.0 g/dL 3.8    Total Bilirubin      0.20 - 1.00 mg/dL 0.29    GFR, Calculated      ml/min/1.73sq m 50    Hemoglobin A1C      Normal 4.0-5.6%; PreDiabetic 5.7-6.4%; Diabetic >=6.5%; Glycemic control for adults with diabetes <7.0% % 6.2 (H)    eAG, EST AVG Glucose      mg/dl 131       Legend:  (H) High

## 2025-01-20 NOTE — ASSESSMENT & PLAN NOTE
Update TFTs on current Rx LT4  Orders:  •  Comprehensive metabolic panel; Future  •  T4, free; Future  •  TSH, 3rd generation; Future

## 2025-01-20 NOTE — ASSESSMENT & PLAN NOTE
Good control. Patient is at acceptable risk to proceed with planned surgical intervention of spinal cord stimulator placement. No other changes in management today.   Lab Results   Component Value Date    HGBA1C 6.2 (H) 01/11/2025     Orders:  •  Comprehensive metabolic panel; Future  •  Hemoglobin A1C; Future

## 2025-01-22 ENCOUNTER — ANESTHESIA (OUTPATIENT)
Dept: PERIOP | Facility: HOSPITAL | Age: 60
End: 2025-01-22
Payer: COMMERCIAL

## 2025-01-22 ENCOUNTER — HOSPITAL ENCOUNTER (OUTPATIENT)
Dept: RADIOLOGY | Facility: HOSPITAL | Age: 60
Setting detail: OUTPATIENT SURGERY
Discharge: HOME | End: 2025-01-22
Payer: COMMERCIAL

## 2025-01-22 ENCOUNTER — ANESTHESIA EVENT (OUTPATIENT)
Dept: PERIOP | Facility: HOSPITAL | Age: 60
End: 2025-01-22
Payer: COMMERCIAL

## 2025-01-22 ENCOUNTER — HOSPITAL ENCOUNTER (OUTPATIENT)
Facility: HOSPITAL | Age: 60
Setting detail: OUTPATIENT SURGERY
Discharge: HOME | End: 2025-01-22
Attending: STUDENT IN AN ORGANIZED HEALTH CARE EDUCATION/TRAINING PROGRAM | Admitting: STUDENT IN AN ORGANIZED HEALTH CARE EDUCATION/TRAINING PROGRAM
Payer: COMMERCIAL

## 2025-01-22 VITALS
SYSTOLIC BLOOD PRESSURE: 157 MMHG | DIASTOLIC BLOOD PRESSURE: 100 MMHG | RESPIRATION RATE: 16 BRPM | TEMPERATURE: 97 F | HEART RATE: 60 BPM | OXYGEN SATURATION: 95 %

## 2025-01-22 DIAGNOSIS — Z96.89 S/P INSERTION OF SPINAL CORD STIMULATOR: Primary | ICD-10-CM

## 2025-01-22 DIAGNOSIS — G89.4 CHRONIC PAIN SYNDROME: ICD-10-CM

## 2025-01-22 LAB
GLUCOSE SERPL-MCNC: 107 MG/DL (ref 65–140)
GLUCOSE SERPL-MCNC: 127 MG/DL (ref 65–140)

## 2025-01-22 PROCEDURE — 72070 X-RAY EXAM THORAC SPINE 2VWS: CPT

## 2025-01-22 PROCEDURE — 63650 IMPLANT NEUROELECTRODES: CPT | Performed by: STUDENT IN AN ORGANIZED HEALTH CARE EDUCATION/TRAINING PROGRAM

## 2025-01-22 PROCEDURE — 82948 REAGENT STRIP/BLOOD GLUCOSE: CPT

## 2025-01-22 PROCEDURE — 63685 INS/RPLC SPI NPG/RCVR POCKET: CPT | Performed by: STUDENT IN AN ORGANIZED HEALTH CARE EDUCATION/TRAINING PROGRAM

## 2025-01-22 PROCEDURE — NC001 PR NO CHARGE: Performed by: STUDENT IN AN ORGANIZED HEALTH CARE EDUCATION/TRAINING PROGRAM

## 2025-01-22 PROCEDURE — C1822 GEN, NEURO, HF, RECHG BAT: HCPCS | Performed by: STUDENT IN AN ORGANIZED HEALTH CARE EDUCATION/TRAINING PROGRAM

## 2025-01-22 PROCEDURE — C1778 LEAD, NEUROSTIMULATOR: HCPCS | Performed by: STUDENT IN AN ORGANIZED HEALTH CARE EDUCATION/TRAINING PROGRAM

## 2025-01-22 DEVICE — IPG MODEL IPG3000 KIT, NIPG3000
Type: IMPLANTABLE DEVICE | Site: SPINE THORACIC | Status: FUNCTIONAL
Brand: SENZA

## 2025-01-22 DEVICE — BLUE LEAD KIT, 70CM WITH 5MM SPACING
Type: IMPLANTABLE DEVICE | Site: SPINE THORACIC | Status: FUNCTIONAL
Brand: NEVRO®

## 2025-01-22 DEVICE — N300 LEAD ANCHOR KIT
Type: IMPLANTABLE DEVICE | Site: SPINE THORACIC | Status: FUNCTIONAL
Brand: NEVRO®

## 2025-01-22 RX ORDER — VANCOMYCIN HYDROCHLORIDE 1 G/20ML
INJECTION, POWDER, LYOPHILIZED, FOR SOLUTION INTRAVENOUS AS NEEDED
Status: DISCONTINUED | OUTPATIENT
Start: 2025-01-22 | End: 2025-01-22 | Stop reason: HOSPADM

## 2025-01-22 RX ORDER — CEPHALEXIN 500 MG/1
500 CAPSULE ORAL EVERY 6 HOURS SCHEDULED
Qty: 28 CAPSULE | Refills: 0 | Status: SHIPPED | OUTPATIENT
Start: 2025-01-22 | End: 2025-01-29

## 2025-01-22 RX ORDER — FENTANYL CITRATE/PF 50 MCG/ML
50 SYRINGE (ML) INJECTION
Status: DISCONTINUED | OUTPATIENT
Start: 2025-01-22 | End: 2025-01-22 | Stop reason: HOSPADM

## 2025-01-22 RX ORDER — METHOCARBAMOL 500 MG/1
500 TABLET, FILM COATED ORAL 3 TIMES DAILY PRN
Qty: 20 TABLET | Refills: 0 | Status: SHIPPED | OUTPATIENT
Start: 2025-01-22

## 2025-01-22 RX ORDER — ALBUMIN HUMAN 50 G/1000ML
SOLUTION INTRAVENOUS CONTINUOUS PRN
Status: DISCONTINUED | OUTPATIENT
Start: 2025-01-22 | End: 2025-01-22

## 2025-01-22 RX ORDER — FENTANYL CITRATE 50 UG/ML
INJECTION, SOLUTION INTRAMUSCULAR; INTRAVENOUS AS NEEDED
Status: DISCONTINUED | OUTPATIENT
Start: 2025-01-22 | End: 2025-01-22

## 2025-01-22 RX ORDER — OXYCODONE AND ACETAMINOPHEN 5; 325 MG/1; MG/1
1 TABLET ORAL EVERY 4 HOURS PRN
Status: DISCONTINUED | OUTPATIENT
Start: 2025-01-22 | End: 2025-01-22 | Stop reason: HOSPADM

## 2025-01-22 RX ORDER — LIDOCAINE HYDROCHLORIDE 10 MG/ML
INJECTION, SOLUTION EPIDURAL; INFILTRATION; INTRACAUDAL; PERINEURAL AS NEEDED
Status: DISCONTINUED | OUTPATIENT
Start: 2025-01-22 | End: 2025-01-22

## 2025-01-22 RX ORDER — DEXAMETHASONE SODIUM PHOSPHATE 10 MG/ML
INJECTION, SOLUTION INTRAMUSCULAR; INTRAVENOUS AS NEEDED
Status: DISCONTINUED | OUTPATIENT
Start: 2025-01-22 | End: 2025-01-22

## 2025-01-22 RX ORDER — MIDAZOLAM HYDROCHLORIDE 2 MG/2ML
INJECTION, SOLUTION INTRAMUSCULAR; INTRAVENOUS AS NEEDED
Status: DISCONTINUED | OUTPATIENT
Start: 2025-01-22 | End: 2025-01-22

## 2025-01-22 RX ORDER — LIDOCAINE HYDROCHLORIDE AND EPINEPHRINE 10; 10 MG/ML; UG/ML
INJECTION, SOLUTION INFILTRATION; PERINEURAL AS NEEDED
Status: DISCONTINUED | OUTPATIENT
Start: 2025-01-22 | End: 2025-01-22 | Stop reason: HOSPADM

## 2025-01-22 RX ORDER — ROCURONIUM BROMIDE 10 MG/ML
INJECTION, SOLUTION INTRAVENOUS AS NEEDED
Status: DISCONTINUED | OUTPATIENT
Start: 2025-01-22 | End: 2025-01-22

## 2025-01-22 RX ORDER — HYDROMORPHONE HCL/PF 1 MG/ML
0.5 SYRINGE (ML) INJECTION
Status: DISCONTINUED | OUTPATIENT
Start: 2025-01-22 | End: 2025-01-22 | Stop reason: HOSPADM

## 2025-01-22 RX ORDER — CHLORHEXIDINE GLUCONATE ORAL RINSE 1.2 MG/ML
15 SOLUTION DENTAL ONCE
Status: DISCONTINUED | OUTPATIENT
Start: 2025-01-22 | End: 2025-01-22 | Stop reason: HOSPADM

## 2025-01-22 RX ORDER — METOCLOPRAMIDE HYDROCHLORIDE 5 MG/ML
10 INJECTION INTRAMUSCULAR; INTRAVENOUS ONCE AS NEEDED
Status: DISCONTINUED | OUTPATIENT
Start: 2025-01-22 | End: 2025-01-22 | Stop reason: HOSPADM

## 2025-01-22 RX ORDER — OXYCODONE AND ACETAMINOPHEN 5; 325 MG/1; MG/1
1 TABLET ORAL EVERY 4 HOURS PRN
Qty: 30 TABLET | Refills: 0 | Status: SHIPPED | OUTPATIENT
Start: 2025-01-22 | End: 2025-02-01

## 2025-01-22 RX ORDER — CEFAZOLIN SODIUM 2 G/50ML
2000 SOLUTION INTRAVENOUS ONCE
Status: DISCONTINUED | OUTPATIENT
Start: 2025-01-22 | End: 2025-01-22 | Stop reason: HOSPADM

## 2025-01-22 RX ORDER — ONDANSETRON 2 MG/ML
INJECTION INTRAMUSCULAR; INTRAVENOUS AS NEEDED
Status: DISCONTINUED | OUTPATIENT
Start: 2025-01-22 | End: 2025-01-22

## 2025-01-22 RX ORDER — SODIUM CHLORIDE, SODIUM LACTATE, POTASSIUM CHLORIDE, CALCIUM CHLORIDE 600; 310; 30; 20 MG/100ML; MG/100ML; MG/100ML; MG/100ML
125 INJECTION, SOLUTION INTRAVENOUS CONTINUOUS
Status: DISCONTINUED | OUTPATIENT
Start: 2025-01-22 | End: 2025-01-22 | Stop reason: HOSPADM

## 2025-01-22 RX ORDER — SODIUM CHLORIDE, SODIUM LACTATE, POTASSIUM CHLORIDE, CALCIUM CHLORIDE 600; 310; 30; 20 MG/100ML; MG/100ML; MG/100ML; MG/100ML
INJECTION, SOLUTION INTRAVENOUS CONTINUOUS PRN
Status: DISCONTINUED | OUTPATIENT
Start: 2025-01-22 | End: 2025-01-22

## 2025-01-22 RX ORDER — CEFAZOLIN SODIUM 1 G/50ML
1000 SOLUTION INTRAVENOUS ONCE
Status: DISCONTINUED | OUTPATIENT
Start: 2025-01-22 | End: 2025-01-22 | Stop reason: HOSPADM

## 2025-01-22 RX ORDER — PROPOFOL 10 MG/ML
INJECTION, EMULSION INTRAVENOUS AS NEEDED
Status: DISCONTINUED | OUTPATIENT
Start: 2025-01-22 | End: 2025-01-22

## 2025-01-22 RX ADMIN — PROPOFOL 300 MG: 10 INJECTION, EMULSION INTRAVENOUS at 10:32

## 2025-01-22 RX ADMIN — ROCURONIUM 20 MG: 50 INJECTION, SOLUTION INTRAVENOUS at 10:59

## 2025-01-22 RX ADMIN — ONDANSETRON 4 MG: 2 INJECTION, SOLUTION INTRAMUSCULAR; INTRAVENOUS at 12:01

## 2025-01-22 RX ADMIN — SODIUM CHLORIDE, SODIUM LACTATE, POTASSIUM CHLORIDE, AND CALCIUM CHLORIDE: .6; .31; .03; .02 INJECTION, SOLUTION INTRAVENOUS at 10:26

## 2025-01-22 RX ADMIN — ROCURONIUM 50 MG: 50 INJECTION, SOLUTION INTRAVENOUS at 10:32

## 2025-01-22 RX ADMIN — FENTANYL CITRATE 50 MCG: 50 INJECTION INTRAMUSCULAR; INTRAVENOUS at 10:40

## 2025-01-22 RX ADMIN — FENTANYL CITRATE 50 MCG: 50 INJECTION INTRAMUSCULAR; INTRAVENOUS at 10:32

## 2025-01-22 RX ADMIN — LIDOCAINE HYDROCHLORIDE 50 MG: 10 INJECTION, SOLUTION EPIDURAL; INFILTRATION; INTRACAUDAL at 10:32

## 2025-01-22 RX ADMIN — MIDAZOLAM 2 MG: 1 INJECTION INTRAMUSCULAR; INTRAVENOUS at 10:26

## 2025-01-22 RX ADMIN — ALBUMIN (HUMAN): 12.5 INJECTION, SOLUTION INTRAVENOUS at 11:43

## 2025-01-22 RX ADMIN — SUGAMMADEX 300 MG: 100 INJECTION, SOLUTION INTRAVENOUS at 12:06

## 2025-01-22 RX ADMIN — DEXAMETHASONE SODIUM PHOSPHATE 10 MG: 10 INJECTION INTRAMUSCULAR; INTRAVENOUS at 10:52

## 2025-01-22 NOTE — ANESTHESIA POSTPROCEDURE EVALUATION
Post-Op Assessment Note    CV Status:  Stable  Pain Score: 0    Pain management: adequate       Mental Status:  Alert and awake   Hydration Status:  Stable   PONV Controlled:  None   Airway Patency:  Patent     Post Op Vitals Reviewed: Yes    No anethesia notable event occurred.    Staff: CRNA           Last Filed PACU Vitals:  Vitals Value Taken Time   Temp 97.1    Pulse 63    /100    Resp 12    SpO2 98

## 2025-01-22 NOTE — H&P
Neurosurgery History and Physical    Prior clinic encounter from 1/2/24 reviewed. After examining the patient I find no changes in the patients condition since the encounter.    Patient personally seen and examined.  Neurological examination unchanged compared to last office/progress note, with the following exceptions:    /94   Pulse 62   Temp (!) 97.3 °F (36.3 °C) (Temporal)   Resp 20   SpO2 99%      Awake and alert.  Regular cardiac rate and rhythm.  No respiratory distress.  Abdomen nontender.  Normocephalic. Extremities warm, well perfused.    Post operative instructions and medications have been reviewed with the patient.    Assessment and Plan:    All questions have been answered to the patient satisfaction.  Plan to proceed with placement of percutaneous spinal cord stimulator with battery in the right flank. They are in agreement with proceeding.

## 2025-01-22 NOTE — ANESTHESIA PREPROCEDURE EVALUATION
Procedure:  Placement of percutaneous spinal cord stimulator with battery in the right flank (Right: Spine Thoracic)    Relevant Problems   CARDIO   (+) Pacemaker   (+) Paroxysmal atrial fibrillation (HCC)   (+) Primary hypertension   (+) Sinus bradycardia      ENDO   (+) Controlled type 2 diabetes mellitus with microalbuminuria, without long-term current use of insulin  (HCC)   (+) Hyperparathyroidism (HCC)   (+) Hypothyroidism (acquired)      /RENAL   (+) Diabetic nephropathy associated with type 2 diabetes mellitus (HCC)   (+) Stage 3a chronic kidney disease (HCC)      MUSCULOSKELETAL   (+) Gout   (+) Mid back pain      NEURO/PSYCH   (+) Chronic pain syndrome   (+) Painful diabetic neuropathy (HCC)      PULMONARY   (+) Obstructive sleep apnea        Physical Exam    Airway    Mallampati score: II  TM Distance: >3 FB  Neck ROM: full     Dental   No notable dental hx     Cardiovascular      Pulmonary      Other Findings        Anesthesia Plan  ASA Score- 3     Anesthesia Type- general with ASA Monitors.         Additional Monitors:     Airway Plan: ETT.           Plan Factors-Exercise tolerance (METS): >4 METS.    Chart reviewed. EKG reviewed.  Existing labs reviewed. Patient summary reviewed.    Patient is not a current smoker.      There is medical exclusion for perioperative obstructive sleep apnea risk education.        Induction- intravenous.    Postoperative Plan- Plan for postoperative opioid use.         Informed Consent- Anesthetic plan and risks discussed with patient.  I personally reviewed this patient with the CRNA. Discussed and agreed on the Anesthesia Plan with the CRNA..      NPO Status:  Vitals Value Taken Time   Date of last liquid 01/21/25 01/22/25 0958   Time of last liquid 2000 01/22/25 0958   Date of last solid 01/21/25 01/22/25 0958   Time of last solid 2000 01/22/25 0958

## 2025-01-22 NOTE — DISCHARGE INSTR - AVS FIRST PAGE
Spinal Cord Stimulator & IPG implant Discharge Instructions    Activity:    1. Do not lift more than 20 pounds for 2 weeks.  2. Avoid bending, lifting and twisting for 2 weeks.    Surgical incision care:    1. Keep dressings in place for 3 days, if dressing already applied during the procedure.  2. Keep incisions dry for 3 days.  3. May allow clean water to flow over incisions after 3 days.  4. Do not immerse the incisions in water for 4 weeks.  5. After 3 days, incisions may be left open to air, but should remain clean.  6. Do not apply any creams or ointments to the incision, unless otherwise instructed by [facility].  7. Contact office if increasing redness, drainage, pain or swelling around the incisions.      Postoperative medication:    1. [facility]  provide pain medication for the first 2 weeks after surgery as coordinated with your pain specialist.   2. If [facility] is providing pain medication, please contact office for questions regarding dosage and modifications.  3. If [facility] is not providing pain medication postoperatively, then contact pain specialist for additional instructions and prescriptions.  4. No antiplatelet or anticoagulation medication for 7-10 days after surgery, unless otherwise instructed. Please contact . [facility] if you have any questions about the effects of any of your medications on blood clotting.  5. Do not operate heavy machinery or vehicles while taking sedating medications.

## 2025-01-22 NOTE — OP NOTE
OPERATIVE REPORT  PATIENT NAME: Fawad Baer    :  1965  MRN: [id number]  Pt Location: [facility] OR ROOM 01    SURGERY DATE: 2025    Surgeons and Role:     * Natalio Rosas MD - Primary    Preop Diagnosis:  Chronic pain syndrome [G89.4]  Diabetic nephropathy associated with type 2 diabetes mellitus (HCC) [E11.21]    Post-Op Diagnosis Codes:     * Chronic pain syndrome [G89.4]     * Diabetic nephropathy associated with type 2 diabetes mellitus (HCC) [E11.21]    Procedure(s):  Placement of percutaneous spinal cord stimulator leads x 2  Placement of right flank IPG    Specimen(s):  * No specimens in log *    Estimated Blood Loss:   Minimal    Drains:  * No LDAs found *    Anesthesia Type:   General    Operative Indications:  Chronic pain syndrome [G89.4]  Diabetic nephropathy associated with type 2 diabetes mellitus (HCC) [E11.21]      Operative Findings:  2 leads placed at the level of T10, system interrogated and functioning appropriately at conclusion of case.      Complications:   None    Procedure and Technique:  General endotracheal anesthesia was induced.  Appropriate intravenous antibiotics were given. Serial compression devices were applied to the legs and the patient was placed prone on the Neo table with all pressure points padded.  X-ray confirmation of the appropriate level was obtained using fluoroscopy.  The back was prepped and draped in the usual sterile fashion.  Timeout was performed per protocol.      The loss of resistance technique was used to enter the epidural space with the spinal needle on the right, starting at the pedicle of L2 and aiming towards the air bubble at the T11/12 disk space in the midline. The lead was placed through the needle using the navigating stylet to mimic the trial with the tip at top of T10    The loss of resistance technique was used to enter the epidural space with the spinal needle on the right, starting at the pedicle of L2 and aiming towards the air  bubble at the T11/12 disk space in the midline. The lead was placed through the needle using the navigating stylet to mimic the trial with the tip at the bottom of T10.    A midline incision connected the 2 trochar entry points was made sharply  The subcutaneous tissue above the fascia was dissected on each side until the needle was visualized.  The fascia was incised around the the needle to allow room for the locking flange on both sides.  The needles and stylets were carefully removed so that the leads were not displaced.  This was confirmed radiographically.  The leads were then passed from the percutaneous insertion sites to the midline.  The locking flanges were placed over the wires, partially through the fascial openings on both sided.  They were locked down using the torque wrench.  They were secured in place with 2-0- silk suture.    Attention was then turned towards the hip incision.  A horizontal incision at the waistline on the right was created with a #15 blade.  Blunt and sharp dissection were used to make a pocket for the battery approximately 1 cm deep to the skin.  Hemostasis was obtained.  The pulse generator battery pack fit well in the pocket created for it.  It was removed.  The wires were tunneled from superior to the inferior incision.  They were cleaned and inserted into the pulse generator battery pack per protocol.  The device was grounded and tested.  Good impedance was noted at all leads.  The leads were final tightened to the battery using the torque wrench at each lead.  The battery was then placed in the pouch for the final time with the lettering facing dorsally and the wires coiled underneath.  Vancomycin powder was applied to that wound.  The wounds were closed in layers of 0 Vicryl, 2-0 Vicryl, 5-0 Chromic gut and glue at the skin.       I was present for the entire procedure.      Patient Disposition:  PACU     Implant Name Type Inv. Item Serial No.  Lot No. LRB No.  Used Action   LEAD BLUE PERC 70CM - [id number]  LEAD BLUE PERC 70CM  [id number] Right 1 Implanted   LEAD BLUE PERC 70CM - [id number]  LEAD BLUE PERC 70CM  NEVRO [id number] Right 1 Implanted   ANCHOR LEAD KIT N300 - SNA  ANCHOR LEAD KIT N300 NA NEVRO [id number] Right 1 Implanted   IPG SENZA KIT - [id number]  IPG SENZA KIT [id number] NEVRO [id number] Right 1 Implanted             SIGNATURE: Natalio Rosas MD  DATE: January 22, 2025  TIME: 9:51 AM

## 2025-01-24 DIAGNOSIS — E78.2 MIXED HYPERLIPIDEMIA: ICD-10-CM

## 2025-01-24 DIAGNOSIS — E11.42 DIABETIC PERIPHERAL NEUROPATHY (HCC): ICD-10-CM

## 2025-01-24 RX ORDER — EZETIMIBE 10 MG/1
10 TABLET ORAL DAILY
Qty: 90 TABLET | Refills: 1 | Status: SHIPPED | OUTPATIENT
Start: 2025-01-24

## 2025-01-24 RX ORDER — GABAPENTIN 600 MG/1
600 TABLET ORAL 3 TIMES DAILY
Qty: 270 TABLET | Refills: 1 | Status: SHIPPED | OUTPATIENT
Start: 2025-01-24

## 2025-01-25 NOTE — ANESTHESIA POSTPROCEDURE EVALUATION
Post-Op Assessment Note    CV Status:  Stable    Pain management: adequate       Mental Status:  Alert and awake   Hydration Status:  Euvolemic   PONV Controlled:  Controlled   Airway Patency:  Patent     Post Op Vitals Reviewed: Yes    No anethesia notable event occurred.    Staff: Anesthesiologist, with CRNAs           Last Filed PACU Vitals:  Vitals Value Taken Time   Temp 97.1 °F (36.2 °C) 01/22/25 1300   Pulse 60 01/22/25 1301   /96 01/22/25 1300   Resp 16 01/22/25 1230   SpO2 95 % 01/22/25 1301   Vitals shown include unfiled device data.    Modified Octavio:     Vitals Value Taken Time   Activity 2 01/22/25 1300   Respiration 2 01/22/25 1300   Circulation 2 01/22/25 1300   Consciousness 2 01/22/25 1300   Oxygen Saturation 2 01/22/25 1300     Modified Octavio Score: 10

## 2025-01-28 ENCOUNTER — TELEPHONE (OUTPATIENT)
Dept: NEUROSURGERY | Facility: CLINIC | Age: 60
End: 2025-01-28

## 2025-02-05 DIAGNOSIS — E03.9 HYPOTHYROIDISM (ACQUIRED): ICD-10-CM

## 2025-02-06 RX ORDER — LEVOTHYROXINE SODIUM 175 UG/1
TABLET ORAL
Qty: 90 TABLET | Refills: 1 | Status: SHIPPED | OUTPATIENT
Start: 2025-02-06

## 2025-02-07 ENCOUNTER — CLINICAL SUPPORT (OUTPATIENT)
Dept: NEUROSURGERY | Facility: CLINIC | Age: 60
End: 2025-02-07

## 2025-02-07 DIAGNOSIS — Z09 FOLLOW-UP EXAMINATION, FOLLOWING OTHER SURGERY: Primary | ICD-10-CM

## 2025-02-07 PROCEDURE — 99024 POSTOP FOLLOW-UP VISIT: CPT | Performed by: STUDENT IN AN ORGANIZED HEALTH CARE EDUCATION/TRAINING PROGRAM

## 2025-02-07 NOTE — PROGRESS NOTES
Post-Op Visit- Neurosurgery    Fawad Baer 59 y.o. male MRN: [id number]    Chief Complaint:  Patient presents post: Placement of percutaneous spinal cord stimulator with battery in the right flank - Right    History of Present Illness:  Patient presents for 2 week POV for incision check alone and ambulating without an assistive device.  Patient reports he is doing well overall and denies any incisional issues or fevers.  he denies any new weakness, numbness or tingling since the surgery.  Patient denies surgical pain at this time and rates their pain as a Pain Score: 0-No pain/10.      Wound Exam: Incision well approximated.  No erythema, edema or drainage present.   Location: back                   Discussion/Summary:  Doing well postoperatively. Reviewed incision care with patient including daily observation for s/s infection including: increased erythema, edema, drainage, dehiscence of incision or fever >101.  Should these be observed, he understands that he is to call and/or return immediately for reassessment.  Advised patient to continue cleansing area with mild soap and water and pat dry. Not to apply any lotions, creams, or ointments, & not to submerge in any water for 4 more weeks.     He is to maintain activity restrictions until cleared by the surgeon. Activity levels were also reviewed with the patient in detail, he is to lift no greater than 10 pounds and ambulation is encouraged as tolerated.     Verified date/time/location of upcoming POV and reminded him to complete x-rays prior to his next appointment. He is to call the office with any further questions or concerns, or if any incisional issues or fevers would arise.     Patient met with [employer] rep at the conclusion of our visit.

## 2025-02-10 DIAGNOSIS — E11.29 CONTROLLED TYPE 2 DIABETES MELLITUS WITH MICROALBUMINURIA, WITHOUT LONG-TERM CURRENT USE OF INSULIN  (HCC): ICD-10-CM

## 2025-02-10 DIAGNOSIS — R80.9 CONTROLLED TYPE 2 DIABETES MELLITUS WITH MICROALBUMINURIA, WITHOUT LONG-TERM CURRENT USE OF INSULIN  (HCC): ICD-10-CM

## 2025-02-11 RX ORDER — SEMAGLUTIDE 2.68 MG/ML
INJECTION, SOLUTION SUBCUTANEOUS
Qty: 3 ML | Refills: 1 | Status: SHIPPED | OUTPATIENT
Start: 2025-02-11

## 2025-02-18 ENCOUNTER — PATIENT MESSAGE (OUTPATIENT)
Age: 60
End: 2025-02-18

## 2025-02-19 DIAGNOSIS — R60.0 LEG EDEMA: ICD-10-CM

## 2025-02-19 RX ORDER — FUROSEMIDE 20 MG/1
20 TABLET ORAL DAILY
Qty: 90 TABLET | Refills: 0 | Status: CANCELLED | OUTPATIENT
Start: 2025-02-19

## 2025-02-20 DIAGNOSIS — R60.9 EDEMA: Primary | ICD-10-CM

## 2025-02-20 RX ORDER — FUROSEMIDE 40 MG/1
40 TABLET ORAL DAILY
Qty: 90 TABLET | Refills: 3 | Status: SHIPPED | OUTPATIENT
Start: 2025-02-20

## 2025-03-03 DIAGNOSIS — I48.0 PAROXYSMAL ATRIAL FIBRILLATION (HCC): ICD-10-CM

## 2025-03-04 RX ORDER — CARVEDILOL 6.25 MG/1
TABLET ORAL
Qty: 90 TABLET | Refills: 1 | Status: SHIPPED | OUTPATIENT
Start: 2025-03-04

## 2025-03-05 ENCOUNTER — HOSPITAL ENCOUNTER (OUTPATIENT)
Dept: RADIOLOGY | Facility: HOSPITAL | Age: 60
Discharge: HOME | End: 2025-03-05
Payer: COMMERCIAL

## 2025-03-05 DIAGNOSIS — Z09 FOLLOW-UP EXAMINATION, FOLLOWING OTHER SURGERY: ICD-10-CM

## 2025-03-05 PROCEDURE — 72072 X-RAY EXAM THORAC SPINE 3VWS: CPT

## 2025-03-06 DIAGNOSIS — R80.9 PROTEINURIA: ICD-10-CM

## 2025-03-07 ENCOUNTER — OFFICE VISIT (OUTPATIENT)
Dept: NEUROSURGERY | Facility: CLINIC | Age: 60
End: 2025-03-07
Payer: COMMERCIAL

## 2025-03-07 VITALS
WEIGHT: 263 LBS | DIASTOLIC BLOOD PRESSURE: 68 MMHG | HEART RATE: 99 BPM | HEIGHT: 72 IN | OXYGEN SATURATION: 63 % | SYSTOLIC BLOOD PRESSURE: 116 MMHG | TEMPERATURE: 98.4 F | RESPIRATION RATE: 18 BRPM | BODY MASS INDEX: 35.62 KG/M2

## 2025-03-07 DIAGNOSIS — G89.4 CHRONIC PAIN SYNDROME: Primary | ICD-10-CM

## 2025-03-07 PROCEDURE — 99212 OFFICE O/P EST SF 10 MIN: CPT | Performed by: STUDENT IN AN ORGANIZED HEALTH CARE EDUCATION/TRAINING PROGRAM

## 2025-03-07 RX ORDER — LISINOPRIL 10 MG/1
10 TABLET ORAL DAILY
Qty: 90 TABLET | Refills: 1 | Status: SHIPPED | OUTPATIENT
Start: 2025-03-07

## 2025-03-07 NOTE — PROGRESS NOTES
Name: Fawad Baer      : 1965      MRN: [id number]  Encounter Provider: Natalio Rosas MD  Encounter Date: 3/7/2025   Enc[facility]  :  Assessment & Plan  Chronic pain syndrome  Fawad Baer is 59 year old male with a history of chronic back pain a diabetic neuropathy who returns to clinic ~6 weeks s/p SCS placement. Overall he is doing well and reports good efficacy for his neuropathic pain. I did review his XR which show the leads at T10, a level lower then placement. His wounds have healed well and he is overall veryt happy with the results so far. Going forward I'll continue to be available on an as needed basis for any issues related to his SCS system.            History of Present Illness   HPI     Fawad Baer is 59 year old male with a history of chronic back pain a diabetic neuropathy who returns to clinic ~6 weeks s/p SCS placement. See assessment and plan.  Review of Systems   Constitutional: Negative.    HENT: Negative.     Eyes: Negative.    Respiratory: Negative.     Cardiovascular: Negative.    Gastrointestinal: Negative.    Endocrine: Negative.    Genitourinary: Negative.    Musculoskeletal: Negative.    Skin: Negative.    Allergic/Immunologic: Negative.    Neurological: Negative.    Hematological: Negative.    Psychiatric/Behavioral: Negative.       I have personally reviewed the MA's review of systems and made changes as necessary.    Past Medical History   Past Medical History:   Diagnosis Date    Arthritis     right foot    Chronic cholecystitis     Chronic kidney disease     24 per chart Stage 3    CPAP (continuous positive airway pressure) dependence     Diabetes mellitus (HCC)     Type 2    Diabetic nephropathy (HCC)     Disease of thyroid gland     Diverticulitis of colon     DVT (deep venous thrombosis) (HCC)     24 per pt hx of 3/2024 due left adrenal gland ruptured - taken off of Xarelto for 2  weeks developed blood clot Left calf and both lungs    Gout     History of pulmonary embolus (PE) 03/2024    ruptured adrenal gland - taken off of Xarelto for 2 weeks and developed blood clot in left leg and both lungs    Hyperlipidemia     Hypertension     Hypothyroidism     MTHFR mutation     Neuropathy     Neuropathy in diabetes (HCC) 2018    Scab     right arm- no s/s infection    Sleep apnea     Tarsal tunnel syndrome, right     11/25/24 bilateral per pt    Tinnitus     left ear    Wears glasses     and contacts    Wears glasses      Past Surgical History:   Procedure Laterality Date    APPENDECTOMY      ARTHRODESIS      Lumbar L__    BACK SURGERY      BUNIONECTOMY Right 10/07/2016    Procedure: REMOVAL TIBIAL  SESAMOID BONE  RIGHT FOOT;  Surgeon: Vicente Aguirre DPM;  [facility];  Service:     CARDIAC PACEMAKER PLACEMENT  09/01/2021    CHG ASSAY OF PARATHORMONE N/A 12/4/2024    Procedure: INTRAOP PTH MONITORING;  Surgeon: Og Segovia MD;  [facility];  Service: Surgical Oncology    CHOLECYSTECTOMY  03/26/2015    COLONOSCOPY      KNEE ARTHROSCOPY      KNEE ARTHROSCOPY W/ MENISCAL REPAIR      Lateral    NASAL SEPTUM SURGERY  10/16/2013    PLANTAR FASCIA SURGERY Left 10/26/2023    Procedure: RELEASE FASCIA PLANTAR/FASCIOTOMY;  Surgeon: Inderjit Ramirez DPM;  [facility]: [facility];  Service: Podiatry    MA COLONOSCOPY FLX DX W/COLLJ SPEC WHEN PFRMD N/A 11/23/2018    Procedure: COLONOSCOPY;  Surgeon: Manjit Dietrich MD;  [facility];  Service: Gastroenterology    MA PARATHYROIDECTOMY/EXPLORATION PARATHYROIDS Right 12/4/2024    Procedure: MINIMALLY INVASIVE RIGHT PARATHYROIDECTOMY, PSB 4 GLAND EXPLORATION;  Surgeon: Og Segovia MD;  [facility];  Service: Surgical Oncology    MA PRQ IMPLTJ NSTIM ELECTRODE ARRAY EPIDURAL Bilateral 12/18/2024    Procedure: NEVRO SCS TRIAL;  Surgeon: Jing Vinson MD;  [facility];  Service: Pain Management      DC PRQ IMPLTJ NSTIM ELECTRODE ARRAY EPIDURAL Right 1/22/2025    Procedure: Placement of percutaneous spinal cord stimulator with battery in the right flank;  Surgeon: Natalio Rosas MD;  [facility];  Service: Neurosurgery    DC RELEASE TARSAL TUNNEL Right 06/25/2018    Procedure: RELEASE TARSAL TUNNEL;  Surgeon: Cliff Bernabe DPM;  [facility];  Service: Podiatry    DC RELEASE TARSAL TUNNEL Left 03/13/2020    Procedure: RELEASE TARSAL TUNNEL;  Surgeon: Cliff Bernabe DPM;  Location: [facility];  Service: Podiatry    DC RELEASE TARSAL TUNNEL Left 10/26/2023    Procedure: RELEASE TARSAL TUNNEL;  Surgeon: Inderjit Ramirez DPM;  [facility];  Service: Podiatry    SPINAL FUSION      TONSILLECTOMY  10/16/2013    with Adenoidectomy    UVULECTOMY  10/16/2013    UVULOPALATOPHARYNGOPLASTY      WISDOM TOOTH EXTRACTION       Family History   Problem Relation Age of Onset    Aneurysm Mother         intracranial aneurysm repair    Coronary artery disease Father     Hypertension Father     Heart disease Father     Aneurysm Brother     Anesthesia problems Neg Hx      he reports that he has never smoked. He has never used smokeless tobacco. He reports current alcohol use of about 6.0 standard drinks of alcohol per week. He reports that he does not use drugs.  Current Outpatient Medications   Medication Instructions    allopurinol (ZYLOPRIM) 300 mg, Oral, Daily    carvedilol (COREG) 6.25 mg tablet Take 1 tablet by mouth once daily in the morning and take 1 and 1/2 tablets once daily in the evening.    ezetimibe (ZETIA) 10 mg, Oral, Daily    furosemide (LASIX) 40 mg, Oral, Daily    gabapentin (NEURONTIN) 600 mg, Oral, 3 times daily    levothyroxine 175 mcg tablet TAKE 1 TABLET BY MOUTH ONCE DAILY ON AN EMPTY STOMACH 1 HOUR BEFORE BREAKFAST AND 4 HOURS APART FROM CALCIUM AND VITAMIN D SUPPLEMENTATION    lisinopril (ZESTRIL) 10 mg, Oral, Daily    metFORMIN (GLUCOPHAGE-XR) 500 mg, Oral, Daily at  bedtime    methocarbamol (ROBAXIN) 500 mg, Oral, 3 times daily PRN    Ozempic, 2 MG/DOSE, 8 MG/3ML injection pen Inject 2 mg under the skin every 7 days.    sildenafil (VIAGRA) 100 mg, Oral, As needed    urea (CARMOL) 40 % Topical, Daily, Apply to callused area   No Known Allergies   Objective   There were no vitals taken for this visit.    Physical Exam  Neurological Exam  Awake and alert  Oriented and appropriate  5/5 throughout

## 2025-03-10 NOTE — ASSESSMENT & PLAN NOTE
Fawad Baer is 59 year old male with a history of chronic back pain a diabetic neuropathy who returns to clinic ~6 weeks s/p SCS placement. Overall he is doing well and reports good efficacy for his neuropathic pain. I did review his XR which show the leads at T10, a level lower then placement. His wounds have healed well and he is overall veryt happy with the results so far. Going forward I'll continue to be available on an as needed basis for any issues related to his SCS system.

## 2025-03-16 DIAGNOSIS — E11.42 DIABETIC PERIPHERAL NEUROPATHY (HCC): ICD-10-CM

## 2025-03-17 RX ORDER — GABAPENTIN 600 MG/1
600 TABLET ORAL 3 TIMES DAILY
Qty: 270 TABLET | Refills: 1 | Status: SHIPPED | OUTPATIENT
Start: 2025-03-17

## 2025-03-18 ENCOUNTER — IN-CLINIC DEVICE VISIT (OUTPATIENT)
Dept: CARDIOLOGY CLINIC | Facility: HOSPITAL | Age: 60
End: 2025-03-18
Payer: COMMERCIAL

## 2025-03-18 DIAGNOSIS — I48.91 ATRIAL FIBRILLATION, UNSPECIFIED TYPE (HCC): Primary | ICD-10-CM

## 2025-03-18 DIAGNOSIS — I49.5 SSS (SICK SINUS SYNDROME) (HCC): ICD-10-CM

## 2025-03-18 PROCEDURE — 93280 PM DEVICE PROGR EVAL DUAL: CPT | Performed by: INTERNAL MEDICINE

## 2025-03-18 NOTE — PROGRESS NOTES
Results for orders placed or performed in visit on 03/18/25   Cardiac EP device report    Narrative    MDDT DUAL PM/ ACTIVE SYSTEM IS MRI CONDITIONAL  DEVICE INTERROGATED IN THE [facility]: BATTERY VOLTAGE ADEQUATE (10.7 YRS). AP: 72.5%. : 1.2% (MVP-ON). ALL LEAD PARAMETERS WITHIN NORMAL LIMITS. 2 VT EPISODES W/ EGMS SHOWING NSVT 6 BEATS @ 214 BPM & 8 BEATS @ 190 BPM. 143 AT/AF EPISODES W/ AVAIL EGMS SHOWING AF, MAX DURATION 9 HRS 20 MINS. AF BURDEN: 0.3%. PT TAKES ELIQUIS, COREG. EF: 60-65% (ECHO 3/16/24). NO PROGRAMMING CHANGES MADE TO DEVICE PARAMETERS. TASK DR. CALDERON. NORMAL DEVICE FUNCTION. CH

## 2025-03-27 ENCOUNTER — RESULTS FOLLOW-UP (OUTPATIENT)
Dept: CARDIOLOGY CLINIC | Facility: CLINIC | Age: 60
End: 2025-03-27

## 2025-04-10 DIAGNOSIS — E11.29 CONTROLLED TYPE 2 DIABETES MELLITUS WITH MICROALBUMINURIA, WITHOUT LONG-TERM CURRENT USE OF INSULIN  (HCC): ICD-10-CM

## 2025-04-10 DIAGNOSIS — R80.9 CONTROLLED TYPE 2 DIABETES MELLITUS WITH MICROALBUMINURIA, WITHOUT LONG-TERM CURRENT USE OF INSULIN  (HCC): ICD-10-CM

## 2025-04-11 RX ORDER — SEMAGLUTIDE 2.68 MG/ML
INJECTION, SOLUTION SUBCUTANEOUS
Qty: 3 ML | Refills: 3 | Status: SHIPPED | OUTPATIENT
Start: 2025-04-11

## 2025-04-13 DIAGNOSIS — E11.42 DIABETIC PERIPHERAL NEUROPATHY (HCC): ICD-10-CM

## 2025-04-15 ENCOUNTER — TELEPHONE (OUTPATIENT)
Age: 60
End: 2025-04-15

## 2025-04-15 RX ORDER — GABAPENTIN 600 MG/1
600 TABLET ORAL 3 TIMES DAILY
Qty: 270 TABLET | Refills: 1 | Status: SHIPPED | OUTPATIENT
Start: 2025-04-15

## 2025-04-15 NOTE — TELEPHONE ENCOUNTER
I called and left a VM for Marciano regarding completing blood/urine labs prior to upcoming appt on 04/22/2025

## 2025-04-21 ENCOUNTER — RESULTS FOLLOW-UP (OUTPATIENT)
Dept: NEPHROLOGY | Facility: CLINIC | Age: 60
End: 2025-04-21

## 2025-04-21 ENCOUNTER — RESULTS FOLLOW-UP (OUTPATIENT)
Dept: ENDOCRINOLOGY | Facility: CLINIC | Age: 60
End: 2025-04-21

## 2025-04-21 ENCOUNTER — APPOINTMENT (OUTPATIENT)
Dept: LAB | Facility: HOSPITAL | Age: 60
End: 2025-04-21
Payer: COMMERCIAL

## 2025-04-21 DIAGNOSIS — I12.9 HYPERTENSIVE KIDNEY DISEASE: ICD-10-CM

## 2025-04-21 DIAGNOSIS — E11.21 DIABETIC NEPHROPATHY ASSOCIATED WITH TYPE 2 DIABETES MELLITUS (HCC): ICD-10-CM

## 2025-04-21 DIAGNOSIS — R80.1 PERSISTENT PROTEINURIA: ICD-10-CM

## 2025-04-21 DIAGNOSIS — E03.9 HYPOTHYROIDISM (ACQUIRED): ICD-10-CM

## 2025-04-21 DIAGNOSIS — Z98.890 S/P PARATHYROIDECTOMY: ICD-10-CM

## 2025-04-21 DIAGNOSIS — Z90.89 S/P PARATHYROIDECTOMY: ICD-10-CM

## 2025-04-21 DIAGNOSIS — E11.29 CONTROLLED TYPE 2 DIABETES MELLITUS WITH MICROALBUMINURIA, WITHOUT LONG-TERM CURRENT USE OF INSULIN  (HCC): ICD-10-CM

## 2025-04-21 DIAGNOSIS — N18.30 STAGE 3 CHRONIC KIDNEY DISEASE, UNSPECIFIED WHETHER STAGE 3A OR 3B CKD (HCC): ICD-10-CM

## 2025-04-21 DIAGNOSIS — R80.9 CONTROLLED TYPE 2 DIABETES MELLITUS WITH MICROALBUMINURIA, WITHOUT LONG-TERM CURRENT USE OF INSULIN  (HCC): ICD-10-CM

## 2025-04-21 LAB
ALBUMIN SERPL BCG-MCNC: 4 G/DL (ref 3.5–5)
ALP SERPL-CCNC: 53 U/L (ref 34–104)
ALT SERPL W P-5'-P-CCNC: 24 U/L (ref 7–52)
ANION GAP SERPL CALCULATED.3IONS-SCNC: 9 MMOL/L (ref 4–13)
AST SERPL W P-5'-P-CCNC: 19 U/L (ref 13–39)
BACTERIA UR QL AUTO: ABNORMAL /HPF
BASOPHILS # BLD AUTO: 0.03 THOUSANDS/ÂΜL (ref 0–0.1)
BASOPHILS NFR BLD AUTO: 1 % (ref 0–1)
BILIRUB SERPL-MCNC: 0.34 MG/DL (ref 0.2–1)
BILIRUB UR QL STRIP: NEGATIVE
BUN SERPL-MCNC: 33 MG/DL (ref 5–25)
CALCIUM SERPL-MCNC: 9.5 MG/DL (ref 8.4–10.2)
CHLORIDE SERPL-SCNC: 105 MMOL/L (ref 96–108)
CLARITY UR: CLEAR
CO2 SERPL-SCNC: 26 MMOL/L (ref 21–32)
COLOR UR: YELLOW
CREAT SERPL-MCNC: 1.63 MG/DL (ref 0.6–1.3)
CREAT UR-MCNC: 116.2 MG/DL
EOSINOPHIL # BLD AUTO: 0.31 THOUSAND/ÂΜL (ref 0–0.61)
EOSINOPHIL NFR BLD AUTO: 5 % (ref 0–6)
ERYTHROCYTE [DISTWIDTH] IN BLOOD BY AUTOMATED COUNT: 14.1 % (ref 11.6–15.1)
EST. AVERAGE GLUCOSE BLD GHB EST-MCNC: 128 MG/DL
GFR SERPL CREATININE-BSD FRML MDRD: 45 ML/MIN/1.73SQ M
GLUCOSE P FAST SERPL-MCNC: 96 MG/DL (ref 65–99)
GLUCOSE UR STRIP-MCNC: NEGATIVE MG/DL
HBA1C MFR BLD: 6.1 %
HCT VFR BLD AUTO: 51.5 % (ref 36.5–49.3)
HGB BLD-MCNC: 16.5 G/DL (ref 12–17)
HGB UR QL STRIP.AUTO: NEGATIVE
IMM GRANULOCYTES # BLD AUTO: 0.01 THOUSAND/UL (ref 0–0.2)
IMM GRANULOCYTES NFR BLD AUTO: 0 % (ref 0–2)
KETONES UR STRIP-MCNC: NEGATIVE MG/DL
LEUKOCYTE ESTERASE UR QL STRIP: NEGATIVE
LYMPHOCYTES # BLD AUTO: 1.76 THOUSANDS/ÂΜL (ref 0.6–4.47)
LYMPHOCYTES NFR BLD AUTO: 30 % (ref 14–44)
MCH RBC QN AUTO: 29.9 PG (ref 26.8–34.3)
MCHC RBC AUTO-ENTMCNC: 32 G/DL (ref 31.4–37.4)
MCV RBC AUTO: 93 FL (ref 82–98)
MICROALBUMIN UR-MCNC: 1284.1 MG/L
MICROALBUMIN/CREAT 24H UR: 1105 MG/G CREATININE (ref 0–30)
MONOCYTES # BLD AUTO: 0.59 THOUSAND/ÂΜL (ref 0.17–1.22)
MONOCYTES NFR BLD AUTO: 10 % (ref 4–12)
NEUTROPHILS # BLD AUTO: 3.12 THOUSANDS/ÂΜL (ref 1.85–7.62)
NEUTS SEG NFR BLD AUTO: 54 % (ref 43–75)
NITRITE UR QL STRIP: NEGATIVE
NON-SQ EPI CELLS URNS QL MICRO: ABNORMAL /HPF
NRBC BLD AUTO-RTO: 0 /100 WBCS
PH UR STRIP.AUTO: 5.5 [PH]
PLATELET # BLD AUTO: 183 THOUSANDS/UL (ref 149–390)
PMV BLD AUTO: 9.2 FL (ref 8.9–12.7)
POTASSIUM SERPL-SCNC: 4.7 MMOL/L (ref 3.5–5.3)
PROT SERPL-MCNC: 7.2 G/DL (ref 6.4–8.4)
PROT UR STRIP-MCNC: ABNORMAL MG/DL
PTH-INTACT SERPL-MCNC: 77.8 PG/ML (ref 12–88)
RBC # BLD AUTO: 5.52 MILLION/UL (ref 3.88–5.62)
RBC #/AREA URNS AUTO: ABNORMAL /HPF
SODIUM SERPL-SCNC: 140 MMOL/L (ref 135–147)
SP GR UR STRIP.AUTO: 1.02 (ref 1–1.03)
T4 FREE SERPL-MCNC: 1.06 NG/DL (ref 0.61–1.12)
TSH SERPL DL<=0.05 MIU/L-ACNC: 0.37 UIU/ML (ref 0.45–4.5)
URATE SERPL-MCNC: 4.7 MG/DL (ref 3.5–8.5)
UROBILINOGEN UR STRIP-ACNC: <2 MG/DL
WBC # BLD AUTO: 5.82 THOUSAND/UL (ref 4.31–10.16)
WBC #/AREA URNS AUTO: ABNORMAL /HPF

## 2025-04-21 PROCEDURE — 83970 ASSAY OF PARATHORMONE: CPT

## 2025-04-21 PROCEDURE — 84550 ASSAY OF BLOOD/URIC ACID: CPT

## 2025-04-21 PROCEDURE — 36415 COLL VENOUS BLD VENIPUNCTURE: CPT

## 2025-04-21 PROCEDURE — 85025 COMPLETE CBC W/AUTO DIFF WBC: CPT

## 2025-04-21 PROCEDURE — 80053 COMPREHEN METABOLIC PANEL: CPT

## 2025-04-21 PROCEDURE — 84443 ASSAY THYROID STIM HORMONE: CPT

## 2025-04-21 PROCEDURE — 81001 URINALYSIS AUTO W/SCOPE: CPT

## 2025-04-21 PROCEDURE — 83036 HEMOGLOBIN GLYCOSYLATED A1C: CPT

## 2025-04-21 PROCEDURE — 82043 UR ALBUMIN QUANTITATIVE: CPT

## 2025-04-21 PROCEDURE — 82652 VIT D 1 25-DIHYDROXY: CPT

## 2025-04-21 PROCEDURE — 82570 ASSAY OF URINE CREATININE: CPT

## 2025-04-21 PROCEDURE — 84439 ASSAY OF FREE THYROXINE: CPT

## 2025-04-22 ENCOUNTER — OFFICE VISIT (OUTPATIENT)
Dept: NEPHROLOGY | Facility: CLINIC | Age: 60
End: 2025-04-22
Payer: COMMERCIAL

## 2025-04-22 VITALS
DIASTOLIC BLOOD PRESSURE: 88 MMHG | HEIGHT: 72 IN | HEART RATE: 79 BPM | WEIGHT: 260.8 LBS | OXYGEN SATURATION: 97 % | BODY MASS INDEX: 35.33 KG/M2 | TEMPERATURE: 97.7 F | RESPIRATION RATE: 16 BRPM | SYSTOLIC BLOOD PRESSURE: 134 MMHG

## 2025-04-22 DIAGNOSIS — N18.30 STAGE 3 CHRONIC KIDNEY DISEASE, UNSPECIFIED WHETHER STAGE 3A OR 3B CKD (HCC): ICD-10-CM

## 2025-04-22 DIAGNOSIS — E66.01 MORBID OBESITY (HCC): ICD-10-CM

## 2025-04-22 DIAGNOSIS — I12.9 HYPERTENSIVE CHRONIC KIDNEY DISEASE WITH STAGE 1 THROUGH STAGE 4 CHRONIC KIDNEY DISEASE, OR UNSPECIFIED CHRONIC KIDNEY DISEASE: ICD-10-CM

## 2025-04-22 DIAGNOSIS — E11.21 DIABETIC NEPHROPATHY ASSOCIATED WITH TYPE 2 DIABETES MELLITUS (HCC): ICD-10-CM

## 2025-04-22 DIAGNOSIS — E11.22 TYPE 2 DIABETES MELLITUS WITH STAGE 3 CHRONIC KIDNEY DISEASE, WITHOUT LONG-TERM CURRENT USE OF INSULIN, UNSPECIFIED WHETHER STAGE 3A OR 3B CKD (HCC): Primary | ICD-10-CM

## 2025-04-22 DIAGNOSIS — N18.30 TYPE 2 DIABETES MELLITUS WITH STAGE 3 CHRONIC KIDNEY DISEASE, WITHOUT LONG-TERM CURRENT USE OF INSULIN, UNSPECIFIED WHETHER STAGE 3A OR 3B CKD (HCC): Primary | ICD-10-CM

## 2025-04-22 PROCEDURE — 99214 OFFICE O/P EST MOD 30 MIN: CPT | Performed by: INTERNAL MEDICINE

## 2025-04-22 NOTE — PROGRESS NOTES
Assessment & Plan:    1. Type 2 diabetes mellitus with stage 3 chronic kidney disease, without long-term current use of insulin, unspecified whether stage 3a or 3b CKD (HCC)  2. Hypertensive chronic kidney disease with stage 1 through stage 4 chronic kidney disease, or unspecified chronic kidney disease  3. Stage 3 chronic kidney disease, unspecified whether stage 3a or 3b CKD (HCC)  -     Albumin / creatinine urine ratio; Future; Expected date: 10/22/2025  -     Urinalysis with microscopic; Future; Expected date: 10/22/2025  -     Comprehensive metabolic panel; Future; Expected date: 10/22/2025  -     Uric acid; Future; Expected date: 10/22/2025  4. Diabetic nephropathy associated with type 2 diabetes mellitus (HCC)  5. Morbid obesity (HCC)         Type 2 DM, well controlled.   Reached out to endocrine to switch Ozempic to Mounjaro.   UACR improving-- 2.4 gram to 1.1 gram. Unclear why significant drop, but this is not a 24 hr protein.     2. HTN renal disease  BP well controlled, goal<130/80. Maintain 2 gram Na diet.  Appears euvolemic.   Continue Lisinopril 10mg/day.    3. CKD 3  Cr baseline 1.4-1.6 mg/dL.  4/21/25 Cr 1.63 with eGFR 45 ml/min. Metabolic parameters stable.  Reviewed CKD stages/Cr and eGFR trends.   See recommendation as noted above and below.   Fu in 4 months to assess ability to add SGLT2I.   Volume status euvolemic.   Continue maximally tolerated Lisinopril, hopefully, with weight loss, BP trends will improve as well.     4. Diabetic nephropathy--A3  Recommend Ozempic to switch to Mounjaro. When stable on Mounjaro, would recommend to add SGLT2I.     5. Morbid obesity   Advise to switch Ozempic to Mounjaro, I reached out to endocrine to discuss. Patient agreeable with changing therapy.     The benefits, risks and alternatives to the treatment plan were discussed at this visit. Patient was advised of common adverse effects of any medical therapies prescribed. All questions were answered and  discussed with the patient and any accompanying family members or caretakers.      Subjective:      Patient ID: Fawad Baer is a 60 y.o. male presents for CKD fu in the [place] office. Patient last seen on 1/15/25. At patient last visit, discussed medication optimization but planned for spinal cord stimulator, so medication not adjusted.     HPI  In the interim since last visit, sp spinal cord stimulator which has significantly improved his symptoms. No complaints at present, BP well controlled.Denies weight loss recently, he gained 10 lb despite maximally dosed Ozempic. Found it difficult to maintain the low carb diet. A1C well controlled, A1C 6.1, well controlled.   Denies gout flares.     The following portions of the patient's history were reviewed and updated as appropriate: allergies, current medications, past family history, past medical history, past social history, past surgical history, and problem list.    Review of Systems   Respiratory: Negative.     Cardiovascular: Negative.    Gastrointestinal: Negative.    Genitourinary: Negative.    All other systems reviewed and are negative.        Objective:      /88   Pulse 79   Temp 97.7 °F (36.5 °C)   Resp 16   Ht 6' (1.829 m)   Wt 118 kg (260 lb 12.8 oz)   SpO2 97%   BMI 35.37 kg/m²          Physical Exam  Vitals reviewed.   Constitutional:       General: He is not in acute distress.     Appearance: He is obese. He is not ill-appearing.   HENT:      Head: Normocephalic and atraumatic.      Mouth/Throat:      Mouth: Mucous membranes are moist.      Pharynx: Oropharynx is clear.   Eyes:      Extraocular Movements: Extraocular movements intact.      Conjunctiva/sclera: Conjunctivae normal.   Cardiovascular:      Rate and Rhythm: Normal rate and regular rhythm.      Heart sounds:      No friction rub.   Pulmonary:      Breath sounds: Normal breath sounds. No wheezing or rales.   Abdominal:      Palpations: Abdomen is soft.      Tenderness:  There is no abdominal tenderness. There is no guarding.   Musculoskeletal:      Right lower leg: No edema.      Left lower leg: No edema.   Skin:     General: Skin is warm.      Coloration: Skin is not jaundiced.      Findings: No bruising.   Neurological:      General: No focal deficit present.      Mental Status: He is alert and oriented to person, place, and time.      Cranial Nerves: No cranial nerve deficit.   Psychiatric:         Mood and Affect: Mood normal.         Behavior: Behavior normal.             Lab Results   Component Value Date     12/21/2015    SODIUM 140 04/21/2025    K 4.7 04/21/2025     04/21/2025    CO2 26 04/21/2025    ANIONGAP 10 12/21/2015    AGAP 9 04/21/2025    BUN 33 (H) 04/21/2025    CREATININE 1.63 (H) 04/21/2025    GLUC 113 01/03/2025    GLUF 96 04/21/2025    CALCIUM 9.5 04/21/2025    AST 19 04/21/2025    ALT 24 04/21/2025    ALKPHOS 53 04/21/2025    PROT 7.1 06/11/2015    PROT 7.1 06/11/2015    TP 7.2 04/21/2025    BILITOT 0.6 06/11/2015    TBILI 0.34 04/21/2025    EGFR 45 04/21/2025      Lab Results   Component Value Date    CREATININE 1.63 (H) 04/21/2025    CREATININE 1.49 (H) 01/11/2025    CREATININE 1.41 (H) 01/03/2025    CREATININE 1.48 (H) 11/11/2024    CREATININE 1.46 (H) 08/17/2024    CREATININE 1.26 07/20/2024    CREATININE 1.28 06/29/2024    CREATININE 1.51 (H) 04/19/2024    CREATININE 1.61 (H) 03/20/2024    CREATININE 1.67 (H) 03/19/2024    CREATININE 1.83 (H) 03/18/2024    CREATININE 1.59 (H) 03/17/2024    CREATININE 1.69 (H) 03/16/2024    CREATININE 1.85 (H) 03/16/2024    CREATININE 1.92 (H) 03/16/2024      Lab Results   Component Value Date    COLORU Yellow 04/21/2025    CLARITYU Clear 04/21/2025    SPECGRAV 1.025 04/21/2025    PHUR 5.5 04/21/2025    LEUKOCYTESUR Negative 04/21/2025    NITRITE Negative 04/21/2025    PROTEIN  (2+) (A) 04/21/2025    GLUCOSEU Negative 04/21/2025    KETONESU Negative 04/21/2025    UROBILINOGEN <2.0 04/21/2025     "BILIRUBINUR Negative 04/21/2025    BLOODU Negative 04/21/2025    RBCUA None Seen 04/21/2025    WBCUA 0-1 (A) 04/21/2025    EPIS None Seen 04/21/2025    BACTERIA None Seen 04/21/2025      No results found for: \"LABPROT\"  No results found for: \"MICROALBUR\", \"VMZY00MSC\"  Lab Results   Component Value Date    WBC 5.82 04/21/2025    HGB 16.5 04/21/2025    HCT 51.5 (H) 04/21/2025    MCV 93 04/21/2025     04/21/2025      Lab Results   Component Value Date    HGB 16.5 04/21/2025    HGB 16.1 01/11/2025    HGB 17.1 (H) 11/11/2024    HGB 13.6 04/19/2024    HGB 11.0 (L) 03/20/2024      Lab Results   Component Value Date    FERRITIN 428.0 (H) 03/28/2024      No results found for: \"PTHCALCIUM\", \"ATOX15SMEIHU\", \"PHOSPHORUS\"   Lab Results   Component Value Date    CHOLESTEROL 214 (H) 11/11/2024    HDL 60 11/11/2024    LDLCALC 119 (H) 11/11/2024    TRIG 175 (H) 11/11/2024      Lab Results   Component Value Date    URICACID 4.7 04/21/2025      Lab Results   Component Value Date    HGBA1C 6.1 (H) 04/21/2025      Lab Results   Component Value Date    FREET4 1.06 04/21/2025      Lab Results   Component Value Date    PK Negative 06/11/2015      Lab Results   Component Value Date    PROT 7.1 06/11/2015    PROT 7.1 06/11/2015    UPEP See Comment 01/27/2024        Portions of the record may have been created with voice recognition software. Occasional wrong word or \"sound a like\" substitutions may have occurred due to the inherent limitations of voice recognition software. Read the chart carefully and recognize, using context, where substitutions have occurred. If you have any questions, please contact the dictating provider.    135-120/70-90. Gained 10 lb weight. Drinking more water and less green tea.   "

## 2025-04-23 LAB — 1,25(OH)2D SERPL-MCNC: 16.3 PG/ML (ref 5–200)

## 2025-04-26 DIAGNOSIS — I48.0 PAROXYSMAL ATRIAL FIBRILLATION (HCC): ICD-10-CM

## 2025-04-28 RX ORDER — APIXABAN 5 MG/1
5 TABLET, FILM COATED ORAL 2 TIMES DAILY
Qty: 180 TABLET | Refills: 3 | Status: SHIPPED | OUTPATIENT
Start: 2025-04-28

## 2025-05-12 NOTE — TELEPHONE ENCOUNTER
----- Message from Cody Baer sent at 6/20/2019  8:58 AM EDT -----  Regarding: Prescription Question  Contact: [phone]  Good Morning Doc,    This is the reminder you asked for from my last about my Cymbalta  We switched it from 1 a day to 2 a day and I will be running on Monday  My next appointment is November 14th which is 143 days away, can you call in new prescription for 60 mg twice a day for the 143 days to cover me until the next appointment      Thank Nilda Carey Potassium   Date Value Ref Range Status   05/12/2025 4.0 3.4 - 5.3 mmol/L Final   02/08/2025 4.4 3.4 - 5.3 mmol/L Final   10/05/2020 4.1 3.5 - 5.5 mEq/L Final     Hemoglobin   Date Value Ref Range Status   05/12/2025 9.5 (L) 11.7 - 15.7 g/dL Final   01/13/2020 8.4 (L) 11.7 - 15.7 g/dL Final     Creatinine   Date Value Ref Range Status   05/12/2025 6.70 (H) 0.51 - 0.95 mg/dL Final   01/13/2020 5.97 (H) 0.52 - 1.04 mg/dL Final     Urea Nitrogen   Date Value Ref Range Status   05/12/2025 51.1 (H) 8.0 - 23.0 mg/dL Final   02/08/2025 17 7 - 30 mg/dL Final   01/13/2020 44 (H) 7 - 30 mg/dL Final     Sodium   Date Value Ref Range Status   05/12/2025 133 (L) 135 - 145 mmol/L Final   01/13/2020 139 133 - 144 mmol/L Final     INR   Date Value Ref Range Status   10/04/2024 1.17 (H) 0.85 - 1.15 Final   01/12/2020 1.23 (H) 0.86 - 1.14 Final       DIALYSIS PROCEDURE NOTE  Hepatitis status of previous patient on machine log was checked and verified ok to use with this patients hepatitis status.  Patient dialyzed for 3 hrs. on a K3 bath with a net fluid removal of  1.2 L.  A BFR of 350 ml/min was obtained via a right AVF using 16  gauge needles.      The treatment plan was discussed with Dr. Herrera during the treatment.    Total heparin received during the treatment: 0 units.   Needle cannulation sites held x 10 min. Each site      Meds  given: Midodrine   Complications: Decrease in BP. Requested to be off 30 minutes early.     Person educated: procedure. Knowledge base substantial. Barriers to learning: none. Educated on procedure via verbal mode. Patient verbalized understanding.   ICEBOAT? Timeout performed pre-treatment  I: Patient was identified using 2 identifiers  C:  Consent Signed Yes  E: Equipment preventative maintenance is current and dialysis delivery system OK to use  B: Hepatitis B Surface Antigen: Negative; Draw Date: 05/12/2025      Hepatitis B Surface Antibody: Unknown; Draw Date: 05/12/2025  O: Dialysis orders  present and complete prior to treatment  A: Vascular access verified and assessed prior to treatment  T: Treatment was performed at a clinically appropriate time  ?: Patient was allowed to ask questions and address concerns prior to treatment  See Adult Hemodialysis flowsheet in [facility] for further details and post assessment.  Machine water alarm in place and functioning. Transducer pods intact and checked every 15min.   Pt assisted with repositioning throughout dialysis treatment.  Pt returned via bed.  Chlorine/Chloramine water system checked every 4 hours.  Outpatient Dialysis at [facility]      Post treatment report given to ABBY Thayer RN regarding 1.2 L of fluid removed, last BP 98/54.    Please remove patient dressing on AVF and AVG needle sites 24 hours after dialysis. If leaking occurs please apply a Band-Aid.     CARYN Inman RN

## 2025-05-16 ENCOUNTER — OFFICE VISIT (OUTPATIENT)
Dept: ENDOCRINOLOGY | Facility: CLINIC | Age: 60
End: 2025-05-16
Payer: COMMERCIAL

## 2025-05-16 VITALS
TEMPERATURE: 97.2 F | HEART RATE: 63 BPM | BODY MASS INDEX: 35.72 KG/M2 | DIASTOLIC BLOOD PRESSURE: 70 MMHG | SYSTOLIC BLOOD PRESSURE: 122 MMHG | WEIGHT: 263.4 LBS | OXYGEN SATURATION: 95 %

## 2025-05-16 DIAGNOSIS — E11.21 DIABETIC NEPHROPATHY ASSOCIATED WITH TYPE 2 DIABETES MELLITUS (HCC): ICD-10-CM

## 2025-05-16 DIAGNOSIS — E11.40 PAINFUL DIABETIC NEUROPATHY (HCC): ICD-10-CM

## 2025-05-16 DIAGNOSIS — E11.42 TYPE 2 DIABETES MELLITUS WITH DIABETIC POLYNEUROPATHY, WITHOUT LONG-TERM CURRENT USE OF INSULIN (HCC): Primary | ICD-10-CM

## 2025-05-16 DIAGNOSIS — N18.31 STAGE 3A CHRONIC KIDNEY DISEASE (HCC): ICD-10-CM

## 2025-05-16 PROCEDURE — 99214 OFFICE O/P EST MOD 30 MIN: CPT | Performed by: STUDENT IN AN ORGANIZED HEALTH CARE EDUCATION/TRAINING PROGRAM

## 2025-05-16 RX ORDER — CYANOCOBALAMIN (VITAMIN B-12) 500 MCG
TABLET ORAL
COMMUNITY

## 2025-05-16 RX ORDER — OMEGA-3S/DHA/EPA/FISH OIL/D3 300MG-1000
500 CAPSULE ORAL DAILY
COMMUNITY

## 2025-05-16 RX ORDER — TIRZEPATIDE 5 MG/.5ML
5 INJECTION, SOLUTION SUBCUTANEOUS WEEKLY
Qty: 2 ML | Refills: 3 | Status: SHIPPED | OUTPATIENT
Start: 2025-05-16

## 2025-05-16 NOTE — ASSESSMENT & PLAN NOTE
Kidney function remains stable at stage 3a with slight improvement noted. Regular monitoring of kidney function discussed. No changes in current management plan.    Lab Results   Component Value Date    EGFR 45 04/21/2025    EGFR 50 01/11/2025    EGFR 54 01/03/2025    CREATININE 1.63 (H) 04/21/2025    CREATININE 1.49 (H) 01/11/2025    CREATININE 1.41 (H) 01/03/2025

## 2025-05-16 NOTE — ASSESSMENT & PLAN NOTE
A1c level is currently at 6.1. Reports weight plateau and slight increase since 12/2024 despite maintaining a low-carb, high-protein diet. Considering a switch from Ozempic to Mounjaro due to potential for better weight loss outcomes. Prescription for Mounjaro 5 mg provided, with option to adjust dosage up to 15 mg based on response; continue Ozempic until prior authorization for Mounjaro is approved; given three pen needles for current Ozempic injections.    Follow-up: The patient will follow up in 4 months.    Lab Results   Component Value Date    HGBA1C 6.1 (H) 04/21/2025       Orders:  •  Tirzepatide (Mounjaro) 5 MG/0.5ML SOAJ; Inject 5 mg under the skin once a week  •  Hemoglobin A1C; Future  •  Basic metabolic panel; Future  •  TSH, 3rd generation; Future  •  T4, free; Future  •  Lipid Panel with Direct LDL reflex; Future

## 2025-05-16 NOTE — PROGRESS NOTES
Name: Fawad Baer      : 1965      MRN: [id number]  Encounter Provider: William Farley DO  Encounter Date: 2025   Encounter department: [facility]    Chief Complaint   Patient presents with   • parathyroid     diabetes   :  Assessment & Plan  Type 2 diabetes mellitus with diabetic polyneuropathy, without long-term current use of insulin (HCC)  A1c level is currently at 6.1. Reports weight plateau and slight increase since 2024 despite maintaining a low-carb, high-protein diet. Considering a switch from Ozempic to Mounjaro due to potential for better weight loss outcomes. Prescription for Mounjaro 5 mg provided, with option to adjust dosage up to 15 mg based on response; continue Ozempic until prior authorization for Mounjaro is approved; given three pen needles for current Ozempic injections.    Follow-up: The patient will follow up in 4 months.    Lab Results   Component Value Date    HGBA1C 6.1 (H) 2025       Orders:  •  Tirzepatide (Mounjaro) 5 MG/0.5ML SOAJ; Inject 5 mg under the skin once a week  •  Hemoglobin A1C; Future  •  Basic metabolic panel; Future  •  TSH, 3rd generation; Future  •  T4, free; Future  •  Lipid Panel with Direct LDL reflex; Future    Painful diabetic neuropathy (HCC)    Lab Results   Component Value Date    HGBA1C 6.1 (H) 2025            Stage 3a chronic kidney disease (HCC)  Kidney function remains stable at stage 3a with slight improvement noted. Regular monitoring of kidney function discussed. No changes in current management plan.    Lab Results   Component Value Date    EGFR 45 2025    EGFR 50 2025    EGFR 54 2025    CREATININE 1.63 (H) 2025    CREATININE 1.49 (H) 2025    CREATININE 1.41 (H) 2025            Diabetic nephropathy associated with type 2 diabetes mellitus (HCC)    Lab Results   Component Value Date    HGBA1C 6.1 (H) 2025                History of  Present Illness   History of Present Illness  The patient is a 60-year-old male here today for follow-up of diabetes and primary hyperparathyroidism status post parathyroidectomy.    He underwent a successful parathyroidectomy performed by Dr. Brewster, which resulted in a cure. The procedure involved the removal of two parathyroid glands, one from each side. He experienced mild soreness for a few days post-surgery but has since recovered. A follow-up appointment with Dr. Brewster is scheduled in a few months.    He has had two consultations with Dr. Pierce earlier this year, during which it was suggested that his current medication, Ozempic, may have reached its maximum efficacy. Despite maintaining a consistent low-carb, high-protein diet, he has observed a gradual weight increase from 249 pounds to 261 pounds over the past two months. His A1c levels, which had previously decreased to 5.1 with Ozempic, have now risen to 6.1. He has made dietary modifications, including reducing cheese intake and consuming more red meat, fish, and vegetables. He reports no leg swelling and stable kidney function at stage 3A. He experienced diarrhea during the initial six months of Ozempic treatment, but this side effect has since resolved. His blood glucose levels have been consistently around 95 but have recently increased to approximately 110. He has noticed a correlation between his blood glucose levels and his water intake before bedtime, with higher levels when he does not consume water before sleep.    Pertinent Medical History         Review of Systems as per HPI       Medical History Reviewed by provider this encounter:  Tobacco  Allergies  Meds  Problems  Med Hx  Surg Hx  Fam Hx     .    Objective   /70 (BP Location: Left arm, Patient Position: Sitting)   Pulse 63   Temp (!) 97.2 °F (36.2 °C)   Wt 119 kg (263 lb 6.4 oz)   SpO2 95%   BMI 35.72 kg/m²      Body mass index is 35.72 kg/m².  Wt Readings from Last 3  Encounters:   05/16/25 119 kg (263 lb 6.4 oz)   04/22/25 118 kg (260 lb 12.8 oz)   03/07/25 119 kg (263 lb)     Physical Exam    Cardiovascular:      Pulses: no weak pulses.           Dorsalis pedis pulses are 2+ on the right side and 2+ on the left side.   Feet:      Right foot:      Skin integrity: No ulcer, skin breakdown, erythema, warmth, callus or dry skin.      Left foot:      Skin integrity: No ulcer, skin breakdown, erythema, warmth, callus or dry skin.       Diabetic Foot Exam    Patient's shoes and socks removed.    Right Foot/Ankle   Right Foot Inspection  Skin Exam: skin normal and skin intact. No dry skin, no warmth, no callus, no erythema, no maceration, no abnormal color, no pre-ulcer, no ulcer and no callus.     Toe Exam: ROM and strength within normal limits.     Sensory   Vibration: diminished  Monofilament testing: diminished    Vascular  The right DP pulse is 2+.     Left Foot/Ankle  Left Foot Inspection  Skin Exam: skin normal and skin intact. No dry skin, no warmth, no erythema, no maceration, normal color, no pre-ulcer, no ulcer and no callus.     Toe Exam: ROM and strength within normal limits.     Sensory   Vibration: diminished  Monofilament testing: diminished    Vascular  The left DP pulse is 2+.     Assign Risk Category  No deformity present  No loss of protective sensation  No weak pulses  Risk: 0    Results  Labs:  A1c is 6.1%. Morning blood sugar was 96 mg/dL.  Labs:   Lab Results   Component Value Date    HGBA1C 6.1 (H) 04/21/2025    HGBA1C 6.2 (H) 01/11/2025    HGBA1C 5.8 (H) 11/11/2024     Lab Results   Component Value Date    CREATININE 1.63 (H) 04/21/2025    CREATININE 1.49 (H) 01/11/2025    CREATININE 1.41 (H) 01/03/2025    BUN 33 (H) 04/21/2025     12/21/2015    K 4.7 04/21/2025     04/21/2025    CO2 26 04/21/2025     eGFRcr   Date Value Ref Range Status   03/20/2024 49 (L) >59 Final     eGFR   Date Value Ref Range Status   04/21/2025 45 ml/min/1.73sq m Final      Lab Results   Component Value Date    HDL 60 11/11/2024    TRIG 175 (H) 11/11/2024     Lab Results   Component Value Date    ALT 24 04/21/2025    AST 19 04/21/2025    ALKPHOS 53 04/21/2025    BILITOT 0.6 06/11/2015     Lab Results   Component Value Date    ODC1GRCXIFUU 0.366 (L) 04/21/2025    TKQ0GWRKDHBD 1.346 06/29/2024    [id number] 19.704 (H) 04/19/2024     Lab Results   Component Value Date    FREET4 1.06 04/21/2025       There are no Patient Instructions on file for this visit.    Discussed with the patient and all questioned fully answered. He will call me if any problems arise.

## 2025-05-30 ENCOUNTER — DOCUMENTATION (OUTPATIENT)
Dept: FAMILY MEDICINE CLINIC | Facility: CLINIC | Age: 60
End: 2025-05-30

## 2025-05-30 VITALS — SYSTOLIC BLOOD PRESSURE: 160 MMHG | DIASTOLIC BLOOD PRESSURE: 98 MMHG

## 2025-05-30 NOTE — PROGRESS NOTES
Pt came in for BP. It was 160/98. Pt does report having some coffee and an oxycodone due to pain in his back.     Pt stated he will return to recheck on Tuesday.

## 2025-06-09 DIAGNOSIS — E79.0 HYPERURICEMIA: ICD-10-CM

## 2025-06-09 RX ORDER — ALLOPURINOL 300 MG/1
300 TABLET ORAL DAILY
Qty: 90 TABLET | Refills: 1 | Status: SHIPPED | OUTPATIENT
Start: 2025-06-09

## 2025-06-10 ENCOUNTER — OFFICE VISIT (OUTPATIENT)
Dept: URGENT CARE | Facility: MEDICAL CENTER | Age: 60
End: 2025-06-10
Payer: COMMERCIAL

## 2025-06-10 VITALS
TEMPERATURE: 98.2 F | DIASTOLIC BLOOD PRESSURE: 80 MMHG | RESPIRATION RATE: 18 BRPM | HEIGHT: 72 IN | SYSTOLIC BLOOD PRESSURE: 136 MMHG | WEIGHT: 265 LBS | HEART RATE: 50 BPM | OXYGEN SATURATION: 97 % | BODY MASS INDEX: 35.89 KG/M2

## 2025-06-10 DIAGNOSIS — S39.012A STRAIN OF MUSCLE, FASCIA AND TENDON OF LOWER BACK, INITIAL ENCOUNTER: Primary | ICD-10-CM

## 2025-06-10 PROCEDURE — G0382 LEV 3 HOSP TYPE B ED VISIT: HCPCS | Performed by: PHYSICIAN ASSISTANT

## 2025-06-10 PROCEDURE — S9083 URGENT CARE CENTER GLOBAL: HCPCS | Performed by: PHYSICIAN ASSISTANT

## 2025-06-10 RX ORDER — METHOCARBAMOL 500 MG/1
500 TABLET, FILM COATED ORAL 3 TIMES DAILY
Qty: 30 TABLET | Refills: 0 | Status: SHIPPED | OUTPATIENT
Start: 2025-06-10

## 2025-06-10 RX ORDER — METHYLPREDNISOLONE 4 MG/1
TABLET ORAL
Qty: 1 EACH | Refills: 0 | Status: SHIPPED | OUTPATIENT
Start: 2025-06-10

## 2025-06-10 NOTE — PROGRESS NOTES
[facility]        NAME: Fawad Baer is a 60 y.o. male  : 1965    MRN: [id number]  DATE: Renu 10, 2025  TIME: 2:05 PM    Assessment and Plan   Strain of muscle, fascia and tendon of lower back, initial encounter [S39.012A]  1. Strain of muscle, fascia and tendon of lower back, initial encounter  methocarbamol (ROBAXIN) 500 mg tablet    methylPREDNISolone 4 MG tablet therapy pack            Patient Instructions       Follow up with PCP in 3-5 days.  Proceed to  ER if symptoms worsen.    If tests have been performed at [facility], our office will contact you with results if changes need to be made to the care plan discussed with you at the visit.  You can review your full results on [facility].    Chief Complaint     Chief Complaint   Patient presents with    Back Pain     Lower back pain x 1 week, was cutting his toe nails and was bent over and when he got back up that's when the back started to hurt          History of Present Illness       Back Pain  This is a recurrent problem. Episode onset: 10 days ago. The pain is moderate. The symptoms are aggravated by bending, standing and twisting. Stiffness is present In the morning. Pertinent negatives include no bladder incontinence, bowel incontinence, fever, leg pain, paresis, paresthesias, perianal numbness or weakness. He has tried NSAIDs and ice for the symptoms. The treatment provided no relief.       Review of Systems   Review of Systems   Constitutional:  Negative for fever.   Respiratory:  Negative for shortness of breath.    Gastrointestinal:  Negative for bowel incontinence.   Genitourinary:  Negative for bladder incontinence.   Musculoskeletal:  Positive for back pain.   Neurological:  Negative for weakness and paresthesias.         Current Medications     Current Medications[1]    Current Allergies     Allergies as of 06/10/2025    (No Known Allergies)            The following portions of the patient's history were reviewed and  updated as appropriate: allergies, current medications, past family history, past medical history, past social history, past surgical history and problem list.     Past Medical History[2]    Past Surgical History[3]    Family History[4]      Medications have been verified.        Objective   /80   Pulse (!) 50   Temp 98.2 °F (36.8 °C)   Resp 18   Ht 6' (1.829 m)   Wt 120 kg (265 lb)   SpO2 97%   BMI 35.94 kg/m²   No LMP for male patient.       Physical Exam     Physical Exam  Constitutional:       Appearance: Normal appearance.     Cardiovascular:      Rate and Rhythm: Normal rate and regular rhythm.   Pulmonary:      Effort: Pulmonary effort is normal.      Breath sounds: Normal breath sounds.   Abdominal:      General: Bowel sounds are normal.      Palpations: Abdomen is soft.      Tenderness: There is no abdominal tenderness. There is no right CVA tenderness or left CVA tenderness.     Musculoskeletal:      Thoracic back: Normal.      Lumbar back: Spasms and tenderness (left sided paraspinals) present. No swelling, signs of trauma or bony tenderness. Normal range of motion.      Comments: Pt NV intact     Neurological:      Mental Status: He is alert.                        [1]   Current Outpatient Medications:     allopurinol (ZYLOPRIM) 300 mg tablet, Take 1 tablet by mouth daily., Disp: 90 tablet, Rfl: 1    carvedilol (COREG) 6.25 mg tablet, Take 1 tablet by mouth once daily in the morning and take 1 and 1/2 tablets once daily in the evening., Disp: 90 tablet, Rfl: 1    cholecalciferol (VITAMIN D3) 400 units tablet, Take 500 Units by mouth daily, Disp: , Rfl:     Cyanocobalamin (Vitamin B 12) 500 MCG TABS, Take by mouth, Disp: , Rfl:     Eliquis 5 MG, Take 1 tablet by mouth twice daily, Disp: 180 tablet, Rfl: 3    ezetimibe (ZETIA) 10 mg tablet, Take 1 tablet (10 mg total) by mouth daily, Disp: 90 tablet, Rfl: 1    furosemide (Lasix) 40 mg tablet, Take 1 tablet (40 mg total) by mouth daily, Disp:  90 tablet, Rfl: 3    gabapentin (NEURONTIN) 600 MG tablet, Take 1 tablet (600 mg total) by mouth 3 (three) times a day, Disp: 270 tablet, Rfl: 1    levothyroxine 175 mcg tablet, TAKE 1 TABLET BY MOUTH ONCE DAILY ON AN EMPTY STOMACH 1 HOUR BEFORE BREAKFAST AND 4 HOURS APART FROM CALCIUM AND VITAMIN D SUPPLEMENTATION, Disp: 90 tablet, Rfl: 1    lisinopril (ZESTRIL) 10 mg tablet, Take 1 tablet (10 mg total) by mouth daily, Disp: 90 tablet, Rfl: 1    metFORMIN (GLUCOPHAGE-XR) 500 mg 24 hr tablet, Take 1 tablet (500 mg total) by mouth daily at bedtime, Disp: 100 tablet, Rfl: 1    methocarbamol (ROBAXIN) 500 mg tablet, Take 1 tablet (500 mg total) by mouth 3 (three) times a day, Disp: 30 tablet, Rfl: 0    methylPREDNISolone 4 MG tablet therapy pack, Use as directed on package, Disp: 1 each, Rfl: 0    sildenafil (Viagra) 100 mg tablet, Take 1 tablet (100 mg total) by mouth as needed for erectile dysfunction, Disp: 30 tablet, Rfl: 0    Tirzepatide (Mounjaro) 5 MG/0.5ML SOAJ, Inject 5 mg under the skin once a week, Disp: 2 mL, Rfl: 3  [2]   Past Medical History:  Diagnosis Date    Arthritis     right foot    Chronic cholecystitis     Chronic kidney disease     11/25/24 per chart Stage 3    CPAP (continuous positive airway pressure) dependence     Diabetes mellitus (HCC)     Type 2    Diabetic nephropathy (HCC)     Disease of thyroid gland 1990    Diverticulitis of colon     DVT (deep venous thrombosis) ([facility])     11/25/24 per pt hx of 3/2024 due left adrenal gland ruptured - taken off of Xarelto for 2 weeks developed blood clot Left calf and both lungs    Gout     History of pulmonary embolus (PE) 03/2024    ruptured adrenal gland - taken off of Xarelto for 2 weeks and developed blood clot in left leg and both lungs    Hyperlipidemia     Hypertension     Hypothyroidism     MTHFR mutation     Neuropathy     Neuropathy in diabetes ([facility]) 2018    Scab     right arm- no s/s infection    Sleep apnea     Tarsal tunnel syndrome,  right     11/25/24 bilateral per pt    Tinnitus     left ear    Wears glasses     and contacts    Wears glasses    [3]   Past Surgical History:  Procedure Laterality Date    APPENDECTOMY      ARTHRODESIS      Lumbar L__    BACK SURGERY      BUNIONECTOMY Right 10/07/2016    Procedure: REMOVAL TIBIAL  SESAMOID BONE  RIGHT FOOT;  Surgeon: Vicente Aguirre DPM;  Location: [facility];  Service:     CARDIAC PACEMAKER PLACEMENT  09/01/2021    CHG ASSAY OF PARATHORMONE N/A 12/4/2024    Procedure: INTRAOP PTH MONITORING;  Surgeon: Og Segovia MD;  Location: [facility];  Service: Surgical Oncology    CHOLECYSTECTOMY  03/26/2015    COLONOSCOPY      KNEE ARTHROSCOPY      KNEE ARTHROSCOPY W/ MENISCAL REPAIR      Lateral    NASAL SEPTUM SURGERY  10/16/2013    PLANTAR FASCIA SURGERY Left 10/26/2023    Procedure: RELEASE FASCIA PLANTAR/FASCIOTOMY;  Surgeon: Idnerjit Ramirez DPM;  Location: [facility];  Service: Podiatry    WV COLONOSCOPY FLX DX W/COLLJ SPEC WHEN PFRMD N/A 11/23/2018    Procedure: COLONOSCOPY;  Surgeon: Manjit Dietrich MD;  Location: [facility];  Service: Gastroenterology    WV PARATHYROIDECTOMY/EXPLORATION PARATHYROIDS Right 12/4/2024    Procedure: MINIMALLY INVASIVE RIGHT PARATHYROIDECTOMY, PSB 4 GLAND EXPLORATION;  Surgeon: Og Segovia MD;  [facility];  Service: Surgical Oncology    WV PRQ IMPLTJ NSTIM ELECTRODE ARRAY EPIDURAL Bilateral 12/18/2024    Procedure: NEVRO SCS TRIAL;  Surgeon: Jing Vinson MD;  Location: [facility];  Service: Pain Management     WV PRQ IMPLTJ NSTIM ELECTRODE ARRAY EPIDURAL Right 1/22/2025    Procedure: Placement of percutaneous spinal cord stimulator with battery in the right flank;  Surgeon: Natalio Rosas MD;  [facility];  Service: Neurosurgery    WV RELEASE TARSAL TUNNEL Right 06/25/2018    Procedure: RELEASE TARSAL TUNNEL;  Surgeon: Cliff Bernabe DPM;  [facility]: [facility];  Service: Podiatry    WV RELEASE TARSAL TUNNEL Left  03/13/2020    Procedure: RELEASE TARSAL TUNNEL;  Surgeon: Cliff Bernabe DPM;  Location:  MAIN OR;  Service: Podiatry    OK RELEASE TARSAL TUNNEL Left 10/26/2023    Procedure: RELEASE TARSAL TUNNEL;  Surgeon: Inderjit Ramirez DPM;  Location: [facility];  Service: Podiatry    SPINAL FUSION      TONSILLECTOMY  10/16/2013    with Adenoidectomy    UVULECTOMY  10/16/2013    UVULOPALATOPHARYNGOPLASTY      WISDOM TOOTH EXTRACTION     [4]   Family History  Problem Relation Name Age of Onset    Aneurysm Mother          intracranial aneurysm repair    Coronary artery disease Father Carlos Manuel     Hypertension Father Carlos Manuel     Heart disease Father Carlos Manuel     Aneurysm Brother      Anesthesia problems Neg Hx

## 2025-06-13 DIAGNOSIS — E11.42 DIABETIC PERIPHERAL NEUROPATHY (HCC): ICD-10-CM

## 2025-06-14 DIAGNOSIS — I48.0 PAROXYSMAL ATRIAL FIBRILLATION (HCC): ICD-10-CM

## 2025-06-15 RX ORDER — GABAPENTIN 600 MG/1
600 TABLET ORAL 3 TIMES DAILY
Qty: 90 TABLET | Refills: 0 | Status: SHIPPED | OUTPATIENT
Start: 2025-06-15

## 2025-06-15 RX ORDER — CARVEDILOL 6.25 MG/1
TABLET ORAL
Qty: 90 TABLET | Refills: 1 | Status: SHIPPED | OUTPATIENT
Start: 2025-06-15

## 2025-06-23 DIAGNOSIS — E11.42 TYPE 2 DIABETES MELLITUS WITH DIABETIC POLYNEUROPATHY, WITHOUT LONG-TERM CURRENT USE OF INSULIN (HCC): Primary | ICD-10-CM

## 2025-06-23 RX ORDER — TIRZEPATIDE 7.5 MG/.5ML
7.5 INJECTION, SOLUTION SUBCUTANEOUS WEEKLY
Qty: 2 ML | Refills: 3 | Status: SHIPPED | OUTPATIENT
Start: 2025-06-23

## 2025-06-24 ENCOUNTER — REMOTE DEVICE CLINIC VISIT (OUTPATIENT)
Dept: CARDIOLOGY CLINIC | Facility: CLINIC | Age: 60
End: 2025-06-24
Payer: COMMERCIAL

## 2025-06-24 DIAGNOSIS — Z95.0 CARDIAC PACEMAKER IN SITU: Primary | ICD-10-CM

## 2025-06-24 PROCEDURE — 93294 REM INTERROG EVL PM/LDLS PM: CPT | Performed by: INTERNAL MEDICINE

## 2025-06-24 PROCEDURE — 93296 REM INTERROG EVL PM/IDS: CPT | Performed by: INTERNAL MEDICINE

## 2025-06-24 NOTE — PROGRESS NOTES
"Results for orders placed or performed in visit on 06/24/25   Cardiac EP device report    Narrative    MDDT DUAL PM/ ACTIVE SYSTEM IS MRI CONDITIONAL  CARELINK TRANSMISSION: PRESENTING EGRAM AP VS@ 63 BPM. BATTERY STATUS \"10 YRS.\" AP 75%  2%. ALL AVAILABLE LEAD PARAMETERS WITHIN NORMAL LIMITS. 80 AT/AF NOTED; 0% BURDEN; PT ON ELIQUIS. AVAIL EGRAMS SHOWING AF. NORMAL DEVICE FUNCTION. NC         "

## 2025-06-30 ENCOUNTER — PATIENT MESSAGE (OUTPATIENT)
Dept: FAMILY MEDICINE CLINIC | Facility: CLINIC | Age: 60
End: 2025-06-30

## 2025-06-30 DIAGNOSIS — E11.42 DIABETIC PERIPHERAL NEUROPATHY (HCC): ICD-10-CM

## 2025-07-01 NOTE — PATIENT COMMUNICATION
Pt called refill line checking on status of [employer] message. I informed him [employer] messages have a 72 hour turn around time but his message was already in Dr. Loredo's inbasket and will be addressed as soon as he gets to it and he verbalized understanding and said that was fine. He would like a [employer] message back once addressed.

## 2025-07-03 DIAGNOSIS — E11.42 DIABETIC PERIPHERAL NEUROPATHY (HCC): ICD-10-CM

## 2025-07-03 RX ORDER — GABAPENTIN 600 MG/1
1200 TABLET ORAL 3 TIMES DAILY
Qty: 180 TABLET | Refills: 0 | Status: SHIPPED | OUTPATIENT
Start: 2025-07-03

## 2025-07-03 RX ORDER — GABAPENTIN 600 MG/1
600 TABLET ORAL 3 TIMES DAILY
Qty: 90 TABLET | Refills: 0 | Status: SHIPPED | OUTPATIENT
Start: 2025-07-03 | End: 2025-07-03

## 2025-07-03 NOTE — PATIENT COMMUNICATION
Patient called into the refill line to check the status of his refill. I informed him that I do see the message sent to the provider but we are awaiting an answer. I will send another message to the office and provider.     Patient wants to increase dose to 2 tablets of the 600mg 3 times a day instead of taking 1 tablet 3 times a day.     Patient is going on vacation Saturday and would like this addressed as soon as possible.

## 2025-07-09 DIAGNOSIS — E78.2 MIXED HYPERLIPIDEMIA: ICD-10-CM

## 2025-07-09 DIAGNOSIS — E03.9 HYPOTHYROIDISM (ACQUIRED): ICD-10-CM

## 2025-07-09 RX ORDER — LEVOTHYROXINE SODIUM 175 UG/1
TABLET ORAL
Qty: 90 TABLET | Refills: 0 | Status: SHIPPED | OUTPATIENT
Start: 2025-07-09

## 2025-07-09 RX ORDER — EZETIMIBE 10 MG/1
10 TABLET ORAL DAILY
Qty: 90 TABLET | Refills: 0 | Status: SHIPPED | OUTPATIENT
Start: 2025-07-09

## 2025-07-11 ENCOUNTER — RA CDI HCC (OUTPATIENT)
Dept: OTHER | Facility: HOSPITAL | Age: 60
End: 2025-07-11

## 2025-07-11 NOTE — PROGRESS NOTES
HCC coding opportunities          Chart Reviewed number of suggestions sent to Provider: 1  E66.01     Patients Insurance        Commercial Insurance: [employer]

## 2025-07-18 ENCOUNTER — TELEPHONE (OUTPATIENT)
Dept: FAMILY MEDICINE CLINIC | Facility: CLINIC | Age: 60
End: 2025-07-18

## 2025-07-22 ENCOUNTER — TELEPHONE (OUTPATIENT)
Age: 60
End: 2025-07-22

## 2025-07-22 ENCOUNTER — OFFICE VISIT (OUTPATIENT)
Dept: FAMILY MEDICINE CLINIC | Facility: CLINIC | Age: 60
End: 2025-07-22
Payer: COMMERCIAL

## 2025-07-22 VITALS
WEIGHT: 270 LBS | TEMPERATURE: 96.1 F | OXYGEN SATURATION: 97 % | BODY MASS INDEX: 36.57 KG/M2 | HEIGHT: 72 IN | DIASTOLIC BLOOD PRESSURE: 80 MMHG | SYSTOLIC BLOOD PRESSURE: 124 MMHG | HEART RATE: 77 BPM

## 2025-07-22 DIAGNOSIS — N52.9 ERECTILE DYSFUNCTION, UNSPECIFIED ERECTILE DYSFUNCTION TYPE: ICD-10-CM

## 2025-07-22 PROCEDURE — 99214 OFFICE O/P EST MOD 30 MIN: CPT | Performed by: FAMILY MEDICINE

## 2025-07-22 RX ORDER — SILDENAFIL 100 MG/1
100 TABLET, FILM COATED ORAL AS NEEDED
Qty: 30 TABLET | Refills: 0 | Status: SHIPPED | OUTPATIENT
Start: 2025-07-22

## 2025-07-22 NOTE — TELEPHONE ENCOUNTER
PA for sildenafil (Viagra) 100 mg tablet SUBMITTED to     via    []CMM-KEY:   [x]Surescripts-Case ID #   []Availity-Auth ID # NDC #   []Faxed to plan   []Other website   []Phone call Case ID #     [x]PA sent as URGENT    All office notes, labs and other pertaining documents and studies sent. Clinical questions answered. Awaiting determination from insurance company.     Turnaround time for your insurance to make a decision on your Prior Authorization can take 7-21 business days.

## 2025-07-22 NOTE — PROGRESS NOTES
Name: Fawad Baer      : 1965      MRN: [id number]  Encounter Provider: Agustín Loredo DO  Encounter Date: 2025   Encounter department: [facility]  :  Assessment & Plan  Erectile dysfunction, unspecified erectile dysfunction type               Depression Screening and Follow-up Plan: Patient was screened for depression during today's encounter. They screened negative with a PHQ-2 score of 0.      History of Present Illness   HPI  Review of Systems    Objective   /80 (BP Location: Left arm, Patient Position: Sitting, Cuff Size: Standard)   Pulse 77   Temp (!) 96.1 °F (35.6 °C) (Temporal)   Ht 6' (1.829 m)   Wt 122 kg (270 lb)   SpO2 97%   BMI 36.62 kg/m²      Physical Exam

## 2025-07-23 NOTE — TELEPHONE ENCOUNTER
PA for sildenafil (Viagra) 100 mg tablet  EXCLUDED from plan       Reason:(Screenshot if applicable)        Message sent to office clinical pool Yes    Appeal is not warranted at this time

## 2025-07-29 DIAGNOSIS — N52.9 ERECTILE DYSFUNCTION, UNSPECIFIED ERECTILE DYSFUNCTION TYPE: ICD-10-CM

## 2025-07-30 RX ORDER — SILDENAFIL 100 MG/1
100 TABLET, FILM COATED ORAL DAILY PRN
Qty: 30 TABLET | Refills: 0 | OUTPATIENT
Start: 2025-07-30

## 2025-08-03 DIAGNOSIS — E11.42 DIABETIC PERIPHERAL NEUROPATHY (HCC): ICD-10-CM

## 2025-08-05 RX ORDER — GABAPENTIN 600 MG/1
1200 TABLET ORAL 3 TIMES DAILY
Qty: 180 TABLET | Refills: 0 | Status: SHIPPED | OUTPATIENT
Start: 2025-08-05

## 2025-08-06 ENCOUNTER — TELEPHONE (OUTPATIENT)
Dept: SURGICAL ONCOLOGY | Facility: CLINIC | Age: 60
End: 2025-08-06

## 2025-08-07 ENCOUNTER — TELEPHONE (OUTPATIENT)
Dept: SURGICAL ONCOLOGY | Facility: CLINIC | Age: 60
End: 2025-08-07

## 2025-08-07 DIAGNOSIS — Z90.89 S/P PARATHYROIDECTOMY: Primary | ICD-10-CM

## 2025-08-07 DIAGNOSIS — Z98.890 S/P PARATHYROIDECTOMY: Primary | ICD-10-CM

## 2025-08-18 DIAGNOSIS — R80.9 CONTROLLED TYPE 2 DIABETES MELLITUS WITH MICROALBUMINURIA, WITHOUT LONG-TERM CURRENT USE OF INSULIN  (HCC): ICD-10-CM

## 2025-08-18 DIAGNOSIS — E11.29 CONTROLLED TYPE 2 DIABETES MELLITUS WITH MICROALBUMINURIA, WITHOUT LONG-TERM CURRENT USE OF INSULIN  (HCC): ICD-10-CM

## 2025-08-20 RX ORDER — METFORMIN HYDROCHLORIDE 500 MG/1
500 TABLET, EXTENDED RELEASE ORAL
Qty: 100 TABLET | Refills: 1 | Status: SHIPPED | OUTPATIENT
Start: 2025-08-20

## (undated) DEVICE — GLOVE INDICATOR PI UNDERGLOVE SZ 8.5 BLUE

## (undated) DEVICE — NEEDLE BLUNT 18 G X 1 1/2IN

## (undated) DEVICE — SUT ETHILON 4-0 PS-2 18 IN 1667H

## (undated) DEVICE — TRIAL STIMULATOR 3500 KIT: Brand: SENZA

## (undated) DEVICE — SUT VICRYL 3-0 SH 27 IN J416H

## (undated) DEVICE — SPONGE SCRUB 4 PCT CHLORHEXIDINE

## (undated) DEVICE — CURITY NON-ADHERENT STRIPS: Brand: CURITY

## (undated) DEVICE — SYRINGE 5ML LL

## (undated) DEVICE — NEEDLE 25G X 1 1/2

## (undated) DEVICE — GLOVE SRG BIOGEL 7

## (undated) DEVICE — GLOVE INDICATOR PI UNDERGLOVE SZ 8 BLUE

## (undated) DEVICE — SCD SEQUENTIAL COMPRESSION COMFORT SLEEVE MEDIUM KNEE LENGTH: Brand: KENDALL SCD

## (undated) DEVICE — PENCIL ELECTROSURG E-Z CLEAN -0035H

## (undated) DEVICE — STANDARD SURGICAL GOWN, L: Brand: CONVERTORS

## (undated) DEVICE — STERILE POLYISOPRENE POWDER-FREE SURGICAL GLOVES WITH EMOLLIENT COATING: Brand: PROTEXIS

## (undated) DEVICE — STOCKINETTE REGULAR

## (undated) DEVICE — NEPTUNE E-SEP SMOKE EVACUATION PENCIL, COATED, 70MM BLADE, PUSH BUTTON SWITCH: Brand: NEPTUNE E-SEP

## (undated) DEVICE — CUFF TOURNIQUET DISP SZ18

## (undated) DEVICE — GLOVE INDICATOR PI UNDERGLOVE SZ 7.5 BLUE

## (undated) DEVICE — SUT SILK 2-0 SH 30 IN K833H

## (undated) DEVICE — INSTRUMENT POUCH: Brand: CONVERTORS

## (undated) DEVICE — GAUZE SPONGES,16 PLY: Brand: CURITY

## (undated) DEVICE — STERILE POLYISOPRENE POWDER-FREE SURGICAL GLOVES: Brand: PROTEXIS

## (undated) DEVICE — SYRINGE EPI 8ML LUER SLIP LOSS OF RESISTANCE PLASTIC PERFIX

## (undated) DEVICE — 3M™ IOBAN™ 2 ANTIMICROBIAL INCISE DRAPE 6650EZ: Brand: IOBAN™ 2

## (undated) DEVICE — PREP PAD BNS: Brand: CONVERTORS

## (undated) DEVICE — DRESSING MEPILEX FOAM BORDER SACRUM 6.3 X 7.9IN

## (undated) DEVICE — CAST PADDING 4 IN SYNTHETIC NON-STRL

## (undated) DEVICE — DRESSING MEPILEX AG BORDER POST-OP 4 X 8 IN

## (undated) DEVICE — CHLORAPREP HI-LITE 26ML ORANGE

## (undated) DEVICE — SUT ETHILON 3-0 PS-1 18 IN 1663G

## (undated) DEVICE — SUT ETHILON 3-0 PS-1 18 IN 1663H

## (undated) DEVICE — GLOVE SRG BIOGEL 7.5

## (undated) DEVICE — DRESSING MEPORE FILM ADHESIVE 4 X 5IN

## (undated) DEVICE — SINGLE PORT MANIFOLD: Brand: NEPTUNE 2

## (undated) DEVICE — PAD CAST 4 IN COTTON NON STERILE

## (undated) DEVICE — EXOFIN PRECISION PEN HIGH VISCOSITY TOPICAL SKIN ADHESIVE: Brand: EXOFIN PRECISION PEN, 1G

## (undated) DEVICE — HEAVY DUTY TABLE COVER: Brand: CONVERTORS

## (undated) DEVICE — SUT MONOCRYL 3-0 SH 27 IN Y416H

## (undated) DEVICE — BETHLEHEM UNIVERSAL  MIONR EXT: Brand: CARDINAL HEALTH

## (undated) DEVICE — SURGICEL 4 X 8IN

## (undated) DEVICE — THREE-QUARTER SHEET: Brand: CONVERTORS

## (undated) DEVICE — TIBURON LAPAROTOMY DRAPE: Brand: CONVERTORS

## (undated) DEVICE — PAD GROUNDING DUAL ADULT

## (undated) DEVICE — DISPOSABLE EQUIPMENT COVER: Brand: SMALL TOWEL DRAPE

## (undated) DEVICE — SUT MONOCRYL 4-0 PS-2 27 IN Y426H

## (undated) DEVICE — TUBING SUCTION 5MM X 12 FT

## (undated) DEVICE — PADDING CAST 3IN COTTON STRL

## (undated) DEVICE — TIBURON SPLIT SHEET: Brand: CONVERTORS

## (undated) DEVICE — BETHLEHEM UNIVERSAL MINOR GEN: Brand: CARDINAL HEALTH

## (undated) DEVICE — SUPER SPONGES,MEDIUM: Brand: DERMACEA

## (undated) DEVICE — ELECTRODE BLADE MOD E-Z CLEAN 2.5IN 6.4CM -0012M

## (undated) DEVICE — C-ARM: Brand: UNBRANDED

## (undated) DEVICE — NEEDLE 18 G X 1 1/2

## (undated) DEVICE — INTENDED FOR TISSUE SEPARATION, AND OTHER PROCEDURES THAT REQUIRE A SHARP SURGICAL BLADE TO PUNCTURE OR CUT.: Brand: BARD-PARKER ® CARBON RIB-BACK BLADES

## (undated) DEVICE — 10FR FRAZIER SUCTION HANDLE: Brand: CARDINAL HEALTH

## (undated) DEVICE — GLOVE PI ULTRA TOUCH SZ.7.0

## (undated) DEVICE — 3M™ STERI-STRIP™ REINFORCED ADHESIVE SKIN CLOSURES, R1547, 1/2 IN X 4 IN (12 MM X 100 MM), 6 STRIPS/ENVELOPE: Brand: 3M™ STERI-STRIP™

## (undated) DEVICE — MINOR PROCEDURE DRAPE: Brand: CONVERTORS

## (undated) DEVICE — ACE WRAP 4 IN UNSTERILE

## (undated) DEVICE — LUBRICANT SURGILUBE TUBE 4 OZ  FLIP TOP

## (undated) DEVICE — ANTIBACTERIAL VIOLET BRAIDED (POLYGLACTIN 910), SYNTHETIC ABSORBABLE SUTURE: Brand: COATED VICRYL

## (undated) DEVICE — GLOVE INDICATOR PI UNDERGLOVE SZ 7 BLUE

## (undated) DEVICE — SYRINGE 10ML LL

## (undated) DEVICE — CUFF TOURNIQUET 34 X 4 IN QUICK CONNECT DISP 1BLA

## (undated) DEVICE — DRAPE SURGIKIT SADDLE BAG

## (undated) DEVICE — RX-2™ COUDÉ®  EPIDURAL NEEDLE 14G TW X 6.0": Brand: EPIMED

## (undated) DEVICE — JACKSON TABLE FOAM POSITIONING KIT: Brand: CARDINAL HEALTH

## (undated) DEVICE — ABDOMINAL PAD: Brand: DERMACEA

## (undated) DEVICE — CHLORAPREP HI-LITE 10.5ML ORANGE

## (undated) DEVICE — GLOVE RADIATION 7 1/2 PF

## (undated) DEVICE — RX-2™ COUDÉ®  EPIDURAL NEEDLE 14G TW X 4.0": Brand: EPIMED

## (undated) DEVICE — BIPOLAR CORD DISP

## (undated) DEVICE — LIGACLIP MCA MULTIPLE CLIP APPLIERS, 20 SMALL CLIPS: Brand: LIGACLIP

## (undated) DEVICE — IMPERVIOUS SURGICAL GOWN, LG: Brand: CONVERTORS

## (undated) DEVICE — REM POLYHESIVE ADULT PATIENT RETURN ELECTRODE: Brand: VALLEYLAB

## (undated) DEVICE — SPONGE LAP 18 X 18 IN STRL RFD

## (undated) DEVICE — ANTIBACTERIAL UNDYED BRAIDED (POLYGLACTIN 910), SYNTHETIC ABSORBABLE SUTURE: Brand: COATED VICRYL

## (undated) DEVICE — PENCILETTE PUSH BUTTON COATED

## (undated) DEVICE — SUT MONOCRYL PLUS 4-0 PS-2 18 IN MCP496G

## (undated) DEVICE — TUNNELING TOOL KIT, 35CM: Brand: NEVRO®

## (undated) DEVICE — INTENDED FOR TISSUE SEPARATION, AND OTHER PROCEDURES THAT REQUIRE A SHARP SURGICAL BLADE TO PUNCTURE OR CUT.: Brand: BARD-PARKER ® SAFETYLOCK CARBON RIB-BACK BLADES

## (undated) DEVICE — CUFF TOURNIQUET 18 X 4 IN QUICK CONNECT DISP 1 BLADDER

## (undated) DEVICE — 2963 MEDIPORE SOFT CLOTH TAPE 3 IN X 10 YD 12 RLS/CS: Brand: 3M™ MEDIPORE™

## (undated) DEVICE — OCCLUSIVE GAUZE STRIP,3% BISMUTH TRIBROMOPHENATE IN PETROLATUM BLEND: Brand: XEROFORM

## (undated) DEVICE — 3000CC GUARDIAN II: Brand: GUARDIAN

## (undated) DEVICE — 2000CC GUARDIAN II: Brand: GUARDIAN

## (undated) DEVICE — NEEDLE SPINAL 22G X 5IN QUINCKE

## (undated) DEVICE — UNDERGLOVE PROTEXIS  BLUE SZ 7

## (undated) DEVICE — 3M™ TEGADERM™ TRANSPARENT FILM DRESSING FRAME STYLE, 1628, 6 IN X 8 IN (15 CM X 20 CM), 10/CT 8CT/CASE: Brand: 3M™ TEGADERM™

## (undated) DEVICE — PACK UNIVERSAL NECK

## (undated) DEVICE — HYDROPHILIC WOUND DRESSING WITH ZINC PLUS VITAMINS A AND B6.: Brand: DERMAGRAN®-B

## (undated) DEVICE — DRESSING MEPILEX AG BORDER POST-OP 4 X 6 IN

## (undated) DEVICE — DISPOSABLE OR TOWEL: Brand: CARDINAL HEALTH

## (undated) DEVICE — TRAY EPIDURAL SINGLE SHOT

## (undated) DEVICE — DRAPE C-ARMOUR

## (undated) DEVICE — SUT MONOCRYL 5-0 P-3 18 IN Y493G

## (undated) DEVICE — INTENDED FOR TISSUE SEPARATION, AND OTHER PROCEDURES THAT REQUIRE A SHARP SURGICAL BLADE TO PUNCTURE OR CUT.: Brand: BARD-PARKER SAFETY BLADES SIZE 15, STERILE

## (undated) DEVICE — 3M™ STERI-STRIP™ COMPOUND BENZOIN TINCTURE 40 BAGS/CARTON 4 CARTONS/CASE C1544: Brand: 3M™ STERI-STRIP™

## (undated) DEVICE — ACE WRAP 4 IN STERILE

## (undated) DEVICE — CURITY STRETCH BANDAGE: Brand: CURITY

## (undated) DEVICE — SUT VICRYL 4-0 PS-2 27 IN J426H